# Patient Record
Sex: MALE | Race: BLACK OR AFRICAN AMERICAN | NOT HISPANIC OR LATINO | Employment: OTHER | URBAN - NONMETROPOLITAN AREA
[De-identification: names, ages, dates, MRNs, and addresses within clinical notes are randomized per-mention and may not be internally consistent; named-entity substitution may affect disease eponyms.]

---

## 2017-06-07 ENCOUNTER — APPOINTMENT (OUTPATIENT)
Dept: GENERAL RADIOLOGY | Facility: HOSPITAL | Age: 77
End: 2017-06-07

## 2017-06-07 ENCOUNTER — APPOINTMENT (OUTPATIENT)
Dept: CT IMAGING | Facility: HOSPITAL | Age: 77
End: 2017-06-07

## 2017-06-07 ENCOUNTER — HOSPITAL ENCOUNTER (OUTPATIENT)
Facility: HOSPITAL | Age: 77
Setting detail: OBSERVATION
Discharge: HOME | End: 2017-06-09
Attending: EMERGENCY MEDICINE | Admitting: HOSPITALIST

## 2017-06-07 DIAGNOSIS — D72.829 LEUKOCYTOSIS, UNSPECIFIED TYPE: ICD-10-CM

## 2017-06-07 DIAGNOSIS — R41.82 ALTERED MENTAL STATUS, UNSPECIFIED: Primary | ICD-10-CM

## 2017-06-07 DIAGNOSIS — R53.81 PHYSICAL DECONDITIONING: ICD-10-CM

## 2017-06-07 DIAGNOSIS — N28.9 RENAL INSUFFICIENCY: ICD-10-CM

## 2017-06-07 LAB
ALBUMIN SERPL-MCNC: 4.4 G/DL (ref 3.4–4.8)
ALBUMIN/GLOB SERPL: 1.2 G/DL (ref 1.1–1.8)
ALP SERPL-CCNC: 127 U/L (ref 38–126)
ALT SERPL W P-5'-P-CCNC: 27 U/L (ref 21–72)
ANION GAP SERPL CALCULATED.3IONS-SCNC: 14 MMOL/L (ref 5–15)
AST SERPL-CCNC: 32 U/L (ref 17–59)
BACTERIA UR QL AUTO: ABNORMAL /HPF
BASOPHILS # BLD AUTO: 0.01 10*3/MM3 (ref 0–0.2)
BASOPHILS NFR BLD AUTO: 0.1 % (ref 0–2)
BILIRUB SERPL-MCNC: 0.8 MG/DL (ref 0.2–1.3)
BILIRUB UR QL STRIP: NEGATIVE
BUN BLD-MCNC: 36 MG/DL (ref 7–21)
BUN/CREAT SERPL: 16.3 (ref 7–25)
CALCIUM SPEC-SCNC: 9 MG/DL (ref 8.4–10.2)
CHLORIDE SERPL-SCNC: 97 MMOL/L (ref 95–110)
CK MB SERPL-CCNC: 0.68 NG/ML (ref 0–5)
CK SERPL-CCNC: 77 U/L (ref 55–170)
CLARITY UR: CLEAR
CLUMPED PLATELETS: NORMAL
CO2 SERPL-SCNC: 23 MMOL/L (ref 22–31)
COLOR UR: YELLOW
CREAT BLD-MCNC: 2.21 MG/DL (ref 0.7–1.3)
DACRYOCYTES BLD QL SMEAR: NORMAL
DEPRECATED RDW RBC AUTO: 41.3 FL (ref 35.1–43.9)
EOSINOPHIL # BLD AUTO: 0.01 10*3/MM3 (ref 0–0.7)
EOSINOPHIL NFR BLD AUTO: 0.1 % (ref 0–7)
ERYTHROCYTE [DISTWIDTH] IN BLOOD BY AUTOMATED COUNT: 14.9 % (ref 11.5–14.5)
GFR SERPL CREATININE-BSD FRML MDRD: 35 ML/MIN/1.73 (ref 42–98)
GLOBULIN UR ELPH-MCNC: 3.6 GM/DL (ref 2.3–3.5)
GLUCOSE BLD-MCNC: 259 MG/DL (ref 60–100)
GLUCOSE BLDC GLUCOMTR-MCNC: 222 MG/DL (ref 70–130)
GLUCOSE UR STRIP-MCNC: ABNORMAL MG/DL
HCT VFR BLD AUTO: 47.5 % (ref 39–49)
HGB BLD-MCNC: 16.7 G/DL (ref 13.7–17.3)
HGB UR QL STRIP.AUTO: ABNORMAL
HOLD SPECIMEN: NORMAL
HOLD SPECIMEN: NORMAL
HYALINE CASTS UR QL AUTO: ABNORMAL /LPF
IMM GRANULOCYTES # BLD: 0.04 10*3/MM3 (ref 0–0.02)
IMM GRANULOCYTES NFR BLD: 0.3 % (ref 0–0.5)
INR PPP: 2.02 (ref 0.8–1.2)
KETONES UR QL STRIP: ABNORMAL
LEUKOCYTE ESTERASE UR QL STRIP.AUTO: NEGATIVE
LYMPHOCYTES # BLD AUTO: 0.66 10*3/MM3 (ref 0.6–4.2)
LYMPHOCYTES NFR BLD AUTO: 4.7 % (ref 10–50)
MCH RBC QN AUTO: 26.6 PG (ref 26.5–34)
MCHC RBC AUTO-ENTMCNC: 35.2 G/DL (ref 31.5–36.3)
MCV RBC AUTO: 75.8 FL (ref 80–98)
MICROCYTES BLD QL: NORMAL
MONOCYTES # BLD AUTO: 0.41 10*3/MM3 (ref 0–0.9)
MONOCYTES NFR BLD AUTO: 2.9 % (ref 0–12)
NEUTROPHILS # BLD AUTO: 13 10*3/MM3 (ref 2–8.6)
NEUTROPHILS NFR BLD AUTO: 91.9 % (ref 37–80)
NITRITE UR QL STRIP: NEGATIVE
NRBC BLD MANUAL-RTO: 0 /100 WBC (ref 0–0)
PH UR STRIP.AUTO: <=5 [PH] (ref 5–9)
PLATELET # BLD AUTO: 224 10*3/MM3 (ref 150–450)
PMV BLD AUTO: 11.4 FL (ref 8–12)
POTASSIUM BLD-SCNC: 4.9 MMOL/L (ref 3.5–5.1)
PROT SERPL-MCNC: 8 G/DL (ref 6.3–8.6)
PROT UR QL STRIP: ABNORMAL
PROTHROMBIN TIME: 23 SECONDS (ref 11.1–15.3)
RBC # BLD AUTO: 6.27 10*6/MM3 (ref 4.37–5.74)
RBC # UR: ABNORMAL /HPF
REF LAB TEST METHOD: ABNORMAL
SMALL PLATELETS BLD QL SMEAR: ADEQUATE
SODIUM BLD-SCNC: 134 MMOL/L (ref 137–145)
SP GR UR STRIP: 1.02 (ref 1–1.03)
SQUAMOUS #/AREA URNS HPF: ABNORMAL /HPF
TROPONIN I SERPL-MCNC: <0.012 NG/ML
UROBILINOGEN UR QL STRIP: ABNORMAL
WBC MORPH BLD: NORMAL
WBC NRBC COR # BLD: 14.13 10*3/MM3 (ref 3.2–9.8)
WBC UR QL AUTO: ABNORMAL /HPF
WHOLE BLOOD HOLD SPECIMEN: NORMAL
WHOLE BLOOD HOLD SPECIMEN: NORMAL

## 2017-06-07 PROCEDURE — 82962 GLUCOSE BLOOD TEST: CPT

## 2017-06-07 PROCEDURE — 87040 BLOOD CULTURE FOR BACTERIA: CPT | Performed by: EMERGENCY MEDICINE

## 2017-06-07 PROCEDURE — 84484 ASSAY OF TROPONIN QUANT: CPT | Performed by: EMERGENCY MEDICINE

## 2017-06-07 PROCEDURE — 82553 CREATINE MB FRACTION: CPT | Performed by: EMERGENCY MEDICINE

## 2017-06-07 PROCEDURE — 82550 ASSAY OF CK (CPK): CPT | Performed by: EMERGENCY MEDICINE

## 2017-06-07 PROCEDURE — 96375 TX/PRO/DX INJ NEW DRUG ADDON: CPT

## 2017-06-07 PROCEDURE — 99285 EMERGENCY DEPT VISIT HI MDM: CPT

## 2017-06-07 PROCEDURE — 85025 COMPLETE CBC W/AUTO DIFF WBC: CPT | Performed by: EMERGENCY MEDICINE

## 2017-06-07 PROCEDURE — 93010 ELECTROCARDIOGRAM REPORT: CPT | Performed by: INTERNAL MEDICINE

## 2017-06-07 PROCEDURE — 96374 THER/PROPH/DIAG INJ IV PUSH: CPT

## 2017-06-07 PROCEDURE — 85610 PROTHROMBIN TIME: CPT | Performed by: EMERGENCY MEDICINE

## 2017-06-07 PROCEDURE — G0378 HOSPITAL OBSERVATION PER HR: HCPCS

## 2017-06-07 PROCEDURE — 80053 COMPREHEN METABOLIC PANEL: CPT | Performed by: EMERGENCY MEDICINE

## 2017-06-07 PROCEDURE — 70450 CT HEAD/BRAIN W/O DYE: CPT

## 2017-06-07 PROCEDURE — 85007 BL SMEAR W/DIFF WBC COUNT: CPT | Performed by: EMERGENCY MEDICINE

## 2017-06-07 PROCEDURE — 81001 URINALYSIS AUTO W/SCOPE: CPT | Performed by: EMERGENCY MEDICINE

## 2017-06-07 PROCEDURE — 93005 ELECTROCARDIOGRAM TRACING: CPT | Performed by: EMERGENCY MEDICINE

## 2017-06-07 PROCEDURE — 71010 HC CHEST PA OR AP: CPT

## 2017-06-07 PROCEDURE — 25010000002 CEFTRIAXONE: Performed by: EMERGENCY MEDICINE

## 2017-06-07 RX ORDER — BUDESONIDE 0.5 MG/2ML
0.5 INHALANT ORAL
COMMUNITY
Start: 2016-01-26

## 2017-06-07 RX ORDER — PILOCARPINE HYDROCHLORIDE 10 MG/ML
1 SOLUTION/ DROPS OPHTHALMIC
COMMUNITY
Start: 2016-01-26 | End: 2020-01-01 | Stop reason: HOSPADM

## 2017-06-07 RX ORDER — LANSOPRAZOLE 30 MG/1
30 CAPSULE, DELAYED RELEASE ORAL
Status: ON HOLD | COMMUNITY
Start: 2017-04-10 | End: 2021-01-01

## 2017-06-07 RX ORDER — AMLODIPINE BESYLATE 5 MG/1
TABLET ORAL
COMMUNITY
Start: 2017-03-07 | End: 2020-01-01 | Stop reason: HOSPADM

## 2017-06-07 RX ORDER — BRINZOLAMIDE 10 MG/ML
1 SUSPENSION/ DROPS OPHTHALMIC 3 TIMES DAILY
COMMUNITY
Start: 2016-01-26

## 2017-06-07 RX ORDER — CLONIDINE HYDROCHLORIDE 0.2 MG/1
0.2 TABLET ORAL
COMMUNITY
Start: 2017-04-10 | End: 2020-01-01 | Stop reason: HOSPADM

## 2017-06-07 RX ORDER — OXYBUTYNIN CHLORIDE 10 MG/1
TABLET, EXTENDED RELEASE ORAL
COMMUNITY
Start: 2016-09-05 | End: 2020-01-01 | Stop reason: HOSPADM

## 2017-06-07 RX ORDER — WARFARIN SODIUM 5 MG/1
TABLET ORAL
COMMUNITY
Start: 2017-04-10 | End: 2020-01-01 | Stop reason: HOSPADM

## 2017-06-07 RX ORDER — IPRATROPIUM BROMIDE AND ALBUTEROL SULFATE 2.5; .5 MG/3ML; MG/3ML
3 SOLUTION RESPIRATORY (INHALATION) EVERY 6 HOURS
COMMUNITY
Start: 2016-01-26 | End: 2020-01-01 | Stop reason: HOSPADM

## 2017-06-07 RX ORDER — HYDROCODONE BITARTRATE AND ACETAMINOPHEN 7.5; 325 MG/1; MG/1
1 TABLET ORAL EVERY 6 HOURS PRN
COMMUNITY
End: 2020-01-01 | Stop reason: HOSPADM

## 2017-06-07 RX ORDER — LATANOPROST 50 UG/ML
1 SOLUTION/ DROPS OPHTHALMIC NIGHTLY
COMMUNITY
Start: 2016-01-26

## 2017-06-07 RX ORDER — SODIUM CHLORIDE 9 MG/ML
125 INJECTION, SOLUTION INTRAVENOUS CONTINUOUS
Status: DISCONTINUED | OUTPATIENT
Start: 2017-06-07 | End: 2017-06-09 | Stop reason: HOSPADM

## 2017-06-07 RX ORDER — SODIUM CHLORIDE 0.9 % (FLUSH) 0.9 %
10 SYRINGE (ML) INJECTION AS NEEDED
Status: DISCONTINUED | OUTPATIENT
Start: 2017-06-07 | End: 2017-06-09 | Stop reason: HOSPADM

## 2017-06-07 RX ORDER — ALBUTEROL SULFATE 90 UG/1
2 AEROSOL, METERED RESPIRATORY (INHALATION) EVERY 6 HOURS
COMMUNITY
End: 2020-01-01 | Stop reason: HOSPADM

## 2017-06-07 RX ORDER — HYDROCODONE BITARTRATE AND ACETAMINOPHEN 7.5; 325 MG/1; MG/1
1 TABLET ORAL ONCE
Status: COMPLETED | OUTPATIENT
Start: 2017-06-07 | End: 2017-06-07

## 2017-06-07 RX ORDER — DESLORATADINE 5 MG/1
5 TABLET ORAL
COMMUNITY
Start: 2017-04-10 | End: 2020-01-01 | Stop reason: HOSPADM

## 2017-06-07 RX ORDER — LABETALOL HYDROCHLORIDE 5 MG/ML
40 INJECTION, SOLUTION INTRAVENOUS ONCE
Status: COMPLETED | OUTPATIENT
Start: 2017-06-07 | End: 2017-06-07

## 2017-06-07 RX ADMIN — Medication 10 ML: at 22:27

## 2017-06-07 RX ADMIN — HYDROCODONE BITARTRATE AND ACETAMINOPHEN 1 TABLET: 7.5; 325 TABLET ORAL at 22:43

## 2017-06-07 RX ADMIN — CEFTRIAXONE 1 G: 1 INJECTION, POWDER, FOR SOLUTION INTRAMUSCULAR; INTRAVENOUS at 22:42

## 2017-06-07 RX ADMIN — Medication 10 ML: at 22:00

## 2017-06-07 RX ADMIN — SODIUM CHLORIDE 125 ML/HR: 900 INJECTION, SOLUTION INTRAVENOUS at 23:56

## 2017-06-07 RX ADMIN — LABETALOL HYDROCHLORIDE 40 MG: 5 INJECTION, SOLUTION INTRAVENOUS at 22:27

## 2017-06-08 LAB
ANION GAP SERPL CALCULATED.3IONS-SCNC: 13 MMOL/L (ref 5–15)
BUN BLD-MCNC: 44 MG/DL (ref 7–21)
BUN/CREAT SERPL: 16.3 (ref 7–25)
CALCIUM SPEC-SCNC: 8.7 MG/DL (ref 8.4–10.2)
CHLORIDE SERPL-SCNC: 100 MMOL/L (ref 95–110)
CO2 SERPL-SCNC: 25 MMOL/L (ref 22–31)
CREAT BLD-MCNC: 2.7 MG/DL (ref 0.7–1.3)
GFR SERPL CREATININE-BSD FRML MDRD: 28 ML/MIN/1.73 (ref 42–98)
GLUCOSE BLD-MCNC: 251 MG/DL (ref 60–100)
GLUCOSE BLDC GLUCOMTR-MCNC: 222 MG/DL (ref 70–130)
GLUCOSE BLDC GLUCOMTR-MCNC: 268 MG/DL (ref 70–130)
GLUCOSE BLDC GLUCOMTR-MCNC: 272 MG/DL (ref 70–130)
GLUCOSE BLDC GLUCOMTR-MCNC: 299 MG/DL (ref 70–130)
GLUCOSE BLDC GLUCOMTR-MCNC: 356 MG/DL (ref 70–130)
INR PPP: 2.05 (ref 0.8–1.2)
INR PPP: 2.19 (ref 0.8–1.2)
POTASSIUM BLD-SCNC: 4.1 MMOL/L (ref 3.5–5.1)
PROTHROMBIN TIME: 23.3 SECONDS (ref 11.1–15.3)
PROTHROMBIN TIME: 24.5 SECONDS (ref 11.1–15.3)
SODIUM BLD-SCNC: 138 MMOL/L (ref 137–145)
TROPONIN I SERPL-MCNC: <0.012 NG/ML
TROPONIN I SERPL-MCNC: <0.012 NG/ML
WHOLE BLOOD HOLD SPECIMEN: NORMAL

## 2017-06-08 PROCEDURE — 63710000001 INSULIN DETEMIR PER 5 UNITS: Performed by: HOSPITALIST

## 2017-06-08 PROCEDURE — 84484 ASSAY OF TROPONIN QUANT: CPT | Performed by: HOSPITALIST

## 2017-06-08 PROCEDURE — 87040 BLOOD CULTURE FOR BACTERIA: CPT | Performed by: EMERGENCY MEDICINE

## 2017-06-08 PROCEDURE — 82962 GLUCOSE BLOOD TEST: CPT

## 2017-06-08 PROCEDURE — 80048 BASIC METABOLIC PNL TOTAL CA: CPT | Performed by: NURSE PRACTITIONER

## 2017-06-08 PROCEDURE — G0378 HOSPITAL OBSERVATION PER HR: HCPCS

## 2017-06-08 PROCEDURE — 94640 AIRWAY INHALATION TREATMENT: CPT

## 2017-06-08 PROCEDURE — 85610 PROTHROMBIN TIME: CPT | Performed by: HOSPITALIST

## 2017-06-08 PROCEDURE — 63710000001 INSULIN ASPART PER 5 UNITS: Performed by: NURSE PRACTITIONER

## 2017-06-08 PROCEDURE — 94799 UNLISTED PULMONARY SVC/PX: CPT

## 2017-06-08 PROCEDURE — 94760 N-INVAS EAR/PLS OXIMETRY 1: CPT

## 2017-06-08 PROCEDURE — 25010000002 CEFTRIAXONE: Performed by: HOSPITALIST

## 2017-06-08 RX ORDER — IPRATROPIUM BROMIDE AND ALBUTEROL SULFATE 2.5; .5 MG/3ML; MG/3ML
3 SOLUTION RESPIRATORY (INHALATION) EVERY 6 HOURS PRN
Status: DISCONTINUED | OUTPATIENT
Start: 2017-06-08 | End: 2017-06-09 | Stop reason: HOSPADM

## 2017-06-08 RX ORDER — SODIUM CHLORIDE 0.9 % (FLUSH) 0.9 %
1-10 SYRINGE (ML) INJECTION AS NEEDED
Status: DISCONTINUED | OUTPATIENT
Start: 2017-06-08 | End: 2017-06-09 | Stop reason: HOSPADM

## 2017-06-08 RX ORDER — CLONIDINE HYDROCHLORIDE 0.2 MG/1
0.2 TABLET ORAL EVERY 8 HOURS SCHEDULED
Status: DISCONTINUED | OUTPATIENT
Start: 2017-06-08 | End: 2017-06-09 | Stop reason: HOSPADM

## 2017-06-08 RX ORDER — WARFARIN SODIUM 5 MG/1
5 TABLET ORAL
Status: DISCONTINUED | OUTPATIENT
Start: 2017-06-08 | End: 2017-06-08

## 2017-06-08 RX ORDER — AMLODIPINE BESYLATE 5 MG/1
5 TABLET ORAL
Status: DISCONTINUED | OUTPATIENT
Start: 2017-06-08 | End: 2017-06-09 | Stop reason: HOSPADM

## 2017-06-08 RX ORDER — IPRATROPIUM BROMIDE AND ALBUTEROL SULFATE 2.5; .5 MG/3ML; MG/3ML
3 SOLUTION RESPIRATORY (INHALATION)
Status: DISCONTINUED | OUTPATIENT
Start: 2017-06-08 | End: 2017-06-08

## 2017-06-08 RX ORDER — HYDROCODONE BITARTRATE AND ACETAMINOPHEN 7.5; 325 MG/1; MG/1
1 TABLET ORAL EVERY 6 HOURS PRN
Status: DISCONTINUED | OUTPATIENT
Start: 2017-06-08 | End: 2017-06-09 | Stop reason: HOSPADM

## 2017-06-08 RX ORDER — ALBUTEROL SULFATE 90 UG/1
2 AEROSOL, METERED RESPIRATORY (INHALATION) EVERY 6 HOURS
Status: DISCONTINUED | OUTPATIENT
Start: 2017-06-08 | End: 2017-06-08 | Stop reason: CLARIF

## 2017-06-08 RX ORDER — DEXTROSE MONOHYDRATE 25 G/50ML
25 INJECTION, SOLUTION INTRAVENOUS
Status: DISCONTINUED | OUTPATIENT
Start: 2017-06-08 | End: 2017-06-09 | Stop reason: HOSPADM

## 2017-06-08 RX ORDER — NALOXONE HCL 0.4 MG/ML
0.4 VIAL (ML) INJECTION
Status: DISCONTINUED | OUTPATIENT
Start: 2017-06-08 | End: 2017-06-09 | Stop reason: HOSPADM

## 2017-06-08 RX ORDER — PANTOPRAZOLE SODIUM 40 MG/1
40 TABLET, DELAYED RELEASE ORAL EVERY MORNING
Status: DISCONTINUED | OUTPATIENT
Start: 2017-06-08 | End: 2017-06-09 | Stop reason: HOSPADM

## 2017-06-08 RX ORDER — NICOTINE POLACRILEX 4 MG
15 LOZENGE BUCCAL
Status: DISCONTINUED | OUTPATIENT
Start: 2017-06-08 | End: 2017-06-09 | Stop reason: HOSPADM

## 2017-06-08 RX ORDER — MORPHINE SULFATE 2 MG/ML
1 INJECTION, SOLUTION INTRAMUSCULAR; INTRAVENOUS EVERY 4 HOURS PRN
Status: DISCONTINUED | OUTPATIENT
Start: 2017-06-08 | End: 2017-06-09 | Stop reason: HOSPADM

## 2017-06-08 RX ORDER — WARFARIN SODIUM 5 MG/1
5 TABLET ORAL
Status: DISCONTINUED | OUTPATIENT
Start: 2017-06-08 | End: 2017-06-09 | Stop reason: HOSPADM

## 2017-06-08 RX ORDER — WARFARIN SODIUM 7.5 MG/1
7.5 TABLET ORAL
Status: DISCONTINUED | OUTPATIENT
Start: 2017-06-09 | End: 2017-06-09 | Stop reason: HOSPADM

## 2017-06-08 RX ORDER — BUDESONIDE 0.5 MG/2ML
0.5 INHALANT ORAL
Status: DISCONTINUED | OUTPATIENT
Start: 2017-06-08 | End: 2017-06-09 | Stop reason: HOSPADM

## 2017-06-08 RX ADMIN — BUDESONIDE 0.5 MG: 0.5 INHALANT RESPIRATORY (INHALATION) at 19:50

## 2017-06-08 RX ADMIN — IPRATROPIUM BROMIDE AND ALBUTEROL SULFATE 3 ML: 2.5; .5 SOLUTION RESPIRATORY (INHALATION) at 06:45

## 2017-06-08 RX ADMIN — INSULIN ASPART 6 UNITS: 100 INJECTION, SOLUTION INTRAVENOUS; SUBCUTANEOUS at 11:09

## 2017-06-08 RX ADMIN — INSULIN ASPART 8 UNITS: 100 INJECTION, SOLUTION INTRAVENOUS; SUBCUTANEOUS at 21:20

## 2017-06-08 RX ADMIN — INSULIN DETEMIR 15 UNITS: 100 INJECTION, SOLUTION SUBCUTANEOUS at 08:22

## 2017-06-08 RX ADMIN — SODIUM CHLORIDE 125 ML/HR: 900 INJECTION, SOLUTION INTRAVENOUS at 11:13

## 2017-06-08 RX ADMIN — CEFTRIAXONE 1 G: 1 INJECTION, POWDER, FOR SOLUTION INTRAMUSCULAR; INTRAVENOUS at 21:33

## 2017-06-08 RX ADMIN — CLONIDINE HYDROCHLORIDE 0.2 MG: 0.2 TABLET ORAL at 21:20

## 2017-06-08 RX ADMIN — INSULIN DETEMIR 15 UNITS: 100 INJECTION, SOLUTION SUBCUTANEOUS at 18:09

## 2017-06-08 RX ADMIN — AMLODIPINE BESYLATE 5 MG: 5 TABLET ORAL at 08:22

## 2017-06-08 RX ADMIN — CLONIDINE HYDROCHLORIDE 0.2 MG: 0.2 TABLET ORAL at 15:24

## 2017-06-08 RX ADMIN — WARFARIN SODIUM 5 MG: 5 TABLET ORAL at 18:09

## 2017-06-08 RX ADMIN — BUDESONIDE 0.5 MG: 0.5 INHALANT RESPIRATORY (INHALATION) at 06:45

## 2017-06-08 RX ADMIN — INSULIN ASPART 6 UNITS: 100 INJECTION, SOLUTION INTRAVENOUS; SUBCUTANEOUS at 18:09

## 2017-06-08 RX ADMIN — CLONIDINE HYDROCHLORIDE 0.2 MG: 0.2 TABLET ORAL at 06:21

## 2017-06-08 RX ADMIN — PANTOPRAZOLE SODIUM 40 MG: 40 TABLET, DELAYED RELEASE ORAL at 06:21

## 2017-06-08 NOTE — CONSULTS
Discharge Planning Assessment  [facility]     Patient Name: Ondina Alanis Sr.  MRN: [id number]  Today's Date: 6/8/2017    Admit Date: 6/7/2017          Discharge Needs Assessment       06/08/17 1626    Living Environment    Lives With spouse    Living Arrangements house    Transportation Available family or friend will provide    Living Environment    Provides Primary Care For no one    Primary Care Provided By spouse/significant other    Quality Of Family Relationships supportive    Able to Return to Prior Living Arrangements yes    Living Arrangement Comments lives with spouse.     Discharge Needs Assessment    Concerns To Be Addressed discharge planning concerns   dme    Concerns Comments Pt's wife requested a wheelchair and transfer tub bench from [facility]. If HHC is ordered caretenders is preferred    Anticipated Changes Related to Illness inability to care for self    Equipment Currently Used at Home walker, rolling;commode   has rollator also    Equipment Needed After Discharge bath bench;wheelchair    Discharge Facility/Level Of Care Needs home with home health    Current Discharge Risk cognitively impaired    Discharge Planning Comments anticipate home vs. home with c. Pt's wife has assistance to get him up the 4 steps to the house. She said that the son is planning on building a wheelchair ramp. His ins. covers meds. through [employer] mail order. For short term meds, [facility].             Discharge Plan       06/08/17 1637    Case Management/Social Work Plan    Plan home vs. home with [facility]    Patient/Family In Agreement With Plan yes        Discharge Placement     No information found        Expected Discharge Date and Time     Expected Discharge Date Expected Discharge Time    Jun 9, 2017               Demographic Summary     None            Functional Status     None            Psychosocial     None            Abuse/Neglect     None            Legal     None            Substance Abuse      None            Patient Forms       06/08/17 1626    Patient Forms    Patient Observation Letter Delivered    Delivered to Support person   wife    Method of delivery In person   patient is disoriented          Geovanna Omalley RN

## 2017-06-08 NOTE — PROGRESS NOTES
"Anticoagulation by Pharmacy - Warfarin    Ondina Alanis Sr. is a 77 y.o.male  [Ht: 76\" (193 cm); Wt: 240 lb (109 kg)] on Warfarin 5 mg on Tuesday, Thursday and Saturday with 7.5mg on Sunday, Monday, Wednesday and Friday PO  for indication of PE.    Goal INR: 2-3  Today's INR:   Lab Results   Component Value Date    INR 2.19 (H) 06/08/2017         Lab Results   Component Value Date    INR 2.19 (H) 06/08/2017    INR 2.05 (H) 06/08/2017    INR 2.02 (H) 06/07/2017    PROTIME 24.5 (H) 06/08/2017    PROTIME 23.3 (H) 06/08/2017    PROTIME 23.0 (H) 06/07/2017     Lab Results   Component Value Date    HGB 16.7 06/07/2017    HGB 14.5 01/17/2017    HGB 14.9 01/16/2017     Lab Results   Component Value Date    HCT 47.5 06/07/2017    HCT 43.1 01/17/2017    HCT 44.2 01/16/2017     Assessment/Plan:  Reviewed above labs. INR is 2.19.  INR is at goal.Reviewed current medication list.  Will continue current home dose as listed above. Rx will continue to follow and adjust dose accordingly.      Fany Byers [facility]  06/08/17 10:10 AM     "

## 2017-06-08 NOTE — H&P
[facility] Admission      Date of Admission: 6/7/2017      Primary Care Physician: Ralph Hensley MD    Following information is obtained partially from patient, family and/or medical records.    Time of patient encounter: 6/7/17 11:45PM    Chief Complaint:   Chief Complaint   Patient presents with   • Altered Mental Status       HPI: Pt is a 77 y.o. male with known history of possible dementia presenting to the ER with altered mental status.  Patient lives with home with his wife who reports that normally he obeys commands and is helpful in activities of daily living.  However today.  Family reports that he has been uncooperative.  He suspects that he is doing requested activity however he is not able to follow commands.  Family reports the patient has been progressively weakening to the point that he is now unable to enter his house.  Family report denies any neurological findings including focal deficits.  Denies any fever or chills.  Denies any cough or congestion.  Denies any dysuria hematuria.  Denies any nausea vomiting or diarrhea.       Concurrent Medical History:   Patient Active Problem List   Diagnosis   • Altered mental status, unspecified       Past Surgical History:   Past Surgical History:   Procedure Laterality Date   • BACK SURGERY     • KNEE SURGERY Left    • PROSTATE SURGERY         Family History: family history is not on file.    Social History:  reports that he has never smoked. He does not have any smokeless tobacco history on file. He reports that he does not drink alcohol or use illicit drugs.    Allergies:   Allergies   Allergen Reactions   • Contrast Dye        Home Medications:   Prior to Admission medications    Medication Sig Start Date End Date Taking? Authorizing Provider   amLODIPine (NORVASC) 5 MG tablet TAKE 1 TABLET DAILY 3/7/17  Yes Historical Provider, MD   brimonidine (ALPHAGAN P) 0.1 % solution ophthalmic solution 1 drop.  1/26/16  Yes Historical Provider, MD   brinzolamide (AZOPT) 1 % ophthalmic suspension 1 drop. 1/26/16  Yes Historical Provider, MD   budesonide (PULMICORT) 0.5 MG/2ML nebulizer solution Inhale 0.5 mg. 1/26/16  Yes Historical Provider, MD   CloNIDine (CATAPRES) 0.2 MG tablet Take 0.2 mg by mouth. 4/10/17  Yes Historical Provider, MD   desloratadine (CLARINEX) 5 MG tablet Take 5 mg by mouth. 4/10/17  Yes Historical Provider, MD   HYDROcodone-acetaminophen (NORCO) 7.5-325 MG per tablet Take 1 tablet by mouth Every 6 (Six) Hours As Needed for Moderate Pain (4-6).   Yes Historical Provider, MD   Insulin Detemir (LEVEMIR SC) Inject 15 Units under the skin 2 (Two) Times a Day. 4/14/17  Yes Historical Provider, MD   Insulin Lispro (HUMALOG) 100 UNIT/ML solution pen-injector Inject 12 Units under the skin. 4/10/17  Yes Historical Provider, MD   ipratropium-albuterol (DUO-NEB) 0.5-2.5 mg/mL nebulizer Inhale 3 mL Every 6 (Six) Hours. 1/26/16  Yes Historical Provider, MD   lansoprazole (PREVACID) 30 MG capsule Take 30 mg by mouth. 4/10/17  Yes Historical Provider, MD   latanoprost (XALATAN) 0.005 % ophthalmic solution 1 drop. 1/26/16  Yes Historical Provider, MD   oxybutynin XL (DITROPAN-XL) 10 MG 24 hr tablet  9/5/16  Yes Historical Provider, MD   pilocarpine (PILOCAR) 1 % ophthalmic solution 1 drop. 1/26/16  Yes Historical Provider, MD   warfarin (COUMADIN) 5 MG tablet take 1 and 1/2 tablets SUN MON WED FRI and 1 tablet all other days for MYOCARDIAL REINFARCTION PREVENTION 4/10/17  Yes Historical Provider, MD   albuterol (PROVENTIL HFA;VENTOLIN HFA) 108 (90 BASE) MCG/ACT inhaler Inhale 2 puffs Every 6 (Six) Hours.    Historical Provider, MD       Review of Systems:  Review of Systems   Unable to perform ROS: Mental status change      Otherwise complete ROS is negative except as mentioned above and in HPI.    Physical Exam:   Temp:  [98.8 °F (37.1 °C)] 98.8 °F (37.1 °C)  Heart Rate:  [78-94] 78  Resp:  [18] 18  BP:  (165-195)/() 168/72  Physical Exam   Constitutional: He is oriented to person, place, and time. He appears well-developed and well-nourished.   HENT:   Head: Normocephalic and atraumatic.   Nose: Nose normal.   Mouth/Throat: Oropharynx is clear and moist.   Eyes: Conjunctivae are normal. Pupils are equal, round, and reactive to light. No scleral icterus.   Patient is bilaterally blind.   Neck: No tracheal deviation present.   Cardiovascular: Normal heart sounds.  Exam reveals no friction rub.    Pulmonary/Chest: Effort normal and breath sounds normal. No respiratory distress. He has no wheezes. He has no rales.   Abdominal: Soft. Bowel sounds are normal. He exhibits no distension. There is no tenderness.   Musculoskeletal: Normal range of motion.   Neurological: He is alert and oriented to person, place, and time.   Alert and oriented ×2.  Not oriented to time.   Skin: Skin is warm and dry.         Results Reviewed:  I have personally reviewed current lab, radiology, and data and agree with results.  Lab Results (last 24 hours)     Procedure Component Value Units Date/Time    CBC Auto Differential [[id number]]  (Abnormal) Collected:  06/07/17 2107    Specimen:  Blood Updated:  06/07/17 2120     WBC 14.13 (H) 10*3/mm3      RBC 6.27 (H) 10*6/mm3      Hemoglobin 16.7 g/dL      Hematocrit 47.5 %      MCV 75.8 (L) fL      MCH 26.6 pg      MCHC 35.2 g/dL      RDW 14.9 (H) %      RDW-SD 41.3 fl      MPV 11.4 fL      Platelets 224 10*3/mm3      Neutrophil % 91.9 (H) %      Lymphocyte % 4.7 (L) %      Monocyte % 2.9 %      Eosinophil % 0.1 %      Basophil % 0.1 %      Immature Grans % 0.3 %      Neutrophils, Absolute 13.00 (H) 10*3/mm3      Lymphocytes, Absolute 0.66 10*3/mm3      Monocytes, Absolute 0.41 10*3/mm3      Eosinophils, Absolute 0.01 10*3/mm3      Basophils, Absolute 0.01 10*3/mm3      Immature Grans, Absolute 0.04 (H) 10*3/mm3      nRBC 0.0 /100 WBC     CBC & Differential [[id number]] Collected:  06/07/17  2107    Specimen:  Blood Updated:  06/07/17 2139    Narrative:       The following orders were created for panel order CBC & Differential.  Procedure                               Abnormality         Status                     ---------                               -----------         ------                     Scan Slide[[id number]]                                       Final result               CBC Auto Differential[[id number]]        Abnormal            Final result                 Please view results for these tests on the individual orders.    Scan Slide [[id number]] Collected:  06/07/17 2107    Specimen:  Blood Updated:  06/07/17 2139     Dacrocytes Slight/1+     Microcytes Slight/1+     WBC Morphology Normal     Platelet Estimate Adequate     Clumped Platelets --      NONE SEEN       Comprehensive Metabolic Panel [[id number]]  (Abnormal) Collected:  06/07/17 2142    Specimen:  Blood Updated:  06/07/17 2200     Glucose 259 (H) mg/dL      BUN 36 (H) mg/dL      Creatinine 2.21 (H) mg/dL      Sodium 134 (L) mmol/L      Potassium 4.9 mmol/L      Chloride 97 mmol/L      CO2 23.0 mmol/L      Calcium 9.0 mg/dL      Total Protein 8.0 g/dL      Albumin 4.40 g/dL      ALT (SGPT) 27 U/L      AST (SGOT) 32 U/L      Alkaline Phosphatase 127 (H) U/L      Total Bilirubin 0.8 mg/dL      eGFR  African Amer 35 (L) mL/min/1.73      Globulin 3.6 (H) gm/dL      A/G Ratio 1.2 g/dL      BUN/Creatinine Ratio 16.3     Anion Gap 14.0 mmol/L     Narrative:       The MDRD GFR formula is only valid for adults with stable renal function between ages 18 and 70.    CK [[id number]]  (Normal) Collected:  06/07/17 2142    Specimen:  Blood Updated:  06/07/17 2200     Creatine Kinase 77 U/L     Urinalysis With / Culture If Indicated [[id number]]  (Abnormal) Collected:  06/07/17 2159    Specimen:  Urine from Urine, Catheter Updated:  06/07/17 2208     Color, UA Yellow     Appearance, UA Clear     pH, UA <=5.0     Specific Gravity, UA 1.020      Glucose, UA >=1000 mg/dL (3+) (A)     Ketones, UA Trace (A)     Bilirubin, UA Negative     Blood, UA Small (1+) (A)     Protein,  mg/dL (2+) (A)     Leuk Esterase, UA Negative     Nitrite, UA Negative     Urobilinogen, UA 0.2 E.U./dL    Urinalysis, Microscopic Only [[id number]]  (Abnormal) Collected:  06/07/17 2159    Specimen:  Urine from Urine, Catheter Updated:  06/07/17 2209     RBC, UA 0-2 (A) /HPF      WBC, UA None Seen /HPF      Bacteria, UA None Seen /HPF      Squamous Epithelial Cells, UA None Seen /HPF      Hyaline Casts, UA None Seen /LPF      Methodology Automated Microscopy    POC Glucose Fingerstick [[id number]]  (Abnormal) Collected:  06/07/17 2148    Specimen:  Blood Updated:  06/07/17 2211     Glucose 222 (H) mg/dL       RN NotifiedNotify DoctorMeter: [id number]: [id number] ML EVANGE       CK-MB [[id number]]  (Normal) Collected:  06/07/17 2142    Specimen:  Blood Updated:  06/07/17 2212     CKMB 0.68 ng/mL     Troponin [[id number]]  (Normal) Collected:  06/07/17 2142    Specimen:  Blood Updated:  06/07/17 2212     Troponin I <0.012 ng/mL     Protime-INR [[id number]]  (Abnormal) Collected:  06/07/17 2107    Specimen:  Blood Updated:  06/07/17 2234     Protime 23.0 (H) Seconds      INR 2.02 (H)    Narrative:       Therapeutic range for most indications is 2.0-3.0 INR,  or 2.5-3.5 for mechanical heart valves.    Blood Culture [[id number]] Collected:  06/07/17 2239    Specimen:  Blood from Arm, Left Updated:  06/07/17 2248    Light Blue Top [[id number]] Collected:  06/07/17 2107    Specimen:  Blood Updated:  06/07/17 2305     Extra Tube hold for add-on      Auto resulted       Lavender Top [[id number]] Collected:  06/07/17 2107    Specimen:  Blood Updated:  06/07/17 2305     Extra Tube hold for add-on      Auto resulted       Gold Top - SST [[id number]] Collected:  06/07/17 2107    Specimen:  Blood Updated:  06/07/17 2303     Extra Tube Hold for add-ons.      Auto resulted.       [place] Draw  [[id number]] Collected:  06/07/17 2107    Specimen:  Blood Updated:  06/07/17 2305    Narrative:       The following orders were created for panel order [place] Draw.  Procedure                               Abnormality         Status                     ---------                               -----------         ------                     Light Blue Top[[id number]]                                   Final result               Green Top (Gel)[[id number]]                                  Final result               Lavender Top[[id number]]                                     Final result               Gold Top - SST[[id number]]                                   Final result                 Please view results for these tests on the individual orders.    Green Top (Gel) [[id number]] Collected:  06/07/17 2142    Specimen:  Blood Updated:  06/07/17 2305     Extra Tube Hold for add-ons.      Auto resulted.           Imaging Results (last 24 hours)     Procedure Component Value Units Date/Time    XR Chest 1 View [[id number]] Collected:  06/07/17 2105     Updated:  06/07/17 2117    Narrative:         Chest single view on  6/7/2017     CLINICAL INDICATION: Altered mental status    COMPARISON: 1/16/2017    FINDINGS: There is moderate elevation of the right hemidiaphragm.  There is mild adjacent right basilar atelectasis. Lungs are  otherwise clear. Cardiac, hilar and mediastinal contours are  within normal limits. Pulmonary vascularity is within normal  limits.      Impression:       No acute disease.    Electronically signed by:  Tung Cruz  6/7/2017 9:17 PM CDT  Workstation: [id number]    CT Head Without Contrast [[id number]] Collected:  06/07/17 2129     Updated:  06/07/17 2145    Narrative:         CT head without contrast on 6/7/2017     CLINICAL INDICATION: Altered mental status    TECHNIQUE:  Multiple axial images are obtained throughout the  head without the administration of contrast.  This study was  performed  with techniques to keep radiation doses as low as  reasonably achievable, (ALARA).   Total DLP is 923.9 mGy*cm.    COMPARISON: 1/16/2017    FINDINGS:  There is generalized cerebral atrophy. There is low  density in the periventricular white matter consistent with  chronic small vessel ischemic changes. There is stable  ventriculomegaly that is not definitely out of proportion to the  degree of atrophy. There is no hemorrhage. There are no abnormal  extra-axial fluid collections. There is no mass, mass effect or  midline shift. There is no CT evidence of acute infarct. No bony  abnormality is noted.      Impression:       Atrophy and chronic small vessel ischemic changes  with no acute intracranial abnormality.    Electronically signed by:  Tung Cruz  6/7/2017 9:45 PM CDT  Workstation: [id number]          Assessment and Plan:  Questionable altered mental status: Workup in the ER has been essentially negative including urine and chest x-ray.  However, due to patient's progressive weakness will place patient in observation.  We will obtain MRI of the brain in the morning.      Tanner Bob MD  06/08/17  12:16 AM

## 2017-06-08 NOTE — ED PROVIDER NOTES
Subjective   HPI Comments: Patient presents with AMS via EMS.  Patient lives at home.  Minimal history as patient is poor historian.  Some hx of similar symptoms per family to EMS with prior UTI's.  Patient is baseline incontinent, unknown baseline mental status in terms of dementia.  No f/c.  Has been weak per his report.  No neurologic symptoms by history.  No cp/sob.  No syncope/presyncope.  Patient does appear to be oriented to place and time.       History provided by:  Patient      Review of Systems   Constitutional: Negative.  Negative for appetite change, chills and fever.   HENT: Negative.  Negative for congestion.    Eyes: Negative.  Negative for photophobia and visual disturbance.   Respiratory: Negative.  Negative for cough, chest tightness and shortness of breath.    Cardiovascular: Negative.  Negative for chest pain and palpitations.   Gastrointestinal: Negative.  Negative for abdominal pain, constipation, diarrhea, nausea and vomiting.   Endocrine: Negative.    Genitourinary: Negative.  Negative for decreased urine volume, dysuria, flank pain and hematuria.   Musculoskeletal: Positive for back pain. Negative for arthralgias, myalgias, neck pain and neck stiffness.   Skin: Negative.  Negative for pallor.   Neurological: Negative.  Negative for dizziness, syncope, weakness, light-headedness, numbness and headaches.   Psychiatric/Behavioral: Negative.  Negative for confusion and suicidal ideas. The patient is not nervous/anxious.        Past Medical History:   Diagnosis Date   • Asthma    • Diabetes mellitus    • GERD (gastroesophageal reflux disease)    • Hypertension    • Prostate disorder        Allergies   Allergen Reactions   • Contrast Dye        Past Surgical History:   Procedure Laterality Date   • BACK SURGERY     • KNEE SURGERY Left    • PROSTATE SURGERY         History reviewed. No pertinent family history.    Social History     Social History   • Marital status:      Spouse name: N/A    • Number of children: N/A   • Years of education: N/A     Social History Main Topics   • Smoking status: Never Smoker   • Smokeless tobacco: None   • Alcohol use No   • Drug use: No   • Sexual activity: Defer     Other Topics Concern   • None     Social History Narrative   • None           Objective   Physical Exam   Constitutional: He is oriented to person, place, and time. He appears well-developed and well-nourished. No distress.   HENT:   Head: Normocephalic and atraumatic.   Eyes: Conjunctivae and EOM are normal.   Neck: Normal range of motion. Neck supple. No JVD present.   Cardiovascular: Normal rate, regular rhythm, normal heart sounds and intact distal pulses.  Exam reveals no gallop and no friction rub.    No murmur heard.  Pulmonary/Chest: Effort normal. No respiratory distress. He has no wheezes. He has no rales. He exhibits no tenderness.   Abdominal: Soft. Bowel sounds are normal. He exhibits no distension and no mass. There is no tenderness. There is no rebound and no guarding.   Musculoskeletal: Normal range of motion.   Neurological: He is alert and oriented to person, place, and time.   Skin: Skin is warm and dry.   Psychiatric: Judgment and thought content normal. He is slowed. He is noncommunicative. He exhibits abnormal remote memory. He is inattentive.   Nursing note and vitals reviewed.      Procedures         ED Course  ED Course    Confusion with leukocytosis without source of infection identified at this time.  Patient with similar confusion prior with UTI etiology, though none at this time.  Empiric Rocephin given.  Mild renal insufficiency, some chronic insufficiency as well.  Will obs.               MDM    Final diagnoses:   Altered mental status, unspecified   Leukocytosis, unspecified type            Nima Lazcano MD  06/07/17 0695

## 2017-06-08 NOTE — PLAN OF CARE
Problem: Patient Care Overview (Adult)  Goal: Plan of Care Review  Outcome: Ongoing (interventions implemented as appropriate)    06/08/17 0339   Coping/Psychosocial Response Interventions   Plan Of Care Reviewed With patient;spouse   Patient Care Overview   Progress no change   Outcome Evaluation   Outcome Summary/Follow up Plan Pt has been resting since he came to the floor. He states he does not have pain. His blood pressure is in the 160's systolic, which is lower than it was in the ER.          Problem: Renal Failure/Kidney Injury, Acute (Adult)  Goal: Signs and Symptoms of Listed Potential Problems Will be Absent or Manageable (Renal Failure/Kidney Injury, Acute)  Outcome: Ongoing (interventions implemented as appropriate)

## 2017-06-08 NOTE — CONSULTS
Discharge Planning Assessment  [facility]     Patient Name: Ondina Alanis Sr.  MRN: [id number]  Today's Date: 6/8/2017    Admit Date: 6/7/2017          Discharge Needs Assessment       06/08/17 1626    Living Environment    Lives With spouse    Living Arrangements house    Transportation Available family or friend will provide    Living Environment    Provides Primary Care For no one    Primary Care Provided By spouse/significant other    Quality Of Family Relationships supportive    Able to Return to Prior Living Arrangements yes    Living Arrangement Comments lives with spouse.     Discharge Needs Assessment    Concerns To Be Addressed discharge planning concerns   dme    Concerns Comments Pt's wife requested a wheelchair and transfer tub bench from [facility]. If HHC is ordered caretenders is preferred    Anticipated Changes Related to Illness inability to care for self    Equipment Currently Used at Home walker, rolling;commode   has rollator also    Equipment Needed After Discharge bath bench;wheelchair    Discharge Facility/Level Of Care Needs home with home health    Current Discharge Risk cognitively impaired    Discharge Planning Comments anticipate home vs. home with hhc. Pt's wife has assistance to get him up the 4 steps to the house. She said that the son is planning on building a wheelchair ramp. His ins. covers meds. through [employer] mail order. For short term meds, [facility].             Discharge Plan     None        Discharge Placement     No information found                Demographic Summary     None            Functional Status     None            Psychosocial     None            Abuse/Neglect     None            Legal     None            Substance Abuse     None            Patient Forms       06/08/17 1626    Patient Forms    Patient Observation Letter Delivered    Delivered to Support person   wife    Method of delivery In person   patient is disoriented          Geovanna Omalley RN

## 2017-06-08 NOTE — PLAN OF CARE
Problem: Patient Care Overview (Adult)  Goal: Plan of Care Review    06/08/17 1007   Coping/Psychosocial Response Interventions   Plan Of Care Reviewed With patient;spouse   Patient Care Overview   Progress improving   Outcome Evaluation   Outcome Summary/Follow up Plan Pt has been resting well, ate breakfast well. Pt still disoriented to time however wife stated confusion is much better       Goal: Adult Individualization and Mutuality  Outcome: Ongoing (interventions implemented as appropriate)    Problem: Renal Failure/Kidney Injury, Acute (Adult)  Goal: Signs and Symptoms of Listed Potential Problems Will be Absent or Manageable (Renal Failure/Kidney Injury, Acute)  Outcome: Ongoing (interventions implemented as appropriate)

## 2017-06-08 NOTE — PROGRESS NOTES
[facility] Medicine Services  INPATIENT PROGRESS NOTE    Length of Stay: 0  Date of Admission: 6/7/2017  Primary Care Physician: Ralph Hensley MD    Subjective   Chief Complaint: confusion  HPI:  77 year old  male who presented with altered mental status.  He has a history of underlying dementia but wife reports that he was different from his baseline in the fact that he was unable to follow instructions for simple activities of daily living and was more forgetful (unable to recall the current year or president).  Wife denies focal deficits or changes in speech and no deficit noted on exam.  He is awaiting MRI of the brain this morning.     Review of Systems   Constitutional: Negative for chills and fever.   Respiratory: Negative for chest tightness, shortness of breath and wheezing.    Cardiovascular: Negative for chest pain, palpitations and leg swelling.   Gastrointestinal: Negative for abdominal pain, diarrhea, nausea and vomiting.   Musculoskeletal: Negative for back pain and neck pain.   Neurological: Negative for dizziness, weakness and headaches.   Psychiatric/Behavioral: Positive for confusion.        All pertinent negatives and positives are as above. All other systems have been reviewed and are negative unless otherwise stated.     Objective    Temp:  [98.2 °F (36.8 °C)-98.8 °F (37.1 °C)] 98.2 °F (36.8 °C)  Heart Rate:  [71-94] 80  Resp:  [16-20] 18  BP: (164-195)/() 164/84    Physical Exam   Constitutional: He appears well-developed and well-nourished.   HENT:   Head: Normocephalic.   Eyes: Conjunctivae and EOM are normal. Pupils are equal, round, and reactive to light.   Neck: Neck supple.   Cardiovascular: Normal rate, regular rhythm, normal heart sounds and intact distal pulses.    Pulmonary/Chest: Effort normal and breath sounds normal.   Abdominal: Soft. Bowel sounds are normal.   Neurological: He is alert.   Oriented to person and  place only    Skin: Skin is warm and dry.   Psychiatric: He has a normal mood and affect. His behavior is normal.   Vitals reviewed.          Results Review:  I have reviewed the labs, radiology results, and diagnostic studies.    Laboratory Data:     Results from last 7 days  Lab Units 06/08/17  0637 06/07/17  2142   SODIUM mmol/L 138 134*   POTASSIUM mmol/L 4.1 4.9   CHLORIDE mmol/L 100 97   TOTAL CO2 mmol/L 25.0 23.0   BUN mg/dL 44* 36*   CREATININE mg/dL 2.70* 2.21*   GLUCOSE mg/dL 251* 259*   CALCIUM mg/dL 8.7 9.0   BILIRUBIN mg/dL  --  0.8   ALK PHOS U/L  --  127*   ALT (SGPT) U/L  --  27   AST (SGOT) U/L  --  32   ANION GAP mmol/L 13.0 14.0     Estimated Creatinine Clearance: 31.5 mL/min (by C-G formula based on Cr of 2.7).            Results from last 7 days  Lab Units 06/07/17  2107   WBC 10*3/mm3 14.13*   HEMOGLOBIN g/dL 16.7   HEMATOCRIT % 47.5   PLATELETS 10*3/mm3 224       Results from last 7 days  Lab Units 06/08/17  0637 06/08/17  0139 06/07/17 2107   INR  2.19* 2.05* 2.02*       Culture Data:   No results found for: BLOODCX  No results found for: URINECX  No results found for: RESPCX  No results found for: WOUNDCX  No results found for: STOOLCX  No components found for: BODYFLD    Radiology Data:   Imaging Results (last 24 hours)     Procedure Component Value Units Date/Time    XR Chest 1 View [[id number]] Collected:  06/07/17 2105     Updated:  06/07/17 2117    Narrative:         Chest single view on  6/7/2017     CLINICAL INDICATION: Altered mental status    COMPARISON: 1/16/2017    FINDINGS: There is moderate elevation of the right hemidiaphragm.  There is mild adjacent right basilar atelectasis. Lungs are  otherwise clear. Cardiac, hilar and mediastinal contours are  within normal limits. Pulmonary vascularity is within normal  limits.      Impression:       No acute disease.    Electronically signed by:  Tung Cruz  6/7/2017 9:17 PM CDT  Workstation: [id number]    CT Head Without  Contrast [[id number]] Collected:  06/07/17 2129     Updated:  06/07/17 2145    Narrative:         CT head without contrast on 6/7/2017     CLINICAL INDICATION: Altered mental status    TECHNIQUE:  Multiple axial images are obtained throughout the  head without the administration of contrast.  This study was  performed with techniques to keep radiation doses as low as  reasonably achievable, (ALARA).   Total DLP is 923.9 mGy*cm.    COMPARISON: 1/16/2017    FINDINGS:  There is generalized cerebral atrophy. There is low  density in the periventricular white matter consistent with  chronic small vessel ischemic changes. There is stable  ventriculomegaly that is not definitely out of proportion to the  degree of atrophy. There is no hemorrhage. There are no abnormal  extra-axial fluid collections. There is no mass, mass effect or  midline shift. There is no CT evidence of acute infarct. No bony  abnormality is noted.      Impression:       Atrophy and chronic small vessel ischemic changes  with no acute intracranial abnormality.    Electronically signed by:  Tung Cruz  6/7/2017 9:45 PM CDT  Workstation: [id number]          I have reviewed the patient current medications.     Assessment/Plan     Hospital Problem List     Altered mental status, unspecified          Plan:    1. Altered mental status: patient more alert this morning per his wife, but he remains disoriented to time or situation.  Ct of head negative other than chronic small vessel ischemic changes.  UA and CXR unremarkable.  Blood cultures pending and patient awaiting MRI of brain.   2. HTN: Continue home doses of Norvasc and Clonidine  3. Type 2 DM: FSBS ac and hs, Levemir BID  4. Hx dementia  5. Hx of pulmonary embolism and chronic anticoagulation: Coumadin dosing per pharmacy.  6. Hx glaucoma      Discharge Planning: I expect patient to be discharged to home in 1-2 days.          This document has been electronically signed by Nicole Canseco  APRN on June 8, 2017 10:42 AM

## 2017-06-09 ENCOUNTER — APPOINTMENT (OUTPATIENT)
Dept: MRI IMAGING | Facility: HOSPITAL | Age: 77
End: 2017-06-09

## 2017-06-09 VITALS
RESPIRATION RATE: 16 BRPM | TEMPERATURE: 96.8 F | HEIGHT: 76 IN | SYSTOLIC BLOOD PRESSURE: 146 MMHG | WEIGHT: 240 LBS | DIASTOLIC BLOOD PRESSURE: 72 MMHG | HEART RATE: 58 BPM | BODY MASS INDEX: 29.22 KG/M2 | OXYGEN SATURATION: 98 %

## 2017-06-09 LAB
ANION GAP SERPL CALCULATED.3IONS-SCNC: 13 MMOL/L (ref 5–15)
BUN BLD-MCNC: 51 MG/DL (ref 7–21)
BUN/CREAT SERPL: 16.2 (ref 7–25)
CALCIUM SPEC-SCNC: 8 MG/DL (ref 8.4–10.2)
CHLORIDE SERPL-SCNC: 106 MMOL/L (ref 95–110)
CO2 SERPL-SCNC: 19 MMOL/L (ref 22–31)
CREAT BLD-MCNC: 3.14 MG/DL (ref 0.7–1.3)
DEPRECATED RDW RBC AUTO: 44.2 FL (ref 35.1–43.9)
ERYTHROCYTE [DISTWIDTH] IN BLOOD BY AUTOMATED COUNT: 15.2 % (ref 11.5–14.5)
GFR SERPL CREATININE-BSD FRML MDRD: 24 ML/MIN/1.73 (ref 42–98)
GLUCOSE BLD-MCNC: 158 MG/DL (ref 60–100)
GLUCOSE BLDC GLUCOMTR-MCNC: 161 MG/DL (ref 70–130)
GLUCOSE BLDC GLUCOMTR-MCNC: 163 MG/DL (ref 70–130)
HCT VFR BLD AUTO: 38.1 % (ref 39–49)
HGB BLD-MCNC: 12.9 G/DL (ref 13.7–17.3)
INR PPP: 2.29 (ref 0.8–1.2)
MCH RBC QN AUTO: 26.7 PG (ref 26.5–34)
MCHC RBC AUTO-ENTMCNC: 33.9 G/DL (ref 31.5–36.3)
MCV RBC AUTO: 78.7 FL (ref 80–98)
PLATELET # BLD AUTO: 164 10*3/MM3 (ref 150–450)
PMV BLD AUTO: 11.7 FL (ref 8–12)
POTASSIUM BLD-SCNC: 4.5 MMOL/L (ref 3.5–5.1)
PROTHROMBIN TIME: 25.4 SECONDS (ref 11.1–15.3)
RBC # BLD AUTO: 4.84 10*6/MM3 (ref 4.37–5.74)
SODIUM BLD-SCNC: 138 MMOL/L (ref 137–145)
WBC NRBC COR # BLD: 9.41 10*3/MM3 (ref 3.2–9.8)

## 2017-06-09 PROCEDURE — 70551 MRI BRAIN STEM W/O DYE: CPT

## 2017-06-09 PROCEDURE — 82962 GLUCOSE BLOOD TEST: CPT

## 2017-06-09 PROCEDURE — 63710000001 INSULIN ASPART PER 5 UNITS: Performed by: NURSE PRACTITIONER

## 2017-06-09 PROCEDURE — G0378 HOSPITAL OBSERVATION PER HR: HCPCS

## 2017-06-09 PROCEDURE — 63710000001 INSULIN DETEMIR PER 5 UNITS: Performed by: HOSPITALIST

## 2017-06-09 PROCEDURE — 85027 COMPLETE CBC AUTOMATED: CPT | Performed by: NURSE PRACTITIONER

## 2017-06-09 PROCEDURE — 80048 BASIC METABOLIC PNL TOTAL CA: CPT | Performed by: NURSE PRACTITIONER

## 2017-06-09 PROCEDURE — 85610 PROTHROMBIN TIME: CPT | Performed by: HOSPITALIST

## 2017-06-09 PROCEDURE — 94799 UNLISTED PULMONARY SVC/PX: CPT

## 2017-06-09 PROCEDURE — 94760 N-INVAS EAR/PLS OXIMETRY 1: CPT

## 2017-06-09 RX ADMIN — PANTOPRAZOLE SODIUM 40 MG: 40 TABLET, DELAYED RELEASE ORAL at 06:10

## 2017-06-09 RX ADMIN — INSULIN ASPART 2 UNITS: 100 INJECTION, SOLUTION INTRAVENOUS; SUBCUTANEOUS at 09:57

## 2017-06-09 RX ADMIN — INSULIN ASPART 2 UNITS: 100 INJECTION, SOLUTION INTRAVENOUS; SUBCUTANEOUS at 11:08

## 2017-06-09 RX ADMIN — INSULIN DETEMIR 15 UNITS: 100 INJECTION, SOLUTION SUBCUTANEOUS at 09:58

## 2017-06-09 RX ADMIN — SODIUM CHLORIDE 125 ML/HR: 900 INJECTION, SOLUTION INTRAVENOUS at 01:32

## 2017-06-09 RX ADMIN — BUDESONIDE 0.5 MG: 0.5 INHALANT RESPIRATORY (INHALATION) at 10:29

## 2017-06-09 RX ADMIN — AMLODIPINE BESYLATE 5 MG: 5 TABLET ORAL at 09:58

## 2017-06-09 RX ADMIN — CLONIDINE HYDROCHLORIDE 0.2 MG: 0.2 TABLET ORAL at 13:46

## 2017-06-09 NOTE — DISCHARGE INSTR - OTHER ORDERS
[facility] will order wheelchair and transfer bench and obtain submit for insurance approval, and will notify you when approved and available.  [phone]    [facility] line  [phone]  Registered Nurse available 24/7  Call from your home with your health care questions.

## 2017-06-09 NOTE — DISCHARGE PLACEMENT REQUEST
"Nadir Alanis Sr. (77 y.o. Male)     Date of Birth Social Security Number Address Home Phone MRN    1940  [address] [address] [phone] [id number]    Confucianism Marital Status          Alevism        Admission Date Admission Type Admitting Provider Attending Provider Department, Room/Bed    6/7/17 Emergency Tanner Bob MD Mycle, Noble Sam, MD [address], 402/1    Discharge Date Discharge Disposition Discharge Destination                      Attending Provider: Tanner Bob MD     Allergies:  Contrast Dye    Isolation:  None   Infection:  None   Code Status:  FULL    Ht:  76\" (193 cm)   Wt:  240 lb (109 kg)    Admission Cmt:  None   Principal Problem:  None                Active Insurance as of 6/7/2017     Primary Coverage     Payor Plan Insurance Group Employer/Plan Group    MEDICARE MEDICARE A & B      Payor Plan Address Payor Plan Phone Number Effective From Effective To    [address] [phone] 6/1/2017     [place] [address]       Subscriber Name Subscriber Birth Date Member ID       NADIR ALANIS SR. 1940 [id number]           Secondary Coverage     Payor Plan Insurance Group Employer/Plan Group    [facility] SUP [id number]     Payor Plan Address Payor Plan Phone Number Effective From Effective To    [address] [address]  1/1/2017     [place] [address]       Subscriber Name Subscriber Birth Date Member ID       NADIR ALANIS SR. 1940 [id number]                 Emergency Contacts      (Rel.) Home Phone Work Phone Mobile Phone    ZekeLamar (Spouse) [phone] -- [phone]    Cammy Alanis (Daughter) [phone] -- [phone]        Misc. Devices (TUB TRANSFER BOARD) misc [[id number]] (Order [id number])    Date: 6/9/2017   Department: [address]   Ordering/Authorizing: THIERNO Waldrop         Medication    Misc. Devices (TUB TRANSFER BOARD) misc " [[id number]]         Misc. Devices (TUB TRANSFER BOARD) [facility] [[id number]]  Order Details     Dose, Route, Frequency: As Directed    Dispense Quantity:  1 each Refills:  0 Fills Remaining:  --           Sig: Transfer tub bench r/t deconditioning, dementia, and blind r/t glaucoma          Written Date:  17 Expiration Date:  18     Start Date:  17 End Date:  --            Ordering Provider:  THIERNO Waldrop Phone:  [phone] Fax:  [phone]    Address:  [address] [place] [address] NPI:  [id number]            Authorizing Provider:  THIERNO Waldrop Phone:  [phone] Fax:  [phone]    Address:  [address] [place] [address] NPI:  [id number]                 Pharmacy:  [facility] [phone]  - [phone] Phone:  [phone] Fax:  [phone]    Address:  [address] [place] [id number]      Pharmacy Comments:  --          Quantity Remaining:  -- Quantity Filled:  --            Orders with any of the following pharmaceutical classes: Medical Devices      Name Dose Frequency Start Date End Date Medication Warnings Interventions? Order Mode        Misc. Devices (TRANSFER BENCH) [facility]   17    Outpatient        Warnings History      No Interaction Warnings Shown        Order Audit [place]      Number of times this order has been changed since signin     Order Audit [place]       Order Reconciliation Actions           Order Reconciliation Actions       E-Prescribing Status      Medication                    Misc. Devices (TUB TRANSFER BOARD) misc          Sig: Transfer tub bench r/t deconditioning, dementia, and blind r/t glaucoma          Class: Normal          Date/Time Signed: 2017  1:30 PM         E-Prescribing Status: Receipt confirmed by pharmacy (2017  1:30 PM CDT)            Event History           Event History       Order Transmittal Tracking      Misc. Devices (TUB TRANSFER  BOARD) misc (Order #[id number]) on 6/9/17             Insurance Information                MEDICARE/MEDICARE A & B Phone: [phone]    Subscriber: Ondina Alanis Sr. Subscriber#: [id number]    Group#:  Precert#:         UMWA/KOFI MC SUP Phone:     Subscriber: Ondina Alanis Sr. Subscriber#: [id number]    Group#: [id number] Precert#:

## 2017-06-09 NOTE — PLAN OF CARE
Problem: Patient Care Overview (Adult)  Goal: Plan of Care Review  Outcome: Ongoing (interventions implemented as appropriate)    06/09/17 1520   Coping/Psychosocial Response Interventions   Plan Of Care Reviewed With patient   Patient Care Overview   Progress no change       Goal: Adult Individualization and Mutuality  Outcome: Ongoing (interventions implemented as appropriate)    Problem: Renal Failure/Kidney Injury, Acute (Adult)  Goal: Signs and Symptoms of Listed Potential Problems Will be Absent or Manageable (Renal Failure/Kidney Injury, Acute)  Outcome: Ongoing (interventions implemented as appropriate)

## 2017-06-09 NOTE — PROGRESS NOTES
"Anticoagulation by Pharmacy - Warfarin    Ondina Alanis Sr. is a 77 y.o.male  [Ht: 76\" (193 cm); Wt: 240 lb (109 kg)] on Warfarin 7.5 mg PO on Su,M,W,F and 5 mg on Tu,Th, and Sat  for indication of HX of PE.    Goal INR: 2-3  Today's INR:   Lab Results   Component Value Date    INR 2.29 (H) 06/09/2017         Lab Results   Component Value Date    INR 2.29 (H) 06/09/2017    INR 2.19 (H) 06/08/2017    INR 2.05 (H) 06/08/2017    PROTIME 25.4 (H) 06/09/2017    PROTIME 24.5 (H) 06/08/2017    PROTIME 23.3 (H) 06/08/2017     Lab Results   Component Value Date    HGB 12.9 (L) 06/09/2017    HGB 16.7 06/07/2017    HGB 14.5 01/17/2017     Lab Results   Component Value Date    HCT 38.1 (L) 06/09/2017    HCT 47.5 06/07/2017    HCT 43.1 01/17/2017     Assessment/Plan:  INR in therapeutic range. Will continue home dose as noted above.  Warfarin 7.5 mg due today.  Pharmacy will follow and adjust as needed.    Kassie Arana RPH  06/09/17 10:20 AM     "

## 2017-06-09 NOTE — PLAN OF CARE
Problem: Patient Care Overview (Adult)  Goal: Plan of Care Review  Outcome: Ongoing (interventions implemented as appropriate)  Goal: Adult Individualization and Mutuality  Outcome: Ongoing (interventions implemented as appropriate)  Goal: Discharge Needs Assessment  Outcome: Ongoing (interventions implemented as appropriate)    Problem: Renal Failure/Kidney Injury, Acute (Adult)  Goal: Signs and Symptoms of Listed Potential Problems Will be Absent or Manageable (Renal Failure/Kidney Injury, Acute)  Outcome: Ongoing (interventions implemented as appropriate)

## 2017-06-09 NOTE — DISCHARGE PLACEMENT REQUEST
"Nadir Alanis Sr. (77 y.o. Male)     Date of Birth Social Security Number Address Home Phone MRN    1940  [address] [place] [address] [phone] [id number]    Church Marital Status          Amish        Admission Date Admission Type Admitting Provider Attending Provider Department, Room/Bed    6/7/17 Emergency Tanner Bob MD Mycle, Noble Sam, MD [address], 402/1    Discharge Date Discharge Disposition Discharge Destination                      Attending Provider: Tanner Bob MD     Allergies:  Contrast Dye    Isolation:  None   Infection:  None   Code Status:  FULL    Ht:  76\" (193 cm)   Wt:  240 lb (109 kg)    Admission Cmt:  None   Principal Problem:  None                Active Insurance as of 6/7/2017     Primary Coverage     Payor Plan Insurance Group Employer/Plan Group    MEDICARE MEDICARE A & B      Payor Plan Address Payor Plan Phone Number Effective From Effective To    [address] [phone] 6/1/2017     [place] [address]       Subscriber Name Subscriber Birth Date Member ID       NADIR ALANIS SR. 1940 [id number]           Secondary Coverage     Payor Plan Insurance Group Employer/Plan Group    [facility] SUP [id number]     Payor Plan Address Payor Plan Phone Number Effective From Effective To    [address] [address]  1/1/2017     [place] [address]       Subscriber Name Subscriber Birth Date Member ID       NADIR ALANIS SR. 1940 [id number]                 Emergency Contacts      (Rel.) Home Phone Work Phone Mobile Phone    Lamar Alanis (Spouse) [phone] -- [phone]    Cammy Alanis (Daughter) [phone] -- [phone]          [address] [address]  Phone: [phone]  Fax:  Date Ordered: Jun 8, 2017         Patient: Nadir Alanis Sr. MRN: [id number]   [address] [address] " ": 1940  SSN:    Phone: [phone] Sex: M     Weight: 240 lb (109 kg)         Ht Readings from Last 1 Encounters:   17 76\" (193 cm)         Standard Wheelchair (Order ID: [id number])   Diagnosis: Physical deconditioning (R53.81 [ICD-10-CM] 799.3 [ICD-9-CM])   Quantity: 1     Equipment:  Standard Wheelchair  Wheelchair accessories:  Manual W/C Seat Widths 20-23 inches  Length of Need (99 Months = Lifetime): 99 Months = Lifetime            Authorizing Provider:THIERNO Waldrop  Authorizing Provider's NPI: [id number]  Order Entered By: THIERNO Waldrop 2017 4:21 PM     Electronically signed by: THIERNO Waldrop 2017 4:21 PM             Insurance Information                MEDICARE/MEDICARE A & B Phone: [phone]    Subscriber: Ondina Alanis Sr. Subscriber#: [id number]    Group#:  Precert#:         ORAWA/KOFI  SUP Phone:     Subscriber: Ondina Alanis Sr. Subscriber#: [id number]    Group#: [id number] Precert#:           Problem List           Codes Noted - Resolved       Hospital    Altered mental status, unspecified ICD-10-CM: R41.82  ICD-9-CM: 780.97 2017 - Present          "

## 2017-06-09 NOTE — DISCHARGE SUMMARY
[facility] Medicine Services  DISCHARGE SUMMARY       Date of Admission: 6/7/2017  Date of Discharge:  6/9/2017  Primary Care Physician: Ralph Hensley MD    Presenting Problem/History of Present Illness:  Renal insufficiency [N28.9]  Altered mental status, unspecified [R41.82]  Leukocytosis, unspecified type [D72.829]       Final Discharge Diagnoses:  Altered mental status/possible dementia    Consults:   Consults     Date and Time Order Name Status Description    6/7/2017 7603 Hospitalist (on-call MD unless specified)            Procedures Performed:                 Pertinent Test Results: /lpglab  Imaging Results (all)     Procedure Component Value Units Date/Time    XR Chest 1 View [[id number]] Collected:  06/07/17 2105     Updated:  06/07/17 2117    Narrative:         Chest single view on  6/7/2017     CLINICAL INDICATION: Altered mental status    COMPARISON: 1/16/2017    FINDINGS: There is moderate elevation of the right hemidiaphragm.  There is mild adjacent right basilar atelectasis. Lungs are  otherwise clear. Cardiac, hilar and mediastinal contours are  within normal limits. Pulmonary vascularity is within normal  limits.      Impression:       No acute disease.    Electronically signed by:  Tung Cruz  6/7/2017 9:17 PM CDT  Workstation: [id number]    CT Head Without Contrast [[id number]] Collected:  06/07/17 2129     Updated:  06/07/17 2145    Narrative:         CT head without contrast on 6/7/2017     CLINICAL INDICATION: Altered mental status    TECHNIQUE:  Multiple axial images are obtained throughout the  head without the administration of contrast.  This study was  performed with techniques to keep radiation doses as low as  reasonably achievable, (ALARA).   Total DLP is 923.9 mGy*cm.    COMPARISON: 1/16/2017    FINDINGS:  There is generalized cerebral atrophy. There is low  density in the periventricular white matter consistent with  chronic small  vessel ischemic changes. There is stable  ventriculomegaly that is not definitely out of proportion to the  degree of atrophy. There is no hemorrhage. There are no abnormal  extra-axial fluid collections. There is no mass, mass effect or  midline shift. There is no CT evidence of acute infarct. No bony  abnormality is noted.      Impression:       Atrophy and chronic small vessel ischemic changes  with no acute intracranial abnormality.    Electronically signed by:  Tung Cruz  6/7/2017 9:45 PM CDT  Workstation: [id number]    MRI Brain Without Contrast [[id number]] Collected:  06/09/17 0810     Updated:  06/09/17 1543    Narrative:       DATE OF PROCEDURE:  6/9/2017 8:10 AM CDT    PROCEDURE: MR BRAIN WITHOUT IV CONTRAST    INDICATION FOR PROCEDURE:   77 years -old patient presents for  evaluation of altered mental status.    TECHNIQUE: Multiplanar multisequence MR images of the brain are  obtained without intravenous administration of contrast.     COMPARISON:  CT of the head dated June 7, 2017.    FINDINGS:  There is moderate prominence of cortical sulci.   Sagittal midline structures are within normal limits. What is  seen of the cranium and scalp have a normal MR appearance.  What  is seen of the upper cervical vertebral bodies have normal height  and alignment.  Atlanto-axial and atlanto-occipital relationships  are within normal limits. There are multilevel degenerative  changes of the imaged cervical spine. Visualized cervical spinal  cord has normal morphology and signal characteristics.    There is no restricted diffusion. There is multifocal abnormal T2  hyperintensity within the subcortical white matter and centrum  semiovale. There is also confluent abnormal T2 hyperintensity  within the periventricular white matter. Major flow voids of the  [place] are present.   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    The nasopharynx has a normal  appearance. What is seen of both  parotid glands are within normal limits. Internal auditory canals  and cerebellopontine angles are within normal limits. Both orbits  have a normal MR appearance.     There is no obvious mass or mass effect. There is normal  gray-white differentiation. There are no abnormal intra-axial or  extra-axial fluid collections. There are large basilar cisterns.  There is moderate ventriculomegaly. The ventricles have normal  position.  The basal ganglia, thalami, brainstem and cerebellum  have a normal MR appearance.    There are bilateral maxillary mucous retention cysts.      Impression:         1.  No MR evidence for acute infarct.  2.  Moderate involutional changes of the brain.    Electronically signed by:  Alyce Srinivasan MD  6/9/2017 3:43 PM CDT  Workstation: [employer]        Lab Results (last 24 hours)     Procedure Component Value Units Date/Time    POC Glucose Fingerstick [[id number]]  (Abnormal) Collected:  06/08/17 1613    Specimen:  Blood Updated:  06/08/17 1638     Glucose 268 (H) mg/dL       RN NotifiedMeter: [id number]: [id number] SHERINE ABEBE       POC Glucose Fingerstick [[id number]]  (Abnormal) Collected:  06/08/17 2001    Specimen:  Blood Updated:  06/08/17 2014     Glucose 356 (H) mg/dL       RN NotifiedMeter: [id number]: [id number] CONNER KALA       Blood Culture [[id number]]  (Normal) Collected:  06/07/17 2239    Specimen:  Blood from Arm, Left Updated:  06/08/17 2302     Blood Culture No growth at 24 hours    Blood Culture [[id number]]  (Normal) Collected:  06/08/17 0139    Specimen:  Blood from Arm, Right Updated:  06/09/17 0206     Blood Culture No growth at 24 hours    POC Glucose Fingerstick [[id number]]  (Abnormal) Collected:  06/09/17 0717    Specimen:  Blood Updated:  06/09/17 0817     Glucose 161 (H) mg/dL       RN NotifiedMeter: [id number]: [id number] SHERINE ABEBE       Basic Metabolic Panel [[id number]]  (Abnormal)  Collected:  06/09/17 0729    Specimen:  Blood Updated:  06/09/17 0822     Glucose 158 (H) mg/dL      BUN 51 (H) mg/dL      Creatinine 3.14 (H) mg/dL      Sodium 138 mmol/L      Potassium 4.5 mmol/L      Chloride 106 mmol/L      CO2 19.0 (L) mmol/L      Calcium 8.0 (L) mg/dL      eGFR  African Amer 24 (L) mL/min/1.73      BUN/Creatinine Ratio 16.2     Anion Gap 13.0 mmol/L     Narrative:       The MDRD GFR formula is only valid for adults with stable renal function between ages 18 and 70.    CBC (No Diff) [[id number]]  (Abnormal) Collected:  06/09/17 0729    Specimen:  Blood Updated:  06/09/17 0825     WBC 9.41 10*3/mm3      RBC 4.84 10*6/mm3      Hemoglobin 12.9 (L) g/dL      Hematocrit 38.1 (L) %      MCV 78.7 (L) fL      MCH 26.7 pg      MCHC 33.9 g/dL      RDW 15.2 (H) %      RDW-SD 44.2 (H) fl      MPV 11.7 fL      Platelets 164 10*3/mm3     Protime-INR [[id number]]  (Abnormal) Collected:  06/09/17 0729    Specimen:  Blood Updated:  06/09/17 0825     Protime 25.4 (H) Seconds      INR 2.29 (H)    Narrative:       Therapeutic range for most indications is 2.0-3.0 INR,  or 2.5-3.5 for mechanical heart valves.    POC Glucose Fingerstick [[id number]]  (Abnormal) Collected:  06/09/17 1055    Specimen:  Blood Updated:  06/09/17 1149     Glucose 163 (H) mg/dL       RN NotifiedMeter: [id number]: [id number] SHERINE ABEBE               Chief Complaint on Day of Discharge: None    Hospital Course:  77-year-old -American male who was admitted on June 7, 2017 related to altered mental status.  The wife reports that the patient has been progressively more confused over the course of the past few months, but that he had a significant change in his mental status in the 24 hours prior to his admission.  He was placed under observation status for further workup of his confusion.  He had an unremarkable urinalysis, urine culture, blood cultures, and chest x-ray.  No infectious sources were found.  CT of the head and  "MRI of the brain were negative for any signs of CVA or any other acute abnormalities.  The patient's neuro exam was within normal limits with no focal deficits are no weakness noted.  The patient's confusion is improved per the wife's report.  He still remains unable to tell staff the current year or president, but he is now alert and oriented to person, place, and situation.  It's likely that the patient's confusion has a progression of the dementia.  The patient will be discharged home today with instructions to follow-up with his primary care provider within one week of discharge.  He is in stable condition.  The patient and his wife were offered home health for further monitoring in the home setting, however they have declined    Condition on Discharge:  Stable    Physical Exam on Discharge:  /72 (BP Location: Left arm, Patient Position: Lying)  Pulse 58  Temp 96.8 °F (36 °C) (Temporal Artery )   Resp 16  Ht 76\" (193 cm)  Wt 240 lb (109 kg)  SpO2 98%  BMI 29.21 kg/m2  Physical Exam   Constitutional: He appears well-developed and well-nourished.   HENT:   Head: Normocephalic.   Eyes: Conjunctivae are normal.   Neck: Neck supple.   Cardiovascular: Normal rate and regular rhythm.    Pulmonary/Chest: Effort normal and breath sounds normal.   Abdominal: Soft. Bowel sounds are normal.   Musculoskeletal: Normal range of motion. He exhibits no edema.   Neurological: He is alert.   Oriented to person, place, and situation   Skin: Skin is warm and dry.   Psychiatric: He has a normal mood and affect. His behavior is normal.   Vitals reviewed.        Discharge Disposition: home.       Discharge Medications:   Ondina Alanis .   Home Medication Instructions KENNY:[id number]    Printed on:06/09/17 1248   Medication Information                      albuterol (PROVENTIL HFA;VENTOLIN HFA) 108 (90 BASE) MCG/ACT inhaler  Inhale 2 puffs Every 6 (Six) Hours.             amLODIPine (NORVASC) 5 MG tablet  TAKE 1 " TABLET DAILY             brimonidine (ALPHAGAN P) 0.1 % solution ophthalmic solution  1 drop.             brinzolamide (AZOPT) 1 % ophthalmic suspension  1 drop.             budesonide (PULMICORT) 0.5 MG/2ML nebulizer solution  Inhale 0.5 mg.             CloNIDine (CATAPRES) 0.2 MG tablet  Take 0.2 mg by mouth.             desloratadine (CLARINEX) 5 MG tablet  Take 5 mg by mouth.             HYDROcodone-acetaminophen (NORCO) 7.5-325 MG per tablet  Take 1 tablet by mouth Every 6 (Six) Hours As Needed for Moderate Pain (4-6).             Insulin Detemir (LEVEMIR SC)  Inject 15 Units under the skin 2 (Two) Times a Day.             Insulin Lispro (HUMALOG) 100 UNIT/ML solution pen-injector  Inject 12 Units under the skin.             ipratropium-albuterol (DUO-NEB) 0.5-2.5 mg/mL nebulizer  Inhale 3 mL Every 6 (Six) Hours.             lansoprazole (PREVACID) 30 MG capsule  Take 30 mg by mouth.             latanoprost (XALATAN) 0.005 % ophthalmic solution  1 drop.             Misc. Devices (TRANSFER BENCH) misc  Transfer bench r/t dementia, deconditioning.             Misc. Devices (TUB TRANSFER BOARD) misc  Transfer tub bench r/t deconditioning, dementia, and blind r/t glaucoma             oxybutynin XL (DITROPAN-XL) 10 MG 24 hr tablet               pilocarpine (PILOCAR) 1 % ophthalmic solution  1 drop.             warfarin (COUMADIN) 5 MG tablet  take 1 and 1/2 tablets SUN MON WED FRI and 1 tablet all other days for MYOCARDIAL REINFARCTION PREVENTION                 Discharge Diet:  cardiac    Activity at Discharge:  as tolerated    Discharge Care Plan/Instructions: Follow up with PCP within one week of discharge.    Follow-up Appointments:   No future appointments.    Test Results Pending at Discharge:    Order Current Status    Blood Culture Preliminary result    Blood Culture Preliminary result                This document has been electronically signed by THIERNO Waldrop on June 9, 2017 3:59 PM

## 2017-06-12 LAB — BACTERIA SPEC AEROBE CULT: NORMAL

## 2017-06-13 LAB — BACTERIA SPEC AEROBE CULT: NORMAL

## 2019-01-23 ENCOUNTER — APPOINTMENT (OUTPATIENT)
Dept: NUCLEAR MEDICINE | Facility: HOSPITAL | Age: 79
End: 2019-01-23

## 2019-01-23 ENCOUNTER — HOSPITAL ENCOUNTER (INPATIENT)
Facility: HOSPITAL | Age: 79
LOS: 1 days | Discharge: HOME HEALTH | End: 2019-01-25
Attending: FAMILY MEDICINE | Admitting: FAMILY MEDICINE

## 2019-01-23 ENCOUNTER — APPOINTMENT (OUTPATIENT)
Dept: GENERAL RADIOLOGY | Facility: HOSPITAL | Age: 79
End: 2019-01-23

## 2019-01-23 ENCOUNTER — APPOINTMENT (OUTPATIENT)
Dept: ULTRASOUND IMAGING | Facility: HOSPITAL | Age: 79
End: 2019-01-23

## 2019-01-23 DIAGNOSIS — R09.1 PLEURISY: ICD-10-CM

## 2019-01-23 DIAGNOSIS — M17.0 PRIMARY OSTEOARTHRITIS OF BOTH KNEES: ICD-10-CM

## 2019-01-23 DIAGNOSIS — I51.9 LEFT VENTRICULAR DIASTOLIC DYSFUNCTION: ICD-10-CM

## 2019-01-23 DIAGNOSIS — M79.605 LEFT LEG PAIN: ICD-10-CM

## 2019-01-23 DIAGNOSIS — E11.42 DIABETIC PERIPHERAL NEUROPATHY ASSOCIATED WITH TYPE 2 DIABETES MELLITUS (HCC): ICD-10-CM

## 2019-01-23 DIAGNOSIS — I26.99 OTHER ACUTE PULMONARY EMBOLISM WITHOUT ACUTE COR PULMONALE (HCC): ICD-10-CM

## 2019-01-23 DIAGNOSIS — J96.01 ACUTE RESPIRATORY FAILURE WITH HYPOXIA (HCC): ICD-10-CM

## 2019-01-23 DIAGNOSIS — N18.4 CKD (CHRONIC KIDNEY DISEASE) STAGE 4, GFR 15-29 ML/MIN (HCC): ICD-10-CM

## 2019-01-23 DIAGNOSIS — N19 RENAL FAILURE, UNSPECIFIED CHRONICITY: ICD-10-CM

## 2019-01-23 DIAGNOSIS — Z74.09 IMPAIRED PHYSICAL MOBILITY: ICD-10-CM

## 2019-01-23 DIAGNOSIS — R07.9 CHEST PAIN, UNSPECIFIED TYPE: Primary | ICD-10-CM

## 2019-01-23 PROBLEM — N17.9 STAGE 1 ACUTE KIDNEY INJURY (HCC): Status: ACTIVE | Noted: 2019-01-23

## 2019-01-23 PROBLEM — R41.82 ALTERED MENTAL STATUS, UNSPECIFIED: Status: RESOLVED | Noted: 2017-06-07 | Resolved: 2019-01-23

## 2019-01-23 PROBLEM — R07.1 CHEST PAIN ON BREATHING: Status: ACTIVE | Noted: 2019-01-23

## 2019-01-23 PROBLEM — J45.909 ASTHMA: Status: ACTIVE | Noted: 2017-01-23

## 2019-01-23 LAB
ALBUMIN SERPL-MCNC: 3.7 G/DL (ref 3.4–4.8)
ALBUMIN/GLOB SERPL: 1.2 G/DL (ref 1.1–1.8)
ALP SERPL-CCNC: 87 U/L (ref 38–126)
ALT SERPL W P-5'-P-CCNC: 15 U/L (ref 21–72)
ANION GAP SERPL CALCULATED.3IONS-SCNC: 6 MMOL/L (ref 5–15)
AST SERPL-CCNC: 25 U/L (ref 17–59)
BASOPHILS # BLD AUTO: 0.01 10*3/MM3 (ref 0–0.2)
BASOPHILS NFR BLD AUTO: 0.1 % (ref 0–2)
BILIRUB SERPL-MCNC: 1 MG/DL (ref 0.2–1.3)
BUN BLD-MCNC: 44 MG/DL (ref 7–21)
BUN/CREAT SERPL: 16.4 (ref 7–25)
CALCIUM SPEC-SCNC: 9 MG/DL (ref 8.4–10.2)
CHLORIDE SERPL-SCNC: 102 MMOL/L (ref 95–110)
CO2 SERPL-SCNC: 28 MMOL/L (ref 22–31)
CREAT BLD-MCNC: 2.69 MG/DL (ref 0.7–1.3)
D-DIMER, QUANTITATIVE (MAD,POW, STR): 2064 NG/ML (FEU) (ref 0–470)
DEPRECATED RDW RBC AUTO: 43.2 FL (ref 35.1–43.9)
EOSINOPHIL # BLD AUTO: 0.17 10*3/MM3 (ref 0–0.7)
EOSINOPHIL NFR BLD AUTO: 1.6 % (ref 0–7)
ERYTHROCYTE [DISTWIDTH] IN BLOOD BY AUTOMATED COUNT: 14.8 % (ref 11.5–14.5)
FLUAV AG NPH QL: NEGATIVE
FLUBV AG NPH QL IA: NEGATIVE
GFR SERPL CREATININE-BSD FRML MDRD: 28 ML/MIN/1.73 (ref 42–98)
GLOBULIN UR ELPH-MCNC: 3.2 GM/DL (ref 2.3–3.5)
GLUCOSE BLD-MCNC: 148 MG/DL (ref 60–100)
HCT VFR BLD AUTO: 41.2 % (ref 39–49)
HGB BLD-MCNC: 14 G/DL (ref 13.7–17.3)
HOLD SPECIMEN: NORMAL
HOLD SPECIMEN: NORMAL
IMM GRANULOCYTES # BLD AUTO: 0.03 10*3/MM3 (ref 0–0.02)
IMM GRANULOCYTES NFR BLD AUTO: 0.3 % (ref 0–0.5)
INR PPP: 2.09 (ref 0.8–1.2)
LYMPHOCYTES # BLD AUTO: 1.7 10*3/MM3 (ref 0.6–4.2)
LYMPHOCYTES NFR BLD AUTO: 15.6 % (ref 10–50)
MCH RBC QN AUTO: 27 PG (ref 26.5–34)
MCHC RBC AUTO-ENTMCNC: 34 G/DL (ref 31.5–36.3)
MCV RBC AUTO: 79.4 FL (ref 80–98)
MONOCYTES # BLD AUTO: 1.11 10*3/MM3 (ref 0–0.9)
MONOCYTES NFR BLD AUTO: 10.2 % (ref 0–12)
NEUTROPHILS # BLD AUTO: 7.87 10*3/MM3 (ref 2–8.6)
NEUTROPHILS NFR BLD AUTO: 72.2 % (ref 37–80)
NT-PROBNP SERPL-MCNC: 2170 PG/ML (ref 0–1800)
PLATELET # BLD AUTO: 202 10*3/MM3 (ref 150–450)
PMV BLD AUTO: 10.9 FL (ref 8–12)
POTASSIUM BLD-SCNC: 4.3 MMOL/L (ref 3.5–5.1)
PROT SERPL-MCNC: 6.9 G/DL (ref 6.3–8.6)
PROTHROMBIN TIME: 22.6 SECONDS (ref 11.1–15.3)
RBC # BLD AUTO: 5.19 10*6/MM3 (ref 4.37–5.74)
SODIUM BLD-SCNC: 136 MMOL/L (ref 137–145)
TROPONIN I SERPL-MCNC: <0.012 NG/ML
TROPONIN I SERPL-MCNC: <0.012 NG/ML
WBC NRBC COR # BLD: 10.89 10*3/MM3 (ref 3.2–9.8)
WHOLE BLOOD HOLD SPECIMEN: NORMAL
WHOLE BLOOD HOLD SPECIMEN: NORMAL

## 2019-01-23 PROCEDURE — 84484 ASSAY OF TROPONIN QUANT: CPT | Performed by: FAMILY MEDICINE

## 2019-01-23 PROCEDURE — 78580 LUNG PERFUSION IMAGING: CPT

## 2019-01-23 PROCEDURE — 82962 GLUCOSE BLOOD TEST: CPT

## 2019-01-23 PROCEDURE — 84484 ASSAY OF TROPONIN QUANT: CPT | Performed by: STUDENT IN AN ORGANIZED HEALTH CARE EDUCATION/TRAINING PROGRAM

## 2019-01-23 PROCEDURE — 85379 FIBRIN DEGRADATION QUANT: CPT | Performed by: PHYSICIAN ASSISTANT

## 2019-01-23 PROCEDURE — 87804 INFLUENZA ASSAY W/OPTIC: CPT | Performed by: STUDENT IN AN ORGANIZED HEALTH CARE EDUCATION/TRAINING PROGRAM

## 2019-01-23 PROCEDURE — 85025 COMPLETE CBC W/AUTO DIFF WBC: CPT | Performed by: FAMILY MEDICINE

## 2019-01-23 PROCEDURE — 99218 PR INITIAL OBSERVATION CARE/DAY 30 MINUTES: CPT | Performed by: STUDENT IN AN ORGANIZED HEALTH CARE EDUCATION/TRAINING PROGRAM

## 2019-01-23 PROCEDURE — G0378 HOSPITAL OBSERVATION PER HR: HCPCS

## 2019-01-23 PROCEDURE — 0 TECHNETIUM ALBUMIN AGGREGATED: Performed by: EMERGENCY MEDICINE

## 2019-01-23 PROCEDURE — 83880 ASSAY OF NATRIURETIC PEPTIDE: CPT | Performed by: FAMILY MEDICINE

## 2019-01-23 PROCEDURE — 71045 X-RAY EXAM CHEST 1 VIEW: CPT

## 2019-01-23 PROCEDURE — 99285 EMERGENCY DEPT VISIT HI MDM: CPT

## 2019-01-23 PROCEDURE — A9540 TC99M MAA: HCPCS | Performed by: EMERGENCY MEDICINE

## 2019-01-23 PROCEDURE — 25010000002 HYDRALAZINE PER 20 MG: Performed by: PHYSICIAN ASSISTANT

## 2019-01-23 PROCEDURE — 80053 COMPREHEN METABOLIC PANEL: CPT | Performed by: FAMILY MEDICINE

## 2019-01-23 PROCEDURE — 93010 ELECTROCARDIOGRAM REPORT: CPT | Performed by: INTERNAL MEDICINE

## 2019-01-23 PROCEDURE — 93971 EXTREMITY STUDY: CPT

## 2019-01-23 PROCEDURE — 25010000002 MORPHINE PER 10 MG: Performed by: FAMILY MEDICINE

## 2019-01-23 PROCEDURE — 93005 ELECTROCARDIOGRAM TRACING: CPT

## 2019-01-23 PROCEDURE — 85610 PROTHROMBIN TIME: CPT | Performed by: FAMILY MEDICINE

## 2019-01-23 PROCEDURE — 93005 ELECTROCARDIOGRAM TRACING: CPT | Performed by: FAMILY MEDICINE

## 2019-01-23 RX ORDER — SODIUM CHLORIDE 9 MG/ML
75 INJECTION, SOLUTION INTRAVENOUS CONTINUOUS
Status: DISCONTINUED | OUTPATIENT
Start: 2019-01-23 | End: 2019-01-25 | Stop reason: HOSPADM

## 2019-01-23 RX ORDER — SODIUM CHLORIDE 0.9 % (FLUSH) 0.9 %
3-10 SYRINGE (ML) INJECTION AS NEEDED
Status: DISCONTINUED | OUTPATIENT
Start: 2019-01-23 | End: 2019-01-23

## 2019-01-23 RX ORDER — ALBUTEROL SULFATE 2.5 MG/3ML
2.5 SOLUTION RESPIRATORY (INHALATION) EVERY 6 HOURS PRN
Status: DISCONTINUED | OUTPATIENT
Start: 2019-01-23 | End: 2019-01-25 | Stop reason: HOSPADM

## 2019-01-23 RX ORDER — HYDROCODONE BITARTRATE AND ACETAMINOPHEN 7.5; 325 MG/1; MG/1
1 TABLET ORAL EVERY 6 HOURS PRN
Status: DISCONTINUED | OUTPATIENT
Start: 2019-01-23 | End: 2019-01-25 | Stop reason: HOSPADM

## 2019-01-23 RX ORDER — IPRATROPIUM BROMIDE AND ALBUTEROL SULFATE 2.5; .5 MG/3ML; MG/3ML
3 SOLUTION RESPIRATORY (INHALATION)
Status: DISCONTINUED | OUTPATIENT
Start: 2019-01-23 | End: 2019-01-25 | Stop reason: HOSPADM

## 2019-01-23 RX ORDER — WARFARIN SODIUM 7.5 MG/1
7.5 TABLET ORAL
Status: COMPLETED | OUTPATIENT
Start: 2019-01-23 | End: 2019-01-23

## 2019-01-23 RX ORDER — SODIUM CHLORIDE 0.9 % (FLUSH) 0.9 %
3 SYRINGE (ML) INJECTION EVERY 12 HOURS SCHEDULED
Status: DISCONTINUED | OUTPATIENT
Start: 2019-01-23 | End: 2019-01-23

## 2019-01-23 RX ORDER — ONDANSETRON 4 MG/1
4 TABLET, ORALLY DISINTEGRATING ORAL EVERY 6 HOURS PRN
Status: DISCONTINUED | OUTPATIENT
Start: 2019-01-23 | End: 2019-01-25 | Stop reason: HOSPADM

## 2019-01-23 RX ORDER — AMLODIPINE BESYLATE 5 MG/1
5 TABLET ORAL DAILY
Status: DISCONTINUED | OUTPATIENT
Start: 2019-01-24 | End: 2019-01-25 | Stop reason: HOSPADM

## 2019-01-23 RX ORDER — CETIRIZINE HYDROCHLORIDE 5 MG/1
5 TABLET ORAL DAILY
Status: DISCONTINUED | OUTPATIENT
Start: 2019-01-24 | End: 2019-01-25 | Stop reason: HOSPADM

## 2019-01-23 RX ORDER — LANOLIN ALCOHOL/MO/W.PET/CERES
5 CREAM (GRAM) TOPICAL NIGHTLY PRN
Status: DISCONTINUED | OUTPATIENT
Start: 2019-01-23 | End: 2019-01-25 | Stop reason: HOSPADM

## 2019-01-23 RX ORDER — PILOCARPINE HYDROCHLORIDE 10 MG/ML
1 SOLUTION/ DROPS OPHTHALMIC DAILY
Status: DISCONTINUED | OUTPATIENT
Start: 2019-01-24 | End: 2019-01-25 | Stop reason: HOSPADM

## 2019-01-23 RX ORDER — ONDANSETRON 4 MG/1
4 TABLET, FILM COATED ORAL EVERY 6 HOURS PRN
Status: DISCONTINUED | OUTPATIENT
Start: 2019-01-23 | End: 2019-01-25 | Stop reason: HOSPADM

## 2019-01-23 RX ORDER — LATANOPROST 50 UG/ML
1 SOLUTION/ DROPS OPHTHALMIC NIGHTLY
Status: DISCONTINUED | OUTPATIENT
Start: 2019-01-23 | End: 2019-01-25 | Stop reason: HOSPADM

## 2019-01-23 RX ORDER — PANTOPRAZOLE SODIUM 40 MG/1
40 TABLET, DELAYED RELEASE ORAL
Status: DISCONTINUED | OUTPATIENT
Start: 2019-01-24 | End: 2019-01-25 | Stop reason: HOSPADM

## 2019-01-23 RX ORDER — BUDESONIDE 0.5 MG/2ML
0.5 INHALANT ORAL
Status: DISCONTINUED | OUTPATIENT
Start: 2019-01-24 | End: 2019-01-25 | Stop reason: HOSPADM

## 2019-01-23 RX ORDER — CALCIUM CARBONATE 200(500)MG
2 TABLET,CHEWABLE ORAL 2 TIMES DAILY PRN
Status: DISCONTINUED | OUTPATIENT
Start: 2019-01-23 | End: 2019-01-25 | Stop reason: HOSPADM

## 2019-01-23 RX ORDER — ONDANSETRON 2 MG/ML
4 INJECTION INTRAMUSCULAR; INTRAVENOUS EVERY 6 HOURS PRN
Status: DISCONTINUED | OUTPATIENT
Start: 2019-01-23 | End: 2019-01-25 | Stop reason: HOSPADM

## 2019-01-23 RX ORDER — HYDRALAZINE HYDROCHLORIDE 20 MG/ML
10 INJECTION INTRAMUSCULAR; INTRAVENOUS ONCE
Status: COMPLETED | OUTPATIENT
Start: 2019-01-23 | End: 2019-01-23

## 2019-01-23 RX ORDER — FAMOTIDINE 40 MG/1
40 TABLET, FILM COATED ORAL DAILY
Status: DISCONTINUED | OUTPATIENT
Start: 2019-01-24 | End: 2019-01-24

## 2019-01-23 RX ORDER — BRIMONIDINE TARTRATE 0.15 %
1 DROPS OPHTHALMIC (EYE) 3 TIMES DAILY
Status: DISCONTINUED | OUTPATIENT
Start: 2019-01-23 | End: 2019-01-23

## 2019-01-23 RX ORDER — DEXTROSE MONOHYDRATE 25 G/50ML
25 INJECTION, SOLUTION INTRAVENOUS
Status: DISCONTINUED | OUTPATIENT
Start: 2019-01-23 | End: 2019-01-25 | Stop reason: HOSPADM

## 2019-01-23 RX ORDER — SODIUM CHLORIDE 9 MG/ML
75 INJECTION, SOLUTION INTRAVENOUS CONTINUOUS
Status: DISCONTINUED | OUTPATIENT
Start: 2019-01-23 | End: 2019-01-23

## 2019-01-23 RX ORDER — WARFARIN SODIUM 5 MG/1
5 TABLET ORAL
Status: DISCONTINUED | OUTPATIENT
Start: 2019-01-23 | End: 2019-01-23

## 2019-01-23 RX ORDER — BRINZOLAMIDE 10 MG/ML
1 SUSPENSION/ DROPS OPHTHALMIC 3 TIMES DAILY
Status: DISCONTINUED | OUTPATIENT
Start: 2019-01-23 | End: 2019-01-25 | Stop reason: HOSPADM

## 2019-01-23 RX ORDER — CLONIDINE HYDROCHLORIDE 0.2 MG/1
0.2 TABLET ORAL EVERY 12 HOURS SCHEDULED
Status: DISCONTINUED | OUTPATIENT
Start: 2019-01-23 | End: 2019-01-25 | Stop reason: HOSPADM

## 2019-01-23 RX ORDER — ACETAMINOPHEN 325 MG/1
650 TABLET ORAL EVERY 4 HOURS PRN
Status: DISCONTINUED | OUTPATIENT
Start: 2019-01-23 | End: 2019-01-25 | Stop reason: HOSPADM

## 2019-01-23 RX ORDER — SENNA AND DOCUSATE SODIUM 50; 8.6 MG/1; MG/1
2 TABLET, FILM COATED ORAL NIGHTLY
Status: DISCONTINUED | OUTPATIENT
Start: 2019-01-23 | End: 2019-01-25 | Stop reason: HOSPADM

## 2019-01-23 RX ORDER — SODIUM CHLORIDE 0.9 % (FLUSH) 0.9 %
10 SYRINGE (ML) INJECTION AS NEEDED
Status: DISCONTINUED | OUTPATIENT
Start: 2019-01-23 | End: 2019-01-25 | Stop reason: HOSPADM

## 2019-01-23 RX ORDER — OXYBUTYNIN CHLORIDE 10 MG/1
10 TABLET, EXTENDED RELEASE ORAL NIGHTLY
Status: DISCONTINUED | OUTPATIENT
Start: 2019-01-23 | End: 2019-01-25 | Stop reason: HOSPADM

## 2019-01-23 RX ORDER — NICOTINE POLACRILEX 4 MG
15 LOZENGE BUCCAL
Status: DISCONTINUED | OUTPATIENT
Start: 2019-01-23 | End: 2019-01-25 | Stop reason: HOSPADM

## 2019-01-23 RX ADMIN — LATANOPROST 1 DROP: 50 SOLUTION OPHTHALMIC at 21:57

## 2019-01-23 RX ADMIN — Medication 1 DOSE: at 17:33

## 2019-01-23 RX ADMIN — SODIUM CHLORIDE 75 ML/HR: 9 INJECTION, SOLUTION INTRAVENOUS at 19:02

## 2019-01-23 RX ADMIN — MORPHINE SULFATE 4 MG: 4 INJECTION INTRAVENOUS at 15:56

## 2019-01-23 RX ADMIN — OXYBUTYNIN CHLORIDE 10 MG: 10 TABLET, EXTENDED RELEASE ORAL at 21:56

## 2019-01-23 RX ADMIN — SENNOSIDES AND DOCUSATE SODIUM 2 TABLET: 8.6; 5 TABLET ORAL at 21:56

## 2019-01-23 RX ADMIN — HYDROCODONE BITARTRATE AND ACETAMINOPHEN 1 TABLET: 7.5; 325 TABLET ORAL at 22:03

## 2019-01-23 RX ADMIN — HYDRALAZINE HYDROCHLORIDE 10 MG: 20 INJECTION INTRAMUSCULAR; INTRAVENOUS at 19:30

## 2019-01-23 RX ADMIN — CLONIDINE HYDROCHLORIDE 0.2 MG: 0.2 TABLET ORAL at 21:56

## 2019-01-23 RX ADMIN — BRINZOLAMIDE 1 DROP: 10 SUSPENSION/ DROPS OPHTHALMIC at 21:57

## 2019-01-23 RX ADMIN — WARFARIN SODIUM 7.5 MG: 7.5 TABLET ORAL at 21:56

## 2019-01-23 NOTE — ED PROVIDER NOTES
Subjective   Patient presents to emergency department for dyspnea/chest pain.  Wife states it started yesterday.  He lives at home and is non-ambulatory.  Hx of previous DVT/PE.  Endorses compliance with coumadin.  Hx of left knee surgery.  Patient is having left knee pain as well.          History provided by:  Patient   used: No    Chest Pain   Pain location:  Substernal area  Pain quality: sharp    Pain radiates to:  Does not radiate  Pain severity:  Moderate  Onset quality:  Sudden  Duration:  1 day  Timing:  Constant  Progression:  Unchanged  Chronicity:  New  Context: breathing    Worsened by:  Deep breathing  Associated symptoms: shortness of breath    Associated symptoms: no abdominal pain, no altered mental status, no anxiety, no back pain, no cough, no diaphoresis, no dizziness, no dysphagia, no fatigue, no fever, no headache, no heartburn, no lower extremity edema, no nausea, no near-syncope, no numbness, no orthopnea, no palpitations, no syncope, no vomiting and no weakness    Risk factors: diabetes mellitus, hypertension, male sex, prior DVT/PE and smoking        Review of Systems   Constitutional: Negative for chills, diaphoresis, fatigue and fever.   HENT: Negative for trouble swallowing.    Respiratory: Positive for shortness of breath. Negative for cough and wheezing.    Cardiovascular: Positive for chest pain. Negative for palpitations, orthopnea, syncope and near-syncope.   Gastrointestinal: Negative for abdominal pain, heartburn, nausea and vomiting.   Genitourinary: Negative for dysuria and flank pain.   Musculoskeletal: Positive for arthralgias (left knee). Negative for back pain.   Skin: Negative for color change.   Allergic/Immunologic: Negative for immunocompromised state.   Neurological: Negative for dizziness, weakness, numbness and headaches.   Hematological: Does not bruise/bleed easily.   Psychiatric/Behavioral: Negative for confusion.       Past Medical History:  "  Diagnosis Date   • Asthma    • Diabetes mellitus (CMS/[facility])    • GERD (gastroesophageal reflux disease)    • Hypertension    • Prostate disorder        Allergies   Allergen Reactions   • Contrast Dye        Past Surgical History:   Procedure Laterality Date   • BACK SURGERY     • KNEE SURGERY Left    • PROSTATE SURGERY         No family history on file.    Social History     Socioeconomic History   • Marital status:      Spouse name: Not on file   • Number of children: Not on file   • Years of education: Not on file   • Highest education level: Not on file   Tobacco Use   • Smoking status: Never Smoker   Substance and Sexual Activity   • Alcohol use: No   • Drug use: No   • Sexual activity: Defer           Objective      /84 (BP Location: Left arm, Patient Position: Sitting)   Pulse 96   Temp 97.6 °F (36.4 °C) (Oral)   Resp 16   Ht 188 cm (74\")   Wt 98 kg (216 lb)   SpO2 98%   BMI 27.73 kg/m²     Physical Exam   Constitutional: He appears well-developed and well-nourished. He appears distressed.   HENT:   Head: Normocephalic and atraumatic.   Eyes: Conjunctivae are normal.   Cardiovascular: Normal rate, regular rhythm and normal heart sounds.   Pulmonary/Chest: Effort normal and breath sounds normal. No respiratory distress. He has no wheezes.   Abdominal: Soft. He exhibits no distension. There is no tenderness.   Musculoskeletal: He exhibits edema (around left knee) and tenderness (left knee).   Neurological: He is alert.   Skin: Capillary refill takes less than 2 seconds.   Psychiatric: He has a normal mood and affect. His behavior is normal. Thought content normal.   Nursing note and vitals reviewed.      ECG 12 Lead    Date/Time: 1/23/2019 2:31 PM  Performed by: Nasir Drake PA-C  Authorized by: Almas Bera MD   Interpreted by physician  Comparison: not compared with previous ECG   Rhythm: sinus rhythm  Ectopy: atrial premature contractions  Rate: normal  BPM: 91  ST " Segments: ST segments normal  Clinical impression: abnormal ECG                 ED Course      Results for orders placed or performed during the hospital encounter of 01/23/19   Troponin   Result Value Ref Range    Troponin I <0.012 <=0.034 ng/mL   Comprehensive Metabolic Panel   Result Value Ref Range    Glucose 148 (H) 60 - 100 mg/dL    BUN 44 (H) 7 - 21 mg/dL    Creatinine 2.69 (H) 0.70 - 1.30 mg/dL    Sodium 136 (L) 137 - 145 mmol/L    Potassium 4.3 3.5 - 5.1 mmol/L    Chloride 102 95 - 110 mmol/L    CO2 28.0 22.0 - 31.0 mmol/L    Calcium 9.0 8.4 - 10.2 mg/dL    Total Protein 6.9 6.3 - 8.6 g/dL    Albumin 3.70 3.40 - 4.80 g/dL    ALT (SGPT) 15 (L) 21 - 72 U/L    AST (SGOT) 25 17 - 59 U/L    Alkaline Phosphatase 87 38 - 126 U/L    Total Bilirubin 1.0 0.2 - 1.3 mg/dL    eGFR  African Amer 28 (L) 42 - 98 mL/min/1.73    Globulin 3.2 2.3 - 3.5 gm/dL    A/G Ratio 1.2 1.1 - 1.8 g/dL    BUN/Creatinine Ratio 16.4 7.0 - 25.0    Anion Gap 6.0 5.0 - 15.0 mmol/L   BNP   Result Value Ref Range    proBNP 2,170.0 (H) 0.0-1,800.0 pg/mL   Protime-INR   Result Value Ref Range    Protime 22.6 (H) 11.1 - 15.3 Seconds    INR 2.09 (H) 0.80 - 1.20   CBC Auto Differential   Result Value Ref Range    WBC 10.89 (H) 3.20 - 9.80 10*3/mm3    RBC 5.19 4.37 - 5.74 10*6/mm3    Hemoglobin 14.0 13.7 - 17.3 g/dL    Hematocrit 41.2 39.0 - 49.0 %    MCV 79.4 (L) 80.0 - 98.0 fL    MCH 27.0 26.5 - 34.0 pg    MCHC 34.0 31.5 - 36.3 g/dL    RDW 14.8 (H) 11.5 - 14.5 %    RDW-SD 43.2 35.1 - 43.9 fl    MPV 10.9 8.0 - 12.0 fL    Platelets 202 150 - 450 10*3/mm3    Neutrophil % 72.2 37.0 - 80.0 %    Lymphocyte % 15.6 10.0 - 50.0 %    Monocyte % 10.2 0.0 - 12.0 %    Eosinophil % 1.6 0.0 - 7.0 %    Basophil % 0.1 0.0 - 2.0 %    Immature Grans % 0.3 0.0 - 0.5 %    Neutrophils, Absolute 7.87 2.00 - 8.60 10*3/mm3    Lymphocytes, Absolute 1.70 0.60 - 4.20 10*3/mm3    Monocytes, Absolute 1.11 (H) 0.00 - 0.90 10*3/mm3    Eosinophils, Absolute 0.17 0.00 - 0.70  10*3/mm3    Basophils, Absolute 0.01 0.00 - 0.20 10*3/mm3    Immature Grans, Absolute 0.03 (H) 0.00 - 0.02 10*3/mm3   D-dimer, Quantitative   Result Value Ref Range    D-Dimer, Quantitative 2,064 (H) 0 - 470 ng/mL (FEU)   Light Blue Top   Result Value Ref Range    Extra Tube hold for add-on    Green Top (Gel)   Result Value Ref Range    Extra Tube Hold for add-ons.    Lavender Top   Result Value Ref Range    Extra Tube hold for add-on    Gold Top - SST   Result Value Ref Range    Extra Tube Hold for add-ons.      Nm Lung Scan Perfusion Particulate    Result Date: 1/23/2019  Narrative: EXAMINATION:  NUCLEAR MEDICINE PULMONARY PERFUSION / VENTILATION SCAN (V/Q SCAN) CLINICAL HISTORY:   Dyspnea, elevated d-dimer COMPARISON EXAMINATIONS: Chest x-ray performed the same date. TECHNIQUE: Perfusion: 6.2 mCi of technetium labeled MAA (number of particles:  approx 600,000) Ventilation: Not performed, patient was unable to tolerate ventilation procedure. FINDINGS: Perfusion only scan was performed. There is an area of bandlike decreased perfusion in the mid right lung without a corresponding chest x-ray abnormality. Additional areas of decreased perfusion are seen in the left upper anterior lung on the RPO and [place] views without chest x-ray abnormalities.     Impression: CONCLUSION: Intermediate probability of pulmonary embolism. Exam is limited without ventilation data. COMMENTS:  Normal exam = statistically less than 2% have pulmonary emboli. Low probability = statistically 5-19% have pulmonary emboli. Intermediate probability = statistically 20-70% have pulmonary emboli. High probability = statistically greater than 80% have pulmonary emboli.  NOTE: The likelihood of pulmonary embolism, especially in the low and intermediate categories, should be interpreted in conjunction with the pre- and post-test probability and other test results such as venous Doppler, D dimer and if necessary pulmonary angiography as clinically  indicated. Findings were discussed with SALLY DRAKE on 1/23/2019 5:58 PM CST Electronically signed by:  Agustin Early MD  1/23/2019 5:59 PM CST Workstation: [id number]    Xr Chest 1 View    Result Date: 1/23/2019  Narrative: PORTABLE CHEST HISTORY: Chest pain Portable AP supine film of the chest was obtained at 2:04 PM. COMPARISON: June 7, 2017 EKG leads. The lungs are clear of an acute process. The heart is not enlarged. The pulmonary vasculature is not increased. No pleural effusion. No pneumothorax. No acute osseous abnormality.     Impression: CONCLUSION: No Acute Disease [id number] Electronically signed by:  Chris Srinivasan MD  1/23/2019 2:21 PM CST Workstation: [id number]    Us Venous Doppler Lower Extremity Left (duplex)    Result Date: 1/23/2019  Narrative: Ultrasound venous duplex left lower extremity HISTORY: Left lower extremity pain and swelling Duplex ultrasound of the deep venous system of the left lower extremity was performed Real-time images demonstrate normal compressibility without evidence of intraluminal thrombus. Doppler shows phasic flow and augmentation. Color Doppler also reveals venous patency.     Impression: CONCLUSION: No ultrasound evidence of deep venous thrombosis of the left lower extremity. [id number] Electronically signed by:  Chris Srinivasan MD  1/23/2019 4:10 PM CST Workstation: [id number]                [facility]      Final diagnoses:   Chest pain, unspecified type   Renal failure, unspecified chronicity   Left leg pain            Sally Drake PA-C  01/23/19 2595

## 2019-01-24 ENCOUNTER — APPOINTMENT (OUTPATIENT)
Dept: CARDIOLOGY | Facility: HOSPITAL | Age: 79
End: 2019-01-24
Attending: STUDENT IN AN ORGANIZED HEALTH CARE EDUCATION/TRAINING PROGRAM

## 2019-01-24 ENCOUNTER — APPOINTMENT (OUTPATIENT)
Dept: GENERAL RADIOLOGY | Facility: HOSPITAL | Age: 79
End: 2019-01-24

## 2019-01-24 PROBLEM — R07.9 CHEST PAIN: Status: ACTIVE | Noted: 2019-01-24

## 2019-01-24 PROBLEM — I51.9 LEFT VENTRICULAR DYSFUNCTION: Status: ACTIVE | Noted: 2019-01-24

## 2019-01-24 PROBLEM — M25.562 LEFT KNEE PAIN: Status: ACTIVE | Noted: 2019-01-24

## 2019-01-24 LAB
ANION GAP SERPL CALCULATED.3IONS-SCNC: 7 MMOL/L (ref 5–15)
BH CV ECHO MEAS - AO ISTHMUS: 2.3 CM
BH CV ECHO MEAS - AO MAX PG (FULL): 4 MMHG
BH CV ECHO MEAS - AO MAX PG: 8.9 MMHG
BH CV ECHO MEAS - AO MEAN PG (FULL): 3 MMHG
BH CV ECHO MEAS - AO MEAN PG: 5 MMHG
BH CV ECHO MEAS - AO ROOT AREA (BSA CORRECTED): 1.7
BH CV ECHO MEAS - AO ROOT AREA: 10.8 CM^2
BH CV ECHO MEAS - AO ROOT DIAM: 3.7 CM
BH CV ECHO MEAS - AO V2 MAX: 149 CM/SEC
BH CV ECHO MEAS - AO V2 MEAN: 101 CM/SEC
BH CV ECHO MEAS - AO V2 VTI: 21.4 CM
BH CV ECHO MEAS - ASC AORTA: 3.6 CM
BH CV ECHO MEAS - AVA(I,A): 3.4 CM^2
BH CV ECHO MEAS - AVA(I,D): 3.4 CM^2
BH CV ECHO MEAS - AVA(V,A): 3.3 CM^2
BH CV ECHO MEAS - AVA(V,D): 3.3 CM^2
BH CV ECHO MEAS - BSA(HAYCOCK): 2.3 M^2
BH CV ECHO MEAS - BSA: 2.2 M^2
BH CV ECHO MEAS - BZI_BMI: 27.5 KILOGRAMS/M^2
BH CV ECHO MEAS - BZI_METRIC_HEIGHT: 188 CM
BH CV ECHO MEAS - BZI_METRIC_WEIGHT: 97.1 KG
BH CV ECHO MEAS - EDV(CUBED): 148 ML
BH CV ECHO MEAS - EDV(TEICH): 134.8 ML
BH CV ECHO MEAS - EF(CUBED): 53.4 %
BH CV ECHO MEAS - EF(TEICH): 44.9 %
BH CV ECHO MEAS - ESV(CUBED): 68.9 ML
BH CV ECHO MEAS - ESV(TEICH): 74.2 ML
BH CV ECHO MEAS - FS: 22.5 %
BH CV ECHO MEAS - IVS/LVPW: 1.5
BH CV ECHO MEAS - IVSD: 1.7 CM
BH CV ECHO MEAS - LA DIMENSION: 3.8 CM
BH CV ECHO MEAS - LA/AO: 1
BH CV ECHO MEAS - LV MASS(C)D: 336.2 GRAMS
BH CV ECHO MEAS - LV MASS(C)DI: 150.3 GRAMS/M^2
BH CV ECHO MEAS - LV MAX PG: 4.8 MMHG
BH CV ECHO MEAS - LV MEAN PG: 2 MMHG
BH CV ECHO MEAS - LV V1 MAX: 110 CM/SEC
BH CV ECHO MEAS - LV V1 MEAN: 70.3 CM/SEC
BH CV ECHO MEAS - LV V1 VTI: 16 CM
BH CV ECHO MEAS - LVIDD: 5.3 CM
BH CV ECHO MEAS - LVIDS: 4.1 CM
BH CV ECHO MEAS - LVOT AREA (M): 4.5 CM^2
BH CV ECHO MEAS - LVOT AREA: 4.5 CM^2
BH CV ECHO MEAS - LVOT DIAM: 2.4 CM
BH CV ECHO MEAS - LVPWD: 1.2 CM
BH CV ECHO MEAS - MV A MAX VEL: 85.9 CM/SEC
BH CV ECHO MEAS - MV DEC SLOPE: 305 CM/SEC^2
BH CV ECHO MEAS - MV E MAX VEL: 31.7 CM/SEC
BH CV ECHO MEAS - MV E/A: 0.37
BH CV ECHO MEAS - MV MAX PG: 3.2 MMHG
BH CV ECHO MEAS - MV MEAN PG: 1 MMHG
BH CV ECHO MEAS - MV P1/2T MAX VEL: 43.4 CM/SEC
BH CV ECHO MEAS - MV P1/2T: 41.7 MSEC
BH CV ECHO MEAS - MV V2 MAX: 89.6 CM/SEC
BH CV ECHO MEAS - MV V2 MEAN: 50.2 CM/SEC
BH CV ECHO MEAS - MV V2 VTI: 13.3 CM
BH CV ECHO MEAS - MVA P1/2T LCG: 5.1 CM^2
BH CV ECHO MEAS - MVA(P1/2T): 5.3 CM^2
BH CV ECHO MEAS - MVA(VTI): 5.4 CM^2
BH CV ECHO MEAS - RVDD: 2.6 CM
BH CV ECHO MEAS - SI(AO): 102.9 ML/M^2
BH CV ECHO MEAS - SI(CUBED): 35.4 ML/M^2
BH CV ECHO MEAS - SI(LVOT): 32.4 ML/M^2
BH CV ECHO MEAS - SI(TEICH): 27.1 ML/M^2
BH CV ECHO MEAS - SV(AO): 230.1 ML
BH CV ECHO MEAS - SV(CUBED): 79.1 ML
BH CV ECHO MEAS - SV(LVOT): 72.4 ML
BH CV ECHO MEAS - SV(TEICH): 60.5 ML
BH CV ECHO MEAS - TR MAX VEL: 311 CM/SEC
BUN BLD-MCNC: 45 MG/DL (ref 7–21)
BUN/CREAT SERPL: 19 (ref 7–25)
CALCIUM SPEC-SCNC: 8.8 MG/DL (ref 8.4–10.2)
CHLORIDE SERPL-SCNC: 104 MMOL/L (ref 95–110)
CO2 SERPL-SCNC: 25 MMOL/L (ref 22–31)
CREAT BLD-MCNC: 2.37 MG/DL (ref 0.7–1.3)
GFR SERPL CREATININE-BSD FRML MDRD: 32 ML/MIN/1.73 (ref 42–98)
GLUCOSE BLD-MCNC: 188 MG/DL (ref 60–100)
GLUCOSE BLDC GLUCOMTR-MCNC: 151 MG/DL (ref 70–130)
GLUCOSE BLDC GLUCOMTR-MCNC: 164 MG/DL (ref 70–130)
GLUCOSE BLDC GLUCOMTR-MCNC: 233 MG/DL (ref 70–130)
GLUCOSE BLDC GLUCOMTR-MCNC: 243 MG/DL (ref 70–130)
GLUCOSE BLDC GLUCOMTR-MCNC: 245 MG/DL (ref 70–130)
INR PPP: 2.55 (ref 0.8–1.2)
LV EF 2D ECHO EST: 56 %
MAXIMAL PREDICTED HEART RATE: 143 BPM
POTASSIUM BLD-SCNC: 4.5 MMOL/L (ref 3.5–5.1)
PROTHROMBIN TIME: 26.3 SECONDS (ref 11.1–15.3)
SODIUM BLD-SCNC: 136 MMOL/L (ref 137–145)
STRESS TARGET HR: 122 BPM
TROPONIN I SERPL-MCNC: <0.012 NG/ML

## 2019-01-24 PROCEDURE — 82962 GLUCOSE BLOOD TEST: CPT

## 2019-01-24 PROCEDURE — 93306 TTE W/DOPPLER COMPLETE: CPT | Performed by: INTERNAL MEDICINE

## 2019-01-24 PROCEDURE — 94640 AIRWAY INHALATION TREATMENT: CPT

## 2019-01-24 PROCEDURE — 80048 BASIC METABOLIC PNL TOTAL CA: CPT | Performed by: STUDENT IN AN ORGANIZED HEALTH CARE EDUCATION/TRAINING PROGRAM

## 2019-01-24 PROCEDURE — 85610 PROTHROMBIN TIME: CPT | Performed by: STUDENT IN AN ORGANIZED HEALTH CARE EDUCATION/TRAINING PROGRAM

## 2019-01-24 PROCEDURE — 63710000001 INSULIN ASPART PER 5 UNITS: Performed by: STUDENT IN AN ORGANIZED HEALTH CARE EDUCATION/TRAINING PROGRAM

## 2019-01-24 PROCEDURE — 73560 X-RAY EXAM OF KNEE 1 OR 2: CPT

## 2019-01-24 PROCEDURE — 93306 TTE W/DOPPLER COMPLETE: CPT

## 2019-01-24 PROCEDURE — 94799 UNLISTED PULMONARY SVC/PX: CPT

## 2019-01-24 PROCEDURE — 94760 N-INVAS EAR/PLS OXIMETRY 1: CPT

## 2019-01-24 PROCEDURE — 99232 SBSQ HOSP IP/OBS MODERATE 35: CPT | Performed by: STUDENT IN AN ORGANIZED HEALTH CARE EDUCATION/TRAINING PROGRAM

## 2019-01-24 RX ORDER — WARFARIN SODIUM 3 MG/1
6 TABLET ORAL
Status: DISCONTINUED | OUTPATIENT
Start: 2019-01-24 | End: 2019-01-25

## 2019-01-24 RX ORDER — FAMOTIDINE 20 MG/1
20 TABLET, FILM COATED ORAL DAILY
Status: DISCONTINUED | OUTPATIENT
Start: 2019-01-24 | End: 2019-01-25 | Stop reason: HOSPADM

## 2019-01-24 RX ADMIN — BUDESONIDE 0.5 MG: 0.5 INHALANT RESPIRATORY (INHALATION) at 07:37

## 2019-01-24 RX ADMIN — SENNOSIDES AND DOCUSATE SODIUM 2 TABLET: 8.6; 5 TABLET ORAL at 21:44

## 2019-01-24 RX ADMIN — HYDROCODONE BITARTRATE AND ACETAMINOPHEN 1 TABLET: 7.5; 325 TABLET ORAL at 17:20

## 2019-01-24 RX ADMIN — INSULIN ASPART 3 UNITS: 100 INJECTION, SOLUTION INTRAVENOUS; SUBCUTANEOUS at 17:07

## 2019-01-24 RX ADMIN — INSULIN ASPART 3 UNITS: 100 INJECTION, SOLUTION INTRAVENOUS; SUBCUTANEOUS at 21:44

## 2019-01-24 RX ADMIN — WARFARIN SODIUM 6 MG: 3 TABLET ORAL at 17:07

## 2019-01-24 RX ADMIN — AMLODIPINE BESYLATE 5 MG: 5 TABLET ORAL at 10:09

## 2019-01-24 RX ADMIN — FAMOTIDINE 20 MG: 20 TABLET ORAL at 10:09

## 2019-01-24 RX ADMIN — BRINZOLAMIDE 1 DROP: 10 SUSPENSION/ DROPS OPHTHALMIC at 21:47

## 2019-01-24 RX ADMIN — INSULIN ASPART 3 UNITS: 100 INJECTION, SOLUTION INTRAVENOUS; SUBCUTANEOUS at 12:35

## 2019-01-24 RX ADMIN — IPRATROPIUM BROMIDE AND ALBUTEROL SULFATE 3 ML: 2.5; .5 SOLUTION RESPIRATORY (INHALATION) at 07:32

## 2019-01-24 RX ADMIN — IPRATROPIUM BROMIDE AND ALBUTEROL SULFATE 3 ML: 2.5; .5 SOLUTION RESPIRATORY (INHALATION) at 19:19

## 2019-01-24 RX ADMIN — SODIUM CHLORIDE 75 ML/HR: 9 INJECTION, SOLUTION INTRAVENOUS at 10:11

## 2019-01-24 RX ADMIN — CLONIDINE HYDROCHLORIDE 0.2 MG: 0.2 TABLET ORAL at 21:44

## 2019-01-24 RX ADMIN — OXYBUTYNIN CHLORIDE 10 MG: 10 TABLET, EXTENDED RELEASE ORAL at 21:44

## 2019-01-24 RX ADMIN — BRINZOLAMIDE 1 DROP: 10 SUSPENSION/ DROPS OPHTHALMIC at 15:58

## 2019-01-24 RX ADMIN — IPRATROPIUM BROMIDE AND ALBUTEROL SULFATE 3 ML: 2.5; .5 SOLUTION RESPIRATORY (INHALATION) at 12:54

## 2019-01-24 RX ADMIN — CETIRIZINE HYDROCHLORIDE 5 MG: 5 TABLET, FILM COATED ORAL at 10:09

## 2019-01-24 RX ADMIN — PILOCARPINE HYDROCHLORIDE 1 DROP: 10 SOLUTION/ DROPS OPHTHALMIC at 10:11

## 2019-01-24 RX ADMIN — INSULIN ASPART 2 UNITS: 100 INJECTION, SOLUTION INTRAVENOUS; SUBCUTANEOUS at 10:08

## 2019-01-24 RX ADMIN — HYDROCODONE BITARTRATE AND ACETAMINOPHEN 1 TABLET: 7.5; 325 TABLET ORAL at 04:20

## 2019-01-24 RX ADMIN — LATANOPROST 1 DROP: 50 SOLUTION OPHTHALMIC at 21:47

## 2019-01-24 RX ADMIN — CLONIDINE HYDROCHLORIDE 0.2 MG: 0.2 TABLET ORAL at 10:10

## 2019-01-24 RX ADMIN — BRINZOLAMIDE 1 DROP: 10 SUSPENSION/ DROPS OPHTHALMIC at 10:11

## 2019-01-24 NOTE — PROGRESS NOTES
FAMILY MEDICINE DAILY PROGRESS NOTE  NAME: Ondina Alanis Sr.  : 1940  MRN: [id number]     LOS: 0 days     PROVIDER OF SERVICE: Pedro Diaz MD    Chief Complaint: Chest pain on breathing    Subjective:     Interval History:  History taken from: patient family  Patient is a 79 y/o Afri [place] american male who was admitted for chest pain.   Patient was found resting in bed, in no acute distress. Patient did endorse substernal chest pain without radiation. Aleviated by rest and pain medication, aggravated by movement. Reported occasional moments of shortness of air that resolved on its own  Also, patients wife reported that patients L knee and L lower extremity pain.     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     Review of Systems:   Review of Systems   Constitutional: Negative for activity change, appetite change and fever.   HENT: Negative for congestion, sinus pressure, sinus pain and sneezing.    Respiratory: Positive for shortness of breath. Negative for apnea, choking, chest tightness and wheezing.    Cardiovascular: Positive for chest pain. Negative for palpitations and leg swelling.   Gastrointestinal: Negative for abdominal distention, abdominal pain, diarrhea, nausea and vomiting.   Genitourinary: Negative for difficulty urinating, dysuria and frequency.   Musculoskeletal: Positive for arthralgias and back pain.   Skin: Negative for color change.   Neurological: Negative for dizziness, facial asymmetry and headaches.   Psychiatric/Behavioral: Negative for agitation, behavioral problems and confusion.       Objective:     Vital Signs  Temp:  [97.6 °F (36.4 °C)-99.3 °F (37.4 °C)] 99.3 °F (37.4 °C)  Heart Rate:  [] 93  Resp:  [16-20] 18  BP: (142-221)/() 156/82    Physical Exam  Physical Exam   Constitutional: He appears well-developed and  well-nourished. No distress.   HENT:   Head: Atraumatic.   Right Ear: External ear normal.   Left Ear: External ear normal.   Mouth/Throat: Oropharynx is clear and moist.   Eyes:   Patient is blind    Neck: Trachea normal, full passive range of motion without pain and phonation normal.   Cardiovascular: Regular rhythm, S1 normal and S2 normal.   Pulses:       Dorsalis pedis pulses are 2+ on the right side, and 2+ on the left side.   Pulmonary/Chest: Effort normal and breath sounds normal. No tachypnea. No respiratory distress. He has no decreased breath sounds. He has no wheezes.   Abdominal: Soft. Bowel sounds are normal.   Musculoskeletal:        Left knee: He exhibits swelling.        Legs:  Neurological: He is alert.   Patient responded to person and place, not time.        Medication Review    Current Facility-Administered Medications:   •  acetaminophen (TYLENOL) tablet 650 mg, 650 mg, Oral, Q4H PRN, Norm Hernandez MD  •  albuterol (PROVENTIL) nebulizer solution 0.083% 2.5 mg/3mL, 2.5 mg, Nebulization, Q6H PRN, Norm Hernandez MD  •  amLODIPine (NORVASC) tablet 5 mg, 5 mg, Oral, Daily, Norm Hernandez MD  •  brinzolamide (AZOPT) 1 % ophthalmic suspension 1 drop, 1 drop, Both Eyes, TID, Norm Hernandez MD, 1 drop at 01/23/19 2157  •  budesonide (PULMICORT) nebulizer solution 0.5 mg, 0.5 mg, Nebulization, Daily - RT, Norm Hernandez MD, 0.5 mg at 01/24/19 0737  •  calcium carbonate (TUMS) chewable tablet 500 mg (200 mg elemental), 2 tablet, Oral, BID PRN, Norm Hernandez MD  •  cetirizine (zyrTEC) tablet 5 mg, 5 mg, Oral, Daily, Norm Hernandez MD  •  CloNIDine (CATAPRES) tablet 0.2 mg, 0.2 mg, Oral, Q12H, Norm Hernandez MD, 0.2 mg at 01/23/19 2156  •  dextrose (D50W) 25 g/ 50mL Intravenous Solution 25 g, 25 g, Intravenous, Q15 Min PRN, Norm Hernandez MD  •  dextrose (GLUTOSE) oral gel 15 g, 15 g, Oral, Q15 Min PRN, Norm Hernandez MD  •  famotidine (PEPCID) tablet 20 mg, 20 mg, Oral,  Daily, Pedro Diaz MD  •  glucagon (human recombinant) (GLUCAGEN DIAGNOSTIC) injection 1 mg, 1 mg, Subcutaneous, PRN, Norm Hernandez MD  •  HYDROcodone-acetaminophen (NORCO) 7.5-325 MG per tablet 1 tablet, 1 tablet, Oral, Q6H PRN, Norm Hernandez MD, 1 tablet at 01/24/19 0420  •  insulin aspart (novoLOG) injection 0-7 Units, 0-7 Units, Subcutaneous, 4x Daily AC & at Bedtime, Norm Hernandez MD  •  ipratropium-albuterol (DUO-NEB) nebulizer solution 3 mL, 3 mL, Nebulization, Q6H While Awake - RT, Norm Hernandez MD, 3 mL at 01/24/19 0732  •  latanoprost (XALATAN) 0.005 % ophthalmic solution 1 drop, 1 drop, Both Eyes, Nightly, Norm Hernandez MD, 1 drop at 01/23/19 2157  •  melatonin tablet 5.25 mg, 5.25 mg, Oral, Nightly PRN, Norm Hernandez MD  •  ondansetron (ZOFRAN) tablet 4 mg, 4 mg, Oral, Q6H PRN **OR** ondansetron ODT (ZOFRAN-ODT) disintegrating tablet 4 mg, 4 mg, Oral, Q6H PRN **OR** ondansetron (ZOFRAN) injection 4 mg, 4 mg, Intravenous, Q6H PRN, Norm Hernandez MD  •  oxybutynin XL (DITROPAN-XL) 24 hr tablet 10 mg, 10 mg, Oral, Nightly, Norm Hernandez MD, 10 mg at 01/23/19 2156  •  pantoprazole (PROTONIX) EC tablet 40 mg, 40 mg, Oral, Q AM, Norm Hernandez MD  •  Pharmacy to dose warfarin, , Does not apply, Continuous PRN, Norm Hernandez MD  •  pilocarpine (PILOCAR) 1 % ophthalmic solution 1 drop, 1 drop, Both Eyes, Daily, Norm Hernandez MD  •  sennosides-docusate sodium (SENOKOT-S) 8.6-50 MG tablet 2 tablet, 2 tablet, Oral, Nightly, Norm Hernandez MD, 2 tablet at 01/23/19 2156  •  sodium chloride 0.9 % flush 10 mL, 10 mL, Intravenous, PRN, Almas Bear MD  •  sodium chloride 0.9 % infusion, 75 mL/hr, Intravenous, Continuous, Norm Hernandez MD, Last Rate: 75 mL/hr at 01/24/19 0741, 75 mL/hr at 01/24/19 0741     Diagnostic Data    Lab Results (last 24 hours)     Procedure Component Value Units Date/Time    Basic Metabolic Panel [[id number]]  (Abnormal) Collected:   01/24/19 0649    Specimen:  Blood Updated:  01/24/19 0801     Glucose 188 mg/dL      BUN 45 mg/dL      Creatinine 2.37 mg/dL      Sodium 136 mmol/L      Potassium 4.5 mmol/L      Chloride 104 mmol/L      CO2 25.0 mmol/L      Calcium 8.8 mg/dL      eGFR  African Amer 32 mL/min/1.73      BUN/Creatinine Ratio 19.0     Anion Gap 7.0 mmol/L     Narrative:       The MDRD GFR formula is only valid for adults with stable renal function between ages 18 and 70.    POC Glucose Once [[id number]]  (Abnormal) Collected:  01/24/19 0721    Specimen:  Blood Updated:  01/24/19 0744     Glucose 164 mg/dL      Comment: RN NotifiedOperator: [id number] ISH SMILEYNAMpaulette ID: [id number]       Protime-INR [[id number]] Collected:  01/24/19 0649    Specimen:  Blood Updated:  01/24/19 0740    POC Glucose Once [[id number]]  (Abnormal) Collected:  01/23/19 2047    Specimen:  Blood Updated:  01/24/19 0652     Glucose 151 mg/dL      Comment: RN NotifiedOperator: [id number] MARY ODONNELLMeter ID: [id number]       Troponin [[id number]]  (Normal) Collected:  01/23/19 2342    Specimen:  Blood Updated:  01/24/19 0029     Troponin I <0.012 ng/mL     Influenza Antigen, Rapid - Swab, Nasopharynx [[id number]]  (Normal) Collected:  01/23/19 2056    Specimen:  Swab from Nasopharynx Updated:  01/23/19 2113     Influenza A Ag, EIA Negative     Influenza B Ag, EIA Negative    Troponin [[id number]]  (Normal) Collected:  01/23/19 1814    Specimen:  Blood Updated:  01/23/19 1855     Troponin I <0.012 ng/mL     D-dimer, Quantitative [[id number]]  (Abnormal) Collected:  01/23/19 1400    Specimen:  Blood Updated:  01/23/19 1545     D-Dimer, Quantitative 2,064 ng/mL (FEU)     Narrative:       Dimer values <500 ng/ml FEU are FDA approved as aid in diagnosis of deep venous thrombosis and pulmonary embolism.  This test should not be used in an exclusion strategy with pretest probability alone.    A recent guideline regarding diagnosis for pulmonary thromboembolism  recommends an adjusted exclusion criterion of age x 10 ng/ml FEU for patients >50 years of age ([employer] Intern Med 2015; 163: 701-711).    [place] Draw [[id number]] Collected:  01/23/19 1400    Specimen:  Blood Updated:  01/23/19 1501    Narrative:       The following orders were created for panel order [place] Draw.  Procedure                               Abnormality         Status                     ---------                               -----------         ------                     Light Blue Top[[id number]]                                   Final result               Green Top (Gel)[[id number]]                                  Final result               Lavender Top[[id number]]                                     Final result               Gold Top - SST[[id number]]                                   Final result                 Please view results for these tests on the individual orders.    Light Blue Top [[id number]] Collected:  01/23/19 1400    Specimen:  Blood Updated:  01/23/19 1501     Extra Tube hold for add-on     Comment: Auto resulted       Green Top (Gel) [[id number]] Collected:  01/23/19 1400    Specimen:  Blood Updated:  01/23/19 1501     Extra Tube Hold for add-ons.     Comment: Auto resulted.       Lavender Top [[id number]] Collected:  01/23/19 1400    Specimen:  Blood Updated:  01/23/19 1501     Extra Tube hold for add-on     Comment: Auto resulted       Gold Top - SST [[id number]] Collected:  01/23/19 1400    Specimen:  Blood Updated:  01/23/19 1501     Extra Tube Hold for add-ons.     Comment: Auto resulted.       Protime-INR [[id number]]  (Abnormal) Collected:  01/23/19 1400    Specimen:  Blood Updated:  01/23/19 1446     Protime 22.6 Seconds      INR 2.09    Narrative:       Therapeutic range for most indications is 2.0-3.0 INR,  or 2.5-3.5 for mechanical heart valves.    Troponin [[id number]]  (Normal) Collected:  01/23/19 1400    Specimen:  Blood Updated:  01/23/19 1434     Troponin I <0.012 ng/mL      BNP [[id number]]  (Abnormal) Collected:  01/23/19 1400    Specimen:  Blood Updated:  01/23/19 1434     proBNP 2,170.0 pg/mL     Comprehensive Metabolic Panel [[id number]]  (Abnormal) Collected:  01/23/19 1400    Specimen:  Blood Updated:  01/23/19 1422     Glucose 148 mg/dL      BUN 44 mg/dL      Creatinine 2.69 mg/dL      Sodium 136 mmol/L      Potassium 4.3 mmol/L      Chloride 102 mmol/L      CO2 28.0 mmol/L      Calcium 9.0 mg/dL      Total Protein 6.9 g/dL      Albumin 3.70 g/dL      ALT (SGPT) 15 U/L      AST (SGOT) 25 U/L      Alkaline Phosphatase 87 U/L      Total Bilirubin 1.0 mg/dL      eGFR  African Amer 28 mL/min/1.73      Globulin 3.2 gm/dL      A/G Ratio 1.2 g/dL      BUN/Creatinine Ratio 16.4     Anion Gap 6.0 mmol/L     Narrative:       The MDRD GFR formula is only valid for adults with stable renal function between ages 18 and 70.    CBC & Differential [[id number]] Collected:  01/23/19 1400    Specimen:  Blood Updated:  01/23/19 1406    Narrative:       The following orders were created for panel order CBC & Differential.  Procedure                               Abnormality         Status                     ---------                               -----------         ------                     CBC Auto Differential[[id number]]        Abnormal            Final result                 Please view results for these tests on the individual orders.    CBC Auto Differential [[id number]]  (Abnormal) Collected:  01/23/19 1400    Specimen:  Blood Updated:  01/23/19 1406     WBC 10.89 10*3/mm3      RBC 5.19 10*6/mm3      Hemoglobin 14.0 g/dL      Hematocrit 41.2 %      MCV 79.4 fL      MCH 27.0 pg      MCHC 34.0 g/dL      RDW 14.8 %      RDW-SD 43.2 fl      MPV 10.9 fL      Platelets 202 10*3/mm3      Neutrophil % 72.2 %      Lymphocyte % 15.6 %      Monocyte % 10.2 %      Eosinophil % 1.6 %      Basophil % 0.1 %      Immature Grans % 0.3 %      Neutrophils, Absolute 7.87 10*3/mm3      Lymphocytes, Absolute 1.70  10*3/mm3      Monocytes, Absolute 1.11 10*3/mm3      Eosinophils, Absolute 0.17 10*3/mm3      Basophils, Absolute 0.01 10*3/mm3      Immature Grans, Absolute 0.03 10*3/mm3            Imaging Results (last 24 hours)     Procedure Component Value Units Date/Time    NM Lung Scan Perfusion Particulate [[id number]] Collected:  01/23/19 1733     Updated:  01/23/19 1800    Narrative:       EXAMINATION:  NUCLEAR MEDICINE PULMONARY PERFUSION / VENTILATION  SCAN (V/Q SCAN)    CLINICAL HISTORY:   Dyspnea, elevated d-dimer     COMPARISON EXAMINATIONS: Chest x-ray performed the same date.    TECHNIQUE:  Perfusion: 6.2 mCi of technetium labeled MAA (number of  particles:  approx 600,000)  Ventilation: Not performed, patient was unable to tolerate  ventilation procedure.    FINDINGS:  Perfusion only scan was performed. There is an area of bandlike  decreased perfusion in the mid right lung without a corresponding  chest x-ray abnormality.  Additional areas of decreased perfusion are seen in the left  upper anterior lung on the RPO and Croatian views without chest x-ray  abnormalities.      Impression:       CONCLUSION:  Intermediate probability of pulmonary embolism. Exam is limited  without ventilation data.    COMMENTS:    Normal exam = statistically less than 2% have pulmonary emboli.  Low probability = statistically 5-19% have pulmonary emboli.   Intermediate probability = statistically 20-70% have pulmonary  emboli.   High probability = statistically greater than 80% have pulmonary  emboli.      NOTE:   The likelihood of pulmonary embolism, especially in the low and  intermediate categories, should be interpreted in conjunction  with the pre- and post-test probability and other test results  such as venous Doppler, D dimer and if necessary pulmonary  angiography as clinically indicated.    Findings were discussed with SALLY JENKINS on 1/23/2019  5:58 PM CST    Electronically signed by:  Agustin Early MD  1/23/2019 5:59 PM  CST  Workstation: [id number]    US Venous Doppler Lower Extremity Left (duplex) [[id number]] Collected:  01/23/19 1522     Updated:  01/23/19 1612    Narrative:         Ultrasound venous duplex left lower extremity    HISTORY: Left lower extremity pain and swelling    Duplex ultrasound of the deep venous system of the left lower  extremity was performed    Real-time images demonstrate normal compressibility without  evidence of intraluminal thrombus.  Doppler shows phasic flow and augmentation.  Color Doppler also reveals venous patency.      Impression:       CONCLUSION:  No ultrasound evidence of deep venous thrombosis of the left  lower extremity.    [id number]    Electronically signed by:  Chris Srinivasan MD  1/23/2019 4:10 PM CST  Workstation: [id number]    XR Chest 1 View [[id number]] Collected:  01/23/19 1405     Updated:  01/23/19 1423    Narrative:         PORTABLE CHEST    HISTORY: Chest pain    Portable AP supine film of the chest was obtained at 2:04 PM.  COMPARISON: June 7, 2017    EKG leads.  The lungs are clear of an acute process.  The heart is not enlarged.  The pulmonary vasculature is not increased.  No pleural effusion.  No pneumothorax.  No acute osseous abnormality.      Impression:       CONCLUSION:  No Acute Disease    [id number]    Electronically signed by:  Chris Srinivasan MD  1/23/2019 2:21 PM CST  Workstation: [id number]          I reviewed the patient's new clinical results.    Assessment/Plan:     Active Hospital Problems    Diagnosis   • **Chest pain on breathing     -EKG unremarkable.  -Troponin negative x 3    Echo this morning      • Left knee pain       Xray ordered  -pain medication         • Acute respiratory failure with hypoxia (CMS/HCC)     -Patient is not on oxygen nasal cannula at home.  -He was placed on 2 L nasal cannula in the ER and desatted to 86%.  He was bumped up to 3 L and was satting greater than 92%.  -Continue to monitor and wean off of oxygen as needed.  -Chest x-ray showed no acute  cardiopulmonary processes.     • Stage 1 acute kidney injury (CMS/HCC)     -Creatinine elevated to 2.69 (decreasing)   -Baseline appears to be around 2.  -Continue to monitor.  -Renal diet.  -IVF 75 cc per hour.      • Asthma     -Continue home inhalers.     • Primary osteoarthritis of both knees     -Norco 7.5 continue.     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  As well as a PE.  -D-dimer elevated today.  Intermediate risk on VQ scan.  Unable to obtain CTA due to elevated creatinine.  -Left swollen knee.  Ultrasound showed no DVT in the left lower extremity.  -Patient anticoagulated with warfarin.  INR 2.1.  Continue treatment, pharmacy to dose.  -Echo in AM.      • CKD (chronic kidney disease) stage 4, GFR 15-29 ml/min (CMS/[facility])     -Creatinine baseline appears to be 2.  Currently it is 2.69.  -Continue to monitor.  -IVF 75 cc per hour.  -Renal diet.     • GERD (gastroesophageal reflux disease)     -Tums and Pepcid when necessary.     • Diabetic peripheral neuropathy associated with type 2 diabetes mellitus (CMS/[facility])   • Benign essential hypertension     -Continue home antihypertensive medications.  -Continue to monitor.     • Diabetes mellitus type II, uncontrolled (CMS/[facility])     -Sliding Scale.         DVT prophylaxis: Coumadin  Code Status and Medical Interventions:   Ordered at: 01/23/19 2053     Code Status:    CPR     Medical Interventions (Level of Support Prior to Arrest):    Full       Plan for disposition:Home in 1-2 days       Time: Home in 1-2 days      Signed,   Pedro Diaz MD  Family Medicine Resident PGY1  [facility]        This document has been electronically signed by Pedro Diaz MD on January 24, 2019 8:02 AM

## 2019-01-24 NOTE — ED NOTES
Pt presents to the ED with complaints of mid sternal chest pains for the past 2 days. Wife reports that patient has also complained of bilateral lower leg pain with history of dvt. Pt had moderate amount of swelling and warmth to left knee. Pt denies any shortness of breath, cough, or fever.      Sammie Dallas RN  01/23/19 1914

## 2019-01-24 NOTE — PLAN OF CARE
Problem: Patient Care Overview  Goal: Plan of Care Review  Outcome: Ongoing (interventions implemented as appropriate)   01/24/19 1118   Coping/Psychosocial   Plan of Care Reviewed With patient;spouse   Plan of Care Review   Progress no change   OTHER   Outcome Summary New assessment. Per family pt was eating well up until several days pta. No hx of wt loss. Rd will monitor po and make recommendations prn

## 2019-01-24 NOTE — PLAN OF CARE
Problem: Fall Risk (Adult)  Goal: Identify Related Risk Factors and Signs and Symptoms  Outcome: Outcome(s) achieved Date Met: 01/24/19    Goal: Absence of Fall  Outcome: Ongoing (interventions implemented as appropriate)      Problem: Skin Injury Risk (Adult)  Goal: Identify Related Risk Factors and Signs and Symptoms  Outcome: Outcome(s) achieved Date Met: 01/24/19    Goal: Skin Health and Integrity  Outcome: Ongoing (interventions implemented as appropriate)      Problem: Patient Care Overview  Goal: Plan of Care Review  Outcome: Ongoing (interventions implemented as appropriate)   01/24/19 0323   Coping/Psychosocial   Plan of Care Reviewed With patient   Plan of Care Review   Progress no change   OTHER   Outcome Summary new admit. vss. pain controlled with interventions. continue to monitor.       Problem: Pain, Acute (Adult)  Goal: Identify Related Risk Factors and Signs and Symptoms  Outcome: Outcome(s) achieved Date Met: 01/24/19    Goal: Acceptable Pain Control/Comfort Level  Outcome: Ongoing (interventions implemented as appropriate)

## 2019-01-24 NOTE — CONSULTS
Adult Nutrition  Assessment    Patient Name:  Ondina Alanis Sr.  YOB: 1940  MRN: [id number]  Admit Date:  1/23/2019    Assessment Date:  1/24/2019    Comments:  RD consulted for decreased appetite.  Pt sleeping soundly.  His family reports a decreased appetite over the last several days pta.  Prior to that he was eating well.  NO hx of wt loss.  Rd will continue to monitor po and refrain from adding supplements at this time due to elevated bun and creat with hx of CKD.  RD will monitor.    Reason for Assessment     Row Name 01/24/19 1621          Reason for Assessment    Reason For Assessment  identified at risk by screening criteria     Diagnosis  pulmonary disease;cardiac disease     Identified At Risk by Screening Criteria  reduced oral intake over the last month         Nutrition/Diet History     Row Name 01/24/19 1622          Nutrition/Diet History    Typical Food/Fluid Intake  .           Labs/Tests/Procedures/Meds     Row Name 01/24/19 1622          Labs/Procedures/Meds    Lab Results Reviewed  reviewed, pertinent        Diagnostic Tests/Procedures    Diagnostic Test/Procedure Reviewed  reviewed, pertinent        Medications    Pertinent Medications Reviewed  reviewed, pertinent         Physical Findings     Row Name 01/24/19 1622          Physical Findings    Overall Physical Appearance  on oxygen therapy         Estimated/Assessed Needs     Row Name 01/24/19 1622          Calculation Measurements    Weight Used For Calculations  98 kg (216 lb)        Estimated/Assessed Needs    Additional Documentation  Additional Requirements (Group);Fluid Requirements (Group);Willows-St. Jeor Equation (Group);Protein Requirements (Group);Calorie Requirements (Group);KCAL/KG (Group)        KCAL/KG    14 Kcal/Kg (kcal)  1371.68     15 Kcal/Kg (kcal)  1469.66     18 Kcal/Kg (kcal)  1763.59     20 Kcal/Kg (kcal)  1959.54     25 Kcal/Kg (kcal)  2449.43     30 Kcal/Kg (kcal)  2939.31     35 Kcal/Kg  (kcal)  3429.2     40 Kcal/Kg (kcal)  3919.08     45 Kcal/Kg (kcal)  4408.96     50 Kcal/Kg (kcal)  4898.85        Tillman-St. Jeor Equation    RMR (Tillman-St. Jeor Equation)  1774.52        Fluid Requirements    Alberto-Kana Method (over 20 kg)  3459.54         Nutrition Prescription Ordered     Row Name 01/24/19 1622          Nutrition Prescription PO    Current PO Diet  Regular     Fluid Consistency  Thin     Common Modifiers  Consistent Carbohydrate;Cardiac;Renal         Evaluation of Received Nutrient/Fluid Intake     Row Name 01/24/19 1623 01/24/19 1622       Calculation Measurements    Weight Used For Calculations  --  98 kg (216 lb)       PO Evaluation    Number of Days PO Intake Evaluated  Insufficient Data  --        Evaluation of Prescribed Nutrient/Fluid Intake     Row Name 01/24/19 1622          Calculation Measurements    Weight Used For Calculations  98 kg (216 lb)             Electronically signed by:  Constance Thomas RD  01/24/19 4:26 PM

## 2019-01-24 NOTE — H&P
HISTORY AND PHYSICAL  NAME: Ondina Alanis Sr.  : 1940  MRN: [id number]    DATE OF ADMISSION: 19    DATE & TIME SEEN: 19 6:37 PM    PCP: Provider, No Known    CODE STATUS: Full code    CHIEF COMPLAINT Chest pain    HPI:  Ondina Alanis Sr. is a 78 y.o. 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  The patient has dementia and is limited in providing history.  Relative is in the room who she has most of the story.  Patient has been complaining of chest pain for the last 2 days has gotten worse.  The pain is markedly worse when he breathes.  He does not have coronary artery disease history.  He has a dry cough and shortness of breath as well.  Nothing seems to help his chest pain improve, nothing makes it worse.  He has no URI symptoms.  For the last day he has not had very much to eat or drink.  Patient is not mobile, staying confined to his bed.  He also has swelling of his left knee that started a day or 2 ago.     In the ER, patient troponin negative ×1.  EKG was unremarkable.  D-dimer elevated to greater than 2000.  Patient has elevated creatinine and CTA could not be obtained.  VQ scan showed intermediate risk for a PE.  Ultrasound of the left leg was obtained, but was negative for a DVT.  Patient's BNP minimally elevated to ~2100.  He does not have a history of heart failure.  INR is therapeutic at 2.1.    CONCURRENT MEDICAL HISTORY:  Past Medical History:   Diagnosis Date   • Asthma    • Diabetes mellitus (CMS/HCC)    • GERD (gastroesophageal reflux disease)    • Hypertension    • Prostate disorder        PAST SURGICAL HISTORY:  Past Surgical History:   Procedure Laterality Date   • BACK SURGERY     • KNEE SURGERY Left    • PROSTATE SURGERY         FAMILY HISTORY:  No family history on file.     SOCIAL HISTORY:  Social History     Socioeconomic History   • Marital status:      Spouse name: Not on file   • Number of children: Not on  file   • Years of education: Not on file   • Highest education level: Not on file   Social Needs   • Financial resource strain: Not on file   • Food insecurity - worry: Not on file   • Food insecurity - inability: Not on file   • Transportation needs - medical: Not on file   • Transportation needs - non-medical: Not on file   Occupational History   • Not on file   Tobacco Use   • Smoking status: Never Smoker   Substance and Sexual Activity   • Alcohol use: No   • Drug use: No   • Sexual activity: Defer   Other Topics Concern   • Not on file   Social History Narrative   • Not on file       HOME MEDICATIONS:  Prior to Admission medications    Medication Sig Start Date End Date Taking? Authorizing Provider   albuterol (PROVENTIL HFA;VENTOLIN HFA) 108 (90 BASE) MCG/ACT inhaler Inhale 2 puffs Every 6 (Six) Hours.    Selina Rosario MD   amLODIPine (NORVASC) 5 MG tablet TAKE 1 TABLET DAILY 3/7/17   Selina Rosario MD   brimonidine (ALPHAGAN P) 0.1 % solution ophthalmic solution 1 drop. 1/26/16   Selina Rosario MD   brinzolamide (AZOPT) 1 % ophthalmic suspension 1 drop. 1/26/16   Selina Rosario MD   budesonide (PULMICORT) 0.5 MG/2ML nebulizer solution Inhale 0.5 mg. 1/26/16   Selina Rosario MD   CloNIDine (CATAPRES) 0.2 MG tablet Take 0.2 mg by mouth. 4/10/17   Selina Rosario MD   desloratadine (CLARINEX) 5 MG tablet Take 5 mg by mouth. 4/10/17   Selina Rosario MD   HYDROcodone-acetaminophen (NORCO) 7.5-325 MG per tablet Take 1 tablet by mouth Every 6 (Six) Hours As Needed for Moderate Pain (4-6).    Selina Rosario MD   Insulin Detemir (LEVEMIR SC) Inject 15 Units under the skin 2 (Two) Times a Day. 4/14/17   Selina Rosario MD   Insulin Lispro (HUMALOG) 100 UNIT/ML solution pen-injector Inject 12 Units under the skin. 4/10/17   Selina Rosario MD   ipratropium-albuterol (DUO-NEB) 0.5-2.5 mg/mL nebulizer Inhale 3 mL Every 6 (Six) Hours. 1/26/16   Marlene  MD Selina   lansoprazole (PREVACID) 30 MG capsule Take 30 mg by mouth. 4/10/17   Selina Rosario MD   latanoprost (XALATAN) 0.005 % ophthalmic solution 1 drop. 1/26/16   Selina Rosario MD   Misc. Devices (TRANSFER BENCH) misc Transfer bench r/t dementia, deconditioning. 6/8/17   Nicole Canseco APRN   [facility] [place]UB [place]ER [place] [facility] [place] r/t deconditioning, dementia, and blind r/t glaucoma 6/9/17   Nicole Canseco APRN   oxybutynin XL (DITROPAN-XL) 10 MG 24 hr tablet  9/5/16   Selina Rosario MD   pilocarpine (PILOCAR) 1 % ophthalmic solution 1 drop. 1/26/16   Selina Rosario MD   warfarin (COUMADIN) 5 MG tablet take 1 and 1/2 tablets SUN MON WED FRI and 1 tablet all other days for MYOCARDIAL REINFARCTION PREVENTION 4/10/17   Selina Rosario MD       ALLERGIES:  Contrast dye    REVIEW OF SYSTEMS  Review of Systems   Unable to perform ROS: Dementia (limited by)   Constitutional: Positive for activity change, appetite change, chills and fatigue. Negative for diaphoresis and fever.   HENT: Negative for congestion, ear pain, rhinorrhea and sore throat.    Eyes: Negative for redness.   Respiratory: Positive for cough and shortness of breath. Negative for chest tightness and wheezing.    Cardiovascular: Positive for chest pain. Negative for palpitations and leg swelling.   Gastrointestinal: Positive for nausea. Negative for abdominal distention, abdominal pain, constipation, diarrhea, rectal pain and vomiting.   Genitourinary: Negative for flank pain.   Skin: Negative for color change and rash.   Neurological: Positive for weakness. Negative for dizziness, seizures, syncope, light-headedness, numbness and headaches.   Psychiatric/Behavioral: Negative for agitation, confusion, decreased concentration and sleep disturbance. The patient is not nervous/anxious.    All other systems reviewed and are negative.      PHYSICAL EXAM:  Temp:  [97.6 °F (36.4 °C)-98.5 °F (36.9  °C)] 98.5 °F (36.9 °C)  Heart Rate:  [] 105  Resp:  [16-20] 18  BP: (142-221)/() 142/80  Body mass index is 27.73 kg/m².     Physical Exam   Constitutional: He appears well-developed and well-nourished. He appears lethargic. He is active and cooperative. He is easily aroused.  Non-toxic appearance. He does not have a sickly appearance. He does not appear ill. No distress. Nasal cannula in place.   HENT:   Head: Normocephalic.   Right Ear: External ear normal.   Left Ear: External ear normal.   Nose: Nose normal. No mucosal edema or rhinorrhea.   Mouth/Throat: Uvula is midline, oropharynx is clear and moist and mucous membranes are normal. Abnormal dentition.   Cerumen present in both ears.   Eyes: Conjunctivae are normal. No scleral icterus.   Neck: Normal range of motion.   Cardiovascular: Intact distal pulses.   Pulmonary/Chest: Effort normal and breath sounds normal. No accessory muscle usage. No respiratory distress. He has no decreased breath sounds. He has no wheezes.   Abdominal: Soft. Bowel sounds are normal. There is no tenderness (deep palpation). There is no rigidity, no rebound and no guarding.   Musculoskeletal:        Left knee: He exhibits decreased range of motion and swelling. He exhibits no ecchymosis, no deformity, no laceration and no erythema. No tenderness found.   No peripheral edema.  Left knee is warm to touch and swollen.  No erythema, no pain.   Neurological: He is easily aroused. He appears lethargic. No cranial nerve deficit (grossly intact). GCS eye subscore is 3. GCS verbal subscore is 4. GCS motor subscore is 6.   Skin: Skin is warm. Capillary refill takes less than 2 seconds. No rash noted. He is not diaphoretic.   Nursing note and vitals reviewed.      DIAGNOSTIC DATA:   Lab Results (last 24 hours)     Procedure Component Value Units Date/Time    Influenza Antigen, Rapid - Swab, Nasopharynx [[id number]] Collected:  01/23/19 2056    Specimen:  Swab from Nasopharynx  Updated:  01/23/19 2059    Troponin [[id number]]  (Normal) Collected:  01/23/19 1814    Specimen:  Blood Updated:  01/23/19 1855     Troponin I <0.012 ng/mL     D-dimer, Quantitative [[id number]]  (Abnormal) Collected:  01/23/19 1400    Specimen:  Blood Updated:  01/23/19 1545     D-Dimer, Quantitative 2,064 ng/mL (FEU)     Narrative:       Dimer values <500 ng/ml FEU are FDA approved as aid in diagnosis of deep venous thrombosis and pulmonary embolism.  This test should not be used in an exclusion strategy with pretest probability alone.    A recent guideline regarding diagnosis for pulmonary thromboembolism recommends an adjusted exclusion criterion of age x 10 ng/ml FEU for patients >50 years of age ([employer] Intern Med 2015; 163: 701-711).    [place] Draw [[id number]] Collected:  01/23/19 1400    Specimen:  Blood Updated:  01/23/19 1501    Narrative:       The following orders were created for panel order [place] Draw.  Procedure                               Abnormality         Status                     ---------                               -----------         ------                     Light Blue Top[[id number]]                                   Final result               Green Top (Gel)[[id number]]                                  Final result               Lavender Top[[id number]]                                     Final result               Gold Top - SST[[id number]]                                   Final result                 Please view results for these tests on the individual orders.    Light Blue Top [[id number]] Collected:  01/23/19 1400    Specimen:  Blood Updated:  01/23/19 1501     Extra Tube hold for add-on     Comment: Auto resulted       Green Top (Gel) [[id number]] Collected:  01/23/19 1400    Specimen:  Blood Updated:  01/23/19 1501     Extra Tube Hold for add-ons.     Comment: Auto resulted.       Lavender Top [[id number]] Collected:  01/23/19 1400    Specimen:  Blood Updated:  01/23/19 1501     Extra  Tube hold for add-on     Comment: Auto resulted       Gold Top - SST [[id number]] Collected:  01/23/19 1400    Specimen:  Blood Updated:  01/23/19 1501     Extra Tube Hold for add-ons.     Comment: Auto resulted.       Protime-INR [[id number]]  (Abnormal) Collected:  01/23/19 1400    Specimen:  Blood Updated:  01/23/19 1446     Protime 22.6 Seconds      INR 2.09    Narrative:       Therapeutic range for most indications is 2.0-3.0 INR,  or 2.5-3.5 for mechanical heart valves.    Troponin [[id number]]  (Normal) Collected:  01/23/19 1400    Specimen:  Blood Updated:  01/23/19 1434     Troponin I <0.012 ng/mL     BNP [[id number]]  (Abnormal) Collected:  01/23/19 1400    Specimen:  Blood Updated:  01/23/19 1434     proBNP 2,170.0 pg/mL     Comprehensive Metabolic Panel [[id number]]  (Abnormal) Collected:  01/23/19 1400    Specimen:  Blood Updated:  01/23/19 1422     Glucose 148 mg/dL      BUN 44 mg/dL      Creatinine 2.69 mg/dL      Sodium 136 mmol/L      Potassium 4.3 mmol/L      Chloride 102 mmol/L      CO2 28.0 mmol/L      Calcium 9.0 mg/dL      Total Protein 6.9 g/dL      Albumin 3.70 g/dL      ALT (SGPT) 15 U/L      AST (SGOT) 25 U/L      Alkaline Phosphatase 87 U/L      Total Bilirubin 1.0 mg/dL      eGFR  African Amer 28 mL/min/1.73      Globulin 3.2 gm/dL      A/G Ratio 1.2 g/dL      BUN/Creatinine Ratio 16.4     Anion Gap 6.0 mmol/L     Narrative:       The MDRD GFR formula is only valid for adults with stable renal function between ages 18 and 70.    CBC & Differential [[id number]] Collected:  01/23/19 1400    Specimen:  Blood Updated:  01/23/19 1406    Narrative:       The following orders were created for panel order CBC & Differential.  Procedure                               Abnormality         Status                     ---------                               -----------         ------                     CBC Auto Differential[[id number]]        Abnormal            Final result                 Please view  results for these tests on the individual orders.    CBC Auto Differential [[id number]]  (Abnormal) Collected:  01/23/19 1400    Specimen:  Blood Updated:  01/23/19 1406     WBC 10.89 10*3/mm3      RBC 5.19 10*6/mm3      Hemoglobin 14.0 g/dL      Hematocrit 41.2 %      MCV 79.4 fL      MCH 27.0 pg      MCHC 34.0 g/dL      RDW 14.8 %      RDW-SD 43.2 fl      MPV 10.9 fL      Platelets 202 10*3/mm3      Neutrophil % 72.2 %      Lymphocyte % 15.6 %      Monocyte % 10.2 %      Eosinophil % 1.6 %      Basophil % 0.1 %      Immature Grans % 0.3 %      Neutrophils, Absolute 7.87 10*3/mm3      Lymphocytes, Absolute 1.70 10*3/mm3      Monocytes, Absolute 1.11 10*3/mm3      Eosinophils, Absolute 0.17 10*3/mm3      Basophils, Absolute 0.01 10*3/mm3      Immature Grans, Absolute 0.03 10*3/mm3            Imaging Results (last 24 hours)     Procedure Component Value Units Date/Time    NM Lung Scan Perfusion Particulate [[id number]] Collected:  01/23/19 1733     Updated:  01/23/19 1800    Narrative:       EXAMINATION:  NUCLEAR MEDICINE PULMONARY PERFUSION / VENTILATION  SCAN (V/Q SCAN)    CLINICAL HISTORY:   Dyspnea, elevated d-dimer     COMPARISON EXAMINATIONS: Chest x-ray performed the same date.    TECHNIQUE:  Perfusion: 6.2 mCi of technetium labeled MAA (number of  particles:  approx 600,000)  Ventilation: Not performed, patient was unable to tolerate  ventilation procedure.    FINDINGS:  Perfusion only scan was performed. There is an area of bandlike  decreased perfusion in the mid right lung without a corresponding  chest x-ray abnormality.  Additional areas of decreased perfusion are seen in the left  upper anterior lung on the RPO and [place] views without chest x-ray  abnormalities.      Impression:       CONCLUSION:  Intermediate probability of pulmonary embolism. Exam is limited  without ventilation data.    COMMENTS:    Normal exam = statistically less than 2% have pulmonary emboli.  Low probability = statistically 5-19%  have pulmonary emboli.   Intermediate probability = statistically 20-70% have pulmonary  emboli.   High probability = statistically greater than 80% have pulmonary  emboli.      NOTE:   The likelihood of pulmonary embolism, especially in the low and  intermediate categories, should be interpreted in conjunction  with the pre- and post-test probability and other test results  such as venous Doppler, D dimer and if necessary pulmonary  angiography as clinically indicated.    Findings were discussed with SALLY JENKINS on 1/23/2019  5:58 PM CST    Electronically signed by:  Agustin Early MD  1/23/2019 5:59 PM CST  Workstation: [id number]    US Venous Doppler Lower Extremity Left (duplex) [[id number]] Collected:  01/23/19 1522     Updated:  01/23/19 1612    Narrative:         Ultrasound venous duplex left lower extremity    HISTORY: Left lower extremity pain and swelling    Duplex ultrasound of the deep venous system of the left lower  extremity was performed    Real-time images demonstrate normal compressibility without  evidence of intraluminal thrombus.  Doppler shows phasic flow and augmentation.  Color Doppler also reveals venous patency.      Impression:       CONCLUSION:  No ultrasound evidence of deep venous thrombosis of the left  lower extremity.    [id number]    Electronically signed by:  Chris Srinivasan MD  1/23/2019 4:10 PM CST  Workstation: [id number]    XR Chest 1 View [[id number]] Collected:  01/23/19 1405     Updated:  01/23/19 1423    Narrative:         PORTABLE CHEST    HISTORY: Chest pain    Portable AP supine film of the chest was obtained at 2:04 PM.  COMPARISON: June 7, 2017    EKG leads.  The lungs are clear of an acute process.  The heart is not enlarged.  The pulmonary vasculature is not increased.  No pleural effusion.  No pneumothorax.  No acute osseous abnormality.      Impression:       CONCLUSION:  No Acute Disease    15119    Electronically signed by:  Chris Srinivasan MD  1/23/2019 2:21 PM  [place]  Workstation: [id number]            I reviewed the patient's new clinical results.    ASSESSMENT AND PLAN: This is a 78 y.o. male with:    Active Hospital Problems    Diagnosis Date Noted   • **Chest pain on breathing [R07.1] 01/23/2019     Priority: High     -EKG unremarkable.  -Troponin negative ×1.  -Trend his troponins ×2.     • Acute respiratory failure with hypoxia (CMS/[facility]) [J96.01] 01/23/2019     Priority: High     -Patient is not on oxygen nasal cannula at home.  -He was placed on 2 L nasal cannula in the ER and desatted to 86%.  He was bumped up to 3 L and was satting greater than 92%.  -Continue to monitor and wean off of oxygen as needed.  -Chest x-ray showed no acute cardiopulmonary processes.     • Pulmonary embolism (CMS/[facility]) [I26.99] 07/20/2015     Priority: High     -Patient has a history of DVTs in the past.  As well as a PE.  -D-dimer elevated today.  Intermediate risk on VQ scan.  Unable to obtain CTA due to elevated creatinine.  -Left swollen knee.  Ultrasound showed no DVT in the left lower extremity.  -Patient anticoagulated with warfarin.  INR 2.1.  Continue treatment, pharmacy to dose.  -Echo in AM.     • Stage 1 acute kidney injury (CMS/[facility]) [N17.9] 01/23/2019     Priority: Medium     -Creatinine elevated to 2.7.  -Baseline appears to be around 2.  -Continue to monitor.  -Renal diet.  -IVF 75 cc per hour.      • Asthma [J45.909] 01/23/2017     -Continue home inhalers.     • Primary osteoarthritis of both knees [M17.0] 08/31/2015     -Norco 7.5 continue.     • CKD (chronic kidney disease) stage 4, GFR 15-29 ml/min (CMS/[facility]) [N18.4] 03/02/2015     -Creatinine baseline appears to be 2.  Currently it is 2.7.  -Continue to monitor.  -IVF 75 cc per hour.  -Renal diet.     • GERD (gastroesophageal reflux disease) [K21.9] 04/09/2014     -Tums and Pepcid when necessary.     • Diabetic peripheral neuropathy associated with type 2 diabetes mellitus (CMS/[facility]) [E11.42] 07/15/2011   • Benign essential  hypertension [I10] 03/04/2010     -Continue home antihypertensive medications.  -Continue to monitor.     • Diabetes mellitus type II, uncontrolled (CMS/HCC) [E11.65] 03/04/2010     -Sliding Scale.         DVT prophylaxis: Coumadin     ANA # unable to obtain at 1902 due to ana being down for maintenance.    Expected Length of Stay: Where: home and When:  1-2 days    I discussed the patients findings and my recommendations with patient and family.     Dr. Prince  is the attending on record at time of admission, She is aware of the patient's status and agrees with the above history and physical.          This document has been electronically signed by Norm Hernandez MD on January 23, 2019 9:04 PM

## 2019-01-24 NOTE — PROGRESS NOTES
"Anticoagulation by Pharmacy - Warfarin    Ondina Alanis Sr. is a 78 y.o.male  [Ht: 188 cm (74\"); Wt: 98 kg (216 lb)] on Warfarin 6 mg PO  for indication of DVT/PE hx.    Goal INR: 2-3  Today's INR:   Lab Results   Component Value Date    INR 2.55 (H) 01/24/2019         Lab Results   Component Value Date    INR 2.55 (H) 01/24/2019    INR 2.09 (H) 01/23/2019    INR 2.29 (H) 06/09/2017    PROTIME 26.3 (H) 01/24/2019    PROTIME 22.6 (H) 01/23/2019    PROTIME 25.4 (H) 06/09/2017     Lab Results   Component Value Date    HGB 14.0 01/23/2019    HGB 12.9 (L) 06/09/2017    HGB 16.7 06/07/2017     Lab Results   Component Value Date    HCT 41.2 01/23/2019    HCT 38.1 (L) 06/09/2017    HCT 47.5 06/07/2017     Lab Results   Component Value Date     01/23/2019     06/09/2017     06/07/2017       Recent Warfarin Administrations       The 5 most recent administrations since 01/17/2019 are shown below each listed medication.    Warfarin Sodium         Order Dose Date Given     warfarin (COUMADIN) tablet 7.5 mg 7.5 mg 01/23/2019                      Assessment/Plan:  INR 2.55, significant increase overnight.  PTA: 7.5mg SUN MON WED FRI and 5mg all other days (Average daily dose = 6.5mg)    H&H/plt stable  7.5mg dose was given last night and INR had a rapid rise.     Warfarin 6mg daily initiated     Jimmie Caban [facility]  01/24/19 10:37 AM     "

## 2019-01-24 NOTE — PLAN OF CARE
Problem: Fall Risk (Adult)  Goal: Absence of Fall  Outcome: Ongoing (interventions implemented as appropriate)      Problem: Skin Injury Risk (Adult)  Goal: Skin Health and Integrity  Outcome: Ongoing (interventions implemented as appropriate)      Problem: Patient Care Overview  Goal: Plan of Care Review  Outcome: Ongoing (interventions implemented as appropriate)   01/24/19 1442   Coping/Psychosocial   Plan of Care Reviewed With patient   Plan of Care Review   Progress no change   OTHER   Outcome Summary pt. VS stable; no changes; echo and X-ray of L knee and awaiting results. will cont. to monitor     Goal: Individualization and Mutuality  Outcome: Ongoing (interventions implemented as appropriate)    Goal: Discharge Needs Assessment  Outcome: Ongoing (interventions implemented as appropriate)    Goal: Interprofessional Rounds/Family Conf  Outcome: Ongoing (interventions implemented as appropriate)      Problem: Pain, Acute (Adult)  Goal: Acceptable Pain Control/Comfort Level  Outcome: Ongoing (interventions implemented as appropriate)

## 2019-01-25 VITALS
TEMPERATURE: 99.2 F | OXYGEN SATURATION: 96 % | BODY MASS INDEX: 27.72 KG/M2 | HEART RATE: 76 BPM | WEIGHT: 216 LBS | DIASTOLIC BLOOD PRESSURE: 72 MMHG | SYSTOLIC BLOOD PRESSURE: 142 MMHG | HEIGHT: 74 IN | RESPIRATION RATE: 18 BRPM

## 2019-01-25 PROBLEM — I26.99 OTHER ACUTE PULMONARY EMBOLISM WITHOUT ACUTE COR PULMONALE (HCC): Status: ACTIVE | Noted: 2019-01-25

## 2019-01-25 PROBLEM — R09.1 PLEURISY: Status: ACTIVE | Noted: 2019-01-23

## 2019-01-25 LAB
ANION GAP SERPL CALCULATED.3IONS-SCNC: 7 MMOL/L (ref 5–15)
BUN BLD-MCNC: 51 MG/DL (ref 7–21)
BUN/CREAT SERPL: 19.3 (ref 7–25)
CALCIUM SPEC-SCNC: 8.7 MG/DL (ref 8.4–10.2)
CHLORIDE SERPL-SCNC: 102 MMOL/L (ref 95–110)
CO2 SERPL-SCNC: 24 MMOL/L (ref 22–31)
CREAT BLD-MCNC: 2.64 MG/DL (ref 0.7–1.3)
GFR SERPL CREATININE-BSD FRML MDRD: 29 ML/MIN/1.73 (ref 42–98)
GLUCOSE BLD-MCNC: 169 MG/DL (ref 60–100)
GLUCOSE BLDC GLUCOMTR-MCNC: 149 MG/DL (ref 70–130)
GLUCOSE BLDC GLUCOMTR-MCNC: 178 MG/DL (ref 70–130)
GLUCOSE BLDC GLUCOMTR-MCNC: 241 MG/DL (ref 70–130)
INR PPP: 3.12 (ref 0.8–1.2)
POTASSIUM BLD-SCNC: 4.1 MMOL/L (ref 3.5–5.1)
PROTHROMBIN TIME: 30.6 SECONDS (ref 11.1–15.3)
SODIUM BLD-SCNC: 133 MMOL/L (ref 137–145)

## 2019-01-25 PROCEDURE — 85610 PROTHROMBIN TIME: CPT | Performed by: STUDENT IN AN ORGANIZED HEALTH CARE EDUCATION/TRAINING PROGRAM

## 2019-01-25 PROCEDURE — 94799 UNLISTED PULMONARY SVC/PX: CPT

## 2019-01-25 PROCEDURE — 82962 GLUCOSE BLOOD TEST: CPT

## 2019-01-25 PROCEDURE — 94760 N-INVAS EAR/PLS OXIMETRY 1: CPT

## 2019-01-25 PROCEDURE — 97162 PT EVAL MOD COMPLEX 30 MIN: CPT | Performed by: PHYSICAL THERAPIST

## 2019-01-25 PROCEDURE — 63710000001 INSULIN DETEMIR PER 5 UNITS: Performed by: FAMILY MEDICINE

## 2019-01-25 PROCEDURE — 99232 SBSQ HOSP IP/OBS MODERATE 35: CPT | Performed by: STUDENT IN AN ORGANIZED HEALTH CARE EDUCATION/TRAINING PROGRAM

## 2019-01-25 PROCEDURE — 63710000001 INSULIN ASPART PER 5 UNITS: Performed by: STUDENT IN AN ORGANIZED HEALTH CARE EDUCATION/TRAINING PROGRAM

## 2019-01-25 PROCEDURE — 80048 BASIC METABOLIC PNL TOTAL CA: CPT | Performed by: STUDENT IN AN ORGANIZED HEALTH CARE EDUCATION/TRAINING PROGRAM

## 2019-01-25 RX ORDER — ACETAMINOPHEN 500 MG
1000 TABLET ORAL 3 TIMES DAILY
Qty: 60 TABLET | Refills: 0 | Status: SHIPPED | OUTPATIENT
Start: 2019-01-25 | End: 2020-01-01 | Stop reason: HOSPADM

## 2019-01-25 RX ADMIN — INSULIN DETEMIR 16 UNITS: 100 INJECTION, SOLUTION SUBCUTANEOUS at 10:51

## 2019-01-25 RX ADMIN — IPRATROPIUM BROMIDE AND ALBUTEROL SULFATE 3 ML: 2.5; .5 SOLUTION RESPIRATORY (INHALATION) at 07:49

## 2019-01-25 RX ADMIN — HYDROCODONE BITARTRATE AND ACETAMINOPHEN 1 TABLET: 7.5; 325 TABLET ORAL at 10:51

## 2019-01-25 RX ADMIN — CLONIDINE HYDROCHLORIDE 0.2 MG: 0.2 TABLET ORAL at 08:23

## 2019-01-25 RX ADMIN — BRINZOLAMIDE 1 DROP: 10 SUSPENSION/ DROPS OPHTHALMIC at 16:36

## 2019-01-25 RX ADMIN — INSULIN ASPART 3 UNITS: 100 INJECTION, SOLUTION INTRAVENOUS; SUBCUTANEOUS at 11:23

## 2019-01-25 RX ADMIN — HYDROCODONE BITARTRATE AND ACETAMINOPHEN 1 TABLET: 7.5; 325 TABLET ORAL at 18:54

## 2019-01-25 RX ADMIN — BUDESONIDE 0.5 MG: 0.5 INHALANT RESPIRATORY (INHALATION) at 07:49

## 2019-01-25 RX ADMIN — FAMOTIDINE 20 MG: 20 TABLET ORAL at 08:23

## 2019-01-25 RX ADMIN — PILOCARPINE HYDROCHLORIDE 1 DROP: 10 SOLUTION/ DROPS OPHTHALMIC at 08:24

## 2019-01-25 RX ADMIN — CETIRIZINE HYDROCHLORIDE 5 MG: 5 TABLET, FILM COATED ORAL at 08:23

## 2019-01-25 RX ADMIN — HYDROCODONE BITARTRATE AND ACETAMINOPHEN 1 TABLET: 7.5; 325 TABLET ORAL at 04:05

## 2019-01-25 RX ADMIN — PANTOPRAZOLE SODIUM 40 MG: 40 TABLET, DELAYED RELEASE ORAL at 06:12

## 2019-01-25 RX ADMIN — BRINZOLAMIDE 1 DROP: 10 SUSPENSION/ DROPS OPHTHALMIC at 08:23

## 2019-01-25 RX ADMIN — AMLODIPINE BESYLATE 5 MG: 5 TABLET ORAL at 08:23

## 2019-01-25 NOTE — THERAPY EVALUATION
Acute Care - Physical Therapy Initial Evaluation  [facility]     Patient Name: Ondina Alanis Sr.  : 1940  MRN: [id number]  Today's Date: 2019   Onset of Illness/Injury or Date of Surgery: 19  Date of Referral to PT: 19  Referring Physician: Pedro Diaz MD      Admit Date: 2019    Visit Dx:     ICD-10-CM ICD-9-CM   1. Chest pain, unspecified type R07.9 786.50   2. Renal failure, unspecified chronicity N19 586   3. Left leg pain M79.605 729.5   4. Other acute pulmonary embolism without acute cor pulmonale (CMS/HCC) I26.99 415.19   5. CKD (chronic kidney disease) stage 4, GFR 15-29 ml/min (CMS/HCC) N18.4 585.4   6. Primary osteoarthritis of both knees M17.0 715.16   7. Pleurisy R09.1 511.0   8. Left ventricular diastolic dysfunction I51.9 429.9   9. Diabetic peripheral neuropathy associated with type 2 diabetes mellitus (CMS/HCC) E11.42 250.60     357.2   10. Acute respiratory failure with hypoxia (CMS/HCC) J96.01 518.81   11. Impaired physical mobility Z74.09 781.99     Patient Active Problem List   Diagnosis   • CKD (chronic kidney disease) stage 4, GFR 15-29 ml/min (CMS/HCC)   • Diabetic peripheral neuropathy associated with type 2 diabetes mellitus (CMS/HCC)   • Diabetes mellitus type II, uncontrolled (CMS/HCC)   • GERD (gastroesophageal reflux disease)   • Primary osteoarthritis of both knees   • Pulmonary embolism (CMS/HCC)   • BPH (benign prostatic hyperplasia)   • Benign essential hypertension   • Asthma   • Pleurisy   • Acute respiratory failure with hypoxia (CMS/HCC)   • Stage 1 acute kidney injury (CMS/HCC)   • Left ventricular diastolic dysfunction   • Other acute pulmonary embolism without acute cor pulmonale (CMS/HCC)     Past Medical History:   Diagnosis Date   • Asthma    • Diabetes mellitus (CMS/HCC)    • GERD (gastroesophageal reflux disease)    • Hypertension    • Prostate disorder      Past Surgical History:   Procedure Laterality Date   • BACK SURGERY      • KNEE SURGERY Left    • PROSTATE SURGERY          PT ASSESSMENT (last 12 hours)      Physical Therapy Evaluation     Row Name 01/25/19 0930          PT Evaluation Time/Intention    Subjective Information  complains of;pain  -CB     Document Type  evaluation  -CB     Mode of Treatment  individual therapy;physical therapy  -CB     Total Evaluation Minutes, Physical Therapy  41  -CB     Patient Effort  good  -CB     Symptoms Noted During/After Treatment  fatigue  -CB     Row Name 01/25/19 0930          General Information    Patient Profile Reviewed?  yes  -CB     Onset of Illness/Injury or Date of Surgery  01/23/19  -CB     Referring Physician  Pedro Diaz MD  -CB     Patient Observations  alert;cooperative;agree to therapy  -CB     Patient/Family Observations  Wife present  -CB     General Observations of Patient  supine in bed, telemetry, IV, no apparent distress  -CB     Prior Level of Function  independent:;bed mobility independent to dependent with bed mobility in past month   -CB     Equipment Currently Used at Home  wheelchair;walker, rolling;cane, quad;cane, straight;power chair, (recliner lift);commode, bedside  -CB     Pertinent History of Current Functional Problem  To ER with chest pain, SOB, LE swelling  -CB     Existing Precautions/Restrictions  fall;oxygen therapy device and L/min  -CB     Limitations/Impairments  visual  -CB     Benefits Reviewed  patient:;improve function;increase independence  -CB     Barriers to Rehab  previous functional deficit;visual deficit  -CB     Row Name 01/25/19 0930          Relationship/Environment    Lives With  spouse  -CB     Row Name 01/25/19 0930          Resource/Environmental Concerns    Current Living Arrangements  home/apartment/condo  -CB     Row Name 01/25/19 0930          Home Main Entrance    Number of Stairs, Main Entrance  four  -CB     Stair Railings, Main Entrance  railings on both sides of stairs  -CB     Row Name 01/25/19 0930          Cognitive  Assessment/Intervention- PT/OT    Orientation Status (Cognition)  oriented x 3  -CB     Follows Commands (Cognition)  follows one step commands;50-74% accuracy;delayed response/completion;physical/tactile prompts required;repetition of directions required;verbal cues/prompting required  -CB     Row Name 01/25/19 0930          Bed Mobility Assessment/Treatment    Bed Mobility Assessment/Treatment  rolling left;rolling right;supine-sit-supine  -CB     Rolling Left [place] (Bed Mobility)  moderate assist (50% patient effort);2 person assist  -CB     Rolling Right [place] (Bed Mobility)  moderate assist (50% patient effort);2 person assist  -CB     Supine-Sit-Supine [place] (Bed Mobility)  maximum assist (25% patient effort);2 person assist  -CB     Assistive Device (Bed Mobility)  head of bed elevated;overhead trapeze  -CB     Row Name 01/25/19 0930          General ROM    GENERAL ROM COMMENTS  BLE PROM WFL  -CB     Row Name 01/25/19 0930          MMT (Manual Muscle Testing)    General MMT Comments  BLE grossly 2/5  -CB     Row Name 01/25/19 0930          Sensory Assessment/Intervention    Sensory General Assessment  --  -CB     Row Name 01/25/19 0930          Light Touch Sensation Assessment    Left Lower Extremity: Light Touch Sensation Assessment  intact  -CB     Right Lower Extremity: Light Touch Sensation Assessment  intact  -CB     Row Name 01/25/19 0930          Vision Assessment/Intervention    Visual Impairment/Limitations  legally blind  -CB     Row Name 01/25/19 0930          Pain Assessment    Additional Documentation  Pain Scale: Word Pre/Post-Treatment (Group)  -CB     Row Name 01/25/19 0930          Pain Scale: Word Pre/Post-Treatment    Pain: Word Scale, Pretreatment  2 - mild pain  -CB     Pain: Word Scale, Post-Treatment  0 - no pain  -CB     Row Name 01/25/19 5330          Physical Therapy Clinical Impression    Date of Referral to PT  01/24/19  -CB     PT Diagnosis (PT Clinical  Impression)  impaired physical mobility  -CB     Prognosis (PT Clinical Impression)  fair  -CB     Criteria for Skilled Interventions Met (PT Clinical Impression)  yes;treatment indicated  -CB     Pathology/Pathophysiology Noted (Describe Specifically for Each System)  musculoskeletal  -CB     Impairments Found (describe specific impairments)  aerobic capacity/endurance;gait, locomotion, and balance;muscle performance;ROM;ventilation and respiration/gas exchange  -CB     Rehab Potential (PT Clinical Summary)  fair, will monitor progress closely  -CB     Predicted Duration of Therapy (PT)  1-2 days  -CB     Row Name 01/25/19 0930          Vital Signs    Pre Systolic BP Rehab  119  -CB     Pre Treatment Diastolic BP  67  -CB     Intra Systolic BP Rehab  155  -CB     Intra Treatment Diastolic BP  74  -CB     Pretreatment Heart Rate (beats/min)  85  -CB     Posttreatment Heart Rate (beats/min)  78  -CB     Pre SpO2 (%)  98  -CB     O2 Delivery Pre Treatment  supplemental O2  -CB     Post SpO2 (%)  98  -CB     O2 Delivery Post Treatment  supplemental O2  -CB     Pre Patient Position  Supine  -CB     Intra Patient Position  Sitting  -CB     Post Patient Position  Supine  -CB     Row Name 01/25/19 3147          Physical Therapy Goals    Bed Mobility Goal Selection (PT)  bed mobility, PT goal 1;bed mobility, PT goal 2  -CB     Transfer Goal Selection (PT)  transfer, PT goal 1  -CB     Row Name 01/25/19 0930          Bed Mobility Goal 1 (PT)    Activity/Assistive Device (Bed Mobility Goal 1, PT)  rolling to left;rolling to right  -CB     [place] Level/Cues Needed (Bed Mobility Goal 1, PT)  tactile cues required;verbal cues required;minimum assist (75% or more patient effort)  -CB     Time Frame (Bed Mobility Goal 1, PT)  short term goal (STG);2 days  -CB     Barriers (Bed Mobility Goal 1, PT)  general weakness  -CB     Progress/Outcomes (Bed Mobility Goal 1, PT)  goal not met  -CB     Row Name 01/25/19 0930          Bed  Mobility Goal 2 (PT)    Activity/Assistive Device (Bed Mobility Goal 2, PT)  sit to supine/supine to sit  -CB     [place] Level/Cues Needed (Bed Mobility Goal 2, PT)  tactile cues required;verbal cues required;minimum assist (75% or more patient effort);other (see comments) with HOB elevated  -CB     Time Frame (Bed Mobility Goal 2, PT)  long term goal (LTG);by discharge  -CB     Barriers (Bed Mobility Goal 2, PT)  general weakness  -CB     Progress/Outcomes (Bed Mobility Goal 2, PT)  goal not met  -CB     Row Name 01/25/19 0930          Transfer Goal 1 (PT)    Activity/Assistive Device (Transfer Goal 1, PT)  sit-to-stand/stand-to-sit;bed-to-chair/chair-to-bed  -CB     [place] Level/Cues Needed (Transfer Goal 1, PT)  tactile cues required;verbal cues required;moderate assist (50-74% patient effort)  -CB     Time Frame (Transfer Goal 1, PT)  long term goal (LTG);by discharge  -CB     Barriers (Transfers Goal 1, PT)  general weakness  -CB     Progress/Outcome (Transfer Goal 1, PT)  goal not met  -CB     Row Name 01/25/19 0930          Positioning and Restraints    Pre-Treatment Position  in bed  -CB     Post Treatment Position  bed  -CB     In Bed  supine;call light within reach;encouraged to call for assist;exit alarm on;with family/caregiver;side rails up x2;legs elevated  -CB     Row Name 01/25/19 0930          Living Environment    Home Accessibility  stairs to enter home Patient only able to enter house by stretcher/ambulance  -CB       User Key  (r) = Recorded By, (t) = Taken By, (c) = Cosigned By    Initials Name Provider Type    Fay Metcalf, PT Physical Therapist        Physical Therapy Education     Title: PT OT SLP Therapies (In Progress)     Topic: Physical Therapy (In Progress)     Point: Mobility training (Done)     Learning Progress Summary           Patient Acceptance, E,D,TB, VU,NR by KASSIDY at 1/25/2019  4:04 PM    Comment:  Patient educated on proper technique for rolling in the bed and  performing supine-sit-supine mobility.   Significant Other Acceptance, E,D,TB, VU,NR by CB at 1/25/2019  4:04 PM    Comment:  Patient educated on proper technique for rolling in the bed and performing supine-sit-supine mobility.                               User Key     Initials Effective Dates Name Provider Type Discipline    CB 04/06/17 -  Fay Armstrong, PT Physical Therapist PT              PT Recommendation and Plan  Anticipated Discharge Disposition (PT): home with home health  Planned Therapy Interventions (PT Eval): balance training, bed mobility training, gait training, home exercise program, patient/family education, ROM (range of motion), stair training, strengthening, stretching, transfer training  Therapy Frequency (PT Clinical Impression): daily  Outcome Summary/Treatment Plan (PT)  Anticipated Equipment Needs at Discharge (PT): hospital bed, overhead frame and trapeze  Anticipated Discharge Disposition (PT): home with home health  Plan of Care Reviewed With: spouse, patient  Outcome Summary: Initial PT evaluation completed this date.  Patient completes rolling from right to left in bed with modAx2; comes to sit on EOB with maxAx2.  Patient able to sit EOB for 10 minutes with BUE support and maxA for trunk control.  Patient moves from sit to supine with maxAx2.  Recommending hospital bed with overhead trapeze after discharge home and home health PT; physician notified.  Wife educated on using ointment for patient's arthritis as needed.  Continue skilled PT.  Outcome Measures     Row Name 01/25/19 0930             How much help from another person do you currently need...    Turning from your back to your side while in flat bed without using bedrails?  2  -CB      Moving from lying on back to sitting on the side of a flat bed without bedrails?  2  -CB      Moving to and from a bed to a chair (including a wheelchair)?  1  -CB      Standing up from a chair using your arms (e.g., wheelchair, bedside  chair)?  1  -CB      Climbing 3-5 steps with a railing?  1  -CB      To walk in hospital room?  1  -CB      AM-PAC 6 Clicks Score  8  -CB         Functional Assessment    Outcome Measure Options  AM-PAC 6 Clicks Basic Mobility (PT)  -CB        User Key  (r) = Recorded By, (t) = Taken By, (c) = Cosigned By    Initials Name Provider Type    Fay Metcalf, PT Physical Therapist         Time Calculation:   PT Charges     Row Name 01/25/19 1613             Time Calculation    Start Time  0930  -CB      Stop Time  1011  -CB      Time Calculation (min)  41 min  -CB      PT Received On  01/25/19  -CB      PT Goal Re-Cert Due Date  02/07/19  -CB        User Key  (r) = Recorded By, (t) = Taken By, (c) = Cosigned By    Initials Name Provider Type    Fay Metcalf, PT Physical Therapist        Therapy Suggested Charges     Code   Minutes Charges    None           Therapy Charges for Today     Code Description Service Date Service Provider Modifiers Qty    18606055066 HC PT EVAL MOD COMPLEXITY 3 1/25/2019 Fay Armstrong, PT GP 1          PT G-Codes  Outcome Measure Options: AM-PAC 6 Clicks Basic Mobility (PT)  AM-PAC 6 Clicks Score: 8      Fay Armstrong, PT  1/25/2019

## 2019-01-25 NOTE — PROGRESS NOTES
"Anticoagulation by Pharmacy - Warfarin    Ondina Alanis Sr. is a 78 y.o.male  [Ht: 188 cm (74\"); Wt: 98 kg (216 lb)] on Warfarin for indication of DVT hx.    Goal INR: 2-3  Today's INR:   Lab Results   Component Value Date    INR 3.12 (H) 01/25/2019         Lab Results   Component Value Date    INR 3.12 (H) 01/25/2019    INR 2.55 (H) 01/24/2019    INR 2.09 (H) 01/23/2019    PROTIME 30.6 (H) 01/25/2019    PROTIME 26.3 (H) 01/24/2019    PROTIME 22.6 (H) 01/23/2019     Lab Results   Component Value Date    HGB 14.0 01/23/2019    HGB 12.9 (L) 06/09/2017    HGB 16.7 06/07/2017     Lab Results   Component Value Date    HCT 41.2 01/23/2019    HCT 38.1 (L) 06/09/2017    HCT 47.5 06/07/2017     Lab Results   Component Value Date     01/23/2019     06/09/2017     06/07/2017       Recent Warfarin Administrations       The 5 most recent administrations since 01/18/2019 are shown below each listed medication.    Warfarin Sodium         Order Dose Date Given     warfarin (COUMADIN) tablet 6 mg 6 mg 01/24/2019     warfarin (COUMADIN) tablet 7.5 mg 7.5 mg 01/23/2019                      Assessment/Plan:  INR 3.12, supratherapeutic.     INR had another significant jump overnight, 6mg was given last night (PTA average daily dose = 6.5mg)  H&H/plt stable    Will hold warfarin due to two consecutive significant increases in INR, which is now slightly above goal.          Jimmie Caban, [facility]  01/25/19 10:13 AM     "

## 2019-01-25 NOTE — PROGRESS NOTES
FAMILY MEDICINE DAILY PROGRESS NOTE  NAME: Ondina Alanis Sr.  : 1940  MRN: [id number]     LOS: 1 day     PROVIDER OF SERVICE: Pedro Diaz MD    Chief Complaint: Chest pain on breathing    Subjective:     Interval History:  History taken from: patient family  Patient is a 77 y/o Afri can american male who was admitted for chest pain.   Patient was found resting in bed, in no acute distress.   Patient did endorse ongoing substernal chest pain without radiation. Patient reported that it worse with deep breaths. Knee pain is unchanged.    Denied: chest pressure, palpitations, fever, chills, n/v, diarrhea     Overnight nursing reported: no incidents    Will speak with Dr Estrada this morning-  For possible signs of right heart strain.     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     Review of Systems:   Review of Systems   Constitutional: Negative for activity change, appetite change and fever.   HENT: Negative for congestion, sinus pressure, sinus pain and sneezing.    Respiratory: Positive for shortness of breath. Negative for apnea, choking, chest tightness and wheezing.    Cardiovascular: Positive for chest pain. Negative for palpitations and leg swelling.   Gastrointestinal: Negative for abdominal distention, abdominal pain, diarrhea, nausea and vomiting.   Genitourinary: Negative for difficulty urinating, dysuria and frequency.   Musculoskeletal: Positive for arthralgias and back pain.   Skin: Negative for color change.   Neurological: Negative for dizziness, facial asymmetry and headaches.   Psychiatric/Behavioral: Negative for agitation, behavioral problems and confusion.       Objective:     Vital Signs  Temp:  [97.9 °F (36.6 °C)-100.1 °F (37.8 °C)] 98.6 °F (37 °C)  Heart Rate:  [65-98] 67  Resp:  [18-20] 18  BP: (134-164)/() 134/61    Physical Exam  Physical Exam   Constitutional: He appears well-developed and well-nourished. No distress.   HENT:   Head: Atraumatic.   Right Ear: External ear normal.    Left Ear: External ear normal.   Mouth/Throat: Oropharynx is clear and moist.   Eyes:   Patient is blind    Neck: Trachea normal, full passive range of motion without pain and phonation normal.   Cardiovascular: Regular rhythm, S1 normal and S2 normal.   Pulses:       Dorsalis pedis pulses are 2+ on the right side, and 2+ on the left side.   Pulmonary/Chest: Effort normal and breath sounds normal. No tachypnea. No respiratory distress. He has no decreased breath sounds. He has no wheezes.   Abdominal: Soft. Bowel sounds are normal.   Musculoskeletal:        Left knee: He exhibits swelling.        Legs:  Neurological: He is alert.   Patient responded to person and place, not time.        Medication Review    Current Facility-Administered Medications:   •  acetaminophen (TYLENOL) tablet 650 mg, 650 mg, Oral, Q4H PRN, Norm Hernandez MD  •  albuterol (PROVENTIL) nebulizer solution 0.083% 2.5 mg/3mL, 2.5 mg, Nebulization, Q6H PRN, Norm Hernandez MD  •  amLODIPine (NORVASC) tablet 5 mg, 5 mg, Oral, Daily, Norm Hernandez MD, 5 mg at 01/24/19 1009  •  brinzolamide (AZOPT) 1 % ophthalmic suspension 1 drop, 1 drop, Both Eyes, TID, Norm Hernandez MD, 1 drop at 01/24/19 2147  •  budesonide (PULMICORT) nebulizer solution 0.5 mg, 0.5 mg, Nebulization, Daily - RT, Norm Hernandez MD, 0.5 mg at 01/24/19 0737  •  calcium carbonate (TUMS) chewable tablet 500 mg (200 mg elemental), 2 tablet, Oral, BID PRN, Norm Hernandez MD  •  cetirizine (zyrTEC) tablet 5 mg, 5 mg, Oral, Daily, Norm Hernandez MD, 5 mg at 01/24/19 1009  •  CloNIDine (CATAPRES) tablet 0.2 mg, 0.2 mg, Oral, Q12H, Norm Hernandez MD, 0.2 mg at 01/24/19 2144  •  dextrose (D50W) 25 g/ 50mL Intravenous Solution 25 g, 25 g, Intravenous, Q15 Min PRN, Norm Hernandez MD  •  dextrose (GLUTOSE) oral gel 15 g, 15 g, Oral, Q15 Min PRN, Norm Hernandez MD  •  famotidine (PEPCID) tablet 20 mg, 20 mg, Oral, Daily, Pedro Diaz MD, 20 mg at 01/24/19  1009  •  glucagon (human recombinant) (GLUCAGEN DIAGNOSTIC) injection 1 mg, 1 mg, Subcutaneous, PRN, Norm Hernandez MD  •  HYDROcodone-acetaminophen (NORCO) 7.5-325 MG per tablet 1 tablet, 1 tablet, Oral, Q6H PRN, Norm Hernandez MD, 1 tablet at 01/25/19 0405  •  insulin aspart (novoLOG) injection 0-7 Units, 0-7 Units, Subcutaneous, 4x Daily AC & at Bedtime, Norm Hernandez MD, 3 Units at 01/24/19 2144  •  ipratropium-albuterol (DUO-NEB) nebulizer solution 3 mL, 3 mL, Nebulization, Q6H While Awake - RT, Norm Hernandez MD, 3 mL at 01/24/19 1919  •  latanoprost (XALATAN) 0.005 % ophthalmic solution 1 drop, 1 drop, Both Eyes, Nightly, Norm Hernandez MD, 1 drop at 01/24/19 2147  •  melatonin tablet 5.25 mg, 5.25 mg, Oral, Nightly PRN, Norm Hernandez MD  •  ondansetron (ZOFRAN) tablet 4 mg, 4 mg, Oral, Q6H PRN **OR** ondansetron ODT (ZOFRAN-ODT) disintegrating tablet 4 mg, 4 mg, Oral, Q6H PRN **OR** ondansetron (ZOFRAN) injection 4 mg, 4 mg, Intravenous, Q6H PRN, Norm Hernandez MD  •  oxybutynin XL (DITROPAN-XL) 24 hr tablet 10 mg, 10 mg, Oral, Nightly, Norm Hernandez MD, 10 mg at 01/24/19 2144  •  pantoprazole (PROTONIX) EC tablet 40 mg, 40 mg, Oral, Q AM, Norm Hernandez MD, 40 mg at 01/25/19 0612  •  Pharmacy to dose warfarin, , Does not apply, Continuous PRN, Norm Hernandez MD  •  pilocarpine (PILOCAR) 1 % ophthalmic solution 1 drop, 1 drop, Both Eyes, Daily, Norm Hernandez MD, 1 drop at 01/24/19 1011  •  sennosides-docusate sodium (SENOKOT-S) 8.6-50 MG tablet 2 tablet, 2 tablet, Oral, Nightly, Norm Hernandez MD, 2 tablet at 01/24/19 9596  •  sodium chloride 0.9 % flush 10 mL, 10 mL, Intravenous, PRN, Almas Bear MD  •  sodium chloride 0.9 % infusion, 75 mL/hr, Intravenous, Continuous, Norm Hernandez MD, Last Rate: 75 mL/hr at 01/24/19 1011, 75 mL/hr at 01/24/19 1011  •  warfarin (COUMADIN) tablet 6 mg, 6 mg, Oral, Daily, Norm Hernandez MD, 6 mg at 01/24/19  1707     Diagnostic Data    Lab Results (last 24 hours)     Procedure Component Value Units Date/Time    Protime-INR [[id number]] Collected:  01/25/19 0645    Specimen:  Blood Updated:  01/25/19 0704    Basic Metabolic Panel [[id number]] Collected:  01/25/19 0645    Specimen:  Blood Updated:  01/25/19 0704    POC Glucose Once [[id number]]  (Abnormal) Collected:  01/24/19 2107    Specimen:  Blood Updated:  01/24/19 2137     Glucose 243 mg/dL      Comment: RN NotifiedOperator: [id number] JOSE GREENMeter ID: [id number]       POC Glucose Once [[id number]]  (Abnormal) Collected:  01/24/19 1659    Specimen:  Blood Updated:  01/24/19 1732     Glucose 245 mg/dL      Comment: RN NotifiedOperator: [id number] ISH BRIANNAMeter ID: [id number]       POC Glucose Once [[id number]]  (Abnormal) Collected:  01/24/19 1054    Specimen:  Blood Updated:  01/24/19 1407     Glucose 233 mg/dL      Comment: RN NotifiedOperator: [id number] DENG BRIANNAMeter ID: [id number]       Protime-INR [[id number]]  (Abnormal) Collected:  01/24/19 0649    Specimen:  Blood Updated:  01/24/19 0815     Protime 26.3 Seconds      INR 2.55    Narrative:       Therapeutic range for most indications is 2.0-3.0 INR,  or 2.5-3.5 for mechanical heart valves.    Basic Metabolic Panel [[id number]]  (Abnormal) Collected:  01/24/19 0649    Specimen:  Blood Updated:  01/24/19 0801     Glucose 188 mg/dL      BUN 45 mg/dL      Creatinine 2.37 mg/dL      Sodium 136 mmol/L      Potassium 4.5 mmol/L      Chloride 104 mmol/L      CO2 25.0 mmol/L      Calcium 8.8 mg/dL      eGFR  African Amer 32 mL/min/1.73      BUN/Creatinine Ratio 19.0     Anion Gap 7.0 mmol/L     Narrative:       The MDRD GFR formula is only valid for adults with stable renal function between ages 18 and 70.    POC Glucose Once [[id number]]  (Abnormal) Collected:  01/24/19 0721    Specimen:  Blood Updated:  01/24/19 0744     Glucose 164 mg/dL      Comment: RN NotifiedOperator: [id number] ISH  BRIANNAMeter ID: [id number]              Imaging Results (last 24 hours)     Procedure Component Value Units Date/Time    XR Knee 1 or 2 View Left [[id number]] Collected:  01/24/19 0901     Updated:  01/24/19 1523    Narrative:       PROCEDURE: Left knee, two views.    INDICATION: L knee pain, swelling and calor, R07.9 Chest pain,  unspecified N19 Unspecified kidney failure M79.605 Pain in left  leg    COMPARISON: None.    AP and lateral view of the left knee.    Small joint effusion.    High riding patella.    No fractures or acute osseous abnormalities. Tricompartmental  osteoarthritic degenerative changes, severe in the medial joint  compartment with bone-on-bone and osteophytes. Moderate in the  patellofemoral and lateral joint compartments with joint space  narrowing and osteophytes.      Impression:       Small joint effusion.    Tricompartmental osteoarthritic degenerative changes detailed  above greatest in the medial joint compartment.    [id number]    Electronically signed by:  Christopher Ramirez MD  1/24/2019 3:22  PM CST Workstation: [email or web address]          I reviewed the patient's new clinical results.    Assessment/Plan:     Active Hospital Problems    Diagnosis   • **Chest pain on breathing     -EKG unremarkable.  -Troponin negative x 3    Echo- Left ventricular systolic function is normal. Estimated EF appears to be in the range of 56 - 60%. Estimated EF = 56%. Normal left ventricular cavity size noted. All left ventricular wall segments contract normally. Left ventricular wall thickness is consistent with borderline concentric hypertrophy. Left ventricular diastolic dysfunction is noted.     • Left ventricular diastolic dysfunction     ECHO 1/24/19  Left ventricular systolic function is normal. Estimated EF appears to be in the range of 56 - 60%. Estimated EF = 56%. Normal left ventricular cavity size noted. All left ventricular wall segments contract normally. Left ventricular wall thickness is consistent  with borderline concentric hypertrophy. Left ventricular diastolic dysfunction is noted.    -will consult Dr Estrada for possible right heart strain      • Acute respiratory failure with hypoxia (CMS/HCC)     -Patient is not on oxygen nasal cannula at home.  -He was placed on 2 L nasal cannula in the ER and desatted to 86%.  He was bumped up to 3 L and was satting greater than 92%.  -Continue to monitor and wean off of oxygen as needed.  -Chest x-ray showed no acute cardiopulmonary processes.     • Stage 1 acute kidney injury (CMS/HCC)     -Creatinine elevated to 2.69 (decreasing)   Cr today 2.37  -Baseline appears to be around 2.  -Continue to monitor.  -Renal diet.  -IVF 75 cc per hour.      • Asthma     -Continue home inhalers.     • Primary osteoarthritis of both knees     -Norco 7.5 continue  Patient complains of L knee pain  XR- L knee- No fractures or acute osseous abnormalities. Tricompartmental  osteoarthritic degenerative changes, severe in the medial joint  compartment with bone-on-bone and osteophytes. Moderate in the  patellofemoral and lateral joint compartments with joint space  narrowing and osteophytes.    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  As well as a PE.  -D-dimer elevated 2064   Intermediate risk on VQ scan.  Unable to obtain CTA due to elevated creatinine.  -Left swollen knee.  Ultrasound showed no DVT in the left lower extremity.  -Patient anticoagulated with warfarin.  INR 2.1.  Continue treatment, pharmacy to dose.  -Echo  - Left ventricular systolic function is normal. Estimated EF appears to be in the range of 56 - 60%. Estimated EF = 56%. Normal left ventricular cavity size noted. All left ventricular wall segments contract normally. Left ventricular wall thickness is consistent with borderline concentric hypertrophy. Left ventricular diastolic dysfunction is noted.    -consult Dr Estrada this am     • CKD (chronic kidney  disease) stage 4, GFR 15-29 ml/min (CMS/[facility])     -Creatinine baseline appears to be 2.      -Continue to monitor.  -IVF 75 cc per hour.  -Renal diet.     • GERD (gastroesophageal reflux disease)     -Tums and Pepcid when necessary.     • Diabetic peripheral neuropathy associated with type 2 diabetes mellitus (CMS/[facility])   • Benign essential hypertension     -Continue home antihypertensive medications.  -Continue to monitor.     • Diabetes mellitus type II, uncontrolled (CMS/[facility])     -Sliding Scale.         DVT prophylaxis: Coumadin  Code Status and Medical Interventions:   Ordered at: 01/23/19 2053     Code Status:    CPR     Medical Interventions (Level of Support Prior to Arrest):    Full       Plan for disposition:Home in 1-2 days       Time: Home in 1-2 days      Signed,   Pedro Diaz MD  Family Medicine Resident PGY1  [facility]        This document has been electronically signed by Pedro Diaz MD on January 25, 2019 7:06 AM

## 2019-01-25 NOTE — PLAN OF CARE
Problem: Patient Care Overview  Goal: Plan of Care Review  Outcome: Ongoing (interventions implemented as appropriate)   01/25/19 0930   Coping/Psychosocial   Plan of Care Reviewed With spouse;patient   OTHER   Outcome Summary Initial PT evaluation completed this date. Patient completes rolling from right to left in bed with modAx2; comes to sit on EOB with maxAx2. Patient able to sit EOB for 10 minutes with BUE support and maxA for trunk control. Patient moves from sit to supine with maxAx2. Recommending hospital bed with overhead trapeze after discharge home and home health PT; physician notified. Wife educated on using ointment for patient's arthritis as needed. Continue skilled PT.     Goal: Discharge Needs Assessment  Outcome: Ongoing (interventions implemented as appropriate)   01/25/19 0930   Discharge Needs Assessment   Patient/Family Anticipates Transition to home with help/services   Patient/Family Anticipated Services at Transition home health care   Transportation Concerns other (see comments)  (Patient transferred into and out of house by ambulance.)   Anticipated Changes Related to Illness none   Equipment Needed After Discharge hospital bed;other (see comments)  (overhead trapeze)   Outpatient/Agency/Support Group Needs homecare agency   Discharge Facility/Level of Care Needs home with home health   Disability   Equipment Currently Used at Home cane, quad;cane, straight;commode;power chair,(recliner lift);walker, rolling;wheelchair

## 2019-01-25 NOTE — DISCHARGE SUMMARY
[facility]   HOSPITAL DISCHARGE SUMMARY    PATIENT NAME: Ondina Alanis Sr.   PHYSICIAN: Pedro Diaz MD   : 1940  MRN: [id number]    ADMITTED: 2019  DISCHARGED: 19     ADMISSION DIAGNOSES:  Active Hospital Problems    Diagnosis Date Noted   • **Pleurisy [R09.1] 2019   • Left ventricular diastolic dysfunction [I51.9] 2019   • Acute respiratory failure with hypoxia (CMS/HCC) [J96.01] 2019   • Stage 1 acute kidney injury (CMS/HCC) [N17.9] 2019   • Asthma [J45.909] 2017   • Primary osteoarthritis of both knees [M17.0] 2015   • Pulmonary embolism (CMS/HCC) [I26.99] 2015   • CKD (chronic kidney disease) stage 4, GFR 15-29 ml/min (CMS/HCC) [N18.4] 2015   • GERD (gastroesophageal reflux disease) [K21.9] 2014   • Diabetic peripheral neuropathy associated with type 2 diabetes mellitus (CMS/[facility]) [E11.42] 07/15/2011   • Benign essential hypertension [I10] 2010   • Diabetes mellitus type II, uncontrolled (CMS/[facility]) [E11.65] 2010      Resolved Hospital Problems   No resolved problems to display.     DISCHARGE DIAGNOSES:   Active Hospital Problems    Diagnosis Date Noted   • **Pleurisy [R09.1] 2019   • Left ventricular diastolic dysfunction [I51.9] 2019   • Acute respiratory failure with hypoxia (CMS/HCC) [J96.01] 2019   • Stage 1 acute kidney injury (CMS/HCC) [N17.9] 2019   • Asthma [J45.909] 2017   • Primary osteoarthritis of both knees [M17.0] 2015   • Pulmonary embolism (CMS/HCC) [I26.99] 2015   • CKD (chronic kidney disease) stage 4, GFR 15-29 ml/min (CMS/HCC) [N18.4] 2015   • GERD (gastroesophageal reflux disease) [K21.9] 2014   • Diabetic peripheral neuropathy associated with type 2 diabetes mellitus (CMS/[facility]) [E11.42] 07/15/2011   • Benign essential hypertension [I10] 2010   • Diabetes mellitus type II, uncontrolled (CMS/[facility]) [E11.65] 2010       Resolved Hospital Problems   No resolved problems to display.       SERVICE: Family Medicine. Attending Stacey Prince M.D., Resident Pedro Diaz MD    CONSULTS:   Consult Orders (all) (From admission, onward)    Start     Ordered    01/23/19 1840  Family Practice - Resident (on-call MD unless specified)  Once     Specialty:  Family Medicine  Provider:  Norm Hernandez MD    01/23/19 1839          PROCEDURES:   None     HISTORY OF PRESENT ILLNESS (from admission H&P):   Patient is a 78 y.o. 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  The patient has dementia and is limited in providing history.  Relative is in the room who she has most of the story.  Patient has been complaining of chest pain for the last 2 days has gotten worse.  The pain is markedly worse when he breathes.  He does not have coronary artery disease history.  He has a dry cough and shortness of breath as well.  Nothing seems to help his chest pain improve, nothing makes it worse.  He has no URI symptoms.  For the last day he has not had very much to eat or drink.  Patient is not mobile, staying confined to his bed.  He also has swelling of his left knee that started a day or 2 ago.      In the ER, patient troponin negative ×1.  EKG was unremarkable.  D-dimer elevated to greater than 2000.  Patient has elevated creatinine and CTA could not be obtained.  VQ scan showed intermediate risk for a PE.  Ultrasound of the left leg was obtained, but was negative for a DVT.  Patient's BNP minimally elevated to ~2100.  He does not have a history of heart failure.  INR is therapeutic at 2.1.    Copied From Dr Hernandez's Admission H&P      DIAGNOSTIC DATA:   Lab Results   Component Value Date    WBC 10.89 (H) 01/23/2019    HGB 14.0 01/23/2019    HCT 41.2 01/23/2019    MCV 79.4 (L) 01/23/2019     01/23/2019     Lab Results   Component Value Date    GLUCOSE 169 (H) 01/25/2019    BUN 51 (H) 01/25/2019     CREATININE 2.64 (H) 01/25/2019    EGFRIFAFRI 29 (L) 01/25/2019    BCR 19.3 01/25/2019    K 4.1 01/25/2019    CO2 24.0 01/25/2019    CALCIUM 8.7 01/25/2019    ALBUMIN 3.70 01/23/2019    AST 25 01/23/2019    ALT 15 (L) 01/23/2019     Imaging Results (last 24 hours)     Procedure Component Value Units Date/Time    XR Knee 1 or 2 View Left [[id number]] Collected:  01/24/19 0901     Updated:  01/24/19 1523    Narrative:       PROCEDURE: Left knee, two views.    INDICATION: L knee pain, swelling and calor, R07.9 Chest pain,  unspecified N19 Unspecified kidney failure M79.605 Pain in left  leg    COMPARISON: None.    AP and lateral view of the left knee.    Small joint effusion.    High riding patella.    No fractures or acute osseous abnormalities. Tricompartmental  osteoarthritic degenerative changes, severe in the medial joint  compartment with bone-on-bone and osteophytes. Moderate in the  patellofemoral and lateral joint compartments with joint space  narrowing and osteophytes.      Impression:       Small joint effusion.    Tricompartmental osteoarthritic degenerative changes detailed  above greatest in the medial joint compartment.    [id number]    Electronically signed by:  Christopher Ramirez MD  1/24/2019 3:22  PM CST Workstation: [facility]             HOSPITAL COURSE:  Patient was admitted for further evaluation and treatment of substernal chest pain. In the ER, patient troponin negative ×1.  EKG was unremarkable.  D-dimer elevated to greater than 2000.   VQ scan showed intermediate risk for a PE.  Ultrasound of the left leg was obtained, but was negative for a DVT.  Patient's BNP minimally elevated to ~2100.  He does not have a history of heart failure.  INR is therapeutic at 2.1.    Due to concerns for a PE (D-dimer 2064 and previous history), V/Q scan was found to be intermediate risk.  CTA was not unable to be obtained due to elevated creatinine.  Patient was continued on warfarin.  Echocardiogram was performed  showing left ventricular systolic function normal.  Estimated EF is 56-60%.  Normal left ventricular cavity size noted all left ventricular wall segments contracted normally.  Left ventricular wall thickness is consistent with borderline concentric hypertrophy.  Left ventricular diastolic dysfunction is noted. Due to concerns of possible right heart strain, cardiology was consulted.  Upon recommendation of cardiology, no right heart strain was noted regular function of the right ventricle.    Patient complained of chest pain, worsened on deep respiration.  EKG was unremarkable.  Troponins were negative ×3.  Patient was diagnosed with pleurisy.  Advised to take Tylenol 1000 mg 3 times a day.    Patient complained of left knee pain.  Patient has history of osteoarthritis of both knees.  Pain medications was provided.  X-ray was obtained of the left knee,  No fractures or acute osseous abnormalities. Tricompartmental  osteoarthritic degenerative changes, severe in the medial joint  compartment with bone-on-bone and osteophytes. Moderate in the  patellofemoral and lateral joint compartments with joint space  narrowing and osteophytes.  PT/OT was ordered and followed patient throughout hospital course.     Due to patient's deconditioning and osteoarthritis, patient was recommended by PT/OT to have a hospital bed and overhead trapeze upon discharge.     Patient clinically improved throughout hospital course.  Patient was stable upon discharge home.  Patient was advised to follow-up with PCP Mark Sheldon APRN   in 1 week.    DISCHARGE CONDITION:   Stable    DISPOSITION:  Home or Self Care [1]  home health    DISCHARGE MEDICATIONS     Discharge Medications      New Medications      Instructions Start Date   acetaminophen 500 MG tablet  Commonly known as:  TYLENOL   1,000 mg, Oral, 3 Times Daily         Continue These Medications      Instructions Start Date   albuterol sulfate  (90 Base) MCG/ACT  inhaler  Commonly known as:  PROVENTIL HFA;VENTOLIN HFA;PROAIR HFA   2 puffs, Inhalation, Every 6 Hours      amLODIPine 5 MG tablet  Commonly known as:  NORVASC   TAKE 1 TABLET DAILY      brimonidine 0.1 % solution ophthalmic solution  Commonly known as:  ALPHAGAN P   1 drop      brinzolamide 1 % ophthalmic suspension  Commonly known as:  AZOPT   1 drop      budesonide 0.5 MG/2ML nebulizer solution  Commonly known as:  PULMICORT   0.5 mg, Inhalation      CloNIDine 0.2 MG tablet  Commonly known as:  CATAPRES   0.2 mg, Oral      desloratadine 5 MG tablet  Commonly known as:  CLARINEX   5 mg, Oral      HYDROcodone-acetaminophen 7.5-325 MG per tablet  Commonly known as:  NORCO   1 tablet, Oral, Every 6 Hours PRN      Insulin Lispro 100 UNIT/ML solution pen-injector  Commonly known as:  HUMALOG   12 Units, Subcutaneous      ipratropium-albuterol 0.5-2.5 mg/3 ml nebulizer  Commonly known as:  DUO-NEB   3 mL, Inhalation, Every 6 Hours      lansoprazole 30 MG capsule  Commonly known as:  PREVACID   30 mg, Oral      latanoprost 0.005 % ophthalmic solution  Commonly known as:  XALATAN   1 drop      LEVEMIR SC   15 Units, Subcutaneous, 2 Times Daily      oxybutynin XL 10 MG 24 hr tablet  Commonly known as:  DITROPAN-XL   No dose, route, or frequency recorded.      pilocarpine 1 % ophthalmic solution  Commonly known as:  PILOCAR   1 drop      Transfer Bench misc   Transfer bench r/t dementia, deconditioning.      Tub Transfer Board misc   Transfer tub bench r/t deconditioning, dementia, and blind r/t glaucoma      warfarin 5 MG tablet  Commonly known as:  COUMADIN   take 1 and 1/2 tablets SUN MON WED FRI and 1 tablet all other days for MYOCARDIAL REINFARCTION PREVENTION             INSTRUCTIONS:  Activity:   Activity Instructions     Activity as Tolerated          Diet:   Diet Instructions     Diet: Regular, Consistent Carbohydrate      Discharge Diet:   Regular  Consistent Carbohydrate           Special instructions: Patient  instructed to call MD or return to ED with worsening shortness of breath, chest pain, fever greater than 100.4 degrees F or any other medical concerns..    FOLLOW UP:   Additional Instructions for the Follow-ups that You Need to Schedule     Ambulatory Referral to Anticoagulation Monitoring   As directed      Referral to Home Health   As directed      Face to Face Visit Date:  1/25/2019    Follow-up Provider for Plan of Care?:  I treated the patient in an acute care facility and will not continue treatment after discharge.    Follow-up Provider:  NICK SOTOMAYOR [[id number]]    Reason/Clinical Findings:  Deconditioning    Describe mobility limitations that make leaving home difficult:  Deconditioning    Nursing/Therapeutic Services Requested:  Physical Therapy Occupational Therapy Skilled Nursing    Skilled nursing orders:  Medication education    Frequency:  1 Week 1           Follow-up Information     Nick Sotomayor APRN .    Specialty:  Nurse Practitioner  Contact information:  [address]  [place] [id number]  [phone]                   PENDING TEST RESULTS AT DISCHARGE      Time: 20 min    Stacey Prince M.D. is the attending at time of discharge, is aware of the patient's status and agrees with the above discharge summary.    Signed    Pedro Diaz MD  Family Medicine Resident PGY1  [facility]      This document has been electronically signed by Pedro Diaz MD on 01/25/19 11:24 AM

## 2019-01-25 NOTE — DISCHARGE PLACEMENT REQUEST
"Nadir Alanis Sr. (78 y.o. Male)     Date of Birth Social Security Number Address Home Phone MRN    1940  [address] [place] [address] [phone] [id number]    Alevism Marital Status          [place]        Admission Date Admission Type Admitting Provider Attending Provider Department, Room/Bed    1/23/19 Emergency Nims, MD Emily Hernández Anthony, MD [address], 366/1    Discharge Date Discharge Disposition Discharge Destination         Home or Self Care              Attending Provider:  Pedro Diaz MD    Allergies:  Contrast Dye    Isolation:  None   Infection:  None   Code Status:  CPR    Ht:  188 cm (74\")   Wt:  98 kg (216 lb)    Admission Cmt:  None   Principal Problem:  Pleurisy [R09.1] More...                 Active Insurance as of 1/23/2019     Primary Coverage     Payor Plan Insurance Group Employer/Plan Group    MEDICARE MEDICARE A & B      Payor Plan Address Payor Plan Phone Number Payor Plan Fax Number Effective Dates    [address] [phone]  6/1/2017 - None Entered    Formerly [facility] [id number]       Subscriber Name Subscriber Birth Date Member ID       NADIR ALANIS SR. 1940 [id number]           Secondary Coverage     Payor Plan Insurance Group Employer/Plan Group    [facility] [id number]     Payor Plan Address Payor Plan Phone Number Payor Plan Fax Number Effective Dates    [address] [address]   1/23/2019 - None Entered    [facility] [phone]       Subscriber Name Subscriber Birth Date Member ID       NADIR ALANIS SR. 1940 [id number]                 Emergency Contacts      (Rel.) Home Phone Work Phone Mobile Phone    Lamar Alanis (Spouse) [phone] -- [phone]    Cammy Alanis (Daughter) [phone] -- [phone]        [address] [address]  Dept. Phone:  [phone]  Dept. Fax:   Date Ordered: Jan 25, 2019 " "        Patient:  Ondina Alanis Sr. MRN:  [id number]   [address] [facility] [address] :  1940  SSN:    Phone: [phone] Sex:  M     Weight: 98 kg (216 lb)         Ht Readings from Last 1 Encounters:   19 188 cm (74\")         Hospital Bed  (Order ID: [id number])    Diagnosis:  Primary osteoarthritis of both knees (M17.0 [ICD-10-CM] 715.16 [ICD-9-CM])  Pleurisy (R09.1 [ICD-10-CM] 511.0 [ICD-9-CM])  Left ventricular diastolic dysfunction (I51.9 [ICD-10-CM] 429.9 [ICD-9-CM])  Diabetic peripheral neuropathy associated with type 2 diabetes mellitus (CMS/HCC) (E11.42 [ICD-10-CM] 250.60,357.2 [ICD-9-CM])  Acute respiratory failure with hypoxia (CMS/HCC) (J96.01 [ICD-10-CM] 518.81 [ICD-9-CM])   Quantity:  1     Equipment:  Hospital Bed, Semi-Electirc w/ Mattress & w/ Rails  Accessories:  Low Air Loss Mattress (Group 2 support)  Accessories:  Trapeze Bar, Attached to Bed, w/ Grab Bar  Length of Need (99 Months = Lifetime): 99 Months = Lifetime        Authorizing Provider's Phone: [phone]   Authorizing Provider:Pedro Diaz MD  Authorizing Provider's NPI: [id number]  Order Entered By: Pedro Diaz MD 2019 11:39 AM     Electronically signed by: Pedro Diaz MD 2019 11:39 AM               History & Physical      Norm Hernandez MD at 2019  6:36 PM     Attestation signed by Stacey Prince MD at 2019  9:27 PM    I have performed a history and physical examination of the patient. I have discussed the management of the patient with the resident.  I have reviewed the notes, assessment and plan, and/or procedures performed by the resident. I concur with the resident’s documentation.    History provided by wife and daughter. Patient endorses hurting across his entire chest but cannot quantify it. He is also saying his whole left leg hurts.  Wife endorses shaking episodes that just started a couple of days ago. The wife also states he was " out at the doctor's office for the first time in a very long time as he is normally bed confined.      PE: Appears to be in pain, rigors, CVS: S1/S2 RRR with PACs, Lungs: CTA B/L Abdomen soft NT, left knee is swollen compared to right, skin is warmer to touch    Plan: Check influenza screen. Oxygen as needed to maintain sats above 92%. Telemetry and trend troponins.  May have element of heart failure with elevated BNP. Will need echocardiogram in the AM.       This document has been electronically signed by Stacey Prince MD on 2019 9:27 PM                           HISTORY AND PHYSICAL  NAME: Ondina Alanis Sr.  : 1940  MRN: [id number]    DATE OF ADMISSION: 19    DATE & TIME SEEN: 19 6:37 PM    PCP: Provider, No Known    CODE STATUS: Full code    CHIEF COMPLAINT Chest pain    HPI:  Ondina Alanis Sr. is a 78 y.o. 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  The patient has dementia and is limited in providing history.  Relative is in the room who she has most of the story.  Patient has been complaining of chest pain for the last 2 days has gotten worse.  The pain is markedly worse when he breathes.  He does not have coronary artery disease history.  He has a dry cough and shortness of breath as well.  Nothing seems to help his chest pain improve, nothing makes it worse.  He has no URI symptoms.  For the last day he has not had very much to eat or drink.  Patient is not mobile, staying confined to his bed.  He also has swelling of his left knee that started a day or 2 ago.     In the ER, patient troponin negative ×1.  EKG was unremarkable.  D-dimer elevated to greater than 2000.  Patient has elevated creatinine and CTA could not be obtained.  VQ scan showed intermediate risk for a PE.  Ultrasound of the left leg was obtained, but was negative for a DVT.  Patient's BNP minimally elevated to ~2100.  He does not have a history of heart  failure.  INR is therapeutic at 2.1.    CONCURRENT MEDICAL HISTORY:  Past Medical History:   Diagnosis Date   • Asthma    • Diabetes mellitus (CMS/[facility])    • GERD (gastroesophageal reflux disease)    • Hypertension    • Prostate disorder        PAST SURGICAL HISTORY:  Past Surgical History:   Procedure Laterality Date   • BACK SURGERY     • KNEE SURGERY Left    • PROSTATE SURGERY         FAMILY HISTORY:  No family history on file.     SOCIAL HISTORY:  Social History     Socioeconomic History   • Marital status:      Spouse name: Not on file   • Number of children: Not on file   • Years of education: Not on file   • Highest education level: Not on file   Social Needs   • Financial resource strain: Not on file   • Food insecurity - worry: Not on file   • Food insecurity - inability: Not on file   • Transportation needs - medical: Not on file   • Transportation needs - non-medical: Not on file   Occupational History   • Not on file   Tobacco Use   • Smoking status: Never Smoker   Substance and Sexual Activity   • Alcohol use: No   • Drug use: No   • Sexual activity: Defer   Other Topics Concern   • Not on file   Social History Narrative   • Not on file       HOME MEDICATIONS:  Prior to Admission medications    Medication Sig Start Date End Date Taking? Authorizing Provider   albuterol (PROVENTIL HFA;VENTOLIN HFA) 108 (90 BASE) MCG/ACT inhaler Inhale 2 puffs Every 6 (Six) Hours.    ProviderSelina MD   amLODIPine (NORVASC) 5 MG tablet TAKE 1 TABLET DAILY 3/7/17   Selina Rosario MD   brimonidine (ALPHAGAN P) 0.1 % solution ophthalmic solution 1 drop. 1/26/16   Selina Rosario MD   brinzolamide (AZOPT) 1 % ophthalmic suspension 1 drop. 1/26/16   Selina Rosario MD   budesonide (PULMICORT) 0.5 MG/2ML nebulizer solution Inhale 0.5 mg. 1/26/16   Selina Rosario MD   CloNIDine (CATAPRES) 0.2 MG tablet Take 0.2 mg by mouth. 4/10/17   Selina Rosario MD   desloratadine (CLARINEX) 5 MG  tablet Take 5 mg by mouth. 4/10/17   Selina Rosario MD   HYDROcodone-acetaminophen (NORCO) 7.5-325 MG per tablet Take 1 tablet by mouth Every 6 (Six) Hours As Needed for Moderate Pain (4-6).    Selina Rosario MD   Insulin Detemir (LEVEMIR SC) Inject 15 Units under the skin 2 (Two) Times a Day. 4/14/17   Selina Rosario MD   Insulin Lispro (HUMALOG) 100 UNIT/ML solution pen-injector Inject 12 Units under the skin. 4/10/17   Selina Rosario MD   ipratropium-albuterol (DUO-NEB) 0.5-2.5 mg/mL nebulizer Inhale 3 mL Every 6 (Six) Hours. 1/26/16   Selina Rosario MD   lansoprazole (PREVACID) 30 MG capsule Take 30 mg by mouth. 4/10/17   Selina Rosario MD   latanoprost (XALATAN) 0.005 % ophthalmic solution 1 drop. 1/26/16   Selina Rosario MD   Misc. Devices (TRANSFER BENCH) misc Transfer bench r/t dementia, deconditioning. 6/8/17   Nicole Canseco APRN   [facility] Devices (TUB TRANSFER BOARD) [facility] Transfer tub bench r/t deconditioning, dementia, and blind r/t glaucoma 6/9/17   Nicole Canseco APRN   oxybutynin XL (DITROPAN-XL) 10 MG 24 hr tablet  9/5/16   Selina Rosario MD   pilocarpine (PILOCAR) 1 % ophthalmic solution 1 drop. 1/26/16   Selina Rosario MD   warfarin (COUMADIN) 5 MG tablet take 1 and 1/2 tablets SUN MON WED FRI and 1 tablet all other days for MYOCARDIAL REINFARCTION PREVENTION 4/10/17   Selina Rosario MD       ALLERGIES:  Contrast dye    REVIEW OF SYSTEMS  Review of Systems   Unable to perform ROS: Dementia (limited by)   Constitutional: Positive for activity change, appetite change, chills and fatigue. Negative for diaphoresis and fever.   HENT: Negative for congestion, ear pain, rhinorrhea and sore throat.    Eyes: Negative for redness.   Respiratory: Positive for cough and shortness of breath. Negative for chest tightness and wheezing.    Cardiovascular: Positive for chest pain. Negative for palpitations and leg swelling.   Gastrointestinal:  Positive for nausea. Negative for abdominal distention, abdominal pain, constipation, diarrhea, rectal pain and vomiting.   Genitourinary: Negative for flank pain.   Skin: Negative for color change and rash.   Neurological: Positive for weakness. Negative for dizziness, seizures, syncope, light-headedness, numbness and headaches.   Psychiatric/Behavioral: Negative for agitation, confusion, decreased concentration and sleep disturbance. The patient is not nervous/anxious.    All other systems reviewed and are negative.      PHYSICAL EXAM:  Temp:  [97.6 °F (36.4 °C)-98.5 °F (36.9 °C)] 98.5 °F (36.9 °C)  Heart Rate:  [] 105  Resp:  [16-20] 18  BP: (142-221)/() 142/80  Body mass index is 27.73 kg/m².     Physical Exam   Constitutional: He appears well-developed and well-nourished. He appears lethargic. He is active and cooperative. He is easily aroused.  Non-toxic appearance. He does not have a sickly appearance. He does not appear ill. No distress. Nasal cannula in place.   HENT:   Head: Normocephalic.   Right Ear: External ear normal.   Left Ear: External ear normal.   Nose: Nose normal. No mucosal edema or rhinorrhea.   Mouth/Throat: Uvula is midline, oropharynx is clear and moist and mucous membranes are normal. Abnormal dentition.   Cerumen present in both ears.   Eyes: Conjunctivae are normal. No scleral icterus.   Neck: Normal range of motion.   Cardiovascular: Intact distal pulses.   Pulmonary/Chest: Effort normal and breath sounds normal. No accessory muscle usage. No respiratory distress. He has no decreased breath sounds. He has no wheezes.   Abdominal: Soft. Bowel sounds are normal. There is no tenderness (deep palpation). There is no rigidity, no rebound and no guarding.   Musculoskeletal:        Left knee: He exhibits decreased range of motion and swelling. He exhibits no ecchymosis, no deformity, no laceration and no erythema. No tenderness found.   No peripheral edema.  Left knee is warm to  touch and swollen.  No erythema, no pain.   Neurological: He is easily aroused. He appears lethargic. No cranial nerve deficit (grossly intact). GCS eye subscore is 3. GCS verbal subscore is 4. GCS motor subscore is 6.   Skin: Skin is warm. Capillary refill takes less than 2 seconds. No rash noted. He is not diaphoretic.   Nursing note and vitals reviewed.      DIAGNOSTIC DATA:   Lab Results (last 24 hours)     Procedure Component Value Units Date/Time    Influenza Antigen, Rapid - Swab, Nasopharynx [[id number]] Collected:  01/23/19 2056    Specimen:  Swab from Nasopharynx Updated:  01/23/19 2059    Troponin [[id number]]  (Normal) Collected:  01/23/19 1814    Specimen:  Blood Updated:  01/23/19 1855     Troponin I <0.012 ng/mL     D-dimer, Quantitative [[id number]]  (Abnormal) Collected:  01/23/19 1400    Specimen:  Blood Updated:  01/23/19 1545     D-Dimer, Quantitative 2,064 ng/mL (FEU)     Narrative:       Dimer values <500 ng/ml FEU are FDA approved as aid in diagnosis of deep venous thrombosis and pulmonary embolism.  This test should not be used in an exclusion strategy with pretest probability alone.    A recent guideline regarding diagnosis for pulmonary thromboembolism recommends an adjusted exclusion criterion of age x 10 ng/ml FEU for patients >50 years of age (Brenda Intern Med 2015; 163: 701-711).    [facility] Draw [[id number]] Collected:  01/23/19 1400    Specimen:  Blood Updated:  01/23/19 1501    Narrative:       The following orders were created for panel order [facility] Draw.  Procedure                               Abnormality         Status                     ---------                               -----------         ------                     Light Blue Top[[id number]]                                   Final result               Green Top (Gel)[[id number]]                                  Final result               Lavender Top[[id number]]                                     Final result               Gold  Top - SST[[id number]]                                   Final result                 Please view results for these tests on the individual orders.    Light Blue Top [[id number]] Collected:  01/23/19 1400    Specimen:  Blood Updated:  01/23/19 1501     Extra Tube hold for add-on     Comment: Auto resulted       Green Top (Gel) [[id number]] Collected:  01/23/19 1400    Specimen:  Blood Updated:  01/23/19 1501     Extra Tube Hold for add-ons.     Comment: Auto resulted.       Lavender Top [[id number]] Collected:  01/23/19 1400    Specimen:  Blood Updated:  01/23/19 1501     Extra Tube hold for add-on     Comment: Auto resulted       Gold Top - SST [[id number]] Collected:  01/23/19 1400    Specimen:  Blood Updated:  01/23/19 1501     Extra Tube Hold for add-ons.     Comment: Auto resulted.       Protime-INR [[id number]]  (Abnormal) Collected:  01/23/19 1400    Specimen:  Blood Updated:  01/23/19 1446     Protime 22.6 Seconds      INR 2.09    Narrative:       Therapeutic range for most indications is 2.0-3.0 INR,  or 2.5-3.5 for mechanical heart valves.    Troponin [[id number]]  (Normal) Collected:  01/23/19 1400    Specimen:  Blood Updated:  01/23/19 1434     Troponin I <0.012 ng/mL     BNP [[id number]]  (Abnormal) Collected:  01/23/19 1400    Specimen:  Blood Updated:  01/23/19 1434     proBNP 2,170.0 pg/mL     Comprehensive Metabolic Panel [[id number]]  (Abnormal) Collected:  01/23/19 1400    Specimen:  Blood Updated:  01/23/19 1422     Glucose 148 mg/dL      BUN 44 mg/dL      Creatinine 2.69 mg/dL      Sodium 136 mmol/L      Potassium 4.3 mmol/L      Chloride 102 mmol/L      CO2 28.0 mmol/L      Calcium 9.0 mg/dL      Total Protein 6.9 g/dL      Albumin 3.70 g/dL      ALT (SGPT) 15 U/L      AST (SGOT) 25 U/L      Alkaline Phosphatase 87 U/L      Total Bilirubin 1.0 mg/dL      eGFR  African Amer 28 mL/min/1.73      Globulin 3.2 gm/dL      A/G Ratio 1.2 g/dL      BUN/Creatinine Ratio 16.4     Anion Gap 6.0 mmol/L     Narrative:        The MDRD GFR formula is only valid for adults with stable renal function between ages 18 and 70.    CBC & Differential [[id number]] Collected:  01/23/19 1400    Specimen:  Blood Updated:  01/23/19 1406    Narrative:       The following orders were created for panel order CBC & Differential.  Procedure                               Abnormality         Status                     ---------                               -----------         ------                     CBC Auto Differential[[id number]]        Abnormal            Final result                 Please view results for these tests on the individual orders.    CBC Auto Differential [[id number]]  (Abnormal) Collected:  01/23/19 1400    Specimen:  Blood Updated:  01/23/19 1406     WBC 10.89 10*3/mm3      RBC 5.19 10*6/mm3      Hemoglobin 14.0 g/dL      Hematocrit 41.2 %      MCV 79.4 fL      MCH 27.0 pg      MCHC 34.0 g/dL      RDW 14.8 %      RDW-SD 43.2 fl      MPV 10.9 fL      Platelets 202 10*3/mm3      Neutrophil % 72.2 %      Lymphocyte % 15.6 %      Monocyte % 10.2 %      Eosinophil % 1.6 %      Basophil % 0.1 %      Immature Grans % 0.3 %      Neutrophils, Absolute 7.87 10*3/mm3      Lymphocytes, Absolute 1.70 10*3/mm3      Monocytes, Absolute 1.11 10*3/mm3      Eosinophils, Absolute 0.17 10*3/mm3      Basophils, Absolute 0.01 10*3/mm3      Immature Grans, Absolute 0.03 10*3/mm3            Imaging Results (last 24 hours)     Procedure Component Value Units Date/Time    NM Lung Scan Perfusion Particulate [[id number]] Collected:  01/23/19 1733     Updated:  01/23/19 1800    Narrative:       EXAMINATION:  NUCLEAR MEDICINE PULMONARY PERFUSION / VENTILATION  SCAN (V/Q SCAN)    CLINICAL HISTORY:   Dyspnea, elevated d-dimer     COMPARISON EXAMINATIONS: Chest x-ray performed the same date.    TECHNIQUE:  Perfusion: 6.2 mCi of technetium labeled MAA (number of  particles:  approx 600,000)  Ventilation: Not performed, patient was unable to tolerate  ventilation  procedure.    FINDINGS:  Perfusion only scan was performed. There is an area of bandlike  decreased perfusion in the mid right lung without a corresponding  chest x-ray abnormality.  Additional areas of decreased perfusion are seen in the left  upper anterior lung on the RPO and English views without chest x-ray  abnormalities.      Impression:       CONCLUSION:  Intermediate probability of pulmonary embolism. Exam is limited  without ventilation data.    COMMENTS:    Normal exam = statistically less than 2% have pulmonary emboli.  Low probability = statistically 5-19% have pulmonary emboli.   Intermediate probability = statistically 20-70% have pulmonary  emboli.   High probability = statistically greater than 80% have pulmonary  emboli.      NOTE:   The likelihood of pulmonary embolism, especially in the low and  intermediate categories, should be interpreted in conjunction  with the pre- and post-test probability and other test results  such as venous Doppler, D dimer and if necessary pulmonary  angiography as clinically indicated.    Findings were discussed with SALLY JENKINS on 1/23/2019  5:58 PM CST    Electronically signed by:  Agustin Early MD  1/23/2019 5:59 PM CST  Workstation: [id number]    US Venous Doppler Lower Extremity Left (duplex) [[id number]] Collected:  01/23/19 1522     Updated:  01/23/19 1612    Narrative:         Ultrasound venous duplex left lower extremity    HISTORY: Left lower extremity pain and swelling    Duplex ultrasound of the deep venous system of the left lower  extremity was performed    Real-time images demonstrate normal compressibility without  evidence of intraluminal thrombus.  Doppler shows phasic flow and augmentation.  Color Doppler also reveals venous patency.      Impression:       CONCLUSION:  No ultrasound evidence of deep venous thrombosis of the left  lower extremity.    [id number]    Electronically signed by:  Chris Srinivasan MD  1/23/2019 4:10 PM CST  Workstation: [id number]     XR Chest 1 View [[id number]] Collected:  01/23/19 1405     Updated:  01/23/19 1423    Narrative:         PORTABLE CHEST    HISTORY: Chest pain    Portable AP supine film of the chest was obtained at 2:04 PM.  COMPARISON: June 7, 2017    EKG leads.  The lungs are clear of an acute process.  The heart is not enlarged.  The pulmonary vasculature is not increased.  No pleural effusion.  No pneumothorax.  No acute osseous abnormality.      Impression:       CONCLUSION:  No Acute Disease    [id number]    Electronically signed by:  Chris Srinivasan MD  1/23/2019 2:21 PM CST  Workstation: [id number]            I reviewed the patient's new clinical results.    ASSESSMENT AND PLAN: This is a 78 y.o. male with:    Active Hospital Problems    Diagnosis Date Noted   • **Chest pain on breathing [R07.1] 01/23/2019     Priority: High     -EKG unremarkable.  -Troponin negative ×1.  -Trend his troponins ×2.     • Acute respiratory failure with hypoxia (CMS/HCC) [J96.01] 01/23/2019     Priority: High     -Patient is not on oxygen nasal cannula at home.  -He was placed on 2 L nasal cannula in the ER and desatted to 86%.  He was bumped up to 3 L and was satting greater than 92%.  -Continue to monitor and wean off of oxygen as needed.  -Chest x-ray showed no acute cardiopulmonary processes.     • Pulmonary embolism (CMS/HCC) [I26.99] 07/20/2015     Priority: High     -Patient has a history of DVTs in the past.  As well as a PE.  -D-dimer elevated today.  Intermediate risk on VQ scan.  Unable to obtain CTA due to elevated creatinine.  -Left swollen knee.  Ultrasound showed no DVT in the left lower extremity.  -Patient anticoagulated with warfarin.  INR 2.1.  Continue treatment, pharmacy to dose.  -Echo in AM.     • Stage 1 acute kidney injury (CMS/HCC) [N17.9] 01/23/2019     Priority: Medium     -Creatinine elevated to 2.7.  -Baseline appears to be around 2.  -Continue to monitor.  -Renal diet.  -IVF 75 cc per hour.      • Asthma [J45.909] 01/23/2017      -Continue home inhalers.     • Primary osteoarthritis of both knees [M17.0] 08/31/2015     -Norco 7.5 continue.     • CKD (chronic kidney disease) stage 4, GFR 15-29 ml/min (CMS/[facility]) [N18.4] 03/02/2015     -Creatinine baseline appears to be 2.  Currently it is 2.7.  -Continue to monitor.  -IVF 75 cc per hour.  -Renal diet.     • GERD (gastroesophageal reflux disease) [K21.9] 04/09/2014     -Tums and Pepcid when necessary.     • Diabetic peripheral neuropathy associated with type 2 diabetes mellitus (CMS/[facility]) [E11.42] 07/15/2011   • Benign essential hypertension [I10] 03/04/2010     -Continue home antihypertensive medications.  -Continue to monitor.     • Diabetes mellitus type II, uncontrolled (CMS/[facility]) [E11.65] 03/04/2010     -Sliding Scale.         DVT prophylaxis: Coumadin     ANA # unable to obtain at 1902 due to ana being down for maintenance.    Expected Length of Stay: Where: home and When:  1-2 days    I discussed the patients findings and my recommendations with patient and family.     Dr. Prince  is the attending on record at time of admission, She is aware of the patient's status and agrees with the above history and physical.          This document has been electronically signed by Norm Hernandez MD on January 23, 2019 9:04 PM    Electronically signed by Stacey Prince MD at 1/23/2019  9:27 PM          Emergency Department Notes      Sammie Dallas RN at 1/23/2019  2:15 PM        Pt presents to the ED with complaints of mid sternal chest pains for the past 2 days. Wife reports that patient has also complained of bilateral lower leg pain with history of dvt. Pt had moderate amount of swelling and warmth to left knee. Pt denies any shortness of breath, cough, or fever.      Sammie Dallas RN  01/23/19 1914      Electronically signed by Sammie Dallas RN at 1/23/2019  7:14 PM     Nasir Drake PA-C at 1/23/2019  2:30 PM      Procedure Orders    1. ECG 12 Lead [[id number]]  ordered by Almas Bear MD at 01/23/19 1345           Attestation signed by Almas Bear MD at 1/25/2019 10:07 AM          For this patient encounter, I reviewed the NP or PA documentation, treatment plan, and medical decision making. Almas Bear MD 1/25/2019 10:07 AM                  Subjective   Patient presents to emergency department for dyspnea/chest pain.  Wife states it started yesterday.  He lives at home and is non-ambulatory.  Hx of previous DVT/PE.  Endorses compliance with coumadin.  Hx of left knee surgery.  Patient is having left knee pain as well.          History provided by:  Patient   used: No    Chest Pain   Pain location:  Substernal area  Pain quality: sharp    Pain radiates to:  Does not radiate  Pain severity:  Moderate  Onset quality:  Sudden  Duration:  1 day  Timing:  Constant  Progression:  Unchanged  Chronicity:  New  Context: breathing    Worsened by:  Deep breathing  Associated symptoms: shortness of breath    Associated symptoms: no abdominal pain, no altered mental status, no anxiety, no back pain, no cough, no diaphoresis, no dizziness, no dysphagia, no fatigue, no fever, no headache, no heartburn, no lower extremity edema, no nausea, no near-syncope, no numbness, no orthopnea, no palpitations, no syncope, no vomiting and no weakness    Risk factors: diabetes mellitus, hypertension, male sex, prior DVT/PE and smoking        Review of Systems   Constitutional: Negative for chills, diaphoresis, fatigue and fever.   HENT: Negative for trouble swallowing.    Respiratory: Positive for shortness of breath. Negative for cough and wheezing.    Cardiovascular: Positive for chest pain. Negative for palpitations, orthopnea, syncope and near-syncope.   Gastrointestinal: Negative for abdominal pain, heartburn, nausea and vomiting.   Genitourinary: Negative for dysuria and flank pain.   Musculoskeletal: Positive for arthralgias (left knee). Negative  "for back pain.   Skin: Negative for color change.   Allergic/Immunologic: Negative for immunocompromised state.   Neurological: Negative for dizziness, weakness, numbness and headaches.   Hematological: Does not bruise/bleed easily.   Psychiatric/Behavioral: Negative for confusion.       Past Medical History:   Diagnosis Date   • Asthma    • Diabetes mellitus (CMS/HCC)    • GERD (gastroesophageal reflux disease)    • Hypertension    • Prostate disorder        Allergies   Allergen Reactions   • Contrast Dye        Past Surgical History:   Procedure Laterality Date   • BACK SURGERY     • KNEE SURGERY Left    • PROSTATE SURGERY         No family history on file.    Social History     Socioeconomic History   • Marital status:      Spouse name: Not on file   • Number of children: Not on file   • Years of education: Not on file   • Highest education level: Not on file   Tobacco Use   • Smoking status: Never Smoker   Substance and Sexual Activity   • Alcohol use: No   • Drug use: No   • Sexual activity: Defer           Objective      /84 (BP Location: Left arm, Patient Position: Sitting)   Pulse 96   Temp 97.6 °F (36.4 °C) (Oral)   Resp 16   Ht 188 cm (74\")   Wt 98 kg (216 lb)   SpO2 98%   BMI 27.73 kg/m²      Physical Exam   Constitutional: He appears well-developed and well-nourished. He appears distressed.   HENT:   Head: Normocephalic and atraumatic.   Eyes: Conjunctivae are normal.   Cardiovascular: Normal rate, regular rhythm and normal heart sounds.   Pulmonary/Chest: Effort normal and breath sounds normal. No respiratory distress. He has no wheezes.   Abdominal: Soft. He exhibits no distension. There is no tenderness.   Musculoskeletal: He exhibits edema (around left knee) and tenderness (left knee).   Neurological: He is alert.   Skin: Capillary refill takes less than 2 seconds.   Psychiatric: He has a normal mood and affect. His behavior is normal. Thought content normal.   Nursing note and " vitals reviewed.      ECG 12 Lead    Date/Time: 1/23/2019 2:31 PM  Performed by: Nasir Drake PA-C  Authorized by: Almas Bear MD   Interpreted by physician  Comparison: not compared with previous ECG   Rhythm: sinus rhythm  Ectopy: atrial premature contractions  Rate: normal  BPM: 91  ST Segments: ST segments normal  Clinical impression: abnormal ECG                ED Course      Results for orders placed or performed during the hospital encounter of 01/23/19   Troponin   Result Value Ref Range    Troponin I <0.012 <=0.034 ng/mL   Comprehensive Metabolic Panel   Result Value Ref Range    Glucose 148 (H) 60 - 100 mg/dL    BUN 44 (H) 7 - 21 mg/dL    Creatinine 2.69 (H) 0.70 - 1.30 mg/dL    Sodium 136 (L) 137 - 145 mmol/L    Potassium 4.3 3.5 - 5.1 mmol/L    Chloride 102 95 - 110 mmol/L    CO2 28.0 22.0 - 31.0 mmol/L    Calcium 9.0 8.4 - 10.2 mg/dL    Total Protein 6.9 6.3 - 8.6 g/dL    Albumin 3.70 3.40 - 4.80 g/dL    ALT (SGPT) 15 (L) 21 - 72 U/L    AST (SGOT) 25 17 - 59 U/L    Alkaline Phosphatase 87 38 - 126 U/L    Total Bilirubin 1.0 0.2 - 1.3 mg/dL    eGFR  African Amer 28 (L) 42 - 98 mL/min/1.73    Globulin 3.2 2.3 - 3.5 gm/dL    A/G Ratio 1.2 1.1 - 1.8 g/dL    BUN/Creatinine Ratio 16.4 7.0 - 25.0    Anion Gap 6.0 5.0 - 15.0 mmol/L   BNP   Result Value Ref Range    proBNP 2,170.0 (H) 0.0-1,800.0 pg/mL   Protime-INR   Result Value Ref Range    Protime 22.6 (H) 11.1 - 15.3 Seconds    INR 2.09 (H) 0.80 - 1.20   CBC Auto Differential   Result Value Ref Range    WBC 10.89 (H) 3.20 - 9.80 10*3/mm3    RBC 5.19 4.37 - 5.74 10*6/mm3    Hemoglobin 14.0 13.7 - 17.3 g/dL    Hematocrit 41.2 39.0 - 49.0 %    MCV 79.4 (L) 80.0 - 98.0 fL    MCH 27.0 26.5 - 34.0 pg    MCHC 34.0 31.5 - 36.3 g/dL    RDW 14.8 (H) 11.5 - 14.5 %    RDW-SD 43.2 35.1 - 43.9 fl    MPV 10.9 8.0 - 12.0 fL    Platelets 202 150 - 450 10*3/mm3    Neutrophil % 72.2 37.0 - 80.0 %    Lymphocyte % 15.6 10.0 - 50.0 %    Monocyte % 10.2 0.0 -  12.0 %    Eosinophil % 1.6 0.0 - 7.0 %    Basophil % 0.1 0.0 - 2.0 %    Immature Grans % 0.3 0.0 - 0.5 %    Neutrophils, Absolute 7.87 2.00 - 8.60 10*3/mm3    Lymphocytes, Absolute 1.70 0.60 - 4.20 10*3/mm3    Monocytes, Absolute 1.11 (H) 0.00 - 0.90 10*3/mm3    Eosinophils, Absolute 0.17 0.00 - 0.70 10*3/mm3    Basophils, Absolute 0.01 0.00 - 0.20 10*3/mm3    Immature Grans, Absolute 0.03 (H) 0.00 - 0.02 10*3/mm3   D-dimer, Quantitative   Result Value Ref Range    D-Dimer, Quantitative 2,064 (H) 0 - 470 ng/mL (FEU)   Light Blue Top   Result Value Ref Range    Extra Tube hold for add-on    Green Top (Gel)   Result Value Ref Range    Extra Tube Hold for add-ons.    Lavender Top   Result Value Ref Range    Extra Tube hold for add-on    Gold Top - SST   Result Value Ref Range    Extra Tube Hold for add-ons.      Nm Lung Scan Perfusion Particulate    Result Date: 1/23/2019  Narrative: EXAMINATION:  NUCLEAR MEDICINE PULMONARY PERFUSION / VENTILATION SCAN (V/Q SCAN) CLINICAL HISTORY:   Dyspnea, elevated d-dimer COMPARISON EXAMINATIONS: Chest x-ray performed the same date. TECHNIQUE: Perfusion: 6.2 mCi of technetium labeled MAA (number of particles:  approx 600,000) Ventilation: Not performed, patient was unable to tolerate ventilation procedure. FINDINGS: Perfusion only scan was performed. There is an area of bandlike decreased perfusion in the mid right lung without a corresponding chest x-ray abnormality. Additional areas of decreased perfusion are seen in the left upper anterior lung on the RPO and [place] views without chest x-ray abnormalities.     Impression: CONCLUSION: Intermediate probability of pulmonary embolism. Exam is limited without ventilation data. COMMENTS:  Normal exam = statistically less than 2% have pulmonary emboli. Low probability = statistically 5-19% have pulmonary emboli. Intermediate probability = statistically 20-70% have pulmonary emboli. High probability = statistically greater than 80% have  pulmonary emboli.  NOTE: The likelihood of pulmonary embolism, especially in the low and intermediate categories, should be interpreted in conjunction with the pre- and post-test probability and other test results such as venous Doppler, D dimer and if necessary pulmonary angiography as clinically indicated. Findings were discussed with NASIR JENKINS on 1/23/2019 5:58 PM CST Electronically signed by:  Agustin Early MD  1/23/2019 5:59 PM CST Workstation: [id number]    Xr Chest 1 View    Result Date: 1/23/2019  Narrative: PORTABLE CHEST HISTORY: Chest pain Portable AP supine film of the chest was obtained at 2:04 PM. COMPARISON: June 7, 2017 EKG leads. The lungs are clear of an acute process. The heart is not enlarged. The pulmonary vasculature is not increased. No pleural effusion. No pneumothorax. No acute osseous abnormality.     Impression: CONCLUSION: No Acute Disease [id number] Electronically signed by:  Chris Srinivasan MD  1/23/2019 2:21 PM CST Workstation: [id number]    Us Venous Doppler Lower Extremity Left (duplex)    Result Date: 1/23/2019  Narrative: Ultrasound venous duplex left lower extremity HISTORY: Left lower extremity pain and swelling Duplex ultrasound of the deep venous system of the left lower extremity was performed Real-time images demonstrate normal compressibility without evidence of intraluminal thrombus. Doppler shows phasic flow and augmentation. Color Doppler also reveals venous patency.     Impression: CONCLUSION: No ultrasound evidence of deep venous thrombosis of the left lower extremity. [id number] Electronically signed by:  Chris Srinivasan MD  1/23/2019 4:10 PM CST Workstation: [id number]                [facility]      Final diagnoses:   Chest pain, unspecified type   Renal failure, unspecified chronicity   Left leg pain            Nasir Jenkins PA-C  01/23/19 3151      Electronically signed by Almas Bear MD at 1/25/2019 10:07 AM     Sammie Dallas, RN at 1/23/2019  3:10 PM        Pt  in ultrasound at this time.      Sammie Dallas RN  01/23/19 1533      Electronically signed by Sammie Dallas RN at 1/23/2019  3:33 PM     Sammie Dallas RN at 1/23/2019  5:45 PM        Pt in NM at this time.      Sammie Dallas RN  01/23/19 4101      Electronically signed by Sammie Dallas RN at 1/23/2019  5:45 PM     Salena Silva RN at 1/23/2019  7:40 PM        Pt does not wear O2 at home      Salena Silva RN  01/23/19 1940      Electronically signed by Salena Silva RN at 1/23/2019  7:40 PM       Prior to Admission Medications     Prescriptions Last Dose Informant Patient Reported? Taking?    albuterol (PROVENTIL HFA;VENTOLIN HFA) 108 (90 BASE) MCG/ACT inhaler Past Month  Yes Yes    Inhale 2 puffs Every 6 (Six) Hours.    amLODIPine (NORVASC) 5 MG tablet 1/23/2019  Yes Yes    TAKE 1 TABLET DAILY    brimonidine (ALPHAGAN P) 0.1 % solution ophthalmic solution Past Week  Yes Yes    1 drop.    brinzolamide (AZOPT) 1 % ophthalmic suspension Past Week  Yes Yes    1 drop.    budesonide (PULMICORT) 0.5 MG/2ML nebulizer solution Past Week  Yes Yes    Inhale 0.5 mg.    CloNIDine (CATAPRES) 0.2 MG tablet 1/23/2019  Yes Yes    Take 0.2 mg by mouth.    desloratadine (CLARINEX) 5 MG tablet Past Week  Yes Yes    Take 5 mg by mouth.    HYDROcodone-acetaminophen (NORCO) 7.5-325 MG per tablet Past Month  Yes Yes    Take 1 tablet by mouth Every 6 (Six) Hours As Needed for Moderate Pain (4-6).    Insulin Detemir (LEVEMIR SC) 1/23/2019  Yes Yes    Inject 15 Units under the skin 2 (Two) Times a Day.    Insulin Lispro (HUMALOG) 100 UNIT/ML solution pen-injector 1/22/2019  Yes Yes    Inject 12 Units under the skin.    ipratropium-albuterol (DUO-NEB) 0.5-2.5 mg/mL nebulizer 1/22/2019  Yes Yes    Inhale 3 mL Every 6 (Six) Hours.    lansoprazole (PREVACID) 30 MG capsule Past Week  Yes Yes    Take 30 mg by mouth.    latanoprost (XALATAN) 0.005 % ophthalmic solution Past Week  Yes Yes    1 drop.    Misc. Devices  (TRANSFER BENCH) misc   No No    Transfer bench r/t dementia, deconditioning.    Misc. Devices (TUB TRANSFER BOARD) [facility] [place]   No No    Transfer tub bench r/t deconditioning, dementia, and blind r/t glaucoma    oxybutynin XL (DITROPAN-XL) 10 MG 24 hr tablet Past Month  Yes Yes    pilocarpine (PILOCAR) 1 % ophthalmic solution Past Week  Yes Yes    1 drop.    warfarin (COUMADIN) 5 MG tablet 1/22/2019  Yes Yes    take 1 and 1/2 tablets SUN MON WED FRI and 1 tablet all other days for MYOCARDIAL REINFARCTION PREVENTION          Hospital Medications (active)       Dose Frequency Start End    acetaminophen (TYLENOL) tablet 650 mg 650 mg Every 4 Hours PRN 1/23/2019     Sig - Route: Take 2 tablets by mouth Every 4 (Four) Hours As Needed for Mild Pain . - Oral    albuterol (PROVENTIL) nebulizer solution 0.083% 2.5 mg/3mL 2.5 mg Every 6 Hours PRN 1/23/2019     Sig - Route: Take 2.5 mg by nebulization Every 6 (Six) Hours As Needed for Wheezing or Shortness of Air. - Nebulization    amLODIPine (NORVASC) tablet 5 mg 5 mg Daily 1/24/2019     Sig - Route: Take 1 tablet by mouth Daily. - Oral    brinzolamide (AZOPT) 1 % ophthalmic suspension 1 drop 1 drop 3 Times Daily 1/23/2019     Sig - Route: Administer 1 drop to both eyes 3 (Three) Times a Day. - Both Eyes    budesonide (PULMICORT) nebulizer solution 0.5 mg 0.5 mg Daily - RT 1/24/2019     Sig - Route: Take 2 mL by nebulization Daily. - Nebulization    calcium carbonate (TUMS) chewable tablet 500 mg (200 mg elemental) 2 tablet 2 Times Daily PRN 1/23/2019     Sig - Route: Chew 1,000 mg 2 (Two) Times a Day As Needed for Heartburn. - Oral    cetirizine (zyrTEC) tablet 5 mg 5 mg Daily 1/24/2019     Sig - Route: Take 1 tablet by mouth Daily. - Oral    CloNIDine (CATAPRES) tablet 0.2 mg 0.2 mg Every 12 Hours Scheduled 1/23/2019     Sig - Route: Take 1 tablet by mouth Every 12 (Twelve) Hours. - Oral    dextrose (D50W) 25 g/ 50mL Intravenous Solution 25 g 25 g Every 15 Minutes PRN 1/23/2019      Sig - Route: Infuse 50 mL into a venous catheter Every 15 (Fifteen) Minutes As Needed for Low Blood Sugar (Blood Sugar Less Than 70). - Intravenous    dextrose (GLUTOSE) oral gel 15 g 15 g Every 15 Minutes PRN 1/23/2019     Sig - Route: Take 15 g by mouth Every 15 (Fifteen) Minutes As Needed for Low Blood Sugar (Blood sugar less than 70). - Oral    famotidine (PEPCID) tablet 20 mg 20 mg Daily 1/24/2019     Sig - Route: Take 1 tablet by mouth Daily. - Oral    Cosign for Ordering: Accepted by Geetha Kelly MD on 1/24/2019 12:35 PM    glucagon (human recombinant) (GLUCAGEN DIAGNOSTIC) injection 1 mg 1 mg As Needed 1/23/2019     Sig - Route: Inject 1 mg under the skin into the appropriate area as directed As Needed (Blood Glucose Less Than 70). - Subcutaneous    HYDROcodone-acetaminophen (NORCO) 7.5-325 MG per tablet 1 tablet 1 tablet Every 6 Hours PRN 1/23/2019     Sig - Route: Take 1 tablet by mouth Every 6 (Six) Hours As Needed for Moderate Pain . - Oral    insulin aspart (novoLOG) injection 0-7 Units 0-7 Units 4 Times Daily Before Meals & Nightly 1/23/2019     Sig - Route: Inject 0-7 Units under the skin into the appropriate area as directed 4 (Four) Times a Day Before Meals & at Bedtime. - Subcutaneous    insulin detemir (LEVEMIR) injection 16 Units 16 Units Every 12 Hours Scheduled 1/25/2019     Sig - Route: Inject 16 Units under the skin into the appropriate area as directed Every 12 (Twelve) Hours. - Subcutaneous    ipratropium-albuterol (DUO-NEB) nebulizer solution 3 mL 3 mL Every 6 Hours While Awake - RT 1/23/2019     Sig - Route: Take 3 mL by nebulization Every 6 (Six) Hours While Awake. - Nebulization    latanoprost (XALATAN) 0.005 % ophthalmic solution 1 drop 1 drop Nightly 1/23/2019     Sig - Route: Administer 1 drop to both eyes Every Night. - Both Eyes    melatonin tablet 5.25 mg 5.25 mg Nightly PRN 1/23/2019     Sig - Route: Take 1.75 tablets by mouth At Night As Needed for Sleep. - Oral     "ondansetron (ZOFRAN) injection 4 mg 4 mg Every 6 Hours PRN 1/23/2019     Sig - Route: Infuse 2 mL into a venous catheter Every 6 (Six) Hours As Needed for Nausea or Vomiting. - Intravenous    Linked Group 1:  \"Or\" Linked Group Details        ondansetron (ZOFRAN) tablet 4 mg 4 mg Every 6 Hours PRN 1/23/2019     Sig - Route: Take 1 tablet by mouth Every 6 (Six) Hours As Needed for Nausea or Vomiting. - Oral    Linked Group 1:  \"Or\" Linked Group Details        ondansetron ODT (ZOFRAN-ODT) disintegrating tablet 4 mg 4 mg Every 6 Hours PRN 1/23/2019     Sig - Route: Take 1 tablet by mouth Every 6 (Six) Hours As Needed for Nausea or Vomiting. - Oral    Linked Group 1:  \"Or\" Linked Group Details        oxybutynin XL (DITROPAN-XL) 24 hr tablet 10 mg 10 mg Nightly 1/23/2019     Sig - Route: Take 1 tablet by mouth Every Night. - Oral    pantoprazole (PROTONIX) EC tablet 40 mg 40 mg Every Early Morning 1/24/2019     Sig - Route: Take 1 tablet by mouth Every Morning. - Oral    Pharmacy to dose warfarin  Continuous PRN 1/23/2019     Sig - Route: Continuous As Needed for Consult (to keep INR ). - Does not apply    pilocarpine (PILOCAR) 1 % ophthalmic solution 1 drop 1 drop Daily 1/24/2019     Sig - Route: Administer 1 drop to both eyes Daily. - Both Eyes    sennosides-docusate sodium (SENOKOT-S) 8.6-50 MG tablet 2 tablet 2 tablet Nightly 1/23/2019     Sig - Route: Take 2 tablets by mouth Every Night. - Oral    sodium chloride 0.9 % flush 10 mL 10 mL As Needed 1/23/2019     Sig - Route: Infuse 10 mL into a venous catheter As Needed for Line Care. - Intravenous    Cosign for Ordering: Accepted by Almas Bear MD on 1/25/2019 10:52 AM    sodium chloride 0.9 % infusion 75 mL/hr Continuous 1/23/2019     Sig - Route: Infuse 75 mL/hr into a venous catheter Continuous. - Intravenous    warfarin (COUMADIN) tablet 6 mg (Discontinued) 6 mg Daily Warfarin 1/24/2019 1/25/2019    Sig - Route: Take 2 tablets by mouth Daily. - Oral    "          Physician Progress Notes (last 24 hours) (Notes from 2019 11:41 AM through 2019 11:41 AM)      Pedro Diaz MD at 2019  6:50 AM     Attestation signed by Stacey Prince MD at 2019 11:39 AM    I have seen and evaluated the patient.  I have discussed the case with the resident. I have reviewed the notes, assessment and plan, and/or procedures performed by the resident. I concur with the resident’s documentation.  Patient had weaned off oxygen but was hurting across his chest when breathing and was placed back on it.      PE: NAD, CVS: S1/S2 RRR, Lungs: CTA B/L Abdomen soft NT, left knee swelling, extremities are warm and dry.    Plan: Wean off oxygen. Discussed with wife pleuritic chest pain. His knee swelling is due to OA. Discussed OTC topical cream such as capsacin. PT recommended a hospital bed with trapeze for home use with continued PT.  Possible discharge today.      This document has been electronically signed by Stacey Prince MD on 2019 11:39 AM                              FAMILY MEDICINE DAILY PROGRESS NOTE  NAME: Ondina Alanis Sr.  : 1940  MRN: [id number]     LOS: 1 day     PROVIDER OF SERVICE: Pedro Diaz MD    Chief Complaint: Chest pain on breathing    Subjective:     Interval History:  History taken from: patient family  Patient is a 77 y/o [place] americ[place] male who was admitted for chest pain.   Patient was found resting in bed, in no acute distress.   Patient did endorse ongoing substernal chest pain without radiation. Patient reported that it worse with deep breaths. Knee pain is unchanged.    Denied: chest pressure, palpitations, fever, chills, n/v, diarrhea     Overnight nursing reported: no incidents    Will speak with Dr Estrada this morning-  For possible signs of right heart strain.     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     Review of Systems:   Review of Systems   Constitutional: Negative for activity change, appetite change and  fever.   HENT: Negative for congestion, sinus pressure, sinus pain and sneezing.    Respiratory: Positive for shortness of breath. Negative for apnea, choking, chest tightness and wheezing.    Cardiovascular: Positive for chest pain. Negative for palpitations and leg swelling.   Gastrointestinal: Negative for abdominal distention, abdominal pain, diarrhea, nausea and vomiting.   Genitourinary: Negative for difficulty urinating, dysuria and frequency.   Musculoskeletal: Positive for arthralgias and back pain.   Skin: Negative for color change.   Neurological: Negative for dizziness, facial asymmetry and headaches.   Psychiatric/Behavioral: Negative for agitation, behavioral problems and confusion.       Objective:     Vital Signs  Temp:  [97.9 °F (36.6 °C)-100.1 °F (37.8 °C)] 98.6 °F (37 °C)  Heart Rate:  [65-98] 67  Resp:  [18-20] 18  BP: (134-164)/() 134/61    Physical Exam  Physical Exam   Constitutional: He appears well-developed and well-nourished. No distress.   HENT:   Head: Atraumatic.   Right Ear: External ear normal.   Left Ear: External ear normal.   Mouth/Throat: Oropharynx is clear and moist.   Eyes:   Patient is blind    Neck: Trachea normal, full passive range of motion without pain and phonation normal.   Cardiovascular: Regular rhythm, S1 normal and S2 normal.   Pulses:       Dorsalis pedis pulses are 2+ on the right side, and 2+ on the left side.   Pulmonary/Chest: Effort normal and breath sounds normal. No tachypnea. No respiratory distress. He has no decreased breath sounds. He has no wheezes.   Abdominal: Soft. Bowel sounds are normal.   Musculoskeletal:        Left knee: He exhibits swelling.        Legs:  Neurological: He is alert.   Patient responded to person and place, not time.        Medication Review    Current Facility-Administered Medications:   •  acetaminophen (TYLENOL) tablet 650 mg, 650 mg, Oral, Q4H PRN, Norm Hernandez MD  •  albuterol (PROVENTIL) nebulizer solution 0.083%  2.5 mg/3mL, 2.5 mg, Nebulization, Q6H PRN, Norm Hernandez MD  •  amLODIPine (NORVASC) tablet 5 mg, 5 mg, Oral, Daily, Norm Hernandez MD, 5 mg at 01/24/19 1009  •  brinzolamide (AZOPT) 1 % ophthalmic suspension 1 drop, 1 drop, Both Eyes, TID, Norm Hernandez MD, 1 drop at 01/24/19 2147  •  budesonide (PULMICORT) nebulizer solution 0.5 mg, 0.5 mg, Nebulization, Daily - RT, Norm Hernandez MD, 0.5 mg at 01/24/19 0737  •  calcium carbonate (TUMS) chewable tablet 500 mg (200 mg elemental), 2 tablet, Oral, BID PRN, Norm Hernandez MD  •  cetirizine (zyrTEC) tablet 5 mg, 5 mg, Oral, Daily, Norm Hernandez MD, 5 mg at 01/24/19 1009  •  CloNIDine (CATAPRES) tablet 0.2 mg, 0.2 mg, Oral, Q12H, Norm Hernandez MD, 0.2 mg at 01/24/19 2144  •  dextrose (D50W) 25 g/ 50mL Intravenous Solution 25 g, 25 g, Intravenous, Q15 Min PRN, Norm Hernandez MD  •  dextrose (GLUTOSE) oral gel 15 g, 15 g, Oral, Q15 Min PRN, Norm Hernandez MD  •  famotidine (PEPCID) tablet 20 mg, 20 mg, Oral, Daily, Pedro Diaz MD, 20 mg at 01/24/19 1009  •  glucagon (human recombinant) (GLUCAGEN DIAGNOSTIC) injection 1 mg, 1 mg, Subcutaneous, PRN, Norm Hernandez MD  •  HYDROcodone-acetaminophen (NORCO) 7.5-325 MG per tablet 1 tablet, 1 tablet, Oral, Q6H PRN, Norm Hernandez MD, 1 tablet at 01/25/19 0405  •  insulin aspart (novoLOG) injection 0-7 Units, 0-7 Units, Subcutaneous, 4x Daily AC & at Bedtime, Norm Hernandez MD, 3 Units at 01/24/19 2144  •  ipratropium-albuterol (DUO-NEB) nebulizer solution 3 mL, 3 mL, Nebulization, Q6H While Awake - RT, Norm Hernandez MD, 3 mL at 01/24/19 1919  •  latanoprost (XALATAN) 0.005 % ophthalmic solution 1 drop, 1 drop, Both Eyes, Nightly, Norm Hernandez MD, 1 drop at 01/24/19 2147  •  melatonin tablet 5.25 mg, 5.25 mg, Oral, Nightly PRN, Norm Hernandez MD  •  ondansetron (ZOFRAN) tablet 4 mg, 4 mg, Oral, Q6H PRN **OR** ondansetron ODT (ZOFRAN-ODT) disintegrating tablet 4 mg, 4 mg,  Oral, Q6H PRN **OR** ondansetron (ZOFRAN) injection 4 mg, 4 mg, Intravenous, Q6H PRN, Norm Hernandez MD  •  oxybutynin XL (DITROPAN-XL) 24 hr tablet 10 mg, 10 mg, Oral, Nightly, Norm Hernandez MD, 10 mg at 01/24/19 2144  •  pantoprazole (PROTONIX) EC tablet 40 mg, 40 mg, Oral, Q AM, Norm Hernandez MD, 40 mg at 01/25/19 0612  •  Pharmacy to dose warfarin, , Does not apply, Continuous PRN, Norm Hernandez MD  •  pilocarpine (PILOCAR) 1 % ophthalmic solution 1 drop, 1 drop, Both Eyes, Daily, Norm Hernandez MD, 1 drop at 01/24/19 1011  •  sennosides-docusate sodium (SENOKOT-S) 8.6-50 MG tablet 2 tablet, 2 tablet, Oral, Nightly, Norm Hernandez MD, 2 tablet at 01/24/19 2144  •  sodium chloride 0.9 % flush 10 mL, 10 mL, Intravenous, PRN, Almas Bear MD  •  sodium chloride 0.9 % infusion, 75 mL/hr, Intravenous, Continuous, Norm Hernandez MD, Last Rate: 75 mL/hr at 01/24/19 1011, 75 mL/hr at 01/24/19 1011  •  warfarin (COUMADIN) tablet 6 mg, 6 mg, Oral, Daily, Norm Hernandez MD, 6 mg at 01/24/19 1707     Diagnostic Data    Lab Results (last 24 hours)     Procedure Component Value Units Date/Time    Protime-INR [[id number]] Collected:  01/25/19 0645    Specimen:  Blood Updated:  01/25/19 0704    Basic Metabolic Panel [[id number]] Collected:  01/25/19 0645    Specimen:  Blood Updated:  01/25/19 0704    POC Glucose Once [[id number]]  (Abnormal) Collected:  01/24/19 2107    Specimen:  Blood Updated:  01/24/19 2137     Glucose 243 mg/dL      Comment: RN NotifiedOperator: [id number] RITCHIEOK Francisco ID: [id number]       POC Glucose Once [[id number]]  (Abnormal) Collected:  01/24/19 1659    Specimen:  Blood Updated:  01/24/19 1732     Glucose 245 mg/dL      Comment: RN NotifiedOperator: [id number] [facility] ID: [id number]       POC Glucose Once [[id number]]  (Abnormal) Collected:  01/24/19 1054    Specimen:  Blood Updated:  01/24/19 1407     Glucose 233 mg/dL      Comment: RN  NotifiedOperator: [id number] [employer] BRIANNAMeter ID: [id number]       Protime-INR [[id number]]  (Abnormal) Collected:  01/24/19 0649    Specimen:  Blood Updated:  01/24/19 0815     Protime 26.3 Seconds      INR 2.55    Narrative:       Therapeutic range for most indications is 2.0-3.0 INR,  or 2.5-3.5 for mechanical heart valves.    Basic Metabolic Panel [[id number]]  (Abnormal) Collected:  01/24/19 0649    Specimen:  Blood Updated:  01/24/19 0801     Glucose 188 mg/dL      BUN 45 mg/dL      Creatinine 2.37 mg/dL      Sodium 136 mmol/L      Potassium 4.5 mmol/L      Chloride 104 mmol/L      CO2 25.0 mmol/L      Calcium 8.8 mg/dL      eGFR  African Amer 32 mL/min/1.73      BUN/Creatinine Ratio 19.0     Anion Gap 7.0 mmol/L     Narrative:       The MDRD GFR formula is only valid for adults with stable renal function between ages 18 and 70.    POC Glucose Once [[id number]]  (Abnormal) Collected:  01/24/19 0721    Specimen:  Blood Updated:  01/24/19 0744     Glucose 164 mg/dL      Comment: RN NotifiedOperator: [id number] [employer] MediaIANNAMeter ID: [id number]              Imaging Results (last 24 hours)     Procedure Component Value Units Date/Time    XR Knee 1 or 2 View Left [[id number]] Collected:  01/24/19 0901     Updated:  01/24/19 1523    Narrative:       PROCEDURE: Left knee, two views.    INDICATION: L knee pain, swelling and calor, R07.9 Chest pain,  unspecified N19 Unspecified kidney failure M79.605 Pain in left  leg    COMPARISON: None.    AP and lateral view of the left knee.    Small joint effusion.    High riding patella.    No fractures or acute osseous abnormalities. Tricompartmental  osteoarthritic degenerative changes, severe in the medial joint  compartment with bone-on-bone and osteophytes. Moderate in the  patellofemoral and lateral joint compartments with joint space  narrowing and osteophytes.      Impression:       Small joint effusion.    Tricompartmental osteoarthritic degenerative changes  detailed  above greatest in the medial joint compartment.    [id number]    Electronically signed by:  Christopher Ramirez MD  1/24/2019 3:22  PM CST Workstation: [id number]          I reviewed the patient's new clinical results.    Assessment/Plan:     Active Hospital Problems    Diagnosis   • **Chest pain on breathing     -EKG unremarkable.  -Troponin negative x 3    Echo- Left ventricular systolic function is normal. Estimated EF appears to be in the range of 56 - 60%. Estimated EF = 56%. Normal left ventricular cavity size noted. All left ventricular wall segments contract normally. Left ventricular wall thickness is consistent with borderline concentric hypertrophy. Left ventricular diastolic dysfunction is noted.     • Left ventricular diastolic dysfunction     ECHO 1/24/19  Left ventricular systolic function is normal. Estimated EF appears to be in the range of 56 - 60%. Estimated EF = 56%. Normal left ventricular cavity size noted. All left ventricular wall segments contract normally. Left ventricular wall thickness is consistent with borderline concentric hypertrophy. Left ventricular diastolic dysfunction is noted.    -will consult Dr Estrada for possible right heart strain      • Acute respiratory failure with hypoxia (CMS/HCC)     -Patient is not on oxygen nasal cannula at home.  -He was placed on 2 L nasal cannula in the ER and desatted to 86%.  He was bumped up to 3 L and was satting greater than 92%.  -Continue to monitor and wean off of oxygen as needed.  -Chest x-ray showed no acute cardiopulmonary processes.     • Stage 1 acute kidney injury (CMS/HCC)     -Creatinine elevated to 2.69 (decreasing)   Cr today 2.37  -Baseline appears to be around 2.  -Continue to monitor.  -Renal diet.  -IVF 75 cc per hour.      • Asthma     -Continue home inhalers.     • Primary osteoarthritis of both knees     -Norco 7.5 continue  Patient complains of L knee pain  XR- L knee- No fractures or acute osseous  abnormalities. Tricompartmental  osteoarthritic degenerative changes, severe in the medial joint  compartment with bone-on-bone and osteophytes. Moderate in the  patellofemoral and lateral joint compartments with joint space  narrowing and osteophytes.    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  As well as a PE.  -D-dimer elevated 2064   Intermediate risk on VQ scan.  Unable to obtain CTA due to elevated creatinine.  -Left swollen knee.  Ultrasound showed no DVT in the left lower extremity.  -Patient anticoagulated with warfarin.  INR 2.1.  Continue treatment, pharmacy to dose.  -Echo  - Left ventricular systolic function is normal. Estimated EF appears to be in the range of 56 - 60%. Estimated EF = 56%. Normal left ventricular cavity size noted. All left ventricular wall segments contract normally. Left ventricular wall thickness is consistent with borderline concentric hypertrophy. Left ventricular diastolic dysfunction is noted.    -consult Dr Estrada this am     • CKD (chronic kidney disease) stage 4, GFR 15-29 ml/min (CMS/[facility])     -Creatinine baseline appears to be 2.      -Continue to monitor.  -IVF 75 cc per hour.  -Renal diet.     • GERD (gastroesophageal reflux disease)     -Tums and Pepcid when necessary.     • Diabetic peripheral neuropathy associated with type 2 diabetes mellitus (CMS/[facility])   • Benign essential hypertension     -Continue home antihypertensive medications.  -Continue to monitor.     • Diabetes mellitus type II, uncontrolled (CMS/HCC)     -Sliding Scale.         DVT prophylaxis: Coumadin  Code Status and Medical Interventions:   Ordered at: 01/23/19 2053     Code Status:    CPR     Medical Interventions (Level of Support Prior to Arrest):    Full       Plan for disposition:Home in 1-2 days       Time: Home in 1-2 days      Signed,   ePdro Diaz MD  Family Medicine Resident PGY1  [facility]        This document has  been electronically signed by Pedro Diaz MD on January 25, 2019 7:06 AM          Electronically signed by Stacey Prince MD at 1/25/2019 11:39 AM

## 2019-01-25 NOTE — DISCHARGE PLACEMENT REQUEST
"Nadir Alanis Sr. (78 y.o. Male)     Date of Birth Social Security Number Address Home Phone MRN    1940  [address] [address] [phone] [id number]    Spiritism Marital Status          Restorationist        Admission Date Admission Type Admitting Provider Attending Provider Department, Room/Bed    1/23/19 Emergency Nims, MD Emily Hernández Anthony, MD [address], 366/1    Discharge Date Discharge Disposition Discharge Destination         Home or Self Care              Attending Provider:  Pedro Diaz MD    Allergies:  Contrast Dye    Isolation:  None   Infection:  None   Code Status:  CPR    Ht:  188 cm (74\")   Wt:  98 kg (216 lb)    Admission Cmt:  None   Principal Problem:  Pleurisy [R09.1] More...                 Active Insurance as of 1/23/2019     Primary Coverage     Payor Plan Insurance Group Employer/Plan Group    MEDICARE MEDICARE A & B      Payor Plan Address Payor Plan Phone Number Payor Plan Fax Number Effective Dates    [address] [phone]  6/1/2017 - None Entered    [facility] [id number]       Subscriber Name Subscriber Birth Date Member ID       NADIR ALANIS SR. 1940 [id number]           Secondary Coverage     Payor Plan Insurance Group Employer/Plan Group    [facility] [id number]     Payor Plan Address Payor Plan Phone Number Payor Plan Fax Number Effective Dates    [address] [address]   1/23/2019 - None Entered    [facility] [address]       Subscriber Name Subscriber Birth Date Member ID       NADIR ALANIS SR. 1940 [id number]                 Emergency Contacts      (Rel.) Home Phone Work Phone Mobile Phone    AlanisLamar (Spouse) [phone] -- [phone]    Cammy Alanis (Daughter) [phone] -- [phone]        [address] [address]  Phone:  [phone]  Fax:   Date: Jan 25, 2019      Referral to Home " Health     Patient:  Ondina Alanis Sr. MRN:  [id number]   [address] [address] :  1940  SSN:    Phone: [phone] Sex:  M      INSURANCE PAYOR PLAN GROUP # SUBSCRIBER ID   Primary:  Secondary:    MEDICARE  [facility] [id number]  [id number]    [id number]  [id number]      Referring Provider Information:  LAURENT MURRY Phone: [phone] Fax:       Referral Information:   # Visits:  1 Referral Type: Home Health [42]   Urgency:  Routine Referral Reason: Specialty Services Required   Start Date: 2019 End Date:  To be determined by Insurer   Diagnosis: Left leg pain (M79.605 [ICD-10-CM] 729.5 [ICD-9-CM])  CKD (chronic kidney disease) stage 4, GFR 15-29 ml/min (CMS/[facility]) (N18.4 [ICD-10-CM] 585.4 [ICD-9-CM])  Primary osteoarthritis of both knees (M17.0 [ICD-10-CM] 715.16 [ICD-9-CM])  Pleurisy (R09.1 [ICD-10-CM] 511.0 [ICD-9-CM])  Left ventricular diastolic dysfunction (I51.9 [ICD-10-CM] 429.9 [ICD-9-CM])      Refer to Dept:   Refer to Provider:   Refer to Facility:       Face to Face Visit Date: 2019  Follow-up Provider for Plan of Care? I treated the patient in an acute care facility and will not continue treatment after discharge.  Follow-up Provider: NICK SOTOMAYOR [[id number]]  Reason/Clinical Findings: Deconditioning  Describe mobility limitations that make leaving home difficult: Deconditioning  Nursing/Therapeutic Services Requested: Physical Therapy  Nursing/Therapeutic Services Requested: Occupational Therapy  Nursing/Therapeutic Services Requested: Skilled Nursing  Skilled nursing orders: Medication education  Frequency: 1 Week 1     This document serves as a request of services and does not constitute Insurance authorization or approval of services.  To determine eligibility, please contact the members Insurance carrier to verify and review coverage.     If you have medical questions regarding this request for services. Please contact [facility]  [address] at [phone] between the hours of 8:00am - 5:00pm (Mon-Fri).       Authorizing Provider:ePdro Diaz MD  Authorizing Provider's NPI: [id number]  Order Entered By: Pedro Diaz MD 2019 11:23 AM     Electronically signed by: Pedro Diaz MD 2019 11:23 AM               History & Physical      Norm Hernandez MD at 2019  6:36 PM     Attestation signed by Stacey Prince MD at 2019  9:27 PM    I have performed a history and physical examination of the patient. I have discussed the management of the patient with the resident.  I have reviewed the notes, assessment and plan, and/or procedures performed by the resident. I concur with the resident’s documentation.    History provided by wife and daughter. Patient endorses hurting across his entire chest but cannot quantify it. He is also saying his whole left leg hurts.  Wife endorses shaking episodes that just started a couple of days ago. The wife also states he was out at the doctor's office for the first time in a very long time as he is normally bed confined.      PE: Appears to be in pain, rigors, CVS: S1/S2 RRR with PACs, Lungs: CTA B/L Abdomen soft NT, left knee is swollen compared to right, skin is warmer to touch    Plan: Check influenza screen. Oxygen as needed to maintain sats above 92%. Telemetry and trend troponins.  May have element of heart failure with elevated BNP. Will need echocardiogram in the AM.       This document has been electronically signed by Stacey Prince MD on 2019 9:27 PM                           HISTORY AND PHYSICAL  NAME: Ondina Alanis Sr.  : 1940  MRN: [id number]    DATE OF ADMISSION: 19    DATE & TIME SEEN: 19 6:37 PM    PCP: Provider, No Known    CODE STATUS: Full code    CHIEF COMPLAINT Chest pain    HPI:  Ondina Alanis Sr. is a 78 y.o. 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  The patient has  dementia and is limited in providing history.  Relative is in the room who she has most of the story.  Patient has been complaining of chest pain for the last 2 days has gotten worse.  The pain is markedly worse when he breathes.  He does not have coronary artery disease history.  He has a dry cough and shortness of breath as well.  Nothing seems to help his chest pain improve, nothing makes it worse.  He has no URI symptoms.  For the last day he has not had very much to eat or drink.  Patient is not mobile, staying confined to his bed.  He also has swelling of his left knee that started a day or 2 ago.     In the ER, patient troponin negative ×1.  EKG was unremarkable.  D-dimer elevated to greater than 2000.  Patient has elevated creatinine and CTA could not be obtained.  VQ scan showed intermediate risk for a PE.  Ultrasound of the left leg was obtained, but was negative for a DVT.  Patient's BNP minimally elevated to ~2100.  He does not have a history of heart failure.  INR is therapeutic at 2.1.    CONCURRENT MEDICAL HISTORY:  Past Medical History:   Diagnosis Date   • Asthma    • Diabetes mellitus (CMS/[facility])    • GERD (gastroesophageal reflux disease)    • Hypertension    • Prostate disorder        PAST SURGICAL HISTORY:  Past Surgical History:   Procedure Laterality Date   • BACK SURGERY     • KNEE SURGERY Left    • PROSTATE SURGERY         FAMILY HISTORY:  No family history on file.     SOCIAL HISTORY:  Social History     Socioeconomic History   • Marital status:      Spouse name: Not on file   • Number of children: Not on file   • Years of education: Not on file   • Highest education level: Not on file   Social Needs   • Financial resource strain: Not on file   • Food insecurity - worry: Not on file   • Food insecurity - inability: Not on file   • Transportation needs - medical: Not on file   • Transportation needs - non-medical: Not on file   Occupational History   • Not on file   Tobacco Use   •  Smoking status: Never Smoker   Substance and Sexual Activity   • Alcohol use: No   • Drug use: No   • Sexual activity: Defer   Other Topics Concern   • Not on file   Social History Narrative   • Not on file       HOME MEDICATIONS:  Prior to Admission medications    Medication Sig Start Date End Date Taking? Authorizing Provider   albuterol (PROVENTIL HFA;VENTOLIN HFA) 108 (90 BASE) MCG/ACT inhaler Inhale 2 puffs Every 6 (Six) Hours.    Selina Rosario MD   amLODIPine (NORVASC) 5 MG tablet TAKE 1 TABLET DAILY 3/7/17   Selina Rosario MD   brimonidine (ALPHAGAN P) 0.1 % solution ophthalmic solution 1 drop. 1/26/16   Selina Rosario MD   brinzolamide (AZOPT) 1 % ophthalmic suspension 1 drop. 1/26/16   Selina Rosario MD   budesonide (PULMICORT) 0.5 MG/2ML nebulizer solution Inhale 0.5 mg. 1/26/16   Selina Rosario MD   CloNIDine (CATAPRES) 0.2 MG tablet Take 0.2 mg by mouth. 4/10/17   Selina Rosario MD   desloratadine (CLARINEX) 5 MG tablet Take 5 mg by mouth. 4/10/17   Selina Rosario MD   HYDROcodone-acetaminophen (NORCO) 7.5-325 MG per tablet Take 1 tablet by mouth Every 6 (Six) Hours As Needed for Moderate Pain (4-6).    Selina Rosario MD   Insulin Detemir (LEVEMIR SC) Inject 15 Units under the skin 2 (Two) Times a Day. 4/14/17   Selina Rosario MD   Insulin Lispro (HUMALOG) 100 UNIT/ML solution pen-injector Inject 12 Units under the skin. 4/10/17   Selina Rosario MD   ipratropium-albuterol (DUO-NEB) 0.5-2.5 mg/mL nebulizer Inhale 3 mL Every 6 (Six) Hours. 1/26/16   Selina Rosario MD   lansoprazole (PREVACID) 30 MG capsule Take 30 mg by mouth. 4/10/17   Selina Rosario MD   latanoprost (XALATAN) 0.005 % ophthalmic solution 1 drop. 1/26/16   Provider, Historical, MD   Misc. Devices (TRANSFER BENCH) misc Transfer bench r/t dementia, deconditioning. 6/8/17   Nicole Canseco APRN   [facility] Devices (TUB TRANSFER BOARD) misc Transfer tub bench r/t  deconditioning, dementia, and blind r/t glaucoma 6/9/17   Canseco NicoleTHIERNO mills   oxybutynin XL (DITROPAN-XL) 10 MG 24 hr tablet  9/5/16   Selina Rosario MD   pilocarpine (PILOCAR) 1 % ophthalmic solution 1 drop. 1/26/16   Selina Rosario MD   warfarin (COUMADIN) 5 MG tablet take 1 and 1/2 tablets SUN MON WED FRI and 1 tablet all other days for MYOCARDIAL REINFARCTION PREVENTION 4/10/17   Selina Rosario MD       ALLERGIES:  Contrast dye    REVIEW OF SYSTEMS  Review of Systems   Unable to perform ROS: Dementia (limited by)   Constitutional: Positive for activity change, appetite change, chills and fatigue. Negative for diaphoresis and fever.   HENT: Negative for congestion, ear pain, rhinorrhea and sore throat.    Eyes: Negative for redness.   Respiratory: Positive for cough and shortness of breath. Negative for chest tightness and wheezing.    Cardiovascular: Positive for chest pain. Negative for palpitations and leg swelling.   Gastrointestinal: Positive for nausea. Negative for abdominal distention, abdominal pain, constipation, diarrhea, rectal pain and vomiting.   Genitourinary: Negative for flank pain.   Skin: Negative for color change and rash.   Neurological: Positive for weakness. Negative for dizziness, seizures, syncope, light-headedness, numbness and headaches.   Psychiatric/Behavioral: Negative for agitation, confusion, decreased concentration and sleep disturbance. The patient is not nervous/anxious.    All other systems reviewed and are negative.      PHYSICAL EXAM:  Temp:  [97.6 °F (36.4 °C)-98.5 °F (36.9 °C)] 98.5 °F (36.9 °C)  Heart Rate:  [] 105  Resp:  [16-20] 18  BP: (142-221)/() 142/80  Body mass index is 27.73 kg/m².     Physical Exam   Constitutional: He appears well-developed and well-nourished. He appears lethargic. He is active and cooperative. He is easily aroused.  Non-toxic appearance. He does not have a sickly appearance. He does not appear ill. No  distress. Nasal cannula in place.   HENT:   Head: Normocephalic.   Right Ear: External ear normal.   Left Ear: External ear normal.   Nose: Nose normal. No mucosal edema or rhinorrhea.   Mouth/Throat: Uvula is midline, oropharynx is clear and moist and mucous membranes are normal. Abnormal dentition.   Cerumen present in both ears.   Eyes: Conjunctivae are normal. No scleral icterus.   Neck: Normal range of motion.   Cardiovascular: Intact distal pulses.   Pulmonary/Chest: Effort normal and breath sounds normal. No accessory muscle usage. No respiratory distress. He has no decreased breath sounds. He has no wheezes.   Abdominal: Soft. Bowel sounds are normal. There is no tenderness (deep palpation). There is no rigidity, no rebound and no guarding.   Musculoskeletal:        Left knee: He exhibits decreased range of motion and swelling. He exhibits no ecchymosis, no deformity, no laceration and no erythema. No tenderness found.   No peripheral edema.  Left knee is warm to touch and swollen.  No erythema, no pain.   Neurological: He is easily aroused. He appears lethargic. No cranial nerve deficit (grossly intact). GCS eye subscore is 3. GCS verbal subscore is 4. GCS motor subscore is 6.   Skin: Skin is warm. Capillary refill takes less than 2 seconds. No rash noted. He is not diaphoretic.   Nursing note and vitals reviewed.      DIAGNOSTIC DATA:   Lab Results (last 24 hours)     Procedure Component Value Units Date/Time    Influenza Antigen, Rapid - Swab, Nasopharynx [[id number]] Collected:  01/23/19 2056    Specimen:  Swab from Nasopharynx Updated:  01/23/19 2059    Troponin [[id number]]  (Normal) Collected:  01/23/19 1814    Specimen:  Blood Updated:  01/23/19 1855     Troponin I <0.012 ng/mL     D-dimer, Quantitative [[id number]]  (Abnormal) Collected:  01/23/19 1400    Specimen:  Blood Updated:  01/23/19 1545     D-Dimer, Quantitative 2,064 ng/mL (FEU)     Narrative:       Dimer values <500 ng/ml FEU are FDA  approved as aid in diagnosis of deep venous thrombosis and pulmonary embolism.  This test should not be used in an exclusion strategy with pretest probability alone.    A recent guideline regarding diagnosis for pulmonary thromboembolism recommends an adjusted exclusion criterion of age x 10 ng/ml FEU for patients >50 years of age (Brenda Intern Med 2015; 163: 701-711).    [place] Draw [[id number]] Collected:  01/23/19 1400    Specimen:  Blood Updated:  01/23/19 1501    Narrative:       The following orders were created for panel order [place] Draw.  Procedure                               Abnormality         Status                     ---------                               -----------         ------                     Light Blue Top[[id number]]                                   Final result               Green Top (Gel)[[id number]]                                  Final result               Lavender Top[[id number]]                                     Final result               Gold Top - SST[[id number]]                                   Final result                 Please view results for these tests on the individual orders.    Light Blue Top [[id number]] Collected:  01/23/19 1400    Specimen:  Blood Updated:  01/23/19 1501     Extra Tube hold for add-on     Comment: Auto resulted       Green Top (Gel) [[id number]] Collected:  01/23/19 1400    Specimen:  Blood Updated:  01/23/19 1501     Extra Tube Hold for add-ons.     Comment: Auto resulted.       Lavender Top [[id number]] Collected:  01/23/19 1400    Specimen:  Blood Updated:  01/23/19 1501     Extra Tube hold for add-on     Comment: Auto resulted       Gold Top - SST [[id number]] Collected:  01/23/19 1400    Specimen:  Blood Updated:  01/23/19 1501     Extra Tube Hold for add-ons.     Comment: Auto resulted.       Protime-INR [[id number]]  (Abnormal) Collected:  01/23/19 1400    Specimen:  Blood Updated:  01/23/19 1446     Protime 22.6 Seconds      INR 2.09    Narrative:        Therapeutic range for most indications is 2.0-3.0 INR,  or 2.5-3.5 for mechanical heart valves.    Troponin [[id number]]  (Normal) Collected:  01/23/19 1400    Specimen:  Blood Updated:  01/23/19 1434     Troponin I <0.012 ng/mL     BNP [[id number]]  (Abnormal) Collected:  01/23/19 1400    Specimen:  Blood Updated:  01/23/19 1434     proBNP 2,170.0 pg/mL     Comprehensive Metabolic Panel [[id number]]  (Abnormal) Collected:  01/23/19 1400    Specimen:  Blood Updated:  01/23/19 1422     Glucose 148 mg/dL      BUN 44 mg/dL      Creatinine 2.69 mg/dL      Sodium 136 mmol/L      Potassium 4.3 mmol/L      Chloride 102 mmol/L      CO2 28.0 mmol/L      Calcium 9.0 mg/dL      Total Protein 6.9 g/dL      Albumin 3.70 g/dL      ALT (SGPT) 15 U/L      AST (SGOT) 25 U/L      Alkaline Phosphatase 87 U/L      Total Bilirubin 1.0 mg/dL      eGFR  African Amer 28 mL/min/1.73      Globulin 3.2 gm/dL      A/G Ratio 1.2 g/dL      BUN/Creatinine Ratio 16.4     Anion Gap 6.0 mmol/L     Narrative:       The MDRD GFR formula is only valid for adults with stable renal function between ages 18 and 70.    CBC & Differential [[id number]] Collected:  01/23/19 1400    Specimen:  Blood Updated:  01/23/19 1406    Narrative:       The following orders were created for panel order CBC & Differential.  Procedure                               Abnormality         Status                     ---------                               -----------         ------                     CBC Auto Differential[[id number]]        Abnormal            Final result                 Please view results for these tests on the individual orders.    CBC Auto Differential [[id number]]  (Abnormal) Collected:  01/23/19 1400    Specimen:  Blood Updated:  01/23/19 1406     WBC 10.89 10*3/mm3      RBC 5.19 10*6/mm3      Hemoglobin 14.0 g/dL      Hematocrit 41.2 %      MCV 79.4 fL      MCH 27.0 pg      MCHC 34.0 g/dL      RDW 14.8 %      RDW-SD 43.2 fl      MPV 10.9 fL       Platelets 202 10*3/mm3      Neutrophil % 72.2 %      Lymphocyte % 15.6 %      Monocyte % 10.2 %      Eosinophil % 1.6 %      Basophil % 0.1 %      Immature Grans % 0.3 %      Neutrophils, Absolute 7.87 10*3/mm3      Lymphocytes, Absolute 1.70 10*3/mm3      Monocytes, Absolute 1.11 10*3/mm3      Eosinophils, Absolute 0.17 10*3/mm3      Basophils, Absolute 0.01 10*3/mm3      Immature Grans, Absolute 0.03 10*3/mm3            Imaging Results (last 24 hours)     Procedure Component Value Units Date/Time    NM Lung Scan Perfusion Particulate [[id number]] Collected:  01/23/19 1733     Updated:  01/23/19 1800    Narrative:       EXAMINATION:  NUCLEAR MEDICINE PULMONARY PERFUSION / VENTILATION  SCAN (V/Q SCAN)    CLINICAL HISTORY:   Dyspnea, elevated d-dimer     COMPARISON EXAMINATIONS: Chest x-ray performed the same date.    TECHNIQUE:  Perfusion: 6.2 mCi of technetium labeled MAA (number of  particles:  approx 600,000)  Ventilation: Not performed, patient was unable to tolerate  ventilation procedure.    FINDINGS:  Perfusion only scan was performed. There is an area of bandlike  decreased perfusion in the mid right lung without a corresponding  chest x-ray abnormality.  Additional areas of decreased perfusion are seen in the left  upper anterior lung on the RPO and CARLOS views without chest x-ray  abnormalities.      Impression:       CONCLUSION:  Intermediate probability of pulmonary embolism. Exam is limited  without ventilation data.    COMMENTS:    Normal exam = statistically less than 2% have pulmonary emboli.  Low probability = statistically 5-19% have pulmonary emboli.   Intermediate probability = statistically 20-70% have pulmonary  emboli.   High probability = statistically greater than 80% have pulmonary  emboli.      NOTE:   The likelihood of pulmonary embolism, especially in the low and  intermediate categories, should be interpreted in conjunction  with the pre- and post-test probability and other test  results  such as venous Doppler, D dimer and if necessary pulmonary  angiography as clinically indicated.    Findings were discussed with SALLY JENKINS on 1/23/2019  5:58 PM CST    Electronically signed by:  Agustin Early MD  1/23/2019 5:59 PM CST  Workstation: [id number]    US Venous Doppler Lower Extremity Left (duplex) [[id number]] Collected:  01/23/19 1522     Updated:  01/23/19 1612    Narrative:         Ultrasound venous duplex left lower extremity    HISTORY: Left lower extremity pain and swelling    Duplex ultrasound of the deep venous system of the left lower  extremity was performed    Real-time images demonstrate normal compressibility without  evidence of intraluminal thrombus.  Doppler shows phasic flow and augmentation.  Color Doppler also reveals venous patency.      Impression:       CONCLUSION:  No ultrasound evidence of deep venous thrombosis of the left  lower extremity.    [id number]    Electronically signed by:  Chris Srinivasan MD  1/23/2019 4:10 PM CST  Workstation: [id number]    XR Chest 1 View [[id number]] Collected:  01/23/19 1405     Updated:  01/23/19 1423    Narrative:         PORTABLE CHEST    HISTORY: Chest pain    Portable AP supine film of the chest was obtained at 2:04 PM.  COMPARISON: June 7, 2017    EKG leads.  The lungs are clear of an acute process.  The heart is not enlarged.  The pulmonary vasculature is not increased.  No pleural effusion.  No pneumothorax.  No acute osseous abnormality.      Impression:       CONCLUSION:  No Acute Disease    [id number]    Electronically signed by:  Chris Srinivasan MD  1/23/2019 2:21 PM CST  Workstation: [id number]            I reviewed the patient's new clinical results.    ASSESSMENT AND PLAN: This is a 78 y.o. male with:    Active Hospital Problems    Diagnosis Date Noted   • **Chest pain on breathing [R07.1] 01/23/2019     Priority: High     -EKG unremarkable.  -Troponin negative ×1.  -Trend his troponins ×2.     • Acute respiratory failure with hypoxia  (CMS/[facility]) [J96.01] 01/23/2019     Priority: High     -Patient is not on oxygen nasal cannula at home.  -He was placed on 2 L nasal cannula in the ER and desatted to 86%.  He was bumped up to 3 L and was satting greater than 92%.  -Continue to monitor and wean off of oxygen as needed.  -Chest x-ray showed no acute cardiopulmonary processes.     • Pulmonary embolism (CMS/HCC) [I26.99] 07/20/2015     Priority: High     -Patient has a history of DVTs in the past.  As well as a PE.  -D-dimer elevated today.  Intermediate risk on VQ scan.  Unable to obtain CTA due to elevated creatinine.  -Left swollen knee.  Ultrasound showed no DVT in the left lower extremity.  -Patient anticoagulated with warfarin.  INR 2.1.  Continue treatment, pharmacy to dose.  -Echo in AM.     • Stage 1 acute kidney injury (CMS/HCC) [N17.9] 01/23/2019     Priority: Medium     -Creatinine elevated to 2.7.  -Baseline appears to be around 2.  -Continue to monitor.  -Renal diet.  -IVF 75 cc per hour.      • Asthma [J45.909] 01/23/2017     -Continue home inhalers.     • Primary osteoarthritis of both knees [M17.0] 08/31/2015     -Norco 7.5 continue.     • CKD (chronic kidney disease) stage 4, GFR 15-29 ml/min (CMS/HCC) [N18.4] 03/02/2015     -Creatinine baseline appears to be 2.  Currently it is 2.7.  -Continue to monitor.  -IVF 75 cc per hour.  -Renal diet.     • GERD (gastroesophageal reflux disease) [K21.9] 04/09/2014     -Tums and Pepcid when necessary.     • Diabetic peripheral neuropathy associated with type 2 diabetes mellitus (CMS/[facility]) [E11.42] 07/15/2011   • Benign essential hypertension [I10] 03/04/2010     -Continue home antihypertensive medications.  -Continue to monitor.     • Diabetes mellitus type II, uncontrolled (CMS/[facility]) [E11.65] 03/04/2010     -Sliding Scale.         DVT prophylaxis: Coumadin     ANA # unable to obtain at 1902 due to ana being down for maintenance.    Expected Length of Stay: Where: home and When:  1-2 days    I  discussed the patients findings and my recommendations with patient and family.     Dr. Prince  is the attending on record at time of admission, She is aware of the patient's status and agrees with the above history and physical.          This document has been electronically signed by Norm Hernandez MD on 2019 9:04 PM    Electronically signed by Stacey Prince MD at 2019  9:27 PM          Physician Progress Notes (last 24 hours) (Notes from 2019 11:25 AM through 2019 11:25 AM)      Pedro Diaz MD at 2019  6:50 AM          FAMILY MEDICINE DAILY PROGRESS NOTE  NAME: Ondina Alanis Sr.  : 1940  MRN: [id number]     LOS: 1 day     PROVIDER OF SERVICE: Pedro Diaz MD    Chief Complaint: Chest pain on breathing    Subjective:     Interval History:  History taken from: patient family  Patient is a 79 y/o Afri [place] american male who was admitted for chest pain.   Patient was found resting in bed, in no acute distress.   Patient did endorse ongoing substernal chest pain without radiation. Patient reported that it worse with deep breaths. Knee pain is unchanged.    Denied: chest pressure, palpitations, fever, chills, n/v, diarrhea     Overnight nursing reported: no incidents    Will speak with Dr Estrada this morning-  For possible signs of right heart strain.     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     Review of Systems:   Review of Systems   Constitutional: Negative for activity change, appetite change and fever.   HENT: Negative for congestion, sinus pressure, sinus pain and sneezing.    Respiratory: Positive for shortness of breath. Negative for apnea, choking, chest tightness and wheezing.    Cardiovascular: Positive for chest pain. Negative for palpitations and leg swelling.   Gastrointestinal: Negative for abdominal distention, abdominal pain, diarrhea, nausea and vomiting.   Genitourinary: Negative for difficulty urinating, dysuria and frequency.   Musculoskeletal:  Positive for arthralgias and back pain.   Skin: Negative for color change.   Neurological: Negative for dizziness, facial asymmetry and headaches.   Psychiatric/Behavioral: Negative for agitation, behavioral problems and confusion.       Objective:     Vital Signs  Temp:  [97.9 °F (36.6 °C)-100.1 °F (37.8 °C)] 98.6 °F (37 °C)  Heart Rate:  [65-98] 67  Resp:  [18-20] 18  BP: (134-164)/() 134/61    Physical Exam  Physical Exam   Constitutional: He appears well-developed and well-nourished. No distress.   HENT:   Head: Atraumatic.   Right Ear: External ear normal.   Left Ear: External ear normal.   Mouth/Throat: Oropharynx is clear and moist.   Eyes:   Patient is blind    Neck: Trachea normal, full passive range of motion without pain and phonation normal.   Cardiovascular: Regular rhythm, S1 normal and S2 normal.   Pulses:       Dorsalis pedis pulses are 2+ on the right side, and 2+ on the left side.   Pulmonary/Chest: Effort normal and breath sounds normal. No tachypnea. No respiratory distress. He has no decreased breath sounds. He has no wheezes.   Abdominal: Soft. Bowel sounds are normal.   Musculoskeletal:        Left knee: He exhibits swelling.        Legs:  Neurological: He is alert.   Patient responded to person and place, not time.        Medication Review    Current Facility-Administered Medications:   •  acetaminophen (TYLENOL) tablet 650 mg, 650 mg, Oral, Q4H PRN, Norm Hernandez MD  •  albuterol (PROVENTIL) nebulizer solution 0.083% 2.5 mg/3mL, 2.5 mg, Nebulization, Q6H PRN, Norm Hernandez MD  •  amLODIPine (NORVASC) tablet 5 mg, 5 mg, Oral, Daily, Norm Hernandez MD, 5 mg at 01/24/19 1009  •  brinzolamide (AZOPT) 1 % ophthalmic suspension 1 drop, 1 drop, Both Eyes, TID, Norm Hernandez MD, 1 drop at 01/24/19 2824  •  budesonide (PULMICORT) nebulizer solution 0.5 mg, 0.5 mg, Nebulization, Daily - RT, Norm Hernandez MD, 0.5 mg at 01/24/19 5222  •  calcium carbonate (TUMS) chewable tablet  500 mg (200 mg elemental), 2 tablet, Oral, BID PRN, Norm Hernandez MD  •  cetirizine (zyrTEC) tablet 5 mg, 5 mg, Oral, Daily, Norm Hernandez MD, 5 mg at 01/24/19 1009  •  CloNIDine (CATAPRES) tablet 0.2 mg, 0.2 mg, Oral, Q12H, Norm Hernandez MD, 0.2 mg at 01/24/19 2144  •  dextrose (D50W) 25 g/ 50mL Intravenous Solution 25 g, 25 g, Intravenous, Q15 Min PRN, Norm Hernandez MD  •  dextrose (GLUTOSE) oral gel 15 g, 15 g, Oral, Q15 Min PRN, Norm Hernandez MD  •  famotidine (PEPCID) tablet 20 mg, 20 mg, Oral, Daily, DiazPedro MD, 20 mg at 01/24/19 1009  •  glucagon (human recombinant) (GLUCAGEN DIAGNOSTIC) injection 1 mg, 1 mg, Subcutaneous, PRN, Norm Hernandez MD  •  HYDROcodone-acetaminophen (NORCO) 7.5-325 MG per tablet 1 tablet, 1 tablet, Oral, Q6H PRN, Norm Hernandez MD, 1 tablet at 01/25/19 0405  •  insulin aspart (novoLOG) injection 0-7 Units, 0-7 Units, Subcutaneous, 4x Daily AC & at Bedtime, Norm Hernandez MD, 3 Units at 01/24/19 2144  •  ipratropium-albuterol (DUO-NEB) nebulizer solution 3 mL, 3 mL, Nebulization, Q6H While Awake - RT, Norm Hernandez MD, 3 mL at 01/24/19 1919  •  latanoprost (XALATAN) 0.005 % ophthalmic solution 1 drop, 1 drop, Both Eyes, Nightly, Norm Hernadnez MD, 1 drop at 01/24/19 2147  •  melatonin tablet 5.25 mg, 5.25 mg, Oral, Nightly PRN, Norm Hernandez MD  •  ondansetron (ZOFRAN) tablet 4 mg, 4 mg, Oral, Q6H PRN **OR** ondansetron ODT (ZOFRAN-ODT) disintegrating tablet 4 mg, 4 mg, Oral, Q6H PRN **OR** ondansetron (ZOFRAN) injection 4 mg, 4 mg, Intravenous, Q6H PRN, Norm Hernandez MD  •  oxybutynin XL (DITROPAN-XL) 24 hr tablet 10 mg, 10 mg, Oral, Nightly, Norm Hernandez MD, 10 mg at 01/24/19 6290  •  pantoprazole (PROTONIX) EC tablet 40 mg, 40 mg, Oral, Q AM, Norm Hernandez MD, 40 mg at 01/25/19 0612  •  Pharmacy to dose warfarin, , Does not apply, Continuous PRN, Norm Hernandez MD  •  pilocarpine (PILOCAR) 1 % ophthalmic solution 1  drop, 1 drop, Both Eyes, Daily, Norm Hernandez MD, 1 drop at 01/24/19 1011  •  sennosides-docusate sodium (SENOKOT-S) 8.6-50 MG tablet 2 tablet, 2 tablet, Oral, Nightly, Norm Hernandez MD, 2 tablet at 01/24/19 2144  •  sodium chloride 0.9 % flush 10 mL, 10 mL, Intravenous, PRN, Almas Bear MD  •  sodium chloride 0.9 % infusion, 75 mL/hr, Intravenous, Continuous, Norm Hernandez MD, Last Rate: 75 mL/hr at 01/24/19 1011, 75 mL/hr at 01/24/19 1011  •  warfarin (COUMADIN) tablet 6 mg, 6 mg, Oral, Daily, Norm Hernandez MD, 6 mg at 01/24/19 1707     Diagnostic Data    Lab Results (last 24 hours)     Procedure Component Value Units Date/Time    Protime-INR [[id number]] Collected:  01/25/19 0645    Specimen:  Blood Updated:  01/25/19 0704    Basic Metabolic Panel [[id number]] Collected:  01/25/19 0645    Specimen:  Blood Updated:  01/25/19 0704    POC Glucose Once [[id number]]  (Abnormal) Collected:  01/24/19 2107    Specimen:  Blood Updated:  01/24/19 2137     Glucose 243 mg/dL      Comment: RN NotifiedOperator: [id number] JOSE GREENMeter ID: [id number]       POC Glucose Once [[id number]]  (Abnormal) Collected:  01/24/19 1659    Specimen:  Blood Updated:  01/24/19 1732     Glucose 245 mg/dL      Comment: RN NotifiedOperator: [id number] [employer] ID: [id number]       POC Glucose Once [[id number]]  (Abnormal) Collected:  01/24/19 1054    Specimen:  Blood Updated:  01/24/19 1407     Glucose 233 mg/dL      Comment: RN NotifiedOperator: [id number] [employer] ID: [id number]       Protime-INR [[id number]]  (Abnormal) Collected:  01/24/19 0649    Specimen:  Blood Updated:  01/24/19 0815     Protime 26.3 Seconds      INR 2.55    Narrative:       Therapeutic range for most indications is 2.0-3.0 INR,  or 2.5-3.5 for mechanical heart valves.    Basic Metabolic Panel [[id number]]  (Abnormal) Collected:  01/24/19 0649    Specimen:  Blood Updated:  01/24/19 0801     Glucose 188 mg/dL      BUN 45  mg/dL      Creatinine 2.37 mg/dL      Sodium 136 mmol/L      Potassium 4.5 mmol/L      Chloride 104 mmol/L      CO2 25.0 mmol/L      Calcium 8.8 mg/dL      eGFR  African Amer 32 mL/min/1.73      BUN/Creatinine Ratio 19.0     Anion Gap 7.0 mmol/L     Narrative:       The MDRD GFR formula is only valid for adults with stable renal function between ages 18 and 70.    POC Glucose Once [[id number]]  (Abnormal) Collected:  01/24/19 0721    Specimen:  Blood Updated:  01/24/19 0744     Glucose 164 mg/dL      Comment: RN NotifiedOperator: [id number] ISH GUTIÉRREZIANNAMeter ID: [id number]              Imaging Results (last 24 hours)     Procedure Component Value Units Date/Time    XR Knee 1 or 2 View Left [[id number]] Collected:  01/24/19 0901     Updated:  01/24/19 1523    Narrative:       PROCEDURE: Left knee, two views.    INDICATION: L knee pain, swelling and calor, R07.9 Chest pain,  unspecified N19 Unspecified kidney failure M79.605 Pain in left  leg    COMPARISON: None.    AP and lateral view of the left knee.    Small joint effusion.    High riding patella.    No fractures or acute osseous abnormalities. Tricompartmental  osteoarthritic degenerative changes, severe in the medial joint  compartment with bone-on-bone and osteophytes. Moderate in the  patellofemoral and lateral joint compartments with joint space  narrowing and osteophytes.      Impression:       Small joint effusion.    Tricompartmental osteoarthritic degenerative changes detailed  above greatest in the medial joint compartment.    12324    Electronically signed by:  Christopher Ramirez MD  1/24/2019 3:22  PM CST Workstation: [employer]          I reviewed the patient's new clinical results.    Assessment/Plan:     Active Hospital Problems    Diagnosis   • **Chest pain on breathing     -EKG unremarkable.  -Troponin negative x 3    Echo- Left ventricular systolic function is normal. Estimated EF appears to be in the range of 56 - 60%. Estimated EF = 56%.  Normal left ventricular cavity size noted. All left ventricular wall segments contract normally. Left ventricular wall thickness is consistent with borderline concentric hypertrophy. Left ventricular diastolic dysfunction is noted.     • Left ventricular diastolic dysfunction     ECHO 1/24/19  Left ventricular systolic function is normal. Estimated EF appears to be in the range of 56 - 60%. Estimated EF = 56%. Normal left ventricular cavity size noted. All left ventricular wall segments contract normally. Left ventricular wall thickness is consistent with borderline concentric hypertrophy. Left ventricular diastolic dysfunction is noted.    -will consult Dr Estrada for possible right heart strain      • Acute respiratory failure with hypoxia (CMS/HCC)     -Patient is not on oxygen nasal cannula at home.  -He was placed on 2 L nasal cannula in the ER and desatted to 86%.  He was bumped up to 3 L and was satting greater than 92%.  -Continue to monitor and wean off of oxygen as needed.  -Chest x-ray showed no acute cardiopulmonary processes.     • Stage 1 acute kidney injury (CMS/HCC)     -Creatinine elevated to 2.69 (decreasing)   Cr today 2.37  -Baseline appears to be around 2.  -Continue to monitor.  -Renal diet.  -IVF 75 cc per hour.      • Asthma     -Continue home inhalers.     • Primary osteoarthritis of both knees     -Norco 7.5 continue  Patient complains of L knee pain  XR- L knee- No fractures or acute osseous abnormalities. Tricompartmental  osteoarthritic degenerative changes, severe in the medial joint  compartment with bone-on-bone and osteophytes. Moderate in the  patellofemoral and lateral joint compartments with joint space  narrowing and osteophytes.    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  As well as a PE.  -D-dimer elevated 2064   Intermediate risk on VQ scan.  Unable to obtain CTA due to elevated creatinine.  -Left swollen knee.   Ultrasound showed no DVT in the left lower extremity.  -Patient anticoagulated with warfarin.  INR 2.1.  Continue treatment, pharmacy to dose.  -Echo  - Left ventricular systolic function is normal. Estimated EF appears to be in the range of 56 - 60%. Estimated EF = 56%. Normal left ventricular cavity size noted. All left ventricular wall segments contract normally. Left ventricular wall thickness is consistent with borderline concentric hypertrophy. Left ventricular diastolic dysfunction is noted.    -consult Dr Estrada this am     • CKD (chronic kidney disease) stage 4, GFR 15-29 ml/min (CMS/[facility])     -Creatinine baseline appears to be 2.      -Continue to monitor.  -IVF 75 cc per hour.  -Renal diet.     • GERD (gastroesophageal reflux disease)     -Tums and Pepcid when necessary.     • Diabetic peripheral neuropathy associated with type 2 diabetes mellitus (CMS/[facility])   • Benign essential hypertension     -Continue home antihypertensive medications.  -Continue to monitor.     • Diabetes mellitus type II, uncontrolled (CMS/[facility])     -Sliding Scale.         DVT prophylaxis: Coumadin  Code Status and Medical Interventions:   Ordered at: 01/23/19 2053     Code Status:    CPR     Medical Interventions (Level of Support Prior to Arrest):    Full       Plan for disposition:Home in 1-2 days       Time: Home in 1-2 days      Signed,   Pedro Diaz MD  Family Medicine Resident PGY1  [facility]        This document has been electronically signed by Pedro Diaz MD on January 25, 2019 7:06 AM          Electronically signed by Pedro Diaz MD at 1/25/2019  7:06 AM

## 2019-01-25 NOTE — SIGNIFICANT NOTE
The patient requires operative report elevated more than 30° most of the time due to problems with aspiration because of acute respiratory failure with hypoxia.  In addition the patient requires frequent changes in body position due to patient's immobility.  This is to prevent bedsores.  Patient is bedbound.      Signed,   Pedro Diaz MD  Family Medicine Resident PGY1  [facility]        This document has been electronically signed by Pedro Diaz MD on January 25, 2019 2:44 PM

## 2019-01-25 NOTE — SIGNIFICANT NOTE
Spoke with Dr Estrada regarding possible right heart strain. At 0820.   Dr Estrada reported no right heart strain, normal right ventricular function.     Signed,   Pedro Diaz MD  Family Medicine Resident PGY1  [facility]        This document has been electronically signed by Pedro Diaz MD on January 25, 2019 8:22 AM

## 2019-01-26 ENCOUNTER — READMISSION MANAGEMENT (OUTPATIENT)
Dept: CALL CENTER | Facility: HOSPITAL | Age: 79
End: 2019-01-26

## 2019-01-26 NOTE — OUTREACH NOTE
Prep Survey      Responses   Facility patient discharged from?  [place]   Is patient eligible?  Yes   Discharge diagnosis  pluerisy   Does the patient have one of the following disease processes/diagnoses(primary or secondary)?  Other   Does the patient have Home health ordered?  Yes   What is the Home health agency?   [facility]   Is there a DME ordered?  Yes   What DME was ordered?  hospital bed   Comments regarding appointments  call for apmt - see AVS   Prep survey completed?  Yes          Suzanna Pierre RN

## 2019-01-26 NOTE — THERAPY DISCHARGE NOTE
Acute Care - Physical Therapy Discharge Summary  [facility]       Patient Name: Ondina Alanis Sr.  : 1940  MRN: [id number]    Today's Date: 2019  Onset of Illness/Injury or Date of Surgery: 19    Date of Referral to PT: 19  Referring Physician: Pedro Diaz MD      Admit Date: 2019      PT Recommendation and Plan    Visit Dx:    ICD-10-CM ICD-9-CM   1. Chest pain, unspecified type R07.9 786.50   2. Renal failure, unspecified chronicity N19 586   3. Left leg pain M79.605 729.5   4. Other acute pulmonary embolism without acute cor pulmonale (CMS/[facility]) I26.99 415.19   5. CKD (chronic kidney disease) stage 4, GFR 15-29 ml/min (CMS/[facility]) N18.4 585.4   6. Primary osteoarthritis of both knees M17.0 715.16   7. Pleurisy R09.1 511.0   8. Left ventricular diastolic dysfunction I51.9 429.9   9. Diabetic peripheral neuropathy associated with type 2 diabetes mellitus (CMS/[facility]) E11.42 250.60     357.2   10. Acute respiratory failure with hypoxia (CMS/[facility]) J96.01 518.81   11. Impaired physical mobility Z74.09 781.99       Outcome Measures     Row Name 19 0930             How much help from another person do you currently need...    Turning from your back to your side while in flat bed without using bedrails?  2  -CB      Moving from lying on back to sitting on the side of a flat bed without bedrails?  2  -CB      Moving to and from a bed to a chair (including a wheelchair)?  1  -CB      Standing up from a chair using your arms (e.g., wheelchair, bedside chair)?  1  -CB      Climbing 3-5 steps with a railing?  1  -CB      To walk in hospital room?  1  -CB      AM-PAC 6 Clicks Score  8  -CB         Functional Assessment    Outcome Measure Options  AM-PAC 6 Clicks Basic Mobility (PT)  -CB        User Key  (r) = Recorded By, (t) = Taken By, (c) = Cosigned By    Initials Name Provider Type    Fay Metcalf, PT Physical Therapist            Therapy Suggested Charges     Code   Minutes  Charges    None             Rehab Goal Summary     Row Name 01/26/19 1009             Physical Therapy Goals    Bed Mobility Goal Selection (PT)  bed mobility, PT goal 1;bed mobility, PT goal 2  -LF      Transfer Goal Selection (PT)  transfer, PT goal 1  -LF         Bed Mobility Goal 1 (PT)    Activity/Assistive Device (Bed Mobility Goal 1, PT)  rolling to left;rolling to right  -LF      [place] Level/Cues Needed (Bed Mobility Goal 1, PT)  tactile cues required;verbal cues required;minimum assist (75% or more patient effort)  -LF      Time Frame (Bed Mobility Goal 1, PT)  short term goal (STG);2 days  -LF      Barriers (Bed Mobility Goal 1, PT)  general weakness  -LF      Progress/Outcomes (Bed Mobility Goal 1, PT)  goal not met  -LF         Bed Mobility Goal 2 (PT)    Activity/Assistive Device (Bed Mobility Goal 2, PT)  sit to supine/supine to sit  -LF      [place] Level/Cues Needed (Bed Mobility Goal 2, PT)  tactile cues required;verbal cues required;minimum assist (75% or more patient effort);other (see comments) with HOB elevated  -LF      Time Frame (Bed Mobility Goal 2, PT)  long term goal (LTG);by discharge  -LF      Barriers (Bed Mobility Goal 2, PT)  general weakness  -LF      Progress/Outcomes (Bed Mobility Goal 2, PT)  goal not met  -LF         Transfer Goal 1 (PT)    Activity/Assistive Device (Transfer Goal 1, PT)  sit-to-stand/stand-to-sit;bed-to-chair/chair-to-bed  -LF      [place] Level/Cues Needed (Transfer Goal 1, PT)  tactile cues required;verbal cues required;moderate assist (50-74% patient effort)  -LF      Time Frame (Transfer Goal 1, PT)  long term goal (LTG);by discharge  -LF      Barriers (Transfers Goal 1, PT)  general weakness  -LF      Progress/Outcome (Transfer Goal 1, PT)  goal not met  -LF        User Key  (r) = Recorded By, (t) = Taken By, (c) = Cosigned By    Initials Name Provider Type Discipline    LF Onel, Terese, PT Physical Therapist PT              PT Discharge  Summary  Anticipated Discharge Disposition (PT): home with home health  Reason for Discharge: Discharge from facility, Per MD order  Outcomes Achieved: Discharge from facility occurred on same date as evluation  Discharge Destination: Home      Terese Balderramacristin, PT   1/26/2019

## 2019-01-28 ENCOUNTER — READMISSION MANAGEMENT (OUTPATIENT)
Dept: CALL CENTER | Facility: HOSPITAL | Age: 79
End: 2019-01-28

## 2019-01-28 NOTE — OUTREACH NOTE
Medical Week 1 Survey      Responses   Facility patient discharged from?  [facility]   Does the patient have one of the following disease processes/diagnoses(primary or secondary)?  Other   Is there a successful TCM telephone encounter documented?  No   Week 1 attempt successful?  Yes   Call start time  1437   Call end time  1440   Discharge diagnosis  pltroyisy   Is patient permission given to speak with other caregiver?  Yes   List who call center can speak with  Wife   Person spoke with today (if not patient) and relationship  Wife   Meds reviewed with patient/caregiver?  Yes   Is the patient having any side effects they believe may be caused by any medication additions or changes?  No   Does the patient have all medications ordered at discharge?  Yes   Is the patient taking all medications as directed (includes completed medication regime)?  Yes   Medication comments  Does not need Tylenol every day   Comments regarding appointments  [facility]   Does the patient have a primary care provider?   Yes   Does the patient have an appointment with their PCP within 7 days of discharge?  Yes   Has the patient kept scheduled appointments due by today?  N/A   What is the Home health agency?   [facility]   What DME was ordered?  hospital bed   Has all DME been delivered?  No   Psychosocial issues?  No   Comments  Waiting on insurance for hospital bed   Did the patient receive a copy of their discharge instructions?  Yes   Nursing interventions  Reviewed instructions with patient   What is the patient's perception of their health status since discharge?  Improving   Is the patient/caregiver able to teach back signs and symptoms related to disease process for when to call PCP?  Yes   Is the patient/caregiver able to teach back signs and symptoms related to disease process for when to call 911?  Yes   Is the patient/caregiver able to teach back the hierarchy of who to call/visit for symptoms/problems? PCP, Specialist, Home health  nurse, Urgent Care, ED, 911  Yes   Week 1 call completed?  Yes          Meena Salcido RN

## 2019-01-28 NOTE — PAYOR COMM NOTE
"Nadir Alanis Sr. (78 y.o. Male)     Date of Birth Social Security Number Address Home Phone MRN    1940  [address] [place] [address] [phone] [id number]    Restorationist Marital Status          Taoism        Admission Date Admission Type Admitting Provider Attending Provider Department, Room/Bed    1/23/19 Emergency Stacey Prince MD  [address], 366/1    Discharge Date Discharge Disposition Discharge Destination        1/25/2019 Home or Self Care              Attending Provider:  (none)   Allergies:  Contrast Dye    Isolation:  None   Infection:  None   Code Status:  Prior    Ht:  188 cm (74\")   Wt:  98 kg (216 lb)    Admission Cmt:  None   Principal Problem:  Pleurisy [R09.1] More...                 Active Insurance as of 1/23/2019     Primary Coverage     Payor Plan Insurance Group Employer/Plan Group    MEDICARE MEDICARE A & B      Payor Plan Address Payor Plan Phone Number Payor Plan Fax Number Effective Dates    [address] [phone]  6/1/2017 - None Entered    [facility] [id number]       Subscriber Name Subscriber Birth Date Member ID       NADIR ALANIS SR. 1940 [id number]           Secondary Coverage     Payor Plan Insurance Group Employer/Plan Group    [facility] [id number]     Payor Plan Address Payor Plan Phone Number Payor Plan Fax Number Effective Dates    [address] [address]   1/23/2019 - None Entered    [facility] [phone]       Subscriber Name Subscriber Birth Date Member ID       NADIR ALANIS SR. 1940 [id number]                 Emergency Contacts      (Rel.) Home Phone Work Phone Mobile Phone    Lamar Alanis (Spouse) [phone] -- [phone]    ZekeCammy (Daughter) [phone] -- [phone]               Discharge Summary      Pedro Diaz MD at 1/25/2019 11:23 AM     Attestation signed by Stacey Prince MD at 1/27/2019 12:10 PM    I have seen the patient on day of " discharge, 2019.  I have reviewed the notes, assessments, and/or procedures performed by Dr. Diaz, I concur with her/his documentation and assessment and plan for Ondina Alanis SrPawel.            This document has been electronically signed by Stacey Prince MD on 2019 12:10 PM                        [facility]   HOSPITAL DISCHARGE SUMMARY    PATIENT NAME: Ondina Alanis Sr.   PHYSICIAN: Pedro Diaz MD   : 1940  MRN: [id number]    ADMITTED: 2019  DISCHARGED: 19     ADMISSION DIAGNOSES:  Active Hospital Problems    Diagnosis Date Noted   • **Pleurisy [R09.1] 2019   • Left ventricular diastolic dysfunction [I51.9] 2019   • Acute respiratory failure with hypoxia (CMS/HCC) [J96.01] 2019   • Stage 1 acute kidney injury (CMS/HCC) [N17.9] 2019   • Asthma [J45.909] 2017   • Primary osteoarthritis of both knees [M17.0] 2015   • Pulmonary embolism (CMS/HCC) [I26.99] 2015   • CKD (chronic kidney disease) stage 4, GFR 15-29 ml/min (CMS/HCC) [N18.4] 2015   • GERD (gastroesophageal reflux disease) [K21.9] 2014   • Diabetic peripheral neuropathy associated with type 2 diabetes mellitus (CMS/HCC) [E11.42] 07/15/2011   • Benign essential hypertension [I10] 2010   • Diabetes mellitus type II, uncontrolled (CMS/HCC) [E11.65] 2010      Resolved Hospital Problems   No resolved problems to display.     DISCHARGE DIAGNOSES:   Active Hospital Problems    Diagnosis Date Noted   • **Pleurisy [R09.1] 2019   • Left ventricular diastolic dysfunction [I51.9] 2019   • Acute respiratory failure with hypoxia (CMS/HCC) [J96.01] 2019   • Stage 1 acute kidney injury (CMS/HCC) [N17.9] 2019   • Asthma [J45.909] 2017   • Primary osteoarthritis of both knees [M17.0] 2015   • Pulmonary embolism (CMS/HCC) [I26.99] 2015   • CKD (chronic kidney disease) stage 4, GFR 15-29 ml/min (CMS/HCC)  [N18.4] 03/02/2015   • GERD (gastroesophageal reflux disease) [K21.9] 04/09/2014   • Diabetic peripheral neuropathy associated with type 2 diabetes mellitus (CMS/[facility]) [E11.42] 07/15/2011   • Benign essential hypertension [I10] 03/04/2010   • Diabetes mellitus type II, uncontrolled (CMS/[facility]) [E11.65] 03/04/2010      Resolved Hospital Problems   No resolved problems to display.       SERVICE: Family Medicine. Attending Stacey Prince M.D., Resident Pedro Diaz MD    CONSULTS:   Consult Orders (all) (From admission, onward)    Start     Ordered    01/23/19 1840  Family Practice - Resident (on-call MD unless specified)  Once     Specialty:  Family Medicine  Provider:  Norm Hernandez MD    01/23/19 9849          PROCEDURES:   None     HISTORY OF PRESENT ILLNESS (from admission H&P):   Patient is a 78 y.o. 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  The patient has dementia and is limited in providing history.  Relative is in the room who she has most of the story.  Patient has been complaining of chest pain for the last 2 days has gotten worse.  The pain is markedly worse when he breathes.  He does not have coronary artery disease history.  He has a dry cough and shortness of breath as well.  Nothing seems to help his chest pain improve, nothing makes it worse.  He has no URI symptoms.  For the last day he has not had very much to eat or drink.  Patient is not mobile, staying confined to his bed.  He also has swelling of his left knee that started a day or 2 ago.      In the ER, patient troponin negative ×1.  EKG was unremarkable.  D-dimer elevated to greater than 2000.  Patient has elevated creatinine and CTA could not be obtained.  VQ scan showed intermediate risk for a PE.  Ultrasound of the left leg was obtained, but was negative for a DVT.  Patient's BNP minimally elevated to ~2100.  He does not have a history of heart failure.  INR is therapeutic at 2.1.    Copied  From Dr Hernandez's Admission H&P      DIAGNOSTIC DATA:   Lab Results   Component Value Date    WBC 10.89 (H) 01/23/2019    HGB 14.0 01/23/2019    HCT 41.2 01/23/2019    MCV 79.4 (L) 01/23/2019     01/23/2019     Lab Results   Component Value Date    GLUCOSE 169 (H) 01/25/2019    BUN 51 (H) 01/25/2019    CREATININE 2.64 (H) 01/25/2019    EGFRIFAFRI 29 (L) 01/25/2019    BCR 19.3 01/25/2019    K 4.1 01/25/2019    CO2 24.0 01/25/2019    CALCIUM 8.7 01/25/2019    ALBUMIN 3.70 01/23/2019    AST 25 01/23/2019    ALT 15 (L) 01/23/2019     Imaging Results (last 24 hours)     Procedure Component Value Units Date/Time    XR Knee 1 or 2 View Left [[id number]] Collected:  01/24/19 0901     Updated:  01/24/19 1523    Narrative:       PROCEDURE: Left knee, two views.    INDICATION: L knee pain, swelling and calor, R07.9 Chest pain,  unspecified N19 Unspecified kidney failure M79.605 Pain in left  leg    COMPARISON: None.    AP and lateral view of the left knee.    Small joint effusion.    High riding patella.    No fractures or acute osseous abnormalities. Tricompartmental  osteoarthritic degenerative changes, severe in the medial joint  compartment with bone-on-bone and osteophytes. Moderate in the  patellofemoral and lateral joint compartments with joint space  narrowing and osteophytes.      Impression:       Small joint effusion.    Tricompartmental osteoarthritic degenerative changes detailed  above greatest in the medial joint compartment.    [id number]    Electronically signed by:  Christopher Ramirez MD  1/24/2019 3:22  PM CST Workstation: [facility]             HOSPITAL COURSE:  Patient was admitted for further evaluation and treatment of substernal chest pain. In the ER, patient troponin negative ×1.  EKG was unremarkable.  D-dimer elevated to greater than 2000.   VQ scan showed intermediate risk for a PE.  Ultrasound of the left leg was obtained, but was negative for a DVT.  Patient's BNP minimally elevated to ~2100.   He does not have a history of heart failure.  INR is therapeutic at 2.1.    Due to concerns for a PE (D-dimer 2064 and previous history), V/Q scan was found to be intermediate risk.  CTA was not unable to be obtained due to elevated creatinine.  Patient was continued on warfarin.  Echocardiogram was performed showing left ventricular systolic function normal.  Estimated EF is 56-60%.  Normal left ventricular cavity size noted all left ventricular wall segments contracted normally.  Left ventricular wall thickness is consistent with borderline concentric hypertrophy.  Left ventricular diastolic dysfunction is noted. Due to concerns of possible right heart strain, cardiology was consulted.  Upon recommendation of cardiology, no right heart strain was noted regular function of the right ventricle.    Patient complained of chest pain, worsened on deep respiration.  EKG was unremarkable.  Troponins were negative ×3.  Patient was diagnosed with pleurisy.  Advised to take Tylenol 1000 mg 3 times a day.    Patient complained of left knee pain.  Patient has history of osteoarthritis of both knees.  Pain medications was provided.  X-ray was obtained of the left knee,  No fractures or acute osseous abnormalities. Tricompartmental  osteoarthritic degenerative changes, severe in the medial joint  compartment with bone-on-bone and osteophytes. Moderate in the  patellofemoral and lateral joint compartments with joint space  narrowing and osteophytes.  PT/OT was ordered and followed patient throughout hospital course.     Due to patient's deconditioning and osteoarthritis, patient was recommended by PT/OT to have a hospital bed and overhead trapeze upon discharge.     Patient clinically improved throughout hospital course.  Patient was stable upon discharge home.  Patient was advised to follow-up with PCP Mark Sheldon APRN   in 1 week.    DISCHARGE CONDITION:   Stable    DISPOSITION:  Home or Self Care [1]  home  health    DISCHARGE MEDICATIONS     Discharge Medications      New Medications      Instructions Start Date   acetaminophen 500 MG tablet  Commonly known as:  TYLENOL   1,000 mg, Oral, 3 Times Daily         Continue These Medications      Instructions Start Date   albuterol sulfate  (90 Base) MCG/ACT inhaler  Commonly known as:  PROVENTIL HFA;VENTOLIN HFA;PROAIR HFA   2 puffs, Inhalation, Every 6 Hours      amLODIPine 5 MG tablet  Commonly known as:  NORVASC   TAKE 1 TABLET DAILY      brimonidine 0.1 % solution ophthalmic solution  Commonly known as:  ALPHAGAN P   1 drop      brinzolamide 1 % ophthalmic suspension  Commonly known as:  AZOPT   1 drop      budesonide 0.5 MG/2ML nebulizer solution  Commonly known as:  PULMICORT   0.5 mg, Inhalation      CloNIDine 0.2 MG tablet  Commonly known as:  CATAPRES   0.2 mg, Oral      desloratadine 5 MG tablet  Commonly known as:  CLARINEX   5 mg, Oral      HYDROcodone-acetaminophen 7.5-325 MG per tablet  Commonly known as:  NORCO   1 tablet, Oral, Every 6 Hours PRN      Insulin Lispro 100 UNIT/ML solution pen-injector  Commonly known as:  HUMALOG   12 Units, Subcutaneous      ipratropium-albuterol 0.5-2.5 mg/3 ml nebulizer  Commonly known as:  DUO-NEB   3 mL, Inhalation, Every 6 Hours      lansoprazole 30 MG capsule  Commonly known as:  PREVACID   30 mg, Oral      latanoprost 0.005 % ophthalmic solution  Commonly known as:  XALATAN   1 drop      LEVEMIR SC   15 Units, Subcutaneous, 2 Times Daily      oxybutynin XL 10 MG 24 hr tablet  Commonly known as:  DITROPAN-XL   No dose, route, or frequency recorded.      pilocarpine 1 % ophthalmic solution  Commonly known as:  PILOCAR   1 drop      Transfer Bench misc   Transfer bench r/t dementia, deconditioning.      Tub Transfer Board misc   Transfer tub bench r/t deconditioning, dementia, and blind r/t glaucoma      warfarin 5 MG tablet  Commonly known as:  COUMADIN   take 1 and 1/2 tablets SUN MON WED FRI and 1 tablet all  other days for MYOCARDIAL REINFARCTION PREVENTION             INSTRUCTIONS:  Activity:   Activity Instructions     Activity as Tolerated          Diet:   Diet Instructions     Diet: Regular, Consistent Carbohydrate      Discharge Diet:   Regular  Consistent Carbohydrate           Special instructions: Patient instructed to call MD or return to ED with worsening shortness of breath, chest pain, fever greater than 100.4 degrees F or any other medical concerns..    FOLLOW UP:   Additional Instructions for the Follow-ups that You Need to Schedule     Ambulatory Referral to Anticoagulation Monitoring   As directed      Referral to Home Health   As directed      Face to Face Visit Date:  1/25/2019    Follow-up Provider for Plan of Care?:  I treated the patient in an acute care facility and will not continue treatment after discharge.    Follow-up Provider:  NICK SOTOMAYOR [[id number]]    Reason/Clinical Findings:  Deconditioning    Describe mobility limitations that make leaving home difficult:  Deconditioning    Nursing/Therapeutic Services Requested:  Physical Therapy Occupational Therapy Skilled Nursing    Skilled nursing orders:  Medication education    Frequency:  1 Week 1           Follow-up Information     Nick Sotomayor APRN .    Specialty:  Nurse Practitioner  Contact information:  [address] [place], [place] [address]Y  [place] [address]  [phone]                   PENDING TEST RESULTS AT DISCHARGE      Time: 20 min    Stacey Prince M.D. is the attending at time of discharge, is aware of the patient's status and agrees with the above discharge summary.    Signed    Pedro Diaz MD  Family Medicine Resident PGY1  [facility]      This document has been electronically signed by Pedro Diaz MD on 01/25/19 11:24 AM                Electronically signed by Stacey Prince MD at 1/27/2019 12:10 PM

## 2019-01-29 ENCOUNTER — ANTICOAGULATION VISIT (OUTPATIENT)
Dept: CARDIAC SURGERY | Facility: CLINIC | Age: 79
End: 2019-01-29

## 2019-01-29 DIAGNOSIS — I26.99 OTHER ACUTE PULMONARY EMBOLISM WITHOUT ACUTE COR PULMONALE (HCC): ICD-10-CM

## 2019-01-29 NOTE — PROGRESS NOTES
Pt referred to [facility] and was already being managed by Dr Shellie De La Fuente, RUSS with [facility]. Will resolve episode.

## 2019-02-05 ENCOUNTER — READMISSION MANAGEMENT (OUTPATIENT)
Dept: CALL CENTER | Facility: HOSPITAL | Age: 79
End: 2019-02-05

## 2019-02-05 NOTE — OUTREACH NOTE
"Medical Week 2 Survey      Responses   Facility patient discharged from?  [facility]   Does the patient have one of the following disease processes/diagnoses(primary or secondary)?  Other   Week 2 attempt successful?  Yes   Call start time  1133   Discharge diagnosis  pluerisy   Call end time  1139   Person spoke with today (if not patient) and relationship  Wife   Meds reviewed with patient/caregiver?  Yes   Is the patient taking all medications as directed (includes completed medication regime)?  Yes   Medication comments  started on new med that helps breathing   Has the patient kept scheduled appointments due by today?  Yes   What is the Home health agency?   [facility]   Has home health visited the patient within 72 hours of discharge?  Yes   Home health comments  HH still coming   What is the patient's perception of their health status since discharge?  Improving   Additional teach back comments  Wife say pt is \"better\". Breathing \"pretty good\". no chest pain. Still having pain in legs.    Week 2 Call Completed?  Yes          Ananya Bell RN  "

## 2019-02-13 ENCOUNTER — READMISSION MANAGEMENT (OUTPATIENT)
Dept: CALL CENTER | Facility: HOSPITAL | Age: 79
End: 2019-02-13

## 2019-02-13 NOTE — OUTREACH NOTE
Medical Week 3 Survey      Responses   Facility patient discharged from?  [facility]   Does the patient have one of the following disease processes/diagnoses(primary or secondary)?  Other   Week 3 attempt successful?  Yes   Call start time  0951   Call end time  0954   Discharge diagnosis  pltroyisy   Is patient permission given to speak with other caregiver?  Yes   List who call center can speak with  Wife   Person spoke with today (if not patient) and relationship  Wife   Meds reviewed with patient/caregiver?  Yes   Is the patient having any side effects they believe may be caused by any medication additions or changes?  No   Does the patient have all medications ordered at discharge?  Yes   Is the patient taking all medications as directed (includes completed medication regime)?  Yes   Comments regarding appointments  [facility]   Does the patient have a primary care provider?   Yes   Does the patient have an appointment with their PCP within 7 days of discharge?  Yes   Has the patient kept scheduled appointments due by today?  Yes   What is the Home health agency?   [facility]   Has home health visited the patient within 72 hours of discharge?  Yes   Home health comments  HH still coming   Psychosocial issues?  No   Did the patient receive a copy of their discharge instructions?  Yes   Nursing interventions  Reviewed instructions with patient   What is the patient's perception of their health status since discharge?  Improving   Is the patient/caregiver able to teach back signs and symptoms related to disease process for when to call PCP?  Yes   Is the patient/caregiver able to teach back signs and symptoms related to disease process for when to call 911?  Yes   Is the patient/caregiver able to teach back the hierarchy of who to call/visit for symptoms/problems? PCP, Specialist, Home health nurse, Urgent Care, ED, 911  Yes   Additional teach back comments  Wife reports is doing better. Still with mobility issues and  pain with knees and legs. [facility] and HH still coming to house.    Week 3 Call Completed?  Yes          Ezequiel Armendariz RN

## 2019-02-20 ENCOUNTER — READMISSION MANAGEMENT (OUTPATIENT)
Dept: CALL CENTER | Facility: HOSPITAL | Age: 79
End: 2019-02-20

## 2019-02-20 NOTE — OUTREACH NOTE
Medical Week 4 Survey      Responses   Facility patient discharged from?  [facility]   Does the patient have one of the following disease processes/diagnoses(primary or secondary)?  Other   Week 4 attempt successful?  Yes   Call start time  0804   Call end time  0808   Discharge diagnosis  pluerisy   Person spoke with today (if not patient) and relationship  Wife, Lamar Foley reviewed with patient/caregiver?  Yes   Is the patient taking all medications as directed (includes completed medication regime)?  Yes   Has the patient kept scheduled appointments due by today?  Yes   Comments  [facility]   Is the patient still receiving Home Health Services?  Yes   Home health comments  HH still coming   Comments  Has the hospital bed   What is the patient's perception of their health status since discharge?  Improving   Additional teach back comments  Wife cares for patient. She feeds patient, turns and care for him.   Week 4 Call Completed?  Yes   Would the patient like one additional call?  No   Graduated  Yes   Did the patient feel the follow up calls were helpful during their recovery period?  Yes   Was the number of calls appropriate?  Yes   Wrap up additional comments  Aware of the Nurse Call Line          Sylvia Sanders RN

## 2020-01-01 ENCOUNTER — APPOINTMENT (OUTPATIENT)
Dept: GENERAL RADIOLOGY | Facility: HOSPITAL | Age: 80
End: 2020-01-01

## 2020-01-01 ENCOUNTER — APPOINTMENT (OUTPATIENT)
Dept: ULTRASOUND IMAGING | Facility: HOSPITAL | Age: 80
End: 2020-01-01

## 2020-01-01 ENCOUNTER — HOSPITAL ENCOUNTER (INPATIENT)
Facility: HOSPITAL | Age: 80
LOS: 19 days | Discharge: NURSING, REHAB OR LONG-TERM CARE | End: 2020-10-13
Attending: EMERGENCY MEDICINE | Admitting: FAMILY MEDICINE

## 2020-01-01 ENCOUNTER — HOSPITAL ENCOUNTER (INPATIENT)
Facility: HOSPITAL | Age: 80
LOS: 7 days | Discharge: NURSING, REHAB OR LONG-TERM CARE | End: 2020-08-28
Attending: STUDENT IN AN ORGANIZED HEALTH CARE EDUCATION/TRAINING PROGRAM | Admitting: HOSPITALIST

## 2020-01-01 ENCOUNTER — APPOINTMENT (OUTPATIENT)
Dept: CARDIOLOGY | Facility: HOSPITAL | Age: 80
End: 2020-01-01

## 2020-01-01 ENCOUNTER — APPOINTMENT (OUTPATIENT)
Dept: INTERVENTIONAL RADIOLOGY/VASCULAR | Facility: HOSPITAL | Age: 80
End: 2020-01-01

## 2020-01-01 ENCOUNTER — HOSPITAL ENCOUNTER (OUTPATIENT)
Facility: HOSPITAL | Age: 80
Discharge: HOME | End: 2020-09-11
Attending: INTERNAL MEDICINE | Admitting: INTERNAL MEDICINE

## 2020-01-01 ENCOUNTER — HOSPITAL ENCOUNTER (OUTPATIENT)
Facility: HOSPITAL | Age: 80
Discharge: HOME | End: 2020-11-03
Attending: INTERNAL MEDICINE | Admitting: INTERNAL MEDICINE

## 2020-01-01 VITALS
OXYGEN SATURATION: 98 % | SYSTOLIC BLOOD PRESSURE: 166 MMHG | TEMPERATURE: 99.5 F | WEIGHT: 209.7 LBS | HEART RATE: 83 BPM | RESPIRATION RATE: 18 BRPM | BODY MASS INDEX: 28.4 KG/M2 | DIASTOLIC BLOOD PRESSURE: 77 MMHG | HEIGHT: 72 IN

## 2020-01-01 VITALS — HEART RATE: 67 BPM

## 2020-01-01 VITALS
HEART RATE: 67 BPM | SYSTOLIC BLOOD PRESSURE: 142 MMHG | HEIGHT: 76 IN | WEIGHT: 212.8 LBS | BODY MASS INDEX: 25.91 KG/M2 | OXYGEN SATURATION: 99 % | TEMPERATURE: 97.9 F | DIASTOLIC BLOOD PRESSURE: 68 MMHG | RESPIRATION RATE: 18 BRPM

## 2020-01-01 DIAGNOSIS — N32.89 BLADDER SPASM: ICD-10-CM

## 2020-01-01 DIAGNOSIS — Z74.09 IMPAIRED MOBILITY AND ACTIVITIES OF DAILY LIVING: ICD-10-CM

## 2020-01-01 DIAGNOSIS — R26.89 DECREASED FUNCTIONAL MOBILITY: ICD-10-CM

## 2020-01-01 DIAGNOSIS — R13.10 DYSPHAGIA, UNSPECIFIED TYPE: ICD-10-CM

## 2020-01-01 DIAGNOSIS — R09.02 HYPOXIA: ICD-10-CM

## 2020-01-01 DIAGNOSIS — R77.8 ELEVATED TROPONIN: ICD-10-CM

## 2020-01-01 DIAGNOSIS — J18.9 PNEUMONIA OF BOTH LOWER LOBES DUE TO INFECTIOUS ORGANISM: Primary | ICD-10-CM

## 2020-01-01 DIAGNOSIS — Z78.9 IMPAIRED MOBILITY AND ACTIVITIES OF DAILY LIVING: ICD-10-CM

## 2020-01-01 DIAGNOSIS — Z74.09 IMPAIRED PHYSICAL MOBILITY: ICD-10-CM

## 2020-01-01 DIAGNOSIS — R06.02 SOB (SHORTNESS OF BREATH): ICD-10-CM

## 2020-01-01 DIAGNOSIS — J90 PLEURAL EFFUSION: ICD-10-CM

## 2020-01-01 DIAGNOSIS — Z74.09 MOBILITY IMPAIRED: ICD-10-CM

## 2020-01-01 DIAGNOSIS — I50.9 CONGESTIVE HEART FAILURE, UNSPECIFIED HF CHRONICITY, UNSPECIFIED HEART FAILURE TYPE (HCC): ICD-10-CM

## 2020-01-01 DIAGNOSIS — J45.901 EXACERBATION OF ASTHMA, UNSPECIFIED ASTHMA SEVERITY, UNSPECIFIED WHETHER PERSISTENT: Primary | ICD-10-CM

## 2020-01-01 LAB
25(OH)D3 SERPL-MCNC: 43.8 NG/ML (ref 30–100)
ACANTHOCYTES BLD QL SMEAR: NORMAL
ALBUMIN SERPL-MCNC: 2.8 G/DL (ref 3.5–5.2)
ALBUMIN SERPL-MCNC: 2.9 G/DL (ref 3.5–5.2)
ALBUMIN SERPL-MCNC: 2.9 G/DL (ref 3.5–5.2)
ALBUMIN SERPL-MCNC: 3.1 G/DL (ref 3.5–5.2)
ALBUMIN SERPL-MCNC: 3.2 G/DL (ref 3.5–5.2)
ALBUMIN SERPL-MCNC: 3.3 G/DL (ref 3.5–5.2)
ALBUMIN SERPL-MCNC: 3.4 G/DL (ref 3.5–5.2)
ALBUMIN SERPL-MCNC: 3.4 G/DL (ref 3.5–5.2)
ALBUMIN SERPL-MCNC: 3.6 G/DL (ref 3.5–5.2)
ALBUMIN SERPL-MCNC: 3.6 G/DL (ref 3.5–5.2)
ALBUMIN SERPL-MCNC: 3.7 G/DL (ref 3.5–5.2)
ALBUMIN SERPL-MCNC: 3.8 G/DL (ref 3.5–5.2)
ALBUMIN SERPL-MCNC: 3.9 G/DL (ref 3.5–5.2)
ALBUMIN SERPL-MCNC: 4 G/DL (ref 3.5–5.2)
ALBUMIN/GLOB SERPL: 1 G/DL
ALBUMIN/GLOB SERPL: 1.1 G/DL
ALBUMIN/GLOB SERPL: 1.2 G/DL
ALBUMIN/GLOB SERPL: 1.3 G/DL
ALBUMIN/GLOB SERPL: 1.4 G/DL
ALBUMIN/GLOB SERPL: 1.5 G/DL
ALP SERPL-CCNC: 60 U/L (ref 39–117)
ALP SERPL-CCNC: 62 U/L (ref 39–117)
ALP SERPL-CCNC: 64 U/L (ref 39–117)
ALP SERPL-CCNC: 65 U/L (ref 39–117)
ALP SERPL-CCNC: 68 U/L (ref 39–117)
ALP SERPL-CCNC: 69 U/L (ref 39–117)
ALP SERPL-CCNC: 70 U/L (ref 39–117)
ALP SERPL-CCNC: 71 U/L (ref 39–117)
ALP SERPL-CCNC: 72 U/L (ref 39–117)
ALP SERPL-CCNC: 72 U/L (ref 39–117)
ALP SERPL-CCNC: 73 U/L (ref 39–117)
ALP SERPL-CCNC: 74 U/L (ref 39–117)
ALP SERPL-CCNC: 74 U/L (ref 39–117)
ALP SERPL-CCNC: 75 U/L (ref 39–117)
ALP SERPL-CCNC: 75 U/L (ref 39–117)
ALP SERPL-CCNC: 76 U/L (ref 39–117)
ALP SERPL-CCNC: 77 U/L (ref 39–117)
ALP SERPL-CCNC: 77 U/L (ref 39–117)
ALP SERPL-CCNC: 78 U/L (ref 39–117)
ALP SERPL-CCNC: 84 U/L (ref 39–117)
ALP SERPL-CCNC: 84 U/L (ref 39–117)
ALP SERPL-CCNC: 88 U/L (ref 39–117)
ALP SERPL-CCNC: 90 U/L (ref 39–117)
ALP SERPL-CCNC: 90 U/L (ref 39–117)
ALT SERPL W P-5'-P-CCNC: 10 U/L (ref 1–41)
ALT SERPL W P-5'-P-CCNC: 11 U/L (ref 1–41)
ALT SERPL W P-5'-P-CCNC: 12 U/L (ref 1–41)
ALT SERPL W P-5'-P-CCNC: 13 U/L (ref 1–41)
ALT SERPL W P-5'-P-CCNC: 14 U/L (ref 1–41)
ALT SERPL W P-5'-P-CCNC: 14 U/L (ref 1–41)
ALT SERPL W P-5'-P-CCNC: 15 U/L (ref 1–41)
ALT SERPL W P-5'-P-CCNC: 15 U/L (ref 1–41)
ALT SERPL W P-5'-P-CCNC: 5 U/L (ref 1–41)
ALT SERPL W P-5'-P-CCNC: 5 U/L (ref 1–41)
ALT SERPL W P-5'-P-CCNC: 6 U/L (ref 1–41)
ALT SERPL W P-5'-P-CCNC: 7 U/L (ref 1–41)
ALT SERPL W P-5'-P-CCNC: 8 U/L (ref 1–41)
ALT SERPL W P-5'-P-CCNC: 9 U/L (ref 1–41)
ALT SERPL W P-5'-P-CCNC: <5 U/L (ref 1–41)
AMORPH URATE CRY URNS QL MICRO: ABNORMAL /HPF
ANION GAP SERPL CALCULATED.3IONS-SCNC: 10 MMOL/L (ref 5–15)
ANION GAP SERPL CALCULATED.3IONS-SCNC: 11 MMOL/L (ref 5–15)
ANION GAP SERPL CALCULATED.3IONS-SCNC: 12 MMOL/L (ref 5–15)
ANION GAP SERPL CALCULATED.3IONS-SCNC: 13 MMOL/L (ref 5–15)
ANION GAP SERPL CALCULATED.3IONS-SCNC: 14 MMOL/L (ref 5–15)
ANION GAP SERPL CALCULATED.3IONS-SCNC: 15 MMOL/L (ref 5–15)
ANION GAP SERPL CALCULATED.3IONS-SCNC: 16 MMOL/L (ref 5–15)
ANION GAP SERPL CALCULATED.3IONS-SCNC: 16 MMOL/L (ref 5–15)
ANION GAP SERPL CALCULATED.3IONS-SCNC: 7 MMOL/L (ref 5–15)
ANION GAP SERPL CALCULATED.3IONS-SCNC: 8 MMOL/L (ref 5–15)
ANION GAP SERPL CALCULATED.3IONS-SCNC: 9 MMOL/L (ref 5–15)
ANISOCYTOSIS BLD QL: NORMAL
AST SERPL-CCNC: 10 U/L (ref 1–40)
AST SERPL-CCNC: 11 U/L (ref 1–40)
AST SERPL-CCNC: 12 U/L (ref 1–40)
AST SERPL-CCNC: 14 U/L (ref 1–40)
AST SERPL-CCNC: 14 U/L (ref 1–40)
AST SERPL-CCNC: 15 U/L (ref 1–40)
AST SERPL-CCNC: 16 U/L (ref 1–40)
AST SERPL-CCNC: 17 U/L (ref 1–40)
AST SERPL-CCNC: 17 U/L (ref 1–40)
AST SERPL-CCNC: 18 U/L (ref 1–40)
AST SERPL-CCNC: 19 U/L (ref 1–40)
AST SERPL-CCNC: 19 U/L (ref 1–40)
AST SERPL-CCNC: 20 U/L (ref 1–40)
AST SERPL-CCNC: 23 U/L (ref 1–40)
AST SERPL-CCNC: 23 U/L (ref 1–40)
AST SERPL-CCNC: 7 U/L (ref 1–40)
AST SERPL-CCNC: 8 U/L (ref 1–40)
AST SERPL-CCNC: 9 U/L (ref 1–40)
AST SERPL-CCNC: 9 U/L (ref 1–40)
B PARAPERT DNA SPEC QL NAA+PROBE: NOT DETECTED
B PERT DNA SPEC QL NAA+PROBE: NOT DETECTED
BACTERIA BLD CULT: ABNORMAL
BACTERIA SPEC AEROBE CULT: ABNORMAL
BACTERIA SPEC AEROBE CULT: NO GROWTH
BACTERIA SPEC AEROBE CULT: NORMAL
BACTERIA UR QL AUTO: ABNORMAL /HPF
BASOPHILS # BLD AUTO: 0.01 10*3/MM3 (ref 0–0.2)
BASOPHILS # BLD AUTO: 0.02 10*3/MM3 (ref 0–0.2)
BASOPHILS # BLD AUTO: 0.03 10*3/MM3 (ref 0–0.2)
BASOPHILS # BLD AUTO: 0.04 10*3/MM3 (ref 0–0.2)
BASOPHILS NFR BLD AUTO: 0.1 % (ref 0–1.5)
BASOPHILS NFR BLD AUTO: 0.2 % (ref 0–1.5)
BASOPHILS NFR BLD AUTO: 0.3 % (ref 0–1.5)
BASOPHILS NFR BLD AUTO: 0.4 % (ref 0–1.5)
BASOPHILS NFR BLD AUTO: 0.5 % (ref 0–1.5)
BASOPHILS NFR BLD AUTO: 0.6 % (ref 0–1.5)
BASOPHILS NFR BLD AUTO: 0.6 % (ref 0–1.5)
BASOPHILS NFR BLD AUTO: 0.7 % (ref 0–1.5)
BH CV ECHO MEAS - ACS: 1.7 CM
BH CV ECHO MEAS - AI DEC SLOPE: 530 CM/SEC^2
BH CV ECHO MEAS - AI MAX PG: 66.6 MMHG
BH CV ECHO MEAS - AI MAX VEL: 408 CM/SEC
BH CV ECHO MEAS - AI P1/2T: 225.5 MSEC
BH CV ECHO MEAS - AO ISTHMUS: 3.3 CM
BH CV ECHO MEAS - AO MAX PG (FULL): 5.3 MMHG
BH CV ECHO MEAS - AO MAX PG: 7.5 MMHG
BH CV ECHO MEAS - AO MEAN PG (FULL): 4 MMHG
BH CV ECHO MEAS - AO MEAN PG: 5 MMHG
BH CV ECHO MEAS - AO ROOT AREA (BSA CORRECTED): 1.8
BH CV ECHO MEAS - AO ROOT AREA: 9.6 CM^2
BH CV ECHO MEAS - AO ROOT DIAM: 3.5 CM
BH CV ECHO MEAS - AO V2 MAX: 137 CM/SEC
BH CV ECHO MEAS - AO V2 MEAN: 99.7 CM/SEC
BH CV ECHO MEAS - AO V2 VTI: 17.1 CM
BH CV ECHO MEAS - ASC AORTA: 3.8 CM
BH CV ECHO MEAS - AVA(I,A): 2.1 CM^2
BH CV ECHO MEAS - AVA(I,D): 2.1 CM^2
BH CV ECHO MEAS - AVA(V,A): 2.7 CM^2
BH CV ECHO MEAS - AVA(V,D): 2.7 CM^2
BH CV ECHO MEAS - BSA(HAYCOCK): 1.9 M^2
BH CV ECHO MEAS - BSA: 1.9 M^2
BH CV ECHO MEAS - BZI_BMI: 21.7 KILOGRAMS/M^2
BH CV ECHO MEAS - BZI_METRIC_HEIGHT: 182.9 CM
BH CV ECHO MEAS - BZI_METRIC_WEIGHT: 72.6 KG
BH CV ECHO MEAS - EDV(CUBED): 308.9 ML
BH CV ECHO MEAS - EDV(MOD-SP2): 212 ML
BH CV ECHO MEAS - EDV(MOD-SP4): 193 ML
BH CV ECHO MEAS - EDV(TEICH): 236.1 ML
BH CV ECHO MEAS - EF(CUBED): 26.5 %
BH CV ECHO MEAS - EF(MOD-SP2): 12.7 %
BH CV ECHO MEAS - EF(MOD-SP4): 13.5 %
BH CV ECHO MEAS - EF(TEICH): 20.8 %
BH CV ECHO MEAS - ESV(CUBED): 227 ML
BH CV ECHO MEAS - ESV(MOD-SP2): 185 ML
BH CV ECHO MEAS - ESV(MOD-SP4): 167 ML
BH CV ECHO MEAS - ESV(TEICH): 186.9 ML
BH CV ECHO MEAS - FS: 9.8 %
BH CV ECHO MEAS - IVS/LVPW: 0.89
BH CV ECHO MEAS - IVSD: 1 CM
BH CV ECHO MEAS - LA DIMENSION: 5.3 CM
BH CV ECHO MEAS - LA/AO: 1.5
BH CV ECHO MEAS - LV DIASTOLIC VOL/BSA (35-75): 99.6 ML/M^2
BH CV ECHO MEAS - LV MASS(C)D: 344.1 GRAMS
BH CV ECHO MEAS - LV MASS(C)DI: 177.6 GRAMS/M^2
BH CV ECHO MEAS - LV MAX PG: 2.3 MMHG
BH CV ECHO MEAS - LV MEAN PG: 1 MMHG
BH CV ECHO MEAS - LV SYSTOLIC VOL/BSA (12-30): 86.2 ML/M^2
BH CV ECHO MEAS - LV V1 MAX: 75.1 CM/SEC
BH CV ECHO MEAS - LV V1 MEAN: 46 CM/SEC
BH CV ECHO MEAS - LV V1 VTI: 7.2 CM
BH CV ECHO MEAS - LVIDD: 6.8 CM
BH CV ECHO MEAS - LVIDS: 6.1 CM
BH CV ECHO MEAS - LVLD AP2: 10.1 CM
BH CV ECHO MEAS - LVLD AP4: 10.4 CM
BH CV ECHO MEAS - LVLS AP2: 10.3 CM
BH CV ECHO MEAS - LVLS AP4: 10.3 CM
BH CV ECHO MEAS - LVOT AREA (M): 4.9 CM^2
BH CV ECHO MEAS - LVOT AREA: 4.9 CM^2
BH CV ECHO MEAS - LVOT DIAM: 2.5 CM
BH CV ECHO MEAS - LVPWD: 1.2 CM
BH CV ECHO MEAS - MR MAX PG: 101.6 MMHG
BH CV ECHO MEAS - MR MAX VEL: 504 CM/SEC
BH CV ECHO MEAS - MV A MAX VEL: 81 CM/SEC
BH CV ECHO MEAS - MV DEC SLOPE: 903 CM/SEC^2
BH CV ECHO MEAS - MV E MAX VEL: 98.5 CM/SEC
BH CV ECHO MEAS - MV E/A: 1.2
BH CV ECHO MEAS - MV MAX PG: 11.8 MMHG
BH CV ECHO MEAS - MV MEAN PG: 5 MMHG
BH CV ECHO MEAS - MV P1/2T MAX VEL: 125 CM/SEC
BH CV ECHO MEAS - MV P1/2T: 40.5 MSEC
BH CV ECHO MEAS - MV V2 MAX: 172 CM/SEC
BH CV ECHO MEAS - MV V2 MEAN: 99.6 CM/SEC
BH CV ECHO MEAS - MV V2 VTI: 16.6 CM
BH CV ECHO MEAS - MVA P1/2T LCG: 1.8 CM^2
BH CV ECHO MEAS - MVA(P1/2T): 5.4 CM^2
BH CV ECHO MEAS - MVA(VTI): 2.1 CM^2
BH CV ECHO MEAS - PA MAX PG: 3.9 MMHG
BH CV ECHO MEAS - PA V2 MAX: 98.8 CM/SEC
BH CV ECHO MEAS - RAP SYSTOLE: 5 MMHG
BH CV ECHO MEAS - RVDD: 2.8 CM
BH CV ECHO MEAS - RVSP: 61.4 MMHG
BH CV ECHO MEAS - SI(AO): 84.9 ML/M^2
BH CV ECHO MEAS - SI(CUBED): 42.3 ML/M^2
BH CV ECHO MEAS - SI(LVOT): 18.2 ML/M^2
BH CV ECHO MEAS - SI(MOD-SP2): 13.9 ML/M^2
BH CV ECHO MEAS - SI(MOD-SP4): 13.4 ML/M^2
BH CV ECHO MEAS - SI(TEICH): 25.4 ML/M^2
BH CV ECHO MEAS - SV(AO): 164.5 ML
BH CV ECHO MEAS - SV(CUBED): 81.9 ML
BH CV ECHO MEAS - SV(LVOT): 35.2 ML
BH CV ECHO MEAS - SV(MOD-SP2): 27 ML
BH CV ECHO MEAS - SV(MOD-SP4): 26 ML
BH CV ECHO MEAS - SV(TEICH): 49.1 ML
BH CV ECHO MEAS - TR MAX VEL: 375 CM/SEC
BILIRUB SERPL-MCNC: 0.4 MG/DL (ref 0–1.2)
BILIRUB SERPL-MCNC: 0.5 MG/DL (ref 0–1.2)
BILIRUB SERPL-MCNC: 0.6 MG/DL (ref 0–1.2)
BILIRUB SERPL-MCNC: 0.6 MG/DL (ref 0–1.2)
BILIRUB SERPL-MCNC: 0.7 MG/DL (ref 0–1.2)
BILIRUB UR QL STRIP: NEGATIVE
BUN SERPL-MCNC: 48 MG/DL (ref 8–23)
BUN SERPL-MCNC: 50 MG/DL (ref 8–23)
BUN SERPL-MCNC: 51 MG/DL (ref 8–23)
BUN SERPL-MCNC: 52 MG/DL (ref 8–23)
BUN SERPL-MCNC: 55 MG/DL (ref 8–23)
BUN SERPL-MCNC: 56 MG/DL (ref 8–23)
BUN SERPL-MCNC: 59 MG/DL (ref 8–23)
BUN SERPL-MCNC: 60 MG/DL (ref 8–23)
BUN SERPL-MCNC: 61 MG/DL (ref 8–23)
BUN SERPL-MCNC: 63 MG/DL (ref 8–23)
BUN SERPL-MCNC: 64 MG/DL (ref 8–23)
BUN SERPL-MCNC: 66 MG/DL (ref 8–23)
BUN SERPL-MCNC: 67 MG/DL (ref 8–23)
BUN SERPL-MCNC: 69 MG/DL (ref 8–23)
BUN SERPL-MCNC: 69 MG/DL (ref 8–23)
BUN SERPL-MCNC: 70 MG/DL (ref 8–23)
BUN SERPL-MCNC: 70 MG/DL (ref 8–23)
BUN SERPL-MCNC: 71 MG/DL (ref 8–23)
BUN SERPL-MCNC: 72 MG/DL (ref 8–23)
BUN SERPL-MCNC: 73 MG/DL (ref 8–23)
BUN SERPL-MCNC: 74 MG/DL (ref 8–23)
BUN SERPL-MCNC: 75 MG/DL (ref 8–23)
BUN SERPL-MCNC: 76 MG/DL (ref 8–23)
BUN SERPL-MCNC: 77 MG/DL (ref 8–23)
BUN SERPL-MCNC: 78 MG/DL (ref 8–23)
BUN SERPL-MCNC: 79 MG/DL (ref 8–23)
BUN SERPL-MCNC: 79 MG/DL (ref 8–23)
BUN SERPL-MCNC: 80 MG/DL (ref 8–23)
BUN SERPL-MCNC: 81 MG/DL (ref 8–23)
BUN SERPL-MCNC: 81 MG/DL (ref 8–23)
BUN SERPL-MCNC: 82 MG/DL (ref 8–23)
BUN SERPL-MCNC: 82 MG/DL (ref 8–23)
BUN SERPL-MCNC: 83 MG/DL (ref 8–23)
BUN SERPL-MCNC: 84 MG/DL (ref 8–23)
BUN/CREAT SERPL: 17.8 (ref 7–25)
BUN/CREAT SERPL: 17.9 (ref 7–25)
BUN/CREAT SERPL: 18.1 (ref 7–25)
BUN/CREAT SERPL: 18.3 (ref 7–25)
BUN/CREAT SERPL: 19.6 (ref 7–25)
BUN/CREAT SERPL: 19.8 (ref 7–25)
BUN/CREAT SERPL: 20 (ref 7–25)
BUN/CREAT SERPL: 20.1 (ref 7–25)
BUN/CREAT SERPL: 20.2 (ref 7–25)
BUN/CREAT SERPL: 20.3 (ref 7–25)
BUN/CREAT SERPL: 20.5 (ref 7–25)
BUN/CREAT SERPL: 20.6 (ref 7–25)
BUN/CREAT SERPL: 20.7 (ref 7–25)
BUN/CREAT SERPL: 20.8 (ref 7–25)
BUN/CREAT SERPL: 20.8 (ref 7–25)
BUN/CREAT SERPL: 20.9 (ref 7–25)
BUN/CREAT SERPL: 21 (ref 7–25)
BUN/CREAT SERPL: 21.1 (ref 7–25)
BUN/CREAT SERPL: 21.1 (ref 7–25)
BUN/CREAT SERPL: 21.6 (ref 7–25)
BUN/CREAT SERPL: 21.6 (ref 7–25)
BUN/CREAT SERPL: 22 (ref 7–25)
BUN/CREAT SERPL: 22.3 (ref 7–25)
BUN/CREAT SERPL: 22.4 (ref 7–25)
BUN/CREAT SERPL: 22.5 (ref 7–25)
BUN/CREAT SERPL: 22.6 (ref 7–25)
BUN/CREAT SERPL: 22.8 (ref 7–25)
BUN/CREAT SERPL: 22.8 (ref 7–25)
BUN/CREAT SERPL: 22.9 (ref 7–25)
BUN/CREAT SERPL: 23 (ref 7–25)
BUN/CREAT SERPL: 23.3 (ref 7–25)
BUN/CREAT SERPL: 23.3 (ref 7–25)
BUN/CREAT SERPL: 23.8 (ref 7–25)
BUN/CREAT SERPL: 23.9 (ref 7–25)
BUN/CREAT SERPL: 24.2 (ref 7–25)
BUN/CREAT SERPL: 24.4 (ref 7–25)
BUN/CREAT SERPL: 24.6 (ref 7–25)
BUN/CREAT SERPL: 25.3 (ref 7–25)
BUN/CREAT SERPL: 25.5 (ref 7–25)
BUN/CREAT SERPL: 26.1 (ref 7–25)
BUN/CREAT SERPL: 26.3 (ref 7–25)
BUN/CREAT SERPL: 26.6 (ref 7–25)
BUN/CREAT SERPL: 27 (ref 7–25)
BUN/CREAT SERPL: 28.6 (ref 7–25)
BUN/CREAT SERPL: 28.7 (ref 7–25)
BUN/CREAT SERPL: 29.2 (ref 7–25)
BUN/CREAT SERPL: 29.6 (ref 7–25)
BUN/CREAT SERPL: 30.7 (ref 7–25)
C PNEUM DNA NPH QL NAA+NON-PROBE: NOT DETECTED
CALCIUM SPEC-SCNC: 8 MG/DL (ref 8.6–10.5)
CALCIUM SPEC-SCNC: 8.1 MG/DL (ref 8.6–10.5)
CALCIUM SPEC-SCNC: 8.2 MG/DL (ref 8.6–10.5)
CALCIUM SPEC-SCNC: 8.2 MG/DL (ref 8.6–10.5)
CALCIUM SPEC-SCNC: 8.3 MG/DL (ref 8.6–10.5)
CALCIUM SPEC-SCNC: 8.4 MG/DL (ref 8.6–10.5)
CALCIUM SPEC-SCNC: 8.4 MG/DL (ref 8.6–10.5)
CALCIUM SPEC-SCNC: 8.5 MG/DL (ref 8.6–10.5)
CALCIUM SPEC-SCNC: 8.5 MG/DL (ref 8.6–10.5)
CALCIUM SPEC-SCNC: 8.6 MG/DL (ref 8.6–10.5)
CALCIUM SPEC-SCNC: 8.7 MG/DL (ref 8.6–10.5)
CALCIUM SPEC-SCNC: 8.8 MG/DL (ref 8.6–10.5)
CALCIUM SPEC-SCNC: 8.9 MG/DL (ref 8.6–10.5)
CALCIUM SPEC-SCNC: 9 MG/DL (ref 8.6–10.5)
CALCIUM SPEC-SCNC: 9.1 MG/DL (ref 8.6–10.5)
CALCIUM SPEC-SCNC: 9.2 MG/DL (ref 8.6–10.5)
CALCIUM SPEC-SCNC: 9.3 MG/DL (ref 8.6–10.5)
CALCIUM SPEC-SCNC: 9.4 MG/DL (ref 8.6–10.5)
CALCIUM SPEC-SCNC: 9.4 MG/DL (ref 8.6–10.5)
CALCIUM SPEC-SCNC: 9.5 MG/DL (ref 8.6–10.5)
CALCIUM SPEC-SCNC: 9.5 MG/DL (ref 8.6–10.5)
CHLORIDE SERPL-SCNC: 100 MMOL/L (ref 98–107)
CHLORIDE SERPL-SCNC: 101 MMOL/L (ref 98–107)
CHLORIDE SERPL-SCNC: 101 MMOL/L (ref 98–107)
CHLORIDE SERPL-SCNC: 102 MMOL/L (ref 98–107)
CHLORIDE SERPL-SCNC: 103 MMOL/L (ref 98–107)
CHLORIDE SERPL-SCNC: 104 MMOL/L (ref 98–107)
CHLORIDE SERPL-SCNC: 105 MMOL/L (ref 98–107)
CHLORIDE SERPL-SCNC: 106 MMOL/L (ref 98–107)
CHLORIDE SERPL-SCNC: 106 MMOL/L (ref 98–107)
CHLORIDE SERPL-SCNC: 107 MMOL/L (ref 98–107)
CHLORIDE SERPL-SCNC: 108 MMOL/L (ref 98–107)
CHLORIDE SERPL-SCNC: 109 MMOL/L (ref 98–107)
CHLORIDE SERPL-SCNC: 110 MMOL/L (ref 98–107)
CHLORIDE SERPL-SCNC: 111 MMOL/L (ref 98–107)
CHLORIDE SERPL-SCNC: 92 MMOL/L (ref 98–107)
CHLORIDE SERPL-SCNC: 92 MMOL/L (ref 98–107)
CHLORIDE SERPL-SCNC: 93 MMOL/L (ref 98–107)
CHLORIDE SERPL-SCNC: 94 MMOL/L (ref 98–107)
CHLORIDE SERPL-SCNC: 96 MMOL/L (ref 98–107)
CHLORIDE SERPL-SCNC: 96 MMOL/L (ref 98–107)
CHLORIDE SERPL-SCNC: 97 MMOL/L (ref 98–107)
CHLORIDE SERPL-SCNC: 98 MMOL/L (ref 98–107)
CHLORIDE SERPL-SCNC: 98 MMOL/L (ref 98–107)
CHLORIDE SERPL-SCNC: 99 MMOL/L (ref 98–107)
CLARITY UR: ABNORMAL
CO2 SERPL-SCNC: 16 MMOL/L (ref 22–29)
CO2 SERPL-SCNC: 18 MMOL/L (ref 22–29)
CO2 SERPL-SCNC: 18 MMOL/L (ref 22–29)
CO2 SERPL-SCNC: 19 MMOL/L (ref 22–29)
CO2 SERPL-SCNC: 20 MMOL/L (ref 22–29)
CO2 SERPL-SCNC: 21 MMOL/L (ref 22–29)
CO2 SERPL-SCNC: 22 MMOL/L (ref 22–29)
CO2 SERPL-SCNC: 22 MMOL/L (ref 22–29)
CO2 SERPL-SCNC: 23 MMOL/L (ref 22–29)
CO2 SERPL-SCNC: 24 MMOL/L (ref 22–29)
CO2 SERPL-SCNC: 25 MMOL/L (ref 22–29)
CO2 SERPL-SCNC: 25 MMOL/L (ref 22–29)
CO2 SERPL-SCNC: 26 MMOL/L (ref 22–29)
CO2 SERPL-SCNC: 27 MMOL/L (ref 22–29)
CO2 SERPL-SCNC: 28 MMOL/L (ref 22–29)
CO2 SERPL-SCNC: 29 MMOL/L (ref 22–29)
CO2 SERPL-SCNC: 30 MMOL/L (ref 22–29)
CO2 SERPL-SCNC: 30 MMOL/L (ref 22–29)
CO2 SERPL-SCNC: 31 MMOL/L (ref 22–29)
CO2 SERPL-SCNC: 31 MMOL/L (ref 22–29)
CO2 SERPL-SCNC: 33 MMOL/L (ref 22–29)
CO2 SERPL-SCNC: 35 MMOL/L (ref 22–29)
COLOR UR: YELLOW
CREAT SERPL-MCNC: 2.03 MG/DL (ref 0.76–1.27)
CREAT SERPL-MCNC: 2.11 MG/DL (ref 0.76–1.27)
CREAT SERPL-MCNC: 2.15 MG/DL (ref 0.76–1.27)
CREAT SERPL-MCNC: 2.26 MG/DL (ref 0.76–1.27)
CREAT SERPL-MCNC: 2.32 MG/DL (ref 0.76–1.27)
CREAT SERPL-MCNC: 2.47 MG/DL (ref 0.76–1.27)
CREAT SERPL-MCNC: 2.51 MG/DL (ref 0.76–1.27)
CREAT SERPL-MCNC: 2.61 MG/DL (ref 0.76–1.27)
CREAT SERPL-MCNC: 2.63 MG/DL (ref 0.76–1.27)
CREAT SERPL-MCNC: 2.66 MG/DL (ref 0.76–1.27)
CREAT SERPL-MCNC: 2.7 MG/DL (ref 0.76–1.27)
CREAT SERPL-MCNC: 2.74 MG/DL (ref 0.76–1.27)
CREAT SERPL-MCNC: 2.78 MG/DL (ref 0.76–1.27)
CREAT SERPL-MCNC: 2.8 MG/DL (ref 0.76–1.27)
CREAT SERPL-MCNC: 2.81 MG/DL (ref 0.76–1.27)
CREAT SERPL-MCNC: 2.83 MG/DL (ref 0.76–1.27)
CREAT SERPL-MCNC: 2.84 MG/DL (ref 0.76–1.27)
CREAT SERPL-MCNC: 2.85 MG/DL (ref 0.76–1.27)
CREAT SERPL-MCNC: 2.89 MG/DL (ref 0.76–1.27)
CREAT SERPL-MCNC: 2.9 MG/DL (ref 0.76–1.27)
CREAT SERPL-MCNC: 2.9 MG/DL (ref 0.76–1.27)
CREAT SERPL-MCNC: 2.95 MG/DL (ref 0.76–1.27)
CREAT SERPL-MCNC: 2.97 MG/DL (ref 0.76–1.27)
CREAT SERPL-MCNC: 2.98 MG/DL (ref 0.76–1.27)
CREAT SERPL-MCNC: 2.98 MG/DL (ref 0.76–1.27)
CREAT SERPL-MCNC: 3.03 MG/DL (ref 0.76–1.27)
CREAT SERPL-MCNC: 3.04 MG/DL (ref 0.76–1.27)
CREAT SERPL-MCNC: 3.05 MG/DL (ref 0.76–1.27)
CREAT SERPL-MCNC: 3.11 MG/DL (ref 0.76–1.27)
CREAT SERPL-MCNC: 3.12 MG/DL (ref 0.76–1.27)
CREAT SERPL-MCNC: 3.13 MG/DL (ref 0.76–1.27)
CREAT SERPL-MCNC: 3.18 MG/DL (ref 0.76–1.27)
CREAT SERPL-MCNC: 3.31 MG/DL (ref 0.76–1.27)
CREAT SERPL-MCNC: 3.39 MG/DL (ref 0.76–1.27)
CREAT SERPL-MCNC: 3.39 MG/DL (ref 0.76–1.27)
CREAT SERPL-MCNC: 3.47 MG/DL (ref 0.76–1.27)
CREAT SERPL-MCNC: 3.51 MG/DL (ref 0.76–1.27)
CREAT SERPL-MCNC: 3.52 MG/DL (ref 0.76–1.27)
CREAT SERPL-MCNC: 3.54 MG/DL (ref 0.76–1.27)
CREAT SERPL-MCNC: 3.63 MG/DL (ref 0.76–1.27)
CREAT SERPL-MCNC: 3.63 MG/DL (ref 0.76–1.27)
CREAT SERPL-MCNC: 3.67 MG/DL (ref 0.76–1.27)
CREAT SERPL-MCNC: 3.68 MG/DL (ref 0.76–1.27)
CREAT SERPL-MCNC: 3.69 MG/DL (ref 0.76–1.27)
CREAT SERPL-MCNC: 3.71 MG/DL (ref 0.76–1.27)
CREAT SERPL-MCNC: 3.72 MG/DL (ref 0.76–1.27)
CREAT SERPL-MCNC: 3.81 MG/DL (ref 0.76–1.27)
CREAT SERPL-MCNC: 3.82 MG/DL (ref 0.76–1.27)
CREAT SERPL-MCNC: 3.83 MG/DL (ref 0.76–1.27)
CREAT SERPL-MCNC: 3.95 MG/DL (ref 0.76–1.27)
CREAT SERPL-MCNC: 4 MG/DL (ref 0.76–1.27)
CREAT UR-MCNC: 37.1 MG/DL
CRP SERPL-MCNC: 3.63 MG/DL (ref 0–0.5)
D-LACTATE SERPL-SCNC: 1.4 MMOL/L (ref 0.5–2)
D-LACTATE SERPL-SCNC: 1.4 MMOL/L (ref 0.5–2)
D-LACTATE SERPL-SCNC: 2.1 MMOL/L (ref 0.5–2)
DEPRECATED RDW RBC AUTO: 42.6 FL (ref 37–54)
DEPRECATED RDW RBC AUTO: 43.2 FL (ref 37–54)
DEPRECATED RDW RBC AUTO: 44.1 FL (ref 37–54)
DEPRECATED RDW RBC AUTO: 44.2 FL (ref 37–54)
DEPRECATED RDW RBC AUTO: 44.3 FL (ref 37–54)
DEPRECATED RDW RBC AUTO: 44.3 FL (ref 37–54)
DEPRECATED RDW RBC AUTO: 45 FL (ref 37–54)
DEPRECATED RDW RBC AUTO: 45 FL (ref 37–54)
DEPRECATED RDW RBC AUTO: 45.1 FL (ref 37–54)
DEPRECATED RDW RBC AUTO: 45.5 FL (ref 37–54)
DEPRECATED RDW RBC AUTO: 46.2 FL (ref 37–54)
DEPRECATED RDW RBC AUTO: 46.4 FL (ref 37–54)
DEPRECATED RDW RBC AUTO: 46.5 FL (ref 37–54)
DEPRECATED RDW RBC AUTO: 46.8 FL (ref 37–54)
DEPRECATED RDW RBC AUTO: 46.8 FL (ref 37–54)
DEPRECATED RDW RBC AUTO: 47.1 FL (ref 37–54)
DEPRECATED RDW RBC AUTO: 47.3 FL (ref 37–54)
DEPRECATED RDW RBC AUTO: 47.4 FL (ref 37–54)
DEPRECATED RDW RBC AUTO: 47.5 FL (ref 37–54)
DEPRECATED RDW RBC AUTO: 47.5 FL (ref 37–54)
DEPRECATED RDW RBC AUTO: 47.9 FL (ref 37–54)
DEPRECATED RDW RBC AUTO: 47.9 FL (ref 37–54)
DEPRECATED RDW RBC AUTO: 48.1 FL (ref 37–54)
DEPRECATED RDW RBC AUTO: 48.5 FL (ref 37–54)
DEPRECATED RDW RBC AUTO: 48.6 FL (ref 37–54)
DEPRECATED RDW RBC AUTO: 48.6 FL (ref 37–54)
DEPRECATED RDW RBC AUTO: 48.7 FL (ref 37–54)
DEPRECATED RDW RBC AUTO: 49 FL (ref 37–54)
DEPRECATED RDW RBC AUTO: 49.1 FL (ref 37–54)
DEPRECATED RDW RBC AUTO: 49.2 FL (ref 37–54)
DEPRECATED RDW RBC AUTO: 49.3 FL (ref 37–54)
DEPRECATED RDW RBC AUTO: 49.4 FL (ref 37–54)
DEPRECATED RDW RBC AUTO: 49.5 FL (ref 37–54)
EOSINOPHIL # BLD AUTO: 0.01 10*3/MM3 (ref 0–0.4)
EOSINOPHIL # BLD AUTO: 0.03 10*3/MM3 (ref 0–0.4)
EOSINOPHIL # BLD AUTO: 0.04 10*3/MM3 (ref 0–0.4)
EOSINOPHIL # BLD AUTO: 0.07 10*3/MM3 (ref 0–0.4)
EOSINOPHIL # BLD AUTO: 0.21 10*3/MM3 (ref 0–0.4)
EOSINOPHIL # BLD AUTO: 0.21 10*3/MM3 (ref 0–0.4)
EOSINOPHIL # BLD AUTO: 0.24 10*3/MM3 (ref 0–0.4)
EOSINOPHIL # BLD AUTO: 0.29 10*3/MM3 (ref 0–0.4)
EOSINOPHIL # BLD AUTO: 0.31 10*3/MM3 (ref 0–0.4)
EOSINOPHIL # BLD AUTO: 0.31 10*3/MM3 (ref 0–0.4)
EOSINOPHIL # BLD AUTO: 0.32 10*3/MM3 (ref 0–0.4)
EOSINOPHIL # BLD AUTO: 0.33 10*3/MM3 (ref 0–0.4)
EOSINOPHIL # BLD AUTO: 0.33 10*3/MM3 (ref 0–0.4)
EOSINOPHIL # BLD AUTO: 0.35 10*3/MM3 (ref 0–0.4)
EOSINOPHIL # BLD AUTO: 0.36 10*3/MM3 (ref 0–0.4)
EOSINOPHIL # BLD AUTO: 0.36 10*3/MM3 (ref 0–0.4)
EOSINOPHIL # BLD AUTO: 0.37 10*3/MM3 (ref 0–0.4)
EOSINOPHIL # BLD AUTO: 0.39 10*3/MM3 (ref 0–0.4)
EOSINOPHIL # BLD AUTO: 0.4 10*3/MM3 (ref 0–0.4)
EOSINOPHIL # BLD AUTO: 0.42 10*3/MM3 (ref 0–0.4)
EOSINOPHIL # BLD AUTO: 0.46 10*3/MM3 (ref 0–0.4)
EOSINOPHIL NFR BLD AUTO: 0.2 % (ref 0.3–6.2)
EOSINOPHIL NFR BLD AUTO: 0.4 % (ref 0.3–6.2)
EOSINOPHIL NFR BLD AUTO: 0.4 % (ref 0.3–6.2)
EOSINOPHIL NFR BLD AUTO: 0.9 % (ref 0.3–6.2)
EOSINOPHIL NFR BLD AUTO: 3 % (ref 0.3–6.2)
EOSINOPHIL NFR BLD AUTO: 3.1 % (ref 0.3–6.2)
EOSINOPHIL NFR BLD AUTO: 3.1 % (ref 0.3–6.2)
EOSINOPHIL NFR BLD AUTO: 3.4 % (ref 0.3–6.2)
EOSINOPHIL NFR BLD AUTO: 3.4 % (ref 0.3–6.2)
EOSINOPHIL NFR BLD AUTO: 3.9 % (ref 0.3–6.2)
EOSINOPHIL NFR BLD AUTO: 4 % (ref 0.3–6.2)
EOSINOPHIL NFR BLD AUTO: 4 % (ref 0.3–6.2)
EOSINOPHIL NFR BLD AUTO: 4.1 % (ref 0.3–6.2)
EOSINOPHIL NFR BLD AUTO: 4.3 % (ref 0.3–6.2)
EOSINOPHIL NFR BLD AUTO: 4.6 % (ref 0.3–6.2)
EOSINOPHIL NFR BLD AUTO: 5 % (ref 0.3–6.2)
EOSINOPHIL NFR BLD AUTO: 5 % (ref 0.3–6.2)
EOSINOPHIL NFR BLD AUTO: 5.1 % (ref 0.3–6.2)
EOSINOPHIL NFR BLD AUTO: 5.5 % (ref 0.3–6.2)
EOSINOPHIL NFR BLD AUTO: 5.8 % (ref 0.3–6.2)
EOSINOPHIL NFR BLD AUTO: 6 % (ref 0.3–6.2)
EOSINOPHIL NFR BLD AUTO: 6.5 % (ref 0.3–6.2)
EOSINOPHIL NFR BLD AUTO: 7 % (ref 0.3–6.2)
ERYTHROCYTE [DISTWIDTH] IN BLOOD BY AUTOMATED COUNT: 14.6 % (ref 12.3–15.4)
ERYTHROCYTE [DISTWIDTH] IN BLOOD BY AUTOMATED COUNT: 14.8 % (ref 12.3–15.4)
ERYTHROCYTE [DISTWIDTH] IN BLOOD BY AUTOMATED COUNT: 14.9 % (ref 12.3–15.4)
ERYTHROCYTE [DISTWIDTH] IN BLOOD BY AUTOMATED COUNT: 14.9 % (ref 12.3–15.4)
ERYTHROCYTE [DISTWIDTH] IN BLOOD BY AUTOMATED COUNT: 15.1 % (ref 12.3–15.4)
ERYTHROCYTE [DISTWIDTH] IN BLOOD BY AUTOMATED COUNT: 15.2 % (ref 12.3–15.4)
ERYTHROCYTE [DISTWIDTH] IN BLOOD BY AUTOMATED COUNT: 15.6 % (ref 12.3–15.4)
ERYTHROCYTE [DISTWIDTH] IN BLOOD BY AUTOMATED COUNT: 15.8 % (ref 12.3–15.4)
ERYTHROCYTE [DISTWIDTH] IN BLOOD BY AUTOMATED COUNT: 15.9 % (ref 12.3–15.4)
ERYTHROCYTE [DISTWIDTH] IN BLOOD BY AUTOMATED COUNT: 16 % (ref 12.3–15.4)
ERYTHROCYTE [DISTWIDTH] IN BLOOD BY AUTOMATED COUNT: 16.1 % (ref 12.3–15.4)
ERYTHROCYTE [DISTWIDTH] IN BLOOD BY AUTOMATED COUNT: 16.2 % (ref 12.3–15.4)
ERYTHROCYTE [DISTWIDTH] IN BLOOD BY AUTOMATED COUNT: 16.3 % (ref 12.3–15.4)
ERYTHROCYTE [DISTWIDTH] IN BLOOD BY AUTOMATED COUNT: 16.4 % (ref 12.3–15.4)
ERYTHROCYTE [DISTWIDTH] IN BLOOD BY AUTOMATED COUNT: 16.4 % (ref 12.3–15.4)
ERYTHROCYTE [DISTWIDTH] IN BLOOD BY AUTOMATED COUNT: 16.6 % (ref 12.3–15.4)
ERYTHROCYTE [DISTWIDTH] IN BLOOD BY AUTOMATED COUNT: 16.7 % (ref 12.3–15.4)
ERYTHROCYTE [DISTWIDTH] IN BLOOD BY AUTOMATED COUNT: 16.8 % (ref 12.3–15.4)
ERYTHROCYTE [DISTWIDTH] IN BLOOD BY AUTOMATED COUNT: 16.9 % (ref 12.3–15.4)
FERRITIN SERPL-MCNC: 599.8 NG/ML (ref 30–400)
FLUAV H1 2009 PAND RNA NPH QL NAA+PROBE: NOT DETECTED
FLUAV H1 HA GENE NPH QL NAA+PROBE: NOT DETECTED
FLUAV H3 RNA NPH QL NAA+PROBE: NOT DETECTED
FLUAV SUBTYP SPEC NAA+PROBE: NOT DETECTED
FLUBV RNA ISLT QL NAA+PROBE: NOT DETECTED
GFR SERPL CREATININE-BSD FRML MDRD: 18 ML/MIN/1.73
GFR SERPL CREATININE-BSD FRML MDRD: 18 ML/MIN/1.73
GFR SERPL CREATININE-BSD FRML MDRD: 19 ML/MIN/1.73
GFR SERPL CREATININE-BSD FRML MDRD: 20 ML/MIN/1.73
GFR SERPL CREATININE-BSD FRML MDRD: 21 ML/MIN/1.73
GFR SERPL CREATININE-BSD FRML MDRD: 22 ML/MIN/1.73
GFR SERPL CREATININE-BSD FRML MDRD: 23 ML/MIN/1.73
GFR SERPL CREATININE-BSD FRML MDRD: 23 ML/MIN/1.73
GFR SERPL CREATININE-BSD FRML MDRD: 24 ML/MIN/1.73
GFR SERPL CREATININE-BSD FRML MDRD: 25 ML/MIN/1.73
GFR SERPL CREATININE-BSD FRML MDRD: 26 ML/MIN/1.73
GFR SERPL CREATININE-BSD FRML MDRD: 27 ML/MIN/1.73
GFR SERPL CREATININE-BSD FRML MDRD: 28 ML/MIN/1.73
GFR SERPL CREATININE-BSD FRML MDRD: 28 ML/MIN/1.73
GFR SERPL CREATININE-BSD FRML MDRD: 29 ML/MIN/1.73
GFR SERPL CREATININE-BSD FRML MDRD: 29 ML/MIN/1.73
GFR SERPL CREATININE-BSD FRML MDRD: 30 ML/MIN/1.73
GFR SERPL CREATININE-BSD FRML MDRD: 31 ML/MIN/1.73
GFR SERPL CREATININE-BSD FRML MDRD: 33 ML/MIN/1.73
GFR SERPL CREATININE-BSD FRML MDRD: 34 ML/MIN/1.73
GFR SERPL CREATININE-BSD FRML MDRD: 36 ML/MIN/1.73
GFR SERPL CREATININE-BSD FRML MDRD: 37 ML/MIN/1.73
GFR SERPL CREATININE-BSD FRML MDRD: 39 ML/MIN/1.73
GLOBULIN UR ELPH-MCNC: 2.4 GM/DL
GLOBULIN UR ELPH-MCNC: 2.5 GM/DL
GLOBULIN UR ELPH-MCNC: 2.6 GM/DL
GLOBULIN UR ELPH-MCNC: 2.7 GM/DL
GLOBULIN UR ELPH-MCNC: 2.8 GM/DL
GLOBULIN UR ELPH-MCNC: 2.8 GM/DL
GLOBULIN UR ELPH-MCNC: 2.9 GM/DL
GLOBULIN UR ELPH-MCNC: 3 GM/DL
GLOBULIN UR ELPH-MCNC: 3 GM/DL
GLOBULIN UR ELPH-MCNC: 3.1 GM/DL
GLOBULIN UR ELPH-MCNC: 3.2 GM/DL
GLOBULIN UR ELPH-MCNC: 3.3 GM/DL
GLUCOSE BLDC GLUCOMTR-MCNC: 102 MG/DL (ref 70–130)
GLUCOSE BLDC GLUCOMTR-MCNC: 103 MG/DL (ref 70–130)
GLUCOSE BLDC GLUCOMTR-MCNC: 105 MG/DL (ref 70–130)
GLUCOSE BLDC GLUCOMTR-MCNC: 106 MG/DL (ref 70–130)
GLUCOSE BLDC GLUCOMTR-MCNC: 107 MG/DL (ref 70–130)
GLUCOSE BLDC GLUCOMTR-MCNC: 110 MG/DL (ref 70–130)
GLUCOSE BLDC GLUCOMTR-MCNC: 110 MG/DL (ref 70–130)
GLUCOSE BLDC GLUCOMTR-MCNC: 111 MG/DL (ref 70–130)
GLUCOSE BLDC GLUCOMTR-MCNC: 112 MG/DL (ref 70–130)
GLUCOSE BLDC GLUCOMTR-MCNC: 113 MG/DL (ref 70–130)
GLUCOSE BLDC GLUCOMTR-MCNC: 115 MG/DL (ref 70–130)
GLUCOSE BLDC GLUCOMTR-MCNC: 116 MG/DL (ref 70–130)
GLUCOSE BLDC GLUCOMTR-MCNC: 116 MG/DL (ref 70–130)
GLUCOSE BLDC GLUCOMTR-MCNC: 117 MG/DL (ref 70–130)
GLUCOSE BLDC GLUCOMTR-MCNC: 117 MG/DL (ref 70–130)
GLUCOSE BLDC GLUCOMTR-MCNC: 119 MG/DL (ref 70–130)
GLUCOSE BLDC GLUCOMTR-MCNC: 120 MG/DL (ref 70–130)
GLUCOSE BLDC GLUCOMTR-MCNC: 122 MG/DL (ref 70–130)
GLUCOSE BLDC GLUCOMTR-MCNC: 122 MG/DL (ref 70–130)
GLUCOSE BLDC GLUCOMTR-MCNC: 123 MG/DL (ref 70–130)
GLUCOSE BLDC GLUCOMTR-MCNC: 123 MG/DL (ref 70–130)
GLUCOSE BLDC GLUCOMTR-MCNC: 125 MG/DL (ref 70–130)
GLUCOSE BLDC GLUCOMTR-MCNC: 127 MG/DL (ref 70–130)
GLUCOSE BLDC GLUCOMTR-MCNC: 128 MG/DL (ref 70–130)
GLUCOSE BLDC GLUCOMTR-MCNC: 132 MG/DL (ref 70–130)
GLUCOSE BLDC GLUCOMTR-MCNC: 134 MG/DL (ref 70–130)
GLUCOSE BLDC GLUCOMTR-MCNC: 134 MG/DL (ref 70–130)
GLUCOSE BLDC GLUCOMTR-MCNC: 135 MG/DL (ref 70–130)
GLUCOSE BLDC GLUCOMTR-MCNC: 137 MG/DL (ref 70–130)
GLUCOSE BLDC GLUCOMTR-MCNC: 139 MG/DL (ref 70–130)
GLUCOSE BLDC GLUCOMTR-MCNC: 141 MG/DL (ref 70–130)
GLUCOSE BLDC GLUCOMTR-MCNC: 142 MG/DL (ref 70–130)
GLUCOSE BLDC GLUCOMTR-MCNC: 143 MG/DL (ref 70–130)
GLUCOSE BLDC GLUCOMTR-MCNC: 146 MG/DL (ref 70–130)
GLUCOSE BLDC GLUCOMTR-MCNC: 146 MG/DL (ref 70–130)
GLUCOSE BLDC GLUCOMTR-MCNC: 148 MG/DL (ref 70–130)
GLUCOSE BLDC GLUCOMTR-MCNC: 151 MG/DL (ref 70–130)
GLUCOSE BLDC GLUCOMTR-MCNC: 151 MG/DL (ref 70–130)
GLUCOSE BLDC GLUCOMTR-MCNC: 152 MG/DL (ref 70–130)
GLUCOSE BLDC GLUCOMTR-MCNC: 152 MG/DL (ref 70–130)
GLUCOSE BLDC GLUCOMTR-MCNC: 153 MG/DL (ref 70–130)
GLUCOSE BLDC GLUCOMTR-MCNC: 153 MG/DL (ref 70–130)
GLUCOSE BLDC GLUCOMTR-MCNC: 154 MG/DL (ref 70–130)
GLUCOSE BLDC GLUCOMTR-MCNC: 155 MG/DL (ref 70–130)
GLUCOSE BLDC GLUCOMTR-MCNC: 156 MG/DL (ref 70–130)
GLUCOSE BLDC GLUCOMTR-MCNC: 158 MG/DL (ref 70–130)
GLUCOSE BLDC GLUCOMTR-MCNC: 159 MG/DL (ref 70–130)
GLUCOSE BLDC GLUCOMTR-MCNC: 163 MG/DL (ref 70–130)
GLUCOSE BLDC GLUCOMTR-MCNC: 164 MG/DL (ref 70–130)
GLUCOSE BLDC GLUCOMTR-MCNC: 164 MG/DL (ref 70–130)
GLUCOSE BLDC GLUCOMTR-MCNC: 166 MG/DL (ref 70–130)
GLUCOSE BLDC GLUCOMTR-MCNC: 167 MG/DL (ref 70–130)
GLUCOSE BLDC GLUCOMTR-MCNC: 171 MG/DL (ref 70–130)
GLUCOSE BLDC GLUCOMTR-MCNC: 172 MG/DL (ref 70–130)
GLUCOSE BLDC GLUCOMTR-MCNC: 173 MG/DL (ref 70–130)
GLUCOSE BLDC GLUCOMTR-MCNC: 173 MG/DL (ref 70–130)
GLUCOSE BLDC GLUCOMTR-MCNC: 175 MG/DL (ref 70–130)
GLUCOSE BLDC GLUCOMTR-MCNC: 187 MG/DL (ref 70–130)
GLUCOSE BLDC GLUCOMTR-MCNC: 190 MG/DL (ref 70–130)
GLUCOSE BLDC GLUCOMTR-MCNC: 191 MG/DL (ref 70–130)
GLUCOSE BLDC GLUCOMTR-MCNC: 194 MG/DL (ref 70–130)
GLUCOSE BLDC GLUCOMTR-MCNC: 198 MG/DL (ref 70–130)
GLUCOSE BLDC GLUCOMTR-MCNC: 205 MG/DL (ref 70–130)
GLUCOSE BLDC GLUCOMTR-MCNC: 209 MG/DL (ref 70–130)
GLUCOSE BLDC GLUCOMTR-MCNC: 213 MG/DL (ref 70–130)
GLUCOSE BLDC GLUCOMTR-MCNC: 242 MG/DL (ref 70–130)
GLUCOSE BLDC GLUCOMTR-MCNC: 53 MG/DL (ref 70–130)
GLUCOSE BLDC GLUCOMTR-MCNC: 54 MG/DL (ref 70–130)
GLUCOSE BLDC GLUCOMTR-MCNC: 57 MG/DL (ref 70–130)
GLUCOSE BLDC GLUCOMTR-MCNC: 61 MG/DL (ref 70–130)
GLUCOSE BLDC GLUCOMTR-MCNC: 63 MG/DL (ref 70–130)
GLUCOSE BLDC GLUCOMTR-MCNC: 69 MG/DL (ref 70–130)
GLUCOSE BLDC GLUCOMTR-MCNC: 69 MG/DL (ref 70–130)
GLUCOSE BLDC GLUCOMTR-MCNC: 73 MG/DL (ref 70–130)
GLUCOSE BLDC GLUCOMTR-MCNC: 73 MG/DL (ref 70–130)
GLUCOSE BLDC GLUCOMTR-MCNC: 74 MG/DL (ref 70–130)
GLUCOSE BLDC GLUCOMTR-MCNC: 75 MG/DL (ref 70–130)
GLUCOSE BLDC GLUCOMTR-MCNC: 80 MG/DL (ref 70–130)
GLUCOSE BLDC GLUCOMTR-MCNC: 82 MG/DL (ref 70–130)
GLUCOSE BLDC GLUCOMTR-MCNC: 85 MG/DL (ref 70–130)
GLUCOSE BLDC GLUCOMTR-MCNC: 89 MG/DL (ref 70–130)
GLUCOSE BLDC GLUCOMTR-MCNC: 89 MG/DL (ref 70–130)
GLUCOSE BLDC GLUCOMTR-MCNC: 92 MG/DL (ref 70–130)
GLUCOSE BLDC GLUCOMTR-MCNC: 92 MG/DL (ref 70–130)
GLUCOSE BLDC GLUCOMTR-MCNC: 96 MG/DL (ref 70–130)
GLUCOSE SERPL-MCNC: 103 MG/DL (ref 65–99)
GLUCOSE SERPL-MCNC: 103 MG/DL (ref 65–99)
GLUCOSE SERPL-MCNC: 106 MG/DL (ref 65–99)
GLUCOSE SERPL-MCNC: 107 MG/DL (ref 65–99)
GLUCOSE SERPL-MCNC: 107 MG/DL (ref 65–99)
GLUCOSE SERPL-MCNC: 108 MG/DL (ref 65–99)
GLUCOSE SERPL-MCNC: 109 MG/DL (ref 65–99)
GLUCOSE SERPL-MCNC: 109 MG/DL (ref 65–99)
GLUCOSE SERPL-MCNC: 110 MG/DL (ref 65–99)
GLUCOSE SERPL-MCNC: 112 MG/DL (ref 65–99)
GLUCOSE SERPL-MCNC: 112 MG/DL (ref 65–99)
GLUCOSE SERPL-MCNC: 113 MG/DL (ref 65–99)
GLUCOSE SERPL-MCNC: 114 MG/DL (ref 65–99)
GLUCOSE SERPL-MCNC: 115 MG/DL (ref 65–99)
GLUCOSE SERPL-MCNC: 115 MG/DL (ref 65–99)
GLUCOSE SERPL-MCNC: 116 MG/DL (ref 65–99)
GLUCOSE SERPL-MCNC: 119 MG/DL (ref 65–99)
GLUCOSE SERPL-MCNC: 121 MG/DL (ref 65–99)
GLUCOSE SERPL-MCNC: 121 MG/DL (ref 65–99)
GLUCOSE SERPL-MCNC: 122 MG/DL (ref 65–99)
GLUCOSE SERPL-MCNC: 129 MG/DL (ref 65–99)
GLUCOSE SERPL-MCNC: 130 MG/DL (ref 65–99)
GLUCOSE SERPL-MCNC: 130 MG/DL (ref 65–99)
GLUCOSE SERPL-MCNC: 132 MG/DL (ref 65–99)
GLUCOSE SERPL-MCNC: 137 MG/DL (ref 65–99)
GLUCOSE SERPL-MCNC: 139 MG/DL (ref 65–99)
GLUCOSE SERPL-MCNC: 139 MG/DL (ref 65–99)
GLUCOSE SERPL-MCNC: 142 MG/DL (ref 65–99)
GLUCOSE SERPL-MCNC: 145 MG/DL (ref 65–99)
GLUCOSE SERPL-MCNC: 151 MG/DL (ref 65–99)
GLUCOSE SERPL-MCNC: 153 MG/DL (ref 65–99)
GLUCOSE SERPL-MCNC: 153 MG/DL (ref 65–99)
GLUCOSE SERPL-MCNC: 158 MG/DL (ref 65–99)
GLUCOSE SERPL-MCNC: 159 MG/DL (ref 65–99)
GLUCOSE SERPL-MCNC: 168 MG/DL (ref 65–99)
GLUCOSE SERPL-MCNC: 170 MG/DL (ref 65–99)
GLUCOSE SERPL-MCNC: 180 MG/DL (ref 65–99)
GLUCOSE SERPL-MCNC: 193 MG/DL (ref 65–99)
GLUCOSE SERPL-MCNC: 201 MG/DL (ref 65–99)
GLUCOSE SERPL-MCNC: 204 MG/DL (ref 65–99)
GLUCOSE SERPL-MCNC: 44 MG/DL (ref 65–99)
GLUCOSE SERPL-MCNC: 56 MG/DL (ref 65–99)
GLUCOSE SERPL-MCNC: 63 MG/DL (ref 65–99)
GLUCOSE SERPL-MCNC: 67 MG/DL (ref 65–99)
GLUCOSE SERPL-MCNC: 79 MG/DL (ref 65–99)
GLUCOSE SERPL-MCNC: 94 MG/DL (ref 65–99)
GLUCOSE SERPL-MCNC: 96 MG/DL (ref 65–99)
GLUCOSE SERPL-MCNC: 96 MG/DL (ref 65–99)
GLUCOSE SERPL-MCNC: 99 MG/DL (ref 65–99)
GLUCOSE UR STRIP-MCNC: NEGATIVE MG/DL
GRAM STN SPEC: ABNORMAL
HADV DNA SPEC NAA+PROBE: NOT DETECTED
HCOV 229E RNA SPEC QL NAA+PROBE: NOT DETECTED
HCOV HKU1 RNA SPEC QL NAA+PROBE: NOT DETECTED
HCOV NL63 RNA SPEC QL NAA+PROBE: NOT DETECTED
HCOV OC43 RNA SPEC QL NAA+PROBE: NOT DETECTED
HCT VFR BLD AUTO: 25.3 % (ref 37.5–51)
HCT VFR BLD AUTO: 26.4 % (ref 37.5–51)
HCT VFR BLD AUTO: 26.5 % (ref 37.5–51)
HCT VFR BLD AUTO: 26.9 % (ref 37.5–51)
HCT VFR BLD AUTO: 27.6 % (ref 37.5–51)
HCT VFR BLD AUTO: 27.7 % (ref 37.5–51)
HCT VFR BLD AUTO: 27.7 % (ref 37.5–51)
HCT VFR BLD AUTO: 27.8 % (ref 37.5–51)
HCT VFR BLD AUTO: 27.9 % (ref 37.5–51)
HCT VFR BLD AUTO: 27.9 % (ref 37.5–51)
HCT VFR BLD AUTO: 28.1 % (ref 37.5–51)
HCT VFR BLD AUTO: 28.3 % (ref 37.5–51)
HCT VFR BLD AUTO: 28.6 % (ref 37.5–51)
HCT VFR BLD AUTO: 28.8 % (ref 37.5–51)
HCT VFR BLD AUTO: 28.9 % (ref 37.5–51)
HCT VFR BLD AUTO: 28.9 % (ref 37.5–51)
HCT VFR BLD AUTO: 29.3 % (ref 37.5–51)
HCT VFR BLD AUTO: 29.5 % (ref 37.5–51)
HCT VFR BLD AUTO: 29.6 % (ref 37.5–51)
HCT VFR BLD AUTO: 29.8 % (ref 37.5–51)
HCT VFR BLD AUTO: 30.8 % (ref 37.5–51)
HCT VFR BLD AUTO: 30.8 % (ref 37.5–51)
HCT VFR BLD AUTO: 31.3 % (ref 37.5–51)
HCT VFR BLD AUTO: 31.3 % (ref 37.5–51)
HCT VFR BLD AUTO: 31.5 % (ref 37.5–51)
HCT VFR BLD AUTO: 31.8 % (ref 37.5–51)
HCT VFR BLD AUTO: 31.9 % (ref 37.5–51)
HCT VFR BLD AUTO: 31.9 % (ref 37.5–51)
HCT VFR BLD AUTO: 32.5 % (ref 37.5–51)
HCT VFR BLD AUTO: 33 % (ref 37.5–51)
HCT VFR BLD AUTO: 33.4 % (ref 37.5–51)
HCT VFR BLD AUTO: 33.5 % (ref 37.5–51)
HCT VFR BLD AUTO: 33.5 % (ref 37.5–51)
HCT VFR BLD AUTO: 34.6 % (ref 37.5–51)
HCT VFR BLD AUTO: 34.8 % (ref 37.5–51)
HCT VFR BLD AUTO: 34.8 % (ref 37.5–51)
HCT VFR BLD AUTO: 35.4 % (ref 37.5–51)
HCT VFR BLD AUTO: 35.7 % (ref 37.5–51)
HGB BLD-MCNC: 10.1 G/DL (ref 13–17.7)
HGB BLD-MCNC: 10.1 G/DL (ref 13–17.7)
HGB BLD-MCNC: 10.3 G/DL (ref 13–17.7)
HGB BLD-MCNC: 10.4 G/DL (ref 13–17.7)
HGB BLD-MCNC: 10.5 G/DL (ref 13–17.7)
HGB BLD-MCNC: 10.5 G/DL (ref 13–17.7)
HGB BLD-MCNC: 10.6 G/DL (ref 13–17.7)
HGB BLD-MCNC: 10.7 G/DL (ref 13–17.7)
HGB BLD-MCNC: 10.8 G/DL (ref 13–17.7)
HGB BLD-MCNC: 11 G/DL (ref 13–17.7)
HGB BLD-MCNC: 11.3 G/DL (ref 13–17.7)
HGB BLD-MCNC: 11.3 G/DL (ref 13–17.7)
HGB BLD-MCNC: 11.6 G/DL (ref 13–17.7)
HGB BLD-MCNC: 11.8 G/DL (ref 13–17.7)
HGB BLD-MCNC: 11.9 G/DL (ref 13–17.7)
HGB BLD-MCNC: 8.4 G/DL (ref 13–17.7)
HGB BLD-MCNC: 8.8 G/DL (ref 13–17.7)
HGB BLD-MCNC: 8.9 G/DL (ref 13–17.7)
HGB BLD-MCNC: 8.9 G/DL (ref 13–17.7)
HGB BLD-MCNC: 9 G/DL (ref 13–17.7)
HGB BLD-MCNC: 9.1 G/DL (ref 13–17.7)
HGB BLD-MCNC: 9.2 G/DL (ref 13–17.7)
HGB BLD-MCNC: 9.2 G/DL (ref 13–17.7)
HGB BLD-MCNC: 9.3 G/DL (ref 13–17.7)
HGB BLD-MCNC: 9.4 G/DL (ref 13–17.7)
HGB BLD-MCNC: 9.5 G/DL (ref 13–17.7)
HGB BLD-MCNC: 9.6 G/DL (ref 13–17.7)
HGB BLD-MCNC: 9.8 G/DL (ref 13–17.7)
HGB UR QL STRIP.AUTO: ABNORMAL
HGB UR QL STRIP.AUTO: ABNORMAL
HGB UR QL STRIP.AUTO: NEGATIVE
HMPV RNA NPH QL NAA+NON-PROBE: NOT DETECTED
HOLD SPECIMEN: NORMAL
HPIV1 RNA SPEC QL NAA+PROBE: NOT DETECTED
HPIV2 RNA SPEC QL NAA+PROBE: NOT DETECTED
HPIV3 RNA NPH QL NAA+PROBE: NOT DETECTED
HPIV4 P GENE NPH QL NAA+PROBE: NOT DETECTED
HYALINE CASTS UR QL AUTO: ABNORMAL /LPF
IMM GRANULOCYTES # BLD AUTO: 0.01 10*3/MM3 (ref 0–0.05)
IMM GRANULOCYTES # BLD AUTO: 0.02 10*3/MM3 (ref 0–0.05)
IMM GRANULOCYTES # BLD AUTO: 0.03 10*3/MM3 (ref 0–0.05)
IMM GRANULOCYTES # BLD AUTO: 0.04 10*3/MM3 (ref 0–0.05)
IMM GRANULOCYTES # BLD AUTO: 0.04 10*3/MM3 (ref 0–0.05)
IMM GRANULOCYTES # BLD AUTO: 0.05 10*3/MM3 (ref 0–0.05)
IMM GRANULOCYTES # BLD AUTO: 0.05 10*3/MM3 (ref 0–0.05)
IMM GRANULOCYTES # BLD AUTO: 0.07 10*3/MM3 (ref 0–0.05)
IMM GRANULOCYTES # BLD AUTO: 0.08 10*3/MM3 (ref 0–0.05)
IMM GRANULOCYTES NFR BLD AUTO: 0.2 % (ref 0–0.5)
IMM GRANULOCYTES NFR BLD AUTO: 0.2 % (ref 0–0.5)
IMM GRANULOCYTES NFR BLD AUTO: 0.3 % (ref 0–0.5)
IMM GRANULOCYTES NFR BLD AUTO: 0.4 % (ref 0–0.5)
IMM GRANULOCYTES NFR BLD AUTO: 0.5 % (ref 0–0.5)
IMM GRANULOCYTES NFR BLD AUTO: 0.5 % (ref 0–0.5)
IMM GRANULOCYTES NFR BLD AUTO: 0.6 % (ref 0–0.5)
IMM GRANULOCYTES NFR BLD AUTO: 0.6 % (ref 0–0.5)
IMM GRANULOCYTES NFR BLD AUTO: 0.9 % (ref 0–0.5)
IMM GRANULOCYTES NFR BLD AUTO: 1 % (ref 0–0.5)
INR PPP: 1.22 (ref 0.8–1.2)
INR PPP: 1.26 (ref 0.8–1.2)
INR PPP: 1.27 (ref 0.8–1.2)
INR PPP: 1.28 (ref 0.8–1.2)
INR PPP: 1.33 (ref 0.8–1.2)
INR PPP: 1.35 (ref 0.8–1.2)
INR PPP: 1.4 (ref 0.8–1.2)
INR PPP: 1.4 (ref 0.8–1.2)
INR PPP: 1.41 (ref 0.8–1.2)
INR PPP: 1.45 (ref 0.8–1.2)
INR PPP: 1.46 (ref 0.8–1.2)
INR PPP: 1.5 (ref 0.8–1.2)
INR PPP: 1.51 (ref 0.8–1.2)
INR PPP: 1.55 (ref 0.8–1.2)
INR PPP: 1.6 (ref 0.8–1.2)
INR PPP: 1.7 (ref 0.8–1.2)
INR PPP: 1.78 (ref 0.8–1.2)
INR PPP: 1.82 (ref 0.8–1.2)
INR PPP: 1.88 (ref 0.8–1.2)
INR PPP: 1.9 (ref 0.8–1.2)
INR PPP: 1.9 (ref 0.8–1.2)
INR PPP: 1.97 (ref 0.8–1.2)
INR PPP: 2.16 (ref 0.8–1.2)
INR PPP: 2.2 (ref 0.8–1.2)
INR PPP: 2.2 (ref 0.8–1.2)
INR PPP: 2.24 (ref 0.8–1.2)
INR PPP: 2.26 (ref 0.8–1.2)
INR PPP: 2.28 (ref 0.8–1.2)
INR PPP: 2.3 (ref 0.8–1.2)
INR PPP: 2.4 (ref 0.8–1.2)
INR PPP: 2.5 (ref 0.8–1.2)
INR PPP: 2.66 (ref 0.8–1.2)
INR PPP: 2.7 (ref 0.8–1.2)
INR PPP: 2.73 (ref 0.8–1.2)
INR PPP: 3.3 (ref 0.8–1.2)
INR PPP: 3.3 (ref 0.8–1.2)
IRON 24H UR-MRATE: 29 MCG/DL (ref 59–158)
IRON SATN MFR SERPL: 13 % (ref 20–50)
ISOLATED FROM: ABNORMAL
KETONES UR QL STRIP: NEGATIVE
LACTATE HOLD SPECIMEN: NORMAL
LDH SERPL-CCNC: 174 U/L (ref 135–225)
LEUKOCYTE ESTERASE UR QL STRIP.AUTO: ABNORMAL
LV EF 2D ECHO EST: 20 %
LYMPHOCYTES # BLD AUTO: 0.41 10*3/MM3 (ref 0.7–3.1)
LYMPHOCYTES # BLD AUTO: 0.59 10*3/MM3 (ref 0.7–3.1)
LYMPHOCYTES # BLD AUTO: 0.6 10*3/MM3 (ref 0.7–3.1)
LYMPHOCYTES # BLD AUTO: 0.72 10*3/MM3 (ref 0.7–3.1)
LYMPHOCYTES # BLD AUTO: 0.82 10*3/MM3 (ref 0.7–3.1)
LYMPHOCYTES # BLD AUTO: 0.83 10*3/MM3 (ref 0.7–3.1)
LYMPHOCYTES # BLD AUTO: 0.85 10*3/MM3 (ref 0.7–3.1)
LYMPHOCYTES # BLD AUTO: 0.88 10*3/MM3 (ref 0.7–3.1)
LYMPHOCYTES # BLD AUTO: 0.89 10*3/MM3 (ref 0.7–3.1)
LYMPHOCYTES # BLD AUTO: 0.91 10*3/MM3 (ref 0.7–3.1)
LYMPHOCYTES # BLD AUTO: 0.92 10*3/MM3 (ref 0.7–3.1)
LYMPHOCYTES # BLD AUTO: 0.94 10*3/MM3 (ref 0.7–3.1)
LYMPHOCYTES # BLD AUTO: 0.95 10*3/MM3 (ref 0.7–3.1)
LYMPHOCYTES # BLD AUTO: 0.98 10*3/MM3 (ref 0.7–3.1)
LYMPHOCYTES # BLD AUTO: 0.99 10*3/MM3 (ref 0.7–3.1)
LYMPHOCYTES # BLD AUTO: 1.03 10*3/MM3 (ref 0.7–3.1)
LYMPHOCYTES # BLD AUTO: 1.07 10*3/MM3 (ref 0.7–3.1)
LYMPHOCYTES # BLD AUTO: 1.21 10*3/MM3 (ref 0.7–3.1)
LYMPHOCYTES # BLD AUTO: 1.49 10*3/MM3 (ref 0.7–3.1)
LYMPHOCYTES # BLD AUTO: 1.62 10*3/MM3 (ref 0.7–3.1)
LYMPHOCYTES # BLD AUTO: 1.68 10*3/MM3 (ref 0.7–3.1)
LYMPHOCYTES NFR BLD AUTO: 11.2 % (ref 19.6–45.3)
LYMPHOCYTES NFR BLD AUTO: 11.6 % (ref 19.6–45.3)
LYMPHOCYTES NFR BLD AUTO: 11.7 % (ref 19.6–45.3)
LYMPHOCYTES NFR BLD AUTO: 12.1 % (ref 19.6–45.3)
LYMPHOCYTES NFR BLD AUTO: 12.2 % (ref 19.6–45.3)
LYMPHOCYTES NFR BLD AUTO: 13.4 % (ref 19.6–45.3)
LYMPHOCYTES NFR BLD AUTO: 13.6 % (ref 19.6–45.3)
LYMPHOCYTES NFR BLD AUTO: 13.9 % (ref 19.6–45.3)
LYMPHOCYTES NFR BLD AUTO: 14.1 % (ref 19.6–45.3)
LYMPHOCYTES NFR BLD AUTO: 14.5 % (ref 19.6–45.3)
LYMPHOCYTES NFR BLD AUTO: 14.8 % (ref 19.6–45.3)
LYMPHOCYTES NFR BLD AUTO: 15.7 % (ref 19.6–45.3)
LYMPHOCYTES NFR BLD AUTO: 16.9 % (ref 19.6–45.3)
LYMPHOCYTES NFR BLD AUTO: 17.5 % (ref 19.6–45.3)
LYMPHOCYTES NFR BLD AUTO: 18 % (ref 19.6–45.3)
LYMPHOCYTES NFR BLD AUTO: 21.2 % (ref 19.6–45.3)
LYMPHOCYTES NFR BLD AUTO: 21.2 % (ref 19.6–45.3)
LYMPHOCYTES NFR BLD AUTO: 5.5 % (ref 19.6–45.3)
LYMPHOCYTES NFR BLD AUTO: 7 % (ref 19.6–45.3)
LYMPHOCYTES NFR BLD AUTO: 7.8 % (ref 19.6–45.3)
LYMPHOCYTES NFR BLD AUTO: 8.5 % (ref 19.6–45.3)
LYMPHOCYTES NFR BLD AUTO: 9.9 % (ref 19.6–45.3)
LYMPHOCYTES NFR BLD AUTO: 9.9 % (ref 19.6–45.3)
M PNEUMO IGG SER IA-ACNC: NOT DETECTED
MAGNESIUM SERPL-MCNC: 1.7 MG/DL (ref 1.6–2.4)
MAGNESIUM SERPL-MCNC: 1.9 MG/DL (ref 1.6–2.4)
MAGNESIUM SERPL-MCNC: 1.9 MG/DL (ref 1.6–2.4)
MAGNESIUM SERPL-MCNC: 2 MG/DL (ref 1.6–2.4)
MAGNESIUM SERPL-MCNC: 2.1 MG/DL (ref 1.6–2.4)
MAGNESIUM SERPL-MCNC: 2.2 MG/DL (ref 1.6–2.4)
MAGNESIUM SERPL-MCNC: 2.3 MG/DL (ref 1.6–2.4)
MAGNESIUM SERPL-MCNC: 2.5 MG/DL (ref 1.6–2.4)
MAXIMAL PREDICTED HEART RATE: 141 BPM
MCH RBC QN AUTO: 26.3 PG (ref 26.6–33)
MCH RBC QN AUTO: 26.4 PG (ref 26.6–33)
MCH RBC QN AUTO: 26.4 PG (ref 26.6–33)
MCH RBC QN AUTO: 26.5 PG (ref 26.6–33)
MCH RBC QN AUTO: 26.6 PG (ref 26.6–33)
MCH RBC QN AUTO: 26.8 PG (ref 26.6–33)
MCH RBC QN AUTO: 26.9 PG (ref 26.6–33)
MCH RBC QN AUTO: 27 PG (ref 26.6–33)
MCH RBC QN AUTO: 27.1 PG (ref 26.6–33)
MCH RBC QN AUTO: 27.2 PG (ref 26.6–33)
MCH RBC QN AUTO: 27.3 PG (ref 26.6–33)
MCH RBC QN AUTO: 27.3 PG (ref 26.6–33)
MCHC RBC AUTO-ENTMCNC: 31.6 G/DL (ref 31.5–35.7)
MCHC RBC AUTO-ENTMCNC: 31.9 G/DL (ref 31.5–35.7)
MCHC RBC AUTO-ENTMCNC: 31.9 G/DL (ref 31.5–35.7)
MCHC RBC AUTO-ENTMCNC: 32.2 G/DL (ref 31.5–35.7)
MCHC RBC AUTO-ENTMCNC: 32.3 G/DL (ref 31.5–35.7)
MCHC RBC AUTO-ENTMCNC: 32.3 G/DL (ref 31.5–35.7)
MCHC RBC AUTO-ENTMCNC: 32.5 G/DL (ref 31.5–35.7)
MCHC RBC AUTO-ENTMCNC: 32.7 G/DL (ref 31.5–35.7)
MCHC RBC AUTO-ENTMCNC: 32.8 G/DL (ref 31.5–35.7)
MCHC RBC AUTO-ENTMCNC: 32.9 G/DL (ref 31.5–35.7)
MCHC RBC AUTO-ENTMCNC: 33 G/DL (ref 31.5–35.7)
MCHC RBC AUTO-ENTMCNC: 33 G/DL (ref 31.5–35.7)
MCHC RBC AUTO-ENTMCNC: 33.1 G/DL (ref 31.5–35.7)
MCHC RBC AUTO-ENTMCNC: 33.2 G/DL (ref 31.5–35.7)
MCHC RBC AUTO-ENTMCNC: 33.4 G/DL (ref 31.5–35.7)
MCHC RBC AUTO-ENTMCNC: 33.5 G/DL (ref 31.5–35.7)
MCHC RBC AUTO-ENTMCNC: 33.6 G/DL (ref 31.5–35.7)
MCHC RBC AUTO-ENTMCNC: 33.7 G/DL (ref 31.5–35.7)
MCHC RBC AUTO-ENTMCNC: 33.9 G/DL (ref 31.5–35.7)
MCHC RBC AUTO-ENTMCNC: 33.9 G/DL (ref 31.5–35.7)
MCHC RBC AUTO-ENTMCNC: 34 G/DL (ref 31.5–35.7)
MCV RBC AUTO: 78.6 FL (ref 79–97)
MCV RBC AUTO: 79.6 FL (ref 79–97)
MCV RBC AUTO: 79.7 FL (ref 79–97)
MCV RBC AUTO: 80 FL (ref 79–97)
MCV RBC AUTO: 80.3 FL (ref 79–97)
MCV RBC AUTO: 80.3 FL (ref 79–97)
MCV RBC AUTO: 80.4 FL (ref 79–97)
MCV RBC AUTO: 80.5 FL (ref 79–97)
MCV RBC AUTO: 80.5 FL (ref 79–97)
MCV RBC AUTO: 80.6 FL (ref 79–97)
MCV RBC AUTO: 80.6 FL (ref 79–97)
MCV RBC AUTO: 80.7 FL (ref 79–97)
MCV RBC AUTO: 80.8 FL (ref 79–97)
MCV RBC AUTO: 80.8 FL (ref 79–97)
MCV RBC AUTO: 80.9 FL (ref 79–97)
MCV RBC AUTO: 81 FL (ref 79–97)
MCV RBC AUTO: 81.1 FL (ref 79–97)
MCV RBC AUTO: 81.4 FL (ref 79–97)
MCV RBC AUTO: 81.5 FL (ref 79–97)
MCV RBC AUTO: 81.6 FL (ref 79–97)
MCV RBC AUTO: 81.9 FL (ref 79–97)
MCV RBC AUTO: 81.9 FL (ref 79–97)
MCV RBC AUTO: 82 FL (ref 79–97)
MCV RBC AUTO: 82 FL (ref 79–97)
MCV RBC AUTO: 82.1 FL (ref 79–97)
MCV RBC AUTO: 82.6 FL (ref 79–97)
MCV RBC AUTO: 82.9 FL (ref 79–97)
MCV RBC AUTO: 83 FL (ref 79–97)
MCV RBC AUTO: 83 FL (ref 79–97)
MCV RBC AUTO: 83.1 FL (ref 79–97)
MCV RBC AUTO: 83.2 FL (ref 79–97)
MCV RBC AUTO: 83.3 FL (ref 79–97)
MCV RBC AUTO: 83.4 FL (ref 79–97)
MCV RBC AUTO: 83.5 FL (ref 79–97)
MCV RBC AUTO: 84 FL (ref 79–97)
MCV RBC AUTO: 84.3 FL (ref 79–97)
MONOCYTES # BLD AUTO: 0.3 10*3/MM3 (ref 0.1–0.9)
MONOCYTES # BLD AUTO: 0.42 10*3/MM3 (ref 0.1–0.9)
MONOCYTES # BLD AUTO: 0.56 10*3/MM3 (ref 0.1–0.9)
MONOCYTES # BLD AUTO: 0.58 10*3/MM3 (ref 0.1–0.9)
MONOCYTES # BLD AUTO: 0.6 10*3/MM3 (ref 0.1–0.9)
MONOCYTES # BLD AUTO: 0.6 10*3/MM3 (ref 0.1–0.9)
MONOCYTES # BLD AUTO: 0.61 10*3/MM3 (ref 0.1–0.9)
MONOCYTES # BLD AUTO: 0.62 10*3/MM3 (ref 0.1–0.9)
MONOCYTES # BLD AUTO: 0.63 10*3/MM3 (ref 0.1–0.9)
MONOCYTES # BLD AUTO: 0.68 10*3/MM3 (ref 0.1–0.9)
MONOCYTES # BLD AUTO: 0.68 10*3/MM3 (ref 0.1–0.9)
MONOCYTES # BLD AUTO: 0.69 10*3/MM3 (ref 0.1–0.9)
MONOCYTES # BLD AUTO: 0.69 10*3/MM3 (ref 0.1–0.9)
MONOCYTES # BLD AUTO: 0.71 10*3/MM3 (ref 0.1–0.9)
MONOCYTES # BLD AUTO: 0.72 10*3/MM3 (ref 0.1–0.9)
MONOCYTES # BLD AUTO: 0.73 10*3/MM3 (ref 0.1–0.9)
MONOCYTES # BLD AUTO: 0.73 10*3/MM3 (ref 0.1–0.9)
MONOCYTES # BLD AUTO: 0.82 10*3/MM3 (ref 0.1–0.9)
MONOCYTES # BLD AUTO: 0.85 10*3/MM3 (ref 0.1–0.9)
MONOCYTES # BLD AUTO: 0.86 10*3/MM3 (ref 0.1–0.9)
MONOCYTES # BLD AUTO: 0.89 10*3/MM3 (ref 0.1–0.9)
MONOCYTES # BLD AUTO: 0.98 10*3/MM3 (ref 0.1–0.9)
MONOCYTES # BLD AUTO: 1 10*3/MM3 (ref 0.1–0.9)
MONOCYTES NFR BLD AUTO: 10.2 % (ref 5–12)
MONOCYTES NFR BLD AUTO: 10.4 % (ref 5–12)
MONOCYTES NFR BLD AUTO: 10.6 % (ref 5–12)
MONOCYTES NFR BLD AUTO: 10.6 % (ref 5–12)
MONOCYTES NFR BLD AUTO: 10.8 % (ref 5–12)
MONOCYTES NFR BLD AUTO: 10.8 % (ref 5–12)
MONOCYTES NFR BLD AUTO: 11 % (ref 5–12)
MONOCYTES NFR BLD AUTO: 11 % (ref 5–12)
MONOCYTES NFR BLD AUTO: 12.1 % (ref 5–12)
MONOCYTES NFR BLD AUTO: 13.1 % (ref 5–12)
MONOCYTES NFR BLD AUTO: 4 % (ref 5–12)
MONOCYTES NFR BLD AUTO: 6.6 % (ref 5–12)
MONOCYTES NFR BLD AUTO: 6.9 % (ref 5–12)
MONOCYTES NFR BLD AUTO: 7.1 % (ref 5–12)
MONOCYTES NFR BLD AUTO: 7.8 % (ref 5–12)
MONOCYTES NFR BLD AUTO: 8.4 % (ref 5–12)
MONOCYTES NFR BLD AUTO: 8.4 % (ref 5–12)
MONOCYTES NFR BLD AUTO: 8.6 % (ref 5–12)
MONOCYTES NFR BLD AUTO: 8.9 % (ref 5–12)
MONOCYTES NFR BLD AUTO: 9 % (ref 5–12)
MONOCYTES NFR BLD AUTO: 9.1 % (ref 5–12)
MONOCYTES NFR BLD AUTO: 9.4 % (ref 5–12)
MONOCYTES NFR BLD AUTO: 9.4 % (ref 5–12)
MRSA DNA SPEC QL NAA+PROBE: NEGATIVE
NEUTROPHILS NFR BLD AUTO: 3.61 10*3/MM3 (ref 1.7–7)
NEUTROPHILS NFR BLD AUTO: 3.75 10*3/MM3 (ref 1.7–7)
NEUTROPHILS NFR BLD AUTO: 3.82 10*3/MM3 (ref 1.7–7)
NEUTROPHILS NFR BLD AUTO: 3.84 10*3/MM3 (ref 1.7–7)
NEUTROPHILS NFR BLD AUTO: 4.26 10*3/MM3 (ref 1.7–7)
NEUTROPHILS NFR BLD AUTO: 4.33 10*3/MM3 (ref 1.7–7)
NEUTROPHILS NFR BLD AUTO: 4.44 10*3/MM3 (ref 1.7–7)
NEUTROPHILS NFR BLD AUTO: 4.58 10*3/MM3 (ref 1.7–7)
NEUTROPHILS NFR BLD AUTO: 4.98 10*3/MM3 (ref 1.7–7)
NEUTROPHILS NFR BLD AUTO: 5.13 10*3/MM3 (ref 1.7–7)
NEUTROPHILS NFR BLD AUTO: 5.16 10*3/MM3 (ref 1.7–7)
NEUTROPHILS NFR BLD AUTO: 5.24 10*3/MM3 (ref 1.7–7)
NEUTROPHILS NFR BLD AUTO: 5.34 10*3/MM3 (ref 1.7–7)
NEUTROPHILS NFR BLD AUTO: 5.41 10*3/MM3 (ref 1.7–7)
NEUTROPHILS NFR BLD AUTO: 5.71 10*3/MM3 (ref 1.7–7)
NEUTROPHILS NFR BLD AUTO: 5.83 10*3/MM3 (ref 1.7–7)
NEUTROPHILS NFR BLD AUTO: 6.67 10*3/MM3 (ref 1.7–7)
NEUTROPHILS NFR BLD AUTO: 6.67 10*3/MM3 (ref 1.7–7)
NEUTROPHILS NFR BLD AUTO: 60 % (ref 42.7–76)
NEUTROPHILS NFR BLD AUTO: 60.7 % (ref 42.7–76)
NEUTROPHILS NFR BLD AUTO: 64.7 % (ref 42.7–76)
NEUTROPHILS NFR BLD AUTO: 66 % (ref 42.7–76)
NEUTROPHILS NFR BLD AUTO: 66.6 % (ref 42.7–76)
NEUTROPHILS NFR BLD AUTO: 66.9 % (ref 42.7–76)
NEUTROPHILS NFR BLD AUTO: 67.5 % (ref 42.7–76)
NEUTROPHILS NFR BLD AUTO: 69.2 % (ref 42.7–76)
NEUTROPHILS NFR BLD AUTO: 7.14 10*3/MM3 (ref 1.7–7)
NEUTROPHILS NFR BLD AUTO: 7.19 10*3/MM3 (ref 1.7–7)
NEUTROPHILS NFR BLD AUTO: 7.57 10*3/MM3 (ref 1.7–7)
NEUTROPHILS NFR BLD AUTO: 71.2 % (ref 42.7–76)
NEUTROPHILS NFR BLD AUTO: 71.3 % (ref 42.7–76)
NEUTROPHILS NFR BLD AUTO: 71.4 % (ref 42.7–76)
NEUTROPHILS NFR BLD AUTO: 73.1 % (ref 42.7–76)
NEUTROPHILS NFR BLD AUTO: 73.6 % (ref 42.7–76)
NEUTROPHILS NFR BLD AUTO: 74 % (ref 42.7–76)
NEUTROPHILS NFR BLD AUTO: 74 % (ref 42.7–76)
NEUTROPHILS NFR BLD AUTO: 74.2 % (ref 42.7–76)
NEUTROPHILS NFR BLD AUTO: 74.9 % (ref 42.7–76)
NEUTROPHILS NFR BLD AUTO: 79.3 % (ref 42.7–76)
NEUTROPHILS NFR BLD AUTO: 8.22 10*3/MM3 (ref 1.7–7)
NEUTROPHILS NFR BLD AUTO: 80 % (ref 42.7–76)
NEUTROPHILS NFR BLD AUTO: 81.3 % (ref 42.7–76)
NEUTROPHILS NFR BLD AUTO: 82.4 % (ref 42.7–76)
NEUTROPHILS NFR BLD AUTO: 84.7 % (ref 42.7–76)
NEUTROPHILS NFR BLD AUTO: 88.9 % (ref 42.7–76)
NEUTROPHILS NFR BLD AUTO: 9.32 10*3/MM3 (ref 1.7–7)
NITRITE UR QL STRIP: NEGATIVE
NRBC BLD AUTO-RTO: 0 /100 WBC (ref 0–0.2)
NT-PROBNP SERPL-MCNC: ABNORMAL PG/ML (ref 0–1800)
OVALOCYTES BLD QL SMEAR: NORMAL
PH UR STRIP.AUTO: <=5 [PH] (ref 5–9)
PHOSPHATE SERPL-MCNC: 2.6 MG/DL (ref 2.5–4.5)
PHOSPHATE SERPL-MCNC: 2.8 MG/DL (ref 2.5–4.5)
PHOSPHATE SERPL-MCNC: 3.4 MG/DL (ref 2.5–4.5)
PHOSPHATE SERPL-MCNC: 4.3 MG/DL (ref 2.5–4.5)
PLATELET # BLD AUTO: 160 10*3/MM3 (ref 140–450)
PLATELET # BLD AUTO: 160 10*3/MM3 (ref 140–450)
PLATELET # BLD AUTO: 162 10*3/MM3 (ref 140–450)
PLATELET # BLD AUTO: 165 10*3/MM3 (ref 140–450)
PLATELET # BLD AUTO: 166 10*3/MM3 (ref 140–450)
PLATELET # BLD AUTO: 173 10*3/MM3 (ref 140–450)
PLATELET # BLD AUTO: 189 10*3/MM3 (ref 140–450)
PLATELET # BLD AUTO: 191 10*3/MM3 (ref 140–450)
PLATELET # BLD AUTO: 192 10*3/MM3 (ref 140–450)
PLATELET # BLD AUTO: 197 10*3/MM3 (ref 140–450)
PLATELET # BLD AUTO: 200 10*3/MM3 (ref 140–450)
PLATELET # BLD AUTO: 202 10*3/MM3 (ref 140–450)
PLATELET # BLD AUTO: 203 10*3/MM3 (ref 140–450)
PLATELET # BLD AUTO: 205 10*3/MM3 (ref 140–450)
PLATELET # BLD AUTO: 213 10*3/MM3 (ref 140–450)
PLATELET # BLD AUTO: 214 10*3/MM3 (ref 140–450)
PLATELET # BLD AUTO: 216 10*3/MM3 (ref 140–450)
PLATELET # BLD AUTO: 217 10*3/MM3 (ref 140–450)
PLATELET # BLD AUTO: 221 10*3/MM3 (ref 140–450)
PLATELET # BLD AUTO: 222 10*3/MM3 (ref 140–450)
PLATELET # BLD AUTO: 223 10*3/MM3 (ref 140–450)
PLATELET # BLD AUTO: 226 10*3/MM3 (ref 140–450)
PLATELET # BLD AUTO: 227 10*3/MM3 (ref 140–450)
PLATELET # BLD AUTO: 228 10*3/MM3 (ref 140–450)
PLATELET # BLD AUTO: 229 10*3/MM3 (ref 140–450)
PLATELET # BLD AUTO: 234 10*3/MM3 (ref 140–450)
PLATELET # BLD AUTO: 241 10*3/MM3 (ref 140–450)
PLATELET # BLD AUTO: 250 10*3/MM3 (ref 140–450)
PLATELET # BLD AUTO: 252 10*3/MM3 (ref 140–450)
PLATELET # BLD AUTO: 277 10*3/MM3 (ref 140–450)
PLATELET # BLD AUTO: 278 10*3/MM3 (ref 140–450)
PLATELET # BLD AUTO: 284 10*3/MM3 (ref 140–450)
PMV BLD AUTO: 10.5 FL (ref 6–12)
PMV BLD AUTO: 11 FL (ref 6–12)
PMV BLD AUTO: 11.2 FL (ref 6–12)
PMV BLD AUTO: 11.3 FL (ref 6–12)
PMV BLD AUTO: 11.4 FL (ref 6–12)
PMV BLD AUTO: 11.5 FL (ref 6–12)
PMV BLD AUTO: 11.6 FL (ref 6–12)
PMV BLD AUTO: 11.6 FL (ref 6–12)
PMV BLD AUTO: 11.7 FL (ref 6–12)
PMV BLD AUTO: 11.9 FL (ref 6–12)
PMV BLD AUTO: 12 FL (ref 6–12)
PMV BLD AUTO: 12.1 FL (ref 6–12)
PMV BLD AUTO: 12.2 FL (ref 6–12)
PMV BLD AUTO: 12.3 FL (ref 6–12)
PMV BLD AUTO: 12.4 FL (ref 6–12)
PMV BLD AUTO: 12.5 FL (ref 6–12)
PMV BLD AUTO: 12.6 FL (ref 6–12)
PMV BLD AUTO: 12.7 FL (ref 6–12)
PMV BLD AUTO: 12.7 FL (ref 6–12)
PMV BLD AUTO: 12.9 FL (ref 6–12)
PMV BLD AUTO: 12.9 FL (ref 6–12)
PMV BLD AUTO: 13 FL (ref 6–12)
PMV BLD AUTO: 13.5 FL (ref 6–12)
PMV BLD AUTO: 13.7 FL (ref 6–12)
POTASSIUM SERPL-SCNC: 3.2 MMOL/L (ref 3.5–5.2)
POTASSIUM SERPL-SCNC: 3.4 MMOL/L (ref 3.5–5.2)
POTASSIUM SERPL-SCNC: 3.5 MMOL/L (ref 3.5–5.2)
POTASSIUM SERPL-SCNC: 3.5 MMOL/L (ref 3.5–5.2)
POTASSIUM SERPL-SCNC: 3.6 MMOL/L (ref 3.5–5.2)
POTASSIUM SERPL-SCNC: 3.7 MMOL/L (ref 3.5–5.2)
POTASSIUM SERPL-SCNC: 3.7 MMOL/L (ref 3.5–5.2)
POTASSIUM SERPL-SCNC: 3.8 MMOL/L (ref 3.5–5.2)
POTASSIUM SERPL-SCNC: 3.9 MMOL/L (ref 3.5–5.2)
POTASSIUM SERPL-SCNC: 4 MMOL/L (ref 3.5–5.2)
POTASSIUM SERPL-SCNC: 4.1 MMOL/L (ref 3.5–5.2)
POTASSIUM SERPL-SCNC: 4.2 MMOL/L (ref 3.5–5.2)
POTASSIUM SERPL-SCNC: 4.3 MMOL/L (ref 3.5–5.2)
POTASSIUM SERPL-SCNC: 4.4 MMOL/L (ref 3.5–5.2)
POTASSIUM SERPL-SCNC: 4.5 MMOL/L (ref 3.5–5.2)
POTASSIUM SERPL-SCNC: 4.5 MMOL/L (ref 3.5–5.2)
POTASSIUM SERPL-SCNC: 4.6 MMOL/L (ref 3.5–5.2)
POTASSIUM SERPL-SCNC: 4.8 MMOL/L (ref 3.5–5.2)
POTASSIUM SERPL-SCNC: 4.8 MMOL/L (ref 3.5–5.2)
POTASSIUM SERPL-SCNC: 4.9 MMOL/L (ref 3.5–5.2)
POTASSIUM SERPL-SCNC: 5 MMOL/L (ref 3.5–5.2)
POTASSIUM SERPL-SCNC: 5.1 MMOL/L (ref 3.5–5.2)
POTASSIUM SERPL-SCNC: 5.1 MMOL/L (ref 3.5–5.2)
POTASSIUM SERPL-SCNC: 5.2 MMOL/L (ref 3.5–5.2)
POTASSIUM SERPL-SCNC: 5.3 MMOL/L (ref 3.5–5.2)
POTASSIUM SERPL-SCNC: 5.3 MMOL/L (ref 3.5–5.2)
POTASSIUM SERPL-SCNC: 5.4 MMOL/L (ref 3.5–5.2)
POTASSIUM SERPL-SCNC: 5.7 MMOL/L (ref 3.5–5.2)
PREALB SERPL-MCNC: 15.3 MG/DL (ref 20–40)
PROCALCITONIN SERPL-MCNC: 0.34 NG/ML (ref 0–0.25)
PROT SERPL-MCNC: 5.3 G/DL (ref 6–8.5)
PROT SERPL-MCNC: 5.3 G/DL (ref 6–8.5)
PROT SERPL-MCNC: 5.8 G/DL (ref 6–8.5)
PROT SERPL-MCNC: 5.9 G/DL (ref 6–8.5)
PROT SERPL-MCNC: 5.9 G/DL (ref 6–8.5)
PROT SERPL-MCNC: 6 G/DL (ref 6–8.5)
PROT SERPL-MCNC: 6.1 G/DL (ref 6–8.5)
PROT SERPL-MCNC: 6.3 G/DL (ref 6–8.5)
PROT SERPL-MCNC: 6.4 G/DL (ref 6–8.5)
PROT SERPL-MCNC: 6.5 G/DL (ref 6–8.5)
PROT SERPL-MCNC: 6.6 G/DL (ref 6–8.5)
PROT SERPL-MCNC: 6.6 G/DL (ref 6–8.5)
PROT SERPL-MCNC: 6.7 G/DL (ref 6–8.5)
PROT SERPL-MCNC: 6.7 G/DL (ref 6–8.5)
PROT SERPL-MCNC: 6.8 G/DL (ref 6–8.5)
PROT SERPL-MCNC: 6.8 G/DL (ref 6–8.5)
PROT SERPL-MCNC: 6.9 G/DL (ref 6–8.5)
PROT SERPL-MCNC: 7.2 G/DL (ref 6–8.5)
PROT UR QL STRIP: ABNORMAL
PROTHROMBIN TIME: 16 SECONDS (ref 11.1–15.3)
PROTHROMBIN TIME: 16.4 SECONDS (ref 11.1–15.3)
PROTHROMBIN TIME: 16.5 SECONDS (ref 11.1–15.3)
PROTHROMBIN TIME: 16.6 SECONDS (ref 11.1–15.3)
PROTHROMBIN TIME: 17.1 SECONDS (ref 11.1–15.3)
PROTHROMBIN TIME: 17.4 SECONDS (ref 11.1–15.3)
PROTHROMBIN TIME: 18 SECONDS (ref 11.1–15.3)
PROTHROMBIN TIME: 18.4 SECONDS (ref 11.1–15.3)
PROTHROMBIN TIME: 18.5 SECONDS (ref 11.1–15.3)
PROTHROMBIN TIME: 19 SECONDS (ref 11.1–15.3)
PROTHROMBIN TIME: 19.4 SECONDS (ref 11.1–15.3)
PROTHROMBIN TIME: 21.7 SECONDS (ref 11.1–15.3)
PROTHROMBIN TIME: 22.1 SECONDS (ref 11.1–15.3)
PROTHROMBIN TIME: 22.7 SECONDS (ref 11.1–15.3)
PROTHROMBIN TIME: 23.6 SECONDS (ref 11.1–15.3)
PROTHROMBIN TIME: 25.4 SECONDS (ref 11.1–15.3)
PROTHROMBIN TIME: 25.8 SECONDS (ref 11.1–15.3)
PROTHROMBIN TIME: 26.2 SECONDS (ref 11.1–15.3)
PROTHROMBIN TIME: 26.4 SECONDS (ref 11.1–15.3)
PROTHROMBIN TIME: 26.6 SECONDS (ref 11.1–15.3)
PROTHROMBIN TIME: 30.1 SECONDS (ref 11.1–15.3)
PROTHROMBIN TIME: 30.5 SECONDS (ref 11.1–15.3)
PROTHROMBIN TIME: 30.8 SECONDS (ref 11.1–15.3)
PROTHROMBIN TIME: ABNORMAL S
PTH-INTACT SERPL-MCNC: 78.4 PG/ML (ref 15–65)
RBC # BLD AUTO: 3.12 10*6/MM3 (ref 4.14–5.8)
RBC # BLD AUTO: 3.28 10*6/MM3 (ref 4.14–5.8)
RBC # BLD AUTO: 3.28 10*6/MM3 (ref 4.14–5.8)
RBC # BLD AUTO: 3.31 10*6/MM3 (ref 4.14–5.8)
RBC # BLD AUTO: 3.32 10*6/MM3 (ref 4.14–5.8)
RBC # BLD AUTO: 3.34 10*6/MM3 (ref 4.14–5.8)
RBC # BLD AUTO: 3.36 10*6/MM3 (ref 4.14–5.8)
RBC # BLD AUTO: 3.39 10*6/MM3 (ref 4.14–5.8)
RBC # BLD AUTO: 3.4 10*6/MM3 (ref 4.14–5.8)
RBC # BLD AUTO: 3.4 10*6/MM3 (ref 4.14–5.8)
RBC # BLD AUTO: 3.46 10*6/MM3 (ref 4.14–5.8)
RBC # BLD AUTO: 3.48 10*6/MM3 (ref 4.14–5.8)
RBC # BLD AUTO: 3.51 10*6/MM3 (ref 4.14–5.8)
RBC # BLD AUTO: 3.55 10*6/MM3 (ref 4.14–5.8)
RBC # BLD AUTO: 3.55 10*6/MM3 (ref 4.14–5.8)
RBC # BLD AUTO: 3.61 10*6/MM3 (ref 4.14–5.8)
RBC # BLD AUTO: 3.64 10*6/MM3 (ref 4.14–5.8)
RBC # BLD AUTO: 3.71 10*6/MM3 (ref 4.14–5.8)
RBC # BLD AUTO: 3.76 10*6/MM3 (ref 4.14–5.8)
RBC # BLD AUTO: 3.82 10*6/MM3 (ref 4.14–5.8)
RBC # BLD AUTO: 3.82 10*6/MM3 (ref 4.14–5.8)
RBC # BLD AUTO: 3.91 10*6/MM3 (ref 4.14–5.8)
RBC # BLD AUTO: 3.93 10*6/MM3 (ref 4.14–5.8)
RBC # BLD AUTO: 3.94 10*6/MM3 (ref 4.14–5.8)
RBC # BLD AUTO: 3.94 10*6/MM3 (ref 4.14–5.8)
RBC # BLD AUTO: 3.95 10*6/MM3 (ref 4.14–5.8)
RBC # BLD AUTO: 4.02 10*6/MM3 (ref 4.14–5.8)
RBC # BLD AUTO: 4.02 10*6/MM3 (ref 4.14–5.8)
RBC # BLD AUTO: 4.06 10*6/MM3 (ref 4.14–5.8)
RBC # BLD AUTO: 4.1 10*6/MM3 (ref 4.14–5.8)
RBC # BLD AUTO: 4.14 10*6/MM3 (ref 4.14–5.8)
RBC # BLD AUTO: 4.16 10*6/MM3 (ref 4.14–5.8)
RBC # BLD AUTO: 4.2 10*6/MM3 (ref 4.14–5.8)
RBC # BLD AUTO: 4.3 10*6/MM3 (ref 4.14–5.8)
RBC # BLD AUTO: 4.33 10*6/MM3 (ref 4.14–5.8)
RBC # BLD AUTO: 4.38 10*6/MM3 (ref 4.14–5.8)
RBC # UR: ABNORMAL /HPF
REF LAB TEST METHOD: ABNORMAL
RHINOVIRUS RNA SPEC NAA+PROBE: NOT DETECTED
RSV RNA NPH QL NAA+NON-PROBE: NOT DETECTED
SARS-COV-2 RNA NPH QL NAA+NON-PROBE: NOT DETECTED
SMALL PLATELETS BLD QL SMEAR: ADEQUATE
SODIUM SERPL-SCNC: 133 MMOL/L (ref 136–145)
SODIUM SERPL-SCNC: 133 MMOL/L (ref 136–145)
SODIUM SERPL-SCNC: 134 MMOL/L (ref 136–145)
SODIUM SERPL-SCNC: 135 MMOL/L (ref 136–145)
SODIUM SERPL-SCNC: 136 MMOL/L (ref 136–145)
SODIUM SERPL-SCNC: 137 MMOL/L (ref 136–145)
SODIUM SERPL-SCNC: 138 MMOL/L (ref 136–145)
SODIUM SERPL-SCNC: 139 MMOL/L (ref 136–145)
SODIUM SERPL-SCNC: 140 MMOL/L (ref 136–145)
SODIUM SERPL-SCNC: 141 MMOL/L (ref 136–145)
SODIUM SERPL-SCNC: 142 MMOL/L (ref 136–145)
SODIUM SERPL-SCNC: 143 MMOL/L (ref 136–145)
SODIUM UR-SCNC: 98 MMOL/L
SP GR UR STRIP: 1.01 (ref 1–1.03)
SQUAMOUS #/AREA URNS HPF: ABNORMAL /HPF
STRESS TARGET HR: 120 BPM
TARGETS BLD QL SMEAR: NORMAL
TIBC SERPL-MCNC: 221 MCG/DL (ref 298–536)
TRANSFERRIN SERPL-MCNC: 148 MG/DL (ref 200–360)
TROPONIN T SERPL-MCNC: 0.2 NG/ML (ref 0–0.03)
TROPONIN T SERPL-MCNC: 0.21 NG/ML (ref 0–0.03)
TROPONIN T SERPL-MCNC: 0.21 NG/ML (ref 0–0.03)
TROPONIN T SERPL-MCNC: 0.24 NG/ML (ref 0–0.03)
UNIDENT CRYS URNS QL MICRO: ABNORMAL /HPF
UROBILINOGEN UR QL STRIP: ABNORMAL
UUN 24H UR-MCNC: 247 MG/DL
VANCOMYCIN SERPL-MCNC: 11.5 MCG/ML (ref 5–40)
WBC # BLD AUTO: 10.36 10*3/MM3 (ref 3.4–10.8)
WBC # BLD AUTO: 11.66 10*3/MM3 (ref 3.4–10.8)
WBC # BLD AUTO: 5.38 10*3/MM3 (ref 3.4–10.8)
WBC # BLD AUTO: 5.48 10*3/MM3 (ref 3.4–10.8)
WBC # BLD AUTO: 5.57 10*3/MM3 (ref 3.4–10.8)
WBC # BLD AUTO: 5.67 10*3/MM3 (ref 3.4–10.8)
WBC # BLD AUTO: 5.73 10*3/MM3 (ref 3.4–10.8)
WBC # BLD AUTO: 5.74 10*3/MM3 (ref 3.4–10.8)
WBC # BLD AUTO: 5.88 10*3/MM3 (ref 3.4–10.8)
WBC # BLD AUTO: 6.05 10*3/MM3 (ref 3.4–10.8)
WBC # BLD AUTO: 6.06 10*3/MM3 (ref 3.4–10.8)
WBC # BLD AUTO: 6.13 10*3/MM3 (ref 3.4–10.8)
WBC # BLD AUTO: 6.25 10*3/MM3 (ref 3.4–10.8)
WBC # BLD AUTO: 6.31 10*3/MM3 (ref 3.4–10.8)
WBC # BLD AUTO: 6.34 10*3/MM3 (ref 3.4–10.8)
WBC # BLD AUTO: 6.41 10*3/MM3 (ref 3.4–10.8)
WBC # BLD AUTO: 6.41 10*3/MM3 (ref 3.4–10.8)
WBC # BLD AUTO: 6.42 10*3/MM3 (ref 3.4–10.8)
WBC # BLD AUTO: 6.59 10*3/MM3 (ref 3.4–10.8)
WBC # BLD AUTO: 6.59 10*3/MM3 (ref 3.4–10.8)
WBC # BLD AUTO: 6.94 10*3/MM3 (ref 3.4–10.8)
WBC # BLD AUTO: 7.02 10*3/MM3 (ref 3.4–10.8)
WBC # BLD AUTO: 7.08 10*3/MM3 (ref 3.4–10.8)
WBC # BLD AUTO: 7.25 10*3/MM3 (ref 3.4–10.8)
WBC # BLD AUTO: 7.5 10*3/MM3 (ref 3.4–10.8)
WBC # BLD AUTO: 7.6 10*3/MM3 (ref 3.4–10.8)
WBC # BLD AUTO: 7.64 10*3/MM3 (ref 3.4–10.8)
WBC # BLD AUTO: 7.71 10*3/MM3 (ref 3.4–10.8)
WBC # BLD AUTO: 7.8 10*3/MM3 (ref 3.4–10.8)
WBC # BLD AUTO: 7.84 10*3/MM3 (ref 3.4–10.8)
WBC # BLD AUTO: 7.87 10*3/MM3 (ref 3.4–10.8)
WBC # BLD AUTO: 8.47 10*3/MM3 (ref 3.4–10.8)
WBC # BLD AUTO: 9.09 10*3/MM3 (ref 3.4–10.8)
WBC # BLD AUTO: 9.32 10*3/MM3 (ref 3.4–10.8)
WBC # BLD AUTO: 9.35 10*3/MM3 (ref 3.4–10.8)
WBC # BLD AUTO: 9.54 10*3/MM3 (ref 3.4–10.8)
WBC MORPH BLD: NORMAL
WBC UR QL AUTO: ABNORMAL /HPF
WHOLE BLOOD HOLD SPECIMEN: NORMAL

## 2020-01-01 PROCEDURE — 74019 RADEX ABDOMEN 2 VIEWS: CPT

## 2020-01-01 PROCEDURE — P9047 ALBUMIN (HUMAN), 25%, 50ML: HCPCS | Performed by: NURSE PRACTITIONER

## 2020-01-01 PROCEDURE — 80053 COMPREHEN METABOLIC PANEL: CPT | Performed by: INTERNAL MEDICINE

## 2020-01-01 PROCEDURE — 25010000002 FUROSEMIDE PER 20 MG: Performed by: HOSPITALIST

## 2020-01-01 PROCEDURE — 63710000001 DRONABINOL PER 2.5 MG: Performed by: INTERNAL MEDICINE

## 2020-01-01 PROCEDURE — 82962 GLUCOSE BLOOD TEST: CPT

## 2020-01-01 PROCEDURE — 97530 THERAPEUTIC ACTIVITIES: CPT

## 2020-01-01 PROCEDURE — 85610 PROTHROMBIN TIME: CPT | Performed by: INTERNAL MEDICINE

## 2020-01-01 PROCEDURE — 83735 ASSAY OF MAGNESIUM: CPT | Performed by: INTERNAL MEDICINE

## 2020-01-01 PROCEDURE — 83735 ASSAY OF MAGNESIUM: CPT | Performed by: NURSE PRACTITIONER

## 2020-01-01 PROCEDURE — 63710000001 INSULIN DETEMIR PER 5 UNITS: Performed by: STUDENT IN AN ORGANIZED HEALTH CARE EDUCATION/TRAINING PROGRAM

## 2020-01-01 PROCEDURE — 97110 THERAPEUTIC EXERCISES: CPT

## 2020-01-01 PROCEDURE — 93005 ELECTROCARDIOGRAM TRACING: CPT | Performed by: EMERGENCY MEDICINE

## 2020-01-01 PROCEDURE — 36410 VNPNXR 3YR/> PHY/QHP DX/THER: CPT

## 2020-01-01 PROCEDURE — 25010000002 HALOPERIDOL LACTATE PER 5 MG: Performed by: INTERNAL MEDICINE

## 2020-01-01 PROCEDURE — 81001 URINALYSIS AUTO W/SCOPE: CPT | Performed by: INTERNAL MEDICINE

## 2020-01-01 PROCEDURE — 76937 US GUIDE VASCULAR ACCESS: CPT

## 2020-01-01 PROCEDURE — 63710000001 INSULIN DETEMIR PER 5 UNITS: Performed by: INTERNAL MEDICINE

## 2020-01-01 PROCEDURE — 85027 COMPLETE CBC AUTOMATED: CPT | Performed by: INTERNAL MEDICINE

## 2020-01-01 PROCEDURE — 84466 ASSAY OF TRANSFERRIN: CPT | Performed by: NURSE PRACTITIONER

## 2020-01-01 PROCEDURE — 25010000002 FUROSEMIDE PER 20 MG: Performed by: NURSE PRACTITIONER

## 2020-01-01 PROCEDURE — 25010000002 PROMETHAZINE PER 50 MG: Performed by: INTERNAL MEDICINE

## 2020-01-01 PROCEDURE — 97535 SELF CARE MNGMENT TRAINING: CPT

## 2020-01-01 PROCEDURE — 25010000002 AMIODARONE IN DEXTROSE 5% 150-4.21 MG/100ML-% SOLUTION

## 2020-01-01 PROCEDURE — 25010000002 CEFEPIME PER 500 MG: Performed by: HOSPITALIST

## 2020-01-01 PROCEDURE — 94799 UNLISTED PULMONARY SVC/PX: CPT

## 2020-01-01 PROCEDURE — 63710000001 INSULIN ASPART PER 5 UNITS: Performed by: INTERNAL MEDICINE

## 2020-01-01 PROCEDURE — 82306 VITAMIN D 25 HYDROXY: CPT | Performed by: NURSE PRACTITIONER

## 2020-01-01 PROCEDURE — 85025 COMPLETE CBC W/AUTO DIFF WBC: CPT | Performed by: INTERNAL MEDICINE

## 2020-01-01 PROCEDURE — 25010000002 ALBUMIN HUMAN 25% PER 50 ML: Performed by: NURSE PRACTITIONER

## 2020-01-01 PROCEDURE — 85610 PROTHROMBIN TIME: CPT | Performed by: HOSPITALIST

## 2020-01-01 PROCEDURE — 83605 ASSAY OF LACTIC ACID: CPT | Performed by: EMERGENCY MEDICINE

## 2020-01-01 PROCEDURE — P9047 ALBUMIN (HUMAN), 25%, 50ML: HCPCS | Performed by: INTERNAL MEDICINE

## 2020-01-01 PROCEDURE — 92526 ORAL FUNCTION THERAPY: CPT | Performed by: SPEECH-LANGUAGE PATHOLOGIST

## 2020-01-01 PROCEDURE — 93010 ELECTROCARDIOGRAM REPORT: CPT | Performed by: INTERNAL MEDICINE

## 2020-01-01 PROCEDURE — 76775 US EXAM ABDO BACK WALL LIM: CPT

## 2020-01-01 PROCEDURE — 80048 BASIC METABOLIC PNL TOTAL CA: CPT | Performed by: STUDENT IN AN ORGANIZED HEALTH CARE EDUCATION/TRAINING PROGRAM

## 2020-01-01 PROCEDURE — 71045 X-RAY EXAM CHEST 1 VIEW: CPT

## 2020-01-01 PROCEDURE — 99233 SBSQ HOSP IP/OBS HIGH 50: CPT | Performed by: INTERNAL MEDICINE

## 2020-01-01 PROCEDURE — B54NZZA ULTRASONOGRAPHY OF LEFT UPPER EXTREMITY VEINS, GUIDANCE: ICD-10-PCS | Performed by: FAMILY MEDICINE

## 2020-01-01 PROCEDURE — 25010000002 VANCOMYCIN 5 G RECONSTITUTED SOLUTION: Performed by: HOSPITALIST

## 2020-01-01 PROCEDURE — 25010000002 ONDANSETRON PER 1 MG: Performed by: HOSPITALIST

## 2020-01-01 PROCEDURE — 85007 BL SMEAR W/DIFF WBC COUNT: CPT | Performed by: INTERNAL MEDICINE

## 2020-01-01 PROCEDURE — 84484 ASSAY OF TROPONIN QUANT: CPT | Performed by: NURSE PRACTITIONER

## 2020-01-01 PROCEDURE — 94760 N-INVAS EAR/PLS OXIMETRY 1: CPT

## 2020-01-01 PROCEDURE — 25010000002 ALBUMIN HUMAN 25% PER 50 ML: Performed by: INTERNAL MEDICINE

## 2020-01-01 PROCEDURE — 83970 ASSAY OF PARATHORMONE: CPT | Performed by: NURSE PRACTITIONER

## 2020-01-01 PROCEDURE — 25010000002 LORAZEPAM PER 2 MG: Performed by: HOSPITALIST

## 2020-01-01 PROCEDURE — 85025 COMPLETE CBC W/AUTO DIFF WBC: CPT | Performed by: STUDENT IN AN ORGANIZED HEALTH CARE EDUCATION/TRAINING PROGRAM

## 2020-01-01 PROCEDURE — 25010000002 EPOETIN ALFA-EPBX 10000 UNIT/ML SOLUTION: Performed by: INTERNAL MEDICINE

## 2020-01-01 PROCEDURE — 85014 HEMATOCRIT: CPT | Performed by: NURSE PRACTITIONER

## 2020-01-01 PROCEDURE — 93005 ELECTROCARDIOGRAM TRACING: CPT | Performed by: INTERNAL MEDICINE

## 2020-01-01 PROCEDURE — 80048 BASIC METABOLIC PNL TOTAL CA: CPT | Performed by: HOSPITALIST

## 2020-01-01 PROCEDURE — 97166 OT EVAL MOD COMPLEX 45 MIN: CPT

## 2020-01-01 PROCEDURE — 87147 CULTURE TYPE IMMUNOLOGIC: CPT | Performed by: INTERNAL MEDICINE

## 2020-01-01 PROCEDURE — 25010000002 VANCOMYCIN 5 G RECONSTITUTED SOLUTION: Performed by: INTERNAL MEDICINE

## 2020-01-01 PROCEDURE — 93306 TTE W/DOPPLER COMPLETE: CPT | Performed by: INTERNAL MEDICINE

## 2020-01-01 PROCEDURE — 25010000002 FUROSEMIDE PER 20 MG: Performed by: FAMILY MEDICINE

## 2020-01-01 PROCEDURE — 25010000002 AMIODARONE IN DEXTROSE 5% 360-4.14 MG/200ML-% SOLUTION: Performed by: INTERNAL MEDICINE

## 2020-01-01 PROCEDURE — 99222 1ST HOSP IP/OBS MODERATE 55: CPT | Performed by: NURSE PRACTITIONER

## 2020-01-01 PROCEDURE — 25010000002 ENOXAPARIN PER 10 MG: Performed by: NURSE PRACTITIONER

## 2020-01-01 PROCEDURE — 85610 PROTHROMBIN TIME: CPT | Performed by: EMERGENCY MEDICINE

## 2020-01-01 PROCEDURE — 80048 BASIC METABOLIC PNL TOTAL CA: CPT | Performed by: INTERNAL MEDICINE

## 2020-01-01 PROCEDURE — 85610 PROTHROMBIN TIME: CPT | Performed by: STUDENT IN AN ORGANIZED HEALTH CARE EDUCATION/TRAINING PROGRAM

## 2020-01-01 PROCEDURE — 63710000001 INSULIN DETEMIR PER 5 UNITS: Performed by: HOSPITALIST

## 2020-01-01 PROCEDURE — 99233 SBSQ HOSP IP/OBS HIGH 50: CPT | Performed by: NURSE PRACTITIONER

## 2020-01-01 PROCEDURE — 36415 COLL VENOUS BLD VENIPUNCTURE: CPT | Performed by: HOSPITALIST

## 2020-01-01 PROCEDURE — 63710000001 INSULIN ASPART PER 5 UNITS: Performed by: NURSE PRACTITIONER

## 2020-01-01 PROCEDURE — 5A2204Z RESTORATION OF CARDIAC RHYTHM, SINGLE: ICD-10-PCS | Performed by: INTERNAL MEDICINE

## 2020-01-01 PROCEDURE — 83880 ASSAY OF NATRIURETIC PEPTIDE: CPT | Performed by: INTERNAL MEDICINE

## 2020-01-01 PROCEDURE — P9612 CATHETERIZE FOR URINE SPEC: HCPCS

## 2020-01-01 PROCEDURE — 87040 BLOOD CULTURE FOR BACTERIA: CPT | Performed by: INTERNAL MEDICINE

## 2020-01-01 PROCEDURE — 93005 ELECTROCARDIOGRAM TRACING: CPT | Performed by: STUDENT IN AN ORGANIZED HEALTH CARE EDUCATION/TRAINING PROGRAM

## 2020-01-01 PROCEDURE — 84484 ASSAY OF TROPONIN QUANT: CPT | Performed by: EMERGENCY MEDICINE

## 2020-01-01 PROCEDURE — 94640 AIRWAY INHALATION TREATMENT: CPT

## 2020-01-01 PROCEDURE — 93005 ELECTROCARDIOGRAM TRACING: CPT | Performed by: FAMILY MEDICINE

## 2020-01-01 PROCEDURE — 05HF33Z INSERTION OF INFUSION DEVICE INTO LEFT CEPHALIC VEIN, PERCUTANEOUS APPROACH: ICD-10-PCS | Performed by: FAMILY MEDICINE

## 2020-01-01 PROCEDURE — 80048 BASIC METABOLIC PNL TOTAL CA: CPT | Performed by: NURSE PRACTITIONER

## 2020-01-01 PROCEDURE — 25010000002 FUROSEMIDE PER 20 MG: Performed by: EMERGENCY MEDICINE

## 2020-01-01 PROCEDURE — 25010000002 FUROSEMIDE PER 20 MG: Performed by: INTERNAL MEDICINE

## 2020-01-01 PROCEDURE — 85025 COMPLETE CBC W/AUTO DIFF WBC: CPT | Performed by: HOSPITALIST

## 2020-01-01 PROCEDURE — 63710000001 DIPHENHYDRAMINE PER 50 MG: Performed by: INTERNAL MEDICINE

## 2020-01-01 PROCEDURE — 25010000002 FUROSEMIDE PER 20 MG

## 2020-01-01 PROCEDURE — 85018 HEMOGLOBIN: CPT | Performed by: NURSE PRACTITIONER

## 2020-01-01 PROCEDURE — 80048 BASIC METABOLIC PNL TOTAL CA: CPT | Performed by: FAMILY MEDICINE

## 2020-01-01 PROCEDURE — 87641 MR-STAPH DNA AMP PROBE: CPT | Performed by: FAMILY MEDICINE

## 2020-01-01 PROCEDURE — 63710000001 ONDANSETRON PER 8 MG: Performed by: INTERNAL MEDICINE

## 2020-01-01 PROCEDURE — 85025 COMPLETE CBC W/AUTO DIFF WBC: CPT | Performed by: EMERGENCY MEDICINE

## 2020-01-01 PROCEDURE — 86140 C-REACTIVE PROTEIN: CPT | Performed by: EMERGENCY MEDICINE

## 2020-01-01 PROCEDURE — 84484 ASSAY OF TROPONIN QUANT: CPT | Performed by: STUDENT IN AN ORGANIZED HEALTH CARE EDUCATION/TRAINING PROGRAM

## 2020-01-01 PROCEDURE — 82570 ASSAY OF URINE CREATININE: CPT | Performed by: INTERNAL MEDICINE

## 2020-01-01 PROCEDURE — 63710000001 INSULIN DETEMIR PER 5 UNITS: Performed by: NURSE PRACTITIONER

## 2020-01-01 PROCEDURE — B54MZZA ULTRASONOGRAPHY OF RIGHT UPPER EXTREMITY VEINS, GUIDANCE: ICD-10-PCS | Performed by: FAMILY MEDICINE

## 2020-01-01 PROCEDURE — 84100 ASSAY OF PHOSPHORUS: CPT | Performed by: NURSE PRACTITIONER

## 2020-01-01 PROCEDURE — 71046 X-RAY EXAM CHEST 2 VIEWS: CPT

## 2020-01-01 PROCEDURE — 84100 ASSAY OF PHOSPHORUS: CPT | Performed by: INTERNAL MEDICINE

## 2020-01-01 PROCEDURE — 84484 ASSAY OF TROPONIN QUANT: CPT | Performed by: HOSPITALIST

## 2020-01-01 PROCEDURE — 63710000001 INSULIN REGULAR HUMAN PER 5 UNITS: Performed by: NURSE PRACTITIONER

## 2020-01-01 PROCEDURE — 87040 BLOOD CULTURE FOR BACTERIA: CPT | Performed by: STUDENT IN AN ORGANIZED HEALTH CARE EDUCATION/TRAINING PROGRAM

## 2020-01-01 PROCEDURE — 83605 ASSAY OF LACTIC ACID: CPT | Performed by: STUDENT IN AN ORGANIZED HEALTH CARE EDUCATION/TRAINING PROGRAM

## 2020-01-01 PROCEDURE — 05HB33Z INSERTION OF INFUSION DEVICE INTO RIGHT BASILIC VEIN, PERCUTANEOUS APPROACH: ICD-10-PCS | Performed by: FAMILY MEDICINE

## 2020-01-01 PROCEDURE — 83735 ASSAY OF MAGNESIUM: CPT | Performed by: HOSPITALIST

## 2020-01-01 PROCEDURE — 87150 DNA/RNA AMPLIFIED PROBE: CPT | Performed by: INTERNAL MEDICINE

## 2020-01-01 PROCEDURE — 83735 ASSAY OF MAGNESIUM: CPT | Performed by: STUDENT IN AN ORGANIZED HEALTH CARE EDUCATION/TRAINING PROGRAM

## 2020-01-01 PROCEDURE — 80053 COMPREHEN METABOLIC PANEL: CPT | Performed by: NURSE PRACTITIONER

## 2020-01-01 PROCEDURE — 92610 EVALUATE SWALLOWING FUNCTION: CPT | Performed by: SPEECH-LANGUAGE PATHOLOGIST

## 2020-01-01 PROCEDURE — 97162 PT EVAL MOD COMPLEX 30 MIN: CPT

## 2020-01-01 PROCEDURE — 36415 COLL VENOUS BLD VENIPUNCTURE: CPT | Performed by: EMERGENCY MEDICINE

## 2020-01-01 PROCEDURE — 25010000002 CEFEPIME PER 500 MG: Performed by: INTERNAL MEDICINE

## 2020-01-01 PROCEDURE — 87086 URINE CULTURE/COLONY COUNT: CPT | Performed by: INTERNAL MEDICINE

## 2020-01-01 PROCEDURE — 93005 ELECTROCARDIOGRAM TRACING: CPT | Performed by: NURSE PRACTITIONER

## 2020-01-01 PROCEDURE — 63710000001 INSULIN ASPART PER 5 UNITS: Performed by: STUDENT IN AN ORGANIZED HEALTH CARE EDUCATION/TRAINING PROGRAM

## 2020-01-01 PROCEDURE — 84300 ASSAY OF URINE SODIUM: CPT | Performed by: INTERNAL MEDICINE

## 2020-01-01 PROCEDURE — 25010000002 MIDAZOLAM PER 1 MG: Performed by: INTERNAL MEDICINE

## 2020-01-01 PROCEDURE — 85610 PROTHROMBIN TIME: CPT | Performed by: NURSE PRACTITIONER

## 2020-01-01 PROCEDURE — 84132 ASSAY OF SERUM POTASSIUM: CPT | Performed by: INTERNAL MEDICINE

## 2020-01-01 PROCEDURE — 83615 LACTATE (LD) (LDH) ENZYME: CPT | Performed by: EMERGENCY MEDICINE

## 2020-01-01 PROCEDURE — 99285 EMERGENCY DEPT VISIT HI MDM: CPT

## 2020-01-01 PROCEDURE — 84100 ASSAY OF PHOSPHORUS: CPT | Performed by: HOSPITALIST

## 2020-01-01 PROCEDURE — 83880 ASSAY OF NATRIURETIC PEPTIDE: CPT | Performed by: STUDENT IN AN ORGANIZED HEALTH CARE EDUCATION/TRAINING PROGRAM

## 2020-01-01 PROCEDURE — 84145 PROCALCITONIN (PCT): CPT | Performed by: EMERGENCY MEDICINE

## 2020-01-01 PROCEDURE — C1751 CATH, INF, PER/CENT/MIDLINE: HCPCS

## 2020-01-01 PROCEDURE — 84540 ASSAY OF URINE/UREA-N: CPT | Performed by: INTERNAL MEDICINE

## 2020-01-01 PROCEDURE — 83540 ASSAY OF IRON: CPT | Performed by: NURSE PRACTITIONER

## 2020-01-01 PROCEDURE — 87040 BLOOD CULTURE FOR BACTERIA: CPT | Performed by: EMERGENCY MEDICINE

## 2020-01-01 PROCEDURE — 0202U NFCT DS 22 TRGT SARS-COV-2: CPT | Performed by: EMERGENCY MEDICINE

## 2020-01-01 PROCEDURE — 85027 COMPLETE CBC AUTOMATED: CPT | Performed by: NURSE PRACTITIONER

## 2020-01-01 PROCEDURE — 25010000002 FENTANYL CITRATE (PF) 100 MCG/2ML SOLUTION: Performed by: INTERNAL MEDICINE

## 2020-01-01 PROCEDURE — 84134 ASSAY OF PREALBUMIN: CPT | Performed by: INTERNAL MEDICINE

## 2020-01-01 PROCEDURE — 80053 COMPREHEN METABOLIC PANEL: CPT | Performed by: STUDENT IN AN ORGANIZED HEALTH CARE EDUCATION/TRAINING PROGRAM

## 2020-01-01 PROCEDURE — 80053 COMPREHEN METABOLIC PANEL: CPT | Performed by: EMERGENCY MEDICINE

## 2020-01-01 PROCEDURE — 25010000002 CEFTRIAXONE PER 250 MG: Performed by: EMERGENCY MEDICINE

## 2020-01-01 PROCEDURE — 80202 ASSAY OF VANCOMYCIN: CPT | Performed by: HOSPITALIST

## 2020-01-01 PROCEDURE — 83880 ASSAY OF NATRIURETIC PEPTIDE: CPT | Performed by: EMERGENCY MEDICINE

## 2020-01-01 PROCEDURE — 81001 URINALYSIS AUTO W/SCOPE: CPT | Performed by: EMERGENCY MEDICINE

## 2020-01-01 PROCEDURE — 87040 BLOOD CULTURE FOR BACTERIA: CPT | Performed by: HOSPITALIST

## 2020-01-01 PROCEDURE — 84132 ASSAY OF SERUM POTASSIUM: CPT | Performed by: NURSE PRACTITIONER

## 2020-01-01 PROCEDURE — 93306 TTE W/DOPPLER COMPLETE: CPT

## 2020-01-01 PROCEDURE — 85025 COMPLETE CBC W/AUTO DIFF WBC: CPT | Performed by: FAMILY MEDICINE

## 2020-01-01 PROCEDURE — 63710000001 ONDANSETRON ODT 4 MG TABLET DISPERSIBLE: Performed by: NURSE PRACTITIONER

## 2020-01-01 PROCEDURE — 82728 ASSAY OF FERRITIN: CPT | Performed by: EMERGENCY MEDICINE

## 2020-01-01 RX ORDER — ONDANSETRON 4 MG/1
4 TABLET, FILM COATED ORAL EVERY 6 HOURS PRN
Status: DISCONTINUED | OUTPATIENT
Start: 2020-01-01 | End: 2020-01-01 | Stop reason: SDUPTHER

## 2020-01-01 RX ORDER — NITROGLYCERIN 0.4 MG/1
0.4 TABLET SUBLINGUAL
Status: DISCONTINUED | OUTPATIENT
Start: 2020-01-01 | End: 2020-01-01 | Stop reason: HOSPADM

## 2020-01-01 RX ORDER — ISOSORBIDE MONONITRATE 30 MG/1
30 TABLET, EXTENDED RELEASE ORAL
Status: DISCONTINUED | OUTPATIENT
Start: 2020-01-01 | End: 2020-01-01 | Stop reason: HOSPADM

## 2020-01-01 RX ORDER — AMIODARONE HYDROCHLORIDE 200 MG/1
200 TABLET ORAL
Start: 2020-01-01 | End: 2021-01-01 | Stop reason: HOSPADM

## 2020-01-01 RX ORDER — FUROSEMIDE 10 MG/ML
10 INJECTION INTRAMUSCULAR; INTRAVENOUS ONCE
Status: COMPLETED | OUTPATIENT
Start: 2020-01-01 | End: 2020-01-01

## 2020-01-01 RX ORDER — BENZONATATE 100 MG/1
200 CAPSULE ORAL 3 TIMES DAILY PRN
Status: DISCONTINUED | OUTPATIENT
Start: 2020-01-01 | End: 2020-01-01 | Stop reason: HOSPADM

## 2020-01-01 RX ORDER — NICOTINE POLACRILEX 4 MG
15 LOZENGE BUCCAL
Status: DISCONTINUED | OUTPATIENT
Start: 2020-01-01 | End: 2020-01-01 | Stop reason: HOSPADM

## 2020-01-01 RX ORDER — ACETAMINOPHEN 160 MG/5ML
650 SOLUTION ORAL EVERY 4 HOURS PRN
Status: DISCONTINUED | OUTPATIENT
Start: 2020-01-01 | End: 2020-01-01 | Stop reason: HOSPADM

## 2020-01-01 RX ORDER — HALOPERIDOL 5 MG/ML
1 INJECTION INTRAMUSCULAR EVERY 6 HOURS PRN
Status: DISCONTINUED | OUTPATIENT
Start: 2020-01-01 | End: 2020-01-01 | Stop reason: HOSPADM

## 2020-01-01 RX ORDER — FAMOTIDINE 40 MG/1
40 TABLET, FILM COATED ORAL DAILY
Status: DISCONTINUED | OUTPATIENT
Start: 2020-01-01 | End: 2020-01-01

## 2020-01-01 RX ORDER — DIAPER,BRIEF,INFANT-TODD,DISP
EACH MISCELLANEOUS 2 TIMES DAILY
Status: DISCONTINUED | OUTPATIENT
Start: 2020-01-01 | End: 2020-01-01 | Stop reason: HOSPADM

## 2020-01-01 RX ORDER — FERROUS SULFATE TAB EC 324 MG (65 MG FE EQUIVALENT) 324 (65 FE) MG
324 TABLET DELAYED RESPONSE ORAL 2 TIMES DAILY WITH MEALS
Status: DISCONTINUED | OUTPATIENT
Start: 2020-01-01 | End: 2020-01-01 | Stop reason: HOSPADM

## 2020-01-01 RX ORDER — TRAZODONE HYDROCHLORIDE 100 MG/1
100 TABLET ORAL NIGHTLY
COMMUNITY
End: 2020-01-01 | Stop reason: HOSPADM

## 2020-01-01 RX ORDER — BISACODYL 10 MG
5 SUPPOSITORY, RECTAL RECTAL DAILY PRN
Status: DISCONTINUED | OUTPATIENT
Start: 2020-01-01 | End: 2020-01-01 | Stop reason: HOSPADM

## 2020-01-01 RX ORDER — ACETAMINOPHEN 650 MG/1
650 SUPPOSITORY RECTAL EVERY 4 HOURS PRN
Status: DISCONTINUED | OUTPATIENT
Start: 2020-01-01 | End: 2020-01-01 | Stop reason: HOSPADM

## 2020-01-01 RX ORDER — FUROSEMIDE 40 MG/1
40 TABLET ORAL DAILY
Status: DISCONTINUED | OUTPATIENT
Start: 2020-01-01 | End: 2020-01-01

## 2020-01-01 RX ORDER — ASPIRIN 81 MG/1
81 TABLET ORAL DAILY
Qty: 30 TABLET | Refills: 0
Start: 2020-01-01 | End: 2020-01-01 | Stop reason: HOSPADM

## 2020-01-01 RX ORDER — WARFARIN SODIUM 7.5 MG/1
7.5 TABLET ORAL
Status: DISCONTINUED | OUTPATIENT
Start: 2020-01-01 | End: 2020-01-01

## 2020-01-01 RX ORDER — WARFARIN SODIUM 7.5 MG/1
TABLET ORAL
Qty: 1 TABLET | Refills: 0
Start: 2020-01-01 | End: 2020-01-01 | Stop reason: HOSPADM

## 2020-01-01 RX ORDER — DRONABINOL 2.5 MG/1
2.5 CAPSULE ORAL 2 TIMES DAILY
Status: DISPENSED | OUTPATIENT
Start: 2020-01-01 | End: 2020-01-01

## 2020-01-01 RX ORDER — ALBUMIN (HUMAN) 12.5 G/50ML
25 SOLUTION INTRAVENOUS EVERY 12 HOURS
Status: DISCONTINUED | OUTPATIENT
Start: 2020-01-01 | End: 2020-01-01

## 2020-01-01 RX ORDER — WARFARIN SODIUM 2.5 MG/1
2.5 TABLET ORAL
Status: DISCONTINUED | OUTPATIENT
Start: 2020-01-01 | End: 2020-01-01

## 2020-01-01 RX ORDER — ONDANSETRON 4 MG/1
4 TABLET, FILM COATED ORAL EVERY 4 HOURS PRN
Status: DISCONTINUED | OUTPATIENT
Start: 2020-01-01 | End: 2020-01-01 | Stop reason: HOSPADM

## 2020-01-01 RX ORDER — FUROSEMIDE 40 MG/1
40 TABLET ORAL ONCE
Status: DISCONTINUED | OUTPATIENT
Start: 2020-01-01 | End: 2020-01-01

## 2020-01-01 RX ORDER — ATROPA BELLADONNA AND OPIUM 16.2; 3 MG/1; MG/1
30 SUPPOSITORY RECTAL EVERY 8 HOURS PRN
Status: DISCONTINUED | OUTPATIENT
Start: 2020-01-01 | End: 2020-01-01 | Stop reason: HOSPADM

## 2020-01-01 RX ORDER — HYDRALAZINE HYDROCHLORIDE 50 MG/1
50 TABLET, FILM COATED ORAL EVERY 8 HOURS SCHEDULED
Status: DISCONTINUED | OUTPATIENT
Start: 2020-01-01 | End: 2020-01-01

## 2020-01-01 RX ORDER — HYDROCODONE BITARTRATE AND ACETAMINOPHEN 5; 325 MG/1; MG/1
1 TABLET ORAL EVERY 8 HOURS PRN
Status: DISCONTINUED | OUTPATIENT
Start: 2020-01-01 | End: 2020-01-01 | Stop reason: HOSPADM

## 2020-01-01 RX ORDER — SIMETHICONE 80 MG
80 TABLET,CHEWABLE ORAL 4 TIMES DAILY PRN
Status: DISCONTINUED | OUTPATIENT
Start: 2020-01-01 | End: 2020-01-01 | Stop reason: HOSPADM

## 2020-01-01 RX ORDER — LORAZEPAM 2 MG/ML
0.5 INJECTION INTRAMUSCULAR ONCE
Status: COMPLETED | OUTPATIENT
Start: 2020-01-01 | End: 2020-01-01

## 2020-01-01 RX ORDER — WARFARIN SODIUM 5 MG/1
5 TABLET ORAL
Status: DISCONTINUED | OUTPATIENT
Start: 2020-01-01 | End: 2020-01-01

## 2020-01-01 RX ORDER — ACETAMINOPHEN 325 MG/1
650 TABLET ORAL EVERY 4 HOURS PRN
Status: DISCONTINUED | OUTPATIENT
Start: 2020-01-01 | End: 2020-01-01 | Stop reason: HOSPADM

## 2020-01-01 RX ORDER — CARVEDILOL 6.25 MG/1
6.25 TABLET ORAL 2 TIMES DAILY WITH MEALS
Status: DISCONTINUED | OUTPATIENT
Start: 2020-01-01 | End: 2020-01-01 | Stop reason: HOSPADM

## 2020-01-01 RX ORDER — METOLAZONE 2.5 MG/1
2.5 TABLET ORAL EVERY OTHER DAY
Status: DISCONTINUED | OUTPATIENT
Start: 2020-01-01 | End: 2020-01-01

## 2020-01-01 RX ORDER — SODIUM CHLORIDE 9 MG/ML
75 INJECTION, SOLUTION INTRAVENOUS CONTINUOUS
Status: DISPENSED | OUTPATIENT
Start: 2020-01-01 | End: 2020-01-01

## 2020-01-01 RX ORDER — BUDESONIDE AND FORMOTEROL FUMARATE DIHYDRATE 80; 4.5 UG/1; UG/1
2 AEROSOL RESPIRATORY (INHALATION)
Status: DISCONTINUED | OUTPATIENT
Start: 2020-01-01 | End: 2020-01-01 | Stop reason: HOSPADM

## 2020-01-01 RX ORDER — HYDRALAZINE HYDROCHLORIDE 25 MG/1
75 TABLET, FILM COATED ORAL EVERY 8 HOURS SCHEDULED
Start: 2020-01-01 | End: 2021-01-01 | Stop reason: HOSPADM

## 2020-01-01 RX ORDER — PANTOPRAZOLE SODIUM 40 MG/1
40 TABLET, DELAYED RELEASE ORAL EVERY MORNING
Status: DISCONTINUED | OUTPATIENT
Start: 2020-01-01 | End: 2020-01-01 | Stop reason: HOSPADM

## 2020-01-01 RX ORDER — POTASSIUM CHLORIDE 1.5 G/1.77G
20 POWDER, FOR SOLUTION ORAL ONCE
Status: COMPLETED | OUTPATIENT
Start: 2020-01-01 | End: 2020-01-01

## 2020-01-01 RX ORDER — WARFARIN SODIUM 3 MG/1
3 TABLET ORAL
Status: DISCONTINUED | OUTPATIENT
Start: 2020-01-01 | End: 2020-01-01 | Stop reason: HOSPADM

## 2020-01-01 RX ORDER — WARFARIN SODIUM 7.5 MG/1
7.5 TABLET ORAL
Status: DISCONTINUED | OUTPATIENT
Start: 2020-01-01 | End: 2020-01-01 | Stop reason: HOSPADM

## 2020-01-01 RX ORDER — ATROPA BELLADONNA AND OPIUM 16.2; 3 MG/1; MG/1
30 SUPPOSITORY RECTAL EVERY 8 HOURS PRN
Start: 2020-01-01 | End: 2020-01-01

## 2020-01-01 RX ORDER — ONDANSETRON 4 MG/1
4 TABLET, ORALLY DISINTEGRATING ORAL EVERY 6 HOURS PRN
Status: DISCONTINUED | OUTPATIENT
Start: 2020-01-01 | End: 2020-01-01 | Stop reason: HOSPADM

## 2020-01-01 RX ORDER — FENTANYL CITRATE 50 UG/ML
50 INJECTION, SOLUTION INTRAMUSCULAR; INTRAVENOUS ONCE
Status: COMPLETED | OUTPATIENT
Start: 2020-01-01 | End: 2020-01-01

## 2020-01-01 RX ORDER — BISACODYL 5 MG/1
5 TABLET, DELAYED RELEASE ORAL DAILY PRN
Status: DISCONTINUED | OUTPATIENT
Start: 2020-01-01 | End: 2020-01-01 | Stop reason: HOSPADM

## 2020-01-01 RX ORDER — WARFARIN SODIUM 7.5 MG/1
7.5 TABLET ORAL
Status: DISCONTINUED | OUTPATIENT
Start: 2020-01-01 | End: 2020-01-01 | Stop reason: DRUGHIGH

## 2020-01-01 RX ORDER — HYDRALAZINE HYDROCHLORIDE 25 MG/1
25 TABLET, FILM COATED ORAL EVERY 8 HOURS SCHEDULED
Qty: 60 TABLET | Refills: 0
Start: 2020-01-01 | End: 2020-01-01 | Stop reason: HOSPADM

## 2020-01-01 RX ORDER — SODIUM CHLORIDE 0.9 % (FLUSH) 0.9 %
10 SYRINGE (ML) INJECTION EVERY 12 HOURS SCHEDULED
Status: CANCELLED | OUTPATIENT
Start: 2020-01-01

## 2020-01-01 RX ORDER — IPRATROPIUM BROMIDE AND ALBUTEROL SULFATE 2.5; .5 MG/3ML; MG/3ML
3 SOLUTION RESPIRATORY (INHALATION) EVERY 4 HOURS PRN
Status: DISCONTINUED | OUTPATIENT
Start: 2020-01-01 | End: 2020-01-01 | Stop reason: HOSPADM

## 2020-01-01 RX ORDER — CARVEDILOL 3.12 MG/1
3.12 TABLET ORAL 2 TIMES DAILY WITH MEALS
Status: DISCONTINUED | OUTPATIENT
Start: 2020-01-01 | End: 2020-01-01

## 2020-01-01 RX ORDER — HYDRALAZINE HYDROCHLORIDE 25 MG/1
25 TABLET, FILM COATED ORAL EVERY 8 HOURS SCHEDULED
Status: DISCONTINUED | OUTPATIENT
Start: 2020-01-01 | End: 2020-01-01

## 2020-01-01 RX ORDER — WARFARIN SODIUM 3 MG/1
3 TABLET ORAL
Status: DISCONTINUED | OUTPATIENT
Start: 2020-01-01 | End: 2020-01-01

## 2020-01-01 RX ORDER — MIDAZOLAM HYDROCHLORIDE 1 MG/ML
1 INJECTION INTRAMUSCULAR; INTRAVENOUS ONCE
Status: COMPLETED | OUTPATIENT
Start: 2020-01-01 | End: 2020-01-01

## 2020-01-01 RX ORDER — SODIUM CHLORIDE 0.9 % (FLUSH) 0.9 %
10 SYRINGE (ML) INJECTION EVERY 12 HOURS SCHEDULED
Status: DISCONTINUED | OUTPATIENT
Start: 2020-01-01 | End: 2020-01-01 | Stop reason: HOSPADM

## 2020-01-01 RX ORDER — ATORVASTATIN CALCIUM 40 MG/1
40 TABLET, FILM COATED ORAL NIGHTLY
Qty: 30 TABLET | Refills: 0
Start: 2020-01-01

## 2020-01-01 RX ORDER — WARFARIN SODIUM 10 MG/1
TABLET ORAL
Qty: 1 TABLET | Refills: 0
Start: 2020-01-01 | End: 2020-01-01 | Stop reason: HOSPADM

## 2020-01-01 RX ORDER — FUROSEMIDE 10 MG/ML
40 INJECTION INTRAMUSCULAR; INTRAVENOUS EVERY 12 HOURS
Status: DISCONTINUED | OUTPATIENT
Start: 2020-01-01 | End: 2020-01-01

## 2020-01-01 RX ORDER — IPRATROPIUM BROMIDE AND ALBUTEROL SULFATE 2.5; .5 MG/3ML; MG/3ML
3 SOLUTION RESPIRATORY (INHALATION) EVERY 4 HOURS PRN
Qty: 360 ML
Start: 2020-01-01

## 2020-01-01 RX ORDER — ALBUTEROL SULFATE 2.5 MG/3ML
2.5 SOLUTION RESPIRATORY (INHALATION)
Status: DISCONTINUED | OUTPATIENT
Start: 2020-01-01 | End: 2020-01-01 | Stop reason: HOSPADM

## 2020-01-01 RX ORDER — WARFARIN SODIUM 4 MG/1
4 TABLET ORAL
Status: DISCONTINUED | OUTPATIENT
Start: 2020-01-01 | End: 2020-01-01

## 2020-01-01 RX ORDER — HYDROCODONE BITARTRATE AND ACETAMINOPHEN 7.5; 325 MG/1; MG/1
1 TABLET ORAL ONCE AS NEEDED
Status: COMPLETED | OUTPATIENT
Start: 2020-01-01 | End: 2020-01-01

## 2020-01-01 RX ORDER — ALBUMIN (HUMAN) 12.5 G/50ML
25 SOLUTION INTRAVENOUS 2 TIMES DAILY
Status: COMPLETED | OUTPATIENT
Start: 2020-01-01 | End: 2020-01-01

## 2020-01-01 RX ORDER — WARFARIN SODIUM 1 MG/1
1 TABLET ORAL
Status: DISCONTINUED | OUTPATIENT
Start: 2020-01-01 | End: 2020-01-01

## 2020-01-01 RX ORDER — WARFARIN SODIUM 7.5 MG/1
7.5 TABLET ORAL
Status: COMPLETED | OUTPATIENT
Start: 2020-01-01 | End: 2020-01-01

## 2020-01-01 RX ORDER — DOCUSATE SODIUM 100 MG/1
200 CAPSULE, LIQUID FILLED ORAL 2 TIMES DAILY
Status: DISCONTINUED | OUTPATIENT
Start: 2020-01-01 | End: 2020-01-01 | Stop reason: HOSPADM

## 2020-01-01 RX ORDER — HYDRALAZINE HYDROCHLORIDE 25 MG/1
25 TABLET, FILM COATED ORAL EVERY 8 HOURS SCHEDULED
Status: DISCONTINUED | OUTPATIENT
Start: 2020-01-01 | End: 2020-01-01 | Stop reason: HOSPADM

## 2020-01-01 RX ORDER — IPRATROPIUM BROMIDE AND ALBUTEROL SULFATE 2.5; .5 MG/3ML; MG/3ML
3 SOLUTION RESPIRATORY (INHALATION)
Status: DISCONTINUED | OUTPATIENT
Start: 2020-01-01 | End: 2020-01-01 | Stop reason: DRUGHIGH

## 2020-01-01 RX ORDER — HALOPERIDOL 5 MG/ML
2 INJECTION INTRAMUSCULAR EVERY 6 HOURS PRN
Status: DISCONTINUED | OUTPATIENT
Start: 2020-01-01 | End: 2020-01-01 | Stop reason: HOSPADM

## 2020-01-01 RX ORDER — DILTIAZEM HCL IN NACL,ISO-OSM 125 MG/125
5-15 PLASTIC BAG, INJECTION (ML) INTRAVENOUS
Status: DISCONTINUED | OUTPATIENT
Start: 2020-01-01 | End: 2020-01-01

## 2020-01-01 RX ORDER — IPRATROPIUM BROMIDE AND ALBUTEROL SULFATE 2.5; .5 MG/3ML; MG/3ML
3 SOLUTION RESPIRATORY (INHALATION)
Status: DISCONTINUED | OUTPATIENT
Start: 2020-01-01 | End: 2020-01-01

## 2020-01-01 RX ORDER — AMIODARONE HYDROCHLORIDE 200 MG/1
200 TABLET ORAL
Status: DISCONTINUED | OUTPATIENT
Start: 2020-01-01 | End: 2020-01-01 | Stop reason: HOSPADM

## 2020-01-01 RX ORDER — FUROSEMIDE 10 MG/ML
20 INJECTION INTRAMUSCULAR; INTRAVENOUS 3 TIMES DAILY
Status: DISCONTINUED | OUTPATIENT
Start: 2020-01-01 | End: 2020-01-01

## 2020-01-01 RX ORDER — BENZONATATE 200 MG/1
200 CAPSULE ORAL 3 TIMES DAILY PRN
Qty: 30 CAPSULE | Refills: 0
Start: 2020-01-01 | End: 2020-01-01 | Stop reason: HOSPADM

## 2020-01-01 RX ORDER — ISOSORBIDE MONONITRATE 30 MG/1
30 TABLET, EXTENDED RELEASE ORAL
Qty: 60 TABLET | Refills: 0
Start: 2020-01-01

## 2020-01-01 RX ORDER — CLONIDINE HYDROCHLORIDE 0.1 MG/1
0.1 TABLET ORAL EVERY 8 HOURS SCHEDULED
Status: DISCONTINUED | OUTPATIENT
Start: 2020-01-01 | End: 2020-01-01

## 2020-01-01 RX ORDER — DOCUSATE SODIUM 100 MG/1
200 CAPSULE, LIQUID FILLED ORAL 2 TIMES DAILY
Status: DISCONTINUED | OUTPATIENT
Start: 2020-01-01 | End: 2020-01-01 | Stop reason: SDUPTHER

## 2020-01-01 RX ORDER — AMLODIPINE BESYLATE 5 MG/1
5 TABLET ORAL
Status: DISCONTINUED | OUTPATIENT
Start: 2020-01-01 | End: 2020-01-01 | Stop reason: HOSPADM

## 2020-01-01 RX ORDER — DEXTROSE MONOHYDRATE 25 G/50ML
25 INJECTION, SOLUTION INTRAVENOUS
Status: DISCONTINUED | OUTPATIENT
Start: 2020-01-01 | End: 2020-01-01 | Stop reason: HOSPADM

## 2020-01-01 RX ORDER — SODIUM CHLORIDE 9 MG/ML
75 INJECTION, SOLUTION INTRAVENOUS CONTINUOUS
Status: DISCONTINUED | OUTPATIENT
Start: 2020-01-01 | End: 2020-01-01

## 2020-01-01 RX ORDER — LORAZEPAM 1 MG/1
1 TABLET ORAL EVERY 6 HOURS PRN
Status: DISCONTINUED | OUTPATIENT
Start: 2020-01-01 | End: 2020-01-01 | Stop reason: HOSPADM

## 2020-01-01 RX ORDER — METOLAZONE 2.5 MG/1
2.5 TABLET ORAL DAILY
Status: DISCONTINUED | OUTPATIENT
Start: 2020-01-01 | End: 2020-01-01

## 2020-01-01 RX ORDER — BISACODYL 5 MG/1
5 TABLET, DELAYED RELEASE ORAL DAILY PRN
Status: ON HOLD
Start: 2020-01-01 | End: 2021-01-01

## 2020-01-01 RX ORDER — SODIUM CHLORIDE 0.9 % (FLUSH) 0.9 %
10 SYRINGE (ML) INJECTION AS NEEDED
Status: CANCELLED | OUTPATIENT
Start: 2020-01-01

## 2020-01-01 RX ORDER — ASPIRIN 81 MG/1
81 TABLET ORAL DAILY
Status: DISCONTINUED | OUTPATIENT
Start: 2020-01-01 | End: 2020-01-01 | Stop reason: HOSPADM

## 2020-01-01 RX ORDER — CLONIDINE HYDROCHLORIDE 0.2 MG/1
0.2 TABLET ORAL EVERY 12 HOURS SCHEDULED
Status: DISCONTINUED | OUTPATIENT
Start: 2020-01-01 | End: 2020-01-01

## 2020-01-01 RX ORDER — LORAZEPAM 0.5 MG/1
0.5 TABLET ORAL 2 TIMES DAILY
Status: DISCONTINUED | OUTPATIENT
Start: 2020-01-01 | End: 2020-01-01 | Stop reason: HOSPADM

## 2020-01-01 RX ORDER — ATORVASTATIN CALCIUM 40 MG/1
40 TABLET, FILM COATED ORAL NIGHTLY
Status: DISCONTINUED | OUTPATIENT
Start: 2020-01-01 | End: 2020-01-01 | Stop reason: HOSPADM

## 2020-01-01 RX ORDER — SODIUM CHLORIDE 0.9 % (FLUSH) 0.9 %
3 SYRINGE (ML) INJECTION EVERY 12 HOURS SCHEDULED
Status: DISCONTINUED | OUTPATIENT
Start: 2020-01-01 | End: 2020-01-01 | Stop reason: HOSPADM

## 2020-01-01 RX ORDER — AMIODARONE HYDROCHLORIDE 200 MG/1
200 TABLET ORAL EVERY 12 HOURS SCHEDULED
Qty: 30 TABLET | Refills: 0
Start: 2020-01-01 | End: 2020-01-01 | Stop reason: HOSPADM

## 2020-01-01 RX ORDER — HEPARIN SODIUM 5000 [USP'U]/ML
5000 INJECTION, SOLUTION INTRAVENOUS; SUBCUTANEOUS EVERY 12 HOURS SCHEDULED
Status: CANCELLED | OUTPATIENT
Start: 2020-01-01

## 2020-01-01 RX ORDER — HYDROCODONE BITARTRATE AND ACETAMINOPHEN 5; 325 MG/1; MG/1
1 TABLET ORAL EVERY 8 HOURS PRN
Status: DISPENSED | OUTPATIENT
Start: 2020-01-01 | End: 2020-01-01

## 2020-01-01 RX ORDER — ACETAMINOPHEN 325 MG/1
650 TABLET ORAL EVERY 6 HOURS PRN
Status: DISCONTINUED | OUTPATIENT
Start: 2020-01-01 | End: 2020-01-01 | Stop reason: HOSPADM

## 2020-01-01 RX ORDER — METOPROLOL TARTRATE 5 MG/5ML
5 INJECTION INTRAVENOUS ONCE
Status: COMPLETED | OUTPATIENT
Start: 2020-01-01 | End: 2020-01-01

## 2020-01-01 RX ORDER — ONDANSETRON 2 MG/ML
4 INJECTION INTRAMUSCULAR; INTRAVENOUS EVERY 6 HOURS PRN
Status: DISCONTINUED | OUTPATIENT
Start: 2020-01-01 | End: 2020-01-01 | Stop reason: HOSPADM

## 2020-01-01 RX ORDER — SODIUM POLYSTYRENE SULFONATE 15 G/60ML
30 SUSPENSION ORAL; RECTAL ONCE
Status: DISCONTINUED | OUTPATIENT
Start: 2020-01-01 | End: 2020-01-01 | Stop reason: HOSPADM

## 2020-01-01 RX ORDER — DILTIAZEM HYDROCHLORIDE 5 MG/ML
20 INJECTION INTRAVENOUS ONCE
Status: COMPLETED | OUTPATIENT
Start: 2020-01-01 | End: 2020-01-01

## 2020-01-01 RX ORDER — SODIUM CHLORIDE 0.9 % (FLUSH) 0.9 %
3 SYRINGE (ML) INJECTION AS NEEDED
Status: DISCONTINUED | OUTPATIENT
Start: 2020-01-01 | End: 2020-01-01 | Stop reason: HOSPADM

## 2020-01-01 RX ORDER — AMIODARONE HYDROCHLORIDE 200 MG/1
200 TABLET ORAL EVERY 12 HOURS SCHEDULED
Status: DISCONTINUED | OUTPATIENT
Start: 2020-01-01 | End: 2020-01-01 | Stop reason: HOSPADM

## 2020-01-01 RX ORDER — LACTULOSE 10 G/15ML
10 SOLUTION ORAL ONCE
Status: DISCONTINUED | OUTPATIENT
Start: 2020-01-01 | End: 2020-01-01 | Stop reason: HOSPADM

## 2020-01-01 RX ORDER — ONDANSETRON 4 MG/1
4 TABLET, FILM COATED ORAL EVERY 6 HOURS PRN
Status: DISCONTINUED | OUTPATIENT
Start: 2020-01-01 | End: 2020-01-01 | Stop reason: HOSPADM

## 2020-01-01 RX ORDER — CARVEDILOL 6.25 MG/1
6.25 TABLET ORAL 2 TIMES DAILY WITH MEALS
Status: DISCONTINUED | OUTPATIENT
Start: 2020-01-01 | End: 2020-01-01

## 2020-01-01 RX ORDER — CLONIDINE HYDROCHLORIDE 0.1 MG/1
0.1 TABLET ORAL EVERY 12 HOURS SCHEDULED
Status: COMPLETED | OUTPATIENT
Start: 2020-01-01 | End: 2020-01-01

## 2020-01-01 RX ORDER — SODIUM CHLORIDE 0.9 % (FLUSH) 0.9 %
10 SYRINGE (ML) INJECTION AS NEEDED
Status: DISCONTINUED | OUTPATIENT
Start: 2020-01-01 | End: 2020-01-01 | Stop reason: HOSPADM

## 2020-01-01 RX ORDER — CARVEDILOL 12.5 MG/1
12.5 TABLET ORAL 2 TIMES DAILY WITH MEALS
Status: DISCONTINUED | OUTPATIENT
Start: 2020-01-01 | End: 2020-01-01

## 2020-01-01 RX ORDER — AMLODIPINE BESYLATE 5 MG/1
5 TABLET ORAL DAILY
Status: DISCONTINUED | OUTPATIENT
Start: 2020-01-01 | End: 2020-01-01

## 2020-01-01 RX ORDER — LORAZEPAM 2 MG/ML
1 INJECTION INTRAMUSCULAR ONCE
Status: COMPLETED | OUTPATIENT
Start: 2020-01-01 | End: 2020-01-01

## 2020-01-01 RX ORDER — SODIUM CHLORIDE 9 MG/ML
60 INJECTION, SOLUTION INTRAVENOUS CONTINUOUS
Status: DISCONTINUED | OUTPATIENT
Start: 2020-01-01 | End: 2020-01-01

## 2020-01-01 RX ORDER — FUROSEMIDE 10 MG/ML
40 INJECTION INTRAMUSCULAR; INTRAVENOUS ONCE
Status: DISCONTINUED | OUTPATIENT
Start: 2020-01-01 | End: 2020-01-01

## 2020-01-01 RX ORDER — BUDESONIDE AND FORMOTEROL FUMARATE DIHYDRATE 80; 4.5 UG/1; UG/1
2 AEROSOL RESPIRATORY (INHALATION)
Status: DISCONTINUED | OUTPATIENT
Start: 2020-01-01 | End: 2020-01-01

## 2020-01-01 RX ORDER — QUETIAPINE FUMARATE 25 MG/1
25 TABLET, FILM COATED ORAL NIGHTLY
COMMUNITY
End: 2020-01-01 | Stop reason: HOSPADM

## 2020-01-01 RX ORDER — HYDRALAZINE HYDROCHLORIDE 20 MG/ML
10 INJECTION INTRAMUSCULAR; INTRAVENOUS EVERY 4 HOURS PRN
Status: DISCONTINUED | OUTPATIENT
Start: 2020-01-01 | End: 2020-01-01 | Stop reason: HOSPADM

## 2020-01-01 RX ORDER — WARFARIN SODIUM 3 MG/1
6 TABLET ORAL
Status: DISCONTINUED | OUTPATIENT
Start: 2020-01-01 | End: 2020-01-01

## 2020-01-01 RX ORDER — HYDRALAZINE HYDROCHLORIDE 50 MG/1
50 TABLET, FILM COATED ORAL EVERY 8 HOURS SCHEDULED
Status: DISCONTINUED | OUTPATIENT
Start: 2020-01-01 | End: 2020-01-01 | Stop reason: HOSPADM

## 2020-01-01 RX ORDER — FUROSEMIDE 10 MG/ML
INJECTION INTRAMUSCULAR; INTRAVENOUS
Status: COMPLETED
Start: 2020-01-01 | End: 2020-01-01

## 2020-01-01 RX ORDER — CARVEDILOL 6.25 MG/1
6.25 TABLET ORAL 2 TIMES DAILY WITH MEALS
Qty: 30 TABLET | Refills: 0
Start: 2020-01-01 | End: 2021-01-01 | Stop reason: HOSPADM

## 2020-01-01 RX ORDER — DIPHENHYDRAMINE HCL 25 MG
25 CAPSULE ORAL EVERY 6 HOURS PRN
Status: DISCONTINUED | OUTPATIENT
Start: 2020-01-01 | End: 2020-01-01 | Stop reason: HOSPADM

## 2020-01-01 RX ORDER — FUROSEMIDE 10 MG/ML
20 INJECTION INTRAMUSCULAR; INTRAVENOUS EVERY 12 HOURS
Status: COMPLETED | OUTPATIENT
Start: 2020-01-01 | End: 2020-01-01

## 2020-01-01 RX ORDER — FUROSEMIDE 10 MG/ML
40 INJECTION INTRAMUSCULAR; INTRAVENOUS DAILY
Status: DISCONTINUED | OUTPATIENT
Start: 2020-01-01 | End: 2020-01-01

## 2020-01-01 RX ORDER — MORPHINE SULFATE 2 MG/ML
2 INJECTION, SOLUTION INTRAMUSCULAR; INTRAVENOUS EVERY 4 HOURS PRN
Status: DISCONTINUED | OUTPATIENT
Start: 2020-01-01 | End: 2020-01-01 | Stop reason: HOSPADM

## 2020-01-01 RX ORDER — FUROSEMIDE 40 MG/1
80 TABLET ORAL DAILY
Status: DISCONTINUED | OUTPATIENT
Start: 2020-01-01 | End: 2020-01-01

## 2020-01-01 RX ORDER — AMIODARONE HYDROCHLORIDE 200 MG/1
200 TABLET ORAL EVERY 12 HOURS SCHEDULED
Status: DISCONTINUED | OUTPATIENT
Start: 2020-01-01 | End: 2020-01-01

## 2020-01-01 RX ORDER — WARFARIN SODIUM 10 MG/1
10 TABLET ORAL
Status: COMPLETED | OUTPATIENT
Start: 2020-01-01 | End: 2020-01-01

## 2020-01-01 RX ORDER — DEXTROSE MONOHYDRATE 25 G/50ML
50 INJECTION, SOLUTION INTRAVENOUS ONCE
Status: DISCONTINUED | OUTPATIENT
Start: 2020-01-01 | End: 2020-01-01 | Stop reason: HOSPADM

## 2020-01-01 RX ORDER — ACETAMINOPHEN 325 MG/1
650 TABLET ORAL EVERY 6 HOURS PRN
Start: 2020-01-01

## 2020-01-01 RX ORDER — ONDANSETRON 2 MG/ML
4 INJECTION INTRAMUSCULAR; INTRAVENOUS EVERY 6 HOURS PRN
Status: ON HOLD
Start: 2020-01-01 | End: 2021-01-01

## 2020-01-01 RX ORDER — METHYLPREDNISOLONE SODIUM SUCCINATE 125 MG/2ML
80 INJECTION, POWDER, LYOPHILIZED, FOR SOLUTION INTRAMUSCULAR; INTRAVENOUS EVERY 8 HOURS
Status: DISCONTINUED | OUTPATIENT
Start: 2020-01-01 | End: 2020-01-01

## 2020-01-01 RX ORDER — SODIUM CHLORIDE 0.9 % (FLUSH) 0.9 %
20 SYRINGE (ML) INJECTION AS NEEDED
Status: CANCELLED | OUTPATIENT
Start: 2020-01-01

## 2020-01-01 RX ORDER — LORAZEPAM 0.5 MG/1
0.5 TABLET ORAL 2 TIMES DAILY
Status: DISPENSED | OUTPATIENT
Start: 2020-01-01 | End: 2020-01-01

## 2020-01-01 RX ORDER — CLONIDINE HYDROCHLORIDE 0.1 MG/1
0.1 TABLET ORAL EVERY 12 HOURS SCHEDULED
Status: DISCONTINUED | OUTPATIENT
Start: 2020-01-01 | End: 2020-01-01

## 2020-01-01 RX ORDER — FUROSEMIDE 10 MG/ML
40 INJECTION INTRAMUSCULAR; INTRAVENOUS ONCE
Status: COMPLETED | OUTPATIENT
Start: 2020-01-01 | End: 2020-01-01

## 2020-01-01 RX ORDER — ASPIRIN 81 MG/1
324 TABLET, CHEWABLE ORAL ONCE
Status: COMPLETED | OUTPATIENT
Start: 2020-01-01 | End: 2020-01-01

## 2020-01-01 RX ORDER — ASPIRIN 81 MG/1
81 TABLET ORAL DAILY
Status: DISCONTINUED | OUTPATIENT
Start: 2020-01-01 | End: 2020-01-01

## 2020-01-01 RX ORDER — FUROSEMIDE 40 MG/1
40 TABLET ORAL
Status: DISCONTINUED | OUTPATIENT
Start: 2020-01-01 | End: 2020-01-01

## 2020-01-01 RX ADMIN — HYDRALAZINE HYDROCHLORIDE 75 MG: 50 TABLET ORAL at 14:37

## 2020-01-01 RX ADMIN — INSULIN DETEMIR 10 UNITS: 100 INJECTION, SOLUTION SUBCUTANEOUS at 20:30

## 2020-01-01 RX ADMIN — WARFARIN SODIUM 6 MG: 3 TABLET ORAL at 17:05

## 2020-01-01 RX ADMIN — HYDRALAZINE HYDROCHLORIDE 75 MG: 50 TABLET ORAL at 06:26

## 2020-01-01 RX ADMIN — NYSTATIN: 100000 CREAM TOPICAL at 08:45

## 2020-01-01 RX ADMIN — HYDRALAZINE HYDROCHLORIDE 75 MG: 50 TABLET ORAL at 05:16

## 2020-01-01 RX ADMIN — ISOSORBIDE MONONITRATE 30 MG: 30 TABLET, EXTENDED RELEASE ORAL at 09:41

## 2020-01-01 RX ADMIN — HYDRALAZINE HYDROCHLORIDE 50 MG: 50 TABLET, FILM COATED ORAL at 20:38

## 2020-01-01 RX ADMIN — CARVEDILOL 6.25 MG: 6.25 TABLET, FILM COATED ORAL at 08:42

## 2020-01-01 RX ADMIN — ALBUMIN HUMAN 25 G: 0.25 SOLUTION INTRAVENOUS at 04:08

## 2020-01-01 RX ADMIN — ATORVASTATIN CALCIUM 40 MG: 40 TABLET, FILM COATED ORAL at 20:21

## 2020-01-01 RX ADMIN — CEFEPIME HYDROCHLORIDE 2 G: 2 INJECTION, POWDER, FOR SOLUTION INTRAVENOUS at 14:37

## 2020-01-01 RX ADMIN — SODIUM CHLORIDE, PRESERVATIVE FREE 10 ML: 5 INJECTION INTRAVENOUS at 21:09

## 2020-01-01 RX ADMIN — SODIUM CHLORIDE, PRESERVATIVE FREE 10 ML: 5 INJECTION INTRAVENOUS at 08:57

## 2020-01-01 RX ADMIN — CARVEDILOL 12.5 MG: 12.5 TABLET, FILM COATED ORAL at 10:12

## 2020-01-01 RX ADMIN — ATORVASTATIN CALCIUM 40 MG: 40 TABLET, FILM COATED ORAL at 20:38

## 2020-01-01 RX ADMIN — SODIUM CHLORIDE 75 ML/HR: 9 INJECTION, SOLUTION INTRAVENOUS at 08:58

## 2020-01-01 RX ADMIN — HYDRALAZINE HYDROCHLORIDE 50 MG: 50 TABLET, FILM COATED ORAL at 21:01

## 2020-01-01 RX ADMIN — BUDESONIDE AND FORMOTEROL FUMARATE DIHYDRATE 2 PUFF: 80; 4.5 AEROSOL RESPIRATORY (INHALATION) at 19:15

## 2020-01-01 RX ADMIN — CEFEPIME HYDROCHLORIDE 2 G: 2 INJECTION, POWDER, FOR SOLUTION INTRAVENOUS at 02:40

## 2020-01-01 RX ADMIN — NYSTATIN: 100000 CREAM TOPICAL at 08:10

## 2020-01-01 RX ADMIN — NYSTATIN: 100000 CREAM TOPICAL at 21:50

## 2020-01-01 RX ADMIN — HYDRALAZINE HYDROCHLORIDE 25 MG: 25 TABLET, FILM COATED ORAL at 09:12

## 2020-01-01 RX ADMIN — PROMETHAZINE HYDROCHLORIDE 12.5 MG: 25 INJECTION INTRAMUSCULAR; INTRAVENOUS at 00:20

## 2020-01-01 RX ADMIN — NYSTATIN: 100000 CREAM TOPICAL at 21:29

## 2020-01-01 RX ADMIN — CARVEDILOL 6.25 MG: 6.25 TABLET, FILM COATED ORAL at 08:25

## 2020-01-01 RX ADMIN — FUROSEMIDE 40 MG: 10 INJECTION, SOLUTION INTRAMUSCULAR; INTRAVENOUS at 00:32

## 2020-01-01 RX ADMIN — SODIUM CHLORIDE, PRESERVATIVE FREE 10 ML: 5 INJECTION INTRAVENOUS at 06:45

## 2020-01-01 RX ADMIN — ATORVASTATIN CALCIUM 40 MG: 40 TABLET, FILM COATED ORAL at 21:26

## 2020-01-01 RX ADMIN — SODIUM CHLORIDE, PRESERVATIVE FREE 10 ML: 5 INJECTION INTRAVENOUS at 21:50

## 2020-01-01 RX ADMIN — PANTOPRAZOLE SODIUM 40 MG: 40 TABLET, DELAYED RELEASE ORAL at 07:04

## 2020-01-01 RX ADMIN — ACETAMINOPHEN 650 MG: 325 TABLET, FILM COATED ORAL at 08:08

## 2020-01-01 RX ADMIN — AMIODARONE HYDROCHLORIDE 150 MG: 1.5 INJECTION, SOLUTION INTRAVENOUS at 08:22

## 2020-01-01 RX ADMIN — PANTOPRAZOLE SODIUM 40 MG: 40 TABLET, DELAYED RELEASE ORAL at 06:26

## 2020-01-01 RX ADMIN — HYDRALAZINE HYDROCHLORIDE 50 MG: 50 TABLET, FILM COATED ORAL at 14:37

## 2020-01-01 RX ADMIN — ALBUTEROL SULFATE 2.5 MG: 2.5 SOLUTION RESPIRATORY (INHALATION) at 19:05

## 2020-01-01 RX ADMIN — ISOSORBIDE MONONITRATE 30 MG: 30 TABLET, EXTENDED RELEASE ORAL at 08:19

## 2020-01-01 RX ADMIN — SODIUM CHLORIDE 60 ML/HR: 900 INJECTION, SOLUTION INTRAVENOUS at 05:25

## 2020-01-01 RX ADMIN — CARVEDILOL 6.25 MG: 6.25 TABLET, FILM COATED ORAL at 08:33

## 2020-01-01 RX ADMIN — AMIODARONE HYDROCHLORIDE 200 MG: 200 TABLET ORAL at 09:14

## 2020-01-01 RX ADMIN — WARFARIN SODIUM 2.5 MG: 2.5 TABLET ORAL at 17:11

## 2020-01-01 RX ADMIN — ATORVASTATIN CALCIUM 40 MG: 40 TABLET, FILM COATED ORAL at 20:25

## 2020-01-01 RX ADMIN — CLONIDINE HYDROCHLORIDE 0.1 MG: 0.1 TABLET ORAL at 14:05

## 2020-01-01 RX ADMIN — ALBUMIN HUMAN 25 G: 0.25 SOLUTION INTRAVENOUS at 05:17

## 2020-01-01 RX ADMIN — FUROSEMIDE 40 MG: 10 INJECTION, SOLUTION INTRAMUSCULAR; INTRAVENOUS at 08:34

## 2020-01-01 RX ADMIN — ATORVASTATIN CALCIUM 40 MG: 40 TABLET, FILM COATED ORAL at 20:19

## 2020-01-01 RX ADMIN — SODIUM CHLORIDE 75 ML/HR: 9 INJECTION, SOLUTION INTRAVENOUS at 04:25

## 2020-01-01 RX ADMIN — AMIODARONE HYDROCHLORIDE 200 MG: 200 TABLET ORAL at 08:27

## 2020-01-01 RX ADMIN — ALBUTEROL SULFATE 2.5 MG: 2.5 SOLUTION RESPIRATORY (INHALATION) at 07:15

## 2020-01-01 RX ADMIN — ALBUTEROL SULFATE 2.5 MG: 2.5 SOLUTION RESPIRATORY (INHALATION) at 07:42

## 2020-01-01 RX ADMIN — AMIODARONE HYDROCHLORIDE 200 MG: 200 TABLET ORAL at 20:46

## 2020-01-01 RX ADMIN — CARVEDILOL 3.12 MG: 3.12 TABLET, FILM COATED ORAL at 17:04

## 2020-01-01 RX ADMIN — FUROSEMIDE 40 MG: 10 INJECTION, SOLUTION INTRAMUSCULAR; INTRAVENOUS at 08:30

## 2020-01-01 RX ADMIN — INSULIN DETEMIR 15 UNITS: 100 INJECTION, SOLUTION SUBCUTANEOUS at 20:47

## 2020-01-01 RX ADMIN — AMIODARONE HYDROCHLORIDE 200 MG: 200 TABLET ORAL at 08:22

## 2020-01-01 RX ADMIN — NYSTATIN: 100000 CREAM TOPICAL at 21:00

## 2020-01-01 RX ADMIN — ALBUTEROL SULFATE 2.5 MG: 2.5 SOLUTION RESPIRATORY (INHALATION) at 19:23

## 2020-01-01 RX ADMIN — ASPIRIN 324 MG: 81 TABLET, CHEWABLE ORAL at 23:12

## 2020-01-01 RX ADMIN — NYSTATIN: 100000 CREAM TOPICAL at 10:15

## 2020-01-01 RX ADMIN — ALBUTEROL SULFATE 2.5 MG: 2.5 SOLUTION RESPIRATORY (INHALATION) at 15:39

## 2020-01-01 RX ADMIN — CARVEDILOL 6.25 MG: 6.25 TABLET, FILM COATED ORAL at 12:08

## 2020-01-01 RX ADMIN — BUDESONIDE AND FORMOTEROL FUMARATE DIHYDRATE 2 PUFF: 80; 4.5 AEROSOL RESPIRATORY (INHALATION) at 20:25

## 2020-01-01 RX ADMIN — FUROSEMIDE 40 MG: 10 INJECTION, SOLUTION INTRAMUSCULAR; INTRAVENOUS at 09:13

## 2020-01-01 RX ADMIN — HYDRALAZINE HYDROCHLORIDE 50 MG: 50 TABLET, FILM COATED ORAL at 05:34

## 2020-01-01 RX ADMIN — SODIUM CHLORIDE, PRESERVATIVE FREE 10 ML: 5 INJECTION INTRAVENOUS at 08:45

## 2020-01-01 RX ADMIN — PANTOPRAZOLE SODIUM 40 MG: 40 TABLET, DELAYED RELEASE ORAL at 08:23

## 2020-01-01 RX ADMIN — PANTOPRAZOLE SODIUM 40 MG: 40 TABLET, DELAYED RELEASE ORAL at 06:06

## 2020-01-01 RX ADMIN — HYDRALAZINE HYDROCHLORIDE 50 MG: 50 TABLET, FILM COATED ORAL at 05:52

## 2020-01-01 RX ADMIN — CARVEDILOL 6.25 MG: 6.25 TABLET, FILM COATED ORAL at 18:55

## 2020-01-01 RX ADMIN — BISACODYL 5 MG: 5 TABLET, COATED ORAL at 18:19

## 2020-01-01 RX ADMIN — INSULIN DETEMIR 10 UNITS: 100 INJECTION, SOLUTION SUBCUTANEOUS at 08:22

## 2020-01-01 RX ADMIN — SODIUM CHLORIDE, PRESERVATIVE FREE 10 ML: 5 INJECTION INTRAVENOUS at 20:21

## 2020-01-01 RX ADMIN — HYDRALAZINE HYDROCHLORIDE 50 MG: 50 TABLET, FILM COATED ORAL at 18:03

## 2020-01-01 RX ADMIN — CEFEPIME HYDROCHLORIDE 2 G: 2 INJECTION, POWDER, FOR SOLUTION INTRAVENOUS at 09:09

## 2020-01-01 RX ADMIN — HYDRALAZINE HYDROCHLORIDE 75 MG: 50 TABLET ORAL at 21:11

## 2020-01-01 RX ADMIN — FUROSEMIDE 10 MG: 10 INJECTION INTRAMUSCULAR; INTRAVENOUS at 02:50

## 2020-01-01 RX ADMIN — PANTOPRAZOLE SODIUM 40 MG: 40 TABLET, DELAYED RELEASE ORAL at 06:18

## 2020-01-01 RX ADMIN — HYDRALAZINE HYDROCHLORIDE 75 MG: 50 TABLET ORAL at 05:32

## 2020-01-01 RX ADMIN — HYDRALAZINE HYDROCHLORIDE 75 MG: 50 TABLET ORAL at 21:34

## 2020-01-01 RX ADMIN — NYSTATIN: 100000 CREAM TOPICAL at 20:21

## 2020-01-01 RX ADMIN — CARVEDILOL 6.25 MG: 6.25 TABLET, FILM COATED ORAL at 09:14

## 2020-01-01 RX ADMIN — ISOSORBIDE MONONITRATE 30 MG: 30 TABLET, EXTENDED RELEASE ORAL at 08:27

## 2020-01-01 RX ADMIN — DOXYCYCLINE 100 MG: 100 INJECTION, POWDER, LYOPHILIZED, FOR SOLUTION INTRAVENOUS at 05:25

## 2020-01-01 RX ADMIN — ATORVASTATIN CALCIUM 40 MG: 40 TABLET, FILM COATED ORAL at 21:06

## 2020-01-01 RX ADMIN — NYSTATIN: 100000 CREAM TOPICAL at 09:17

## 2020-01-01 RX ADMIN — DRONABINOL 2.5 MG: 2.5 CAPSULE ORAL at 22:08

## 2020-01-01 RX ADMIN — DRONABINOL 2.5 MG: 2.5 CAPSULE ORAL at 10:03

## 2020-01-01 RX ADMIN — CARVEDILOL 6.25 MG: 6.25 TABLET, FILM COATED ORAL at 10:48

## 2020-01-01 RX ADMIN — ATORVASTATIN CALCIUM 40 MG: 40 TABLET, FILM COATED ORAL at 20:42

## 2020-01-01 RX ADMIN — ISOSORBIDE MONONITRATE 30 MG: 30 TABLET, EXTENDED RELEASE ORAL at 08:46

## 2020-01-01 RX ADMIN — FUROSEMIDE 80 MG: 40 TABLET ORAL at 08:43

## 2020-01-01 RX ADMIN — ALBUTEROL SULFATE 2.5 MG: 2.5 SOLUTION RESPIRATORY (INHALATION) at 19:13

## 2020-01-01 RX ADMIN — AMIODARONE HYDROCHLORIDE 200 MG: 200 TABLET ORAL at 08:51

## 2020-01-01 RX ADMIN — SODIUM CHLORIDE, PRESERVATIVE FREE 10 ML: 5 INJECTION INTRAVENOUS at 08:24

## 2020-01-01 RX ADMIN — AMIODARONE HYDROCHLORIDE 200 MG: 200 TABLET ORAL at 08:42

## 2020-01-01 RX ADMIN — SODIUM CHLORIDE, PRESERVATIVE FREE 10 ML: 5 INJECTION INTRAVENOUS at 08:19

## 2020-01-01 RX ADMIN — INSULIN ASPART 3 UNITS: 100 INJECTION, SOLUTION INTRAVENOUS; SUBCUTANEOUS at 17:04

## 2020-01-01 RX ADMIN — PANTOPRAZOLE SODIUM 40 MG: 40 TABLET, DELAYED RELEASE ORAL at 05:31

## 2020-01-01 RX ADMIN — HYDRALAZINE HYDROCHLORIDE 50 MG: 50 TABLET, FILM COATED ORAL at 00:07

## 2020-01-01 RX ADMIN — INSULIN DETEMIR 15 UNITS: 100 INJECTION, SOLUTION SUBCUTANEOUS at 09:49

## 2020-01-01 RX ADMIN — HYDRALAZINE HYDROCHLORIDE 50 MG: 50 TABLET, FILM COATED ORAL at 21:28

## 2020-01-01 RX ADMIN — ALBUTEROL SULFATE 2.5 MG: 2.5 SOLUTION RESPIRATORY (INHALATION) at 10:47

## 2020-01-01 RX ADMIN — ASPIRIN 81 MG: 81 TABLET, COATED ORAL at 08:50

## 2020-01-01 RX ADMIN — DOXYCYCLINE 100 MG: 100 INJECTION, POWDER, LYOPHILIZED, FOR SOLUTION INTRAVENOUS at 05:24

## 2020-01-01 RX ADMIN — INSULIN DETEMIR 10 UNITS: 100 INJECTION, SOLUTION SUBCUTANEOUS at 21:49

## 2020-01-01 RX ADMIN — NYSTATIN: 100000 CREAM TOPICAL at 08:11

## 2020-01-01 RX ADMIN — HYDRALAZINE HYDROCHLORIDE 75 MG: 50 TABLET ORAL at 20:55

## 2020-01-01 RX ADMIN — ACETAMINOPHEN 650 MG: 325 TABLET, FILM COATED ORAL at 00:07

## 2020-01-01 RX ADMIN — SODIUM CHLORIDE, PRESERVATIVE FREE 10 ML: 5 INJECTION INTRAVENOUS at 09:27

## 2020-01-01 RX ADMIN — ALBUMIN HUMAN 25 G: 0.25 SOLUTION INTRAVENOUS at 17:07

## 2020-01-01 RX ADMIN — ATORVASTATIN CALCIUM 40 MG: 40 TABLET, FILM COATED ORAL at 20:22

## 2020-01-01 RX ADMIN — HYDRALAZINE HYDROCHLORIDE 25 MG: 25 TABLET, FILM COATED ORAL at 21:26

## 2020-01-01 RX ADMIN — IPRATROPIUM BROMIDE 0.5 MG: 0.5 SOLUTION RESPIRATORY (INHALATION) at 13:45

## 2020-01-01 RX ADMIN — FUROSEMIDE 20 MG: 10 INJECTION, SOLUTION INTRAMUSCULAR; INTRAVENOUS at 05:24

## 2020-01-01 RX ADMIN — HYDRALAZINE HYDROCHLORIDE 75 MG: 50 TABLET ORAL at 16:04

## 2020-01-01 RX ADMIN — INSULIN ASPART 2 UNITS: 100 INJECTION, SOLUTION INTRAVENOUS; SUBCUTANEOUS at 16:31

## 2020-01-01 RX ADMIN — HYDRALAZINE HYDROCHLORIDE 50 MG: 50 TABLET, FILM COATED ORAL at 05:24

## 2020-01-01 RX ADMIN — FUROSEMIDE 20 MG: 10 INJECTION, SOLUTION INTRAVENOUS at 09:37

## 2020-01-01 RX ADMIN — CARVEDILOL 6.25 MG: 6.25 TABLET, FILM COATED ORAL at 09:09

## 2020-01-01 RX ADMIN — HYDROCODONE BITARTRATE AND ACETAMINOPHEN 1 TABLET: 7.5; 325 TABLET ORAL at 14:33

## 2020-01-01 RX ADMIN — CARVEDILOL 6.25 MG: 6.25 TABLET, FILM COATED ORAL at 17:25

## 2020-01-01 RX ADMIN — DRONABINOL 2.5 MG: 2.5 CAPSULE ORAL at 10:13

## 2020-01-01 RX ADMIN — INSULIN DETEMIR 15 UNITS: 100 INJECTION, SOLUTION SUBCUTANEOUS at 08:01

## 2020-01-01 RX ADMIN — HYDRALAZINE HYDROCHLORIDE 75 MG: 50 TABLET ORAL at 15:51

## 2020-01-01 RX ADMIN — ONDANSETRON 4 MG: 2 INJECTION INTRAMUSCULAR; INTRAVENOUS at 01:26

## 2020-01-01 RX ADMIN — DOXYCYCLINE 100 MG: 100 INJECTION, POWDER, LYOPHILIZED, FOR SOLUTION INTRAVENOUS at 05:49

## 2020-01-01 RX ADMIN — ALBUTEROL SULFATE 2.5 MG: 2.5 SOLUTION RESPIRATORY (INHALATION) at 15:00

## 2020-01-01 RX ADMIN — PANTOPRAZOLE SODIUM 40 MG: 40 TABLET, DELAYED RELEASE ORAL at 05:34

## 2020-01-01 RX ADMIN — SODIUM CHLORIDE, PRESERVATIVE FREE 10 ML: 5 INJECTION INTRAVENOUS at 08:51

## 2020-01-01 RX ADMIN — HYDRALAZINE HYDROCHLORIDE 75 MG: 50 TABLET ORAL at 12:07

## 2020-01-01 RX ADMIN — ISOSORBIDE MONONITRATE 30 MG: 30 TABLET, EXTENDED RELEASE ORAL at 12:09

## 2020-01-01 RX ADMIN — HYDRALAZINE HYDROCHLORIDE 75 MG: 50 TABLET ORAL at 05:51

## 2020-01-01 RX ADMIN — HYDRALAZINE HYDROCHLORIDE 25 MG: 25 TABLET, FILM COATED ORAL at 14:10

## 2020-01-01 RX ADMIN — NYSTATIN: 100000 CREAM TOPICAL at 21:40

## 2020-01-01 RX ADMIN — WARFARIN SODIUM 2.5 MG: 2.5 TABLET ORAL at 18:13

## 2020-01-01 RX ADMIN — HYDRALAZINE HYDROCHLORIDE 75 MG: 50 TABLET ORAL at 15:11

## 2020-01-01 RX ADMIN — WARFARIN SODIUM 5 MG: 5 TABLET ORAL at 19:25

## 2020-01-01 RX ADMIN — NYSTATIN: 100000 CREAM TOPICAL at 21:08

## 2020-01-01 RX ADMIN — INSULIN ASPART 3 UNITS: 100 INJECTION, SOLUTION INTRAVENOUS; SUBCUTANEOUS at 17:25

## 2020-01-01 RX ADMIN — DRONABINOL 2.5 MG: 2.5 CAPSULE ORAL at 20:54

## 2020-01-01 RX ADMIN — ATORVASTATIN CALCIUM 40 MG: 40 TABLET, FILM COATED ORAL at 20:34

## 2020-01-01 RX ADMIN — NYSTATIN: 100000 CREAM TOPICAL at 20:20

## 2020-01-01 RX ADMIN — ALBUMIN HUMAN 25 G: 0.25 SOLUTION INTRAVENOUS at 10:16

## 2020-01-01 RX ADMIN — FUROSEMIDE 40 MG: 10 INJECTION, SOLUTION INTRAMUSCULAR; INTRAVENOUS at 16:45

## 2020-01-01 RX ADMIN — CARVEDILOL 12.5 MG: 12.5 TABLET, FILM COATED ORAL at 17:10

## 2020-01-01 RX ADMIN — CEFEPIME HYDROCHLORIDE 2 G: 2 INJECTION, POWDER, FOR SOLUTION INTRAVENOUS at 16:06

## 2020-01-01 RX ADMIN — ATORVASTATIN CALCIUM 40 MG: 40 TABLET, FILM COATED ORAL at 21:12

## 2020-01-01 RX ADMIN — FUROSEMIDE 20 MG: 10 INJECTION, SOLUTION INTRAMUSCULAR; INTRAVENOUS at 06:27

## 2020-01-01 RX ADMIN — DOXYCYCLINE 100 MG: 100 INJECTION, POWDER, LYOPHILIZED, FOR SOLUTION INTRAVENOUS at 17:24

## 2020-01-01 RX ADMIN — AMIODARONE HYDROCHLORIDE 1 MG/MIN: 1.8 INJECTION, SOLUTION INTRAVENOUS at 08:32

## 2020-01-01 RX ADMIN — SODIUM CHLORIDE 75 ML/HR: 9 INJECTION, SOLUTION INTRAVENOUS at 13:09

## 2020-01-01 RX ADMIN — INSULIN ASPART 3 UNITS: 100 INJECTION, SOLUTION INTRAVENOUS; SUBCUTANEOUS at 12:42

## 2020-01-01 RX ADMIN — AMIODARONE HYDROCHLORIDE 200 MG: 200 TABLET ORAL at 10:12

## 2020-01-01 RX ADMIN — VANCOMYCIN HYDROCHLORIDE 2000 MG: 5 INJECTION, POWDER, LYOPHILIZED, FOR SOLUTION INTRAVENOUS at 04:22

## 2020-01-01 RX ADMIN — HYDRALAZINE HYDROCHLORIDE 50 MG: 50 TABLET, FILM COATED ORAL at 20:19

## 2020-01-01 RX ADMIN — NYSTATIN: 100000 CREAM TOPICAL at 09:00

## 2020-01-01 RX ADMIN — HYDRALAZINE HYDROCHLORIDE 75 MG: 50 TABLET ORAL at 13:19

## 2020-01-01 RX ADMIN — ISOSORBIDE MONONITRATE 30 MG: 30 TABLET, EXTENDED RELEASE ORAL at 08:42

## 2020-01-01 RX ADMIN — PANTOPRAZOLE SODIUM 40 MG: 40 TABLET, DELAYED RELEASE ORAL at 05:49

## 2020-01-01 RX ADMIN — ALBUTEROL SULFATE 2.5 MG: 2.5 SOLUTION RESPIRATORY (INHALATION) at 10:19

## 2020-01-01 RX ADMIN — FENTANYL CITRATE 50 MCG: 50 INJECTION, SOLUTION INTRAMUSCULAR; INTRAVENOUS at 08:06

## 2020-01-01 RX ADMIN — ISOSORBIDE MONONITRATE 30 MG: 30 TABLET, EXTENDED RELEASE ORAL at 09:28

## 2020-01-01 RX ADMIN — SODIUM CHLORIDE, PRESERVATIVE FREE 10 ML: 5 INJECTION INTRAVENOUS at 21:12

## 2020-01-01 RX ADMIN — ATORVASTATIN CALCIUM 40 MG: 40 TABLET, FILM COATED ORAL at 20:46

## 2020-01-01 RX ADMIN — ISOSORBIDE MONONITRATE 30 MG: 30 TABLET, EXTENDED RELEASE ORAL at 10:47

## 2020-01-01 RX ADMIN — INSULIN DETEMIR 15 UNITS: 100 INJECTION, SOLUTION SUBCUTANEOUS at 08:24

## 2020-01-01 RX ADMIN — FUROSEMIDE 40 MG: 40 TABLET ORAL at 09:17

## 2020-01-01 RX ADMIN — SODIUM CHLORIDE, PRESERVATIVE FREE 10 ML: 5 INJECTION INTRAVENOUS at 21:01

## 2020-01-01 RX ADMIN — AMIODARONE HYDROCHLORIDE 200 MG: 200 TABLET ORAL at 08:19

## 2020-01-01 RX ADMIN — CARVEDILOL 6.25 MG: 6.25 TABLET, FILM COATED ORAL at 17:34

## 2020-01-01 RX ADMIN — CLONIDINE HYDROCHLORIDE 0.1 MG: 0.1 TABLET ORAL at 23:23

## 2020-01-01 RX ADMIN — CEFEPIME HYDROCHLORIDE 2 G: 2 INJECTION, POWDER, FOR SOLUTION INTRAVENOUS at 05:24

## 2020-01-01 RX ADMIN — ALBUTEROL SULFATE 2.5 MG: 2.5 SOLUTION RESPIRATORY (INHALATION) at 20:25

## 2020-01-01 RX ADMIN — CARVEDILOL 6.25 MG: 6.25 TABLET, FILM COATED ORAL at 18:26

## 2020-01-01 RX ADMIN — NYSTATIN: 100000 CREAM TOPICAL at 09:49

## 2020-01-01 RX ADMIN — SODIUM CHLORIDE 75 ML/HR: 9 INJECTION, SOLUTION INTRAVENOUS at 08:22

## 2020-01-01 RX ADMIN — WARFARIN SODIUM 7.5 MG: 7.5 TABLET ORAL at 17:34

## 2020-01-01 RX ADMIN — NYSTATIN: 100000 CREAM TOPICAL at 10:59

## 2020-01-01 RX ADMIN — NYSTATIN: 100000 CREAM TOPICAL at 08:59

## 2020-01-01 RX ADMIN — ISOSORBIDE MONONITRATE 30 MG: 30 TABLET, EXTENDED RELEASE ORAL at 08:10

## 2020-01-01 RX ADMIN — SODIUM CHLORIDE, PRESERVATIVE FREE 10 ML: 5 INJECTION INTRAVENOUS at 21:41

## 2020-01-01 RX ADMIN — ASPIRIN 81 MG: 81 TABLET, COATED ORAL at 08:22

## 2020-01-01 RX ADMIN — FUROSEMIDE 40 MG: 10 INJECTION, SOLUTION INTRAMUSCULAR; INTRAVENOUS at 05:17

## 2020-01-01 RX ADMIN — AMIODARONE HYDROCHLORIDE 200 MG: 200 TABLET ORAL at 08:25

## 2020-01-01 RX ADMIN — ATORVASTATIN CALCIUM 40 MG: 40 TABLET, FILM COATED ORAL at 20:55

## 2020-01-01 RX ADMIN — ISOSORBIDE MONONITRATE 30 MG: 30 TABLET, EXTENDED RELEASE ORAL at 09:21

## 2020-01-01 RX ADMIN — SODIUM CHLORIDE, PRESERVATIVE FREE 10 ML: 5 INJECTION INTRAVENOUS at 20:35

## 2020-01-01 RX ADMIN — VANCOMYCIN HYDROCHLORIDE 750 MG: 5 INJECTION, POWDER, LYOPHILIZED, FOR SOLUTION INTRAVENOUS at 18:04

## 2020-01-01 RX ADMIN — SODIUM CHLORIDE, PRESERVATIVE FREE 10 ML: 5 INJECTION INTRAVENOUS at 09:38

## 2020-01-01 RX ADMIN — ALBUMIN HUMAN 25 G: 0.25 SOLUTION INTRAVENOUS at 17:40

## 2020-01-01 RX ADMIN — PANTOPRAZOLE SODIUM 40 MG: 40 TABLET, DELAYED RELEASE ORAL at 06:05

## 2020-01-01 RX ADMIN — INSULIN ASPART 3 UNITS: 100 INJECTION, SOLUTION INTRAVENOUS; SUBCUTANEOUS at 17:42

## 2020-01-01 RX ADMIN — CARVEDILOL 6.25 MG: 6.25 TABLET, FILM COATED ORAL at 18:00

## 2020-01-01 RX ADMIN — PANTOPRAZOLE SODIUM 40 MG: 40 TABLET, DELAYED RELEASE ORAL at 05:51

## 2020-01-01 RX ADMIN — HYDRALAZINE HYDROCHLORIDE 75 MG: 50 TABLET ORAL at 05:15

## 2020-01-01 RX ADMIN — CARVEDILOL 6.25 MG: 6.25 TABLET, FILM COATED ORAL at 17:38

## 2020-01-01 RX ADMIN — AMIODARONE HYDROCHLORIDE 200 MG: 200 TABLET ORAL at 10:13

## 2020-01-01 RX ADMIN — SODIUM CHLORIDE, PRESERVATIVE FREE 10 ML: 5 INJECTION INTRAVENOUS at 20:47

## 2020-01-01 RX ADMIN — INSULIN ASPART 5 UNITS: 100 INJECTION, SOLUTION INTRAVENOUS; SUBCUTANEOUS at 11:02

## 2020-01-01 RX ADMIN — AMIODARONE HYDROCHLORIDE 0.5 MG/MIN: 1.8 INJECTION, SOLUTION INTRAVENOUS at 04:13

## 2020-01-01 RX ADMIN — SODIUM CHLORIDE, PRESERVATIVE FREE 10 ML: 5 INJECTION INTRAVENOUS at 21:28

## 2020-01-01 RX ADMIN — CARVEDILOL 6.25 MG: 6.25 TABLET, FILM COATED ORAL at 17:45

## 2020-01-01 RX ADMIN — METOLAZONE 2.5 MG: 2.5 TABLET ORAL at 12:07

## 2020-01-01 RX ADMIN — AMIODARONE HYDROCHLORIDE 200 MG: 200 TABLET ORAL at 12:08

## 2020-01-01 RX ADMIN — NYSTATIN: 100000 CREAM TOPICAL at 10:03

## 2020-01-01 RX ADMIN — HYDRALAZINE HYDROCHLORIDE 75 MG: 50 TABLET ORAL at 00:21

## 2020-01-01 RX ADMIN — ASPIRIN 81 MG: 81 TABLET, COATED ORAL at 10:12

## 2020-01-01 RX ADMIN — NYSTATIN: 100000 CREAM TOPICAL at 22:06

## 2020-01-01 RX ADMIN — ALBUTEROL SULFATE 2.5 MG: 2.5 SOLUTION RESPIRATORY (INHALATION) at 15:45

## 2020-01-01 RX ADMIN — CARVEDILOL 6.25 MG: 6.25 TABLET, FILM COATED ORAL at 10:12

## 2020-01-01 RX ADMIN — PANTOPRAZOLE SODIUM 40 MG: 40 TABLET, DELAYED RELEASE ORAL at 05:35

## 2020-01-01 RX ADMIN — AMIODARONE HYDROCHLORIDE 200 MG: 200 TABLET ORAL at 20:30

## 2020-01-01 RX ADMIN — FUROSEMIDE 40 MG: 10 INJECTION, SOLUTION INTRAMUSCULAR; INTRAVENOUS at 18:38

## 2020-01-01 RX ADMIN — NYSTATIN: 100000 CREAM TOPICAL at 21:09

## 2020-01-01 RX ADMIN — NYSTATIN: 100000 CREAM TOPICAL at 20:39

## 2020-01-01 RX ADMIN — HYDRALAZINE HYDROCHLORIDE 75 MG: 50 TABLET ORAL at 13:45

## 2020-01-01 RX ADMIN — DRONABINOL 2.5 MG: 2.5 CAPSULE ORAL at 13:45

## 2020-01-01 RX ADMIN — CARVEDILOL 6.25 MG: 6.25 TABLET, FILM COATED ORAL at 09:28

## 2020-01-01 RX ADMIN — ASPIRIN 81 MG: 81 TABLET, COATED ORAL at 08:25

## 2020-01-01 RX ADMIN — ISOSORBIDE MONONITRATE 30 MG: 30 TABLET, EXTENDED RELEASE ORAL at 08:33

## 2020-01-01 RX ADMIN — WARFARIN SODIUM 10 MG: 10 TABLET ORAL at 17:37

## 2020-01-01 RX ADMIN — ALBUMIN HUMAN 25 G: 0.25 SOLUTION INTRAVENOUS at 21:08

## 2020-01-01 RX ADMIN — CARVEDILOL 6.25 MG: 6.25 TABLET, FILM COATED ORAL at 17:05

## 2020-01-01 RX ADMIN — ALBUTEROL SULFATE 2.5 MG: 2.5 SOLUTION RESPIRATORY (INHALATION) at 07:49

## 2020-01-01 RX ADMIN — CARVEDILOL 6.25 MG: 6.25 TABLET, FILM COATED ORAL at 16:04

## 2020-01-01 RX ADMIN — FUROSEMIDE 40 MG: 10 INJECTION, SOLUTION INTRAMUSCULAR; INTRAVENOUS at 05:28

## 2020-01-01 RX ADMIN — HYDRALAZINE HYDROCHLORIDE 75 MG: 50 TABLET ORAL at 21:04

## 2020-01-01 RX ADMIN — ASPIRIN 81 MG: 81 TABLET, COATED ORAL at 08:27

## 2020-01-01 RX ADMIN — DOXYCYCLINE 100 MG: 100 INJECTION, POWDER, LYOPHILIZED, FOR SOLUTION INTRAVENOUS at 18:04

## 2020-01-01 RX ADMIN — SODIUM CHLORIDE, PRESERVATIVE FREE 10 ML: 5 INJECTION INTRAVENOUS at 09:41

## 2020-01-01 RX ADMIN — SODIUM CHLORIDE, PRESERVATIVE FREE 10 ML: 5 INJECTION INTRAVENOUS at 21:49

## 2020-01-01 RX ADMIN — SODIUM CHLORIDE, PRESERVATIVE FREE 10 ML: 5 INJECTION INTRAVENOUS at 08:01

## 2020-01-01 RX ADMIN — SODIUM CHLORIDE, PRESERVATIVE FREE 10 ML: 5 INJECTION INTRAVENOUS at 21:11

## 2020-01-01 RX ADMIN — CARVEDILOL 6.25 MG: 6.25 TABLET, FILM COATED ORAL at 17:41

## 2020-01-01 RX ADMIN — DRONABINOL 2.5 MG: 2.5 CAPSULE ORAL at 10:29

## 2020-01-01 RX ADMIN — WARFARIN SODIUM 3 MG: 3 TABLET ORAL at 18:33

## 2020-01-01 RX ADMIN — ATORVASTATIN CALCIUM 40 MG: 40 TABLET, FILM COATED ORAL at 00:20

## 2020-01-01 RX ADMIN — AMIODARONE HYDROCHLORIDE 200 MG: 200 TABLET ORAL at 09:41

## 2020-01-01 RX ADMIN — INSULIN ASPART 2 UNITS: 100 INJECTION, SOLUTION INTRAVENOUS; SUBCUTANEOUS at 11:15

## 2020-01-01 RX ADMIN — CEFEPIME HYDROCHLORIDE 2 G: 2 INJECTION, POWDER, FOR SOLUTION INTRAVENOUS at 05:25

## 2020-01-01 RX ADMIN — SODIUM BICARBONATE 50 MEQ: 84 INJECTION, SOLUTION INTRAVENOUS at 17:49

## 2020-01-01 RX ADMIN — CARVEDILOL 6.25 MG: 6.25 TABLET, FILM COATED ORAL at 17:42

## 2020-01-01 RX ADMIN — CARVEDILOL 6.25 MG: 6.25 TABLET, FILM COATED ORAL at 08:22

## 2020-01-01 RX ADMIN — BUDESONIDE AND FORMOTEROL FUMARATE DIHYDRATE 2 PUFF: 80; 4.5 AEROSOL RESPIRATORY (INHALATION) at 07:42

## 2020-01-01 RX ADMIN — SODIUM CHLORIDE, PRESERVATIVE FREE 10 ML: 5 INJECTION INTRAVENOUS at 08:26

## 2020-01-01 RX ADMIN — CLONIDINE HYDROCHLORIDE 0.1 MG: 0.1 TABLET ORAL at 08:19

## 2020-01-01 RX ADMIN — HYDRALAZINE HYDROCHLORIDE 75 MG: 50 TABLET ORAL at 21:12

## 2020-01-01 RX ADMIN — BUDESONIDE AND FORMOTEROL FUMARATE DIHYDRATE 2 PUFF: 80; 4.5 AEROSOL RESPIRATORY (INHALATION) at 07:09

## 2020-01-01 RX ADMIN — HYDRALAZINE HYDROCHLORIDE 75 MG: 50 TABLET ORAL at 22:08

## 2020-01-01 RX ADMIN — ENOXAPARIN SODIUM 90 MG: 100 INJECTION SUBCUTANEOUS at 12:27

## 2020-01-01 RX ADMIN — ISOSORBIDE MONONITRATE 30 MG: 30 TABLET, EXTENDED RELEASE ORAL at 09:09

## 2020-01-01 RX ADMIN — AMIODARONE HYDROCHLORIDE 200 MG: 200 TABLET ORAL at 08:43

## 2020-01-01 RX ADMIN — HYDRALAZINE HYDROCHLORIDE 75 MG: 50 TABLET ORAL at 20:25

## 2020-01-01 RX ADMIN — CARVEDILOL 6.25 MG: 6.25 TABLET, FILM COATED ORAL at 18:19

## 2020-01-01 RX ADMIN — CEFEPIME HYDROCHLORIDE 2 G: 2 INJECTION, POWDER, FOR SOLUTION INTRAVENOUS at 06:28

## 2020-01-01 RX ADMIN — HYDRALAZINE HYDROCHLORIDE 75 MG: 50 TABLET ORAL at 05:21

## 2020-01-01 RX ADMIN — HYDRALAZINE HYDROCHLORIDE 25 MG: 25 TABLET ORAL at 21:11

## 2020-01-01 RX ADMIN — FUROSEMIDE 40 MG: 10 INJECTION, SOLUTION INTRAMUSCULAR; INTRAVENOUS at 04:12

## 2020-01-01 RX ADMIN — AMIODARONE HYDROCHLORIDE 200 MG: 200 TABLET ORAL at 09:39

## 2020-01-01 RX ADMIN — ASPIRIN 81 MG: 81 TABLET, COATED ORAL at 09:09

## 2020-01-01 RX ADMIN — WARFARIN SODIUM 3 MG: 3 TABLET ORAL at 17:57

## 2020-01-01 RX ADMIN — WARFARIN SODIUM 4 MG: 4 TABLET ORAL at 16:04

## 2020-01-01 RX ADMIN — ISOSORBIDE MONONITRATE 30 MG: 30 TABLET, EXTENDED RELEASE ORAL at 09:15

## 2020-01-01 RX ADMIN — SODIUM CHLORIDE, PRESERVATIVE FREE 10 ML: 5 INJECTION INTRAVENOUS at 21:00

## 2020-01-01 RX ADMIN — SODIUM CHLORIDE, PRESERVATIVE FREE 10 ML: 5 INJECTION INTRAVENOUS at 20:20

## 2020-01-01 RX ADMIN — NYSTATIN: 100000 CREAM TOPICAL at 10:48

## 2020-01-01 RX ADMIN — CARVEDILOL 6.25 MG: 6.25 TABLET, FILM COATED ORAL at 18:05

## 2020-01-01 RX ADMIN — CEFEPIME HYDROCHLORIDE 2 G: 2 INJECTION, POWDER, FOR SOLUTION INTRAVENOUS at 04:05

## 2020-01-01 RX ADMIN — AMIODARONE HYDROCHLORIDE 200 MG: 200 TABLET ORAL at 21:49

## 2020-01-01 RX ADMIN — INSULIN DETEMIR 10 UNITS: 100 INJECTION, SOLUTION SUBCUTANEOUS at 08:57

## 2020-01-01 RX ADMIN — BUDESONIDE AND FORMOTEROL FUMARATE DIHYDRATE 2 PUFF: 80; 4.5 AEROSOL RESPIRATORY (INHALATION) at 06:52

## 2020-01-01 RX ADMIN — SODIUM CHLORIDE, PRESERVATIVE FREE 10 ML: 5 INJECTION INTRAVENOUS at 08:46

## 2020-01-01 RX ADMIN — ATORVASTATIN CALCIUM 40 MG: 40 TABLET, FILM COATED ORAL at 21:11

## 2020-01-01 RX ADMIN — DRONABINOL 2.5 MG: 2.5 CAPSULE ORAL at 08:27

## 2020-01-01 RX ADMIN — ASPIRIN 81 MG: 81 TABLET, COATED ORAL at 08:01

## 2020-01-01 RX ADMIN — CARVEDILOL 6.25 MG: 6.25 TABLET, FILM COATED ORAL at 09:41

## 2020-01-01 RX ADMIN — HYDRALAZINE HYDROCHLORIDE 75 MG: 50 TABLET ORAL at 06:18

## 2020-01-01 RX ADMIN — CLONIDINE HYDROCHLORIDE 0.1 MG: 0.1 TABLET ORAL at 21:28

## 2020-01-01 RX ADMIN — INSULIN DETEMIR 10 UNITS: 100 INJECTION, SOLUTION SUBCUTANEOUS at 21:11

## 2020-01-01 RX ADMIN — HYDRALAZINE HYDROCHLORIDE 75 MG: 50 TABLET ORAL at 14:19

## 2020-01-01 RX ADMIN — FUROSEMIDE 40 MG: 10 INJECTION, SOLUTION INTRAMUSCULAR; INTRAVENOUS at 17:41

## 2020-01-01 RX ADMIN — SODIUM CHLORIDE 75 ML/HR: 9 INJECTION, SOLUTION INTRAVENOUS at 21:11

## 2020-01-01 RX ADMIN — BUDESONIDE AND FORMOTEROL FUMARATE DIHYDRATE 2 PUFF: 80; 4.5 AEROSOL RESPIRATORY (INHALATION) at 21:48

## 2020-01-01 RX ADMIN — SODIUM CHLORIDE, PRESERVATIVE FREE 10 ML: 5 INJECTION INTRAVENOUS at 10:14

## 2020-01-01 RX ADMIN — ASPIRIN 81 MG: 81 TABLET, COATED ORAL at 08:46

## 2020-01-01 RX ADMIN — DILTIAZEM HYDROCHLORIDE 20 MG: 5 INJECTION INTRAVENOUS at 11:27

## 2020-01-01 RX ADMIN — BUDESONIDE AND FORMOTEROL FUMARATE DIHYDRATE 2 PUFF: 80; 4.5 AEROSOL RESPIRATORY (INHALATION) at 07:49

## 2020-01-01 RX ADMIN — LORAZEPAM 0.5 MG: 2 INJECTION INTRAMUSCULAR; INTRAVENOUS at 05:24

## 2020-01-01 RX ADMIN — DOXYCYCLINE 100 MG: 100 INJECTION, POWDER, LYOPHILIZED, FOR SOLUTION INTRAVENOUS at 18:33

## 2020-01-01 RX ADMIN — AMIODARONE HYDROCHLORIDE 200 MG: 200 TABLET ORAL at 09:45

## 2020-01-01 RX ADMIN — ALBUMIN HUMAN 25 G: 0.25 SOLUTION INTRAVENOUS at 17:20

## 2020-01-01 RX ADMIN — AMIODARONE HYDROCHLORIDE 200 MG: 200 TABLET ORAL at 08:57

## 2020-01-01 RX ADMIN — ISOSORBIDE MONONITRATE 30 MG: 30 TABLET, EXTENDED RELEASE ORAL at 08:22

## 2020-01-01 RX ADMIN — PANTOPRAZOLE SODIUM 40 MG: 40 TABLET, DELAYED RELEASE ORAL at 06:21

## 2020-01-01 RX ADMIN — FUROSEMIDE 40 MG: 10 INJECTION, SOLUTION INTRAMUSCULAR; INTRAVENOUS at 17:45

## 2020-01-01 RX ADMIN — ASPIRIN 81 MG: 81 TABLET, COATED ORAL at 10:48

## 2020-01-01 RX ADMIN — PANTOPRAZOLE SODIUM 40 MG: 40 TABLET, DELAYED RELEASE ORAL at 05:58

## 2020-01-01 RX ADMIN — AMIODARONE HYDROCHLORIDE 200 MG: 200 TABLET ORAL at 09:16

## 2020-01-01 RX ADMIN — SODIUM CHLORIDE, PRESERVATIVE FREE 10 ML: 5 INJECTION INTRAVENOUS at 20:30

## 2020-01-01 RX ADMIN — ONDANSETRON 4 MG: 2 INJECTION INTRAMUSCULAR; INTRAVENOUS at 18:24

## 2020-01-01 RX ADMIN — FUROSEMIDE 10 MG: 10 INJECTION, SOLUTION INTRAMUSCULAR; INTRAVENOUS at 02:50

## 2020-01-01 RX ADMIN — ISOSORBIDE MONONITRATE 30 MG: 30 TABLET, EXTENDED RELEASE ORAL at 09:39

## 2020-01-01 RX ADMIN — WARFARIN SODIUM 4 MG: 4 TABLET ORAL at 18:26

## 2020-01-01 RX ADMIN — WARFARIN SODIUM 1 MG: 1 TABLET ORAL at 18:03

## 2020-01-01 RX ADMIN — CARVEDILOL 6.25 MG: 6.25 TABLET, FILM COATED ORAL at 21:49

## 2020-01-01 RX ADMIN — PANTOPRAZOLE SODIUM 40 MG: 40 TABLET, DELAYED RELEASE ORAL at 06:40

## 2020-01-01 RX ADMIN — HYDRALAZINE HYDROCHLORIDE 75 MG: 50 TABLET ORAL at 21:08

## 2020-01-01 RX ADMIN — PANTOPRAZOLE SODIUM 40 MG: 40 TABLET, DELAYED RELEASE ORAL at 08:33

## 2020-01-01 RX ADMIN — FUROSEMIDE 20 MG: 10 INJECTION, SOLUTION INTRAMUSCULAR; INTRAVENOUS at 17:20

## 2020-01-01 RX ADMIN — ASPIRIN 81 MG: 81 TABLET, COATED ORAL at 09:21

## 2020-01-01 RX ADMIN — SODIUM CHLORIDE, PRESERVATIVE FREE 10 ML: 5 INJECTION INTRAVENOUS at 10:16

## 2020-01-01 RX ADMIN — HYDRALAZINE HYDROCHLORIDE 75 MG: 50 TABLET ORAL at 20:41

## 2020-01-01 RX ADMIN — ATORVASTATIN CALCIUM 40 MG: 40 TABLET, FILM COATED ORAL at 21:08

## 2020-01-01 RX ADMIN — DRONABINOL 2.5 MG: 2.5 CAPSULE ORAL at 13:35

## 2020-01-01 RX ADMIN — ALBUTEROL SULFATE 2.5 MG: 2.5 SOLUTION RESPIRATORY (INHALATION) at 11:20

## 2020-01-01 RX ADMIN — ISOSORBIDE MONONITRATE 30 MG: 30 TABLET, EXTENDED RELEASE ORAL at 08:01

## 2020-01-01 RX ADMIN — ALBUTEROL SULFATE 2.5 MG: 2.5 SOLUTION RESPIRATORY (INHALATION) at 15:54

## 2020-01-01 RX ADMIN — FUROSEMIDE 40 MG: 10 INJECTION, SOLUTION INTRAMUSCULAR; INTRAVENOUS at 04:20

## 2020-01-01 RX ADMIN — Medication 15 MG/HR: at 21:52

## 2020-01-01 RX ADMIN — SODIUM CHLORIDE, PRESERVATIVE FREE 10 ML: 5 INJECTION INTRAVENOUS at 08:22

## 2020-01-01 RX ADMIN — ATORVASTATIN CALCIUM 40 MG: 40 TABLET, FILM COATED ORAL at 21:28

## 2020-01-01 RX ADMIN — WARFARIN SODIUM 5 MG: 5 TABLET ORAL at 17:20

## 2020-01-01 RX ADMIN — HYDRALAZINE HYDROCHLORIDE 25 MG: 25 TABLET ORAL at 22:35

## 2020-01-01 RX ADMIN — AMIODARONE HYDROCHLORIDE 200 MG: 200 TABLET ORAL at 09:28

## 2020-01-01 RX ADMIN — WARFARIN SODIUM 7.5 MG: 7.5 TABLET ORAL at 17:06

## 2020-01-01 RX ADMIN — ATORVASTATIN CALCIUM 40 MG: 40 TABLET, FILM COATED ORAL at 21:49

## 2020-01-01 RX ADMIN — HYDRALAZINE HYDROCHLORIDE 75 MG: 50 TABLET ORAL at 22:06

## 2020-01-01 RX ADMIN — AMIODARONE HYDROCHLORIDE 200 MG: 200 TABLET ORAL at 08:10

## 2020-01-01 RX ADMIN — ISOSORBIDE MONONITRATE 30 MG: 30 TABLET, EXTENDED RELEASE ORAL at 08:25

## 2020-01-01 RX ADMIN — HYDRALAZINE HYDROCHLORIDE 75 MG: 50 TABLET ORAL at 16:05

## 2020-01-01 RX ADMIN — ALBUTEROL SULFATE 2.5 MG: 2.5 SOLUTION RESPIRATORY (INHALATION) at 14:08

## 2020-01-01 RX ADMIN — CARVEDILOL 6.25 MG: 6.25 TABLET, FILM COATED ORAL at 17:20

## 2020-01-01 RX ADMIN — HYDRALAZINE HYDROCHLORIDE 75 MG: 50 TABLET ORAL at 13:34

## 2020-01-01 RX ADMIN — ATORVASTATIN CALCIUM 40 MG: 40 TABLET, FILM COATED ORAL at 20:30

## 2020-01-01 RX ADMIN — ASPIRIN 81 MG: 81 TABLET, COATED ORAL at 08:33

## 2020-01-01 RX ADMIN — PANTOPRAZOLE SODIUM 40 MG: 40 TABLET, DELAYED RELEASE ORAL at 07:58

## 2020-01-01 RX ADMIN — METOLAZONE 2.5 MG: 2.5 TABLET ORAL at 09:45

## 2020-01-01 RX ADMIN — ALBUMIN HUMAN 25 G: 0.25 SOLUTION INTRAVENOUS at 04:11

## 2020-01-01 RX ADMIN — ASPIRIN 81 MG: 81 TABLET, COATED ORAL at 08:43

## 2020-01-01 RX ADMIN — DRONABINOL 2.5 MG: 2.5 CAPSULE ORAL at 21:12

## 2020-01-01 RX ADMIN — HYDRALAZINE HYDROCHLORIDE 75 MG: 50 TABLET ORAL at 13:35

## 2020-01-01 RX ADMIN — ASPIRIN 81 MG: 81 TABLET, COATED ORAL at 08:42

## 2020-01-01 RX ADMIN — HYDRALAZINE HYDROCHLORIDE 25 MG: 25 TABLET ORAL at 23:02

## 2020-01-01 RX ADMIN — FUROSEMIDE 40 MG: 40 TABLET ORAL at 09:13

## 2020-01-01 RX ADMIN — SODIUM BICARBONATE 50 MEQ: 84 INJECTION, SOLUTION INTRAVENOUS at 09:34

## 2020-01-01 RX ADMIN — CEFEPIME HYDROCHLORIDE 2 G: 2 INJECTION, POWDER, FOR SOLUTION INTRAVENOUS at 17:11

## 2020-01-01 RX ADMIN — SODIUM CHLORIDE, PRESERVATIVE FREE 10 ML: 5 INJECTION INTRAVENOUS at 01:38

## 2020-01-01 RX ADMIN — ALBUMIN HUMAN 25 G: 0.25 SOLUTION INTRAVENOUS at 13:15

## 2020-01-01 RX ADMIN — CARVEDILOL 6.25 MG: 6.25 TABLET, FILM COATED ORAL at 19:25

## 2020-01-01 RX ADMIN — NYSTATIN: 100000 CREAM TOPICAL at 20:26

## 2020-01-01 RX ADMIN — ATORVASTATIN CALCIUM 40 MG: 40 TABLET, FILM COATED ORAL at 22:09

## 2020-01-01 RX ADMIN — AMIODARONE HYDROCHLORIDE 200 MG: 200 TABLET ORAL at 21:11

## 2020-01-01 RX ADMIN — CARVEDILOL 6.25 MG: 6.25 TABLET, FILM COATED ORAL at 08:57

## 2020-01-01 RX ADMIN — ASPIRIN 81 MG: 81 TABLET, COATED ORAL at 08:23

## 2020-01-01 RX ADMIN — CLONIDINE HYDROCHLORIDE 0.2 MG: 0.2 TABLET ORAL at 09:36

## 2020-01-01 RX ADMIN — POTASSIUM CHLORIDE 20 MEQ: 1.5 POWDER, FOR SOLUTION ORAL at 11:49

## 2020-01-01 RX ADMIN — CARVEDILOL 6.25 MG: 6.25 TABLET, FILM COATED ORAL at 09:39

## 2020-01-01 RX ADMIN — NYSTATIN: 100000 CREAM TOPICAL at 09:14

## 2020-01-01 RX ADMIN — SODIUM CHLORIDE, POTASSIUM CHLORIDE, SODIUM LACTATE AND CALCIUM CHLORIDE 1000 ML: 600; 310; 30; 20 INJECTION, SOLUTION INTRAVENOUS at 23:12

## 2020-01-01 RX ADMIN — CLONIDINE HYDROCHLORIDE 0.1 MG: 0.1 TABLET ORAL at 06:21

## 2020-01-01 RX ADMIN — ISOSORBIDE MONONITRATE 30 MG: 30 TABLET, EXTENDED RELEASE ORAL at 08:57

## 2020-01-01 RX ADMIN — ALBUMIN HUMAN 25 G: 0.25 SOLUTION INTRAVENOUS at 16:44

## 2020-01-01 RX ADMIN — CLONIDINE HYDROCHLORIDE 0.1 MG: 0.1 TABLET ORAL at 08:33

## 2020-01-01 RX ADMIN — HYDRALAZINE HYDROCHLORIDE 75 MG: 50 TABLET ORAL at 05:30

## 2020-01-01 RX ADMIN — ASPIRIN 81 MG: 81 TABLET, COATED ORAL at 09:41

## 2020-01-01 RX ADMIN — ASPIRIN 81 MG: 81 TABLET, COATED ORAL at 08:10

## 2020-01-01 RX ADMIN — CARVEDILOL 6.25 MG: 6.25 TABLET, FILM COATED ORAL at 08:08

## 2020-01-01 RX ADMIN — HYDRALAZINE HYDROCHLORIDE 25 MG: 25 TABLET ORAL at 15:02

## 2020-01-01 RX ADMIN — NYSTATIN: 100000 CREAM TOPICAL at 09:38

## 2020-01-01 RX ADMIN — NYSTATIN: 100000 CREAM TOPICAL at 09:24

## 2020-01-01 RX ADMIN — SODIUM CHLORIDE, PRESERVATIVE FREE 10 ML: 5 INJECTION INTRAVENOUS at 20:22

## 2020-01-01 RX ADMIN — DOXYCYCLINE 100 MG: 100 INJECTION, POWDER, LYOPHILIZED, FOR SOLUTION INTRAVENOUS at 18:13

## 2020-01-01 RX ADMIN — CARVEDILOL 6.25 MG: 6.25 TABLET, FILM COATED ORAL at 18:13

## 2020-01-01 RX ADMIN — CARVEDILOL 6.25 MG: 6.25 TABLET, FILM COATED ORAL at 09:12

## 2020-01-01 RX ADMIN — SODIUM CHLORIDE, PRESERVATIVE FREE 10 ML: 5 INJECTION INTRAVENOUS at 09:14

## 2020-01-01 RX ADMIN — ASPIRIN 81 MG: 81 TABLET, COATED ORAL at 08:19

## 2020-01-01 RX ADMIN — WARFARIN SODIUM 3 MG: 3 TABLET ORAL at 17:45

## 2020-01-01 RX ADMIN — PANTOPRAZOLE SODIUM 40 MG: 40 TABLET, DELAYED RELEASE ORAL at 05:15

## 2020-01-01 RX ADMIN — NYSTATIN 1 EACH: 100000 CREAM TOPICAL at 21:00

## 2020-01-01 RX ADMIN — SODIUM BICARBONATE 50 MEQ: 84 INJECTION, SOLUTION INTRAVENOUS at 14:05

## 2020-01-01 RX ADMIN — NYSTATIN: 100000 CREAM TOPICAL at 22:11

## 2020-01-01 RX ADMIN — ATORVASTATIN CALCIUM 40 MG: 40 TABLET, FILM COATED ORAL at 22:06

## 2020-01-01 RX ADMIN — AMIODARONE HYDROCHLORIDE 200 MG: 200 TABLET ORAL at 20:19

## 2020-01-01 RX ADMIN — SODIUM CHLORIDE, PRESERVATIVE FREE 10 ML: 5 INJECTION INTRAVENOUS at 11:01

## 2020-01-01 RX ADMIN — AMIODARONE HYDROCHLORIDE 200 MG: 200 TABLET ORAL at 08:08

## 2020-01-01 RX ADMIN — CARVEDILOL 6.25 MG: 6.25 TABLET, FILM COATED ORAL at 17:06

## 2020-01-01 RX ADMIN — HYDRALAZINE HYDROCHLORIDE 50 MG: 50 TABLET, FILM COATED ORAL at 06:21

## 2020-01-01 RX ADMIN — HYDRALAZINE HYDROCHLORIDE 75 MG: 50 TABLET ORAL at 06:06

## 2020-01-01 RX ADMIN — ALBUMIN HUMAN 25 G: 0.25 SOLUTION INTRAVENOUS at 05:28

## 2020-01-01 RX ADMIN — AMIODARONE HYDROCHLORIDE 200 MG: 200 TABLET ORAL at 08:46

## 2020-01-01 RX ADMIN — SODIUM CHLORIDE, PRESERVATIVE FREE 10 ML: 5 INJECTION INTRAVENOUS at 12:24

## 2020-01-01 RX ADMIN — ISOSORBIDE MONONITRATE 30 MG: 30 TABLET, EXTENDED RELEASE ORAL at 08:51

## 2020-01-01 RX ADMIN — AMIODARONE HYDROCHLORIDE 200 MG: 200 TABLET ORAL at 09:09

## 2020-01-01 RX ADMIN — CARVEDILOL 6.25 MG: 6.25 TABLET, FILM COATED ORAL at 08:10

## 2020-01-01 RX ADMIN — ISOSORBIDE MONONITRATE 30 MG: 30 TABLET, EXTENDED RELEASE ORAL at 09:46

## 2020-01-01 RX ADMIN — CEFTRIAXONE SODIUM 1 G: 1 INJECTION, POWDER, FOR SOLUTION INTRAMUSCULAR; INTRAVENOUS at 00:32

## 2020-01-01 RX ADMIN — PANTOPRAZOLE SODIUM 40 MG: 40 TABLET, DELAYED RELEASE ORAL at 05:22

## 2020-01-01 RX ADMIN — FUROSEMIDE 80 MG: 40 TABLET ORAL at 10:13

## 2020-01-01 RX ADMIN — ALBUTEROL SULFATE 2.5 MG: 2.5 SOLUTION RESPIRATORY (INHALATION) at 11:43

## 2020-01-01 RX ADMIN — ASPIRIN 81 MG: 81 TABLET, COATED ORAL at 08:57

## 2020-01-01 RX ADMIN — BUDESONIDE AND FORMOTEROL FUMARATE DIHYDRATE 2 PUFF: 80; 4.5 AEROSOL RESPIRATORY (INHALATION) at 07:36

## 2020-01-01 RX ADMIN — INSULIN ASPART 2 UNITS: 100 INJECTION, SOLUTION INTRAVENOUS; SUBCUTANEOUS at 17:34

## 2020-01-01 RX ADMIN — FUROSEMIDE 20 MG: 10 INJECTION, SOLUTION INTRAMUSCULAR; INTRAVENOUS at 05:34

## 2020-01-01 RX ADMIN — ALBUTEROL SULFATE 2.5 MG: 2.5 SOLUTION RESPIRATORY (INHALATION) at 10:40

## 2020-01-01 RX ADMIN — CLONIDINE HYDROCHLORIDE 0.1 MG: 0.1 TABLET ORAL at 23:01

## 2020-01-01 RX ADMIN — BUDESONIDE AND FORMOTEROL FUMARATE DIHYDRATE 2 PUFF: 80; 4.5 AEROSOL RESPIRATORY (INHALATION) at 07:17

## 2020-01-01 RX ADMIN — MIDAZOLAM HYDROCHLORIDE 1 MG: 2 INJECTION, SOLUTION INTRAMUSCULAR; INTRAVENOUS at 08:05

## 2020-01-01 RX ADMIN — INSULIN DETEMIR 10 UNITS: 100 INJECTION, SOLUTION SUBCUTANEOUS at 21:35

## 2020-01-01 RX ADMIN — NYSTATIN: 100000 CREAM TOPICAL at 20:34

## 2020-01-01 RX ADMIN — ASPIRIN 81 MG: 81 TABLET, COATED ORAL at 09:16

## 2020-01-01 RX ADMIN — ALBUTEROL SULFATE 2.5 MG: 2.5 SOLUTION RESPIRATORY (INHALATION) at 06:50

## 2020-01-01 RX ADMIN — ATROPA BELLADONNA AND OPIUM 30 MG: 16.2; 3 SUPPOSITORY RECTAL at 10:16

## 2020-01-01 RX ADMIN — NITROGLYCERIN 0.4 MG: 0.4 TABLET, ORALLY DISINTEGRATING SUBLINGUAL at 23:12

## 2020-01-01 RX ADMIN — CARVEDILOL 3.12 MG: 3.12 TABLET, FILM COATED ORAL at 08:23

## 2020-01-01 RX ADMIN — FUROSEMIDE 80 MG: 40 TABLET ORAL at 17:10

## 2020-01-01 RX ADMIN — AMIODARONE HYDROCHLORIDE 200 MG: 200 TABLET ORAL at 09:31

## 2020-01-01 RX ADMIN — WARFARIN SODIUM 5 MG: 5 TABLET ORAL at 18:00

## 2020-01-01 RX ADMIN — SODIUM CHLORIDE, PRESERVATIVE FREE 10 ML: 5 INJECTION INTRAVENOUS at 20:42

## 2020-01-01 RX ADMIN — IPRATROPIUM BROMIDE 0.5 MG: 0.5 SOLUTION RESPIRATORY (INHALATION) at 02:30

## 2020-01-01 RX ADMIN — ONDANSETRON 4 MG: 4 TABLET, ORALLY DISINTEGRATING ORAL at 16:02

## 2020-01-01 RX ADMIN — SODIUM CHLORIDE 60 ML/HR: 900 INJECTION, SOLUTION INTRAVENOUS at 13:45

## 2020-01-01 RX ADMIN — HYDRALAZINE HYDROCHLORIDE 75 MG: 50 TABLET ORAL at 13:12

## 2020-01-01 RX ADMIN — AMIODARONE HYDROCHLORIDE 0.5 MG/MIN: 1.8 INJECTION, SOLUTION INTRAVENOUS at 14:07

## 2020-01-01 RX ADMIN — ASPIRIN 81 MG: 81 TABLET, COATED ORAL at 12:09

## 2020-01-01 RX ADMIN — DOXYCYCLINE 100 MG: 100 INJECTION, POWDER, LYOPHILIZED, FOR SOLUTION INTRAVENOUS at 04:05

## 2020-01-01 RX ADMIN — ALBUMIN HUMAN 25 G: 0.25 SOLUTION INTRAVENOUS at 21:11

## 2020-01-01 RX ADMIN — WARFARIN SODIUM 5 MG: 5 TABLET ORAL at 18:05

## 2020-01-01 RX ADMIN — DRONABINOL 2.5 MG: 2.5 CAPSULE ORAL at 21:06

## 2020-01-01 RX ADMIN — ALBUTEROL SULFATE 2.5 MG: 2.5 SOLUTION RESPIRATORY (INHALATION) at 07:02

## 2020-01-01 RX ADMIN — AMIODARONE HYDROCHLORIDE 200 MG: 200 TABLET ORAL at 20:22

## 2020-01-01 RX ADMIN — NYSTATIN: 100000 CREAM TOPICAL at 20:56

## 2020-01-01 RX ADMIN — NYSTATIN: 100000 CREAM TOPICAL at 09:41

## 2020-01-01 RX ADMIN — DRONABINOL 2.5 MG: 2.5 CAPSULE ORAL at 20:25

## 2020-01-01 RX ADMIN — WARFARIN SODIUM 3.5 MG: 3 TABLET ORAL at 17:41

## 2020-01-01 RX ADMIN — PANTOPRAZOLE SODIUM 40 MG: 40 TABLET, DELAYED RELEASE ORAL at 06:31

## 2020-01-01 RX ADMIN — SODIUM CHLORIDE, PRESERVATIVE FREE 10 ML: 5 INJECTION INTRAVENOUS at 08:35

## 2020-01-01 RX ADMIN — METOLAZONE 2.5 MG: 2.5 TABLET ORAL at 08:10

## 2020-01-01 RX ADMIN — AMLODIPINE BESYLATE 5 MG: 5 TABLET ORAL at 09:37

## 2020-01-01 RX ADMIN — CARVEDILOL 6.25 MG: 6.25 TABLET, FILM COATED ORAL at 08:27

## 2020-01-01 RX ADMIN — NYSTATIN: 100000 CREAM TOPICAL at 21:59

## 2020-01-01 RX ADMIN — CARVEDILOL 6.25 MG: 6.25 TABLET, FILM COATED ORAL at 08:01

## 2020-01-01 RX ADMIN — METOPROLOL TARTRATE 5 MG: 5 INJECTION INTRAVENOUS at 12:24

## 2020-01-01 RX ADMIN — SODIUM CHLORIDE 75 ML/HR: 9 INJECTION, SOLUTION INTRAVENOUS at 11:01

## 2020-01-01 RX ADMIN — FUROSEMIDE 20 MG: 10 INJECTION, SOLUTION INTRAMUSCULAR; INTRAVENOUS at 18:03

## 2020-01-01 RX ADMIN — DOXYCYCLINE 100 MG: 100 INJECTION, POWDER, LYOPHILIZED, FOR SOLUTION INTRAVENOUS at 02:54

## 2020-01-01 RX ADMIN — FUROSEMIDE 40 MG: 10 INJECTION, SOLUTION INTRAMUSCULAR; INTRAVENOUS at 17:20

## 2020-01-01 RX ADMIN — HYDRALAZINE HYDROCHLORIDE 25 MG: 25 TABLET ORAL at 05:59

## 2020-01-01 RX ADMIN — CARVEDILOL 6.25 MG: 6.25 TABLET, FILM COATED ORAL at 18:03

## 2020-01-01 RX ADMIN — ISOSORBIDE MONONITRATE 30 MG: 30 TABLET, EXTENDED RELEASE ORAL at 10:12

## 2020-01-01 RX ADMIN — HYDRALAZINE HYDROCHLORIDE 75 MG: 50 TABLET ORAL at 14:00

## 2020-01-01 RX ADMIN — HYDRALAZINE HYDROCHLORIDE 75 MG: 50 TABLET ORAL at 21:06

## 2020-01-01 RX ADMIN — FUROSEMIDE 40 MG: 10 INJECTION, SOLUTION INTRAMUSCULAR; INTRAVENOUS at 08:26

## 2020-01-01 RX ADMIN — SODIUM CHLORIDE, PRESERVATIVE FREE 10 ML: 5 INJECTION INTRAVENOUS at 09:09

## 2020-01-01 RX ADMIN — HYDRALAZINE HYDROCHLORIDE 75 MG: 50 TABLET ORAL at 05:22

## 2020-01-01 RX ADMIN — CARVEDILOL 6.25 MG: 6.25 TABLET, FILM COATED ORAL at 08:19

## 2020-01-01 RX ADMIN — DOXYCYCLINE 100 MG: 100 INJECTION, POWDER, LYOPHILIZED, FOR SOLUTION INTRAVENOUS at 21:08

## 2020-01-01 RX ADMIN — CARVEDILOL 6.25 MG: 6.25 TABLET, FILM COATED ORAL at 09:45

## 2020-01-01 RX ADMIN — CARVEDILOL 6.25 MG: 6.25 TABLET, FILM COATED ORAL at 08:50

## 2020-01-01 RX ADMIN — CEFEPIME HYDROCHLORIDE 2 G: 2 INJECTION, POWDER, FOR SOLUTION INTRAVENOUS at 03:43

## 2020-01-01 RX ADMIN — HYDRALAZINE HYDROCHLORIDE 50 MG: 50 TABLET, FILM COATED ORAL at 08:34

## 2020-01-01 RX ADMIN — HYDRALAZINE HYDROCHLORIDE 50 MG: 50 TABLET, FILM COATED ORAL at 14:05

## 2020-01-01 RX ADMIN — METOLAZONE 2.5 MG: 2.5 TABLET ORAL at 09:18

## 2020-01-01 RX ADMIN — INSULIN DETEMIR 10 UNITS: 100 INJECTION, SOLUTION SUBCUTANEOUS at 08:21

## 2020-01-01 RX ADMIN — INSULIN DETEMIR 10 UNITS: 100 INJECTION, SOLUTION SUBCUTANEOUS at 08:25

## 2020-01-01 RX ADMIN — NYSTATIN: 100000 CREAM TOPICAL at 20:42

## 2020-01-01 RX ADMIN — HYDRALAZINE HYDROCHLORIDE 25 MG: 25 TABLET ORAL at 05:35

## 2020-01-01 RX ADMIN — HYDRALAZINE HYDROCHLORIDE 75 MG: 50 TABLET ORAL at 05:17

## 2020-01-01 RX ADMIN — SODIUM CHLORIDE, PRESERVATIVE FREE 10 ML: 5 INJECTION INTRAVENOUS at 09:49

## 2020-01-01 RX ADMIN — CARVEDILOL 6.25 MG: 6.25 TABLET, FILM COATED ORAL at 08:46

## 2020-01-01 RX ADMIN — ISOSORBIDE MONONITRATE 30 MG: 30 TABLET, EXTENDED RELEASE ORAL at 08:43

## 2020-01-01 RX ADMIN — HYDRALAZINE HYDROCHLORIDE 25 MG: 25 TABLET ORAL at 13:00

## 2020-01-01 RX ADMIN — DOXYCYCLINE 100 MG: 100 INJECTION, POWDER, LYOPHILIZED, FOR SOLUTION INTRAVENOUS at 05:28

## 2020-01-01 RX ADMIN — HYDRALAZINE HYDROCHLORIDE 50 MG: 50 TABLET, FILM COATED ORAL at 14:06

## 2020-01-01 RX ADMIN — NYSTATIN: 100000 CREAM TOPICAL at 10:13

## 2020-01-01 RX ADMIN — AMIODARONE HYDROCHLORIDE 200 MG: 200 TABLET ORAL at 20:38

## 2020-01-01 RX ADMIN — AMIODARONE HYDROCHLORIDE 200 MG: 200 TABLET ORAL at 10:48

## 2020-01-01 RX ADMIN — CARVEDILOL 6.25 MG: 6.25 TABLET, FILM COATED ORAL at 17:57

## 2020-01-01 RX ADMIN — ISOSORBIDE MONONITRATE 30 MG: 30 TABLET, EXTENDED RELEASE ORAL at 08:08

## 2020-01-01 RX ADMIN — SODIUM CHLORIDE, PRESERVATIVE FREE 10 ML: 5 INJECTION INTRAVENOUS at 10:48

## 2020-01-01 RX ADMIN — AMIODARONE HYDROCHLORIDE 200 MG: 200 TABLET ORAL at 21:28

## 2020-01-01 RX ADMIN — CARVEDILOL 6.25 MG: 6.25 TABLET, FILM COATED ORAL at 08:43

## 2020-01-01 RX ADMIN — HALOPERIDOL LACTATE 2 MG: 5 INJECTION, SOLUTION INTRAMUSCULAR at 01:38

## 2020-01-01 RX ADMIN — HYDRALAZINE HYDROCHLORIDE 50 MG: 50 TABLET, FILM COATED ORAL at 23:23

## 2020-01-01 RX ADMIN — LORAZEPAM 1 MG: 2 INJECTION INTRAMUSCULAR; INTRAVENOUS at 05:19

## 2020-01-01 RX ADMIN — Medication 5 MG/HR: at 12:28

## 2020-01-01 RX ADMIN — HYDRALAZINE HYDROCHLORIDE 25 MG: 25 TABLET ORAL at 06:31

## 2020-01-01 RX ADMIN — CEFEPIME HYDROCHLORIDE 2 G: 2 INJECTION, POWDER, FOR SOLUTION INTRAVENOUS at 05:49

## 2020-01-01 RX ADMIN — FUROSEMIDE 40 MG: 40 TABLET ORAL at 06:05

## 2020-01-01 RX ADMIN — PANTOPRAZOLE SODIUM 40 MG: 40 TABLET, DELAYED RELEASE ORAL at 05:24

## 2020-01-01 RX ADMIN — HYDRALAZINE HYDROCHLORIDE 25 MG: 25 TABLET ORAL at 16:11

## 2020-01-01 RX ADMIN — AMIODARONE HYDROCHLORIDE 200 MG: 200 TABLET ORAL at 08:34

## 2020-01-01 RX ADMIN — SODIUM CHLORIDE, PRESERVATIVE FREE 10 ML: 5 INJECTION INTRAVENOUS at 21:59

## 2020-01-01 RX ADMIN — WARFARIN SODIUM 4 MG: 4 TABLET ORAL at 18:54

## 2020-01-01 RX ADMIN — ASPIRIN 81 MG: 81 TABLET, COATED ORAL at 09:45

## 2020-01-01 RX ADMIN — SODIUM CHLORIDE, PRESERVATIVE FREE 10 ML: 5 INJECTION INTRAVENOUS at 08:11

## 2020-01-01 RX ADMIN — ISOSORBIDE MONONITRATE 30 MG: 30 TABLET, EXTENDED RELEASE ORAL at 10:13

## 2020-01-01 RX ADMIN — SODIUM CHLORIDE, PRESERVATIVE FREE 10 ML: 5 INJECTION INTRAVENOUS at 04:27

## 2020-01-01 RX ADMIN — ASPIRIN 81 MG: 81 TABLET, COATED ORAL at 09:28

## 2020-01-01 RX ADMIN — ALBUTEROL SULFATE 2.5 MG: 2.5 SOLUTION RESPIRATORY (INHALATION) at 07:36

## 2020-01-01 RX ADMIN — CARVEDILOL 6.25 MG: 6.25 TABLET, FILM COATED ORAL at 18:33

## 2020-08-21 PROBLEM — I48.92 ATRIAL FLUTTER (HCC): Status: ACTIVE | Noted: 2020-01-01

## 2020-08-21 PROBLEM — J44.1 COPD EXACERBATION (HCC): Status: ACTIVE | Noted: 2020-01-01

## 2020-08-21 PROBLEM — J45.901 EXACERBATION OF ASTHMA: Status: ACTIVE | Noted: 2020-01-01

## 2020-08-24 PROBLEM — I50.21 ACUTE SYSTOLIC CHF (CONGESTIVE HEART FAILURE) (HCC): Status: ACTIVE | Noted: 2020-01-01

## 2020-09-01 NOTE — PROGRESS NOTES
Anticoagulation by Pharmacy - Warfarin    Ondina Alanis Sr. is a 80 y.o.male who has been consulted for warfarin for atrial fibrillation.    Home regimen: Warfarin 5 mg TueThuSat and 7.5 mg all other days   INR Goal: 2-3    Lab Results   Component Value Date    INR 2.50 (H) 09/01/2020       Lab Results   Component Value Date    INR 2.50 (H) 09/01/2020    INR 2.24 (H) 08/31/2020    INR 2.40 (H) 08/30/2020    PROTIME  09/01/2020      Comment:      fingerstick    PROTIME 26.2 (H) 08/31/2020    PROTIME 16.0 (H) 08/28/2020     Lab Results   Component Value Date    HGB 10.3 (L) 08/31/2020    HGB 10.5 (L) 08/29/2020    HGB 10.5 (L) 08/28/2020    HCT 31.9 (L) 08/31/2020    HCT 31.9 (L) 08/29/2020    HCT 31.8 (L) 08/28/2020     08/31/2020     08/29/2020     08/28/2020       Recent Warfarin Administrations     The 5 most recent administrations since 08/24/2020 are shown below each listed medication.    Warfarin Sodium       Order Dose Date Given     warfarin (COUMADIN) tablet 10 mg 10 mg 08/28/2020     warfarin (COUMADIN) tablet 7.5 mg 7.5 mg 08/27/2020     warfarin (COUMADIN) tablet 7.5 mg 7.5 mg 08/26/2020                Assessment  • H/H were slightly low but stable on 8/31. Plts were WNL. No repeat labs today   • Interacting medications: amiodarone, aspirin    • INR is within goal. Warfarin was reduced from 7.5 mg to 5 mg on 8/30. Will continue current regimen at this time. May go back to home regimen soon if INR remains stable     Plan:  1. Give warfarin 5 mg tablet PO @ 1800 tonight  2. PT/INR ordered daily  3. Pharmacy will continue to follow    John Galicia RPH  09/01/20 11:02

## 2020-09-02 NOTE — PROGRESS NOTES
Anticoagulation by Pharmacy - Warfarin    Ondina Alanis Sr. is a 80 y.o.male who has been consulted for warfarin for atrial fibrillation.     Home regimen: Warfarin 5 mg TueThuSat and 7.5 mg all other days   INR Goal: 2-3  Last INR:   Lab Results   Component Value Date    INR 2.40 (H) 09/02/2020       Objective:  [Ht:  ; Wt:  ]  Lab Results   Component Value Date    INR 2.40 (H) 09/02/2020    INR 2.50 (H) 09/01/2020    INR 2.24 (H) 08/31/2020    PROTIME  09/02/2020      Comment:      FINGERSTICK    PROTIME  09/01/2020      Comment:      fingerstick    PROTIME 26.2 (H) 08/31/2020     Lab Results   Component Value Date    HGB 9.8 (L) 09/02/2020    HGB 10.3 (L) 08/31/2020    HGB 10.5 (L) 08/29/2020    HCT 29.6 (L) 09/02/2020    HCT 31.9 (L) 08/31/2020    HCT 31.9 (L) 08/29/2020     09/02/2020     08/31/2020     08/29/2020       Recent Warfarin Administrations       The 5 most recent administrations since 08/26/2020 are shown below each listed medication.    Warfarin Sodium         Order Dose Date Given     warfarin (COUMADIN) tablet 10 mg 10 mg 08/28/2020     warfarin (COUMADIN) tablet 7.5 mg 7.5 mg 08/27/2020     warfarin (COUMADIN) tablet 7.5 mg 7.5 mg 08/26/2020                      Assessment  Interacting medications: amiodarone can increase the affect of warfarin   INR is therapeutic, and appears stable  H&H down slightly will monitor    Plan:  1.  Give warfarin 5 mg tablet PO @ 1800 tonight  2.  Draw a PT/INR in AM  3.  Pharmacy will continue to follow    Jaxson Dye, PharmD  09/02/20 14:55

## 2020-09-03 NOTE — PROGRESS NOTES
Anticoagulation by Pharmacy - Warfarin    Ondina Alanis Sr. is a 80 y.o.male who has been consulted for warfarin for atrial fibrillation.     Home regimen: Warfarin 5 mg TueThuSat and 7.5 mg all other days   INR Goal: 2-3    Last INR:   Lab Results   Component Value Date    INR 3.30 (H) 09/03/2020       Objective:  [Ht:  ; Wt:  ]  Lab Results   Component Value Date    INR 3.30 (H) 09/03/2020    INR 2.40 (H) 09/02/2020    INR 2.50 (H) 09/01/2020    PROTIME  09/02/2020      Comment:      FINGERSTICK    PROTIME  09/01/2020      Comment:      fingerstick    PROTIME 26.2 (H) 08/31/2020     Lab Results   Component Value Date    HGB 9.8 (L) 09/02/2020    HGB 10.3 (L) 08/31/2020    HGB 10.5 (L) 08/29/2020    HCT 29.6 (L) 09/02/2020    HCT 31.9 (L) 08/31/2020    HCT 31.9 (L) 08/29/2020     09/02/2020     08/31/2020     08/29/2020       Recent Warfarin Administrations       The 5 most recent administrations since 08/26/2020 are shown below each listed medication.    Warfarin Sodium         Order Dose Date Given     warfarin (COUMADIN) tablet 10 mg 10 mg 08/28/2020     warfarin (COUMADIN) tablet 7.5 mg 7.5 mg 08/27/2020     warfarin (COUMADIN) tablet 7.5 mg 7.5 mg 08/26/2020                      Assessment  Interacting medications: amiodarone, aspirin  INR is above goal (fingerstick)  H&H down slightly will monitor  We will hold dose tonight given the very large jump in INR    Plan:  1.  Hold warfarin tonight  2.  Draw a PT/INR in AM  3.  Pharmacy will continue to follow    Norm Bustamante, PharmD  09/03/20 12:58

## 2020-09-04 NOTE — PROGRESS NOTES
Anticoagulation by Pharmacy - Warfarin    Ondina Alanis Sr. is a 80 y.o.male  [Ht:  ; Wt:  ] on Warfarin  for indication of Atrial fibrillation.    Goal INR: 2-3  Home dose is Warfarin 5 mg TueThuSat and 7.5 mg all other days     Today's INR:   Lab Results   Component Value Date    INR 2.66 (H) 09/04/2020          Lab Results   Component Value Date    INR 2.66 (H) 09/04/2020    INR 3.30 (H) 09/03/2020    INR 2.40 (H) 09/02/2020    PROTIME 30.1 (H) 09/04/2020    PROTIME  09/02/2020      Comment:      FINGERSTICK    PROTIME  09/01/2020      Comment:      fingerstick     Lab Results   Component Value Date    HGB 10.3 (L) 09/04/2020    HGB 9.8 (L) 09/02/2020    HGB 10.3 (L) 08/31/2020     Lab Results   Component Value Date    HCT 30.8 (L) 09/04/2020    HCT 29.6 (L) 09/02/2020    HCT 31.9 (L) 08/31/2020     Lab Results   Component Value Date     09/04/2020     09/02/2020     08/31/2020       Recent Warfarin Administrations       The 5 most recent administrations since 08/28/2020 are shown below each listed medication.    Warfarin Sodium         Order Dose Date Given     warfarin (COUMADIN) tablet 10 mg 10 mg 08/28/2020                      Assessment/Plan:  Reviewed above labs. INR is 2.66.  INR is  therapeutic. Pt did not receive dose of warfarin last night. No changes in medication list. Concurrent medications include aspirin and amiodarone. Will change current dose of warfarin to  3 mg. Rx will continue to follow and adjust dose accordingly.      Fany Byers, PharmD  09/04/20 12:50

## 2020-09-05 NOTE — PROGRESS NOTES
Anticoagulation by Pharmacy - Warfarin    Ondina Alanis Sr. is a 80 y.o.male  [Ht:  ; Wt:  ] on Warfarin for indication of Atrial fibrillation    Goal INR: 2-3  Home dose is Warfarin 5 mg TueThuSat and 7.5 mg all other days     Today's INR:   Lab Results   Component Value Date    INR 3.30 (H) 09/05/2020          Lab Results   Component Value Date    INR 3.30 (H) 09/05/2020    INR 2.66 (H) 09/04/2020    INR 3.30 (H) 09/03/2020    PROTIME  09/05/2020      Comment:      fingerstick    PROTIME 30.1 (H) 09/04/2020    PROTIME  09/02/2020      Comment:      FINGERSTICK     Lab Results   Component Value Date    HGB 10.3 (L) 09/04/2020    HGB 9.8 (L) 09/02/2020    HGB 10.3 (L) 08/31/2020     Lab Results   Component Value Date    HCT 30.8 (L) 09/04/2020    HCT 29.6 (L) 09/02/2020    HCT 31.9 (L) 08/31/2020     Lab Results   Component Value Date     09/04/2020     09/02/2020     08/31/2020           Assessment/Plan:  Reviewed above labs. INR is 3.3.  INR is supra therapeutic. Pt did receive dose of warfarin 3 mg  last night. No changes in medication list. Will HOLD warfarin.  Rx will continue to follow and adjust dose accordingly.     Fany Byers, PharmD  09/05/20 11:06

## 2020-09-06 NOTE — PROGRESS NOTES
Anticoagulation by Pharmacy - Warfarin    Ondina Alanis Sr. is a 80 y.o.male  [Ht:  ; Wt:  ] on Warfarin for indication of Atrial fibrillation.    Goal INR: 2-3  Home dose is Warfarin 5 mg TueThuSat and 7.5 mg all other days     Today's INR:   Lab Results   Component Value Date    INR 2.26 (H) 09/06/2020          Lab Results   Component Value Date    INR 2.26 (H) 09/06/2020    INR 3.30 (H) 09/05/2020    INR 2.66 (H) 09/04/2020    PROTIME 26.4 (H) 09/06/2020    PROTIME  09/05/2020      Comment:      fingerstick    PROTIME 30.1 (H) 09/04/2020     Lab Results   Component Value Date    HGB 10.1 (L) 09/06/2020    HGB 10.3 (L) 09/04/2020    HGB 9.8 (L) 09/02/2020     Lab Results   Component Value Date    HCT 30.8 (L) 09/06/2020    HCT 30.8 (L) 09/04/2020    HCT 29.6 (L) 09/02/2020     Lab Results   Component Value Date     09/06/2020     09/04/2020     09/02/2020           Assessment/Plan:  Reviewed above labs. INR is 2.33.  INR is  therapeutic. Pt did NOT receive dose of warfarin last night as dose was being held for supra therapeutic levels. No changes in medication list. Will restart warfarin back at 2.5 mg daily. Rx will continue to follow and adjust dose accordingly.     Fany Byers, PharmD  09/06/20 11:20

## 2020-09-07 NOTE — PROGRESS NOTES
Anticoagulation by Pharmacy - Warfarin    Ondina Alanis Sr. is a 80 y.o.male on Warfarin for indication of Atrial fibrillation.     Goal INR: 2-3  Home dose is Warfarin 5 mg TueThuSat and 7.5 mg all other days     Last INR:   Lab Results   Component Value Date    INR 2.30 (H) 09/07/2020       Objective:  [Ht: 183 cm ; Wt: 95.1 kg  ]  Lab Results   Component Value Date    INR 2.30 (H) 09/07/2020    INR 2.26 (H) 09/06/2020    INR 3.30 (H) 09/05/2020    PROTIME  09/07/2020      Comment:      FINGERSTICK INR    PROTIME 26.4 (H) 09/06/2020    PROTIME  09/05/2020      Comment:      fingerstick     Lab Results   Component Value Date    HGB 10.1 (L) 09/06/2020    HGB 10.3 (L) 09/04/2020    HGB 9.8 (L) 09/02/2020    HCT 30.8 (L) 09/06/2020    HCT 30.8 (L) 09/04/2020    HCT 29.6 (L) 09/02/2020     09/06/2020     09/04/2020     09/02/2020           Assessment  Interacting medications: aspirin, amiodarone  INR is 2.3, therapeutic, fingerstick today, could be falsely elevated.  Warfarin 2.5 mg was written in on MAR but not circled or initialed, unknown if given at time of note.  Dose was held 9/3 and 9/5 due to reaching supratherapeutic levels (these days are also fingersticks).  Reinitiated last night at warfarin 2.5 mg nightly.  Will continue to trend warfarin 2.5 mg nightly.  H/H n/a today.      Plan:  1.  Give warfarin 2.5 mg tablet PO @ 1800 tonight  2.  Draw a PT/INR in AM  3.  Pharmacy will continue to follow    Tung Colon, PharmD  09/07/20 14:13

## 2020-09-08 NOTE — PROGRESS NOTES
Anticoagulation by Pharmacy - Warfarin    Ondina Alanis Sr. is a 80 y.o.male  [Ht:  ; Wt:  ] on Warfarin  for indication of Atrial fibrillation.    Goal INR: 2-3  Home dose is Warfarin 5 mg TueThuSat and 7.5 mg all other days     Today's INR:   Lab Results   Component Value Date    INR 2.30 (H) 09/08/2020          Lab Results   Component Value Date    INR 2.30 (H) 09/08/2020    INR 2.30 (H) 09/07/2020    INR 2.26 (H) 09/06/2020    PROTIME  09/07/2020      Comment:      FINGERSTICK INR    PROTIME 26.4 (H) 09/06/2020    PROTIME  09/05/2020      Comment:      fingerstick     Lab Results   Component Value Date    HGB 9.6 (L) 09/08/2020    HGB 10.1 (L) 09/06/2020    HGB 10.3 (L) 09/04/2020     Lab Results   Component Value Date    HCT 29.3 (L) 09/08/2020    HCT 30.8 (L) 09/06/2020    HCT 30.8 (L) 09/04/2020     Lab Results   Component Value Date     09/08/2020     09/06/2020     09/04/2020           Assessment/Plan:  Reviewed above labs. INR is 2.3.  INR is at goal. No changes in medication list. Concurrent medications include amiodarone and aspirin. Pt did receive dose of warfarin last night.  Will continue current dose of  2.5 mg. Rx will continue to follow and adjust dose accordingly.     Fany Byers, PharmD  09/08/20 09:30

## 2020-09-09 NOTE — PROGRESS NOTES
Anticoagulation by Pharmacy - Warfarin    Ondina Alanis Sr. is a 80 y.o.male who has been consulted for warfarin for atrial fibrillation.    Home regimen: Warfarin 5 mg TueThuSat and 7.5 mg all other days   INR Goal: 2-3    Lab Results   Component Value Date    INR 1.82 (H) 09/09/2020       Lab Results   Component Value Date    INR 1.82 (H) 09/09/2020    INR 2.30 (H) 09/08/2020    INR 2.30 (H) 09/07/2020    PROTIME 22.1 (H) 09/09/2020    PROTIME  09/07/2020      Comment:      FINGERSTICK INR    PROTIME 26.4 (H) 09/06/2020     Lab Results   Component Value Date    HGB 9.6 (L) 09/08/2020    HGB 10.1 (L) 09/06/2020    HGB 10.3 (L) 09/04/2020    HCT 29.3 (L) 09/08/2020    HCT 30.8 (L) 09/06/2020    HCT 30.8 (L) 09/04/2020     09/08/2020     09/06/2020     09/04/2020       Recent Warfarin Administrations     The 5 most recent administrations since 08/24/2020 are shown below each listed medication.    Warfarin Sodium       Order Dose Date Given     warfarin (COUMADIN) tablet 10 mg 10 mg 08/28/2020     warfarin (COUMADIN) tablet 7.5 mg 7.5 mg 08/27/2020     warfarin (COUMADIN) tablet 7.5 mg 7.5 mg 08/26/2020                Assessment  • H/H were slightly low but stable on 9/8. Plts were WNL. No repeat labs today   • Interacting medications: amiodarone, aspirin    • INR is below goal. Will slightly increase Warfarin tonight.     Plan:  1. Give warfarin 3 mg tablet PO @ 1800 tonight  2. PT/INR ordered daily  3. Pharmacy will continue to follow    John Galicia RPH  09/09/20 11:38

## 2020-09-10 NOTE — PROGRESS NOTES
Anticoagulation by Pharmacy - Warfarin    Ondina Alanis Sr. is a 80 y.o.male who has been consulted for warfarin for atrial fibrillation.     Home regimen: Warfarin 5 mg TueThuSat and 7.5 mg all other days   INR Goal: 2-3  Last INR:   Lab Results   Component Value Date    INR 2.20 (H) 09/10/2020       Objective:  [Ht:  ; Wt:  ]  Lab Results   Component Value Date    INR 2.20 (H) 09/10/2020    INR 1.82 (H) 09/09/2020    INR 2.30 (H) 09/08/2020    PROTIME 25.8 (H) 09/10/2020    PROTIME 22.1 (H) 09/09/2020    PROTIME  09/07/2020      Comment:      FINGERSTICK INR     Lab Results   Component Value Date    HGB 9.5 (L) 09/10/2020    HGB 9.6 (L) 09/08/2020    HGB 10.1 (L) 09/06/2020    HCT 28.6 (L) 09/10/2020    HCT 29.3 (L) 09/08/2020    HCT 30.8 (L) 09/06/2020     09/10/2020     09/08/2020     09/06/2020           Assessment  Interacting medications: Amiodarone can increase the affect of warfarin on the INR  INR is Therapeutic, will continue 3 mg   H&H low, but stable      Plan:  1.  Give warfarin 3 mg tablet PO @ 1800 tonight  2.  Draw a PT/INR in AM  3.  Pharmacy will continue to follow    Jaxson Dye, PharmD  09/10/20 16:06

## 2020-09-11 NOTE — PROGRESS NOTES
Anticoagulation by Pharmacy - Warfarin    Ondina Alanis Sr. is a 80 y.o.male who has been consulted for warfarin for atrial fibrillation.     Home regimen: Warfarin 5 mg TueThuSat and 7.5 mg all other days   INR Goal: 2-3    Last INR:   Lab Results   Component Value Date    INR 2.16 (H) 09/11/2020       Objective:  [Ht: 183 cm  ; Wt: 95.1 kg  ]  Lab Results   Component Value Date    INR 2.16 (H) 09/11/2020    INR 2.20 (H) 09/10/2020    INR 1.82 (H) 09/09/2020    PROTIME 25.4 (H) 09/11/2020    PROTIME 25.8 (H) 09/10/2020    PROTIME 22.1 (H) 09/09/2020     Lab Results   Component Value Date    HGB 9.5 (L) 09/10/2020    HGB 9.6 (L) 09/08/2020    HGB 10.1 (L) 09/06/2020    HCT 28.6 (L) 09/10/2020    HCT 29.3 (L) 09/08/2020    HCT 30.8 (L) 09/06/2020     09/10/2020     09/08/2020     09/06/2020           Assessment  Interacting medications: aspirin, Amiodarone can increase the affect of warfarin on the INR  INR is 2.16, therapeutic x 2 days, will continue 3 mg   No H/H today        Plan:  1.  Give warfarin 3 mg tablet PO @ 1800 tonight  2.  Draw a PT/INR in AM  3.  Pharmacy will continue to follow    Tung Colon, PharmD  09/11/20 11:11

## 2020-09-24 PROBLEM — J18.9 PNEUMONIA OF BOTH LOWER LOBES DUE TO INFECTIOUS ORGANISM: Status: ACTIVE | Noted: 2020-01-01

## 2020-10-01 NOTE — SIGNIFICANT NOTE
10/01/20 1255   OTHER   Discipline physical therapist;occupational therapist   Rehab Time/Intention   Session Not Performed patient/family declined evaluation   Recommendation   PT - Next Appointment 10/02/20   Recommendation   OT - Next Appointment 10/02/20

## 2020-10-01 NOTE — PROGRESS NOTES
"Anticoagulation by Pharmacy - Warfarin    Ondina Alanis Sr. is a 80 y.o.male who has been consulted for warfarin for DVT/PE (Active thrombosis)    Home regimen: Recently on LTACH and tolerating Warfarin 2.5-3 mg daily. Discharged home and receiving 5 mg nightly per Wife to finish out old bottle before new prescription of 3 mg nightly. Patient only received a few doses of 3 mg before being admitted. Patient's wife checks INR at home every Monday and INR has been in range.   INR Goal: 2-3    Objective:  [Ht: 193 cm (76\"); Wt: 90.4 kg (199 lb 4.8 oz)]    Lab Results   Component Value Date    INR 1.90 (H) 10/01/2020    INR 1.90 (H) 09/30/2020    INR 1.88 (H) 09/29/2020    PROTIME  10/01/2020      Comment:      FINGERSTICK PT    PROTIME  09/30/2020      Comment:      FINGERSTICK PT    PROTIME 22.7 (H) 09/29/2020     Lab Results   Component Value Date    HGB 10.6 (L) 09/29/2020    HGB 9.5 (L) 09/25/2020    HGB 10.8 (L) 09/24/2020    HCT 33.5 (L) 09/29/2020    HCT 28.9 (L) 09/25/2020    HCT 33.4 (L) 09/24/2020     09/29/2020     09/25/2020     09/24/2020           Assessment  • H/H slightly low on 9/29. Plts WNL  • Interacting medications: aspirin, amiodarone  • INR is slightly below. Will increase dose tonight.     Plan:  1. Give warfarin 3.5 mg tablet PO @ 1800 tonight  2. PT/INR ordered daily  3. Pharmacy will continue to follow    John Galicia [facility]  10/01/20 13:56 CDT     "

## 2020-10-01 NOTE — PROGRESS NOTES
[facility] Medicine Services  INPATIENT PROGRESS NOTE    Length of Stay: 7  Date of Admission: 9/23/2020  Primary Care Physician: Wendie Quiñonez APRN    Subjective   Chief Complaint: No new complaints.    HPI: Patient is seen for follow-up.  He is doing well, slightly deconditioned but voices no new complaints.  Pedal edema persists but improving.      Review of Systems   Constitutional: Positive for activity change and fatigue. Negative for appetite change, chills, diaphoresis and fever.   HENT: Negative for trouble swallowing and voice change.    Eyes: Positive for visual disturbance. Negative for photophobia.   Respiratory: Negative for cough, choking, chest tightness, shortness of breath, wheezing and stridor.    Cardiovascular: Negative for chest pain, palpitations and leg swelling.   Gastrointestinal: Negative for abdominal distention, abdominal pain, blood in stool, constipation, diarrhea, nausea and vomiting.   Endocrine: Negative for cold intolerance, heat intolerance, polydipsia, polyphagia and polyuria.   Genitourinary: Negative for decreased urine volume, difficulty urinating, dysuria, enuresis, flank pain, frequency, hematuria and urgency.   Musculoskeletal: Negative for arthralgias, gait problem, myalgias, neck pain and neck stiffness.   Skin: Negative for pallor, rash and wound.   Neurological: Negative for dizziness, tremors, seizures, syncope, facial asymmetry, speech difficulty, weakness, light-headedness, numbness and headaches.   Hematological: Does not bruise/bleed easily.   Psychiatric/Behavioral: Negative for agitation, behavioral problems and confusion.       Objective    Temp:  [97.7 °F (36.5 °C)-98.6 °F (37 °C)] 98.1 °F (36.7 °C)  Heart Rate:  [64-81] 74  Resp:  [18] 18  BP: (128-166)/(58-80) 141/61    Physical Exam  Vitals signs and nursing note reviewed.   Constitutional:       General: He is not in acute distress.     Appearance: He is  well-developed. He is not diaphoretic.   HENT:      Head: Normocephalic and atraumatic.   Eyes:      General: No scleral icterus.     Pupils: Pupils are equal, round, and reactive to light.      Comments: Patient is legally blind.   Neck:      Musculoskeletal: Normal range of motion and neck supple.      Thyroid: No thyromegaly.      Vascular: No JVD.   Cardiovascular:      Rate and Rhythm: Normal rate and regular rhythm.      Heart sounds: Normal heart sounds. No murmur. No friction rub. No gallop.    Pulmonary:      Effort: Pulmonary effort is normal.      Breath sounds: Normal breath sounds. No wheezing or rales.   Chest:      Chest wall: No tenderness.   Abdominal:      General: Bowel sounds are normal. There is no distension.      Palpations: Abdomen is soft. There is no mass.      Tenderness: There is no abdominal tenderness. There is no guarding or rebound.   Musculoskeletal:         General: No tenderness or deformity.      Right lower leg: Edema present.      Left lower leg: Edema present.   Skin:     General: Skin is warm and dry.      Coloration: Skin is not pale.      Findings: No erythema or rash.   Neurological:      General: No focal deficit present.      Mental Status: He is alert and oriented to person, place, and time. Mental status is at baseline.      Cranial Nerves: No cranial nerve deficit.      Sensory: No sensory deficit.      Motor: No abnormal muscle tone.      Coordination: Coordination normal.      Deep Tendon Reflexes: Reflexes normal.   Psychiatric:         Behavior: Behavior normal.         Thought Content: Thought content normal.         Judgment: Judgment normal.           Medication Review:    Current Facility-Administered Medications:   •  acetaminophen (TYLENOL) tablet 650 mg, 650 mg, Oral, Q6H PRN, Samira Rodrigues MD, 650 mg at 09/25/20 0007  •  albumin human 25 % IV SOLN 25 g, 25 g, Intravenous, Q12H, Colt Neville APRN, 25 g at 10/01/20 8061  •   amiodarone (PACERONE) tablet 200 mg, 200 mg, Oral, Q24H, Molly Gr, APRN, 200 mg at 10/01/20 0846  •  aspirin EC tablet 81 mg, 81 mg, Oral, Daily, Samira Rodrigues MD, 81 mg at 10/01/20 0846  •  atorvastatin (LIPITOR) tablet 40 mg, 40 mg, Oral, Nightly, Samira Rodrigues MD, 40 mg at 09/29/20 2042  •  bisacodyl (DULCOLAX) EC tablet 5 mg, 5 mg, Oral, Daily PRN, Samira Rodrigues MD  •  carvedilol (COREG) tablet 6.25 mg, 6.25 mg, Oral, BID With Meals, Pedro Garcia MD, 6.25 mg at 10/01/20 0846  •  furosemide (LASIX) injection 40 mg, 40 mg, Intravenous, Q12H, Colt Neville, APRN, 40 mg at 10/01/20 0517  •  hydrALAZINE (APRESOLINE) tablet 75 mg, 75 mg, Oral, Q8H, Pedro Garcia MD, 75 mg at 10/01/20 0521  •  ipratropium-albuterol (DUO-NEB) nebulizer solution 3 mL, 3 mL, Nebulization, Q4H PRN, Samira Rodrigues MD  •  isosorbide mononitrate (IMDUR) 24 hr tablet 30 mg, 30 mg, Oral, Q24H, Samira Rodrigues MD, 30 mg at 10/01/20 0846  •  magic butt ointment, , Topical, BID, Samira Rodrigues MD  •  ondansetron (ZOFRAN) tablet 4 mg, 4 mg, Oral, Q6H PRN **OR** ondansetron (ZOFRAN) injection 4 mg, 4 mg, Intravenous, Q6H PRN, Samira Rodrigues MD, 4 mg at 10/01/20 0126  •  pantoprazole (PROTONIX) EC tablet 40 mg, 40 mg, Oral, QAM, Samira Rodrigues MD, 40 mg at 10/01/20 0522  •  Pharmacy to dose warfarin, , Does not apply, Continuous PRN, Samira Rodrigues MD  •  sodium chloride 0.9 % flush 10 mL, 10 mL, Intravenous, PRN, Tree Waggoner, DO  •  sodium chloride 0.9 % flush 10 mL, 10 mL, Intravenous, Q12H, Samira Rodrigues MD, 10 mL at 10/01/20 0846  •  sodium chloride 0.9 % flush 10 mL, 10 mL, Intravenous, PRN, Samira Rodrigues MD  •  warfarin (COUMADIN) tablet 3 mg, 3 mg,  Oral, Daily, Samira Rodrigues MD, 3 mg at 09/30/20 4251    Results Review:  I have reviewed the labs, radiology results, and diagnostic studies.    Laboratory Data:   Results from last 7 days   Lab Units 10/01/20  0718 09/30/20  0710 09/29/20  0549   SODIUM mmol/L 141 138 141   POTASSIUM mmol/L 4.6 3.7 4.0   CHLORIDE mmol/L 102 103 105   CO2 mmol/L 24.0 24.0 26.0   BUN mg/dL 50* 51* 50*   CREATININE mg/dL 2.47* 2.51* 2.32*   GLUCOSE mg/dL 115* 108* 122*   CALCIUM mg/dL 8.9 8.3* 8.5*   ANION GAP mmol/L 15.0 11.0 10.0     Estimated Creatinine Clearance: 31 mL/min (A) (by C-G formula based on SCr of 2.47 mg/dL (H)).  Results from last 7 days   Lab Units 09/30/20  0710 09/29/20  0549   MAGNESIUM mg/dL 1.7 1.9   PHOSPHORUS mg/dL 2.8 2.6         Results from last 7 days   Lab Units 09/29/20  0549 09/25/20  1053   WBC 10*3/mm3 5.67 5.38   HEMOGLOBIN g/dL 10.6* 9.5*   HEMATOCRIT % 33.5* 28.9*   PLATELETS 10*3/mm3 252 203     Results from last 7 days   Lab Units 10/01/20  0718 09/30/20  0710 09/29/20  0549 09/28/20  0700 09/27/20  0746   INR  1.90* 1.90* 1.88* 2.20* 2.40*       Culture Data:   No results found for: BLOODCX  Urine Culture   Date Value Ref Range Status   09/28/2020 No growth  Final     No results found for: RESPCX  No results found for: WOUNDCX  No results found for: STOOLCX  No components found for: BODYFLD    Radiology Data:   Imaging Results (Last 24 Hours)     ** No results found for the last 24 hours. **          I have reviewed the patient's current medications.     Assessment/Plan     Hospital Problem List:  Active Problems:    Pneumonia of both lower lobes due to infectious organism    Acute respiratory failure with hypoxia secondary to  Pneumonia/ CHF decompensation: Much improved.  Patient is maintaining O2 saturation in the upper 90s on room air and has completed a course of antimicrobial therapy. Blood culture showed no growth.    Heart failure with decreased ejection fraction  decompensation (secondary to ischemic cardiomyopathy): Improving as he has lost about 2 pounds in the last 24 hours. Echocardiogram done 8/21/2020 showed severe global hypokinesis with an ejection fraction of 20%.  Continue diuretics with albumin, daily weights, salt and fluid restriction.  Findings of chest x-ray done 9/29/2020 have been reviewed.  Input by cardiologist is appreciated.     Chronic kidney disease stage III/4: Patient's baseline creatinine reportedly between 1.9-2.0. Creatinine is down to 2.47 today  (was 2.70 on admission).  Continue to monitor renal function and avoid nephrotoxins.  Input by nephrologist is appreciated.     History of atrial flutter: Patient is currently in normal sinus rhythm.  Continue amiodarone, Coreg and anticoagulation with warfarin.  INR is 1.90.    Hypertension: Stable.  Continue Coreg and hydralazine.  Continue to make adjustments as needed for optimal blood pressure control.    Diabetes mellitus: Stable.  Continue anti-glycemic with Accu-Cheks and sliding scale insulin.    Acute cystitis: He has completed a course of antimicrobial therapy.  Repeat urine culture showed no growth.            History of GERD: Continue PPI.    Continue DVT prophylaxis with warfarin.    Deconditioning: Continue PT and OT.    Discharge Planning: In progress.    Pedro Garcia MD   10/01/20   11:20 CDT

## 2020-10-02 NOTE — PROGRESS NOTES
"Anticoagulation by Pharmacy - Warfarin    Ondina Alanis Sr. is a 80 y.o.male being continued on warfarin for DVT  Home regimen: Recently on LTACH and tolerating Warfarin 2.5-3 mg daily. Discharged home and receiving 5 mg nightly per Wife to finish out old bottle before new prescription of 3 mg nightly. Patient only received a few doses of 3 mg before being admitted. Patient's wife checks INR at home every Monday and INR has been in range  INR Goal: 2-3  Last INR:   Lab Results   Component Value Date    INR 1.78 (H) 10/02/2020       Objective:  [Ht: 193 cm (76\"); Wt: 91.3 kg (201 lb 4.8 oz)]  Lab Results   Component Value Date    INR 1.78 (H) 10/02/2020    INR 1.90 (H) 10/01/2020    INR 1.90 (H) 09/30/2020    PROTIME 21.7 (H) 10/02/2020    PROTIME  10/01/2020      Comment:      FINGERSTICK PT    PROTIME  09/30/2020      Comment:      FINGERSTICK PT     Lab Results   Component Value Date    HGB 10.6 (L) 09/29/2020    HGB 9.5 (L) 09/25/2020    HGB 10.8 (L) 09/24/2020    HCT 33.5 (L) 09/29/2020    HCT 28.9 (L) 09/25/2020    HCT 33.4 (L) 09/24/2020     09/29/2020     09/25/2020     09/24/2020       Recent Warfarin Administrations       The 5 most recent administrations since 09/25/2020 are shown below each listed medication.    Warfarin Sodium         Order Dose Date Given     warfarin (COUMADIN) half tablet 3.5 mg 3.5 mg 10/01/2020     warfarin (COUMADIN) tablet 3 mg 3 mg 09/30/2020      3 mg 09/29/2020     warfarin (COUMADIN) tablet 2.5 mg 2.5 mg 09/28/2020      2.5 mg 09/27/2020                    Assessment  Interacting medications: Amiodarone can increase the affect of warfarin on the INR  No new H&H  Will increase dose as INR remains subtherapeutic      Plan:  1.  Give warfarin 4 mg tablet PO @ 1800 tonight  2.  Draw a PT/INR in AM  3.  Pharmacy will continue to follow    Jaxson Dye PharmD  10/02/20 15:44 CDT     "

## 2020-10-02 NOTE — PLAN OF CARE
Goal Outcome Evaluation:  Plan of Care Reviewed With: patient  Progress: improving  Outcome Summary: VSS, nausea medicated, bp under control, currently sleeping, will cont to monitor

## 2020-10-02 NOTE — PROGRESS NOTES
[facility]  INPATIENT PROGRESS NOTE    Length of Stay: 8  Date of Admission: 9/23/2020  Primary Care Physician: Wendie Quiñonez APRN    Subjective   Chief Complaint: No new complaints.    HPI: Patient is seen for follow-up.  He is doing well, slightly deconditioned and voices no new complaints.  Pedal edema persists but improving.      Review of Systems   Constitutional: Positive for activity change and fatigue. Negative for appetite change, chills, diaphoresis and fever.   HENT: Negative for trouble swallowing and voice change.    Eyes: Positive for visual disturbance. Negative for photophobia.   Respiratory: Negative for cough, choking, chest tightness, shortness of breath, wheezing and stridor.    Cardiovascular: Negative for chest pain, palpitations and leg swelling.   Gastrointestinal: Negative for abdominal distention, abdominal pain, blood in stool, constipation, diarrhea, nausea and vomiting.   Endocrine: Negative for cold intolerance, heat intolerance, polydipsia, polyphagia and polyuria.   Genitourinary: Negative for decreased urine volume, difficulty urinating, dysuria, enuresis, flank pain, frequency, hematuria and urgency.   Musculoskeletal: Negative for arthralgias, gait problem, myalgias, neck pain and neck stiffness.   Skin: Negative for pallor, rash and wound.   Neurological: Negative for dizziness, tremors, seizures, syncope, facial asymmetry, speech difficulty, weakness, light-headedness, numbness and headaches.   Hematological: Does not bruise/bleed easily.   Psychiatric/Behavioral: Negative for agitation, behavioral problems and confusion.       Objective    Temp:  [97.3 °F (36.3 °C)-98.7 °F (37.1 °C)] 97.8 °F (36.6 °C)  Heart Rate:  [56-80] 75  Resp:  [18] 18  BP: (119-170)/(61-92) 141/92    Physical Exam  Vitals signs and nursing note reviewed.   Constitutional:       General: He is not in acute distress.     Appearance: He is  well-developed. He is not diaphoretic.   HENT:      Head: Normocephalic and atraumatic.   Eyes:      General: No scleral icterus.     Pupils: Pupils are equal, round, and reactive to light.      Comments: Patient is legally blind.   Neck:      Musculoskeletal: Normal range of motion and neck supple.      Thyroid: No thyromegaly.      Vascular: No JVD.   Cardiovascular:      Rate and Rhythm: Normal rate and regular rhythm.      Heart sounds: Normal heart sounds. No murmur. No friction rub. No gallop.    Pulmonary:      Effort: Pulmonary effort is normal.      Breath sounds: Normal breath sounds. No wheezing or rales.   Chest:      Chest wall: No tenderness.   Abdominal:      General: Bowel sounds are normal. There is no distension.      Palpations: Abdomen is soft. There is no mass.      Tenderness: There is no abdominal tenderness. There is no guarding or rebound.   Musculoskeletal:         General: No tenderness or deformity.      Right lower leg: Edema present.      Left lower leg: Edema present.   Skin:     General: Skin is warm and dry.      Coloration: Skin is not pale.      Findings: No erythema or rash.   Neurological:      General: No focal deficit present.      Mental Status: He is alert and oriented to person, place, and time. Mental status is at baseline.      Cranial Nerves: No cranial nerve deficit.      Sensory: No sensory deficit.      Motor: No abnormal muscle tone.      Coordination: Coordination normal.      Deep Tendon Reflexes: Reflexes normal.   Psychiatric:         Behavior: Behavior normal.         Thought Content: Thought content normal.         Judgment: Judgment normal.           Medication Review:    Current Facility-Administered Medications:   •  acetaminophen (TYLENOL) tablet 650 mg, 650 mg, Oral, Q6H PRN, Samira Rodrigues MD, 650 mg at 09/25/20 0007  •  albumin human 25 % IV SOLN 25 g, 25 g, Intravenous, Q12H, Colt Neville APRN, 25 g at 10/02/20 7940  •   amiodarone (PACERONE) tablet 200 mg, 200 mg, Oral, Q24H, Molly Gr, APRN, 200 mg at 10/02/20 0810  •  aspirin EC tablet 81 mg, 81 mg, Oral, Daily, Samira Rodrigues MD, 81 mg at 10/02/20 0810  •  atorvastatin (LIPITOR) tablet 40 mg, 40 mg, Oral, Nightly, Samira Rodrigues MD, 40 mg at 10/02/20 0020  •  bisacodyl (DULCOLAX) EC tablet 5 mg, 5 mg, Oral, Daily PRN, Samira Rodrigues MD  •  carvedilol (COREG) tablet 6.25 mg, 6.25 mg, Oral, BID With Meals, Pedro Garcia MD, 6.25 mg at 10/02/20 0810  •  furosemide (LASIX) injection 40 mg, 40 mg, Intravenous, Q12H, Colt Neville APRN, 40 mg at 10/02/20 0528  •  hydrALAZINE (APRESOLINE) tablet 75 mg, 75 mg, Oral, Q8H, Pedro Garcia MD, 75 mg at 10/02/20 0021  •  ipratropium-albuterol (DUO-NEB) nebulizer solution 3 mL, 3 mL, Nebulization, Q4H PRN, Samira Rodrigues MD  •  isosorbide mononitrate (IMDUR) 24 hr tablet 30 mg, 30 mg, Oral, Q24H, Samira Rodrigues MD, 30 mg at 10/02/20 0810  •  magic butt ointment, , Topical, BID, Samira Rodrigues MD  •  metOLazone (ZAROXOLYN) tablet 2.5 mg, 2.5 mg, Oral, Daily, Marley Akhtar MD, 2.5 mg at 10/02/20 0810  •  ondansetron (ZOFRAN) tablet 4 mg, 4 mg, Oral, Q6H PRN **OR** ondansetron (ZOFRAN) injection 4 mg, 4 mg, Intravenous, Q6H PRN, Samira Rodrigues MD, 4 mg at 10/01/20 1824  •  pantoprazole (PROTONIX) EC tablet 40 mg, 40 mg, Oral, QAM, Samira Rodrigues MD, 40 mg at 10/01/20 0522  •  Pharmacy to dose warfarin, , Does not apply, Continuous PRN, Samira Rodrigues MD  •  sodium chloride 0.9 % flush 10 mL, 10 mL, Intravenous, PRN, Tree Waggoner, DO  •  sodium chloride 0.9 % flush 10 mL, 10 mL, Intravenous, Q12H, Samira Rodrigues MD, 10 mL at 10/02/20 0811  •  sodium chloride 0.9 % flush 10 mL, 10 mL,  Intravenous, PRN, Samira Rodrigues MD  •  warfarin (COUMADIN) half tablet 3.5 mg, 3.5 mg, Oral, Daily, Samira Rodrigues MD, 3.5 mg at 10/01/20 1741    Results Review:  I have reviewed the labs, radiology results, and diagnostic studies.    Laboratory Data:   Results from last 7 days   Lab Units 10/02/20  0640 10/01/20  0718 09/30/20  0710   SODIUM mmol/L 140 141 138   POTASSIUM mmol/L 3.4* 4.6 3.7   CHLORIDE mmol/L 99 102 103   CO2 mmol/L 29.0 24.0 24.0   BUN mg/dL 48* 50* 51*   CREATININE mg/dL 2.63* 2.47* 2.51*   GLUCOSE mg/dL 109* 115* 108*   CALCIUM mg/dL 9.2 8.9 8.3*   ANION GAP mmol/L 12.0 15.0 11.0     Estimated Creatinine Clearance: 29.4 mL/min (A) (by C-G formula based on SCr of 2.63 mg/dL (H)).  Results from last 7 days   Lab Units 09/30/20  0710 09/29/20  0549   MAGNESIUM mg/dL 1.7 1.9   PHOSPHORUS mg/dL 2.8 2.6         Results from last 7 days   Lab Units 09/29/20  0549 09/25/20  1053   WBC 10*3/mm3 5.67 5.38   HEMOGLOBIN g/dL 10.6* 9.5*   HEMATOCRIT % 33.5* 28.9*   PLATELETS 10*3/mm3 252 203     Results from last 7 days   Lab Units 10/02/20  0640 10/01/20  0718 09/30/20  0710 09/29/20  0549 09/28/20  0700   INR  1.78* 1.90* 1.90* 1.88* 2.20*       Culture Data:   No results found for: BLOODCX  No results found for: URINECX  No results found for: RESPCX  No results found for: WOUNDCX  No results found for: STOOLCX  No components found for: BODYFLD    Radiology Data:   Imaging Results (Last 24 Hours)     ** No results found for the last 24 hours. **          I have reviewed the patient's current medications.     Assessment/Plan     Hospital Problem List:  Active Problems:    Pneumonia of both lower lobes due to infectious organism    Acute respiratory failure with hypoxia secondary to Pneumonia/ CHF decompensation: Much improved.  Patient is maintaining O2 saturation in the upper 90s on room air and has completed a course of antimicrobial therapy. Blood culture  showed no growth.    Heart failure with decreased ejection fraction decompensation (secondary to ischemic cardiomyopathy): Patient has gained 2 pounds in the last 24 hours. Echocardiogram done 8/21/2020 showed severe global hypokinesis with an ejection fraction of 20%.  Continue diuretics with albumin, daily weights, salt and fluid restriction.  Findings of chest x-ray done 9/29/2020 have been reviewed.  Repeat chest x-ray in a.m.  Input by cardiologist is appreciated.     Chronic kidney disease stage III/4: Patient's baseline creatinine reportedly between 1.9-2.0. Creatinine is 2.63 today  (was 2.70 on admission).  Continue to monitor renal function and avoid nephrotoxins.  Input by nephrologist is appreciated.     History of atrial flutter: Patient is currently in normal sinus rhythm.  Continue amiodarone, Coreg and anticoagulation with warfarin.  INR is 1.78.    Hypertension: Stable.  Continue Coreg and hydralazine.  Continue to make adjustments as needed for optimal blood pressure control.    Diabetes mellitus: Stable.  Continue anti-glycemic with Accu-Cheks and sliding scale insulin.    Acute cystitis: He has completed a course of antimicrobial therapy.  Repeat urine culture showed no growth.            History of GERD: Continue PPI.    Continue DVT prophylaxis with warfarin.    Deconditioning: Continue PT and OT.    Discharge Planning: In progress.    Pedro Garcia MD   10/02/20   10:21 CDT

## 2020-10-02 NOTE — SIGNIFICANT NOTE
"   10/02/20 5457   OTHER   Discipline occupational therapist;physical therapist   Rehab Time/Intention   Session Not Performed patient/family declined, not feeling well   Recommendation   PT - Next Appointment 10/03/20   Recommendation   OT - Next Appointment 10/03/20     PT/OT anam attempted again this date. Pt asleep upon arrival, unable to arouse to wakeful state. Declined PT/OT by shaking head \"no.\" Will re-attempt tomorrow, 10/3/20.   "

## 2020-10-02 NOTE — PLAN OF CARE
Goal Outcome Evaluation:  Plan of Care Reviewed With: patient  Progress: declining  Outcome Summary: VSS, bp under control with medication, pt has not spoken today, refuses to eat, pt sleeping constantly, refuses therapy, will continue to monitor

## 2020-10-02 NOTE — PROGRESS NOTES
"[facility]  [address] [place] [address]. [address]  T - [phone]  F - [phone]     Progress Note          PATIENT  DEMOGRAPHICS   PATIENT NAME: Ondina Alanis Sr.                      PHYSICIAN: Marley Akhtar MD  : 1940  MRN: [id number]   LOS: 8 days    Patient Care Team:  Wendie Quiñonez APRN as PCP - General (Nurse Practitioner)  Subjective   SUBJECTIVE   resting  Comfortably no distress       Objective   OBJECTIVE   Vital Signs  Temp:  [97.3 °F (36.3 °C)-98.7 °F (37.1 °C)] 97.7 °F (36.5 °C)  Heart Rate:  [56-80] 61  Resp:  [16-18] 16  BP: (119-170)/(67-92) 147/67    Flowsheet Rows      First Filed Value   Admission Height  193 cm (76\") Documented at 2020 2200   Admission Weight  94.8 kg (209 lb) Documented at 20200           I/O last 3 completed shifts:  In: 600 [P.O.:600]  Out: 4450 [Urine:4450]    PHYSICAL EXAM    Physical Exam  Vitals signs reviewed.   Constitutional:       General: He is not in acute distress.     Appearance: He is well-developed.   Eyes:      General:         Right eye: No discharge.   Neck:      Vascular: JVD present.   Cardiovascular:      Rate and Rhythm: Normal rate and regular rhythm.      Heart sounds: Normal heart sounds, S1 normal and S2 normal. No murmur.   Pulmonary:      Effort: Pulmonary effort is normal. No respiratory distress.      Breath sounds: No rales.   Abdominal:      General: Bowel sounds are normal. There is no distension.      Palpations: Abdomen is soft.      Tenderness: There is no abdominal tenderness.   Skin:     Coloration: Skin is not pale.      Findings: No erythema.   Neurological:      General: No focal deficit present.      Mental Status: He is alert. Mental status is at baseline.      Sensory: No sensory deficit.      Motor: No abnormal muscle tone.         RESULTS   Results Review:    Results from last 7 days   Lab Units 10/02/20  0640 10/01/20  0718 20  0710   SODIUM mmol/L 140 " 141 138   POTASSIUM mmol/L 3.4* 4.6 3.7   CHLORIDE mmol/L 99 102 103   CO2 mmol/L 29.0 24.0 24.0   BUN mg/dL 48* 50* 51*   CREATININE mg/dL 2.63* 2.47* 2.51*   CALCIUM mg/dL 9.2 8.9 8.3*   GLUCOSE mg/dL 109* 115* 108*       Estimated Creatinine Clearance: 29.4 mL/min (A) (by C-G formula based on SCr of 2.63 mg/dL (H)).    Results from last 7 days   Lab Units 09/30/20  0710 09/29/20  0549   MAGNESIUM mg/dL 1.7 1.9   PHOSPHORUS mg/dL 2.8 2.6             Results from last 7 days   Lab Units 09/29/20  0549   WBC 10*3/mm3 5.67   HEMOGLOBIN g/dL 10.6*   PLATELETS 10*3/mm3 252       Results from last 7 days   Lab Units 10/02/20  0640 10/01/20  0718 09/30/20  0710 09/29/20  0549 09/28/20  0700   INR  1.78* 1.90* 1.90* 1.88* 2.20*         Imaging Results (Last 24 Hours)     ** No results found for the last 24 hours. **           MEDICATIONS    albumin human, 25 g, Intravenous, Q12H  amiodarone, 200 mg, Oral, Q24H  aspirin, 81 mg, Oral, Daily  atorvastatin, 40 mg, Oral, Nightly  carvedilol, 6.25 mg, Oral, BID With Meals  furosemide, 40 mg, Intravenous, Q12H  hydrALAZINE, 75 mg, Oral, Q8H  isosorbide mononitrate, 30 mg, Oral, Q24H  magic butt ointment, , Topical, BID  metOLazone, 2.5 mg, Oral, Daily  pantoprazole, 40 mg, Oral, QAM  sodium chloride, 10 mL, Intravenous, Q12H  warfarin, 3.5 mg, Oral, Daily      Pharmacy to dose warfarin,         Assessment/Plan   ASSESSMENT / PLAN      Pneumonia of both lower lobes due to infectious organism    1.  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 good UO with iv lasix and albumin. Keep metolazone.      2.  Hypertension- poorly controlled, hydralazine increased to 75 mg 3 times daily and carvedilol to 12.5 mg twice daily  HR is low and observe. bp overall better.      3.  CHF- systolic heart failure EF 20%. severe LV systolic dysfunction with RV systolic pressure of 60 mmHg, cardiology following. cxr bilateral  infiltrates vs edema     4.  A. Fib on amiodarone and coumain     5.  Pneumonia- on antibiotics per primary team     6.  UTI- on antibiotics per primary team                   This document has been electronically signed by Marley Akhtar MD on October 2, 2020 12:52 CDT

## 2020-10-03 NOTE — THERAPY EVALUATION
Acute Care - Speech Language Pathology   Swallow Initial Evaluation [facility]     Patient Name: Ondina Alanis Sr.  : 1940  MRN: [id number]  Today's Date: 10/3/2020               Admit Date: 2020     Pt seen for skilled ST services this date to evaluate swallow function. Pt would verally respond to SLP, but kept eyes closed during entire session. Pt would use verbal obscenities when SLP would attempt to reposition pt; however, allowed for upright positioning w/breaks between positioning. Pt agreeable to consume PO and would intermittently deny additional trials, but would then open mouth to accept additional bolus. SLP provided trials until pt would not longer accept. Pt would not accept solids beyond puree. Pt safely tolerated puree solids w/efficient bolus prep and clearance of oral cavity. SLP consumed thin liquids via straw w/no overt s/s of aspiration. SLP to initiate puree/thin liquids diet and attempt advanced solids during tx next date. SLP attempted to educate pt; however, no evidence of learning was noted. Diet advanced in chart and updated on informational board.    Goal:  Patient will safely tolerate least restricted diet w/no overt s/s of aspiration for adequate nutrition and hydration:      Visit Dx:     ICD-10-CM ICD-9-CM   1. Pneumonia of both lower lobes due to infectious organism  J18.9 486   2. Hypoxia  R09.02 799.02   3. Elevated troponin  R79.89 790.6   4. Congestive heart failure, unspecified HF chronicity, unspecified heart failure type (CMS/[facility])  I50.9 428.0   5. Dysphagia, unspecified type  R13.10 787.20     Patient Active Problem List   Diagnosis   • CKD (chronic kidney disease) stage 4, GFR 15-29 ml/min (CMS/[facility])   • Diabetic peripheral neuropathy associated with type 2 diabetes mellitus (CMS/[facility])   • Diabetes mellitus type II, uncontrolled (CMS/[facility])   • GERD (gastroesophageal reflux disease)   • Primary osteoarthritis of both knees   • Pulmonary embolism (CMS/[facility])    • BPH (benign prostatic hyperplasia)   • Benign essential hypertension   • Asthma   • Pleurisy   • Acute respiratory failure with hypoxia (CMS/HCC)   • Stage 1 acute kidney injury (CMS/HCC)   • Left ventricular diastolic dysfunction   • Exacerbation of asthma   • COPD exacerbation (CMS/HCC)   • Atrial flutter (CMS/HCC)   • Acute systolic CHF (congestive heart failure) (CMS/HCC)   • Pneumonia of both lower lobes due to infectious organism     Past Medical History:   Diagnosis Date   • Asthma    • Diabetes mellitus (CMS/HCC)    • GERD (gastroesophageal reflux disease)    • Hypertension    • Prostate disorder      Past Surgical History:   Procedure Laterality Date   • BACK SURGERY     • KNEE SURGERY Left    • PROSTATE SURGERY          SWALLOW EVALUATION (last 72 hours)      SLP Adult Swallow Evaluation     Row Name 10/03/20 0946                   Rehab Evaluation    Document Type  evaluation  -CK        Total Evaluation Minutes, SLP  25  -CK        Subjective Information  no complaints  -CK        Patient Observations  poorly cooperative  -CK        Patient Effort  fair  -CK           General Information    Patient Profile Reviewed  yes  -CK        Pertinent History Of Current Problem  Nsg note reported pt was refusing PO  -CK        Current Method of Nutrition  NPO  -CK        Precautions/Limitations, Vision  vision impairment, bilaterally legally blind  -CK        Precautions/Limitations, Hearing  WFL;for purposes of eval  -CK        Prior Level of Function-Communication  cognitive-linguistic impairment dementia  -CK        Prior Level of Function-Swallowing  unknown  -CK        Plans/Goals Discussed with  patient No evidence of learning  -CK        Barriers to Rehab  cognitive status;uncooperative  -CK        Patient's Goals for Discharge  patient did not state  -CK           Pain    Additional Documentation  Pain Scale: Numbers Pre/Post-Treatment (Group)  -CK           Pain Scale: Numbers Pre/Post-Treatment     Pretreatment Pain Rating  0/10 - no pain  -CK        Posttreatment Pain Rating  0/10 - no pain  -CK           Oral Motor Structure and Function    Dentition Assessment  natural, present and adequate  -CK        Secretion Management  WNL/WFL  -CK        Mucosal Quality  moist, healthy  -CK        Volitional Swallow  unable to elicit  -CK        Volitional Cough  unable to elicit  -CK           General Eating/Swallowing Observations    Respiratory Support Currently in Use  room air  -CK        Eating/Swallowing Skills  fed by SLP  -CK        Positioning During Eating  upright 90 degree;upright in bed  -CK        Utensils Used  straw  -CK        Consistencies Trialed  pureed;thin liquids  -CK           Clinical Swallow Eval    Oral Prep Phase  WFL for consistencies trialed  -CK        Oral Transit  WFL for consistencies trialed  -CK        Oral Residue  WFL for consistencies trialed  -CK        Pharyngeal Phase  no overt signs/symptoms of pharyngeal impairment  -CK        Esophageal Phase  unremarkable  -CK           Clinical Impression    Barriers to Overall Progress (SLP)  cognition; uncooperative  -CK        SLP Swallowing Diagnosis  functional oral phase;functional pharyngeal phase for consistencies trialed; unable to assess advanced solids  -CK        Functional Impact  no impact on function;other unknown functional impact for advanced solids  -CK        Rehab Potential/Prognosis, Swallowing  good, to achieve stated therapy goals  -CK        Swallow Criteria for Skilled Therapeutic Interventions Met  demonstrates skilled criteria  -CK        Plan for Continued Treatment (SLP)  Initiate diet and POC; trial advanced solids as pt allows  -CK           Recommendations    Therapy Frequency (Swallow)  3 days per week;5 days per week  -CK        Predicted Duration Therapy Intervention (Days)  until discharge  -CK        SLP Diet Recommendation  puree;thin liquids  -CK        Recommended Precautions and Strategies   upright posture during/after eating  -CK        SLP Rec. for Method of Medication Administration  meds crushed;with pudding or applesauce  -CK        Monitor for Signs of Aspiration  yes;notify SLP if any concerns  -CK        Anticipated Discharge Disposition (SLP)  unknown  -CK           Swallow Goals (SLP)    Oral Nutrition/Hydration Goal Selection (SLP)  oral nutrition/hydration, SLP goal 1  -CK           Oral Nutrition/Hydration Goal 1 (SLP)    Oral Nutrition/Hydration Goal 1, SLP  Pt will safely tolerate least restricted diet w/no overt s/s of aspiration for adequate nutrition and hydration.  -CK        Time Frame (Oral Nutrition/Hydration Goal 1, SLP)  by discharge  -CK        Barriers (Oral Nutrition/Hydration Goal 1, SLP)  cognition; uncooperative  -CK          User Key  (r) = Recorded By, (t) = Taken By, (c) = Cosigned By    Initials Name Effective Dates    CK Zakia Wagner, MS CCC-SLP 02/11/20 -           EDUCATION  The patient has been educated in the following areas:   Dysphagia (Swallowing Impairment) Modified Diet Instruction.    SLP Recommendation and Plan  SLP Swallowing Diagnosis: functional oral phase, functional pharyngeal phase(for consistencies trialed; unable to assess advanced solids)  SLP Diet Recommendation: puree, thin liquids  Recommended Precautions and Strategies: upright posture during/after eating  SLP Rec. for Method of Medication Administration: meds crushed, with pudding or applesauce     Monitor for Signs of Aspiration: yes, notify SLP if any concerns     Swallow Criteria for Skilled Therapeutic Interventions Met: demonstrates skilled criteria  Anticipated Discharge Disposition (SLP): unknown  Rehab Potential/Prognosis, Swallowing: good, to achieve stated therapy goals  Therapy Frequency (Swallow): 3 days per week, 5 days per week  Predicted Duration Therapy Intervention (Days): until discharge          Plan for Continued Treatment (SLP): Initiate diet and POC; trial advanced  solids as pt allows              Plan of Care Reviewed With: patient  Outcome Summary: Pt seen for skilled ST services this date to evaluate swallow function.  Pt would verally respond to SLP, but kept eyes closed during entire session.  Pt would use verbal obscenities when SLP would attempt to reposition pt; however, allowed for upright positioning w/breaks between positioning.  Pt agreeable to consume PO and would intermittently deny additional trials, but would then open mouth to accept additional bolus.  SLP provided trials until pt would not longer accept.  Pt would not accept solids beyond puree.  Pt safely tolerated puree solids w/efficient bolus prep and clearance of oral cavity.  SLP consumed thin liquids via straw w/no overt s/s of aspiration.  SLP to initiate puree/thin liquids diet and attempt advanced solids during tx next date.  SLP attempted to educate pt; however, no evidence of learning was noted.  Diet advanced in chart and updated on informational board.    SLP GOALS     Row Name 10/03/20 0946             Oral Nutrition/Hydration Goal 1 (SLP)    Oral Nutrition/Hydration Goal 1, SLP  Pt will safely tolerate least restricted diet w/no overt s/s of aspiration for adequate nutrition and hydration.  -CK      Time Frame (Oral Nutrition/Hydration Goal 1, SLP)  by discharge  -CK      Barriers (Oral Nutrition/Hydration Goal 1, SLP)  cognition; uncooperative  -CK        User Key  (r) = Recorded By, (t) = Taken By, (c) = Cosigned By    Initials Name Provider Type    Zakia Garduno, MS CCC-SLP Speech and Language Pathologist             Time Calculation:   Time Calculation- SLP     Row Name 10/03/20 1011             Time Calculation- SLP    SLP Start Time  0946  -CK      SLP Stop Time  1011  -CK      SLP Time Calculation (min)  25 min  -CK      Total Timed Code Minutes- SLP  25 minute(s)  -CK      SLP Received On  10/03/20  -CK      SLP Goal Re-Cert Due Date  10/17/20  -CK        User Key  (r) =  Recorded By, (t) = Taken By, (c) = Cosigned By    Initials Name Provider Type    CK Zakia Wagner, MS CCC-SLP Speech and Language Pathologist          Therapy Charges for Today     Code Description Service Date Service Provider Modifiers Qty    [id number]  ST EVAL ORAL PHARYNG SWALLOW 2 10/3/2020 Zakia Wagner, MS RICH-SLP GN 1               Zakia Wagner MS CCC-GEORGE  10/3/2020

## 2020-10-03 NOTE — PROGRESS NOTES
[facility] Medicine Services  INPATIENT PROGRESS NOTE    Length of Stay: 9  Date of Admission: 9/23/2020  Primary Care Physician: Wednie Quiñonez APRN    Subjective   Chief Complaint: No new complaints.    HPI: Patient is seen for follow-up.  He is doing well, slightly deconditioned and has been n.p.o. since last p.m. because patient has refused his meals and medications.  He is more alert today and voices no new complaints.  Bilateral pedal edema is much improved.     Review of Systems   Constitutional: Positive for activity change, appetite change and fatigue. Negative for chills, diaphoresis and fever.   HENT: Negative for trouble swallowing and voice change.    Eyes: Positive for visual disturbance. Negative for photophobia.   Respiratory: Negative for cough, choking, chest tightness, shortness of breath, wheezing and stridor.    Cardiovascular: Negative for chest pain, palpitations and leg swelling.   Gastrointestinal: Negative for abdominal distention, abdominal pain, blood in stool, constipation, diarrhea, nausea and vomiting.   Endocrine: Negative for cold intolerance, heat intolerance, polydipsia, polyphagia and polyuria.   Genitourinary: Negative for decreased urine volume, difficulty urinating, dysuria, enuresis, flank pain, frequency, hematuria and urgency.   Musculoskeletal: Negative for arthralgias, gait problem, myalgias, neck pain and neck stiffness.   Skin: Negative for pallor, rash and wound.   Neurological: Negative for dizziness, tremors, seizures, syncope, facial asymmetry, speech difficulty, weakness, light-headedness, numbness and headaches.   Hematological: Does not bruise/bleed easily.   Psychiatric/Behavioral: Negative for agitation, behavioral problems and confusion.       Objective    Temp:  [97.7 °F (36.5 °C)-98.8 °F (37.1 °C)] 98.5 °F (36.9 °C)  Heart Rate:  [61-77] 70  Resp:  [16-18] 18  BP: (140-156)/(66-78) 156/78    Physical  Exam  Vitals signs and nursing note reviewed.   Constitutional:       General: He is not in acute distress.     Appearance: He is well-developed. He is not diaphoretic.   HENT:      Head: Normocephalic and atraumatic.   Eyes:      General: No scleral icterus.     Pupils: Pupils are equal, round, and reactive to light.      Comments: Patient is legally blind.   Neck:      Musculoskeletal: Normal range of motion and neck supple.      Thyroid: No thyromegaly.      Vascular: No JVD.   Cardiovascular:      Rate and Rhythm: Normal rate and regular rhythm.      Heart sounds: Normal heart sounds. No murmur. No friction rub. No gallop.    Pulmonary:      Effort: Pulmonary effort is normal.      Breath sounds: Normal breath sounds. No wheezing or rales.   Chest:      Chest wall: No tenderness.   Abdominal:      General: Bowel sounds are normal. There is no distension.      Palpations: Abdomen is soft. There is no mass.      Tenderness: There is no abdominal tenderness. There is no guarding or rebound.   Musculoskeletal:         General: No tenderness or deformity.      Right lower leg: Edema present.      Left lower leg: Edema present.      Comments: Bilateral pedal edema is much improved.   Skin:     General: Skin is warm and dry.      Coloration: Skin is not pale.      Findings: No erythema or rash.   Neurological:      General: No focal deficit present.      Mental Status: He is alert and oriented to person, place, and time. Mental status is at baseline.      Cranial Nerves: No cranial nerve deficit.      Sensory: No sensory deficit.      Motor: No abnormal muscle tone.      Coordination: Coordination normal.      Deep Tendon Reflexes: Reflexes normal.   Psychiatric:         Behavior: Behavior normal.         Thought Content: Thought content normal.         Judgment: Judgment normal.           Medication Review:    Current Facility-Administered Medications:   •  acetaminophen (TYLENOL) tablet 650 mg, 650 mg, Oral, Q6H PRN,  Samira Rodrigues MD, 650 mg at 09/25/20 0007  •  albumin human 25 % IV SOLN 25 g, 25 g, Intravenous, Q12H, Colt Neville, APRN, 25 g at 10/03/20 0411  •  amiodarone (PACERONE) tablet 200 mg, 200 mg, Oral, Q24H, Molly Gr, APRN, 200 mg at 10/02/20 0810  •  aspirin EC tablet 81 mg, 81 mg, Oral, Daily, Samira Rordigues MD, 81 mg at 10/02/20 0810  •  atorvastatin (LIPITOR) tablet 40 mg, 40 mg, Oral, Nightly, Samira Rodrigues MD, 40 mg at 10/02/20 0020  •  bisacodyl (DULCOLAX) EC tablet 5 mg, 5 mg, Oral, Daily PRN, Samira Rodrigues MD  •  carvedilol (COREG) tablet 6.25 mg, 6.25 mg, Oral, BID With Meals, Pedro Garcia MD, 6.25 mg at 10/02/20 0810  •  furosemide (LASIX) injection 40 mg, 40 mg, Intravenous, Q12H, Colt Neville, APRN, 40 mg at 10/03/20 0420  •  hydrALAZINE (APRESOLINE) tablet 75 mg, 75 mg, Oral, Q8H, Pedro Garcia MD, 75 mg at 10/02/20 1334  •  ipratropium-albuterol (DUO-NEB) nebulizer solution 3 mL, 3 mL, Nebulization, Q4H PRN, Samira Rodrigues MD  •  isosorbide mononitrate (IMDUR) 24 hr tablet 30 mg, 30 mg, Oral, Q24H, Samira Rodrigues MD, 30 mg at 10/02/20 0810  •  magic butt ointment, , Topical, BID, Samira Rodrigues MD  •  metOLazone (ZAROXOLYN) tablet 2.5 mg, 2.5 mg, Oral, Daily, Marley Akhtar MD, 2.5 mg at 10/02/20 0810  •  ondansetron (ZOFRAN) tablet 4 mg, 4 mg, Oral, Q6H PRN **OR** ondansetron (ZOFRAN) injection 4 mg, 4 mg, Intravenous, Q6H PRN, Samira Rodrigues MD, 4 mg at 10/01/20 1824  •  pantoprazole (PROTONIX) EC tablet 40 mg, 40 mg, Oral, QAM, Samira Rodrigues MD, 40 mg at 10/01/20 0522  •  Pharmacy to dose warfarin, , Does not apply, Continuous PRN, Samira Rodrigues MD  •  sodium chloride 0.9 % flush 10 mL, 10 mL, Intravenous, PRN, Tree Waggoner,  DO  •  sodium chloride 0.9 % flush 10 mL, 10 mL, Intravenous, Q12H, Samira Rodrigues MD, 10 mL at 10/02/20 2141  •  sodium chloride 0.9 % flush 10 mL, 10 mL, Intravenous, PRN, Samira Rodrigues MD  •  warfarin (COUMADIN) tablet 4 mg, 4 mg, Oral, Daily, Samira Rodrigues MD    Results Review:  I have reviewed the labs, radiology results, and diagnostic studies.    Laboratory Data:   Results from last 7 days   Lab Units 10/03/20  0744 10/02/20  0640 10/01/20  0718   SODIUM mmol/L 140 140 141   POTASSIUM mmol/L 3.9 3.4* 4.6   CHLORIDE mmol/L 97* 99 102   CO2 mmol/L 28.0 29.0 24.0   BUN mg/dL 50* 48* 50*   CREATININE mg/dL 2.81* 2.63* 2.47*   GLUCOSE mg/dL 79 109* 115*   CALCIUM mg/dL 9.4 9.2 8.9   ANION GAP mmol/L 15.0 12.0 15.0     Estimated Creatinine Clearance: 24.8 mL/min (A) (by C-G formula based on SCr of 2.81 mg/dL (H)).  Results from last 7 days   Lab Units 09/30/20  0710 09/29/20  0549   MAGNESIUM mg/dL 1.7 1.9   PHOSPHORUS mg/dL 2.8 2.6         Results from last 7 days   Lab Units 10/03/20  0603 09/29/20  0549   WBC 10*3/mm3 7.64 5.67   HEMOGLOBIN g/dL 11.9* 10.6*   HEMATOCRIT % 35.4* 33.5*   PLATELETS 10*3/mm3 162 252     Results from last 7 days   Lab Units 10/03/20  0744 10/02/20  0640 10/01/20  0718 09/30/20  0710 09/29/20  0549   INR  1.70* 1.78* 1.90* 1.90* 1.88*       Culture Data:   No results found for: BLOODCX  No results found for: URINECX  No results found for: RESPCX  No results found for: WOUNDCX  No results found for: STOOLCX  No components found for: BODYFLD    Radiology Data:   Imaging Results (Last 24 Hours)     ** No results found for the last 24 hours. **          I have reviewed the patient's current medications.     Assessment/Plan     Hospital Problem List:  Active Problems:    Pneumonia of both lower lobes due to infectious organism    Acute respiratory failure with hypoxia secondary to Pneumonia/ CHF decompensation: Much  improved.  Patient is maintaining O2 saturation in the upper 90s on room air and has completed a course of antimicrobial therapy. Blood culture showed no growth.    Heart failure with decreased ejection fraction decompensation (secondary to ischemic cardiomyopathy): Much improved as patient has lost about 21 pounds in the last 24 hours.  Echocardiogram done 8/21/2020 showed severe global hypokinesis with an ejection fraction of 20%.  Continue diuretics with albumin, daily weights, salt and fluid restriction.  Findings of chest x-ray done 9/29/2020 have been reviewed.  Repeat chest for this a.m. is pending.  Input by cardiologist is appreciated.     Chronic kidney disease stage III/4: Patient's baseline creatinine reportedly between 1.9-2.0. Creatinine is 2.81 today  (was 2.70 on admission).  Continue to monitor renal function and avoid nephrotoxins.  Input by nephrologist is appreciated.     History of atrial flutter: Patient is currently in normal sinus rhythm.  Continue amiodarone, Coreg and anticoagulation with warfarin.  INR is 1.70.    Hypertension: Stable.  Continue Coreg and hydralazine.  Continue to make adjustments as needed for optimal blood pressure control.    Diabetes mellitus: Stable.  Continue anti-glycemic with Accu-Cheks and sliding scale insulin.    Acute cystitis: He has completed a course of antimicrobial therapy.  Repeat urine culture showed no growth.            History of GERD: Continue PPI.    Continue DVT prophylaxis with warfarin.    Nutrition: Continue diet as recommended by speech-language pathologist.    Deconditioning: Continue PT and OT.    Discharge Planning: In progress.    Pedro Garcia MD   10/03/20   10:41 CDT

## 2020-10-03 NOTE — PLAN OF CARE
Problem: Adult Inpatient Plan of Care  Goal: Plan of Care Review  Outcome: Ongoing, Progressing  Flowsheets (Taken 10/3/2020 1005)  Plan of Care Reviewed With: patient  Outcome Summary:   Pt seen for skilled ST services this date to evaluate swallow function.  Pt would verally respond to SLP, but kept eyes closed during entire session.  Pt would use verbal obscenities when SLP would attempt to reposition pt   however, allowed for upright positioning w/breaks between positioning.  Pt agreeable to consume PO and would intermittently deny additional trials, but would then open mouth to accept additional bolus.  SLP provided trials until pt would not longer accept.  Pt would not accept solids beyond puree.  Pt safely tolerated puree solids w/efficient bolus prep and clearance of oral cavity.  SLP consumed thin liquids via straw w/no overt s/s of aspiration.  SLP to initiate puree/thin liquids diet and attempt advanced solids during tx next date.  SLP attempted to educate pt   however, no evidence of learning was noted.  Diet advanced in chart and updated on informational board.

## 2020-10-03 NOTE — PLAN OF CARE
Goal Outcome Evaluation:  Plan of Care Reviewed With: patient  Progress: no change  Outcome Summary: patient NPO until speech evaluates him. v/s has been stable. pt has been agitated this shift. will continue to monitor pt

## 2020-10-03 NOTE — PLAN OF CARE
Goal Outcome Evaluation:  Plan of Care Reviewed With: patient  Progress: no change  Outcome Summary: vss, pt still refusing medications, seen for swallow eval-now on puree diet, did refuse some turns, slept entire shift unless work up for care, will continue to monitor

## 2020-10-03 NOTE — SIGNIFICANT NOTE
10/03/20 0908   OTHER   Discipline occupational therapist;physical therapist   Rehab Time/Intention   Session Not Performed patient/family declined evaluation  (Pt declining OT and PT eval on this date - pt not opening eyes and shaking head no)   Recommendation   PT - Next Appointment 10/04/20   Recommendation   OT - Next Appointment 10/04/20

## 2020-10-03 NOTE — PROGRESS NOTES
"[facility]  [address] [place] [address]. [address]  T - [phone]  F - [phone]     Progress Note          PATIENT  DEMOGRAPHICS   PATIENT NAME: Ondina Alanis Sr.                      PHYSICIAN: Marley Akhtar MD  : 1940  MRN: [id number]   LOS: 9 days    Patient Care Team:  Wendie Quiñonez APRN as PCP - General (Nurse Practitioner)  Subjective   SUBJECTIVE   resting  Comfortably no distress       Objective   OBJECTIVE   Vital Signs  Temp:  [97.7 °F (36.5 °C)-98.8 °F (37.1 °C)] 98.5 °F (36.9 °C)  Heart Rate:  [61-77] 70  Resp:  [16-18] 18  BP: (140-156)/(66-78) 156/78    Flowsheet Rows      First Filed Value   Admission Height  193 cm (76\") Documented at 2020   Admission Weight  94.8 kg (209 lb) Documented at 2020           I/O last 3 completed shifts:  In: 240 [P.O.:240]  Out: 5650 [Urine:5650]    PHYSICAL EXAM    Physical Exam  Vitals signs reviewed.   Constitutional:       General: He is not in acute distress.     Appearance: He is well-developed.   Eyes:      General:         Right eye: No discharge.   Neck:      Vascular: JVD present.   Cardiovascular:      Rate and Rhythm: Normal rate and regular rhythm.      Heart sounds: Normal heart sounds, S1 normal and S2 normal. No murmur.   Pulmonary:      Effort: Pulmonary effort is normal. No respiratory distress.      Breath sounds: No rales.   Abdominal:      General: Bowel sounds are normal. There is no distension.      Palpations: Abdomen is soft.      Tenderness: There is no abdominal tenderness.   Skin:     Coloration: Skin is not pale.      Findings: No erythema.   Neurological:      General: No focal deficit present.      Mental Status: He is alert. Mental status is at baseline.      Sensory: No sensory deficit.      Motor: No abnormal muscle tone.         RESULTS   Results Review:    Results from last 7 days   Lab Units 10/02/20  0640 10/01/20  0718 20  0710   SODIUM mmol/L 140 " 141 138   POTASSIUM mmol/L 3.4* 4.6 3.7   CHLORIDE mmol/L 99 102 103   CO2 mmol/L 29.0 24.0 24.0   BUN mg/dL 48* 50* 51*   CREATININE mg/dL 2.63* 2.47* 2.51*   CALCIUM mg/dL 9.2 8.9 8.3*   GLUCOSE mg/dL 109* 115* 108*       Estimated Creatinine Clearance: 29.6 mL/min (A) (by C-G formula based on SCr of 2.63 mg/dL (H)).    Results from last 7 days   Lab Units 09/30/20  0710 09/29/20  0549   MAGNESIUM mg/dL 1.7 1.9   PHOSPHORUS mg/dL 2.8 2.6             Results from last 7 days   Lab Units 10/03/20  0603 09/29/20  0549   WBC 10*3/mm3 7.64 5.67   HEMOGLOBIN g/dL 11.9* 10.6*   PLATELETS 10*3/mm3 162 252       Results from last 7 days   Lab Units 10/03/20  0744 10/02/20  0640 10/01/20  0718 09/30/20  0710 09/29/20  0549   INR  1.70* 1.78* 1.90* 1.90* 1.88*         Imaging Results (Last 24 Hours)     ** No results found for the last 24 hours. **           MEDICATIONS    albumin human, 25 g, Intravenous, Q12H  amiodarone, 200 mg, Oral, Q24H  aspirin, 81 mg, Oral, Daily  atorvastatin, 40 mg, Oral, Nightly  carvedilol, 6.25 mg, Oral, BID With Meals  furosemide, 40 mg, Intravenous, Q12H  hydrALAZINE, 75 mg, Oral, Q8H  isosorbide mononitrate, 30 mg, Oral, Q24H  magic butt ointment, , Topical, BID  metOLazone, 2.5 mg, Oral, Daily  pantoprazole, 40 mg, Oral, QAM  sodium chloride, 10 mL, Intravenous, Q12H  warfarin, 4 mg, Oral, Daily      Pharmacy to dose warfarin,         Assessment/Plan   ASSESSMENT / PLAN      Pneumonia of both lower lobes due to infectious organism    1.  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 good UO with iv lasix and albumin. Keep metolazone. Wt unclear has nearly 5 L output with no change in weight      2.  Hypertension- poorly controlled, hydralazine increased to 75 mg 3 times daily and carvedilol to 12.5 mg twice daily  HR is low and observe. bp overall better.      3.  CHF- systolic heart failure EF 20%. severe LV systolic  dysfunction with RV systolic pressure of 60 mmHg, cardiology following. cxr bilateral infiltrates vs edema     4.  A. Fib on amiodarone and coumain     5.  Pneumonia- on antibiotics per primary team     6.  UTI- on antibiotics per primary team    7. Altered mental status npo at present                   This document has been electronically signed by Marley Akhtar MD on October 3, 2020 09:04 CDT

## 2020-10-03 NOTE — PROGRESS NOTES
"Anticoagulation by Pharmacy - Warfarin    Ondina Alanis Sr. is a 80 y.o.male  [Ht: 193 cm (76\"); Wt: 82.1 kg (181 lb)] on Warfarin for indication of DVT.    Goal INR: 2-3  Home dose: Recently on LTACH and tolerating Warfarin 2.5-3 mg daily. Discharged home and receiving 5 mg nightly per Wife to finish out old bottle before new prescription of 3 mg nightly. Patient only received a few doses of 3 mg before being admitted. Patient's wife checks INR at home every Monday and INR has been in range    Today's INR:   Lab Results   Component Value Date    INR 1.70 (H) 10/03/2020          Lab Results   Component Value Date    INR 1.70 (H) 10/03/2020    INR 1.78 (H) 10/02/2020    INR 1.90 (H) 10/01/2020    PROTIME 21.7 (H) 10/02/2020    PROTIME  10/01/2020      Comment:      FINGERSTICK PT    PROTIME  09/30/2020      Comment:      FINGERSTICK PT     Lab Results   Component Value Date    HGB 11.9 (L) 10/03/2020    HGB 10.6 (L) 09/29/2020    HGB 9.5 (L) 09/25/2020     Lab Results   Component Value Date    HCT 35.4 (L) 10/03/2020    HCT 33.5 (L) 09/29/2020    HCT 28.9 (L) 09/25/2020     Lab Results   Component Value Date     10/03/2020     09/29/2020     09/25/2020       Recent Warfarin Administrations       The 5 most recent administrations since 09/26/2020 are shown below each listed medication.    Warfarin Sodium         Order Dose Date Given     warfarin (COUMADIN) half tablet 3.5 mg 3.5 mg 10/01/2020     warfarin (COUMADIN) tablet 3 mg 3 mg 09/30/2020      3 mg 09/29/2020     warfarin (COUMADIN) tablet 2.5 mg 2.5 mg 09/28/2020      2.5 mg 09/27/2020                    Assessment/Plan:  Reviewed above labs. INR is 1.7.  INR is below goal. No changes in medication list. Concurrent medications include amiodarone and aspirin. Pt did NOT receive dose of warfarin last nigh, as pt refused.  Will continue current dose of  4 mg. Rx will continue to follow and adjust dose accordingly.     Fany Byers, " PharmD  10/03/20 12:33 CDT

## 2020-10-04 NOTE — PROGRESS NOTES
"Anticoagulation by Pharmacy - Warfarin    Ondina Alanis Sr. is a 80 y.o.male  [Ht: 193 cm (76\"); Wt: 82.1 kg (181 lb)] on Warfarin for indication of DVT.    Goal INR: 2-3  Home dose is Recently on LTACH and tolerating Warfarin 2.5-3 mg daily. Discharged home and receiving 5 mg nightly per Wife to finish out old bottle before new prescription of 3 mg nightly. Patient only received a few doses of 3 mg before being admitted. Patient's wife checks INR at home every Monday and INR has been in range  Today's INR:   Lab Results   Component Value Date    INR 1.70 (H) 10/03/2020          Lab Results   Component Value Date    INR 1.70 (H) 10/03/2020    INR 1.78 (H) 10/02/2020    INR 1.90 (H) 10/01/2020    PROTIME 21.7 (H) 10/02/2020    PROTIME  10/01/2020      Comment:      FINGERSTICK PT    PROTIME  09/30/2020      Comment:      FINGERSTICK PT     Lab Results   Component Value Date    HGB 11.9 (L) 10/03/2020    HGB 10.6 (L) 09/29/2020    HGB 9.5 (L) 09/25/2020     Lab Results   Component Value Date    HCT 35.4 (L) 10/03/2020    HCT 33.5 (L) 09/29/2020    HCT 28.9 (L) 09/25/2020     Lab Results   Component Value Date     10/03/2020     09/29/2020     09/25/2020       Recent Warfarin Administrations       The 5 most recent administrations since 09/27/2020 are shown below each listed medication.    Warfarin Sodium         Order Dose Date Given     warfarin (COUMADIN) half tablet 3.5 mg 3.5 mg 10/01/2020     warfarin (COUMADIN) tablet 3 mg 3 mg 09/30/2020      3 mg 09/29/2020     warfarin (COUMADIN) tablet 2.5 mg 2.5 mg 09/28/2020      2.5 mg 09/27/2020                    Assessment/Plan:  Reviewed above labs. INR- no lab this morning. Pt is refusing medication and has not taken dose the past 2 days. There is a consult for a swallow study.   Will speak to provider about possibly starting enoxaparin. Will leave dose the same for now.    Fany Byers, PharmD  10/04/20 09:25 CDT     "

## 2020-10-04 NOTE — PLAN OF CARE
Problem: Adult Inpatient Plan of Care  Goal: Plan of Care Review  Outcome: Ongoing, Progressing  Flowsheets  Taken 10/4/2020 1244  Plan of Care Reviewed With: patient  Outcome Summary:  Initial PT evaluation complete; co-evaluation with OT.  Patient is fairly alert, cooperative.  He requires max Ax2 with bed mobility, has difficulty initiating movement and fatigues quite easily.  Sitting EOB very limited by poor hip flexion, poor trunk control and severe posterior lean; unable to attempt standing. Patient would benefit from SNF placement for rehab prior to dishcarge home with spouse.  Goals established, continue skilled PT.  Taken 10/4/2020 0928  Plan of Care Reviewed With: patient

## 2020-10-04 NOTE — PROGRESS NOTES
"[facility]  [address] [place] [address]. [address]  T - [phone]  F - [phone]     Progress Note          PATIENT  DEMOGRAPHICS   PATIENT NAME: Ondina Alanis Sr.                      PHYSICIAN: Marley Akhtar MD  : 1940  MRN: [id number]   LOS: 10 days    Patient Care Team:  Wendie Quiñonez APRN as PCP - General (Nurse Practitioner)  Subjective   SUBJECTIVE   Working with speech and able to swallow soft diet       Objective   OBJECTIVE   Vital Signs  Temp:  [97.7 °F (36.5 °C)-98.4 °F (36.9 °C)] 98.4 °F (36.9 °C)  Heart Rate:  [65-81] 70  Resp:  [18] 18  BP: (148-160)/(64-77) 159/69    Flowsheet Rows      First Filed Value   Admission Height  193 cm (76\") Documented at 2020   Admission Weight  94.8 kg (209 lb) Documented at 2020           I/O last 3 completed shifts:  In: 240 [P.O.:240]  Out: 4475 [Urine:4475]    PHYSICAL EXAM    Physical Exam  Vitals signs reviewed.   Constitutional:       General: He is not in acute distress.     Appearance: He is well-developed.   Eyes:      General:         Right eye: No discharge.   Neck:      Vascular: JVD present.   Cardiovascular:      Rate and Rhythm: Normal rate and regular rhythm.      Heart sounds: Normal heart sounds, S1 normal and S2 normal. No murmur.   Pulmonary:      Effort: Pulmonary effort is normal. No respiratory distress.      Breath sounds: No rales.   Abdominal:      General: Bowel sounds are normal. There is no distension.      Palpations: Abdomen is soft.      Tenderness: There is no abdominal tenderness.   Skin:     Coloration: Skin is not pale.      Findings: No erythema.   Neurological:      General: No focal deficit present.      Mental Status: He is alert. Mental status is at baseline.      Sensory: No sensory deficit.      Motor: No abnormal muscle tone.         RESULTS   Results Review:    Results from last 7 days   Lab Units 10/03/20  0744 10/02/20  0640 10/01/20  0718   "   SODIUM mmol/L 140 140 141   POTASSIUM mmol/L 3.9 3.4* 4.6   CHLORIDE mmol/L 97* 99 102   CO2 mmol/L 28.0 29.0 24.0   BUN mg/dL 50* 48* 50*   CREATININE mg/dL 2.81* 2.63* 2.47*   CALCIUM mg/dL 9.4 9.2 8.9   GLUCOSE mg/dL 79 109* 115*       Estimated Creatinine Clearance: 24.8 mL/min (A) (by C-G formula based on SCr of 2.81 mg/dL (H)).    Results from last 7 days   Lab Units 09/30/20  0710 09/29/20  0549   MAGNESIUM mg/dL 1.7 1.9   PHOSPHORUS mg/dL 2.8 2.6             Results from last 7 days   Lab Units 10/03/20  0603 09/29/20  0549   WBC 10*3/mm3 7.64 5.67   HEMOGLOBIN g/dL 11.9* 10.6*   PLATELETS 10*3/mm3 162 252       Results from last 7 days   Lab Units 10/03/20  0744 10/02/20  0640 10/01/20  0718 09/30/20  0710 09/29/20  0549   INR  1.70* 1.78* 1.90* 1.90* 1.88*         Imaging Results (Last 24 Hours)     Procedure Component Value Units Date/Time    XR Chest 1 View [[id number]] Collected: 10/03/20 1610     Updated: 10/03/20 1706    Narrative:        PORTABLE CHEST    HISTORY: Follow-up congestive heart failure. Pneumonia.  Hypoxemia.    Portable AP upright film of the chest was obtained at 4:07 PM.  COMPARISON: September 29, 2020    FINDINGS:   EKG leads.  Decreasing small left pleural effusion.  There may be atelectasis or infiltrate at the left lung base.  The heart is not enlarged.  The pulmonary vasculature is not increased.  No pleural effusion.  No pneumothorax.  No acute osseous abnormality.      Impression:      CONCLUSION:  Decreasing small left pleural effusion.  There may be atelectasis or infiltrate at the left lung base.  The heart is not enlarged.  The pulmonary vasculature is not increased.    [id number]    Electronically signed by:  Chris Srinivasan MD  10/3/2020 5:05 PM CDT  Workstation: [id number]           MEDICATIONS    albumin human, 25 g, Intravenous, Q12H  amiodarone, 200 mg, Oral, Q24H  aspirin, 81 mg, Oral, Daily  atorvastatin, 40 mg, Oral, Nightly  carvedilol, 6.25 mg, Oral, BID With  Meals  furosemide, 40 mg, Intravenous, Q12H  hydrALAZINE, 75 mg, Oral, Q8H  isosorbide mononitrate, 30 mg, Oral, Q24H  magic butt ointment, , Topical, BID  metOLazone, 2.5 mg, Oral, Daily  pantoprazole, 40 mg, Oral, QAM  sodium chloride, 10 mL, Intravenous, Q12H  warfarin, 4 mg, Oral, Daily      Pharmacy to dose warfarin,         Assessment/Plan   ASSESSMENT / PLAN      Pneumonia of both lower lobes due to infectious organism    1.  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 good UO with iv lasix and albumin. Lost significant weight 181 lbs. Cr slightly high and appears euvolemic now.     Change lasix to po and metolazone every other day     2.  Hypertension- poorly controlled, hydralazine increased to 75 mg 3 times daily and carvedilol to 12.5 mg twice daily  HR is low and observe. bp overall better.      3.  CHF- systolic heart failure EF 20%. severe LV systolic dysfunction with RV systolic pressure of 60 mmHg, cardiology following. cxr bilateral infiltrates vs edema     4.  A. Fib on amiodarone and coumain     5.  Pneumonia- on antibiotics per primary team     6.  UTI- on antibiotics per primary team    7. Altered mental status now alert, speech is evaluating and tolerating po well                  This document has been electronically signed by Marley Akhtar MD on October 4, 2020 09:53 CDT

## 2020-10-04 NOTE — PLAN OF CARE
Goal Outcome Evaluation:  Plan of Care Reviewed With: spouse  Progress: improving  Outcome Summary: vss, pt has taken his meds today and has eaten at least 50% of each meal, worked with therapy and sat edge of bed, anticipate discharge tomorrow, will continue to monitor

## 2020-10-04 NOTE — PROGRESS NOTES
[facility] Medicine Services  INPATIENT PROGRESS NOTE    Length of Stay: 10  Date of Admission: 9/23/2020  Primary Care Physician: Wendie Quiñonez APRN    Subjective   Chief Complaint: No new complaints.    HPI: Patient is seen for follow-up.  He is doing well, slightly deconditioned and has been tolerating recommended diet.  Bilateral pedal edema has resolved.      Review of Systems   Constitutional: Positive for activity change and fatigue. Negative for appetite change, chills, diaphoresis and fever.   HENT: Negative for trouble swallowing and voice change.    Eyes: Positive for visual disturbance. Negative for photophobia.   Respiratory: Negative for cough, choking, chest tightness, shortness of breath, wheezing and stridor.    Cardiovascular: Negative for chest pain, palpitations and leg swelling.   Gastrointestinal: Negative for abdominal distention, abdominal pain, blood in stool, constipation, diarrhea, nausea and vomiting.   Endocrine: Negative for cold intolerance, heat intolerance, polydipsia, polyphagia and polyuria.   Genitourinary: Negative for decreased urine volume, difficulty urinating, dysuria, enuresis, flank pain, frequency, hematuria and urgency.   Musculoskeletal: Negative for arthralgias, gait problem, myalgias, neck pain and neck stiffness.   Skin: Negative for pallor, rash and wound.   Neurological: Negative for dizziness, tremors, seizures, syncope, facial asymmetry, speech difficulty, weakness, light-headedness, numbness and headaches.   Hematological: Does not bruise/bleed easily.   Psychiatric/Behavioral: Negative for agitation, behavioral problems and confusion.       Objective    Temp:  [97.7 °F (36.5 °C)-98.4 °F (36.9 °C)] 98.4 °F (36.9 °C)  Heart Rate:  [65-81] 70  Resp:  [18] 18  BP: (148-160)/(64-77) 159/69    Physical Exam  Vitals signs and nursing note reviewed.   Constitutional:       General: He is not in acute distress.      Appearance: He is well-developed. He is not diaphoretic.   HENT:      Head: Normocephalic and atraumatic.   Eyes:      General: No scleral icterus.     Pupils: Pupils are equal, round, and reactive to light.      Comments: Patient is legally blind.   Neck:      Musculoskeletal: Normal range of motion and neck supple.      Thyroid: No thyromegaly.      Vascular: No JVD.   Cardiovascular:      Rate and Rhythm: Normal rate and regular rhythm.      Heart sounds: Normal heart sounds. No murmur. No friction rub. No gallop.    Pulmonary:      Effort: Pulmonary effort is normal.      Breath sounds: Normal breath sounds. No wheezing or rales.   Chest:      Chest wall: No tenderness.   Abdominal:      General: Bowel sounds are normal. There is no distension.      Palpations: Abdomen is soft. There is no mass.      Tenderness: There is no abdominal tenderness. There is no guarding or rebound.   Musculoskeletal:         General: No tenderness or deformity.      Right lower leg: No edema.      Left lower leg: No edema.      Comments: Bilateral pedal edema is much improved.   Skin:     General: Skin is warm and dry.      Coloration: Skin is not pale.      Findings: No erythema or rash.   Neurological:      General: No focal deficit present.      Mental Status: He is alert and oriented to person, place, and time. Mental status is at baseline.      Cranial Nerves: No cranial nerve deficit.      Sensory: No sensory deficit.      Motor: No abnormal muscle tone.      Coordination: Coordination normal.      Deep Tendon Reflexes: Reflexes normal.   Psychiatric:         Behavior: Behavior normal.         Thought Content: Thought content normal.         Judgment: Judgment normal.           Medication Review:    Current Facility-Administered Medications:   •  acetaminophen (TYLENOL) tablet 650 mg, 650 mg, Oral, Q6H PRN, Samira Rodrigues MD, 650 mg at 09/25/20 0007  •  albumin human 25 % IV SOLN 25 g, 25 g,  Intravenous, Q12H, Colt Neville, APRN, 25 g at 10/04/20 0408  •  amiodarone (PACERONE) tablet 200 mg, 200 mg, Oral, Q24H, Molly Gr, APRN, 200 mg at 10/04/20 0945  •  aspirin EC tablet 81 mg, 81 mg, Oral, Daily, Samira Rodrigues MD, 81 mg at 10/04/20 0945  •  atorvastatin (LIPITOR) tablet 40 mg, 40 mg, Oral, Nightly, Samira Rodrigues MD, 40 mg at 10/03/20 2021  •  bisacodyl (DULCOLAX) EC tablet 5 mg, 5 mg, Oral, Daily PRN, Samira Rodrigues MD  •  carvedilol (COREG) tablet 6.25 mg, 6.25 mg, Oral, BID With Meals, Pedro Garcia MD, 6.25 mg at 10/04/20 0945  •  furosemide (LASIX) injection 40 mg, 40 mg, Intravenous, Q12H, Colt Neville, APRN, 40 mg at 10/04/20 0412  •  hydrALAZINE (APRESOLINE) tablet 75 mg, 75 mg, Oral, Q8H, Pedro Garcia MD, 75 mg at 10/04/20 0517  •  ipratropium-albuterol (DUO-NEB) nebulizer solution 3 mL, 3 mL, Nebulization, Q4H PRN, Samira Rodrigues MD  •  isosorbide mononitrate (IMDUR) 24 hr tablet 30 mg, 30 mg, Oral, Q24H, Samira Rodrigues MD, 30 mg at 10/04/20 0946  •  magic butt ointment, , Topical, BID, Samira Rodrigues MD  •  metOLazone (ZAROXOLYN) tablet 2.5 mg, 2.5 mg, Oral, Daily, Marley Akhtar MD, 2.5 mg at 10/04/20 0945  •  ondansetron (ZOFRAN) tablet 4 mg, 4 mg, Oral, Q6H PRN **OR** ondansetron (ZOFRAN) injection 4 mg, 4 mg, Intravenous, Q6H PRN, Samira Rodrigues MD, 4 mg at 10/01/20 1824  •  pantoprazole (PROTONIX) EC tablet 40 mg, 40 mg, Oral, QAM, Samira Rodrigues MD, 40 mg at 10/04/20 0606  •  Pharmacy to dose warfarin, , Does not apply, Continuous PRN, Samira Rodrigues MD  •  sodium chloride 0.9 % flush 10 mL, 10 mL, Intravenous, PRN, Tree Waggoner, DO  •  sodium chloride 0.9 % flush 10 mL, 10 mL, Intravenous, Q12H, Samira Rodrigues MD, 10  mL at 10/04/20 0949  •  sodium chloride 0.9 % flush 10 mL, 10 mL, Intravenous, PRN, Samira Rodrigues MD  •  warfarin (COUMADIN) tablet 4 mg, 4 mg, Oral, Daily, Samira Rodrigues MD    Results Review:  I have reviewed the labs, radiology results, and diagnostic studies.    Laboratory Data:   Results from last 7 days   Lab Units 10/03/20  0744 10/02/20  0640 10/01/20  0718   SODIUM mmol/L 140 140 141   POTASSIUM mmol/L 3.9 3.4* 4.6   CHLORIDE mmol/L 97* 99 102   CO2 mmol/L 28.0 29.0 24.0   BUN mg/dL 50* 48* 50*   CREATININE mg/dL 2.81* 2.63* 2.47*   GLUCOSE mg/dL 79 109* 115*   CALCIUM mg/dL 9.4 9.2 8.9   ANION GAP mmol/L 15.0 12.0 15.0     Estimated Creatinine Clearance: 24.8 mL/min (A) (by C-G formula based on SCr of 2.81 mg/dL (H)).  Results from last 7 days   Lab Units 09/30/20  0710 09/29/20  0549   MAGNESIUM mg/dL 1.7 1.9   PHOSPHORUS mg/dL 2.8 2.6         Results from last 7 days   Lab Units 10/03/20  0603 09/29/20  0549   WBC 10*3/mm3 7.64 5.67   HEMOGLOBIN g/dL 11.9* 10.6*   HEMATOCRIT % 35.4* 33.5*   PLATELETS 10*3/mm3 162 252     Results from last 7 days   Lab Units 10/04/20  0851 10/03/20  0744 10/02/20  0640 10/01/20  0718 09/30/20  0710   INR  1.60* 1.70* 1.78* 1.90* 1.90*       Culture Data:   No results found for: BLOODCX  No results found for: URINECX  No results found for: RESPCX  No results found for: WOUNDCX  No results found for: STOOLCX  No components found for: BODYFLD    Radiology Data:   Imaging Results (Last 24 Hours)     Procedure Component Value Units Date/Time    XR Chest 1 View [[id number]] Collected: 10/03/20 1610     Updated: 10/03/20 1706    Narrative:        PORTABLE CHEST    HISTORY: Follow-up congestive heart failure. Pneumonia.  Hypoxemia.    Portable AP upright film of the chest was obtained at 4:07 PM.  COMPARISON: September 29, 2020    FINDINGS:   EKG leads.  Decreasing small left pleural effusion.  There may be atelectasis or infiltrate  at the left lung base.  The heart is not enlarged.  The pulmonary vasculature is not increased.  No pleural effusion.  No pneumothorax.  No acute osseous abnormality.      Impression:      CONCLUSION:  Decreasing small left pleural effusion.  There may be atelectasis or infiltrate at the left lung base.  The heart is not enlarged.  The pulmonary vasculature is not increased.    [id number]    Electronically signed by:  Chris Srinivasan MD  10/3/2020 5:05 PM CDT  Workstation: [id number]          I have reviewed the patient's current medications.     Assessment/Plan     Hospital Problem List:  Active Problems:    Pneumonia of both lower lobes due to infectious organism    Acute respiratory failure with hypoxia secondary to Pneumonia/ CHF decompensation: Resolved.  Patient is maintaining O2 saturation in the upper 90s on room air and has completed a course of antimicrobial therapy. Blood culture showed no growth.    Heart failure with decreased ejection fraction decompensation (secondary to ischemic cardiomyopathy): Resolved this patient is down to his dry weight and follow-up chest x-ray done 10/3/2020 was essentially unremarkable. Echocardiogram done 8/21/2020 showed severe global hypokinesis with an ejection fraction of 20%.  Continue maintenance diuretics, daily weights, salt and fluid restriction. Inputs by nephrologist/cardiologist is appreciated.     Chronic kidney disease stage III/4: Patient's baseline creatinine reportedly between 1.9-2.0. Creatinine is 2.81 (was 2.70 on admission) and BMP for this a.m. is pending.  Continue to monitor renal function and avoid nephrotoxins.  Input by nephrologist is appreciated.     History of atrial flutter: Patient is currently in normal sinus rhythm.  Continue amiodarone, Coreg and anticoagulation with warfarin.  INR is 1.60.  Patient reportedly has not been compliant with taking prescribed warfarin.    Hypertension: Stable.  Continue Coreg and hydralazine.  Continue to make  adjustments as needed for optimal blood pressure control.    Diabetes mellitus: Stable.  Continue anti-glycemic with Accu-Cheks and sliding scale insulin.    Acute cystitis: He has completed a course of antimicrobial therapy.  Repeat urine culture showed no growth.            History of GERD: Continue PPI.    Continue DVT prophylaxis with warfarin.    Nutrition: Continue diet as recommended by speech-language pathologist.    Deconditioning: Continue PT and OT.    Discharge Planning: Likely discharge to SNF/ home in am.    Pedro Garcia MD   10/04/20   09:56 CDT

## 2020-10-04 NOTE — PLAN OF CARE
Problem: Adult Inpatient Plan of Care  Goal: Plan of Care Review  Outcome: Ongoing, Progressing  Flowsheets  Taken 10/4/2020 1139  Plan of Care Reviewed With: patient  Outcome Summary: OT Eval completed on this date as co-eval with PT. Pt pleasant and agreeable to therapy. Bed Mobility: Max A x 2 to get EOB - unable to maintain static sitting balance requiring Max A to prevent posterior lean when sitting EOB. Toileting: Dependent Feeding: Max A. Pt limited by vision, decreased activity tolerance, decreased strength, decreased balance, decreased ROM, and decreased safety awareness. Pt RUE Shoulder flexion impaired ~70-80* shoulder flexion - but WFL PROM. Pt would benefit from continued skilled OT to address functional deficits. Recommend d/c to SNF, unless spouse is able to care for him at this amount of assistance at home. If spouse is able to do so pt would need home with HHOT and 24/7 supervision/assist.  Taken 10/4/2020 5411  Plan of Care Reviewed With: patient

## 2020-10-04 NOTE — PLAN OF CARE
Problem: Adult Inpatient Plan of Care  Goal: Plan of Care Review  Outcome: Ongoing, Progressing  Flowsheets (Taken 10/4/2020 0940)  Progress: improving  Plan of Care Reviewed With: patient  Outcome Summary:   Pt seen for skilled ST services this date to address oral dysphagia.  Pt continues to present w/eyes closed during entire session   however, he does responds and did not use any verbal obscenities this date when being repositioned.  Pt consumed puree solids and thin liquids via straw w/no difficulty and no overt s/s of aspiration.  Pt required full feeding assist.  Pt consumed mech soft solids/mixed consistencies w/increased oral prep time d/t mastication and AP transit   however, he was noted to use lingual sweep to clear oral cavity.  SLP recommends continued diet and repeat of mech soft trials next date w/possible diet advancement.  SLP attempted to educate pt   however, no evidence of learning noted d/t cognitive deficits.

## 2020-10-04 NOTE — THERAPY EVALUATION
Patient Name: Ondina Alanis Sr.  : 1940    MRN: [id number]                              Today's Date: 10/4/2020       Admit Date: 2020    Visit Dx:     ICD-10-CM ICD-9-CM   1. Pneumonia of both lower lobes due to infectious organism  J18.9 486   2. Hypoxia  R09.02 799.02   3. Elevated troponin  R79.89 790.6   4. Congestive heart failure, unspecified HF chronicity, unspecified heart failure type (CMS/[facility])  I50.9 428.0   5. Dysphagia, unspecified type  R13.10 787.20   6. Impaired mobility and activities of daily living  Z74.09 V49.89    Z78.9    7. Impaired physical mobility  Z74.09 781.99     Patient Active Problem List   Diagnosis   • CKD (chronic kidney disease) stage 4, GFR 15-29 ml/min (CMS/[facility])   • Diabetic peripheral neuropathy associated with type 2 diabetes mellitus (CMS/[facility])   • Diabetes mellitus type II, uncontrolled (CMS/[facility])   • GERD (gastroesophageal reflux disease)   • Primary osteoarthritis of both knees   • Pulmonary embolism (CMS/[facility])   • BPH (benign prostatic hyperplasia)   • Benign essential hypertension   • Asthma   • Pleurisy   • Acute respiratory failure with hypoxia (CMS/[facility])   • Stage 1 acute kidney injury (CMS/[facility])   • Left ventricular diastolic dysfunction   • Exacerbation of asthma   • COPD exacerbation (CMS/[facility])   • Atrial flutter (CMS/[facility])   • Acute systolic CHF (congestive heart failure) (CMS/[facility])   • Pneumonia of both lower lobes due to infectious organism     Past Medical History:   Diagnosis Date   • Asthma    • Diabetes mellitus (CMS/[facility])    • GERD (gastroesophageal reflux disease)    • Hypertension    • Prostate disorder      Past Surgical History:   Procedure Laterality Date   • BACK SURGERY     • KNEE SURGERY Left    • PROSTATE SURGERY       General Information     Row Name 10/04/20 0944          Physical Therapy Time and Intention    Document Type  evaluation  -CZ     Mode of Treatment  co-treatment;physical therapy  -CZ     Row Name 10/04/20 0944           General Information    Patient Profile Reviewed  yes  -CZ     Prior Level of Function  min assist:;transfer;w/c or scooter;bed mobility;gait  -CZ     Barriers to Rehab  previous functional deficit;cognitive status;medically complex;visual deficit  -CZ     Row Name 10/04/20 0944          Living Environment    Lives With  spouse  -     Row Name 10/04/20 0944          Stairs Within Home, Primary    Stairs, Within Home, Primary  Only able to enter home via stretcher.  -CZ     Number of Stairs, Within Home, Primary  none  -CZ     Row Name 10/04/20 0944          Cognition    Orientation Status (Cognition)  oriented to;person;place  -CZ     Row Name 10/04/20 0944          Safety Issues, Functional Mobility    Impairments Affecting Function (Mobility)  cognition;endurance/activity tolerance;strength  -CZ     Cognitive Impairments, Mobility Safety/Performance  insight into deficits/self-awareness;judgment;problem-solving/reasoning  -CZ       User Key  (r) = Recorded By, (t) = Taken By, (c) = Cosigned By    Initials Name Provider Type    CZ Surya Rodriguez, PT Physical Therapist        Mobility     Row Name 10/04/20 0945          Bed Mobility    Bed Mobility  sit-supine;supine-sit  -CZ     Supine-Sit Cole (Bed Mobility)  maximum assist (25% patient effort);2 person assist  -CZ     Sit-Supine Cole (Bed Mobility)  maximum assist (25% patient effort);2 person assist  -CZ     Comment (Bed Mobility)  Very stiff, poor ability to flex hips sufficiently to sit up at EOB.  -     Row Name 10/04/20 0945          Transfers    Comment (Transfers)  Unable to sit sufficiently to attempt standing.  -CZ       User Key  (r) = Recorded By, (t) = Taken By, (c) = Cosigned By    Initials Name Provider Type    Surya Perales, PT Physical Therapist        Obj/Interventions     Row Name 10/04/20 0944          Range of Motion Comprehensive    General Range of Motion  other (see comments)  -CZ     Comment, General Range of  Motion  Very stiff hips and knees, difficult to assess; unable to flex hips and knees sufficiently to sit EOB.  -CZ     Row Name 10/04/20 0944          Strength Comprehensive (MMT)    General Manual Muscle Testing (MMT) Assessment  other (see comments)  -CZ     Comment, General Manual Muscle Testing (MMT) Assessment  BLEs: 2+/5 grossly.  -CZ       User Key  (r) = Recorded By, (t) = Taken By, (c) = Cosigned By    Initials Name Provider Type    CZ Surya Rodriguez, PT Physical Therapist        Goals/Plan     Row Name 10/04/20 0944          Bed Mobility Goal 1 (PT)    Activity/Assistive Device (Bed Mobility Goal 1, PT)  sit to supine/supine to sit;rolling to right;rolling to left  -CZ     [place] Level/Cues Needed (Bed Mobility Goal 1, PT)  minimum assist (75% or more patient effort)  -CZ     Time Frame (Bed Mobility Goal 1, PT)  long term goal (LTG);by discharge  -CZ     Strategies/Barriers (Bed Mobility Goal 1, PT)  Poor ability to maneuver in bed.  -CZ     Progress/Outcomes (Bed Mobility Goal 1, PT)  goal not met  -CZ     Row Name 10/04/20 0944          ROM Goal 1 (PT)    ROM Goal 1 (PT)  Patient will tolerate AAROM and stretching to BLEs, to improve functional mobility.  -CZ     Time Frame (ROM Goal 1, PT)  long-term goal (LTG);by discharge  -CZ     Strategies/Barriers (ROM Goal 1, PT)  Fatigues easily, very debilitated.  -CZ     Progress/Outcome (ROM Goal 1, PT)  goal not met  -CZ     Row Name 10/04/20 0944          Patient Education Goal (PT)    Activity (Patient Education Goal, PT)  Patient will sit EOB with min Ax1, x 5 min with BUE support.  -CZ     Time Frame (Patient Education Goal, PT)  long term goal (LTG);by discharge  -CZ     Barriers (Patient Education Goal, PT)  Decreased hip and knee flexion, poor seated balance.  -CZ     Progress/Outcome (Patient Education Goal, PT)  goal not met  -CZ       User Key  (r) = Recorded By, (t) = Taken By, (c) = Cosigned By    Initials Name Provider Type    CZ  Surya Rodriguez, PT Physical Therapist        Clinical Impression     Row Name 10/04/20 1016 10/04/20 0944       Pain    Additional Documentation  Pain Scale: Numbers Pre/Post-Treatment (Group)  -CZ  Pain Scale: Numbers Pre/Post-Treatment (Group)  -CZ    Row Name 10/04/20 1016 10/04/20 0944       Pain Scale: Numbers Pre/Post-Treatment    Pretreatment Pain Rating  0/10 - no pain  -CZ  0/10 - no pain  -CZ    Posttreatment Pain Rating  0/10 - no pain  -CZ  0/10 - no pain  -CZ    Row Name 10/04/20 0944          Plan of Care Review    Plan of Care Reviewed With  patient  -CZ     Row Name 10/04/20 0944          Therapy Assessment/Plan (PT)    Rehab Potential (PT)  fair, will monitor progress closely  -CZ     Criteria for Skilled Interventions Met (PT)  yes;skilled treatment is necessary  -CZ     Predicted Duration of Therapy Intervention (PT)  2-4 days plus SNF for rehab.  -CZ     Row Name 10/04/20 0944          Vital Signs    Post Systolic BP Rehab  227  -CZ     Post Treatment Diastolic BP  85 DinaMap, difficulty reading; nurse notified.  -CZ     Pretreatment Heart Rate (beats/min)  72  -CZ     Posttreatment Heart Rate (beats/min)  74  -CZ     Pre SpO2 (%)  98  -CZ     O2 Delivery Pre Treatment  room air  -CZ     Post SpO2 (%)  98  -CZ     O2 Delivery Post Treatment  room air  -CZ     Pre Patient Position  Supine  -CZ     Post Patient Position  Supine  -CZ     Row Name 10/04/20 0944          Positioning and Restraints    Pre-Treatment Position  in bed  -CZ     Post Treatment Position  bed  -CZ     In Bed  supine;call light within reach;encouraged to call for assist;exit alarm on;legs elevated  -CZ       User Key  (r) = Recorded By, (t) = Taken By, (c) = Cosigned By    Initials Name Provider Type    CZ Surya Rodriguez, PT Physical Therapist        Outcome Measures     Row Name 10/04/20 0944          How much help from another person do you currently need...    Turning from your back to your side while in flat bed  without using bedrails?  2  -CZ     Moving from lying on back to sitting on the side of a flat bed without bedrails?  2  -CZ     Moving to and from a bed to a chair (including a wheelchair)?  1  -CZ     Standing up from a chair using your arms (e.g., wheelchair, bedside chair)?  1  -CZ     Climbing 3-5 steps with a railing?  1  -CZ     To walk in hospital room?  1  -CZ     AM-PAC 6 Clicks Score (PT)  8  -CZ     Row Name 10/04/20 0944          Functional Assessment    Outcome Measure Options  AM-PAC 6 Clicks Basic Mobility (PT)  -       User Key  (r) = Recorded By, (t) = Taken By, (c) = Cosigned By    Initials Name Provider Type    CZ Surya Rodriguez, PT Physical Therapist        Physical Therapy Education                 Title: PT OT SLP Therapies (In Progress)     Topic: Physical Therapy (In Progress)     Point: Mobility training (Done)     Learning Progress Summary           Patient Acceptance, E, VU,NR by  at 10/4/2020 1244    Comment: Educated on rolling technique, sit<-->supine technique; discussed PT POC and goals.                   Point: Home exercise program (Not Started)     Learner Progress:  Not documented in this visit.          Point: Body mechanics (Not Started)     Learner Progress:  Not documented in this visit.          Point: Precautions (Not Started)     Learner Progress:  Not documented in this visit.                      User Key     Initials Effective Dates Name Provider Type [facility] 04/03/18 -  Surya Rodriguez, PT Physical Therapist PT              PT Recommendation and Plan  Planned Therapy Interventions (PT): balance training, bed mobility training, home exercise program, patient/family education, ROM (range of motion), strengthening, stretching, transfer training  Plan of Care Reviewed With: patient  Outcome Summary: Initial PT evaluation complete; co-evaluation with OT.  Patient is fairly alert, cooperative.  He requires max Ax2 with bed mobility, has difficulty initiating  movement and fatigues quite easily.  Sitting EOB very limited by poor hip flexion, poor trunk control and severe posterior lean; unable to attempt standing. Patient would benefit from SNF placement for rehab prior to dishcarge home with spouse.  Goals established, continue skilled PT.     Time Calculation:   PT Charges     Row Name 10/04/20 1249             Time Calculation    Start Time  0945  -CZ      Stop Time  1023  -CZ      Time Calculation (min)  38 min  -CZ      PT Received On  10/04/20  -CZ      PT Goal Re-Cert Due Date  10/17/20  -CZ        User Key  (r) = Recorded By, (t) = Taken By, (c) = Cosigned By    Initials Name Provider Type    CZ Surya Rodriguez, PT Physical Therapist        Therapy Charges for Today     Code Description Service Date Service Provider Modifiers Qty    53032022337 HC PT EVAL MOD COMPLEXITY 3 10/4/2020 Surya Rodriguez, PT GP 1          PT G-Codes  Outcome Measure Options: AM-PAC 6 Clicks Basic Mobility (PT)  AM-PAC 6 Clicks Score (PT): 8  AM-PAC 6 Clicks Score (OT): 15    Surya Rodriguez PT  10/4/2020

## 2020-10-04 NOTE — PLAN OF CARE
Goal Outcome Evaluation:  Plan of Care Reviewed With: patient  Progress: no change  Outcome Summary: pt resting between care. took night time meds. no acute events overnight. will continue to monitor pt

## 2020-10-04 NOTE — THERAPY TREATMENT NOTE
Acute Care - Speech Language Pathology   Swallow Treatment Note [facility]     Patient Name: Ondina Alanis Sr.  : 1940  MRN: [id number]  Today's Date: 10/4/2020               Admit Date: 2020     Goal:  Patient will safely tolerate least restricted diet w/no overt s/s of aspiration for adequate nutrition and hydration:   Pt seen for skilled ST services this date to address oral dysphagia.  Pt continues to present w/eyes closed during entire session; however, he does responds and did not use any verbal obscenities this date when being repositioned.  Pt consumed puree solids and thin liquids via straw w/no difficulty and no overt s/s of aspiration.  Pt required full feeding assist.  Pt consumed mech soft solids/mixed consistencies w/increased oral prep time d/t mastication and AP transit; however, he was noted to use lingual sweep to clear oral cavity.  SLP recommends continued diet and repeat of mech soft trials next date w/possible diet advancement.  SLP attempted to educate pt; however, no evidence of learning noted d/t cognitive deficits.         Visit Dx:     ICD-10-CM ICD-9-CM   1. Pneumonia of both lower lobes due to infectious organism  J18.9 486   2. Hypoxia  R09.02 799.02   3. Elevated troponin  R79.89 790.6   4. Congestive heart failure, unspecified HF chronicity, unspecified heart failure type (CMS/HCC)  I50.9 428.0   5. Dysphagia, unspecified type  R13.10 787.20     Patient Active Problem List   Diagnosis   • CKD (chronic kidney disease) stage 4, GFR 15-29 ml/min (CMS/[facility])   • Diabetic peripheral neuropathy associated with type 2 diabetes mellitus (CMS/[facility])   • Diabetes mellitus type II, uncontrolled (CMS/HCC)   • GERD (gastroesophageal reflux disease)   • Primary osteoarthritis of both knees   • Pulmonary embolism (CMS/HCC)   • BPH (benign prostatic hyperplasia)   • Benign essential hypertension   • Asthma   • Pleurisy   • Acute respiratory failure with hypoxia (CMS/[facility])   •  Stage 1 acute kidney injury (CMS/HCC)   • Left ventricular diastolic dysfunction   • Exacerbation of asthma   • COPD exacerbation (CMS/[facility])   • Atrial flutter (CMS/HCC)   • Acute systolic CHF (congestive heart failure) (CMS/[facility])   • Pneumonia of both lower lobes due to infectious organism     Past Medical History:   Diagnosis Date   • Asthma    • Diabetes mellitus (CMS/HCC)    • GERD (gastroesophageal reflux disease)    • Hypertension    • Prostate disorder      Past Surgical History:   Procedure Laterality Date   • BACK SURGERY     • KNEE SURGERY Left    • PROSTATE SURGERY          SWALLOW EVALUATION (last 72 hours)      SLP Adult Swallow Evaluation     Row Name 10/04/20 0916 10/03/20 0946                Rehab Evaluation    Document Type  --  evaluation  -CK       Total Evaluation Minutes, SLP  28  -CK  25  -CK       Subjective Information  --  no complaints  -CK       Patient Observations  --  poorly cooperative  -CK       Patient/Family/Caregiver Comments/Observations  No family present at bedside  -CK  --       Patient Effort  --  fair  -CK          General Information    Patient Profile Reviewed  --  yes  -CK       Pertinent History Of Current Problem  Pt continues to leave eyes closed, but was less agitated today when repositioned and had no verbal obscenities.  -CK  Nsg note reported pt was refusing PO  -CK       Current Method of Nutrition  pureed;thin liquids  -CK  NPO  -CK       Precautions/Limitations, Vision  --  vision impairment, bilaterally legally blind  -CK       Precautions/Limitations, Hearing  --  WFL;for purposes of eval  -CK       Prior Level of Function-Communication  cognitive-linguistic impairment dementia  -CK  cognitive-linguistic impairment dementia  -CK       Prior Level of Function-Swallowing  --  unknown  -CK       Plans/Goals Discussed with  --  patient No evidence of learning  -CK       Barriers to Rehab  --  cognitive status;uncooperative  -CK       Patient's Goals for Discharge  --   patient did not state  -CK          Pain    Additional Documentation  --  Pain Scale: Numbers Pre/Post-Treatment (Group)  -CK          Pain Scale: Numbers Pre/Post-Treatment    Pretreatment Pain Rating  --  0/10 - no pain  -CK       Posttreatment Pain Rating  --  0/10 - no pain  -CK          Oral Motor Structure and Function    Dentition Assessment  --  natural, present and adequate  -CK       Secretion Management  --  WNL/WFL  -CK       Mucosal Quality  --  moist, healthy  -CK       Volitional Swallow  WFL  -CK  unable to elicit  -CK       Volitional Cough  weak  -CK  unable to elicit  -CK          General Eating/Swallowing Observations    Respiratory Support Currently in Use  room air  -CK  room air  -CK       Eating/Swallowing Skills  fed by SLP  -CK  fed by SLP  -CK       Positioning During Eating  upright 90 degree;upright in bed  -CK  upright 90 degree;upright in bed  -CK       Utensils Used  straw  -CK  straw  -CK       Consistencies Trialed  soft textures;mixed consistency;pureed;thin liquids  -CK  pureed;thin liquids  -CK          Clinical Swallow Eval    Oral Prep Phase  impaired  -CK  WFL for consistencies trialed  -CK       Oral Transit  impaired  -CK  WFL for consistencies trialed  -CK       Oral Residue  WFL  -CK  WFL for consistencies trialed  -CK       Pharyngeal Phase  no overt signs/symptoms of pharyngeal impairment  -CK  no overt signs/symptoms of pharyngeal impairment  -CK       Esophageal Phase  unremarkable  -CK  unremarkable  -CK          Oral Prep Concerns    Oral Prep Concerns  prolonged mastication  -CK  --       Prolonged Mastication  mechanical soft  -CK  --          Oral Transit Concerns    Oral Transit Concerns  increased oral transit time  -CK  --       Increased Oral Transit Time  mechanical soft  -CK  --          Clinical Impression    Daily Summary of Progress (SLP)  progress toward functional goals is good  -CK  --       Barriers to Overall Progress (SLP)  cognition  -CK   cognition; uncooperative  -CK       SLP Swallowing Diagnosis  mild;oral dysphagia;functional pharyngeal phase  -CK  functional oral phase;functional pharyngeal phase for consistencies trialed; unable to assess advanced solids  -CK       Functional Impact  risk of aspiration/pneumonia  -CK  no impact on function;other unknown functional impact for advanced solids  -CK       Rehab Potential/Prognosis, Swallowing  good, to achieve stated therapy goals  -CK  good, to achieve stated therapy goals  -CK       Swallow Criteria for Skilled Therapeutic Interventions Met  demonstrates skilled criteria  -CK  demonstrates skilled criteria  -CK       Plan for Continued Treatment (SLP)  Continue diet and POC  -CK  Initiate diet and POC; trial advanced solids as pt allows  -CK          Recommendations    Therapy Frequency (Swallow)  3 days per week;5 days per week  -CK  3 days per week;5 days per week  -CK       Predicted Duration Therapy Intervention (Days)  --  until discharge  -CK       SLP Diet Recommendation  puree;thin liquids  -CK  puree;thin liquids  -CK       Recommended Precautions and Strategies  upright posture during/after eating  -CK  upright posture during/after eating  -CK       SLP Rec. for Method of Medication Administration  meds crushed;with pudding or applesauce  -CK  meds crushed;with pudding or applesauce  -CK       Monitor for Signs of Aspiration  yes;notify SLP if any concerns  -CK  yes;notify SLP if any concerns  -CK       Anticipated Discharge Disposition (SLP)  --  unknown  -CK          Swallow Goals (SLP)    Oral Nutrition/Hydration Goal Selection (SLP)  oral nutrition/hydration, SLP goal 1  -CK  oral nutrition/hydration, SLP goal 1  -CK          Oral Nutrition/Hydration Goal 1 (SLP)    Oral Nutrition/Hydration Goal 1, SLP  Pt will safely tolerate least restricted diet w/no overt s/s of aspiration for adequate nutrition and hydration.  -CK  Pt will safely tolerate least restricted diet w/no overt s/s of  aspiration for adequate nutrition and hydration.  -CK       Time Frame (Oral Nutrition/Hydration Goal 1, SLP)  by discharge  -CK  by discharge  -CK       Barriers (Oral Nutrition/Hydration Goal 1, SLP)  cognition  -CK  cognition; uncooperative  -CK       Progress/Outcomes (Oral Nutrition/Hydration Goal 1, SLP)  goal ongoing  -CK  --         User Key  (r) = Recorded By, (t) = Taken By, (c) = Cosigned By    Initials Name Effective Dates    CK Zakia Wagner, MS CCC-SLP 02/11/20 -           EDUCATION  The patient has been educated in the following areas:   Dysphagia (Swallowing Impairment) Modified Diet Instruction.    SLP Recommendation and Plan  SLP Swallowing Diagnosis: mild, oral dysphagia, functional pharyngeal phase  SLP Diet Recommendation: puree, thin liquids  Recommended Precautions and Strategies: upright posture during/after eating  SLP Rec. for Method of Medication Administration: meds crushed, with pudding or applesauce     Monitor for Signs of Aspiration: yes, notify SLP if any concerns     Swallow Criteria for Skilled Therapeutic Interventions Met: demonstrates skilled criteria  Anticipated Discharge Disposition (SLP): unknown  Rehab Potential/Prognosis, Swallowing: good, to achieve stated therapy goals  Therapy Frequency (Swallow): 3 days per week, 5 days per week  Predicted Duration Therapy Intervention (Days): until discharge     Daily Summary of Progress (SLP): progress toward functional goals is good    Plan for Continued Treatment (SLP): Continue diet and POC              Plan of Care Reviewed With: patient  Progress: improving  Outcome Summary: Pt seen for skilled ST services this date to address oral dysphagia.  Pt continues to present w/eyes closed during entire session; however, he does responds and did not use any verbal obscenities this date when being repositioned.  Pt consumed puree solids and thin liquids via straw w/no difficulty and no overt s/s of aspiration.  Pt required full  feeding assist.  Pt consumed [facility] soft solids/mixed consistencies w/increased oral prep time d/t mastication and AP transit; however, he was noted to use lingual sweep to clear oral cavity.  SLP recommends continued diet and repeat of [facility] soft trials next date w/possible diet advancement.  SLP attempted to educate pt; however, no evidence of learning noted d/t cognitive deficits.    SLP GOALS     Row Name 10/04/20 0916 10/03/20 0946          Oral Nutrition/Hydration Goal 1 (SLP)    Oral Nutrition/Hydration Goal 1, SLP  Pt will safely tolerate least restricted diet w/no overt s/s of aspiration for adequate nutrition and hydration.  -CK  Pt will safely tolerate least restricted diet w/no overt s/s of aspiration for adequate nutrition and hydration.  -CK     Time Frame (Oral Nutrition/Hydration Goal 1, SLP)  by discharge  -CK  by discharge  -CK     Barriers (Oral Nutrition/Hydration Goal 1, SLP)  cognition  -CK  cognition; uncooperative  -CK     Progress/Outcomes (Oral Nutrition/Hydration Goal 1, SLP)  goal ongoing  -CK  --       User Key  (r) = Recorded By, (t) = Taken By, (c) = Cosigned By    Initials Name Provider Type    CK Zakia Wagner MS CCC-SLP Speech and Language Pathologist             Time Calculation:   Time Calculation- SLP     Row Name 10/04/20 0946             Time Calculation- SLP    SLP Start Time  0916  -CK      SLP Stop Time  0944  -CK      SLP Time Calculation (min)  28 min  -CK      Total Timed Code Minutes- SLP  28 minute(s)  -CK      SLP Received On  10/04/20  -CK      SLP Goal Re-Cert Due Date  10/17/20  -CK        User Key  (r) = Recorded By, (t) = Taken By, (c) = Cosigned By    Initials Name Provider Type    CK Zakia Wagner MS CCC-SLP Speech and Language Pathologist          Therapy Charges for Today     Code Description Service Date Service Provider Modifiers Qty    35959669535 HC ST EVAL ORAL PHARYNG SWALLOW 2 10/3/2020 Zakia Wagner MS CCC-SLP GN 1    [id number] HC ST  TREATMENT SWALLOW 2 10/4/2020 Zakia Wagner, MS RICH-SLP GN 1               Zakia Wagner MS CCC-SLP  10/4/2020

## 2020-10-04 NOTE — THERAPY EVALUATION
Acute Care - Occupational Therapy Initial Evaluation  [facility]     Patient Name: Ondina Alanis Sr.  : 1940  MRN: [id number]  Today's Date: 10/4/2020  Onset of Illness/Injury or Date of Surgery: 20  Date of Referral to OT: 10/01/20       Admit Date: 2020       ICD-10-CM ICD-9-CM   1. Pneumonia of both lower lobes due to infectious organism  J18.9 486   2. Hypoxia  R09.02 799.02   3. Elevated troponin  R79.89 790.6   4. Congestive heart failure, unspecified HF chronicity, unspecified heart failure type (CMS/HCC)  I50.9 428.0   5. Dysphagia, unspecified type  R13.10 787.20   6. Impaired mobility and activities of daily living  Z74.09 V49.89    Z78.9      Patient Active Problem List   Diagnosis   • CKD (chronic kidney disease) stage 4, GFR 15-29 ml/min (CMS/[facility])   • Diabetic peripheral neuropathy associated with type 2 diabetes mellitus (CMS/[facility])   • Diabetes mellitus type II, uncontrolled (CMS/[facility])   • GERD (gastroesophageal reflux disease)   • Primary osteoarthritis of both knees   • Pulmonary embolism (CMS/[facility])   • BPH (benign prostatic hyperplasia)   • Benign essential hypertension   • Asthma   • Pleurisy   • Acute respiratory failure with hypoxia (CMS/[facility])   • Stage 1 acute kidney injury (CMS/[facility])   • Left ventricular diastolic dysfunction   • Exacerbation of asthma   • COPD exacerbation (CMS/[facility])   • Atrial flutter (CMS/[facility])   • Acute systolic CHF (congestive heart failure) (CMS/[facility])   • Pneumonia of both lower lobes due to infectious organism     Past Medical History:   Diagnosis Date   • Asthma    • Diabetes mellitus (CMS/[facility])    • GERD (gastroesophageal reflux disease)    • Hypertension    • Prostate disorder      Past Surgical History:   Procedure Laterality Date   • BACK SURGERY     • KNEE SURGERY Left    • PROSTATE SURGERY            OT ASSESSMENT FLOWSHEET (last 12 hours)      OT Evaluation and Treatment     Row Name 10/04/20 0925                   OT Time and Intention     Subjective Information  no complaints  -        Document Type  evaluation  -        Mode of Treatment  co-treatment;occupational therapy;physical therapy  -        Patient Effort  fair  -           General Information    Patient Profile Reviewed  yes  -        Onset of Illness/Injury or Date of Surgery  09/23/20  [facility]        Patient/Family/Caregiver Comments/Observations  None present  -        General Observations of Patient  Supine in bed  -        Equipment Currently Used at Home  wheelchair;walker, rolling;cane, quad;cane, straight;power chair, (recliner lift);commode, bedside  -        Limitations/Impairments  visual  -        Risks Reviewed  patient:;LOB;nausea/vomiting;dizziness;increased discomfort;change in vital signs;increased drainage;lines disloged  -        Benefits Reviewed  patient:;improve function;increase independence;increase strength;increase balance;decrease pain;decrease risk of DVT;improve skin integrity;increase knowledge  -           Living Environment    Current Living Arrangements  home/apartment/condo  -        Home Accessibility  stairs to enter home  -        Lives With  spouse  -           Sensory    Additional Documentation  Hearing Assessment (Group);Vision Assessment/Intervention (Group);Sensory Assessment (Somatosensory) (Group)  -           Hearing Assessment    Hearing Status  hearing impairment, bilaterally  -           Vision Assessment/Intervention    Visual Impairment/Limitations  legally blind  -           Sensory Assessment (Somatosensory)    Sensory Assessment (Somatosensory)  sensation intact  -           Cognition    [facility]        Orientation Status (Cognition)  oriented to;person;place  -        Follows Commands (Cognition)  follows one-step commands;over 90% accuracy  [facility]        Cognitive Function (Cognitive)  memory deficit;executive function deficit  -        Executive Function Deficit  (Cognition)  minimal deficit;moderate deficit  -        Memory Deficit (Cognitive)  minimal deficit;moderate deficit  -           Pain Assessment    Additional Documentation  Pain Scale: Numbers Pre/Post-Treatment (Group)  -           Pain Scale: Numbers Pre/Post-Treatment    Pretreatment Pain Rating  0/10 - no pain  -        Posttreatment Pain Rating  0/10 - no pain  -           Range of Motion (ROM)    Range of Motion  right upper extremity;other (see comments) R Shoulder flexion is limited to ~79-80*  -           Range of Motion Comprehensive    General Range of Motion  other (see comments) R Shoulder ROM Impaired  -           Strength (Manual Muscle Testing)    Strength (Manual Muscle Testing)  other (see comments) RUE Shoulder Flex 3-/5 Grossly, LUE 4-/5 Grossly  -           Strength Comprehensive (MMT)    General Manual Muscle Testing (MMT) Assessment  upper extremity strength deficits identified  -           Upper Extremity (Manual Muscle Testing)    Upper Extremity: Manual Muscle Testing (MMT)  left UE strength is WFL;right shoulder strength deficit  -           Right Shoulder (Manual Muscle Testing)    Right Shoulder Manual Muscle Testing (MMT)  flexion  -        MMT: Flexion, Right Shoulder  flexion  -        MMT, Gross Movement: Right Shoulder Flexion  (3-/5) fair minus  -           Bed Mobility    Bed Mobility  supine-sit;sit-supine  -        Supine-Sit [place] (Bed Mobility)  maximum assist (25% patient effort)  -        Sit-Supine [place] (Bed Mobility)  maximum assist (25% patient effort)  -           Transfer Assessment/Treatment    Comment (Transfers)  Unable to maintain static sitting balance with heavy posterior lean  -           Safety Issues, Functional Mobility    Impairments Affecting Function (Mobility)  balance;cognition;endurance/activity tolerance;strength;postural/trunk control;pain  -        Cognitive Impairments, Mobility Safety/Performance   insight into deficits/self-awareness;judgment;problem-solving/reasoning;safety precaution awareness;safety precaution follow-through;awareness, need for assistance  -           Activities of Daily Living    BADL Assessment/Intervention  toileting;feeding  -           Self-Feeding Assessment/Training    [place] Level (Feeding)  liquids to mouth;scoop food and bring to mouth;maximum assist (25% patient effort)  -        Position (Self-Feeding)  sitting up in bed  -           Toileting Assessment/Training    [place] Level (Toileting)  toileting skills;dependent (less than 25% patient effort)  -        Position (Toileting)  supine  -           BADL Safety/Performance    Impairments, BADL Safety/Performance  balance;cognition;endurance/activity tolerance;trunk/postural control;strength;pain  -           Plan of Care Review    Plan of Care Reviewed With  patient  -           Vital Signs    Post Systolic BP Rehab  227  -JH        Post Treatment Diastolic BP  85 dinamap - difficulty reading  -        Pretreatment Heart Rate (beats/min)  72  -        Posttreatment Heart Rate (beats/min)  74  -        Pre SpO2 (%)  98  -        O2 Delivery Pre Treatment  room air  -        Post SpO2 (%)  98  -        O2 Delivery Post Treatment  room air  -        Pre Patient Position  Supine  -           OT Goals    Bed Mobility Goal Selection (OT)  bed mobility, OT goal 1  -        Grooming Goal Selection (OT)  grooming, OT goal 1  -        Self-Feeding Goal Selection (OT)  self-feeding, OT goal 1  -        ROM Goal Selection (OT)  ROM, OT goal 1  -        Activity Tolerance Goal Selection (OT)  activity tolerance, OT goal 1  -           Bed Mobility Goal 1 (OT)    Activity/Assistive Device (Bed Mobility Goal 1, OT)  sit to supine/supine to sit  -        [place] Level/Cues Needed (Bed Mobility Goal 1, OT)  minimum assist (75% or more patient effort);tactile cues required;verbal cues  required  -        Time Frame (Bed Mobility Goal 1, OT)  long term goal (LTG);by discharge  -        Progress/Outcomes (Bed Mobility Goal 1, OT)  goal not met  -           Grooming Goal 1 (OT)    Activity/Device (Grooming Goal 1, OT)  grooming skills, all  -JH        [place] (Grooming Goal 1, OT)  minimum assist (75% or more patient effort);verbal cues required;tactile cues required  -JH        Time Frame (Grooming Goal 1, OT)  long term goal (LTG);by discharge  -        Progress/Outcome (Grooming Goal 1, OT)  goal not met  -           Self-Feeding Goal 1 (OT)    Activity/Device (Self-Feeding Goal 1, OT)  self-feeding skills, all  -        [place] Level/Cues Needed (Self-Feeding Goal 1, OT)  minimum assist (75% or more patient effort);verbal cues required;tactile cues required  -        Time Frame (Self-Feeding Goal 1, OT)  long term goal (LTG);by discharge  -        Progress/Outcomes (Self-Feeding Goal 1, OT)  goal not met  -           ROM Goal 1 (OT)    ROM Goal 1 (OT)  Pt to tolerate PROM to RUE 2 x 15   -JH        Time Frame (ROM Goal 1, OT)  long term goal (LTG);by discharge  -        Progress/Outcome (ROM Goal 1, OT)  goal not met  -            Activity Tolerance Goal 1 (OT)    Activity Level (Endurance Goal 1, OT)  -- 15 min functional activity with proper EC  -JH        Time Frame (Activity Tolerance Goal 1, OT)  long term goal (LTG);by discharge  -        Progress/Outcome (Activity Tolerance Goal 1, OT)  goal not met  -           Positioning and Restraints    Pre-Treatment Position  in bed  -        Post Treatment Position  bed  -        In Bed  supine;call light within reach;encouraged to call for assist;exit alarm on;side rails up x2;notified [facility]           Therapy Assessment/Plan (OT)    Date of Referral to OT  10/01/20  -        OT Diagnosis  Impaired Mobility and ADL's  -        Rehab Potential (OT)  good, to achieve stated therapy goals  [facility]         Criteria for Skilled Therapeutic Interventions Met (OT)  yes;meets criteria;skilled treatment is necessary  -        Therapy Frequency (OT)  other (see comments) 3-5 days per week  -        Predicted Duration of Therapy Intervention (OT)  Until goals met or d/c  -        Problem List (OT)  problems related to;balance;cognition;vision;strength;pain;mobility  -        Planned Therapy Interventions (OT)  activity tolerance training;adaptive equipment training;BADL retraining;transfer/mobility retraining;strengthening exercise;ROM/therapeutic exercise;patient/caregiver education/training;occupation/activity based interventions;neuromuscular control/coordination retraining;functional balance retraining  -           Evaluation Complexity (OT)    Review Occupational Profile/Medical/Therapy History Complexity  moderate complexity  -        Assessment, Occupational Performance/Identification of Deficit Complexity  moderate complexity  -        Clinical Decision Making Complexity (OT)  moderate complexity  -        Overall Complexity of Evaluation (OT)  moderate complexity  -           Therapy Plan Review/Discharge Plan (OT)    Therapy Plan Review (OT)  evaluation/treatment results reviewed;care plan/treatment goals reviewed;risks/benefits reviewed;current/potential barriers reviewed;participants voiced agreement with care plan;patient  -        Anticipated Discharge Disposition (OT)  home with home health  -          User Key  (r) = Recorded By, (t) = Taken By, (c) = Cosigned By    Initials Name Effective Dates     Adarsh Hurst, VINCENT 09/10/19 -                OT Recommendation and Plan  Planned Therapy Interventions (OT): activity tolerance training, adaptive equipment training, BADL retraining, transfer/mobility retraining, strengthening exercise, ROM/therapeutic exercise, patient/caregiver education/training, occupation/activity based interventions, neuromuscular control/coordination retraining,  functional balance retraining  Therapy Frequency (OT): other (see comments)(3-5 days per week)  Plan of Care Review  Plan of Care Reviewed With: patient  Outcome Summary: OT Eval completed on this date as co-eval with PT. Pt pleasant and agreeable to therapy. Bed Mobility: Max A x 2 to get EOB - unable to maintain static sitting balance requiring Max A to prevent posterior lean when sitting EOB. Toileting: Dependent Feeding: Max A. Pt limited by vision, decreased activity tolerance, decreased strength, decreased balance, decreased ROM, and decreased safety awareness. Pt RUE Shoulder flexion impaired ~70-80* shoulder flexion - but WFL PROM. Pt would benefit from continued skilled OT to address functional deficits. Recommend d/c to SNF, unless spouse is able to care for him at this amount of assistance at home. If spouse is able to do so pt would need home with HHOT and 24/7 supervision/assist.  Plan of Care Reviewed With: patient  Outcome Summary: OT Eval completed on this date as co-eval with PT. Pt pleasant and agreeable to therapy. Bed Mobility: Max A x 2 to get EOB - unable to maintain static sitting balance requiring Max A to prevent posterior lean when sitting EOB. Toileting: Dependent Feeding: Max A. Pt limited by vision, decreased activity tolerance, decreased strength, decreased balance, decreased ROM, and decreased safety awareness. Pt RUE Shoulder flexion impaired ~70-80* shoulder flexion - but WFL PROM. Pt would benefit from continued skilled OT to address functional deficits. Recommend d/c to SNF, unless spouse is able to care for him at this amount of assistance at home. If spouse is able to do so pt would need home with HHOT and 24/7 supervision/assist.    Outcome Measures     Row Name 10/04/20 0925             How much help from another is currently needed...    Putting on and taking off regular lower body clothing?  2  -JH      Bathing (including washing, rinsing, and drying)  2  -JH      Toileting  (which includes using toilet bed pan or urinal)  2  -      Putting on and taking off regular upper body clothing  3  -      Taking care of personal grooming (such as brushing teeth)  3  -      Eating meals  3  -      AM-PAC 6 Clicks Score (OT)  15  -         Functional Assessment    Outcome Measure Options  AM-PAC 6 Clicks Daily Activity (OT)  -        User Key  (r) = Recorded By, (t) = Taken By, (c) = Cosigned By    Initials Name Provider Type    Adarsh Robin OT Occupational Therapist          Time Calculation:   Time Calculation- OT     Row Name 10/04/20 1139             Time Calculation-     OT Start Time  0945  -      OT Stop Time  1023  -      OT Time Calculation (min)  38 min  -      OT Received On  10/04/20  -      OT Goal Re-Cert Due Date  10/17/20  -        User Key  (r) = Recorded By, (t) = Taken By, (c) = Cosigned By    Initials Name Provider Type    Adarsh Robin OT Occupational Therapist        Therapy Charges for Today     Code Description Service Date Service Provider Modifiers Qty    [id number]  OT EVAL MOD COMPLEXITY 3 10/4/2020 Adarsh Hurst OT GO 1               Adarsh Hurst OT  10/4/2020

## 2020-10-05 NOTE — SIGNIFICANT NOTE
10/05/20 1244   OTHER   Discipline speech language pathologist   Rehab Time/Intention   Session Not Performed other (see comments)   Unable to awaken pt for safe PO trials at 8:25 am or 1245.  Nsg states he would not awaken for am meal or safe med pass this morning.  Recommend continue puree only if alert.  SLP to trial [facility] soft for upgrade if appropriate next session.  Yenifer Vogt, CCC-SLP 10/5/2020 12:45 CDT

## 2020-10-05 NOTE — THERAPY TREATMENT NOTE
Acute Care - Physical Therapy Treatment Note  [facility]     Patient Name: Ondina Alanis Sr.  : 1940  MRN: [id number]  Today's Date: 10/5/2020   Onset of Illness/Injury or Date of Surgery: 20       PT Assessment (last 12 hours)      PT Evaluation and Treatment     Row Name 10/05/20 1415          Physical Therapy Time and Intention    Subjective Information  no complaints  -     Document Type  therapy note (daily note)  -     Mode of Treatment  physical therapy;individual therapy  -     Patient Effort  adequate  -     Row Name 10/05/20 141          General Information    Patient Profile Reviewed  yes  -     Limitations/Impairments  visual  -     Row Name 10/05/20 141          Cognition    Affect/Mental Status (Cognitive)  WFL  -     Orientation Status (Cognition)  oriented x 3  -     Row Name 10/05/20 141          Pain Scale: Numbers Pre/Post-Treatment    Pretreatment Pain Rating  0/10 - no pain  -     Posttreatment Pain Rating  0/10 - no pain  -     Row Name 10/05/20 1415          Bed Mobility    Scooting/Bridging [place] (Bed Mobility)  dependent (less than 25% patient effort);2 person assist  -     Supine-Sit [place] (Bed Mobility)  maximum assist (25% patient effort);1 person assist  -JA     Sit-Supine [place] (Bed Mobility)  maximum assist (25% patient effort);2 person assist  -JA     Comment (Bed Mobility)  Pt. sitting EOB ~15mins Max-Loc with posterior/ lateral lean, rocking R & L/ Anterior to decrease stiffness at trunk & hips this p.m.    -     Row Name 10/05/20 1415          Safety Issues, Functional Mobility    Impairments Affecting Function (Mobility)  cognition;endurance/activity tolerance;strength  -     Row Name 10/05/20 1415          Vital Signs    Pre Systolic BP Rehab  157  -     Pre Treatment Diastolic BP  70  -JA     Pretreatment Heart Rate (beats/min)  70  -     Pre Patient Position  Supine  -     Intra Patient Position   Sitting  -     Post Patient Position  Supine  -     Row Name 10/05/20 1415          Bed Mobility Goal 1 (PT)    Activity/Assistive Device (Bed Mobility Goal 1, PT)  sit to supine/supine to sit;rolling to right;rolling to left  -     [place] Level/Cues Needed (Bed Mobility Goal 1, PT)  minimum assist (75% or more patient effort)  -     Time Frame (Bed Mobility Goal 1, PT)  long term goal (LTG);by discharge  -JA     Strategies/Barriers (Bed Mobility Goal 1, PT)  Poor ability to maneuver in bed.  -JA     Progress/Outcomes (Bed Mobility Goal 1, PT)  goal not met  -     Row Name 10/05/20 1415          ROM Goal 1 (PT)    ROM Goal 1 (PT)  Patient will tolerate AAROM and stretching to BLEs, to improve functional mobility.  -JA     Time Frame (ROM Goal 1, PT)  long-term goal (LTG);by discharge  -JA     Strategies/Barriers (ROM Goal 1, PT)  Fatigues easily, very debilitated.  -     Progress/Outcome (ROM Goal 1, PT)  goal not met  -     Row Name 10/05/20 1415          Patient Education Goal (PT)    Activity (Patient Education Goal, PT)  Patient will sit EOB with min Ax1, x 5 min with BUE support.  -JA     Time Frame (Patient Education Goal, PT)  long term goal (LTG);by discharge  -     Barriers (Patient Education Goal, PT)  Decreased hip and knee flexion, poor seated balance.  -     Progress/Outcome (Patient Education Goal, PT)  goal not met  -     Row Name 10/05/20 1415          Therapy Assessment/Plan (PT)    Rehab Potential (PT)  fair, will monitor progress closely  -     Criteria for Skilled Interventions Met (PT)  yes;skilled treatment is necessary  -     Row Name 10/05/20 1415          Progress Summary (PT)    Progress Toward Functional Goals (PT)  progress toward functional goals as expected  -       User Key  (r) = Recorded By, (t) = Taken By, (c) = Cosigned By    Initials Name Provider Type    Teo Deras, PTA Physical Therapy Assistant        Physical Therapy Education                  Title: PT OT SLP Therapies (In Progress)     Topic: Physical Therapy (In Progress)     Point: Mobility training (Done)     Learning Progress Summary           Patient Acceptance, E, JULIAN,NR by  at 10/4/2020 1714    Comment: Educated on rolling technique, sit<-->supine technique; discussed PT POC and goals.                   Point: Home exercise program (Not Started)     Learner Progress:  Not documented in this visit.          Point: Body mechanics (Not Started)     Learner Progress:  Not documented in this visit.          Point: Precautions (Not Started)     Learner Progress:  Not documented in this visit.                      User Key     Initials Effective Dates Name Provider Type Discipline     04/03/18 -  Surya Rodriguez, PT Physical Therapist PT              PT Recommendation and Plan  Anticipated Discharge Disposition (PT): skilled nursing facility  Therapy Frequency (PT): daily  Progress Summary (PT)  Progress Toward Functional Goals (PT): progress toward functional goals as expected  Plan of Care Reviewed With: patient  Progress: improving  Outcome Summary: Pt. transferred sup-sit MaxAx1, pt. sat EOB ~15mins Max-Loc for performance pt. with fluctuating assist due to periods of increase posterior/L lateral lean, but able to correct and Loc after correcting, pt. transferred sit-sup MaxAx2, Depx2 to scoot up to HOB. Cont. to mobilize  Outcome Measures     Row Name 10/04/20 0925             How much help from another is currently needed...    Putting on and taking off regular lower body clothing?  2  -JH      Bathing (including washing, rinsing, and drying)  2  -JH      Toileting (which includes using toilet bed pan or urinal)  2  -JH      Putting on and taking off regular upper body clothing  3  -JH      Taking care of personal grooming (such as brushing teeth)  3  -JH      Eating meals  3  -JH      AM-PAC 6 Clicks Score (OT)  15  -JH         Functional Assessment    Outcome Measure Options   AM-PAC 6 Clicks Daily Activity (OT)  -        User Key  (r) = Recorded By, (t) = Taken By, (c) = Cosigned By    Initials Name Provider Type     Adarsh Hurst, VINCENT Occupational Therapist           Time Calculation:   PT Charges     Row Name 10/05/20 1554             Time Calculation    Start Time  1415  -EUSEBIO      Stop Time  1440  -EUSEBIO      Time Calculation (min)  25 min  -         Time Calculation- PT    Total Timed Code Minutes- PT  25 minute(s)  -        User Key  (r) = Recorded By, (t) = Taken By, (c) = Cosigned By    Initials Name Provider Type    Teo Deras PTA Physical Therapy Assistant        Therapy Charges for Today     Code Description Service Date Service Provider Modifiers Qty    [id number]  PT THERAPEUTIC ACT EA 15 MIN 10/5/2020 Teo Kebede PTA GP 2          PT G-Codes  Outcome Measure Options: AM-PAC 6 Clicks Basic Mobility (PT)  AM-PAC 6 Clicks Score (PT): 8  AM-PAC 6 Clicks Score (OT): 15    Teo Kebede PTA  10/5/2020

## 2020-10-05 NOTE — PLAN OF CARE
Problem: Adult Inpatient Plan of Care  Goal: Plan of Care Review  Outcome: Ongoing, Progressing  Flowsheets (Taken 10/5/2020 1415)  Progress: improving  Plan of Care Reviewed With: patient  Outcome Summary: Pt. transferred sup-sit MaxAx1, pt. sat EOB ~15mins Max-Loc for performance pt. with fluctuating assist due to periods of increase posterior/L lateral lean, but able to correct and Loc after correcting, pt. transferred sit-sup MaxAx2, Depx2 to scoot up to HOB. Cont. to mobilize

## 2020-10-05 NOTE — PROGRESS NOTES
[facility] Medicine Services  INPATIENT PROGRESS NOTE    Length of Stay: 11  Date of Admission: 9/23/2020  Primary Care Physician: Wendie Quiñonez APRN    Subjective   Chief Complaint: No new complaints.    HPI: Patient is seen for follow-up.  He is doing well, slightly deconditioned and reportedly has decreased oral intake.    Bilateral pedal edema has resolved.      Review of Systems   Constitutional: Positive for activity change, appetite change and fatigue. Negative for chills, diaphoresis and fever.   HENT: Negative for trouble swallowing and voice change.    Eyes: Positive for visual disturbance. Negative for photophobia.   Respiratory: Negative for cough, choking, chest tightness, shortness of breath, wheezing and stridor.    Cardiovascular: Negative for chest pain, palpitations and leg swelling.   Gastrointestinal: Negative for abdominal distention, abdominal pain, blood in stool, constipation, diarrhea, nausea and vomiting.   Endocrine: Negative for cold intolerance, heat intolerance, polydipsia, polyphagia and polyuria.   Genitourinary: Negative for decreased urine volume, difficulty urinating, dysuria, enuresis, flank pain, frequency, hematuria and urgency.   Musculoskeletal: Negative for arthralgias, gait problem, myalgias, neck pain and neck stiffness.   Skin: Negative for pallor, rash and wound.   Neurological: Negative for dizziness, tremors, seizures, syncope, facial asymmetry, speech difficulty, weakness, light-headedness, numbness and headaches.   Hematological: Does not bruise/bleed easily.   Psychiatric/Behavioral: Negative for agitation, behavioral problems and confusion.       Objective    Temp:  [96.4 °F (35.8 °C)-98.8 °F (37.1 °C)] 97.3 °F (36.3 °C)  Heart Rate:  [64-76] 67  Resp:  [14-16] 14  BP: (135-171)/(64-84) 142/64    Physical Exam  Vitals signs and nursing note reviewed.   Constitutional:       General: He is not in acute distress.      Appearance: He is well-developed. He is not diaphoretic.   HENT:      Head: Normocephalic and atraumatic.   Eyes:      General: No scleral icterus.     Pupils: Pupils are equal, round, and reactive to light.      Comments: Patient is legally blind.   Neck:      Musculoskeletal: Normal range of motion and neck supple.      Thyroid: No thyromegaly.      Vascular: No JVD.   Cardiovascular:      Rate and Rhythm: Normal rate and regular rhythm.      Heart sounds: Normal heart sounds. No murmur. No friction rub. No gallop.    Pulmonary:      Effort: Pulmonary effort is normal.      Breath sounds: Normal breath sounds. No wheezing or rales.   Chest:      Chest wall: No tenderness.   Abdominal:      General: Bowel sounds are normal. There is no distension.      Palpations: Abdomen is soft. There is no mass.      Tenderness: There is no abdominal tenderness. There is no guarding or rebound.   Musculoskeletal:         General: No tenderness or deformity.      Right lower leg: No edema.      Left lower leg: No edema.   Skin:     General: Skin is warm and dry.      Coloration: Skin is not pale.      Findings: No erythema or rash.   Neurological:      General: No focal deficit present.      Mental Status: He is alert and oriented to person, place, and time. Mental status is at baseline.      Cranial Nerves: No cranial nerve deficit.      Sensory: No sensory deficit.      Motor: No abnormal muscle tone.      Coordination: Coordination normal.      Deep Tendon Reflexes: Reflexes normal.   Psychiatric:         Behavior: Behavior normal.         Thought Content: Thought content normal.         Judgment: Judgment normal.           Medication Review:    Current Facility-Administered Medications:   •  acetaminophen (TYLENOL) tablet 650 mg, 650 mg, Oral, Q6H PRN, Samira Rodrigues MD, 650 mg at 09/25/20 0007  •  amiodarone (PACERONE) tablet 200 mg, 200 mg, Oral, Q24H, Molly Gr APRN, 200 mg at 10/04/20  0945  •  aspirin EC tablet 81 mg, 81 mg, Oral, Daily, Samira Rodrigues MD, 81 mg at 10/04/20 0945  •  atorvastatin (LIPITOR) tablet 40 mg, 40 mg, Oral, Nightly, Samira Rodrigues MD, 40 mg at 10/04/20 2034  •  bisacodyl (DULCOLAX) EC tablet 5 mg, 5 mg, Oral, Daily PRN, Samira Rodrigues MD  •  carvedilol (COREG) tablet 6.25 mg, 6.25 mg, Oral, BID With Meals, Pedro Garcia MD, 6.25 mg at 10/04/20 1604  •  furosemide (LASIX) tablet 40 mg, 40 mg, Oral, BID, Marley Akhtar MD, 40 mg at 10/05/20 0605  •  hydrALAZINE (APRESOLINE) tablet 75 mg, 75 mg, Oral, Q8H, Pedro Garcia MD, 75 mg at 10/05/20 0516  •  ipratropium-albuterol (DUO-NEB) nebulizer solution 3 mL, 3 mL, Nebulization, Q4H PRN, Samira Rodrigues MD  •  isosorbide mononitrate (IMDUR) 24 hr tablet 30 mg, 30 mg, Oral, Q24H, Samira Rodrigues MD, 30 mg at 10/04/20 0946  •  magic butt ointment, , Topical, BID, Samira Rodrigues MD  •  [START ON 10/6/2020] metOLazone (ZAROXOLYN) tablet 2.5 mg, 2.5 mg, Oral, Every Other Day, Marley Akhtar MD  •  ondansetron (ZOFRAN) tablet 4 mg, 4 mg, Oral, Q6H PRN **OR** ondansetron (ZOFRAN) injection 4 mg, 4 mg, Intravenous, Q6H PRN, Samira Rodrigues MD, 4 mg at 10/01/20 1824  •  pantoprazole (PROTONIX) EC tablet 40 mg, 40 mg, Oral, QAM, Samira Rodrigues MD, 40 mg at 10/05/20 0605  •  Pharmacy to dose warfarin, , Does not apply, Continuous PRN, Samira Rodrigues MD  •  sodium chloride 0.9 % flush 10 mL, 10 mL, Intravenous, PRN, Tree Waggoner, DO  •  sodium chloride 0.9 % flush 10 mL, 10 mL, Intravenous, Q12H, Samira Rodrigues MD, 10 mL at 10/05/20 0927  •  sodium chloride 0.9 % flush 10 mL, 10 mL, Intravenous, PRN, Samira Rodrigues MD  •  warfarin (COUMADIN) tablet 4 mg, 4 mg, Oral,  Daily, Samira Rodrigues MD, 4 mg at 10/04/20 1604    Results Review:  I have reviewed the labs, radiology results, and diagnostic studies.    Laboratory Data:   Results from last 7 days   Lab Units 10/05/20  0511 10/04/20  0851 10/03/20  0744   SODIUM mmol/L 138 141 140   POTASSIUM mmol/L 3.8 3.5 3.9   CHLORIDE mmol/L 94* 96* 97*   CO2 mmol/L 35.0* 30.0* 28.0   BUN mg/dL 60* 55* 50*   CREATININE mg/dL 3.31* 2.80* 2.81*   GLUCOSE mg/dL 110* 180* 79   CALCIUM mg/dL 9.5 9.5 9.4   ANION GAP mmol/L 9.0 15.0 15.0     Estimated Creatinine Clearance: 20.9 mL/min (A) (by C-G formula based on SCr of 3.31 mg/dL (H)).  Results from last 7 days   Lab Units 09/30/20  0710 09/29/20  0549   MAGNESIUM mg/dL 1.7 1.9   PHOSPHORUS mg/dL 2.8 2.6         Results from last 7 days   Lab Units 10/03/20  0603 09/29/20  0549   WBC 10*3/mm3 7.64 5.67   HEMOGLOBIN g/dL 11.9* 10.6*   HEMATOCRIT % 35.4* 33.5*   PLATELETS 10*3/mm3 162 252     Results from last 7 days   Lab Units 10/05/20  0511 10/04/20  0851 10/03/20  0744 10/02/20  0640 10/01/20  0718   INR  1.27* 1.60* 1.70* 1.78* 1.90*       Culture Data:   No results found for: BLOODCX  No results found for: URINECX  No results found for: RESPCX  No results found for: WOUNDCX  No results found for: STOOLCX  No components found for: BODYFLD    Radiology Data:   Imaging Results (Last 24 Hours)     ** No results found for the last 24 hours. **          I have reviewed the patient's current medications.     Assessment/Plan     Hospital Problem List:  Active Problems:    Pneumonia of both lower lobes due to infectious organism    Acute respiratory failure with hypoxia secondary to Pneumonia/ CHF decompensation: Resolved.  Patient is maintaining O2 saturation in the upper 90s on room air and has completed a course of antimicrobial therapy. Blood culture showed no growth.    Heart failure with decreased ejection fraction decompensation (secondary to ischemic cardiomyopathy):  Resolved as patient is down to his dry weight and follow-up chest x-ray done 10/3/2020 was essentially unremarkable. Echocardiogram done 8/21/2020 showed severe global hypokinesis with an ejection fraction of 20%.  Diuretic therapy will be adjusted due to worsening creatinine.  Continue daily weights, salt and fluid restriction. Inputs by nephrologist/cardiologist is appreciated.     Chronic kidney disease stage III/4: Patient's baseline creatinine reportedly between 1.9-2.0. Creatinine is 3.31 today.  Will defer adjustment of ongoing diuretic therapy to the nephrologist.  Continue to monitor renal function and avoid nephrotoxins.  Input by nephrologist is appreciated.     History of atrial flutter: Patient is currently in normal sinus rhythm.  Continue amiodarone, Coreg and anticoagulation with warfarin.  INR is 1.27.  Patient reportedly has not been compliant with taking prescribed warfarin hence the low INR.  May consider heparin infusion if the need arises    Hypertension: Stable.  Continue Coreg and hydralazine.  Continue to make adjustments as needed for optimal blood pressure control.    Diabetes mellitus: Stable.  Continue anti-glycemic with Accu-Cheks and sliding scale insulin.    Acute cystitis: He has completed a course of antimicrobial therapy.  Repeat urine culture showed no growth.            History of GERD: Continue PPI.    Continue DVT prophylaxis with warfarin.    Nutrition: Continue diet as recommended by speech-language pathologist.  Will add some appetite stimulants.    Deconditioning: Continue PT and OT.    Discharge Planning: In progress.      Pedro Garcia MD   10/05/20   09:50 CDT

## 2020-10-05 NOTE — PROGRESS NOTES
"Anticoagulation by Pharmacy - Warfarin    Ondina Alanis Sr. is a 80 y.o.male being continued on warfarin for DVT    Home regimen: Recently on LTACH and tolerating Warfarin 2.5-3 mg daily. Discharged home and receiving 5 mg nightly per Wife to finish out old bottle before new prescription of 3 mg nightly. Patient only received a few doses of 3 mg before being admitted. Patient's wife checks INR at home every Monday and INR has been in range    INR Goal: 2-3    Last INR:   Lab Results   Component Value Date    INR 1.27 (H) 10/05/2020       Objective:  [Ht: 193 cm (76\"); Wt: 81.8 kg (180 lb 4.8 oz)]  Lab Results   Component Value Date    INR 1.27 (H) 10/05/2020    INR 1.60 (H) 10/04/2020    INR 1.70 (H) 10/03/2020    PROTIME 16.5 (H) 10/05/2020    PROTIME  10/04/2020      Comment:      Fingerstick pt    PROTIME 21.7 (H) 10/02/2020     Lab Results   Component Value Date    HGB 11.9 (L) 10/03/2020    HGB 10.6 (L) 09/29/2020    HGB 9.5 (L) 09/25/2020    HCT 35.4 (L) 10/03/2020    HCT 33.5 (L) 09/29/2020    HCT 28.9 (L) 09/25/2020     10/03/2020     09/29/2020     09/25/2020       Recent Warfarin Administrations       The 5 most recent administrations since 09/25/2020 are shown below each listed medication.    Warfarin Sodium         Order Dose Date Given     warfarin (COUMADIN) half tablet 3.5 mg 3.5 mg 10/01/2020     warfarin (COUMADIN) tablet 3 mg 3 mg 09/30/2020      3 mg 09/29/2020     warfarin (COUMADIN) tablet 2.5 mg 2.5 mg 09/28/2020      2.5 mg 09/27/2020                    Assessment  Interacting medications: Amiodarone, aspirin  Patient has recently been refusing warfarin doses so INR is subtherapeutic   Patient did get a dose last night  No new h/h   We will continue with 4 mg PO nightly    Plan:  1.  Give warfarin 4 mg tablet PO @ 1800 tonight  2.  Draw a PT/INR in AM  3.  Pharmacy will continue to follow    Norm Bustamante, PharmD  10/05/20 13:42 CDT     "

## 2020-10-05 NOTE — PROGRESS NOTES
"[facility]  [address] [place] [address]. [address]  T - [phone]  F - [phone]     Progress Note          PATIENT  DEMOGRAPHICS   PATIENT NAME: Ondina Alanis Sr.                      PHYSICIAN: Marley Akhtar MD  : 1940  MRN: [id number]   LOS: 11 days    Patient Care Team:  Wendie Quiñonez APRN as PCP - General (Nurse Practitioner)  Subjective   SUBJECTIVE   Resting no distress, responding to some questions        Objective   OBJECTIVE   Vital Signs  Temp:  [97.3 °F (36.3 °C)-98.8 °F (37.1 °C)] 97.3 °F (36.3 °C)  Heart Rate:  [64-76] 67  Resp:  [14-16] 14  BP: (135-162)/(64-84) 142/64    Flowsheet Rows      First Filed Value   Admission Height  193 cm (76\") Documented at 2020 2200   Admission Weight  94.8 kg (209 lb) Documented at 2020 2200           I/O last 3 completed shifts:  In: 1120 [P.O.:1120]  Out: 2275 [Urine:2275]    PHYSICAL EXAM    Physical Exam  Vitals signs reviewed.   Constitutional:       General: He is not in acute distress.     Appearance: He is well-developed.   Eyes:      General:         Right eye: No discharge.   Neck:      Vascular: JVD present.   Cardiovascular:      Rate and Rhythm: Normal rate and regular rhythm.      Heart sounds: Normal heart sounds, S1 normal and S2 normal. No murmur.   Pulmonary:      Effort: Pulmonary effort is normal. No respiratory distress.      Breath sounds: No rales.   Abdominal:      General: Bowel sounds are normal. There is no distension.      Palpations: Abdomen is soft.      Tenderness: There is no abdominal tenderness.   Skin:     Coloration: Skin is not pale.      Findings: No erythema.   Neurological:      General: No focal deficit present.      Mental Status: He is alert. Mental status is at baseline.      Sensory: No sensory deficit.      Motor: No abnormal muscle tone.         RESULTS   Results Review:    Results from last 7 days   Lab Units 10/05/20  0511 10/04/20  0851 10/03/20  0744  "   SODIUM mmol/L 138 141 140   POTASSIUM mmol/L 3.8 3.5 3.9   CHLORIDE mmol/L 94* 96* 97*   CO2 mmol/L 35.0* 30.0* 28.0   BUN mg/dL 60* 55* 50*   CREATININE mg/dL 3.31* 2.80* 2.81*   CALCIUM mg/dL 9.5 9.5 9.4   GLUCOSE mg/dL 110* 180* 79       Estimated Creatinine Clearance: 20.9 mL/min (A) (by C-G formula based on SCr of 3.31 mg/dL (H)).    Results from last 7 days   Lab Units 09/30/20  0710 09/29/20  0549   MAGNESIUM mg/dL 1.7 1.9   PHOSPHORUS mg/dL 2.8 2.6             Results from last 7 days   Lab Units 10/03/20  0603 09/29/20  0549   WBC 10*3/mm3 7.64 5.67   HEMOGLOBIN g/dL 11.9* 10.6*   PLATELETS 10*3/mm3 162 252       Results from last 7 days   Lab Units 10/05/20  0511 10/04/20  0851 10/03/20  0744 10/02/20  0640 10/01/20  0718   INR  1.27* 1.60* 1.70* 1.78* 1.90*         Imaging Results (Last 24 Hours)     ** No results found for the last 24 hours. **           MEDICATIONS    amiodarone, 200 mg, Oral, Q24H  aspirin, 81 mg, Oral, Daily  atorvastatin, 40 mg, Oral, Nightly  carvedilol, 6.25 mg, Oral, BID With Meals  dronabinol, 2.5 mg, Oral, BID  furosemide, 40 mg, Oral, BID  hydrALAZINE, 75 mg, Oral, Q8H  isosorbide mononitrate, 30 mg, Oral, Q24H  magic butt ointment, , Topical, BID  [START ON 10/6/2020] metOLazone, 2.5 mg, Oral, Every Other Day  pantoprazole, 40 mg, Oral, QAM  sodium chloride, 10 mL, Intravenous, Q12H  warfarin, 4 mg, Oral, Daily      Pharmacy to dose warfarin,         Assessment/Plan   ASSESSMENT / PLAN      Pneumonia of both lower lobes due to infectious organism    1.  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 good UO with iv lasix and albumin. Lost significant weight 181 lbs. Peak 209 lbs. Cr worsening with high bicarb and low chloride (contraction alkalosis). Lower lasix to daily. Add 500cc NS x 1. Keep metolazone every other day     2.  Hypertension- poorly controlled, hydralazine increased to 75 mg 3 times daily  and carvedilol to 12.5 mg twice daily  HR is low and observe. bp overall better.      3.  CHF- systolic heart failure EF 20%. severe LV systolic dysfunction with RV systolic pressure of 60 mmHg, cardiology following. cxr bilateral infiltrates vs edema     4.  A. Fib on amiodarone and coumain     5.  Pneumonia- on antibiotics per primary team     6.  UTI- on antibiotics per primary team    7. Altered mental status now alert, speech is evaluating and tolerating po well                  This document has been electronically signed by Marley Akhtar MD on October 5, 2020 12:16 CDT

## 2020-10-05 NOTE — PLAN OF CARE
Goal Outcome Evaluation:  Plan of Care Reviewed With: patient  Progress: improving  Outcome Summary: pt resting. took night time meds. no acute events overnight. anticipate discharge this am. will continue to monitor pt

## 2020-10-06 NOTE — THERAPY TREATMENT NOTE
Inpatient Rehabilitation - Speech Language Pathology   Swallow Treatment Note [facility]     Patient Name: Ondina Alanis Sr.  : 1940  MRN: [id number]  Today's Date: 10/6/2020  Onset of Illness/Injury or Date of Surgery: 20            Admit Date: 2020     Pt seen for skilled ST therapu this morning. Pt was alert and agreed to PO trials of mecahnical soft. He is tolerating puree and thin with no noted s/s of aspiration. Pt kept eyes closed throughout session but answered all questions. He was repositioned upright in bed. Edson and thin liquids via straw presented. No s/s on thin liquids via straw. Pt did present with increased oral prep time on mechanical soft solids but no s/s of aspiration. He used lingual sweep and cleared oral cavity. SLP educated on slow rate, upright position, small bites/sips, cyclic eating, and oral sweep. Pt was in agreement but required verbal cues from SLP to utilize small bites/sips. Pt is a full feeding assist. Recommend upgrade to mechanical soft and thin liquids. RN notified prior to seeing patient and following recommended upgrade.     Yenifer Kate MS CCC-SLP      Visit Dx:     ICD-10-CM ICD-9-CM   1. Pneumonia of both lower lobes due to infectious organism  J18.9 486   2. Hypoxia  R09.02 799.02   3. Elevated troponin  R79.89 790.6   4. Congestive heart failure, unspecified HF chronicity, unspecified heart failure type (CMS/[facility])  I50.9 428.0   5. Dysphagia, unspecified type  R13.10 787.20   6. Impaired mobility and activities of daily living  Z74.09 V49.89    Z78.9    7. Impaired physical mobility  Z74.09 781.99     Patient Active Problem List   Diagnosis   • CKD (chronic kidney disease) stage 4, GFR 15-29 ml/min (CMS/[facility])   • Diabetic peripheral neuropathy associated with type 2 diabetes mellitus (CMS/[facility])   • Diabetes mellitus type II, uncontrolled (CMS/[facility])   • GERD (gastroesophageal reflux disease)   • Primary osteoarthritis of both knees    • Pulmonary embolism (CMS/HCC)   • BPH (benign prostatic hyperplasia)   • Benign essential hypertension   • Asthma   • Pleurisy   • Acute respiratory failure with hypoxia (CMS/HCC)   • Stage 1 acute kidney injury (CMS/HCC)   • Left ventricular diastolic dysfunction   • Exacerbation of asthma   • COPD exacerbation (CMS/HCC)   • Atrial flutter (CMS/HCC)   • Acute systolic CHF (congestive heart failure) (CMS/HCC)   • Pneumonia of both lower lobes due to infectious organism     Past Medical History:   Diagnosis Date   • Asthma    • Diabetes mellitus (CMS/HCC)    • GERD (gastroesophageal reflux disease)    • Hypertension    • Prostate disorder      Past Surgical History:   Procedure Laterality Date   • BACK SURGERY     • KNEE SURGERY Left    • PROSTATE SURGERY          SWALLOW EVALUATION (last 72 hours)      SLP Adult Swallow Evaluation     Row Name 10/06/20 0815 10/04/20 0916 10/03/20 0946             Rehab Evaluation    Document Type  therapy note (daily note)  -EA  --  evaluation  -CK      Total Evaluation Minutes, SLP  --  28  -CK  25  -CK      Subjective Information  no complaints  -EA  --  no complaints  -CK      Patient Observations  alert;cooperative;agree to therapy  -EA  --  poorly cooperative  -CK      Patient/Family/Caregiver Comments/Observations  no present   -EA  No family present at bedside  -CK  --      Patient Effort  adequate  -EA  --  fair  -CK         General Information    Patient Profile Reviewed  yes  -EA  --  yes  -CK      Pertinent History Of Current Problem  --  Pt continues to leave eyes closed, but was less agitated today when repositioned and had no verbal obscenities.  -CK  Nsg note reported pt was refusing PO  -CK      Current Method of Nutrition  pureed;thin liquids  -EA  pureed;thin liquids  -CK  NPO  -CK      Precautions/Limitations, Vision  vision impairment, bilaterally legally blind  -EA  --  vision impairment, bilaterally legally blind  -CK      Precautions/Limitations, Hearing   WFL;for purposes of eval  -EA  --  WFL;for purposes of eval  -CK      Prior Level of Function-Communication  cognitive-linguistic impairment dementia  -EA  cognitive-linguistic impairment dementia  -CK  cognitive-linguistic impairment dementia  -CK      Prior Level of Function-Swallowing  --  --  unknown  -CK      Plans/Goals Discussed with  patient No evidence of learning  -EA  --  patient No evidence of learning  -CK      Barriers to Rehab  cognitive status  -EA  --  cognitive status;uncooperative  -CK      Patient's Goals for Discharge  --  --  patient did not state  -CK         Pain    Additional Documentation  --  --  Pain Scale: Numbers Pre/Post-Treatment (Group)  -CK         Pain Scale: Numbers Pre/Post-Treatment    Pretreatment Pain Rating  0/10 - no pain  -EA  --  0/10 - no pain  -CK      Posttreatment Pain Rating  0/10 - no pain  -EA  --  0/10 - no pain  -CK         Oral Motor Structure and Function    Dentition Assessment  natural, present and adequate  -EA  --  natural, present and adequate  -CK      Secretion Management  --  --  WNL/WFL  -CK      Mucosal Quality  moist, healthy  -EA  --  moist, healthy  -CK      Volitional Swallow  WFL  -EA  WFL  -CK  unable to elicit  -CK      Volitional Cough  weak  -EA  weak  -CK  unable to elicit  -CK         General Eating/Swallowing Observations    Respiratory Support Currently in Use  room air  -EA  room air  -CK  room air  -CK      Eating/Swallowing Skills  fed by SLP  -EA  fed by SLP  -CK  fed by SLP  -CK      Positioning During Eating  upright 90 degree;upright in bed  -EA  upright 90 degree;upright in bed  -CK  upright 90 degree;upright in bed  -CK      Utensils Used  straw  -EA  straw  -CK  straw  -CK      Consistencies Trialed  soft textures;mixed consistency;pureed;thin liquids  -EA  soft textures;mixed consistency;pureed;thin liquids  -CK  pureed;thin liquids  -CK         Clinical Swallow Eval    Oral Prep Phase  impaired  -EA  impaired  -CK  WFL for  consistencies trialed  -CK      Oral Transit  impaired  -EA  impaired  -CK  WFL for consistencies trialed  -CK      Oral Residue  WFL  -EA  WFL  -CK  WFL for consistencies trialed  -CK      Pharyngeal Phase  no overt signs/symptoms of pharyngeal impairment  -EA  no overt signs/symptoms of pharyngeal impairment  -CK  no overt signs/symptoms of pharyngeal impairment  -CK      Esophageal Phase  unremarkable  -EA  unremarkable  -CK  unremarkable  -CK         Oral Prep Concerns    Oral Prep Concerns  prolonged mastication  -EA  prolonged mastication  -CK  --      Prolonged Mastication  mechanical soft  -EA  mechanical soft  -CK  --         Oral Transit Concerns    Oral Transit Concerns  increased oral transit time  -EA  increased oral transit time  -CK  --      Increased Oral Transit Time  mechanical soft  -EA  mechanical soft  -CK  --         Clinical Impression    Daily Summary of Progress (SLP)  --  progress toward functional goals is good  -CK  --      Barriers to Overall Progress (SLP)  --  cognition  -CK  cognition; uncooperative  -CK      SLP Swallowing Diagnosis  mild;oral dysphagia;functional pharyngeal phase  -EA  mild;oral dysphagia;functional pharyngeal phase  -CK  functional oral phase;functional pharyngeal phase for consistencies trialed; unable to assess advanced solids  -CK      Functional Impact  risk of aspiration/pneumonia  -EA  risk of aspiration/pneumonia  -CK  no impact on function;other unknown functional impact for advanced solids  -CK      Rehab Potential/Prognosis, Swallowing  good, to achieve stated therapy goals  -EA  good, to achieve stated therapy goals  -CK  good, to achieve stated therapy goals  -CK      Swallow Criteria for Skilled Therapeutic Interventions Met  demonstrates skilled criteria  -EA  demonstrates skilled criteria  -CK  demonstrates skilled criteria  -CK      Plan for Continued Treatment (SLP)  --  Continue diet and POC  -CK  Initiate diet and POC; trial advanced solids as pt  allows  -CK         Recommendations    Therapy Frequency (Swallow)  3 days per week;5 days per week  -EA  3 days per week;5 days per week  -CK  3 days per week;5 days per week  -CK      Predicted Duration Therapy Intervention (Days)  until discharge  -EA  --  until discharge  -CK      SLP Diet Recommendation  puree;thin liquids  -EA  puree;thin liquids  -CK  puree;thin liquids  -CK      Recommended Precautions and Strategies  upright posture during/after eating  -EA  upright posture during/after eating  -CK  upright posture during/after eating  -CK      SLP Rec. for Method of Medication Administration  meds crushed;with pudding or applesauce  -EA  meds crushed;with pudding or applesauce  -CK  meds crushed;with pudding or applesauce  -CK      Monitor for Signs of Aspiration  yes;notify SLP if any concerns  -EA  yes;notify SLP if any concerns  -CK  yes;notify SLP if any concerns  -CK      Anticipated Discharge Disposition (SLP)  unknown  -EA  --  unknown  -CK         Swallow Goals (SLP)    Oral Nutrition/Hydration Goal Selection (SLP)  oral nutrition/hydration, SLP goal 1  -EA  oral nutrition/hydration, SLP goal 1  -CK  oral nutrition/hydration, SLP goal 1  -CK         Oral Nutrition/Hydration Goal 1 (SLP)    Oral Nutrition/Hydration Goal 1, SLP  Pt will safely tolerate least restricted diet w/no overt s/s of aspiration for adequate nutrition and hydration.  -EA  Pt will safely tolerate least restricted diet w/no overt s/s of aspiration for adequate nutrition and hydration.  -CK  Pt will safely tolerate least restricted diet w/no overt s/s of aspiration for adequate nutrition and hydration.  -CK      Time Frame (Oral Nutrition/Hydration Goal 1, SLP)  by discharge  -EA  by discharge  -CK  by discharge  -CK      Barriers (Oral Nutrition/Hydration Goal 1, SLP)  cognition  -EA  cognition  -CK  cognition; uncooperative  -CK      Progress/Outcomes (Oral Nutrition/Hydration Goal 1, SLP)  goal ongoing  -EA  goal ongoing  -CK   --        User Key  (r) = Recorded By, (t) = Taken By, (c) = Cosigned By    Initials Name Effective Dates    CK Ifrah Wagnerjulita TRUONG, MS CCC-SLP 02/11/20 -     Yenifer Hogan, MS CCC-SLP 08/09/20 -           EDUCATION  The patient has been educated in the following areas:   Dysphagia (Swallowing Impairment).    SLP Recommendation and Plan  SLP Swallowing Diagnosis: mild, oral dysphagia, functional pharyngeal phase  SLP Diet Recommendation: puree, thin liquids  Recommended Precautions and Strategies: upright posture during/after eating  SLP Rec. for Method of Medication Administration: meds crushed, with pudding or applesauce     Monitor for Signs of Aspiration: yes, notify SLP if any concerns     Swallow Criteria for Skilled Therapeutic Interventions Met: demonstrates skilled criteria  Anticipated Discharge Disposition (SLP): unknown  Rehab Potential/Prognosis, Swallowing: good, to achieve stated therapy goals  Therapy Frequency (Swallow): 3 days per week, 5 days per week  Predicted Duration Therapy Intervention (Days): until discharge                         Plan of Care Reviewed With: patient  Progress: improving  Outcome Summary: Pt seen for skilled ST therapu this morning. Pt was alert and agreed to PO trials of mecahnical soft. He is tolerating puree and thin with no noted s/s of aspiration. Pt kept eyes closed throughout session but answered all questions. He was repositioned upright in bed. Gazelle and thin liquids via straw presented. No s/s on thin liquids via straw. Pt did present with increased oral prep time on mechanical soft solids but no s/s of aspiration. He used lingual sweep and cleared oral cavity. SLP educated on slow rate, upright position, small bites/sips, cyclic eating, and oral sweep. Pt was in agreement but required verbal cues from SLP to utilize small bites/sips. Pt is a full feeding assist. Recommend upgrade to mechanical soft and thin liquids.    SLP GOALS     Row Name 10/06/20  0815 10/04/20 0916 10/03/20 0946       Oral Nutrition/Hydration Goal 1 (SLP)    Oral Nutrition/Hydration Goal 1, SLP  Pt will safely tolerate least restricted diet w/no overt s/s of aspiration for adequate nutrition and hydration.  -EA  Pt will safely tolerate least restricted diet w/no overt s/s of aspiration for adequate nutrition and hydration.  -CK  Pt will safely tolerate least restricted diet w/no overt s/s of aspiration for adequate nutrition and hydration.  -CK    Time Frame (Oral Nutrition/Hydration Goal 1, SLP)  by discharge  -EA  by discharge  -CK  by discharge  -CK    Barriers (Oral Nutrition/Hydration Goal 1, SLP)  cognition  -EA  cognition  -CK  cognition; uncooperative  -CK    Progress/Outcomes (Oral Nutrition/Hydration Goal 1, SLP)  goal ongoing  -EA  goal ongoing  -CK  --      User Key  (r) = Recorded By, (t) = Taken By, (c) = Cosigned By    Initials Name Provider Type    Zakia Garduno, MS CCC-SLP Speech and Language Pathologist    Yenifer Hogan MS CCC-SLP Speech and Language Pathologist             Time Calculation:   Time Calculation- SLP     Row Name 10/06/20 0836             Time Calculation- SLP    SLP Start Time  0815  -EA      SLP Stop Time  0838  -EA      SLP Time Calculation (min)  23 min  -EA      Total Timed Code Minutes- SLP  23 minute(s)  -EA      SLP Received On  10/04/20  -EA      SLP Goal Re-Cert Due Date  10/17/20  -EA        User Key  (r) = Recorded By, (t) = Taken By, (c) = Cosigned By    Initials Name Provider Type    Yenifer Hogan MS CCC-SLP Speech and Language Pathologist          Therapy Charges for Today     Code Description Service Date Service Provider Modifiers Qty    [id number] HC ST TREATMENT SWALLOW 2 10/6/2020 Yenifer Kate MS CCC-SLP GN 1               Yenifer Kate MS CCC-GEORGE  10/6/2020

## 2020-10-06 NOTE — PROGRESS NOTES
Adult Nutrition  Assessment    Patient Name:  Ondina Alanis Sr.  YOB: 1940  MRN: [id number]  Admit Date:  9/23/2020    Assessment Date:  10/6/2020    Comments:  BLE edema 3+ on admit, MD notes edema resolved 10/5 and lasix decreased. Low Sodium diet- Marinol started 10/5 2.5 BID. Albumin on admit 3.6.  ST had pt on pureed 10/2-10/5 with intakes averaging 61% for those 3 days. ST upgraded to soft/ground this am. Noted wt on last admit 8/21 190# --Admit 9/24 wt 203#, BMI 24.8--today 182# w/ BMI 22.2 after diuretics. Family at bedside- pt doesnt take supplements d/t anticoagulation---pt does like milk. Will add milk TID. RD to follow hospital course.    Reason for Assessment     Row Name 10/06/20 1111          Reason for Assessment    Reason For Assessment  follow-up protocol     Diagnosis  cardiac disease;pulmonary disease;renal disease;infection/sepsis     Identified At Risk by Screening Criteria  MST SCORE 2+;need for education         Nutrition/Diet History     Row Name 10/06/20 1116          Nutrition/Diet History    Typical Food/Fluid Intake  Family at bedside- pt doesnt take supplements d/t anticoagulation---pt does like milk           Labs/Tests/Procedures/Meds     Row Name 10/06/20 1112          Labs/Procedures/Meds    Lab Results Reviewed  reviewed     Lab Results Comments  Glu 180/110/121, BUN 67H/Cr 3.3H and GFR 21L, k low and phos wnl---Alb on admit 3.6        Diagnostic Tests/Procedures    Diagnostic Test/Procedure Reviewed  reviewed        Medications    Pertinent Medications Reviewed  reviewed     Pertinent Medications Comments  today lasix decreased to 40 Qday, zaroxolyn QoD---10/5 marinol 2.5BID             Nutrition Prescription Ordered     Row Name 10/06/20 1113          Nutrition Prescription PO    Current PO Diet  Soft Texture     Texture  Ground     Fluid Consistency  Thin     Common Modifiers  Low Sodium         Evaluation of Received Nutrient/Fluid Intake     Row Name  10/06/20 1114          PO Evaluation    Number of Days PO Intake Evaluated  3 days     Number of Meals  8     % PO Intake  0x2, 25x2, 50x1, 75x2, 100x1---pt was pureed during these 3 days               Electronically signed by:  Candie Redding RD  10/06/20 11:17 CDT

## 2020-10-06 NOTE — THERAPY TREATMENT NOTE
Acute Care - Occupational Therapy Treatment Note  [facility]     Patient Name: Ondina Alanis Sr.  : 1940  MRN: [id number]  Today's Date: 10/6/2020  Onset of Illness/Injury or Date of Surgery: 20  Date of Referral to OT: 10/01/20       Admit Date: 2020       ICD-10-CM ICD-9-CM   1. Pneumonia of both lower lobes due to infectious organism  J18.9 486   2. Hypoxia  R09.02 799.02   3. Elevated troponin  R79.89 790.6   4. Congestive heart failure, unspecified HF chronicity, unspecified heart failure type (CMS/HCC)  I50.9 428.0   5. Dysphagia, unspecified type  R13.10 787.20   6. Impaired mobility and activities of daily living  Z74.09 V49.89    Z78.9    7. Impaired physical mobility  Z74.09 781.99     Patient Active Problem List   Diagnosis   • CKD (chronic kidney disease) stage 4, GFR 15-29 ml/min (CMS/[facility])   • Diabetic peripheral neuropathy associated with type 2 diabetes mellitus (CMS/[facility])   • Diabetes mellitus type II, uncontrolled (CMS/[facility])   • GERD (gastroesophageal reflux disease)   • Primary osteoarthritis of both knees   • Pulmonary embolism (CMS/HCC)   • BPH (benign prostatic hyperplasia)   • Benign essential hypertension   • Asthma   • Pleurisy   • Acute respiratory failure with hypoxia (CMS/HCC)   • Stage 1 acute kidney injury (CMS/HCC)   • Left ventricular diastolic dysfunction   • Exacerbation of asthma   • COPD exacerbation (CMS/HCC)   • Atrial flutter (CMS/HCC)   • Acute systolic CHF (congestive heart failure) (CMS/[facility])   • Pneumonia of both lower lobes due to infectious organism     Past Medical History:   Diagnosis Date   • Asthma    • Diabetes mellitus (CMS/HCC)    • GERD (gastroesophageal reflux disease)    • Hypertension    • Prostate disorder      Past Surgical History:   Procedure Laterality Date   • BACK SURGERY     • KNEE SURGERY Left    • PROSTATE SURGERY            OT ASSESSMENT FLOWSHEET (last 12 hours)      OT Evaluation and Treatment     Row Name 10/06/20 1119  10/06/20 0715                OT Time and Intention    Subjective Information  --  no complaints  -BB       Document Type  --  therapy note (daily note)  -BB       Mode of Treatment  --  individual therapy;occupational therapy  -BB       Patient Effort  --  fair  -BB          General Information    Patient/Family/Caregiver Comments/Observations  --  no family present  -BB       Limitations/Impairments  --  visual  -BB          Cognition    Affect/Mental Status (Cognitive)  --  WFL  -BB       Orientation Status (Cognition)  --  oriented x 3  -BB          Pain Scale: Numbers Pre/Post-Treatment    Pretreatment Pain Rating  --  0/10 - no pain  -BB       Posttreatment Pain Rating  --  0/10 - no pain  -BB          Shoulder (Therapeutic Exercise)    Shoulder (Therapeutic Exercise)  --  PROM (passive range of motion)  -BB       Shoulder AROM (Therapeutic Exercise)  --  right;flexion;extension;supine  -BB       Shoulder PROM (Therapeutic Exercise)  --  5 repetitions  -BB          Elbow/Forearm (Therapeutic Exercise)    Elbow/Forearm (Therapeutic Exercise)  --  PROM (passive range of motion)  -BB       Elbow/Forearm AROM (Therapeutic Exercise)  --  right;flexion;extension;5 repetitions  -BB          Wrist (Therapeutic Exercise)    Wrist (Therapeutic Exercise)  --  PROM (passive range of motion)  -BB       Wrist AROM (Therapeutic Exercise)  --  right;flexion;extension;5 repetitions  -BB          Hand (Therapeutic Exercise)    Hand (Therapeutic Exercise)  --  PROM (passive range of motion)  -BB       Hand AROM/AAROM (Therapeutic Exercise)  --  finger flexion;finger extension;5 repetitions  -BB          Grooming Assessment/Training    [place] Level (Grooming)  --  wash face, hands;dependent (less than 25% patient effort)  -BB       Position (Grooming)  --  long sitting  -BB          Self-Feeding Assessment/Training    [place] Level (Feeding)  --  liquids to mouth;scoop food and bring to mouth;dependent (less than 25%  patient effort)  -BB       Position (Self-Feeding)  --  sitting up in bed  -BB          Plan of Care Review    Plan of Care Reviewed With  --  patient  -BB       Progress  no change  -BB  no change  -BB       Outcome Summary  Pt agrees to OT this am. Pt dependent for grooming tasks and self feeding. Pt would not attempt to feed himself. Pt tolerated PROM with R UE for 5 reps. No new goals met at this time.  -BB  --          Vital Signs    Pretreatment Heart Rate (beats/min)  --  73  -BB       Posttreatment Heart Rate (beats/min)  --  76  -BB       Pre SpO2 (%)  --  97  -BB       O2 Delivery Pre Treatment  --  room air  -BB       Post SpO2 (%)  --  96  -BB       O2 Delivery Post Treatment  --  room air  -BB       Pre Patient Position  --  -- long sitting  -BB       Post Patient Position  --  -- long sitting  -BB          Positioning and Restraints    Pre-Treatment Position  --  in bed  -BB       Post Treatment Position  --  bed  -BB       In Bed  --  fowlers;call light within reach;encouraged to call for assist;exit alarm on  -BB         User Key  (r) = Recorded By, (t) = Taken By, (c) = Cosigned By    Initials Name Effective Dates    BB Lamar Trinidad, СВЕТЛАНА/DIONNE 03/07/18 -                OT Recommendation and Plan     Plan of Care Review  Plan of Care Reviewed With: patient  Progress: no change  Outcome Summary: Pt agrees to OT this am. Pt dependent for grooming tasks and self feeding. Pt would not attempt to feed himself. Pt tolerated PROM with R UE for 5 reps. No new goals met at this time.  Plan of Care Reviewed With: patient  Outcome Summary: Pt agrees to OT this am. Pt dependent for grooming tasks and self feeding. Pt would not attempt to feed himself. Pt tolerated PROM with R UE for 5 reps. No new goals met at this time.    Outcome Measures     Row Name 10/06/20 0715 10/04/20 0925          How much help from another is currently needed...    Putting on and taking off regular lower body clothing?  1  -BB  2   -     Bathing (including washing, rinsing, and drying)  1  -BB  2  -     Toileting (which includes using toilet bed pan or urinal)  1  -BB  2  -JH     Putting on and taking off regular upper body clothing  1  -BB  3  -     Taking care of personal grooming (such as brushing teeth)  1  -BB  3  -     Eating meals  1  -BB  3  -     AM-PAC 6 Clicks Score (OT)  6  -BB  15  -        Functional Assessment    Outcome Measure Options  --  AM-PAC 6 Clicks Daily Activity (OT)  -       User Key  (r) = Recorded By, (t) = Taken By, (c) = Cosigned By    Initials Name Provider Type    Lamar Guerrero COTA/L Occupational Therapy Assistant     Adarsh Hurst OT Occupational Therapist          Time Calculation:   Time Calculation- OT     Row Name 10/06/20 1137             Time Calculation- OT    OT Start Time  0715  -      OT Stop Time  0810  -      OT Time Calculation (min)  55 min  -      Total Timed Code Minutes- OT  55 minute(s)  -      OT Received On  10/06/20  -        User Key  (r) = Recorded By, (t) = Taken By, (c) = Cosigned By    Initials Name Provider Type    Lamar Guerrero COTA/L Occupational Therapy Assistant        Therapy Charges for Today     Code Description Service Date Service Provider Modifiers Qty    [id number]  OT SELF CARE/MGMT/TRAIN EA 15 MIN 10/6/2020 Lamar Trinidad COTA/L GO 4               TRACIE Salvador  10/6/2020

## 2020-10-06 NOTE — PROGRESS NOTES
"Anticoagulation by Pharmacy - Warfarin    Ondina Alanis Sr. is a 80 y.o.male being continued on warfarin for DVT    Home regimen: Recently on LTACH and tolerating Warfarin 2.5-3 mg daily. Discharged home and receiving 5 mg nightly per Wife to finish out old bottle before new prescription of 3 mg nightly. Patient only received a few doses of 3 mg before being admitted. Patient's wife checks INR at home every Monday and INR has been in range    INR Goal: 2-3    Last INR:   Lab Results   Component Value Date    INR 1.26 (H) 10/06/2020       Objective:  [Ht: 193 cm (76\"); Wt: 83 kg (182 lb 14.4 oz)]  Lab Results   Component Value Date    INR 1.26 (H) 10/06/2020    INR 1.27 (H) 10/05/2020    INR 1.60 (H) 10/04/2020    PROTIME 16.4 (H) 10/06/2020    PROTIME 16.5 (H) 10/05/2020    PROTIME  10/04/2020      Comment:      Fingerstick pt     Lab Results   Component Value Date    HGB 11.9 (L) 10/03/2020    HGB 10.6 (L) 09/29/2020    HGB 9.5 (L) 09/25/2020    HCT 35.4 (L) 10/03/2020    HCT 33.5 (L) 09/29/2020    HCT 28.9 (L) 09/25/2020     10/03/2020     09/29/2020     09/25/2020       Recent Warfarin Administrations       The 5 most recent administrations since 09/25/2020 are shown below each listed medication.    Warfarin Sodium         Order Dose Date Given     warfarin (COUMADIN) half tablet 3.5 mg 3.5 mg 10/01/2020     warfarin (COUMADIN) tablet 3 mg 3 mg 09/30/2020      3 mg 09/29/2020     warfarin (COUMADIN) tablet 2.5 mg 2.5 mg 09/28/2020      2.5 mg 09/27/2020                    Assessment  Interacting medications: Amiodarone, aspirin  Patient has recently been refusing warfarin doses so INR is subtherapeutic; patient has received his dose the last two nights  No new h/h   Will continue to follow for s/s of bleeding  We will continue with 4 mg PO nightly    Plan:  1.  Give warfarin 4 mg tablet PO @ 1800 tonight  2.  Draw a PT/INR in AM  3.  Pharmacy will continue to follow    Norm TO" Nichelle Bustamante  10/06/20 09:42 CDT

## 2020-10-06 NOTE — PROGRESS NOTES
[facility] Medicine Services  INPATIENT PROGRESS NOTE    Length of Stay: 12  Date of Admission: 9/23/2020  Primary Care Physician: Wendie Quiñonez APRN    Subjective   Chief Complaint: No new complaints.    HPI: Patient is seen for follow-up.  He is doing well, slightly deconditioned and voices no new complaints.  Oral intake is slowly improving.      Review of Systems   Constitutional: Positive for activity change and fatigue. Negative for appetite change, chills, diaphoresis and fever.   HENT: Negative for trouble swallowing and voice change.    Eyes: Positive for visual disturbance. Negative for photophobia.   Respiratory: Negative for cough, choking, chest tightness, shortness of breath, wheezing and stridor.    Cardiovascular: Negative for chest pain, palpitations and leg swelling.   Gastrointestinal: Negative for abdominal distention, abdominal pain, blood in stool, constipation, diarrhea, nausea and vomiting.   Endocrine: Negative for cold intolerance, heat intolerance, polydipsia, polyphagia and polyuria.   Genitourinary: Negative for decreased urine volume, difficulty urinating, dysuria, enuresis, flank pain, frequency, hematuria and urgency.   Musculoskeletal: Negative for arthralgias, gait problem, myalgias, neck pain and neck stiffness.   Skin: Negative for pallor, rash and wound.   Neurological: Negative for dizziness, tremors, seizures, syncope, facial asymmetry, speech difficulty, weakness, light-headedness, numbness and headaches.   Hematological: Does not bruise/bleed easily.   Psychiatric/Behavioral: Negative for agitation, behavioral problems and confusion.       Objective    Temp:  [96.7 °F (35.9 °C)-97.9 °F (36.6 °C)] 96.9 °F (36.1 °C)  Heart Rate:  [65-89] 76  Resp:  [16-18] 18  BP: (115-157)/(52-76) 157/71    Physical Exam  Vitals signs and nursing note reviewed.   Constitutional:       General: He is not in acute distress.     Appearance: He is  well-developed. He is not diaphoretic.   HENT:      Head: Normocephalic and atraumatic.   Eyes:      General: No scleral icterus.     Pupils: Pupils are equal, round, and reactive to light.      Comments: Patient is legally blind.   Neck:      Musculoskeletal: Normal range of motion and neck supple.      Thyroid: No thyromegaly.      Vascular: No JVD.   Cardiovascular:      Rate and Rhythm: Normal rate and regular rhythm.      Heart sounds: Normal heart sounds. No murmur. No friction rub. No gallop.    Pulmonary:      Effort: Pulmonary effort is normal.      Breath sounds: Normal breath sounds. No wheezing or rales.   Chest:      Chest wall: No tenderness.   Abdominal:      General: Bowel sounds are normal. There is no distension.      Palpations: Abdomen is soft. There is no mass.      Tenderness: There is no abdominal tenderness. There is no guarding or rebound.   Musculoskeletal:         General: No tenderness or deformity.      Right lower leg: No edema.      Left lower leg: No edema.   Skin:     General: Skin is warm and dry.      Coloration: Skin is not pale.      Findings: No erythema or rash.   Neurological:      General: No focal deficit present.      Mental Status: He is alert and oriented to person, place, and time. Mental status is at baseline.      Cranial Nerves: No cranial nerve deficit.      Sensory: No sensory deficit.      Motor: No abnormal muscle tone.      Coordination: Coordination normal.      Deep Tendon Reflexes: Reflexes normal.   Psychiatric:         Behavior: Behavior normal.         Thought Content: Thought content normal.         Judgment: Judgment normal.           Medication Review:    Current Facility-Administered Medications:   •  acetaminophen (TYLENOL) tablet 650 mg, 650 mg, Oral, Q6H PRN, Samira Rodrigues MD, 650 mg at 09/25/20 0007  •  amiodarone (PACERONE) tablet 200 mg, 200 mg, Oral, Q24H, Molly Gr APRN, 200 mg at 10/06/20 0916  •  aspirin EC  tablet 81 mg, 81 mg, Oral, Daily, Samira Rodrigues MD, 81 mg at 10/06/20 0921  •  atorvastatin (LIPITOR) tablet 40 mg, 40 mg, Oral, Nightly, Samira Rodrigues MD, 40 mg at 10/04/20 2034  •  bisacodyl (DULCOLAX) EC tablet 5 mg, 5 mg, Oral, Daily PRN, Samira Rodrigues MD  •  carvedilol (COREG) tablet 6.25 mg, 6.25 mg, Oral, BID With Meals, Pedro Garcia MD, 6.25 mg at 10/06/20 0914  •  dronabinol (MARINOL) capsule 2.5 mg, 2.5 mg, Oral, BID, Pedro Garcia MD  •  furosemide (LASIX) tablet 40 mg, 40 mg, Oral, Daily, Marley Akhtar MD, 40 mg at 10/06/20 0917  •  hydrALAZINE (APRESOLINE) tablet 75 mg, 75 mg, Oral, Q8H, Pedro Garcia MD, 75 mg at 10/05/20 1319  •  ipratropium-albuterol (DUO-NEB) nebulizer solution 3 mL, 3 mL, Nebulization, Q4H PRN, Samira Rodrigues MD  •  isosorbide mononitrate (IMDUR) 24 hr tablet 30 mg, 30 mg, Oral, Q24H, Samira Rodrigues MD, 30 mg at 10/06/20 0921  •  magic butt ointment, , Topical, BID, Samira Rodrigues MD  •  metOLazone (ZAROXOLYN) tablet 2.5 mg, 2.5 mg, Oral, Every Other Day, Marley Akhtar MD, 2.5 mg at 10/06/20 0918  •  ondansetron (ZOFRAN) tablet 4 mg, 4 mg, Oral, Q6H PRN **OR** ondansetron (ZOFRAN) injection 4 mg, 4 mg, Intravenous, Q6H PRN, Samira Rodrigues MD, 4 mg at 10/01/20 1824  •  pantoprazole (PROTONIX) EC tablet 40 mg, 40 mg, Oral, QAM, Samira Rodrigues MD, 40 mg at 10/05/20 0605  •  Pharmacy to dose warfarin, , Does not apply, Continuous PRN, Samira Rodrigues MD  •  sodium chloride 0.9 % flush 10 mL, 10 mL, Intravenous, PRN, Tree Waggoner, DO  •  sodium chloride 0.9 % flush 10 mL, 10 mL, Intravenous, Q12H, Samira Rodrigues MD, 10 mL at 10/06/20 0914  •  sodium chloride 0.9 % flush 10 mL, 10 mL, Intravenous, PRN, Lilia,  Samira Enamorado MD  •  warfarin (COUMADIN) tablet 4 mg, 4 mg, Oral, Daily, [facility], Samira Enamorado MD, 4 mg at 10/05/20 1826    Results Review:  I have reviewed the labs, radiology results, and diagnostic studies.    Laboratory Data:   Results from last 7 days   Lab Units 10/06/20  0635 10/05/20  0511 10/04/20  0851   SODIUM mmol/L 137 138 141   POTASSIUM mmol/L 3.2* 3.8 3.5   CHLORIDE mmol/L 93* 94* 96*   CO2 mmol/L 33.0* 35.0* 30.0*   BUN mg/dL 67* 60* 55*   CREATININE mg/dL 3.39* 3.31* 2.80*   GLUCOSE mg/dL 121* 110* 180*   CALCIUM mg/dL 9.3 9.5 9.5   ANION GAP mmol/L 11.0 9.0 15.0     Estimated Creatinine Clearance: 20.7 mL/min (A) (by C-G formula based on SCr of 3.39 mg/dL (H)).  Results from last 7 days   Lab Units 09/30/20  0710   MAGNESIUM mg/dL 1.7   PHOSPHORUS mg/dL 2.8         Results from last 7 days   Lab Units 10/03/20  0603   WBC 10*3/mm3 7.64   HEMOGLOBIN g/dL 11.9*   HEMATOCRIT % 35.4*   PLATELETS 10*3/mm3 162     Results from last 7 days   Lab Units 10/06/20  0635 10/05/20  0511 10/04/20  0851 10/03/20  0744 10/02/20  0640   INR  1.26* 1.27* 1.60* 1.70* 1.78*       Culture Data:   No results found for: BLOODCX  No results found for: URINECX  No results found for: RESPCX  No results found for: WOUNDCX  No results found for: STOOLCX  No components found for: BODYFLD    Radiology Data:   Imaging Results (Last 24 Hours)     ** No results found for the last 24 hours. **          I have reviewed the patient's current medications.     Assessment/Plan     Hospital Problem List:  Active Problems:    Pneumonia of both lower lobes due to infectious organism    Acute respiratory failure with hypoxia secondary to Pneumonia/ CHF decompensation: Resolved.  Patient is maintaining O2 saturation in the upper 90s on room air and has completed a course of antimicrobial therapy. Blood culture showed no growth.    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 Echocardiogram done 8/21/2020 showed severe global hypokinesis with an ejection fraction of 20%.  Diuretic therapy has been changed to Lasix 40 mg daily and metolazone every other day due to worsening creatinine.  Continue daily weights, salt and fluid restriction. Inputs by nephrologist/cardiologist is appreciated.     Chronic kidney disease stage III/4: Patient's baseline creatinine is reportedly between 1.9-2.0. Creatinine is 3.39 today.  Patient did receive fluid bolus of 500 cc of saline yesterday.  Will defer further adjustment of ongoing diuretic therapy to the nephrologist.  Continue to monitor renal function and avoid nephrotoxins.  Input by nephrologist is appreciated.    Hypokalemia: Continue potassium repletion protocol.    History of atrial flutter: Patient is currently in normal sinus rhythm.  Continue amiodarone, Coreg and anticoagulation with warfarin.  INR is 1.26.  Patient reportedly has not been compliant with taking prescribed warfarin hence the low INR.  May consider heparin infusion if the need arises    Hypertension: Stable.  Continue Coreg and hydralazine.  Continue to make adjustments as needed for optimal blood pressure control.    Diabetes mellitus: Stable.  Continue anti-glycemic with Accu-Cheks and sliding scale insulin.    Acute cystitis: He has completed a course of antimicrobial therapy.  Repeat urine culture showed no growth.            History of GERD: Continue PPI.    Continue DVT prophylaxis with warfarin.    Nutrition: Continue appetite stimulants and diet as recommended by speech-language pathologist.     Deconditioning: Continue PT and OT.    Discharge Planning: In progress.      Pedro Gracia MD   10/06/20   10:25 CDT

## 2020-10-06 NOTE — PLAN OF CARE
Problem: Adult Inpatient Plan of Care  Goal: Plan of Care Review  Outcome: Ongoing, Progressing  Flowsheets (Taken 10/6/2020 0827)  Progress: improving  Plan of Care Reviewed With: patient  Outcome Summary: Pt seen for skilled ST therapu this morning. Pt was alert and agreed to PO trials of mecahnical soft. He is tolerating puree and thin with no noted s/s of aspiration. Pt kept eyes closed throughout session but answered all questions. He was repositioned upright in bed. [place] and thin liquids via straw presented. No s/s on thin liquids via straw. Pt did present with increased oral prep time on mechanical soft solids but no s/s of aspiration. He used lingual sweep and cleared oral cavity. SLP educated on slow rate, upright position, small bites/sips, cyclic eating, and oral sweep. Pt was in agreement but required verbal cues from SLP to utilize small bites/sips. Pt is a full feeding assist. Recommend upgrade to mechanical soft and thin liquids.

## 2020-10-06 NOTE — PLAN OF CARE
Patient refused all oral medications this shift.  No shortness of breath observed at this time.

## 2020-10-06 NOTE — PLAN OF CARE
Problem: Adult Inpatient Plan of Care  Goal: Plan of Care Review  Flowsheets  Taken 10/6/2020 1137  Plan of Care Reviewed With: patient  Taken 10/6/2020 1119  Progress: no change  Outcome Summary: Pt agrees to OT this am. Pt dependent for grooming tasks and self feeding. Pt would not attempt to feed himself. Pt tolerated PROM with R UE for 5 reps. No new goals met at this time.  Taken 10/6/2020 0715  Progress: no change  Plan of Care Reviewed With: patient

## 2020-10-06 NOTE — PROGRESS NOTES
"[facility]  [address] [place] [address]. [address]  T - [phone]  F - [phone]     Progress Note          PATIENT  DEMOGRAPHICS   PATIENT NAME: Ondina Alanis Sr.                      PHYSICIAN: Marley Akhtar MD  : 1940  MRN: [id number]   LOS: 12 days    Patient Care Team:  Wendie Quiñonez APRN as PCP - General (Nurse Practitioner)  Subjective   SUBJECTIVE   Resting no distress, responding to some questions        Objective   OBJECTIVE   Vital Signs  Temp:  [96.7 °F (35.9 °C)-97.9 °F (36.6 °C)] 96.9 °F (36.1 °C)  Heart Rate:  [65-89] 76  Resp:  [16-18] 18  BP: (115-157)/(52-76) 157/71    Flowsheet Rows      First Filed Value   Admission Height  193 cm (76\") Documented at 2020   Admission Weight  94.8 kg (209 lb) Documented at 2020           I/O last 3 completed shifts:  In: 600 [P.O.:600]  Out: 1225 [Urine:1225]    PHYSICAL EXAM    Physical Exam  Vitals signs reviewed.   Constitutional:       General: He is not in acute distress.     Appearance: He is well-developed.   Eyes:      General:         Right eye: No discharge.   Neck:      Vascular: JVD present.   Cardiovascular:      Rate and Rhythm: Normal rate and regular rhythm.      Heart sounds: Normal heart sounds, S1 normal and S2 normal. No murmur.   Pulmonary:      Effort: Pulmonary effort is normal. No respiratory distress.      Breath sounds: No rales.   Abdominal:      General: Bowel sounds are normal. There is no distension.      Palpations: Abdomen is soft.      Tenderness: There is no abdominal tenderness.   Skin:     Coloration: Skin is not pale.      Findings: No erythema.   Neurological:      General: No focal deficit present.      Mental Status: He is alert. Mental status is at baseline.      Sensory: No sensory deficit.      Motor: No abnormal muscle tone.         RESULTS   Results Review:    Results from last 7 days   Lab Units 10/06/20  0635 10/05/20  0511 10/04/20  0851   "   SODIUM mmol/L 137 138 141   POTASSIUM mmol/L 3.2* 3.8 3.5   CHLORIDE mmol/L 93* 94* 96*   CO2 mmol/L 33.0* 35.0* 30.0*   BUN mg/dL 67* 60* 55*   CREATININE mg/dL 3.39* 3.31* 2.80*   CALCIUM mg/dL 9.3 9.5 9.5   GLUCOSE mg/dL 121* 110* 180*       Estimated Creatinine Clearance: 20.7 mL/min (A) (by C-G formula based on SCr of 3.39 mg/dL (H)).    Results from last 7 days   Lab Units 09/30/20  0710   MAGNESIUM mg/dL 1.7   PHOSPHORUS mg/dL 2.8             Results from last 7 days   Lab Units 10/03/20  0603   WBC 10*3/mm3 7.64   HEMOGLOBIN g/dL 11.9*   PLATELETS 10*3/mm3 162       Results from last 7 days   Lab Units 10/06/20  0635 10/05/20  0511 10/04/20  0851 10/03/20  0744 10/02/20  0640   INR  1.26* 1.27* 1.60* 1.70* 1.78*         Imaging Results (Last 24 Hours)     ** No results found for the last 24 hours. **           MEDICATIONS    amiodarone, 200 mg, Oral, Q24H  aspirin, 81 mg, Oral, Daily  atorvastatin, 40 mg, Oral, Nightly  carvedilol, 6.25 mg, Oral, BID With Meals  dronabinol, 2.5 mg, Oral, BID  furosemide, 40 mg, Oral, Daily  hydrALAZINE, 75 mg, Oral, Q8H  isosorbide mononitrate, 30 mg, Oral, Q24H  magic butt ointment, , Topical, BID  metOLazone, 2.5 mg, Oral, Every Other Day  pantoprazole, 40 mg, Oral, QAM  potassium chloride, 20 mEq, Oral, Once  sodium chloride, 10 mL, Intravenous, Q12H  warfarin, 4 mg, Oral, Daily      Pharmacy to dose warfarin,         Assessment/Plan   ASSESSMENT / PLAN      Pneumonia of both lower lobes due to infectious organism    1.  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 good UO with iv lasix and albumin. Lost significant weight 181 lbs. Peak 209 lbs. Cr worsening with high bicarb and low chloride (contraction alkalosis). Lasix is lowered to daily - observe cr. Same as yesterday.  Keep metolazone every other day     2.  Hypertension- poorly controlled, hydralazine increased to 75 mg 3 times daily and  carvedilol to 12.5 mg twice daily  bp overall better.      3.  CHF- systolic heart failure EF 20%. severe LV systolic dysfunction with RV systolic pressure of 60 mmHg, cardiology following. cxr bilateral infiltrates vs edema     4.  A. Fib on amiodarone and coumain     5.  Pneumonia- on antibiotics per primary team     6.  UTI- on antibiotics per primary team    7. Altered mental status now alert, speech is evaluating and tolerating po well      dispo home with home health once renal function is stablized. If tomorrow cr is improving can discharge on current diuretics.                 This document has been electronically signed by Marley Akhtar MD on October 6, 2020 10:52 CDT

## 2020-10-06 NOTE — THERAPY TREATMENT NOTE
Patient Name: Ondina Alanis Sr.  : 1940    MRN: [id number]                              Today's Date: 10/6/2020       Admit Date: 2020    Visit Dx:     ICD-10-CM ICD-9-CM   1. Pneumonia of both lower lobes due to infectious organism  J18.9 486   2. Hypoxia  R09.02 799.02   3. Elevated troponin  R79.89 790.6   4. Congestive heart failure, unspecified HF chronicity, unspecified heart failure type (CMS/[facility])  I50.9 428.0   5. Dysphagia, unspecified type  R13.10 787.20   6. Impaired mobility and activities of daily living  Z74.09 V49.89    Z78.9    7. Impaired physical mobility  Z74.09 781.99     Patient Active Problem List   Diagnosis   • CKD (chronic kidney disease) stage 4, GFR 15-29 ml/min (CMS/[facility])   • Diabetic peripheral neuropathy associated with type 2 diabetes mellitus (CMS/[facility])   • Diabetes mellitus type II, uncontrolled (CMS/[facility])   • GERD (gastroesophageal reflux disease)   • Primary osteoarthritis of both knees   • Pulmonary embolism (CMS/[facility])   • BPH (benign prostatic hyperplasia)   • Benign essential hypertension   • Asthma   • Pleurisy   • Acute respiratory failure with hypoxia (CMS/[facility])   • Stage 1 acute kidney injury (CMS/[facility])   • Left ventricular diastolic dysfunction   • Exacerbation of asthma   • COPD exacerbation (CMS/[facility])   • Atrial flutter (CMS/[facility])   • Acute systolic CHF (congestive heart failure) (CMS/[facility])   • Pneumonia of both lower lobes due to infectious organism     Past Medical History:   Diagnosis Date   • Asthma    • Diabetes mellitus (CMS/[facility])    • GERD (gastroesophageal reflux disease)    • Hypertension    • Prostate disorder      Past Surgical History:   Procedure Laterality Date   • BACK SURGERY     • KNEE SURGERY Left    • PROSTATE SURGERY       General Information     Row Name 10/06/20 0850          Physical Therapy Time and Intention    Document Type  therapy note (daily note)  -CZ     Mode of Treatment  physical therapy  -CZ     Row Name 10/06/20 0850           General Information    Patient Profile Reviewed  yes  -       User Key  (r) = Recorded By, (t) = Taken By, (c) = Cosigned By    Initials Name Provider Type    Surya Perales, PT Physical Therapist        Mobility    No documentation.       Obj/Interventions     Row Name 10/06/20 0850          Range of Motion Comprehensive    General Range of Motion  lower extremity range of motion deficits identified  -     Row Name 10/06/20 0850          Motor Skills    Therapeutic Exercise  hip;knee;ankle  -     Row Name 10/06/20 0850          Hip (Therapeutic Exercise)    Hip (Therapeutic Exercise)  PROM (passive range of motion)  -     Hip PROM (Therapeutic Exercise)  supine;bilateral;aBduction;aDduction;external rotation;internal rotation;flexion;extension;10 repetitions  -     Row Name 10/06/20 0850          Knee (Therapeutic Exercise)    Knee (Therapeutic Exercise)  PROM (passive range of motion)  -     Knee PROM (Therapeutic Exercise)  bilateral;supine;10 repetitions;extension;flexion  -     Row Name 10/06/20 0850          Ankle (Therapeutic Exercise)    Ankle (Therapeutic Exercise)  PROM (passive range of motion)  -     Ankle PROM (Therapeutic Exercise)  supine;bilateral;dorsiflexion;plantarflexion  -     Row Name 10/06/20 0850          Balance    Comment, Balance  Ther ex: PROM, patient pleasant but did not participate much.  B hips and knees very stiff, ROM improved somewhat with stretching.  -       User Key  (r) = Recorded By, (t) = Taken By, (c) = Cosigned By    Initials Name Provider Type    Surya Perales, PT Physical Therapist        Goals/Plan     Row Name 10/06/20 0850          Bed Mobility Goal 1 (PT)    Activity/Assistive Device (Bed Mobility Goal 1, PT)  sit to supine/supine to sit;rolling to right;rolling to left  -     [place] Level/Cues Needed (Bed Mobility Goal 1, PT)  minimum assist (75% or more patient effort)  -     Time Frame (Bed Mobility Goal 1, PT)  long term  goal (LTG);by discharge  -CZ     Strategies/Barriers (Bed Mobility Goal 1, PT)  Poor ability to maneuver in bed.  -CZ     Progress/Outcomes (Bed Mobility Goal 1, PT)  goal not met  -CZ     Row Name 10/06/20 0850          ROM Goal 1 (PT)    ROM Goal 1 (PT)  Patient will tolerate AAROM and stretching to BLEs, to improve functional mobility.  -CZ     Time Frame (ROM Goal 1, PT)  long-term goal (LTG);by discharge  -CZ     Strategies/Barriers (ROM Goal 1, PT)  Fatigues easily, very debilitated.  -CZ     Row Name 10/06/20 0850          Patient Education Goal (PT)    Activity (Patient Education Goal, PT)  Patient will sit EOB with min Ax1, x 5 min with BUE support.  -CZ     Time Frame (Patient Education Goal, PT)  long term goal (LTG);by discharge  -CZ     Barriers (Patient Education Goal, PT)  Decreased hip and knee flexion, poor seated balance.  -CZ       User Key  (r) = Recorded By, (t) = Taken By, (c) = Cosigned By    Initials Name Provider Type    CZ Surya Rodriguez, PT Physical Therapist        Clinical Impression     Row Name 10/06/20 0850          Pain    Additional Documentation  Pain Scale: Numbers Pre/Post-Treatment (Group)  -CZ     Row Name 10/06/20 0850          Pain Scale: Numbers Pre/Post-Treatment    Pretreatment Pain Rating  0/10 - no pain  -CZ     Posttreatment Pain Rating  0/10 - no pain  -CZ     Row Name 10/06/20 0850          Plan of Care Review    Plan of Care Reviewed With  patient  -CZ     Row Name 10/06/20 0850          Therapy Assessment/Plan (PT)    Rehab Potential (PT)  fair, will monitor progress closely  -CZ     Criteria for Skilled Interventions Met (PT)  yes;skilled treatment is necessary  -CZ     Row Name 10/06/20 0850          Vital Signs    Post Systolic BP Rehab  157  -CZ     Post Treatment Diastolic BP  71  -CZ     Pretreatment Heart Rate (beats/min)  72  -CZ     Posttreatment Heart Rate (beats/min)  76  -CZ     Pre SpO2 (%)  98  -CZ     O2 Delivery Pre Treatment  room air  -CZ      Post SpO2 (%)  95  -CZ     Pre Patient Position  Supine  -CZ     Post Patient Position  Supine  -CZ     Row Name 10/06/20 0850          Positioning and Restraints    Pre-Treatment Position  in bed  -CZ     Post Treatment Position  bed  -CZ     In Bed  supine;call light within reach;encouraged to call for assist;exit alarm on;with nsg  -CZ       User Key  (r) = Recorded By, (t) = Taken By, (c) = Cosigned By    Initials Name Provider Type    Surya Perales, PT Physical Therapist        Outcome Measures     Row Name 10/06/20 0850          How much help from another person do you currently need...    Turning from your back to your side while in flat bed without using bedrails?  2  -CZ     Moving from lying on back to sitting on the side of a flat bed without bedrails?  2  -CZ     Moving to and from a bed to a chair (including a wheelchair)?  1  -CZ     Standing up from a chair using your arms (e.g., wheelchair, bedside chair)?  1  -CZ     Climbing 3-5 steps with a railing?  1  -CZ     To walk in hospital room?  1  -CZ     AM-PAC 6 Clicks Score (PT)  8  -CZ     Row Name 10/06/20 0850          Functional Assessment    Outcome Measure Options  AM-PAC 6 Clicks Basic Mobility (PT)  -CZ       User Key  (r) = Recorded By, (t) = Taken By, (c) = Cosigned By    Initials Name Provider Type    Surya Perales, PT Physical Therapist        Physical Therapy Education                 Title: PT OT SLP Therapies (In Progress)     Topic: Physical Therapy (In Progress)     Point: Mobility training (Done)     Learning Progress Summary           Patient Acceptance, E, VU,NR by  at 10/4/2020 1244    Comment: Educated on rolling technique, sit<-->supine technique; discussed PT POC and goals.                   Point: Home exercise program (Done)     Learning Progress Summary           Patient Acceptance, E, VU,NR by  at 10/6/2020 0935    Comment: Educated on supine strengthening and stretching activities, encouraged participation.                    Point: Body mechanics (Not Started)     Learner Progress:  Not documented in this visit.          Point: Precautions (Not Started)     Learner Progress:  Not documented in this visit.                      User Key     Initials Effective Dates Name Provider Type Discipline     04/03/18 -  Surya Rodriguez, PT Physical Therapist PT              PT Recommendation and Plan  Planned Therapy Interventions (PT): balance training, bed mobility training, strengthening, stretching, transfer training  Plan of Care Reviewed With: patient  Outcome Summary: PT treatment: patient is lethargic, pleasant, agreeable to therapy but does not participate much.  Supine PROM and stretching provided to hips/knees/ankles x 10 reps; 30 sec x3 for stretching. Patient denies pain. Goals remain appropriate, continue skilled PT.      Time Calculation:   PT Charges     Row Name 10/06/20 1041             Time Calculation    Start Time  0850  -      Stop Time  0916  -      Time Calculation (min)  26 min  -      PT Received On  10/06/20  -         Time Calculation- PT    Total Timed Code Minutes- PT  26 minute(s)  -         Timed Charges    63448 - PT Therapeutic Exercise Minutes  26  -CZ        User Key  (r) = Recorded By, (t) = Taken By, (c) = Cosigned By    Initials Name Provider Type    CZ Surya Rodriguez, PT Physical Therapist        Therapy Charges for Today     Code Description Service Date Service Provider Modifiers Qty    74092058824 HC PT THER PROC EA 15 MIN 10/6/2020 Surya Rodriguez, PT GP 2          PT G-Codes  Outcome Measure Options: AM-PAC 6 Clicks Basic Mobility (PT)  AM-PAC 6 Clicks Score (PT): 8  AM-PAC 6 Clicks Score (OT): 15    Surya Rodriguez PT  10/6/2020

## 2020-10-06 NOTE — PLAN OF CARE
Problem: Adult Inpatient Plan of Care  Goal: Plan of Care Review  10/6/2020 0941 by Surya Rodriguez, PT  Flowsheets (Taken 10/6/2020 0935)  Outcome Summary:  PT treatment: patient is lethargic, pleasant, agreeable to therapy but does not participate much.  Supine PROM and stretching provided to hips/knees/ankles x 10 reps; 30 sec x3 for stretching. Patient denies pain. Goals remain appropriate, continue skilled PT.

## 2020-10-06 NOTE — PLAN OF CARE
Problem: Adult Inpatient Plan of Care  Goal: Plan of Care Review  Outcome: Ongoing, Progressing  Flowsheets (Taken 10/6/2020 1119)  Plan of Care Reviewed With: family   MD notes edema resolved 10/5 and lasix decreased. Low Sodium diet- ST had pt on pureed 10/2-10/5 with intakes averaging 61% for those 3 days. ST upgraded to soft/ground this am. Noted wt on last admit 8/21 190# -Admit 9/24 wt 203#, BMI 24.8--today 182# w/ BMI 22.2 after diuretics. No alb since admit. Marinol 2.5 BID started 10/5.  Will add milk TID. RD

## 2020-10-07 NOTE — PLAN OF CARE
Patient pulled midline line out this evening.  No shortness of breath or respiratory difficulty at this time.

## 2020-10-07 NOTE — THERAPY TREATMENT NOTE
Acute Care - Physical Therapy Treatment Note  [facility]     Patient Name: Ondina Alanis Sr.  : 1940  MRN: [id number]  Today's Date: 10/7/2020   Onset of Illness/Injury or Date of Surgery: 20       PT Assessment (last 12 hours)      PT Evaluation and Treatment     Row Name 10/07/20 0958          Physical Therapy Time and Intention    Subjective Information  no complaints  -TA     Document Type  therapy note (daily note)  -TA     Mode of Treatment  physical therapy  -TA     Patient Effort  poor  -TA     Comment  pt defered EOB  -TA     Row Name 10/07/20 0958          General Information    Patient Profile Reviewed  yes  -TA     Row Name 10/07/20 0958          Cognition    Personal Safety Interventions  fall prevention program maintained;nonskid shoes/slippers when out of bed  -TA     Row Name 10/07/20 0958          Pain Scale: Numbers Pre/Post-Treatment    Pretreatment Pain Rating  0/10 - no pain  -TA     Posttreatment Pain Rating  0/10 - no pain  -TA     Row Name 10/07/20 0958          Range of Motion Comprehensive    General Range of Motion  lower extremity range of motion deficits identified  -TA     Row Name 10/07/20 0958          Bed Mobility    Supine-Sit [place] (Bed Mobility)  not tested  -TA     Sit-Supine [place] (Bed Mobility)  not tested  -TA     Row Name 10/07/20 0958          Hip (Therapeutic Exercise)    Hip (Therapeutic Exercise)  PROM (passive range of motion)  -TA     Hip PROM (Therapeutic Exercise)  bilateral;flexion;extension;aBduction;aDduction;supine  & SLR 20 reps  -TA     Row Name 10/07/20 0958          Knee (Therapeutic Exercise)    Knee (Therapeutic Exercise)  PROM (passive range of motion)  -TA     Knee PROM (Therapeutic Exercise)  bilateral;supine;flexion;extension 20 reps  -TA     Row Name 10/07/20 0958          Ankle (Therapeutic Exercise)    Ankle (Therapeutic Exercise)  PROM (passive range of motion)  -TA     Ankle PROM (Therapeutic Exercise)   bilateral;dorsiflexion;plantarflexion;supine 20 reps  -TA     Row Name 10/07/20 0958          Plan of Care Review    Plan of Care Reviewed With  patient  -TA     Outcome Summary  pt tolerated supine PROM of  B LE . pt will require 24/7 care @ D/C  -TA     Row Name 10/07/20 0958          Vital Signs    Pre Systolic BP Rehab  159  -TA     Pre Treatment Diastolic BP  78  -TA     Post Systolic BP Rehab  154  -TA     Post Treatment Diastolic BP  72  -TA     Pretreatment Heart Rate (beats/min)  56  -TA     Posttreatment Heart Rate (beats/min)  63  -TA     Pre SpO2 (%)  99  -TA     O2 Delivery Pre Treatment  room air  -TA     Post SpO2 (%)  99  -TA     O2 Delivery Post Treatment  room air  -TA     Pre Patient Position  Supine  -TA     Intra Patient Position  Supine  -TA     Post Patient Position  Supine  -TA     Row Name 10/07/20 0958          Bed Mobility Goal 1 (PT)    Activity/Assistive Device (Bed Mobility Goal 1, PT)  sit to supine/supine to sit;rolling to right;rolling to left  -TA     [place] Level/Cues Needed (Bed Mobility Goal 1, PT)  minimum assist (75% or more patient effort)  -TA     Time Frame (Bed Mobility Goal 1, PT)  long term goal (LTG);by discharge  -TA     Strategies/Barriers (Bed Mobility Goal 1, PT)  Poor ability to maneuver in bed.  -TA     Progress/Outcomes (Bed Mobility Goal 1, PT)  goal not met  -TA     Row Name 10/07/20 0958          ROM Goal 1 (PT)    ROM Goal 1 (PT)  Patient will tolerate AAROM and stretching to BLEs, to improve functional mobility.  -TA     Time Frame (ROM Goal 1, PT)  long-term goal (LTG);by discharge  -TA     Strategies/Barriers (ROM Goal 1, PT)  Fatigues easily, very debilitated.  -TA     Row Name 10/07/20 0958          Patient Education Goal (PT)    Activity (Patient Education Goal, PT)  Patient will sit EOB with min Ax1, x 5 min with BUE support.  -TA     Time Frame (Patient Education Goal, PT)  long term goal (LTG);by discharge  -TA     Barriers (Patient Education  Goal, PT)  Decreased hip and knee flexion, poor seated balance.  -TA     Row Name 10/07/20 0958          Positioning and Restraints    Pre-Treatment Position  in bed  -TA     Post Treatment Position  bed  -TA     In Bed  supine;call light within reach;exit alarm on  -TA     Row Name 10/07/20 0958          Therapy Assessment/Plan (PT)    Rehab Potential (PT)  fair, will monitor progress closely  -TA     Criteria for Skilled Interventions Met (PT)  yes;skilled treatment is necessary  -TA     Comment, Therapy Assessment/Plan (PT)  continue  -TA     Row Name 10/07/20 0958          Progress Summary (PT)    Progress Toward Functional Goals (PT)  unable to show any progress toward functional goals  -TA       User Key  (r) = Recorded By, (t) = Taken By, (c) = Cosigned By    Initials Name Provider Type    Geneva Beyer, PTA Physical Therapy Assistant        Physical Therapy Education                 Title: PT OT SLP Therapies (In Progress)     Topic: Physical Therapy (In Progress)     Point: Mobility training (Done)     Learning Progress Summary           Patient Acceptance, E, VU,NR by  at 10/4/2020 1244    Comment: Educated on rolling technique, sit<-->supine technique; discussed PT POC and goals.                   Point: Home exercise program (Done)     Learning Progress Summary           Patient Acceptance, E, VU,NR by  at 10/6/2020 0935    Comment: Educated on supine strengthening and stretching activities, encouraged participation.                   Point: Body mechanics (Not Started)     Learner Progress:  Not documented in this visit.          Point: Precautions (Not Started)     Learner Progress:  Not documented in this visit.                      User Key     Initials Effective Dates Name Provider Type Discipline     04/03/18 -  Surya Rodriguez, PT Physical Therapist PT              PT Recommendation and Plan  Anticipated Discharge Disposition (PT): skilled nursing facility  Therapy Frequency (PT):  daily  Progress Summary (PT)  Progress Toward Functional Goals (PT): unable to show any progress toward functional goals  Plan of Care Reviewed With: patient  Outcome Summary: pt tolerated supine PROM of  B LE . pt will require 24/7 care @ D/C  Outcome Measures     Row Name 10/07/20 0825 10/06/20 0715          How much help from another person do you currently need...    Turning from your back to your side while in flat bed without using bedrails?  2  -TA  --     Moving from lying on back to sitting on the side of a flat bed without bedrails?  2  -TA  --     Moving to and from a bed to a chair (including a wheelchair)?  1  -TA  --     Standing up from a chair using your arms (e.g., wheelchair, bedside chair)?  1  -TA  --     Climbing 3-5 steps with a railing?  1  -TA  --     To walk in hospital room?  1  -TA  --     AM-PAC 6 Clicks Score (PT)  8  -TA  --        How much help from another is currently needed...    Putting on and taking off regular lower body clothing?  1  -RC  1  -BB     Bathing (including washing, rinsing, and drying)  1  -RC  1  -BB     Toileting (which includes using toilet bed pan or urinal)  1  -RC  1  -BB     Putting on and taking off regular upper body clothing  1  -RC  1  -BB     Taking care of personal grooming (such as brushing teeth)  1  -RC  1  -BB     Eating meals  1  -RC  1  -BB     AM-PAC 6 Clicks Score (OT)  6  -RC  6  -BB        Functional Assessment    Outcome Measure Options  AM-PAC 6 Clicks Basic Mobility (PT)  -TA  --       User Key  (r) = Recorded By, (t) = Taken By, (c) = Cosigned By    Initials Name Provider Type    Geneva Beyer, PTA Physical Therapy Assistant    BB Lamar Trinidad, SOTOMAYOR/L Occupational Therapy Assistant    Jaylyn Kuhn, SOTOMAYOR/L Occupational Therapy Assistant           Time Calculation:   PT Charges     Row Name 10/07/20 1257             Time Calculation    Start Time  0958  -TA      Stop Time  1026  -TA      Time Calculation (min)  28 min  -TA       PT Received On  10/07/20  -TA         Time Calculation- PT    Total Timed Code Minutes- PT  28 minute(s)  -TA        User Key  (r) = Recorded By, (t) = Taken By, (c) = Cosigned By    Initials Name Provider Type    Geneva Beyer PTA Physical Therapy Assistant        Therapy Charges for Today     Code Description Service Date Service Provider Modifiers Qty    47881283172 HC PT THER PROC EA 15 MIN 10/7/2020 Geneva Preciado PTA GP 2          PT G-Codes  Outcome Measure Options: AM-PAC 6 Clicks Basic Mobility (PT)  AM-PAC 6 Clicks Score (PT): 8  AM-PAC 6 Clicks Score (OT): 6    Geneva Preciado PTA  10/7/2020

## 2020-10-07 NOTE — PROGRESS NOTES
"[facility]  [address] [place] [address]. [address]  T - [phone]  F - [phone]     Progress Note          PATIENT  DEMOGRAPHICS   PATIENT NAME: Ondina Alanis Sr.                      PHYSICIAN: Marley Akhtar MD  : 1940  MRN: [id number]   LOS: 13 days    Patient Care Team:  Wendie Quiñonez APRN as PCP - General (Nurse Practitioner)  Subjective   SUBJECTIVE   Resting no distress, burleson in place       Objective   OBJECTIVE   Vital Signs  Temp:  [96.1 °F (35.6 °C)-98.1 °F (36.7 °C)] 98.1 °F (36.7 °C)  Heart Rate:  [58-80] 67  Resp:  [16-18] 16  BP: (121-128)/(56-68) 122/58    Flowsheet Rows      First Filed Value   Admission Height  193 cm (76\") Documented at 2020 2200   Admission Weight  94.8 kg (209 lb) Documented at 2020 220           I/O last 3 completed shifts:  In: 480 [P.O.:480]  Out: 1725 [Urine:1725]    PHYSICAL EXAM    Physical Exam  Vitals signs reviewed.   Constitutional:       General: He is not in acute distress.     Appearance: He is well-developed.   Eyes:      General:         Right eye: No discharge.   Neck:      Vascular: JVD present.   Cardiovascular:      Rate and Rhythm: Normal rate and regular rhythm.      Heart sounds: Normal heart sounds, S1 normal and S2 normal. No murmur.   Pulmonary:      Effort: Pulmonary effort is normal. No respiratory distress.      Breath sounds: No rales.   Abdominal:      General: Bowel sounds are normal. There is no distension.      Palpations: Abdomen is soft.      Tenderness: There is no abdominal tenderness.   Skin:     Coloration: Skin is not pale.      Findings: No erythema.   Neurological:      General: No focal deficit present.      Mental Status: He is alert. Mental status is at baseline.      Sensory: No sensory deficit.      Motor: No abnormal muscle tone.         RESULTS   Results Review:    Results from last 7 days   Lab Units 10/07/20  0610 10/06/20  0635 10/05/20  0511   SODIUM mmol/L " 136 137 138   POTASSIUM mmol/L 3.9 3.2* 3.8   CHLORIDE mmol/L 94* 93* 94*   CO2 mmol/L 28.0 33.0* 35.0*   BUN mg/dL 76* 67* 60*   CREATININE mg/dL 3.71* 3.39* 3.31*   CALCIUM mg/dL 8.8 9.3 9.5   GLUCOSE mg/dL 103* 121* 110*       Estimated Creatinine Clearance: 18.8 mL/min (A) (by C-G formula based on SCr of 3.71 mg/dL (H)).                Results from last 7 days   Lab Units 10/07/20  0610 10/03/20  0603   WBC 10*3/mm3 7.02 7.64   HEMOGLOBIN g/dL 10.3* 11.9*   PLATELETS 10*3/mm3 160 162       Results from last 7 days   Lab Units 10/07/20  0610 10/06/20  0635 10/05/20  0511 10/04/20  0851 10/03/20  0744   INR  1.40* 1.26* 1.27* 1.60* 1.70*         Imaging Results (Last 24 Hours)     Procedure Component Value Units Date/Time    US Guided Vascular Access [[id number]] Resulted: 10/07/20 1122     Updated: 10/07/20 1122           MEDICATIONS    amiodarone, 200 mg, Oral, Q24H  aspirin, 81 mg, Oral, Daily  atorvastatin, 40 mg, Oral, Nightly  carvedilol, 6.25 mg, Oral, BID With Meals  dronabinol, 2.5 mg, Oral, BID  furosemide, 40 mg, Oral, Daily  hydrALAZINE, 75 mg, Oral, Q8H  isosorbide mononitrate, 30 mg, Oral, Q24H  magic butt ointment, , Topical, BID  metOLazone, 2.5 mg, Oral, Every Other Day  pantoprazole, 40 mg, Oral, QAM  sodium chloride, 10 mL, Intravenous, Q12H  warfarin, 5 mg, Oral, Daily      Pharmacy to dose warfarin,         Assessment/Plan   ASSESSMENT / PLAN      Pneumonia of both lower lobes due to infectious organism    1.  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 good UO with iv lasix and albumin. Lost significant weight 181 lbs. Peak 209 lbs. Cr worsening with high bicarb and low chloride (contraction alkalosis). Likely overdiuresed. Hold diuretics. Add albumin x 2     2.  Hypertension- poorly controlled, hydralazine increased to 75 mg 3 times daily and carvedilol to 12.5 mg twice daily  bp overall better.      3.  CHF- systolic  heart failure EF 20%. severe LV systolic dysfunction with RV systolic pressure of 60 mmHg, cardiology following. cxr bilateral infiltrates vs edema     4.  A. Fib on amiodarone and coumain     5.  Pneumonia- on antibiotics per primary team     6.  UTI- on antibiotics per primary team    7. Altered mental status now alert, speech is evaluating and tolerating po well      dispo home with home health once renal function is stablized.              This document has been electronically signed by Marley Akhtar MD on October 7, 2020 11:28 CDT

## 2020-10-07 NOTE — THERAPY TREATMENT NOTE
Acute Care - Occupational Therapy Treatment Note  [facility]     Patient Name: Ondina Alanis Sr.  : 1940  MRN: [id number]  Today's Date: 10/7/2020  Onset of Illness/Injury or Date of Surgery: 20  Date of Referral to OT: 10/01/20       Admit Date: 2020       ICD-10-CM ICD-9-CM   1. Pneumonia of both lower lobes due to infectious organism  J18.9 486   2. Hypoxia  R09.02 799.02   3. Elevated troponin  R79.89 790.6   4. Congestive heart failure, unspecified HF chronicity, unspecified heart failure type (CMS/HCC)  I50.9 428.0   5. Dysphagia, unspecified type  R13.10 787.20   6. Impaired mobility and activities of daily living  Z74.09 V49.89    Z78.9    7. Impaired physical mobility  Z74.09 781.99     Patient Active Problem List   Diagnosis   • CKD (chronic kidney disease) stage 4, GFR 15-29 ml/min (CMS/[facility])   • Diabetic peripheral neuropathy associated with type 2 diabetes mellitus (CMS/[facility])   • Diabetes mellitus type II, uncontrolled (CMS/[facility])   • GERD (gastroesophageal reflux disease)   • Primary osteoarthritis of both knees   • Pulmonary embolism (CMS/HCC)   • BPH (benign prostatic hyperplasia)   • Benign essential hypertension   • Asthma   • Pleurisy   • Acute respiratory failure with hypoxia (CMS/[facility])   • Stage 1 acute kidney injury (CMS/HCC)   • Left ventricular diastolic dysfunction   • Exacerbation of asthma   • COPD exacerbation (CMS/[facility])   • Atrial flutter (CMS/HCC)   • Acute systolic CHF (congestive heart failure) (CMS/[facility])   • Pneumonia of both lower lobes due to infectious organism     Past Medical History:   Diagnosis Date   • Asthma    • Diabetes mellitus (CMS/HCC)    • GERD (gastroesophageal reflux disease)    • Hypertension    • Prostate disorder      Past Surgical History:   Procedure Laterality Date   • BACK SURGERY     • KNEE SURGERY Left    • PROSTATE SURGERY            OT ASSESSMENT FLOWSHEET (last 12 hours)      OT Evaluation and Treatment     Row Name 10/07/20 0825                    OT Time and Intention    Subjective Information  no complaints  -RC        Document Type  therapy note (daily note)  -RC        Mode of Treatment  occupational therapy;individual therapy  -RC        Total Minutes, Occupational Therapy  53  -RC        Patient Effort  adequate  -RC           General Information    Existing Precautions/Restrictions  fall legally blind   -RC           Cognition    Affect/Mental Status (Cognitive)  WFL  -RC           Pain Scale: Numbers Pre/Post-Treatment    Pretreatment Pain Rating  0/10 - no pain  -RC        Posttreatment Pain Rating  0/10 - no pain  -RC           Shoulder (Therapeutic Exercise)    Shoulder (Therapeutic Exercise)  AAROM (active assistive range of motion)  -RC        Shoulder AROM (Therapeutic Exercise)  right;left;flexion;aDduction;aBduction  -RC        Shoulder AAROM (Therapeutic Exercise)  5 repetitions 2 sets  -RC           Elbow/Forearm (Therapeutic Exercise)    Elbow/Forearm (Therapeutic Exercise)  AAROM (active assistive range of motion)  -RC        Elbow/Forearm AROM (Therapeutic Exercise)  left;right;flexion;extension;5 repetitions;2 sets;supine  -RC           Wrist (Therapeutic Exercise)    Wrist (Therapeutic Exercise)  AAROM (active assistive range of motion)  -RC        Wrist AROM (Therapeutic Exercise)  left;right;flexion;extension  -RC        Wrist AAROM (Therapeutic Exercise)  5 repetitions x2  -RC           Hand (Therapeutic Exercise)    Hand AROM/AAROM (Therapeutic Exercise)  left;right;AAROM (active assistive range of motion);finger flexion;finger extension 5x2  -RC           Grooming Assessment/Training    [place] (Grooming)  oral care regimen;wash face, hands;minimum assist (75% patient effort)  -RC        Position (Grooming)  sitting up in bed  -RC           Plan of Care Review    Outcome Summary  pt agreeable to ue ex and grooming act. needs vc's for all act   cont poc   -RC           Vital Signs    Pre Systolic BP  Rehab  185  -RC        Pre Treatment Diastolic BP  75  -RC        Pretreatment Heart Rate (beats/min)  70  -RC        Pre SpO2 (%)  97  -RC        O2 Delivery Pre Treatment  room air  -RC           Positioning and Restraints    Pre-Treatment Position  in bed  -RC        Post Treatment Position  bed  -RC           Progress Summary (OT)    Progress Toward Functional Goals (OT)  progress toward functional goals as expected  -          User Key  (r) = Recorded By, (t) = Taken By, (c) = Cosigned By    Initials Name Effective Dates    RC Marc Jaylyn EDMOND, SOTOMAYOR/DIONNE 03/07/18 -                OT Recommendation and Plan     Progress Toward Functional Goals (OT): progress toward functional goals as expected  Plan of Care Review  Plan of Care Reviewed With: patient  Progress: improving  Outcome Summary: pt agreeable to ue ex and grooming act. needs vc's for all act   cont poc   Plan of Care Reviewed With: patient  Outcome Summary: pt agreeable to ue ex and grooming act. needs vc's for all act   cont poc     Outcome Measures     Row Name 10/07/20 0825 10/06/20 0715          How much help from another person do you currently need...    Turning from your back to your side while in flat bed without using bedrails?  2  -TA  --     Moving from lying on back to sitting on the side of a flat bed without bedrails?  2  -TA  --     Moving to and from a bed to a chair (including a wheelchair)?  1  -TA  --     Standing up from a chair using your arms (e.g., wheelchair, bedside chair)?  1  -TA  --     Climbing 3-5 steps with a railing?  1  -TA  --     To walk in hospital room?  1  -TA  --     AM-PAC 6 Clicks Score (PT)  8  -TA  --        How much help from another is currently needed...    Putting on and taking off regular lower body clothing?  1  -RC  1  -BB     Bathing (including washing, rinsing, and drying)  1  -RC  1  -BB     Toileting (which includes using toilet bed pan or urinal)  1  -RC  1  -BB     Putting on and taking off regular  upper body clothing  1  -RC  1  -BB     Taking care of personal grooming (such as brushing teeth)  1  -RC  1  -BB     Eating meals  1  -RC  1  -BB     AM-PAC 6 Clicks Score (OT)  6  -RC  6  -BB        Functional Assessment    Outcome Measure Options  AM-PAC 6 Clicks Basic Mobility (PT)  -TA  --       User Key  (r) = Recorded By, (t) = Taken By, (c) = Cosigned By    Initials Name Provider Type    TA Geneva Preciado, PTA Physical Therapy Assistant    BB Lamar Trinidad SOTOMAYOR/L Occupational Therapy Assistant    Jaylyn Kuhn SOTOMAYOR/L Occupational Therapy Assistant          Time Calculation:   Time Calculation- OT     Row Name 10/07/20 1257             Time Calculation- OT    OT Start Time  0825  -      OT Stop Time  0925  -      OT Time Calculation (min)  60 min  -      Total Timed Code Minutes- OT  53 minute(s)  -      OT Non-Billable Time (min)  7 min  -      OT Received On  10/07/20  -        User Key  (r) = Recorded By, (t) = Taken By, (c) = Cosigned By    Initials Name Provider Type     Jaylyn Tran SOTOMAYOR/L Occupational Therapy Assistant        Therapy Charges for Today     Code Description Service Date Service Provider Modifiers Qty    32978884770 HC OT SELF CARE/MGMT/TRAIN EA 15 MIN 10/7/2020 Jaylyn Tran SOTOMAYOR/L GO 2    51085108862 HC OT THER PROC EA 15 MIN 10/7/2020 Jaylyn Tran, SOTOMAYOR/L GO 2               Jaylyn Tran SOTOMAYOR/L  10/7/2020

## 2020-10-07 NOTE — SIGNIFICANT NOTE
10/07/20 1123   OTHER   Discipline speech language pathologist   Rehab Time/Intention   Session Not Performed patient unavailable for treatment     Pt was not available for tx this date. He was out for procedure - midline placement. Continue current diet and POC.     Yenifer Kate MS CCC-SLP

## 2020-10-07 NOTE — PROGRESS NOTES
TWO PATIENT IDENTIFIERS WERE USED. THE PATIENT WAS DRAPED WITH A FULL BODY DRAPE AND THE PATIENT'S LEFT ARM WAS PREPPED WITH CHLORA PREP. ULTRASOUND WAS USED TO LOCALIZE THELEFT CEPHALIC VEIN. SUBCUTANEOUS TISSUE AT THE CATHETER SITE WAS INFILTRATED WITH 2% LIDOCAINE. UNDER ULTRASOUND GUIDANCE, THE VEIN WAS ACCESSED WITH A 21 GAUGE  NEEDLE. AN 0.018 WIRE WAS THEN THREADED THROUGH THE NEEDLE. THE 21 GAUGE NEEDLE WAS REMOVED AND A 4 Bulgarian SHEATH WAS PLACED OVER THE WIRE INTO THE VEIN.THE MIDLINE CATHETER WAS TRIMMED TO 20CM. THE MIDLINE CATHETER WAS THEN PLACED OVER THE WIRE INTO THE VEIN, THE SHEATH WAS PEELED AWAY, WIRE WAS REMOVED. CATHETER WAS FLUSHED WITH NORMAL SALINE AND CATHETER TIP APPLIED. BIOPATCH PLACED. CATHETER SECURED WITH STAT LOCK AND TEGADERM. PATIENT TOLERATED PROCEDURE WELL. THIS WAS DONE IN THE ANGIOSUITE      IMPRESSION:SUCCESSFUL PLACEMENT OF SINGLE LUMEN MIDLINE.           Wendie Zapata  10/7/2020  11:50 CDT

## 2020-10-07 NOTE — PLAN OF CARE
Problem: Adult Inpatient Plan of Care  Goal: Plan of Care Review  Recent Flowsheet Documentation  Taken 10/7/2020 0958 by Geneva Preciado PTA  Plan of Care Reviewed With: patient  Outcome Summary: pt tolerated supine PROM of  B LE . pt will require 24/7 care @ D/C

## 2020-10-07 NOTE — PLAN OF CARE
Problem: Adult Inpatient Plan of Care  Goal: Plan of Care Review  Outcome: Ongoing, Progressing  Flowsheets  Taken 10/7/2020 1256  Progress: improving  Plan of Care Reviewed With: patient  Taken 10/7/2020 2256  Outcome Summary: pt agreeable to ue ex and grooming act. needs vc's for all act  AAROM to shoulders elbown wrist digits . cont poc

## 2020-10-07 NOTE — PROGRESS NOTES
"Anticoagulation by Pharmacy - Warfarin    Ondina Alanis Sr. 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 Discharged home and receiving 5 mg nightly per Wife to finish out old bottle before new prescription of 3 mg nightly. Patient only received a few doses of 3 mg before being admitted. Patient's wife checks INR at home every Monday and INR has been in range     INR Goal: 2-3  Last INR:   Lab Results   Component Value Date    INR 1.40 (H) 10/07/2020       Objective:  [Ht: 193 cm (76\"); Wt: 82.4 kg (181 lb 9.6 oz)]  Lab Results   Component Value Date    INR 1.40 (H) 10/07/2020    INR 1.26 (H) 10/06/2020    INR 1.27 (H) 10/05/2020    PROTIME  10/07/2020      Comment:      fingerstick    PROTIME 16.4 (H) 10/06/2020    PROTIME 16.5 (H) 10/05/2020     Lab Results   Component Value Date    HGB 10.3 (L) 10/07/2020    HGB 11.9 (L) 10/03/2020    HGB 10.6 (L) 09/29/2020    HCT 31.3 (L) 10/07/2020    HCT 35.4 (L) 10/03/2020    HCT 33.5 (L) 09/29/2020     10/07/2020     10/03/2020     09/29/2020       Recent Warfarin Administrations       The 5 most recent administrations since 09/30/2020 are shown below each listed medication.    Warfarin Sodium         Order Dose Date Given     warfarin (COUMADIN) tablet 4 mg 4 mg 10/06/2020      4 mg 10/05/2020      4 mg 10/04/2020     warfarin (COUMADIN) half tablet 3.5 mg 3.5 mg 10/01/2020     warfarin (COUMADIN) tablet 3 mg 3 mg 09/30/2020                    Assessment  Interacting medications: Amiodarone can increase the affect of warfarin on the INR  INR is subtherapeutic, but trended up only slightly from yesterday  INR was collected via fingerstick which historically runs higher than standard  checks  H&H noted, no s/sx of bleeding documented  Will increase dose today    Plan:  1.  Give warfarin 5 mg tablet PO @ 1800 tonight  2.  Draw a PT/INR in AM  3.  Pharmacy will " continue to follow    Jaxson Dye, PharmD  10/07/20 09:39 CDT

## 2020-10-08 NOTE — PROGRESS NOTES
"Anticoagulation by Pharmacy - Warfarin    Ondina Alanis Sr. 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 Discharged home and receiving 5 mg nightly per Wife to finish out old bottle before new prescription of 3 mg nightly. Patient only received a few doses of 3 mg before being admitted. Patient's wife checks INR at home every Monday and INR has been in range    Last INR:   Lab Results   Component Value Date    INR 1.28 (H) 10/08/2020       Objective:  [Ht: 193 cm (76\"); Wt: 84.6 kg (186 lb 9.6 oz)]  Lab Results   Component Value Date    INR 1.28 (H) 10/08/2020    INR 1.40 (H) 10/07/2020    INR 1.26 (H) 10/06/2020    PROTIME 16.6 (H) 10/08/2020    PROTIME  10/07/2020      Comment:      fingerstick    PROTIME 16.4 (H) 10/06/2020     Lab Results   Component Value Date    HGB 10.3 (L) 10/07/2020    HGB 11.9 (L) 10/03/2020    HGB 10.6 (L) 09/29/2020    HCT 31.3 (L) 10/07/2020    HCT 35.4 (L) 10/03/2020    HCT 33.5 (L) 09/29/2020     10/07/2020     10/03/2020     09/29/2020       Recent Warfarin Administrations       The 5 most recent administrations since 10/01/2020 are shown below each listed medication.    Warfarin Sodium         Order Dose Date Given     warfarin (COUMADIN) tablet 5 mg 5 mg 10/07/2020     warfarin (COUMADIN) tablet 4 mg 4 mg 10/06/2020      4 mg 10/05/2020      4 mg 10/04/2020     warfarin (COUMADIN) half tablet 3.5 mg 3.5 mg 10/01/2020                    Assessment  Interacting medications: Amiodarone can increase the affect of warfarin on the INR  INR is 1.28, subtherapeutic, trending down today.  INR was collected via blood draw today.    No H/H today  Dose increased yesterday, will continue to trend warfarin 5 mg nightly tonight.  Will monitor as patient almost reached supratherapeutic level on a lower weekly regimen.     Plan:  1.  Give warfarin 5 mg tablet PO @ 1800 tonight  2.  Draw a " PT/INR in AM  3.  Pharmacy will continue to follow    Tung Colon, PharmD  10/08/20 15:13 CDT

## 2020-10-08 NOTE — THERAPY TREATMENT NOTE
Acute Care - Physical Therapy Treatment Note  [facility]     Patient Name: Ondina Alanis Sr.  : 1940  MRN: [id number]  Today's Date: 10/8/2020   Onset of Illness/Injury or Date of Surgery: 20       PT Assessment (last 12 hours)      PT Evaluation and Treatment     Row Name 10/08/20 0948          Physical Therapy Time and Intention    Subjective Information  no complaints  -     Document Type  therapy note (daily note)  -     Mode of Treatment  physical therapy  -     Patient Effort  poor  -     Symptoms Noted During/After Treatment  fatigue  -     Comment  Pt. ROM AA-PROM due to pt. would initially assist with ROM, but then PROM during performance  -     Row Name 10/08/20 0948          General Information    Patient Profile Reviewed  yes  -     Row Name 10/08/20 0948          Pain Scale: Numbers Pre/Post-Treatment    Pretreatment Pain Rating  0/10 - no pain  -     Posttreatment Pain Rating  0/10 - no pain  -     Row Name 10/08/20 0948          Bed Mobility    Supine-Sit [place] (Bed Mobility)  not tested  -     Sit-Supine [place] (Bed Mobility)  not tested  -     Row Name 10/08/20 0948          Hip (Therapeutic Exercise)    Hip PROM (Therapeutic Exercise)  aBduction;aDduction;external rotation;internal rotation SLR x20   -     Row Name 10/08/20 0948          Knee (Therapeutic Exercise)    Knee PROM (Therapeutic Exercise)  flexion;extension x20  -[facility] Name 10/08/20 0948          Ankle (Therapeutic Exercise)    Ankle PROM (Therapeutic Exercise)  bilateral;dorsiflexion;plantarflexion x20  -     Row Name 10/08/20 0948          Vital Signs    Pre Systolic BP Rehab  136  -     Pre Treatment Diastolic BP  65  -     Pretreatment Heart Rate (beats/min)  67  -JA     Pre Patient Position  Supine  -     Intra Patient Position  Supine  -     Post Patient Position  Supine  -     Row Name 10/08/20 0948          Bed Mobility Goal 1 (PT)     Activity/Assistive Device (Bed Mobility Goal 1, PT)  sit to supine/supine to sit;rolling to right;rolling to left  -     [place] Level/Cues Needed (Bed Mobility Goal 1, PT)  minimum assist (75% or more patient effort)  -     Time Frame (Bed Mobility Goal 1, PT)  long term goal (LTG);by discharge  -     Strategies/Barriers (Bed Mobility Goal 1, PT)  Poor ability to maneuver in bed.  -     Progress/Outcomes (Bed Mobility Goal 1, PT)  goal not met  -     Row Name 10/08/20 0948          ROM Goal 1 (PT)    ROM Goal 1 (PT)  Patient will tolerate AAROM and stretching to BLEs, to improve functional mobility.  -     Time Frame (ROM Goal 1, PT)  long-term goal (LTG);by discharge  -     Strategies/Barriers (ROM Goal 1, PT)  Fatigues easily, very debilitated.  -     Row Name 10/08/20 0948          Patient Education Goal (PT)    Activity (Patient Education Goal, PT)  Patient will sit EOB with min Ax1, x 5 min with BUE support.  -     Time Frame (Patient Education Goal, PT)  long term goal (LTG);by discharge  -     Barriers (Patient Education Goal, PT)  Decreased hip and knee flexion, poor seated balance.  -     Row Name 10/08/20 0948          Therapy Assessment/Plan (PT)    Rehab Potential (PT)  fair, will monitor progress closely  -     Criteria for Skilled Interventions Met (PT)  yes;skilled treatment is necessary  -     Row Name 10/08/20 0948          Progress Summary (PT)    Progress Toward Functional Goals (PT)  unable to show any progress toward functional goals  -       User Key  (r) = Recorded By, (t) = Taken By, (c) = Cosigned By    Initials Name Provider Type    Teo Deras PTA Physical Therapy Assistant        Physical Therapy Education                 Title: PT OT SLP Therapies (In Progress)     Topic: Physical Therapy (In Progress)     Point: Mobility training (Done)     Learning Progress Summary           Patient Acceptance, E, VU,NR by ANGELA at 10/4/2020 2596    Comment:  Educated on rolling technique, sit<-->supine technique; discussed PT POC and goals.                   Point: Home exercise program (Done)     Learning Progress Summary           Patient Acceptance, E, VU,NR by ANGELA at 10/6/2020 0935    Comment: Educated on supine strengthening and stretching activities, encouraged participation.                   Point: Body mechanics (Not Started)     Learner Progress:  Not documented in this visit.          Point: Precautions (Not Started)     Learner Progress:  Not documented in this visit.                      User Key     Initials Effective Dates Name Provider Type Discipline     04/03/18 -  Surya Rodriguez, PT Physical Therapist PT              PT Recommendation and Plan  Anticipated Discharge Disposition (PT): skilled nursing facility  Therapy Frequency (PT): daily  Progress Summary (PT)  Progress Toward Functional Goals (PT): unable to show any progress toward functional goals  Plan of Care Reviewed With: patient  Progress: declining  Outcome Summary: PROM performed this a.m. Pt. initially would assist with ROM, but during performance majority PROM this a.m.  Outcome Measures     Row Name 10/08/20 0900 10/07/20 0825 10/06/20 0715       How much help from another person do you currently need...    Turning from your back to your side while in flat bed without using bedrails?  --  2  -TA  --    Moving from lying on back to sitting on the side of a flat bed without bedrails?  --  2  -TA  --    Moving to and from a bed to a chair (including a wheelchair)?  --  1  -TA  --    Standing up from a chair using your arms (e.g., wheelchair, bedside chair)?  --  1  -TA  --    Climbing 3-5 steps with a railing?  --  1  -TA  --    To walk in hospital room?  --  1  -TA  --    AM-PAC 6 Clicks Score (PT)  --  8  -TA  --       How much help from another is currently needed...    Putting on and taking off regular lower body clothing?  1  -RC  1  -RC  1  -BB    Bathing (including washing,  rinsing, and drying)  1  -RC  1  -RC  1  -BB    Toileting (which includes using toilet bed pan or urinal)  1  -RC  1  -RC  1  -BB    Putting on and taking off regular upper body clothing  1  -RC  1  -RC  1  -BB    Taking care of personal grooming (such as brushing teeth)  1  -RC  1  -RC  1  -BB    Eating meals  1  -RC  1  -RC  1  -BB    AM-PAC 6 Clicks Score (OT)  6  -RC  6  -RC  6  -BB       Functional Assessment    Outcome Measure Options  --  AM-PAC 6 Clicks Basic Mobility (PT)  -TA  --      User Key  (r) = Recorded By, (t) = Taken By, (c) = Cosigned By    Initials Name Provider Type    TA Geneva Preciado PTA Physical Therapy Assistant    BB Lamar Trinidad SOTOMAYOR/L Occupational Therapy Assistant    Jaylyn Kuhn SOTOMAYOR/L Occupational Therapy Assistant           Time Calculation:   PT Charges     Row Name 10/08/20 1140             Time Calculation    Start Time  0948  -EUSEBIO      Stop Time  1005  -EUSEBIO      Time Calculation (min)  17 min  -EUSEBIO         Time Calculation- PT    Total Timed Code Minutes- PT  17 minute(s)  -EUSEBIO        User Key  (r) = Recorded By, (t) = Taken By, (c) = Cosigned By    Initials Name Provider Type    Teo Deras PTA Physical Therapy Assistant        Therapy Charges for Today     Code Description Service Date Service Provider Modifiers Qty    43768643461 HC PT THER PROC EA 15 MIN 10/8/2020 Teo Kebede PTA GP 1          PT G-Codes  Outcome Measure Options: AM-PAC 6 Clicks Basic Mobility (PT)  AM-PAC 6 Clicks Score (PT): 8  AM-PAC 6 Clicks Score (OT): 6    Teo Kebede PTA  10/8/2020

## 2020-10-08 NOTE — PROGRESS NOTES
[facility] Medicine Services  INPATIENT PROGRESS NOTE    Length of Stay: 14  Date of Admission: 9/23/2020  Primary Care Physician: Wendie Quiñonez APRN    Subjective   Chief Complaint: shortness of breath   HPI:  80 year old -American male with past medical history of asthma, DM, GERD, HTN, BPH who presented on 9- with complaints of shortness of breath.  He is currently admitted for respiratory failure related to CHF and pneumonia.  During today's visit, patient denies complaints besides chronic pain issues.  He denies shortness of breath.  Creatinine has trended upward to 3.83 today.      Review of Systems   Constitutional: Negative for chills and fever.   HENT: Negative for congestion, rhinorrhea and sore throat.    Respiratory: Negative for cough, shortness of breath and wheezing.    Cardiovascular: Positive for leg swelling. Negative for chest pain and palpitations.   Gastrointestinal: Negative for abdominal pain, nausea and vomiting.   Musculoskeletal: Positive for back pain. Negative for neck pain.   Skin: Negative for pallor.   Neurological: Negative for dizziness and weakness.        All pertinent negatives and positives are as above. All other systems have been reviewed and are negative unless otherwise stated.     Objective    Temp:  [97.1 °F (36.2 °C)-98.8 °F (37.1 °C)] 97.1 °F (36.2 °C)  Heart Rate:  [63-72] 72  Resp:  [16-18] 18  BP: (111-149)/(58-66) 144/66    Physical Exam  Vitals signs and nursing note reviewed.   Constitutional:       General: He is not in acute distress.     Appearance: Normal appearance.   HENT:      Head: Normocephalic and atraumatic.      Right Ear: External ear normal.      Left Ear: External ear normal.      Nose: Nose normal.      Mouth/Throat:      Pharynx: Oropharynx is clear.   Eyes:      General:         Right eye: No discharge.         Left eye: No discharge.      Conjunctiva/sclera: Conjunctivae normal.       Comments: Legally blind   Neck:      Musculoskeletal: Normal range of motion and neck supple.   Cardiovascular:      Rate and Rhythm: Normal rate and regular rhythm.      Heart sounds: Normal heart sounds. No murmur. No friction rub. No gallop.    Pulmonary:      Effort: No respiratory distress.      Breath sounds: Normal breath sounds. No stridor. No wheezing or rales.   Abdominal:      General: Bowel sounds are normal. There is no distension.      Palpations: Abdomen is soft.      Tenderness: There is no abdominal tenderness.   Musculoskeletal: Normal range of motion.         General: Swelling present.      Comments: Trace lower extremity edema   Skin:     General: Skin is warm and dry.   Neurological:      General: No focal deficit present.      Mental Status: He is alert. Mental status is at baseline.   Psychiatric:         Mood and Affect: Mood normal.         Behavior: Behavior normal.             Results Review:  I have reviewed the labs, radiology results, and diagnostic studies.    Laboratory Data:   Results from last 7 days   Lab Units 10/08/20  0544 10/07/20  0610 10/06/20  0635   SODIUM mmol/L 135* 136 137   POTASSIUM mmol/L 3.5 3.9 3.2*   CHLORIDE mmol/L 92* 94* 93*   CO2 mmol/L 31.0* 28.0 33.0*   BUN mg/dL 80* 76* 67*   CREATININE mg/dL 3.83* 3.71* 3.39*   GLUCOSE mg/dL 96 103* 121*   CALCIUM mg/dL 9.3 8.8 9.3   ANION GAP mmol/L 12.0 14.0 11.0     Estimated Creatinine Clearance: 18.7 mL/min (A) (by C-G formula based on SCr of 3.83 mg/dL (H)).          Results from last 7 days   Lab Units 10/07/20  0610 10/03/20  0603   WBC 10*3/mm3 7.02 7.64   HEMOGLOBIN g/dL 10.3* 11.9*   HEMATOCRIT % 31.3* 35.4*   PLATELETS 10*3/mm3 160 162     Results from last 7 days   Lab Units 10/08/20  0544 10/07/20  0610 10/06/20  0635 10/05/20  0511 10/04/20  0851   INR  1.28* 1.40* 1.26* 1.27* 1.60*       Culture Data:   No results found for: BLOODCX  No results found for: URINECX  No results found for: RESPCX  No results  found for: WOUNDCX  No results found for: STOOLCX  No components found for: BODYFLD    Radiology Data:   Imaging Results (Last 24 Hours)     ** No results found for the last 24 hours. **          I have reviewed the patient's current medications.     Assessment/Plan     Active Hospital Problems    Diagnosis   • Pneumonia of both lower lobes due to infectious organism   • Acute systolic CHF (congestive heart failure) (CMS/[facility])   • Acute respiratory failure with hypoxia (CMS/[facility])   • CKD (chronic kidney disease) stage 4, GFR 15-29 ml/min (CMS/[facility])   • Diabetes mellitus type II, uncontrolled (CMS/[facility])   • Benign essential hypertension       Plan:    1. Acute respiratory failure with hypoxia: related to pneumonia/CHF decompensation: Resolved.  Weaned to room air.  Completed antibiotic therapy.  Continue diuresis as renal function allows.   2. Acute exacerbation of heart failure with reduced EF: (secondary to ischemic cardiomyopathy): Resolved. Echocardiogram done 8/21/2020 showed severe global hypokinesis with an ejection fraction of 20%.  Continue daily weights, fluid restriction. Diuretics currently held related to worsening creatinine.  3. Chronic kidney disease stage III/IV: Patient's baseline creatinine is reportedly between 1.9-2.0. Creatinine is 3.83 today. Diuretics held per nephrology. Will reassess BMP in AM.   4. Hypokalemia: Resolved.    5. History of atrial flutter: Patient is currently in normal sinus rhythm.  Continue amiodarone, Coreg and anticoagulation with warfarin.  INR is 1.28 and pharmacy is dosing warfarin.  6. Hypertension: Stable.  Continue Coreg and hydralazine.    7. Diabetes mellitus: FSBS AC and HS with SSI.    8. Acute cystitis: completed antibiotics.  Culture shows no growth.     9. GERD: Continue PPI.        This document has been electronically signed by THIERNO Waldrop on October 8, 2020 15:22 CDT

## 2020-10-08 NOTE — THERAPY DISCHARGE NOTE
Acute Care - Speech Language Pathology   Swallow Treatment Note/Discharge [facility]     Patient Name: Ondina Alanis Sr.  : 1940  MRN: [id number]  Today's Date: 10/8/2020  Onset of Illness/Injury or Date of Surgery: 20            Admit Date: 2020    Visit Dx:    ICD-10-CM ICD-9-CM   1. Pneumonia of both lower lobes due to infectious organism  J18.9 486   2. Hypoxia  R09.02 799.02   3. Elevated troponin  R79.89 790.6   4. Congestive heart failure, unspecified HF chronicity, unspecified heart failure type (CMS/[facility])  I50.9 428.0   5. Dysphagia, unspecified type  R13.10 787.20   6. Impaired mobility and activities of daily living  Z74.09 V49.89    Z78.9    7. Impaired physical mobility  Z74.09 781.99     Patient Active Problem List   Diagnosis   • CKD (chronic kidney disease) stage 4, GFR 15-29 ml/min (CMS/[facility])   • Diabetic peripheral neuropathy associated with type 2 diabetes mellitus (CMS/[facility])   • Diabetes mellitus type II, uncontrolled (CMS/[facility])   • GERD (gastroesophageal reflux disease)   • Primary osteoarthritis of both knees   • Pulmonary embolism (CMS/[facility])   • BPH (benign prostatic hyperplasia)   • Benign essential hypertension   • Asthma   • Pleurisy   • Acute respiratory failure with hypoxia (CMS/[facility])   • Stage 1 acute kidney injury (CMS/[facility])   • Left ventricular diastolic dysfunction   • Exacerbation of asthma   • COPD exacerbation (CMS/[facility])   • Atrial flutter (CMS/[facility])   • Acute systolic CHF (congestive heart failure) (CMS/[facility])   • Pneumonia of both lower lobes due to infectious organism     Past Medical History:   Diagnosis Date   • Asthma    • Diabetes mellitus (CMS/[facility])    • GERD (gastroesophageal reflux disease)    • Hypertension    • Prostate disorder      Past Surgical History:   Procedure Laterality Date   • BACK SURGERY     • KNEE SURGERY Left    • PROSTATE SURGERY            SWALLOW EVALUATION (last 72 hours)      SLP Adult Swallow Evaluation     Row Name 10/08/20 0700  10/06/20 0815                Rehab Evaluation    Document Type  discharge treatment  -EC  therapy note (daily note)  -EA       Subjective Information  no complaints  -EC  no complaints  -EA       Patient Observations  alert;cooperative;agree to therapy  -EC  alert;cooperative;agree to therapy  -EA       Patient/Family/Caregiver Comments/Observations  none  -EC  no present   -EA       Care Plan Review  care plan/treatment goals reviewed  -EC  --       Patient Effort  adequate  -EC  adequate  -EA          General Information    Patient Profile Reviewed  yes  -EC  yes  -EA       Pertinent History Of Current Problem  pt fed per clinician with no s/s of aspiration or difficulty.  pt is alert  -EC  --       Current Method of Nutrition  soft textures;thin liquids;ground  -EC  pureed;thin liquids  -EA       Precautions/Limitations, Vision  vision impairment, bilaterally  -EC  vision impairment, bilaterally legally blind  -EA       Precautions/Limitations, Hearing  --  WFL;for purposes of eval  -EA       Prior Level of Function-Communication  --  cognitive-linguistic impairment dementia  -EA       Plans/Goals Discussed with  --  patient No evidence of learning  -EA       Barriers to Rehab  --  cognitive status  -EA          Pain Scale: Numbers Pre/Post-Treatment    Pretreatment Pain Rating  0/10 - no pain  -EC  0/10 - no pain  -EA       Posttreatment Pain Rating  0/10 - no pain  -EC  0/10 - no pain  -EA          Oral Motor Structure and Function    Dentition Assessment  --  natural, present and adequate  -EA       Mucosal Quality  --  moist, healthy  -EA       Volitional Swallow  --  WFL  -EA       Volitional Cough  --  weak  -EA          General Eating/Swallowing Observations    Respiratory Support Currently in Use  room air  -EC  room air  -EA       Eating/Swallowing Skills  fed by SLP  -EC  fed by SLP  -EA       Positioning During Eating  upright 90 degree;upright in bed  -EC  upright 90 degree;upright in bed  -EA        Utensils Used  straw  -EC  straw  -EA       Consistencies Trialed  soft textures;mixed consistency;pureed;thin liquids  -EC  soft textures;mixed consistency;pureed;thin liquids  -EA          Clinical Swallow Eval    Oral Prep Phase  --  impaired  -EA       Oral Transit  --  impaired  -EA       Oral Residue  --  WFL  -EA       Pharyngeal Phase  --  no overt signs/symptoms of pharyngeal impairment  -EA       Esophageal Phase  --  unremarkable  -EA       Clinical Swallow Evaluation Summary  pt has dentures in place.  pt requires full feed d/t blindness.  there is no s/s of aspiration with liquids via straw or mech soft solids.  pt consumed, cubed hasbrowns, scrambeled eggs, oats, mandarine oranges, and oj with good oral clearance and no deficits noted.  recommend continue current diet.  d/c skilled ST  -EC  --          Oral Prep Concerns    Oral Prep Concerns  --  prolonged mastication  -EA       Prolonged Mastication  --  mechanical soft  -EA          Oral Transit Concerns    Oral Transit Concerns  --  increased oral transit time  -EA       Increased Oral Transit Time  --  mechanical soft  -EA          Clinical Impression    Daily Summary of Progress (SLP)  progress toward functional goals as expected  -EC  --       SLP Swallowing Diagnosis  functional oral phase;functional pharyngeal phase  -EC  mild;oral dysphagia;functional pharyngeal phase  -EA       Functional Impact  --  risk of aspiration/pneumonia  -EA       Rehab Potential/Prognosis, Swallowing  --  good, to achieve stated therapy goals  -EA       Swallow Criteria for Skilled Therapeutic Interventions Met  --  demonstrates skilled criteria  -EA       Plan for Continued Treatment (SLP)  cont [facility] soft diet with ground meat  -EC  --          Recommendations    Therapy Frequency (Swallow)  --  3 days per week;5 days per week  -EA       Predicted Duration Therapy Intervention (Days)  --  until discharge  -EA       SLP Diet Recommendation  --  puree;thin liquids   -EA       Recommended Precautions and Strategies  --  upright posture during/after eating  -EA       SLP Rec. for Method of Medication Administration  --  meds crushed;with pudding or applesauce  -EA       Monitor for Signs of Aspiration  --  yes;notify SLP if any concerns  -EA       Anticipated Discharge Disposition (SLP)  --  unknown  -EA          Swallow Goals (SLP)    Oral Nutrition/Hydration Goal Selection (SLP)  --  oral nutrition/hydration, SLP goal 1  -EA          Oral Nutrition/Hydration Goal 1 (SLP)    Oral Nutrition/Hydration Goal 1, SLP  Pt will safely tolerate least restricted diet w/no overt s/s of aspiration for adequate nutrition and hydration.  -EC  Pt will safely tolerate least restricted diet w/no overt s/s of aspiration for adequate nutrition and hydration.  -EA       Time Frame (Oral Nutrition/Hydration Goal 1, SLP)  by discharge  -EC  by discharge  -EA       Barriers (Oral Nutrition/Hydration Goal 1, SLP)  none  -EC  cognition  -EA       Progress/Outcomes (Oral Nutrition/Hydration Goal 1, SLP)  (S) goal met  -EC  goal ongoing  -EA         User Key  (r) = Recorded By, (t) = Taken By, (c) = Cosigned By    Initials Name Effective Dates    EC Yenifer Vogt, CCC-SLP 02/11/20 -     EA Yenifer Kate, MS CCC-SLP 08/09/20 -           EDUCATION  The patient has been educated in the following areas:   Dysphagia (Swallowing Impairment) Modified Diet Instruction caregiver controlled compensatory strategies.    SLP Recommendation and Plan  SLP Swallowing Diagnosis: functional oral phase, functional pharyngeal phase                                Daily Summary of Progress (SLP): progress toward functional goals as expected  Plan for Continued Treatment (SLP): cont mech soft diet with ground meat                Plan of Care Reviewed With: patient  Progress: improving  Outcome Summary: dysphagia therapy: pt tolerates mech soft solids with teeth in place and full feed assist.  pt does not open eyes  when eating.  clinician/caregiver provide cyclic eating.  d/c skilled ST at this time.    SLP GOALS     Row Name 10/08/20 0700 10/06/20 0815          Oral Nutrition/Hydration Goal 1 (SLP)    Oral Nutrition/Hydration Goal 1, SLP  Pt will safely tolerate least restricted diet w/no overt s/s of aspiration for adequate nutrition and hydration.  -EC  Pt will safely tolerate least restricted diet w/no overt s/s of aspiration for adequate nutrition and hydration.  -EA     Time Frame (Oral Nutrition/Hydration Goal 1, SLP)  by discharge  -EC  by discharge  -EA     Barriers (Oral Nutrition/Hydration Goal 1, SLP)  none  -EC  cognition  -EA     Progress/Outcomes (Oral Nutrition/Hydration Goal 1, SLP)  (S) goal met  -EC  goal ongoing  -EA       User Key  (r) = Recorded By, (t) = Taken By, (c) = Cosigned By    Initials Name Provider Type    Yenifer Ospina CCC-SLP Speech and Language Pathologist    Yenifer Hogan MS CCC-SLP Speech and Language Pathologist               Time Calculation:   Time Calculation- SLP     Row Name 10/08/20 0752             Time Calculation- SLP    SLP Start Time  0725  -EC      SLP Stop Time  0803  -EC      SLP Time Calculation (min)  38 min  -EC      Total Timed Code Minutes- SLP  38 minute(s)  -EC      SLP Received On  10/08/20  -EC        User Key  (r) = Recorded By, (t) = Taken By, (c) = Cosigned By    Initials Name Provider Type    Yenifer Ospina CCC-SLP Speech and Language Pathologist          Therapy Charges for Today     Code Description Service Date Service Provider Modifiers Qty    [id number]  ST TREATMENT SWALLOW 3 10/8/2020 Yenifer Vogt CCC-SLP GN 1                    ÁLVARO Vela  10/8/2020

## 2020-10-08 NOTE — PLAN OF CARE
Problem: Adult Inpatient Plan of Care  Goal: Plan of Care Review  Outcome: Ongoing, Progressing  Flowsheets (Taken 10/8/2020 0751)  Progress: improving  Plan of Care Reviewed With: patient  Outcome Summary: dysphagia therapy: pt tolerates mech soft solids with teeth in place and full feed assist.  pt does not open eyes when eating.  clinician/caregiver provide cyclic eating.  d/c skilled ST at this time.

## 2020-10-08 NOTE — THERAPY TREATMENT NOTE
Acute Care - Occupational Therapy Treatment Note  [facility]     Patient Name: Ondina Alanis Sr.  : 1940  MRN: [id number]  Today's Date: 10/8/2020  Onset of Illness/Injury or Date of Surgery: 20  Date of Referral to OT: 10/01/20       Admit Date: 2020       ICD-10-CM ICD-9-CM   1. Pneumonia of both lower lobes due to infectious organism  J18.9 486   2. Hypoxia  R09.02 799.02   3. Elevated troponin  R79.89 790.6   4. Congestive heart failure, unspecified HF chronicity, unspecified heart failure type (CMS/HCC)  I50.9 428.0   5. Dysphagia, unspecified type  R13.10 787.20   6. Impaired mobility and activities of daily living  Z74.09 V49.89    Z78.9    7. Impaired physical mobility  Z74.09 781.99     Patient Active Problem List   Diagnosis   • CKD (chronic kidney disease) stage 4, GFR 15-29 ml/min (CMS/[facility])   • Diabetic peripheral neuropathy associated with type 2 diabetes mellitus (CMS/[facility])   • Diabetes mellitus type II, uncontrolled (CMS/[facility])   • GERD (gastroesophageal reflux disease)   • Primary osteoarthritis of both knees   • Pulmonary embolism (CMS/[facility])   • BPH (benign prostatic hyperplasia)   • Benign essential hypertension   • Asthma   • Pleurisy   • Acute respiratory failure with hypoxia (CMS/[facility])   • Stage 1 acute kidney injury (CMS/[facility])   • Left ventricular diastolic dysfunction   • Exacerbation of asthma   • COPD exacerbation (CMS/[facility])   • Atrial flutter (CMS/HCC)   • Acute systolic CHF (congestive heart failure) (CMS/[facility])   • Pneumonia of both lower lobes due to infectious organism     Past Medical History:   Diagnosis Date   • Asthma    • Diabetes mellitus (CMS/HCC)    • GERD (gastroesophageal reflux disease)    • Hypertension    • Prostate disorder      Past Surgical History:   Procedure Laterality Date   • BACK SURGERY     • KNEE SURGERY Left    • PROSTATE SURGERY            OT ASSESSMENT FLOWSHEET (last 12 hours)      OT Evaluation and Treatment     Row Name 10/08/20 0900                    OT Time and Intention    Subjective Information  complains of;pain  -RC        Document Type  therapy note (daily note)  -        Mode of Treatment  occupational therapy;individual therapy  -RC        Total Minutes, Occupational Therapy  43  -RC        Patient Effort  fair  -RC           Cognition    Affect/Mental Status (Cognitive)  WFL  -           Pain Scale: Numbers Pre/Post-Treatment    Pretreatment Pain Rating  -- gave no number   -        Pain Location  back shoulders   -        Pain Intervention(s)  Medication (See MAR)  -RC           Shoulder (Therapeutic Exercise)    Shoulder (Therapeutic Exercise)  AROM (active range of motion);AAROM (active assistive range of motion)  -RC        Shoulder AROM (Therapeutic Exercise)  left;right;flexion;aDduction;aBduction  -        Shoulder AAROM (Therapeutic Exercise)  5 repetitions x2  -RC           Elbow/Forearm (Therapeutic Exercise)    Elbow/Forearm (Therapeutic Exercise)  AAROM (active assistive range of motion);AROM (active range of motion)  -RC        Elbow/Forearm AROM (Therapeutic Exercise)  right;left;extension;pronation;supination;flexion  -RC        Elbow/Forearm AAROM (Therapeutic Exercise)  left;right;flexion;extension;supination;pronation;5 repetitions x2  -RC           Wrist (Therapeutic Exercise)    Wrist (Therapeutic Exercise)  AAROM (active assistive range of motion);AROM (active range of motion)  -        Wrist AROM (Therapeutic Exercise)  5 repetitions;2 sets  -           Hand (Therapeutic Exercise)    Hand (Therapeutic Exercise)  AROM (active range of motion)  -        Hand AROM/AAROM (Therapeutic Exercise)  right;left;AROM (active range of motion);finger flexion;finger extension;5 repetitions;2 sets  -           Grooming Assessment/Training    [place] Level (Grooming)  wash face, hands;maximum assist (25% patient effort)  -        Position (Grooming)  -- les participation w/ grooming act today   -            Plan of Care Review    Outcome Summary  poor participation w/ grooming task, aarom-arom for ue ex pt moves slow and needs vc's tc's,   will need 24/7 assist at d/c   cont poc   -RC           Vital Signs    Pre Systolic BP Rehab  160  -RC        Pre Treatment Diastolic BP  78  -RC        Pretreatment Heart Rate (beats/min)  72  -RC           Positioning and Restraints    Pre-Treatment Position  in bed  -RC        Post Treatment Position  bed  -RC        In Bed  call light within reach;encouraged to call for assist;exit alarm on  -RC           Progress Summary (OT)    Progress Toward Functional Goals (OT)  progress toward functional goals as expected  -          User Key  (r) = Recorded By, (t) = Taken By, (c) = Cosigned By    Initials Name Effective Dates     Marc, СВЕТЛАНА Patino/DIONNE 03/07/18 -                OT Recommendation and Plan     Progress Toward Functional Goals (OT): progress toward functional goals as expected  Plan of Care Review  Plan of Care Reviewed With: patient  Progress: improving  Outcome Summary: poor participation w/ grooming task, aarom-arom for ue ex pt moves slow and needs vc's tc's,   will need 24/7 assist at d/c   cont poc   Plan of Care Reviewed With: patient  Outcome Summary: poor participation w/ grooming task, aarom-arom for ue ex pt moves slow and needs vc's tc's,   will need 24/7 assist at d/c   cont poc     Outcome Measures     Row Name 10/08/20 0900 10/07/20 0825 10/06/20 0715       How much help from another person do you currently need...    Turning from your back to your side while in flat bed without using bedrails?  --  2  -TA  --    Moving from lying on back to sitting on the side of a flat bed without bedrails?  --  2  -TA  --    Moving to and from a bed to a chair (including a wheelchair)?  --  1  -TA  --    Standing up from a chair using your arms (e.g., wheelchair, bedside chair)?  --  1  -TA  --    Climbing 3-5 steps with a railing?  --  1  -TA  --    To walk in  hospital room?  --  1  -TA  --    AM-PAC 6 Clicks Score (PT)  --  8  -TA  --       How much help from another is currently needed...    Putting on and taking off regular lower body clothing?  1  -RC  1  -RC  1  -BB    Bathing (including washing, rinsing, and drying)  1  -RC  1  -RC  1  -BB    Toileting (which includes using toilet bed pan or urinal)  1  -RC  1  -RC  1  -BB    Putting on and taking off regular upper body clothing  1  -RC  1  -RC  1  -BB    Taking care of personal grooming (such as brushing teeth)  1  -RC  1  -RC  1  -BB    Eating meals  1  -RC  1  -RC  1  -BB    AM-PAC 6 Clicks Score (OT)  6  -RC  6  -RC  6  -BB       Functional Assessment    Outcome Measure Options  --  AM-PAC 6 Clicks Basic Mobility (PT)  -TA  --      User Key  (r) = Recorded By, (t) = Taken By, (c) = Cosigned By    Initials Name Provider Type    TA Geneva Preciado, KUSH Physical Therapy Assistant    BB Lamar Trinidad SOTOMAYOR/L Occupational Therapy Assistant    RC Jaylyn Tran SOTOMAYOR/L Occupational Therapy Assistant          Time Calculation:   Time Calculation- OT     Row Name 10/08/20 0957             Time Calculation- OT    OT Start Time  0905  -      OT Stop Time  0948  -      OT Time Calculation (min)  43 min  -      Total Timed Code Minutes- OT  43 minute(s)  -      OT Received On  10/08/20  -        User Key  (r) = Recorded By, (t) = Taken By, (c) = Cosigned By    Initials Name Provider Type     Jaylyn Tran SOTOMAYOR/L Occupational Therapy Assistant        Therapy Charges for Today     Code Description Service Date Service Provider Modifiers Qty    70683322596 HC OT SELF CARE/MGMT/TRAIN EA 15 MIN 10/7/2020 Jaylyn Tran, SOTOMAYOR/L GO 2    00963297498 HC OT THER PROC EA 15 MIN 10/7/2020 Jaylyn Tran, SOTOMAYOR/L GO 2    61869470873 HC OT SELF CARE/MGMT/TRAIN EA 15 MIN 10/8/2020 Jyalyn Tran, SOTOMAYOR/L GO 1    94380834127 HC OT THER PROC EA 15 MIN 10/8/2020 Jaylyn Tran, SOTOMAYOR/DIONNE GO 2               Jaylyn G  Marc, SOTOMAYOR/L  10/8/2020

## 2020-10-08 NOTE — PLAN OF CARE
Problem: Adult Inpatient Plan of Care  Goal: Plan of Care Review  Outcome: Ongoing, Progressing  Flowsheets (Taken 10/8/2020 0948)  Progress: declining  Plan of Care Reviewed With: patient  Outcome Summary: PROM performed this a.m. Pt. initially would assist with ROM, but during performance majority PROM this a.m.

## 2020-10-08 NOTE — PLAN OF CARE
Problem: Adult Inpatient Plan of Care  Goal: Plan of Care Review  Outcome: Ongoing, Progressing  Flowsheets  Taken 10/8/2020 1140  Progress: improving  Plan of Care Reviewed With: patient  Taken 10/8/2020 0900  Outcome Summary: poor participation w/ grooming task, aarom-arom for ue ex pt moves slow and needs vc's tc's,   will need 24/7 assist at d/c   cont poc

## 2020-10-08 NOTE — PROGRESS NOTES
"[facility]  [address] [place] [address]. [address]  T - [phone]  F - [phone]     Progress Note          PATIENT  DEMOGRAPHICS   PATIENT NAME: Onidna Alanis Sr.                      PHYSICIAN: Marley Akhtar MD  : 1940  MRN: [id number]   LOS: 14 days    Patient Care Team:  Wendie Quiñonez APRN as PCP - General (Nurse Practitioner)  Subjective   SUBJECTIVE   Resting no distress, burleson in place       Objective   OBJECTIVE   Vital Signs  Temp:  [98.2 °F (36.8 °C)-98.8 °F (37.1 °C)] 98.2 °F (36.8 °C)  Heart Rate:  [63-77] 72  Resp:  [16-18] 18  BP: (111-149)/(58-66) 149/66    Flowsheet Rows      First Filed Value   Admission Height  193 cm (76\") Documented at 2020   Admission Weight  94.8 kg (209 lb) Documented at 2020           I/O last 3 completed shifts:  In: 720 [P.O.:720]  Out: 2175 [Urine:2175]    PHYSICAL EXAM    Physical Exam  Vitals signs reviewed.   Constitutional:       General: He is not in acute distress.     Appearance: He is well-developed.   Eyes:      General:         Right eye: No discharge.   Neck:      Vascular: JVD present.   Cardiovascular:      Rate and Rhythm: Normal rate and regular rhythm.      Heart sounds: Normal heart sounds, S1 normal and S2 normal. No murmur.   Pulmonary:      Effort: Pulmonary effort is normal. No respiratory distress.      Breath sounds: No rales.   Abdominal:      General: Bowel sounds are normal. There is no distension.      Palpations: Abdomen is soft.      Tenderness: There is no abdominal tenderness.   Skin:     Coloration: Skin is not pale.      Findings: No erythema.   Neurological:      General: No focal deficit present.      Mental Status: He is alert. Mental status is at baseline.      Sensory: No sensory deficit.      Motor: No abnormal muscle tone.         RESULTS   Results Review:    Results from last 7 days   Lab Units 10/08/20  0544 10/07/20  0610 10/06/20  0635   SODIUM mmol/L " 135* 136 137   POTASSIUM mmol/L 3.5 3.9 3.2*   CHLORIDE mmol/L 92* 94* 93*   CO2 mmol/L 31.0* 28.0 33.0*   BUN mg/dL 80* 76* 67*   CREATININE mg/dL 3.83* 3.71* 3.39*   CALCIUM mg/dL 9.3 8.8 9.3   GLUCOSE mg/dL 96 103* 121*       Estimated Creatinine Clearance: 18.7 mL/min (A) (by C-G formula based on SCr of 3.83 mg/dL (H)).                Results from last 7 days   Lab Units 10/07/20  0610 10/03/20  0603   WBC 10*3/mm3 7.02 7.64   HEMOGLOBIN g/dL 10.3* 11.9*   PLATELETS 10*3/mm3 160 162       Results from last 7 days   Lab Units 10/08/20  0544 10/07/20  0610 10/06/20  0635 10/05/20  0511 10/04/20  0851   INR  1.28* 1.40* 1.26* 1.27* 1.60*         Imaging Results (Last 24 Hours)     Procedure Component Value Units Date/Time    US Guided Vascular Access [[id number]] Resulted: 10/07/20 1231     Updated: 10/07/20 1231    Narrative:      This procedure was auto-finalized with no dictation required.    IR Insert Midline Without Port Pump 5 Plus [[id number]] Resulted: 10/07/20 1151     Updated: 10/07/20 1151    Narrative:      This procedure was auto-finalized with no dictation required.           MEDICATIONS    albumin human, 25 g, Intravenous, BID  amiodarone, 200 mg, Oral, Q24H  aspirin, 81 mg, Oral, Daily  atorvastatin, 40 mg, Oral, Nightly  carvedilol, 6.25 mg, Oral, BID With Meals  dronabinol, 2.5 mg, Oral, BID  hydrALAZINE, 75 mg, Oral, Q8H  isosorbide mononitrate, 30 mg, Oral, Q24H  magic butt ointment, , Topical, BID  pantoprazole, 40 mg, Oral, QAM  sodium chloride, 10 mL, Intravenous, Q12H  warfarin, 5 mg, Oral, Daily      Pharmacy to dose warfarin,         Assessment/Plan   ASSESSMENT / PLAN      Pneumonia of both lower lobes due to infectious organism    1.  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 good UO with iv lasix and albumin. Lost significant weight 181 lbs. Peak 209 lbs. Cr worsening with high bicarb and low chloride  (contraction alkalosis). Likely overdiuresed. Hold diuretics. On albumin cr still worse and has gained weight. Observe for now     2.  Hypertension- poorly controlled, hydralazine increased to 75 mg 3 times daily and carvedilol to 12.5 mg twice daily  bp overall better.      3.  CHF- systolic heart failure EF 20%. severe LV systolic dysfunction with RV systolic pressure of 60 mmHg, cardiology following. cxr bilateral infiltrates vs edema     4.  A. Fib on amiodarone and coumain     5.  Pneumonia- on antibiotics per primary team     6.  UTI- on antibiotics per primary team                    This document has been electronically signed by Marley Akhtar MD on October 8, 2020 10:52 CDT

## 2020-10-09 NOTE — PLAN OF CARE
Problem: Adult Inpatient Plan of Care  Goal: Plan of Care Review  Outcome: Ongoing, Progressing  Flowsheets  Taken 10/9/2020 1501  Plan of Care Reviewed With: patient  Taken 10/9/2020 0743  Plan of Care Reviewed With: patient  Outcome Summary: dep for feeding this AM, pt was less talkative and less participation in ue ex    cont poc

## 2020-10-09 NOTE — PROGRESS NOTES
"Anticoagulation by Pharmacy - Warfarin    Ondina Alanis Sr. 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 Discharged home and receiving 5 mg nightly per Wife to finish out old bottle before new prescription of 3 mg nightly. Patient only received a few doses of 3 mg before being admitted. Patient's wife checks INR at home every Monday and INR has been in range    Last INR:   Lab Results   Component Value Date    INR 1.35 (H) 10/09/2020       Objective:  [Ht: 193 cm (76\"); Wt: 83.2 kg (183 lb 6.4 oz)]  Lab Results   Component Value Date    INR 1.35 (H) 10/09/2020    INR 1.28 (H) 10/08/2020    INR 1.40 (H) 10/07/2020    PROTIME 17.4 (H) 10/09/2020    PROTIME 16.6 (H) 10/08/2020    PROTIME  10/07/2020      Comment:      fingerstick     Lab Results   Component Value Date    HGB 10.3 (L) 10/07/2020    HGB 11.9 (L) 10/03/2020    HGB 10.6 (L) 09/29/2020    HCT 31.3 (L) 10/07/2020    HCT 35.4 (L) 10/03/2020    HCT 33.5 (L) 09/29/2020     10/07/2020     10/03/2020     09/29/2020       Recent Warfarin Administrations       The 5 most recent administrations since 10/02/2020 are shown below each listed medication.    Warfarin Sodium         Order Dose Date Given     warfarin (COUMADIN) tablet 5 mg 5 mg 10/08/2020      5 mg 10/07/2020     warfarin (COUMADIN) tablet 4 mg 4 mg 10/06/2020      4 mg 10/05/2020      4 mg 10/04/2020                    Assessment  Interacting medications: Amiodarone can increase the affect of warfarin on the INR  INR is 1.35, subtherapeutic, trending down today.  INR was collected via blood draw today.     No H/H today  Dose increased yesterday, will continue to trend warfarin 5 mg nightly tonight.  Will monitor as patient almost reached supratherapeutic level on a lower weekly regimen.     Plan:  1.  Give warfarin 5 mg tablet PO @ 1800 tonight  2.  Draw a PT/INR in AM  3.  Pharmacy will " continue to follow    Tung Colon, PharmD  10/09/20 16:48 CDT

## 2020-10-09 NOTE — PROGRESS NOTES
Adult Nutrition  Assessment    Patient Name:  Ondina Alanis Sr.  YOB: 1940  MRN: [id number]  Admit Date:  9/23/2020    Assessment Date:  10/9/2020    Comments:  BLE edema 3+ on admit, MD notes edema resolved 10/5 and lasix decreased--APRN notes 10/8 trace edema noted and creatinine trending up, nephrology held diuretics. ST has d/c w/ recs for soft/ground meats and thin liquids. Low Sodium diet and milk TID- Marinol started 10/5 2.5 BID. Average intakes over past 5 days 50% w/ upgrade from pureed on 10/6. Albumin on 9/23 3.6. GFR today 18L. Noted wt on last admit 8/21 190# --Admit 9/24 wt 203#, lowest w/ diuresis 10/5 180#. Current wt today 183#. RD to follow hospital course.    Reason for Assessment     Row Name 10/09/20 1120          Reason for Assessment    Reason For Assessment  follow-up protocol     Diagnosis  cardiac disease;pulmonary disease;renal disease;infection/sepsis     Identified At Risk by Screening Criteria  MST SCORE 2+;need for education           Anthropometrics     Row Name 10/09/20 0508          Anthropometrics    Weight  83.2 kg (183 lb 6.4 oz)         Labs/Tests/Procedures/Meds     Row Name 10/09/20 1121          Labs/Procedures/Meds    Lab Results Reviewed  reviewed     Lab Results Comments  Glu 94, Bun 83H/Cr 3.9H and GFR 18L, 9/23 Alb 3.6        Diagnostic Tests/Procedures    Diagnostic Test/Procedure Reviewed  reviewed        Medications    Pertinent Medications Comments  10/7 IV Albumin BID x2 days, marinol 2.5 BID---diuretics held per nephrology             Nutrition Prescription Ordered     Row Name 10/09/20 1122          Nutrition Prescription PO    Current PO Diet  Soft Texture     Texture  Ground     Fluid Consistency  Thin     Supplement  Milk     Supplement Frequency  3 times a day     Common Modifiers  Low Sodium         Evaluation of Received Nutrient/Fluid Intake     Row Name 10/09/20 1122          PO Evaluation    Number of Days PO Intake Evaluated  --  5days     Number of Meals  10     % PO Intake  0x1, 25x3, 50x2, 75x3, 100x1               Electronically signed by:  Candie Redding RD  10/09/20 11:24 CDT

## 2020-10-09 NOTE — THERAPY TREATMENT NOTE
Acute Care - Occupational Therapy Treatment Note  [facility]     Patient Name: Ondina Alanis Sr.  : 1940  MRN: [id number]  Today's Date: 10/9/2020  Onset of Illness/Injury or Date of Surgery: 20  Date of Referral to OT: 10/01/20       Admit Date: 2020       ICD-10-CM ICD-9-CM   1. Pneumonia of both lower lobes due to infectious organism  J18.9 486   2. Hypoxia  R09.02 799.02   3. Elevated troponin  R79.89 790.6   4. Congestive heart failure, unspecified HF chronicity, unspecified heart failure type (CMS/HCC)  I50.9 428.0   5. Dysphagia, unspecified type  R13.10 787.20   6. Impaired mobility and activities of daily living  Z74.09 V49.89    Z78.9    7. Impaired physical mobility  Z74.09 781.99     Patient Active Problem List   Diagnosis   • CKD (chronic kidney disease) stage 4, GFR 15-29 ml/min (CMS/[facility])   • Diabetic peripheral neuropathy associated with type 2 diabetes mellitus (CMS/[facility])   • Diabetes mellitus type II, uncontrolled (CMS/[facility])   • GERD (gastroesophageal reflux disease)   • Primary osteoarthritis of both knees   • Pulmonary embolism (CMS/HCC)   • BPH (benign prostatic hyperplasia)   • Benign essential hypertension   • Asthma   • Pleurisy   • Acute respiratory failure with hypoxia (CMS/[facility])   • Stage 1 acute kidney injury (CMS/HCC)   • Left ventricular diastolic dysfunction   • Exacerbation of asthma   • COPD exacerbation (CMS/[facility])   • Atrial flutter (CMS/HCC)   • Acute systolic CHF (congestive heart failure) (CMS/[facility])   • Pneumonia of both lower lobes due to infectious organism     Past Medical History:   Diagnosis Date   • Asthma    • Diabetes mellitus (CMS/HCC)    • GERD (gastroesophageal reflux disease)    • Hypertension    • Prostate disorder      Past Surgical History:   Procedure Laterality Date   • BACK SURGERY     • KNEE SURGERY Left    • PROSTATE SURGERY            OT ASSESSMENT FLOWSHEET (last 12 hours)      OT Evaluation and Treatment     Row Name 10/09/20 0743                    OT Time and Intention    Subjective Information  no complaints  -RC        Document Type  therapy note (daily note)  -RC        Mode of Treatment  occupational therapy;individual therapy  -RC        Total Minutes, Occupational Therapy  57  -RC        Patient Effort  fair  -RC           General Information    Patient Profile Reviewed  yes  -RC        Existing Precautions/Restrictions  fall blind  -RC           Cognition    Affect/Mental Status (Cognitive)  low arousal/lethargic  -RC           Pain Scale: Numbers Pre/Post-Treatment    Pretreatment Pain Rating  0/10 - no pain  -RC        Posttreatment Pain Rating  0/10 - no pain  -RC           Shoulder (Therapeutic Exercise)    Shoulder PROM (Therapeutic Exercise)  -- aarom to b ues all joints 5x   -RC           Self-Feeding Assessment/Training    [place] Level (Feeding)  feeding skills;dependent (less than 25% patient effort)  -RC        Position (Self-Feeding)  sitting up in bed  -RC           Plan of Care Review    Plan of Care Reviewed With  patient  -RC        Outcome Summary  dep for feeding this AM, pt was less talkative and less participation in ue ex    cont poc   -RC           Vital Signs    Pre Systolic BP Rehab  134  -RC        Pre Treatment Diastolic BP  76  -RC        Pretreatment Heart Rate (beats/min)  70  -RC        Pre SpO2 (%)  98  -RC        O2 Delivery Pre Treatment  room air  -RC           Positioning and Restraints    Pre-Treatment Position  in bed  -RC        Post Treatment Position  bed  -RC        In Bed  call light within reach;encouraged to call for assist;exit alarm on  -RC           Progress Summary (OT)    Progress Toward Functional Goals (OT)  progress toward functional goals is gradual  -          User Key  (r) = Recorded By, (t) = Taken By, (c) = Cosigned By    Initials Name Effective Dates    RC Jaylyn Tran COTA/L 03/07/18 -                OT Recommendation and Plan     Progress Toward Functional Goals  (OT): progress toward functional goals is gradual  Plan of Care Review  Plan of Care Reviewed With: patient  Progress: improving  Outcome Summary: dep for feeding this AM, pt was less talkative and less participation in ue ex    cont poc   Plan of Care Reviewed With: patient  Outcome Summary: dep for feeding this AM, pt was less talkative and less participation in ue ex    cont poc     Outcome Measures     Row Name 10/09/20 1019 10/09/20 0743 10/08/20 0900       How much help from another person do you currently need...    Turning from your back to your side while in flat bed without using bedrails?  1  -LN  --  --    Moving from lying on back to sitting on the side of a flat bed without bedrails?  2  -LN  --  --    Moving to and from a bed to a chair (including a wheelchair)?  1  -LN  --  --    Standing up from a chair using your arms (e.g., wheelchair, bedside chair)?  1  -LN  --  --    Climbing 3-5 steps with a railing?  1  -LN  --  --    To walk in hospital room?  1  -LN  --  --    AM-PAC 6 Clicks Score (PT)  7  -LN  --  --       How much help from another is currently needed...    Putting on and taking off regular lower body clothing?  --  1  -RC  1  -RC    Bathing (including washing, rinsing, and drying)  --  1  -RC  1  -RC    Toileting (which includes using toilet bed pan or urinal)  --  1  -RC  1  -RC    Putting on and taking off regular upper body clothing  --  1  -RC  1  -RC    Taking care of personal grooming (such as brushing teeth)  --  1  -RC  1  -RC    Eating meals  --  1  -RC  1  -RC    AM-PAC 6 Clicks Score (OT)  --  6  -RC  6  -RC       Functional Assessment    Outcome Measure Options  AM-PAC 6 Clicks Basic Mobility (PT)  -LN  --  --    Row Name 10/07/20 0825             How much help from another person do you currently need...    Turning from your back to your side while in flat bed without using bedrails?  2  -TA      Moving from lying on back to sitting on the side of a flat bed without  bedrails?  2  -TA      Moving to and from a bed to a chair (including a wheelchair)?  1  -TA      Standing up from a chair using your arms (e.g., wheelchair, bedside chair)?  1  -TA      Climbing 3-5 steps with a railing?  1  -TA      To walk in hospital room?  1  -TA      AM-PAC 6 Clicks Score (PT)  8  -TA         How much help from another is currently needed...    Putting on and taking off regular lower body clothing?  1  -RC      Bathing (including washing, rinsing, and drying)  1  -RC      Toileting (which includes using toilet bed pan or urinal)  1  -RC      Putting on and taking off regular upper body clothing  1  -RC      Taking care of personal grooming (such as brushing teeth)  1  -RC      Eating meals  1  -RC      AM-PAC 6 Clicks Score (OT)  6  -RC         Functional Assessment    Outcome Measure Options  AM-PAC 6 Clicks Basic Mobility (PT)  -TA        User Key  (r) = Recorded By, (t) = Taken By, (c) = Cosigned By    Initials Name Provider Type    TA Geneva Preciado, PTA Physical Therapy Assistant    Haydee Limon PTA Physical Therapy Assistant    Jaylyn Kuhn SOTOMAYOR/L Occupational Therapy Assistant          Time Calculation:   Time Calculation- OT     Row Name 10/09/20 0840             Time Calculation- OT    OT Start Time  0743  -RC      OT Stop Time  0840  -RC      OT Time Calculation (min)  57 min  -      Total Timed Code Minutes- OT  57 minute(s)  -      OT Received On  10/09/20  -        User Key  (r) = Recorded By, (t) = Taken By, (c) = Cosigned By    Initials Name Provider Type     Jaylyn Tran SOTOMAYOR/L Occupational Therapy Assistant        Therapy Charges for Today     Code Description Service Date Service Provider Modifiers Qty    36079350894 HC OT SELF CARE/MGMT/TRAIN EA 15 MIN 10/8/2020 Jaylyn Tran SOTOMAYOR/L GO 1    97222781343 HC OT THER PROC EA 15 MIN 10/8/2020 Jaylyn Tran SOTOMAYOR/L GO 2    75090734027 HC OT SELF CARE/MGMT/TRAIN EA 15 MIN 10/9/2020 Jaylyn Tran  G, SOTOMAYOR/L GO 1    89701146928 HC OT THER PROC EA 15 MIN 10/9/2020 Marc, Jaylyn G, SOTOMAYOR/L GO 1    19802866344 HC OT THER PROC EA 15 MIN 10/9/2020 Marc, Jaylyn G, SOTOMAYOR/L GO 1    45778169636 HC OT SELF CARE/MGMT/TRAIN EA 15 MIN 10/9/2020 Marc, Jaylyn G, SOTOMAYOR/L GO 1               Jaylyn G Marc, SOTOMAYOR/L  10/9/2020

## 2020-10-09 NOTE — PROGRESS NOTES
[facility] Medicine Services  INPATIENT PROGRESS NOTE    Length of Stay: 15  Date of Admission: 9/23/2020  Primary Care Physician: Wendie Quiñonez APRN    Subjective   Chief Complaint: shortness of breath   HPI:  80 year old -American male with past medical history of asthma, DM, GERD, HTN, BPH who presented on 9- with complaints of shortness of breath.  He is currently admitted for respiratory failure related to CHF and pneumonia.  During today's visit, patient denies complaints.  Creatinine has once again trended upward.     Review of Systems   Constitutional: Negative for chills and fever.   HENT: Negative for congestion, rhinorrhea and sore throat.    Respiratory: Negative for cough, shortness of breath and wheezing.    Cardiovascular: Negative for chest pain, palpitations and leg swelling.   Gastrointestinal: Negative for abdominal pain, nausea and vomiting.   Musculoskeletal: Positive for back pain. Negative for neck pain.   Skin: Negative for pallor.   Neurological: Negative for dizziness and weakness.        All pertinent negatives and positives are as above. All other systems have been reviewed and are negative unless otherwise stated.     Objective    Temp:  [96.9 °F (36.1 °C)-97.6 °F (36.4 °C)] 96.9 °F (36.1 °C)  Heart Rate:  [55-68] 66  Resp:  [16-18] 18  BP: (123-150)/(58-72) 150/68    Physical Exam  Vitals signs and nursing note reviewed.   Constitutional:       General: He is not in acute distress.     Appearance: Normal appearance.   HENT:      Head: Normocephalic and atraumatic.      Right Ear: External ear normal.      Left Ear: External ear normal.      Nose: Nose normal.      Mouth/Throat:      Pharynx: Oropharynx is clear.   Eyes:      General:         Right eye: No discharge.         Left eye: No discharge.      Conjunctiva/sclera: Conjunctivae normal.      Comments: Legally blind   Neck:      Musculoskeletal: Normal range of motion  and neck supple.   Cardiovascular:      Rate and Rhythm: Normal rate and regular rhythm.      Heart sounds: Normal heart sounds. No murmur. No friction rub. No gallop.    Pulmonary:      Effort: No respiratory distress.      Breath sounds: Normal breath sounds. No stridor. No wheezing or rales.   Abdominal:      General: Bowel sounds are normal. There is no distension.      Palpations: Abdomen is soft.      Tenderness: There is no abdominal tenderness.   Musculoskeletal: Normal range of motion.         General: No swelling.   Skin:     General: Skin is warm and dry.   Neurological:      General: No focal deficit present.      Mental Status: He is alert. Mental status is at baseline.   Psychiatric:         Mood and Affect: Mood normal.         Behavior: Behavior normal.             Results Review:  I have reviewed the labs, radiology results, and diagnostic studies.    Laboratory Data:   Results from last 7 days   Lab Units 10/09/20  0604 10/08/20  0544 10/07/20  0610   SODIUM mmol/L 133* 135* 136   POTASSIUM mmol/L 3.6 3.5 3.9   CHLORIDE mmol/L 92* 92* 94*   CO2 mmol/L 31.0* 31.0* 28.0   BUN mg/dL 83* 80* 76*   CREATININE mg/dL 3.95* 3.83* 3.71*   GLUCOSE mg/dL 94 96 103*   CALCIUM mg/dL 9.2 9.3 8.8   ANION GAP mmol/L 10.0 12.0 14.0     Estimated Creatinine Clearance: 17.8 mL/min (A) (by C-G formula based on SCr of 3.95 mg/dL (H)).          Results from last 7 days   Lab Units 10/07/20  0610 10/03/20  0603   WBC 10*3/mm3 7.02 7.64   HEMOGLOBIN g/dL 10.3* 11.9*   HEMATOCRIT % 31.3* 35.4*   PLATELETS 10*3/mm3 160 162     Results from last 7 days   Lab Units 10/09/20  0604 10/08/20  0544 10/07/20  0610 10/06/20  0635 10/05/20  0511   INR  1.35* 1.28* 1.40* 1.26* 1.27*       Culture Data:   No results found for: BLOODCX  No results found for: URINECX  No results found for: RESPCX  No results found for: WOUNDCX  No results found for: STOOLCX  No components found for: BODYFLD    Radiology Data:   Imaging Results (Last 24  Hours)     ** No results found for the last 24 hours. **          I have reviewed the patient's current medications.     Assessment/Plan     Active Hospital Problems    Diagnosis   • Pneumonia of both lower lobes due to infectious organism   • Acute systolic CHF (congestive heart failure) (CMS/[facility])   • Acute respiratory failure with hypoxia (CMS/[facility])   • CKD (chronic kidney disease) stage 4, GFR 15-29 ml/min (CMS/[facility])   • Diabetes mellitus type II, uncontrolled (CMS/[facility])   • Benign essential hypertension       Plan:    1. Acute respiratory failure with hypoxia: related to pneumonia/CHF decompensation: Resolved.  Weaned to room air.  Completed antibiotic therapy.  Continue diuresis as renal function allows.   2. Acute exacerbation of heart failure with reduced EF: (secondary to ischemic cardiomyopathy): Resolved. Echocardiogram done 8/21/2020 showed severe global hypokinesis with an ejection fraction of 20%.  Continue daily weights, fluid restriction. Diuretics currently held related to worsening creatinine.  3. Chronic kidney disease stage III/IV: Patient's baseline creatinine is reportedly between 1.9-2.0. Creatinine is 3.95 today. Diuretics held per nephrology and patient to receive IV fluids today. Will reassess BMP in AM.    4. Hypokalemia: Resolved.    5. History of atrial flutter: Patient is currently in normal sinus rhythm.  Continue amiodarone, Coreg and anticoagulation with warfarin.  INR is 1.35 and pharmacy is dosing warfarin.  6. Hypertension: Stable.  Continue Coreg and hydralazine.    7. Diabetes mellitus: FSBS AC and HS with SSI.    8. Acute cystitis: completed antibiotics.  Culture shows no growth.     9. GERD: Continue PPI.        This document has been electronically signed by THIERNO Waldrop on October 9, 2020 13:16 CDT

## 2020-10-09 NOTE — PLAN OF CARE
Problem: Adult Inpatient Plan of Care  Goal: Plan of Care Review  Outcome: Ongoing, Progressing   ST has d/c w/ recs for soft/ground meats and thin liquids. Low Sodium diet and milk TID- Marinol started 10/5 2.5 BID. Average intakes over past 5 days 50% w/ upgrade from pureed on 10/6. Albumin on 9/23 3.6. GFR today 18L. Noted wt on last admit 8/21 190# --Admit 9/24 wt 203#, lowest w/ diuresis 10/5 180#. Current wt today 183#. RD following.

## 2020-10-09 NOTE — THERAPY TREATMENT NOTE
Acute Care - Physical Therapy Treatment Note  [facility]     Patient Name: Ondina Alanis Sr.  : 1940  MRN: [id number]  Today's Date: 10/9/2020   Onset of Illness/Injury or Date of Surgery: 20       PT Assessment (last 12 hours)      PT Evaluation and Treatment     Row Name 10/09/20 1019          Physical Therapy Time and Intention    Subjective Information  no complaints  -LN     Document Type  therapy note (daily note)  -LN     Mode of Treatment  physical therapy  -LN     Patient Effort  poor  -LN     Comment  pt would answer questions w/o opening his mouth-much encouragement to open his mouth to talk  -     Row Name 10/09/20 1019          General Information    Patient Profile Reviewed  yes  -     Row Name 10/09/20 1019          Cognition    Orientation Status (Cognition)  -- name/bd by id bracelet  -     Row Name 10/09/20 1019          Pain Scale: Numbers Pre/Post-Treatment    Pretreatment Pain Rating  0/10 - no pain  -LN     Posttreatment Pain Rating  0/10 - no pain  -     Row Name 10/09/20 1019          Bed Mobility    Supine-Sit [place] (Bed Mobility)  not tested  -     Sit-Supine [place] (Bed Mobility)  not tested  -     Row Name 10/09/20 1019          Hip (Therapeutic Exercise)    Hip (Therapeutic Exercise)  PROM (passive range of motion)  -LN     Hip PROM (Therapeutic Exercise)  bilateral;flexion;aBduction;aDduction;external rotation;internal rotation;supine 20 reps;hs str 3x20 sec hold  -     Row Name 10/09/20 1019          Knee (Therapeutic Exercise)    Knee (Therapeutic Exercise)  PROM (passive range of motion)  -LN     Knee PROM (Therapeutic Exercise)  bilateral;flexion;extension;supine;10 repetitions 20 reps-limited movement at l knee  -     Row Name 10/09/20 1019          Ankle (Therapeutic Exercise)    Ankle (Therapeutic Exercise)  PROM (passive range of motion)  -     Ankle PROM (Therapeutic Exercise)  bilateral;dorsiflexion;plantarflexion;supine  20 reps-hc str 3x30 sec hold  -LN     Row Name 10/09/20 1019          Plan of Care Review    Plan of Care Reviewed With  patient  -LN     Row Name 10/09/20 1019          Vital Signs    Pre Systolic BP Rehab  140  -LN     Pre Treatment Diastolic BP  61  -LN     Post Systolic BP Rehab  137  -LN     Post Treatment Diastolic BP  80  -LN     Pretreatment Heart Rate (beats/min)  68  -LN     Posttreatment Heart Rate (beats/min)  70  -LN     Pre SpO2 (%)  98  -LN     O2 Delivery Pre Treatment  room air  -LN     Post SpO2 (%)  98  -LN     Pre Patient Position  Supine  -LN     Intra Patient Position  Supine  -LN     Post Patient Position  Side Lying  -LN     Row Name 10/09/20 1019          Bed Mobility Goal 1 (PT)    Activity/Assistive Device (Bed Mobility Goal 1, PT)  sit to supine/supine to sit;rolling to right;rolling to left  -LN     [place] Level/Cues Needed (Bed Mobility Goal 1, PT)  minimum assist (75% or more patient effort)  -LN     Time Frame (Bed Mobility Goal 1, PT)  long term goal (LTG);by discharge  -LN     Strategies/Barriers (Bed Mobility Goal 1, PT)  Poor ability to maneuver in bed.  -LN     Progress/Outcomes (Bed Mobility Goal 1, PT)  goal not met  -LN     Row Name 10/09/20 1019          ROM Goal 1 (PT)    ROM Goal 1 (PT)  Patient will tolerate AAROM and stretching to BLEs, to improve functional mobility.  -LN     Time Frame (ROM Goal 1, PT)  long-term goal (LTG);by discharge  -LN     Strategies/Barriers (ROM Goal 1, PT)  Fatigues easily, very debilitated.  -LN     Row Name 10/09/20 1019          Patient Education Goal (PT)    Activity (Patient Education Goal, PT)  Patient will sit EOB with min Ax1, x 5 min with BUE support.  -LN     Time Frame (Patient Education Goal, PT)  long term goal (LTG);by discharge  -LN     Barriers (Patient Education Goal, PT)  Decreased hip and knee flexion, poor seated balance.  -     Row Name 10/09/20 1019          Positioning and Restraints    Post Treatment Position   bed  -LN     In Bed  notified nsg;side lying right;call light within reach;encouraged to call for assist;exit alarm on;heels elevated  -LN     Row Name 10/09/20 1019          Therapy Assessment/Plan (PT)    Rehab Potential (PT)  fair, will monitor progress closely  -LN     Criteria for Skilled Interventions Met (PT)  yes;skilled treatment is necessary  -LN       User Key  (r) = Recorded By, (t) = Taken By, (c) = Cosigned By    Initials Name Provider Type    LN Haydee Taylor PTA Physical Therapy Assistant        Physical Therapy Education                 Title: PT OT SLP Therapies (In Progress)     Topic: Physical Therapy (In Progress)     Point: Mobility training (In Progress)     Learning Progress Summary           Patient Acceptance, E,TB, NR by  at 10/9/2020 1234    Acceptance, E, VU,NR by  at 10/4/2020 1244    Comment: Educated on rolling technique, sit<-->supine technique; discussed PT POC and goals.                   Point: Home exercise program (In Progress)     Learning Progress Summary           Patient Acceptance, E,TB, NR by  at 10/9/2020 1234    Acceptance, E, VU,NR by  at 10/6/2020 0935    Comment: Educated on supine strengthening and stretching activities, encouraged participation.                   Point: Body mechanics (Not Started)     Learner Progress:  Not documented in this visit.          Point: Precautions (In Progress)     Learning Progress Summary           Patient Acceptance, E,TB, NR by  at 10/9/2020 1234                               User Key     Initials Effective Dates Name Provider Type Discipline    LN 03/07/18 -  Haydee Taylor PTA Physical Therapy Assistant PT    CZ 04/03/18 -  Surya Rodriguez, PT Physical Therapist PT              PT Recommendation and Plan  Anticipated Discharge Disposition (PT): skilled nursing facility  Therapy Frequency (PT): daily  Plan of Care Reviewed With: patient  Outcome Summary: prom to ashia le including stretches-no goals met  Outcome Measures      Row Name 10/09/20 1019 10/08/20 0900 10/07/20 0825       How much help from another person do you currently need...    Turning from your back to your side while in flat bed without using bedrails?  1  -LN  --  2  -TA    Moving from lying on back to sitting on the side of a flat bed without bedrails?  2  -LN  --  2  -TA    Moving to and from a bed to a chair (including a wheelchair)?  1  -LN  --  1  -TA    Standing up from a chair using your arms (e.g., wheelchair, bedside chair)?  1  -LN  --  1  -TA    Climbing 3-5 steps with a railing?  1  -LN  --  1  -TA    To walk in hospital room?  1  -LN  --  1  -TA    AM-PAC 6 Clicks Score (PT)  7  -LN  --  8  -TA       How much help from another is currently needed...    Putting on and taking off regular lower body clothing?  --  1  -RC  1  -RC    Bathing (including washing, rinsing, and drying)  --  1  -RC  1  -RC    Toileting (which includes using toilet bed pan or urinal)  --  1  -RC  1  -RC    Putting on and taking off regular upper body clothing  --  1  -RC  1  -RC    Taking care of personal grooming (such as brushing teeth)  --  1  -RC  1  -RC    Eating meals  --  1  -RC  1  -RC    AM-PAC 6 Clicks Score (OT)  --  6  -RC  6  -RC       Functional Assessment    Outcome Measure Options  AM-PAC 6 Clicks Basic Mobility (PT)  -LN  --  AM-PAC 6 Clicks Basic Mobility (PT)  -TA      User Key  (r) = Recorded By, (t) = Taken By, (c) = Cosigned By    Initials Name Provider Type    Geneva Beyer PTA Physical Therapy Assistant    LN Haydee Taylor PTA Physical Therapy Assistant    RC Jaylyn Tran, СВЕТЛАНА/L Occupational Therapy Assistant           Time Calculation:   PT Charges     Row Name 10/09/20 1235             Time Calculation    Start Time  1019  -LN      Stop Time  1050  -LN      Time Calculation (min)  31 min  -LN      PT Received On  10/09/20  -LN         Time Calculation- PT    Total Timed Code Minutes- PT  31 minute(s)  -LN        User Key  (r) = Recorded By,  (t) = Taken By, (c) = Cosigned By    Initials Name Provider Type    Haydee Limon PTA Physical Therapy Assistant        Therapy Charges for Today     Code Description Service Date Service Provider Modifiers Qty    58148134373 HC PT THERAPEUTIC ACT EA 15 MIN 10/9/2020 Haydee Taylor, KUSH GP 1    43954698585 HC PT THER PROC EA 15 MIN 10/9/2020 Haydee Taylor, KUSH GP 1          PT G-Codes  Outcome Measure Options: AM-PAC 6 Clicks Basic Mobility (PT)  AM-PAC 6 Clicks Score (PT): 7  AM-PAC 6 Clicks Score (OT): 6    Haydee Taylor PTA  10/9/2020

## 2020-10-09 NOTE — PLAN OF CARE
Problem: Adult Inpatient Plan of Care  Goal: Plan of Care Review  Outcome: Ongoing, Progressing  Flowsheets  Taken 10/9/2020 1235  Plan of Care Reviewed With: patient  Outcome Summary: prom to ashia le including stretches-no goals met  Taken 10/9/2020 1019  Plan of Care Reviewed With: patient

## 2020-10-09 NOTE — THERAPY TREATMENT NOTE
Acute Care - Occupational Therapy Treatment Note  [facility]     Patient Name: Ondina Alanis Sr.  : 1940  MRN: [id number]  Today's Date: 10/9/2020  Onset of Illness/Injury or Date of Surgery: 20  Date of Referral to OT: 10/01/20       Admit Date: 2020       ICD-10-CM ICD-9-CM   1. Pneumonia of both lower lobes due to infectious organism  J18.9 486   2. Hypoxia  R09.02 799.02   3. Elevated troponin  R79.89 790.6   4. Congestive heart failure, unspecified HF chronicity, unspecified heart failure type (CMS/HCC)  I50.9 428.0   5. Dysphagia, unspecified type  R13.10 787.20   6. Impaired mobility and activities of daily living  Z74.09 V49.89    Z78.9    7. Impaired physical mobility  Z74.09 781.99     Patient Active Problem List   Diagnosis   • CKD (chronic kidney disease) stage 4, GFR 15-29 ml/min (CMS/[facility])   • Diabetic peripheral neuropathy associated with type 2 diabetes mellitus (CMS/[facility])   • Diabetes mellitus type II, uncontrolled (CMS/[facility])   • GERD (gastroesophageal reflux disease)   • Primary osteoarthritis of both knees   • Pulmonary embolism (CMS/HCC)   • BPH (benign prostatic hyperplasia)   • Benign essential hypertension   • Asthma   • Pleurisy   • Acute respiratory failure with hypoxia (CMS/[facility])   • Stage 1 acute kidney injury (CMS/HCC)   • Left ventricular diastolic dysfunction   • Exacerbation of asthma   • COPD exacerbation (CMS/[facility])   • Atrial flutter (CMS/HCC)   • Acute systolic CHF (congestive heart failure) (CMS/[facility])   • Pneumonia of both lower lobes due to infectious organism     Past Medical History:   Diagnosis Date   • Asthma    • Diabetes mellitus (CMS/HCC)    • GERD (gastroesophageal reflux disease)    • Hypertension    • Prostate disorder      Past Surgical History:   Procedure Laterality Date   • BACK SURGERY     • KNEE SURGERY Left    • PROSTATE SURGERY            OT ASSESSMENT FLOWSHEET (last 12 hours)      OT Evaluation and Treatment     Row Name 10/09/20 0743                    OT Time and Intention    Subjective Information  no complaints  -RC        Document Type  therapy note (daily note)  -RC        Mode of Treatment  occupational therapy;individual therapy  -RC        Total Minutes, Occupational Therapy  57  -RC        Patient Effort  fair  -RC           General Information    Patient Profile Reviewed  yes  -RC        Existing Precautions/Restrictions  fall blind  -RC           Cognition    Affect/Mental Status (Cognitive)  low arousal/lethargic  -RC           Pain Scale: Numbers Pre/Post-Treatment    Pretreatment Pain Rating  0/10 - no pain  -RC        Posttreatment Pain Rating  0/10 - no pain  -RC           Shoulder (Therapeutic Exercise)    Shoulder PROM (Therapeutic Exercise)  -- aarom to b ues all joints 5x   -RC           Self-Feeding Assessment/Training    [place] Level (Feeding)  feeding skills;dependent (less than 25% patient effort)  -RC        Position (Self-Feeding)  sitting up in bed  -RC           Plan of Care Review    Plan of Care Reviewed With  patient  -RC        Outcome Summary  dep for feeding this AM, pt was less talkative and less participation in ue ex    cont poc   -RC           Vital Signs    Pre Systolic BP Rehab  134  -RC        Pre Treatment Diastolic BP  76  -RC        Pretreatment Heart Rate (beats/min)  70  -RC        Pre SpO2 (%)  98  -RC        O2 Delivery Pre Treatment  room air  -RC           Positioning and Restraints    Pre-Treatment Position  in bed  -RC        Post Treatment Position  bed  -RC        In Bed  call light within reach;encouraged to call for assist;exit alarm on  -RC           Progress Summary (OT)    Progress Toward Functional Goals (OT)  progress toward functional goals is gradual  -          User Key  (r) = Recorded By, (t) = Taken By, (c) = Cosigned By    Initials Name Effective Dates    RC Jaylyn Tran COTA/L 03/07/18 -                OT Recommendation and Plan     Progress Toward Functional Goals  (OT): progress toward functional goals is gradual  Plan of Care Review  Plan of Care Reviewed With: patient  Progress: improving  Outcome Summary: dep for feeding this AM, pt was less talkative and less participation in ue ex    cont poc   Plan of Care Reviewed With: patient  Outcome Summary: dep for feeding this AM, pt was less talkative and less participation in ue ex    cont poc     Outcome Measures     Row Name 10/09/20 1019 10/09/20 0743 10/08/20 0900       How much help from another person do you currently need...    Turning from your back to your side while in flat bed without using bedrails?  1  -LN  --  --    Moving from lying on back to sitting on the side of a flat bed without bedrails?  2  -LN  --  --    Moving to and from a bed to a chair (including a wheelchair)?  1  -LN  --  --    Standing up from a chair using your arms (e.g., wheelchair, bedside chair)?  1  -LN  --  --    Climbing 3-5 steps with a railing?  1  -LN  --  --    To walk in hospital room?  1  -LN  --  --    AM-PAC 6 Clicks Score (PT)  7  -LN  --  --       How much help from another is currently needed...    Putting on and taking off regular lower body clothing?  --  1  -RC  1  -RC    Bathing (including washing, rinsing, and drying)  --  1  -RC  1  -RC    Toileting (which includes using toilet bed pan or urinal)  --  1  -RC  1  -RC    Putting on and taking off regular upper body clothing  --  1  -RC  1  -RC    Taking care of personal grooming (such as brushing teeth)  --  1  -RC  1  -RC    Eating meals  --  1  -RC  1  -RC    AM-PAC 6 Clicks Score (OT)  --  6  -RC  6  -RC       Functional Assessment    Outcome Measure Options  AM-PAC 6 Clicks Basic Mobility (PT)  -LN  --  --    Row Name 10/07/20 0825             How much help from another person do you currently need...    Turning from your back to your side while in flat bed without using bedrails?  2  -TA      Moving from lying on back to sitting on the side of a flat bed without  bedrails?  2  -TA      Moving to and from a bed to a chair (including a wheelchair)?  1  -TA      Standing up from a chair using your arms (e.g., wheelchair, bedside chair)?  1  -TA      Climbing 3-5 steps with a railing?  1  -TA      To walk in hospital room?  1  -TA      AM-PAC 6 Clicks Score (PT)  8  -TA         How much help from another is currently needed...    Putting on and taking off regular lower body clothing?  1  -RC      Bathing (including washing, rinsing, and drying)  1  -RC      Toileting (which includes using toilet bed pan or urinal)  1  -RC      Putting on and taking off regular upper body clothing  1  -RC      Taking care of personal grooming (such as brushing teeth)  1  -RC      Eating meals  1  -RC      AM-PAC 6 Clicks Score (OT)  6  -RC         Functional Assessment    Outcome Measure Options  AM-PAC 6 Clicks Basic Mobility (PT)  -TA        User Key  (r) = Recorded By, (t) = Taken By, (c) = Cosigned By    Initials Name Provider Type    TA Geneva Preciado, PTA Physical Therapy Assistant    Haydee Limon PTA Physical Therapy Assistant    Jaylyn Kuhn SOTOMAYOR/L Occupational Therapy Assistant          Time Calculation:   Time Calculation- OT     Row Name 10/09/20 0840             Time Calculation- OT    OT Start Time  0743  -RC      OT Stop Time  0840  -RC      OT Time Calculation (min)  57 min  -      Total Timed Code Minutes- OT  57 minute(s)  -      OT Received On  10/09/20  -        User Key  (r) = Recorded By, (t) = Taken By, (c) = Cosigned By    Initials Name Provider Type     Jaylyn Tran SOTOMAYOR/L Occupational Therapy Assistant        Therapy Charges for Today     Code Description Service Date Service Provider Modifiers Qty    31707328440 HC OT SELF CARE/MGMT/TRAIN EA 15 MIN 10/8/2020 Jaylyn Tran SOTOMAYOR/L GO 1    73718843787 HC OT THER PROC EA 15 MIN 10/8/2020 Jaylyn Tran SOTOMAYOR/L GO 2    68576702925 HC OT SELF CARE/MGMT/TRAIN EA 15 MIN 10/9/2020 Jaylyn Tran  G, SOTOMAYOR/L GO 1    64062449231 HC OT THER PROC EA 15 MIN 10/9/2020 Marc, Jaylyn G, SOTOMAYOR/L GO 1    67223226236 HC OT THER PROC EA 15 MIN 10/9/2020 Marc, Jaylyn G, SOTOMAYOR/L GO 1    65586798823 HC OT SELF CARE/MGMT/TRAIN EA 15 MIN 10/9/2020 Marc, Jaylyn G, SOTOAMYOR/L GO 1               Jaylyn G Marc, SOTOMAYOR/L  10/9/2020

## 2020-10-09 NOTE — PLAN OF CARE
Goal Outcome Evaluation:  Plan of Care Reviewed With: patient  Progress: no change  Outcome Summary: VSS, watching renal function, resting between care, continue to monitor.

## 2020-10-09 NOTE — PROGRESS NOTES
"[facility]  [address] [place] [address]. [id number]  T - [phone]  F - [phone]     Progress Note          PATIENT  DEMOGRAPHICS   PATIENT NAME: Ondina Alanis Sr.                      PHYSICIAN: Marley Akhtar MD  : 1940  MRN: [id number]   LOS: 15 days    Patient Care Team:  Wendie Quiñonez APRN as PCP - General (Nurse Practitioner)  Subjective   SUBJECTIVE   Resting no distress, burleson in place good UO       Objective   OBJECTIVE   Vital Signs  Temp:  [96.9 °F (36.1 °C)-97.6 °F (36.4 °C)] 96.9 °F (36.1 °C)  Heart Rate:  [55-68] 66  Resp:  [16-18] 18  BP: (123-150)/(58-72) 150/68    Flowsheet Rows      First Filed Value   Admission Height  193 cm (76\") Documented at 2020 2200   Admission Weight  94.8 kg (209 lb) Documented at 2020 2200           I/O last 3 completed shifts:  In: -   Out: 2400 [Urine:2400]    PHYSICAL EXAM    Physical Exam  Vitals signs reviewed.   Constitutional:       General: He is not in acute distress.     Appearance: He is well-developed.   Eyes:      General:         Right eye: No discharge.   Neck:      Vascular: JVD present.   Cardiovascular:      Rate and Rhythm: Normal rate and regular rhythm.      Heart sounds: Normal heart sounds, S1 normal and S2 normal. No murmur.   Pulmonary:      Effort: Pulmonary effort is normal. No respiratory distress.      Breath sounds: No rales.   Abdominal:      General: Bowel sounds are normal. There is no distension.      Palpations: Abdomen is soft.      Tenderness: There is no abdominal tenderness.   Skin:     Coloration: Skin is not pale.      Findings: No erythema.   Neurological:      General: No focal deficit present.      Mental Status: He is alert. Mental status is at baseline.      Sensory: No sensory deficit.      Motor: No abnormal muscle tone.         RESULTS   Results Review:    Results from last 7 days   Lab Units 10/09/20  0604 10/08/20  0544 10/07/20  0610   SODIUM mmol/L 133* " 135* 136   POTASSIUM mmol/L 3.6 3.5 3.9   CHLORIDE mmol/L 92* 92* 94*   CO2 mmol/L 31.0* 31.0* 28.0   BUN mg/dL 83* 80* 76*   CREATININE mg/dL 3.95* 3.83* 3.71*   CALCIUM mg/dL 9.2 9.3 8.8   GLUCOSE mg/dL 94 96 103*       Estimated Creatinine Clearance: 17.8 mL/min (A) (by C-G formula based on SCr of 3.95 mg/dL (H)).                Results from last 7 days   Lab Units 10/07/20  0610 10/03/20  0603   WBC 10*3/mm3 7.02 7.64   HEMOGLOBIN g/dL 10.3* 11.9*   PLATELETS 10*3/mm3 160 162       Results from last 7 days   Lab Units 10/09/20  0604 10/08/20  0544 10/07/20  0610 10/06/20  0635 10/05/20  0511   INR  1.35* 1.28* 1.40* 1.26* 1.27*         Imaging Results (Last 24 Hours)     ** No results found for the last 24 hours. **           MEDICATIONS    amiodarone, 200 mg, Oral, Q24H  aspirin, 81 mg, Oral, Daily  atorvastatin, 40 mg, Oral, Nightly  carvedilol, 6.25 mg, Oral, BID With Meals  dronabinol, 2.5 mg, Oral, BID  hydrALAZINE, 75 mg, Oral, Q8H  isosorbide mononitrate, 30 mg, Oral, Q24H  magic butt ointment, , Topical, BID  pantoprazole, 40 mg, Oral, QAM  sodium chloride, 10 mL, Intravenous, Q12H  warfarin, 5 mg, Oral, Daily      Pharmacy to dose warfarin,         Assessment/Plan   ASSESSMENT / PLAN      CKD (chronic kidney disease) stage 4, GFR 15-29 ml/min (CMS/[facility])    Diabetes mellitus type II, uncontrolled (CMS/[facility])    Benign essential hypertension    Acute respiratory failure with hypoxia (CMS/[facility])    Acute systolic CHF (congestive heart failure) (CMS/[facility])    Pneumonia of both lower lobes due to infectious organism    1.  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 good UO with iv lasix and albumin. Lost significant weight 181 lbs. Peak 209 lbs. Cr now worsening with high bicarb and low chloride (contraction alkalosis). Likely overdiuresed. Hold diuretics. Persistent high cr. Give ivf for a day. Dc albumin     dw Pt wife in detail and  explain advance nature of ckd and may need dialysis     2.  Hypertension- poorly controlled, hydralazine increased to 75 mg 3 times daily and carvedilol to 12.5 mg twice daily  bp overall better.      3.  CHF- systolic heart failure EF 20%. severe LV systolic dysfunction with RV systolic pressure of 60 mmHg, cardiology following. cxr bilateral infiltrates vs edema     4.  A. Fib on amiodarone and coumain     5.  Pneumonia- on antibiotics per primary team     6.  UTI- on antibiotics per primary team                    This document has been electronically signed by Marley Akhtar MD on October 9, 2020 12:17 CDT

## 2020-10-10 NOTE — PROGRESS NOTES
"Anticoagulation by Pharmacy - Warfarin    Ondina Alanis Sr. 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 Discharged home and receiving 5 mg nightly per Wife to finish out old bottle before new prescription of 3 mg nightly. Patient only received a few doses of 3 mg before being admitted. Patient's wife checks INR at home every Monday and INR has been in range    INR goal: 2-3     Last INR:   Lab Results   Component Value Date    INR 1.33 (H) 10/10/2020       Objective:  [Ht: 193 cm (76\"); Wt: 84.5 kg (186 lb 4.8 oz)]  Lab Results   Component Value Date    INR 1.33 (H) 10/10/2020    INR 1.35 (H) 10/09/2020    INR 1.28 (H) 10/08/2020    PROTIME 17.1 (H) 10/10/2020    PROTIME 17.4 (H) 10/09/2020    PROTIME 16.6 (H) 10/08/2020     Lab Results   Component Value Date    HGB 10.3 (L) 10/07/2020    HGB 11.9 (L) 10/03/2020    HGB 10.6 (L) 09/29/2020    HCT 31.3 (L) 10/07/2020    HCT 35.4 (L) 10/03/2020    HCT 33.5 (L) 09/29/2020     10/07/2020     10/03/2020     09/29/2020       Recent Warfarin Administrations     The 5 most recent administrations since 10/03/2020 are shown below each listed medication.    Warfarin Sodium       Order Dose Date Given     warfarin (COUMADIN) tablet 5 mg 5 mg 10/09/2020      5 mg 10/08/2020      5 mg 10/07/2020     warfarin (COUMADIN) tablet 4 mg 4 mg 10/06/2020      4 mg 10/05/2020                Assessment  Interacting medications: Amiodarone (can increase affect of warfarin on INR)  INR is subtherapeutic at 1.33     No new H&H available today, most recent H&H from 10/7 was below normal limits   No documentation of bleeding. Patient will continue to be monitored for signs of bleeding     Will increase to warfarin PO 6 mg tonight. Pharmacy will continue to trend and adjust warfarin dose as necessary.     Plan:  1.  Give warfarin 6 mg tablet PO @ 1800 tonight  2.  Draw a PT/INR in " AM  3.  Pharmacy will continue to follow    Concepción Garnett RPH  10/10/20 13:30 CDT

## 2020-10-10 NOTE — THERAPY TREATMENT NOTE
Acute Care - Physical Therapy Treatment Note  [facility]     Patient Name: Ondina Alanis Sr.  : 1940  MRN: [id number]  Today's Date: 10/10/2020   Onset of Illness/Injury or Date of Surgery: 20       PT Assessment (last 12 hours)      PT Evaluation and Treatment     Row Name 10/10/20 1515          Physical Therapy Time and Intention    Subjective Information  complains of;pain  -LN     Document Type  therapy note (daily note)  -LN     Mode of Treatment  physical therapy  -LN     Patient Effort  poor  -LN     Comment  better interaction this rx  -LN     Row Name 10/10/20 151          General Information    Patient Profile Reviewed  yes  -LN     Row Name 10/10/20 151          Cognition    Orientation Status (Cognition)  oriented to;person   -LN     Row Name 10/10/20 151          Pain Scale: Numbers Pre/Post-Treatment    Pretreatment Pain Rating  0/10 - no pain  -LN     Posttreatment Pain Rating  4/10  -LN     Pain Location  back  -LN     Pre/Posttreatment Pain Comment  pt deferred pain meds  -LN     Row Name 10/10/20 151          Bed Mobility    Supine-Sit [place] (Bed Mobility)  not tested  -LN     Sit-Supine [place] (Bed Mobility)  not tested  -LN     Row Name 10/10/20 1515          Hip (Therapeutic Exercise)    Hip (Therapeutic Exercise)  PROM (passive range of motion)  -LN     Hip PROM (Therapeutic Exercise)  bilateral;flexion;aBduction;aDduction;external rotation;internal rotation 20 reps;hs str 3x20 sec hold  -LN     Row Name 10/10/20 151          Knee (Therapeutic Exercise)    Knee (Therapeutic Exercise)  PROM (passive range of motion)  -LN     Knee PROM (Therapeutic Exercise)  bilateral;flexion;extension 20 reps  -LN     Row Name 10/10/20 1515          Ankle (Therapeutic Exercise)    Ankle (Therapeutic Exercise)  AAROM (active assistive range of motion)  -LN     Ankle AAROM (Therapeutic Exercise)  bilateral;dorsiflexion;plantarflexion 20 reps;3x30 sec hold hc str  -LN      Row Name 10/10/20 1515          Plan of Care Review    Plan of Care Reviewed With  patient  -LN     Row Name 10/10/20 1515          Vital Signs    Pre Systolic BP Rehab  155  -LN     Pre Treatment Diastolic BP  72  -LN     Pretreatment Heart Rate (beats/min)  67  -LN     Pre SpO2 (%)  95  -LN     O2 Delivery Pre Treatment  room air  -LN     Pre Patient Position  Supine  -LN     Intra Patient Position  Supine  -LN     Post Patient Position  Supine  -LN     Row Name 10/10/20 1515          Bed Mobility Goal 1 (PT)    Activity/Assistive Device (Bed Mobility Goal 1, PT)  sit to supine/supine to sit;rolling to right;rolling to left  -LN     [place] Level/Cues Needed (Bed Mobility Goal 1, PT)  minimum assist (75% or more patient effort)  -LN     Time Frame (Bed Mobility Goal 1, PT)  long term goal (LTG);by discharge  -LN     Strategies/Barriers (Bed Mobility Goal 1, PT)  Poor ability to maneuver in bed.  -LN     Progress/Outcomes (Bed Mobility Goal 1, PT)  goal not met  -     Row Name 10/10/20 1515          ROM Goal 1 (PT)    ROM Goal 1 (PT)  Patient will tolerate AAROM and stretching to BLEs, to improve functional mobility.  -LN     Time Frame (ROM Goal 1, PT)  long-term goal (LTG);by discharge  -LN     Strategies/Barriers (ROM Goal 1, PT)  Fatigues easily, very debilitated.  -     Row Name 10/10/20 1515          Patient Education Goal (PT)    Activity (Patient Education Goal, PT)  Patient will sit EOB with min Ax1, x 5 min with BUE support.  -LN     Time Frame (Patient Education Goal, PT)  long term goal (LTG);by discharge  -LN     Barriers (Patient Education Goal, PT)  Decreased hip and knee flexion, poor seated balance.  -     Row Name 10/10/20 1515          Positioning and Restraints    Post Treatment Position  bed  -LN     In Bed  supine;call light within reach;encouraged to call for assist;exit alarm on;with family/caregiver;heels elevated extension added to bed this  -     Row Name 10/10/20 1515           Therapy Assessment/Plan (PT)    Rehab Potential (PT)  fair, will monitor progress closely  -LN     Criteria for Skilled Interventions Met (PT)  yes;skilled treatment is necessary  -LN       User Key  (r) = Recorded By, (t) = Taken By, (c) = Cosigned By    Initials Name Provider Type    LN Haydee Taylor PTA Physical Therapy Assistant        Physical Therapy Education                 Title: PT OT SLP Therapies (In Progress)     Topic: Physical Therapy (In Progress)     Point: Mobility training (Done)     Learning Progress Summary           Patient Acceptance, E,TB, VU,NR by LN at 10/10/2020 1604    Acceptance, E,TB, NR by LN at 10/9/2020 1234    Acceptance, E, VU,NR by  at 10/4/2020 1244    Comment: Educated on rolling technique, sit<-->supine technique; discussed PT POC and goals.                   Point: Home exercise program (Done)     Learning Progress Summary           Patient Acceptance, E,TB, VU,NR by LN at 10/10/2020 1604    Acceptance, E,TB, NR by LN at 10/9/2020 1234    Acceptance, E, VU,NR by  at 10/6/2020 0935    Comment: Educated on supine strengthening and stretching activities, encouraged participation.                   Point: Body mechanics (Not Started)     Learner Progress:  Not documented in this visit.          Point: Precautions (Done)     Learning Progress Summary           Patient Acceptance, E,TB, VU,NR by LN at 10/10/2020 1604    Acceptance, E,TB, NR by  at 10/9/2020 1234                               User Key     Initials Effective Dates Name Provider Type Discipline     03/07/18 -  Haydee Taylor PTA Physical Therapy Assistant PT     04/03/18 -  Surya Rodriguez, PT Physical Therapist PT              PT Recommendation and Plan  Anticipated Discharge Disposition (PT): skilled nursing facility  Therapy Frequency (PT): daily  Plan of Care Reviewed With: patient  Outcome Summary: pt received prom to [facility] x 20 reps-more interactive this rx-no goals met  Outcome Measures      Row Name 10/10/20 1515 10/10/20 0635 10/09/20 1019       How much help from another person do you currently need...    Turning from your back to your side while in flat bed without using bedrails?  1  -LN  --  1  -LN    Moving from lying on back to sitting on the side of a flat bed without bedrails?  2  -LN  --  2  -LN    Moving to and from a bed to a chair (including a wheelchair)?  1  -LN  --  1  -LN    Standing up from a chair using your arms (e.g., wheelchair, bedside chair)?  1  -LN  --  1  -LN    Climbing 3-5 steps with a railing?  1  -LN  --  1  -LN    To walk in hospital room?  1  -LN  --  1  -LN    AM-PAC 6 Clicks Score (PT)  7  -LN  --  7  -LN       How much help from another is currently needed...    Putting on and taking off regular lower body clothing?  --  1  -BB  --    Bathing (including washing, rinsing, and drying)  --  1  -BB  --    Toileting (which includes using toilet bed pan or urinal)  --  1  -BB  --    Putting on and taking off regular upper body clothing  --  1  -BB  --    Taking care of personal grooming (such as brushing teeth)  --  1  -BB  --    Eating meals  --  1  -BB  --    AM-PAC 6 Clicks Score (OT)  --  6  -BB  --       Functional Assessment    Outcome Measure Options  AM-PAC 6 Clicks Basic Mobility (PT)  -LN  --  AM-PAC 6 Clicks Basic Mobility (PT)  -LN    Row Name 10/09/20 0743 10/08/20 0900          How much help from another is currently needed...    Putting on and taking off regular lower body clothing?  1  -RC  1  -RC     Bathing (including washing, rinsing, and drying)  1  -RC  1  -RC     Toileting (which includes using toilet bed pan or urinal)  1  -RC  1  -RC     Putting on and taking off regular upper body clothing  1  -RC  1  -RC     Taking care of personal grooming (such as brushing teeth)  1  -RC  1  -RC     Eating meals  1  -RC  1  -RC     AM-PAC 6 Clicks Score (OT)  6  -RC  6  -RC       User Key  (r) = Recorded By, (t) = Taken By, (c) = Cosigned By    Initials Name  Provider Type    LN Claudia, Haydee S, PTA Physical Therapy Assistant    BB Lamar Trinidad, SOTOMAYOR/L Occupational Therapy Assistant    Jaylyn Kuhn, SOTOMAYOR/L Occupational Therapy Assistant           Time Calculation:   PT Charges     Row Name 10/10/20 1605             Time Calculation    Start Time  1515  -LN      Stop Time  1545  -LN      Time Calculation (min)  30 min  -LN      PT Received On  10/10/20  -LN         Time Calculation- PT    Total Timed Code Minutes- PT  30 minute(s)  -LN        User Key  (r) = Recorded By, (t) = Taken By, (c) = Cosigned By    Initials Name Provider Type    LN Claudia, Haydee S, PTA Physical Therapy Assistant        Therapy Charges for Today     Code Description Service Date Service Provider Modifiers Qty    29184394304 HC PT THERAPEUTIC ACT EA 15 MIN 10/9/2020 Claudia, Haydee S, PTA GP 1    58670974645 HC PT THER PROC EA 15 MIN 10/9/2020 Claudia, Haydee S, PTA GP 1    17433783482 HC PT THERAPEUTIC ACT EA 15 MIN 10/10/2020 Claudia, Haydee S, PTA GP 1    65862106325 HC PT THER PROC EA 15 MIN 10/10/2020 Claudia, Haydee S, PTA GP 1          PT G-Codes  Outcome Measure Options: AM-PAC 6 Clicks Basic Mobility (PT)  AM-PAC 6 Clicks Score (PT): 7  AM-PAC 6 Clicks Score (OT): 6    Haydee S Claudia PTA  10/10/2020

## 2020-10-10 NOTE — PLAN OF CARE
Problem: Adult Inpatient Plan of Care  Goal: Plan of Care Review  Outcome: Ongoing, Progressing  Flowsheets  Taken 10/10/2020 1604  Plan of Care Reviewed With: patient  Outcome Summary: pt received prom to [facility]ia delacruz[facility] x 20 reps-more interactive this rx-no goals met  Taken 10/10/2020 1515  Plan of Care Reviewed With: patient

## 2020-10-10 NOTE — PLAN OF CARE
Goal Outcome Evaluation:  Plan of Care Reviewed With: patient  Progress: no change   Patient hasn't spoke very much mainly yes and no.  He did take his medications tonight

## 2020-10-10 NOTE — PROGRESS NOTES
[facility]  INPATIENT PROGRESS NOTE    Length of Stay: 16  Date of Admission: 9/23/2020  Primary Care Physician: Wendie Quiñonez APRN    Subjective   Chief Complaint: shortness of breath   HPI:  80 year old -American male with past medical history of asthma, DM, GERD, HTN, BPH who presented on 9- with complaints of shortness of breath.  He is currently admitted for respiratory failure related to CHF and pneumonia.  During today's visit, patient denies complaints.  Creatinine has once again trended upward.     Review of Systems   Constitutional: Negative for chills and fever.   HENT: Negative for congestion, rhinorrhea and sore throat.    Respiratory: Negative for cough, shortness of breath and wheezing.    Cardiovascular: Negative for chest pain, palpitations and leg swelling.   Gastrointestinal: Negative for abdominal pain, nausea and vomiting.   Musculoskeletal: Positive for back pain. Negative for neck pain.   Skin: Negative for pallor.   Neurological: Negative for dizziness and weakness.        All pertinent negatives and positives are as above. All other systems have been reviewed and are negative unless otherwise stated.     Objective    Temp:  [96.8 °F (36 °C)-97.6 °F (36.4 °C)] 97.6 °F (36.4 °C)  Heart Rate:  [64-81] 65  Resp:  [18] 18  BP: (140-164)/(68-82) 163/74    Physical Exam  Vitals signs and nursing note reviewed.   Constitutional:       General: He is not in acute distress.     Appearance: Normal appearance.   HENT:      Head: Normocephalic and atraumatic.      Right Ear: External ear normal.      Left Ear: External ear normal.      Nose: Nose normal.      Mouth/Throat:      Pharynx: Oropharynx is clear.   Eyes:      General:         Right eye: No discharge.         Left eye: No discharge.      Conjunctiva/sclera: Conjunctivae normal.      Comments: Legally blind   Neck:      Musculoskeletal: Normal range of motion and  neck supple.   Cardiovascular:      Rate and Rhythm: Normal rate and regular rhythm.      Heart sounds: Normal heart sounds. No murmur. No friction rub. No gallop.    Pulmonary:      Effort: No respiratory distress.      Breath sounds: Normal breath sounds. No stridor. No wheezing or rales.   Abdominal:      General: Bowel sounds are normal. There is no distension.      Palpations: Abdomen is soft.      Tenderness: There is no abdominal tenderness.   Musculoskeletal: Normal range of motion.         General: No swelling.   Skin:     General: Skin is warm and dry.   Neurological:      General: No focal deficit present.      Mental Status: He is alert. Mental status is at baseline.   Psychiatric:         Mood and Affect: Mood normal.         Behavior: Behavior normal.             Results Review:  I have reviewed the labs, radiology results, and diagnostic studies.    Laboratory Data:   Results from last 7 days   Lab Units 10/10/20  0627 10/09/20  0604 10/08/20  0544   SODIUM mmol/L 138 133* 135*   POTASSIUM mmol/L 3.7 3.6 3.5   CHLORIDE mmol/L 94* 92* 92*   CO2 mmol/L 29.0 31.0* 31.0*   BUN mg/dL 83* 83* 80*   CREATININE mg/dL 4.00* 3.95* 3.83*   GLUCOSE mg/dL 130* 94 96   CALCIUM mg/dL 9.1 9.2 9.3   ANION GAP mmol/L 15.0 10.0 12.0     Estimated Creatinine Clearance: 17.9 mL/min (A) (by C-G formula based on SCr of 4 mg/dL (H)).          Results from last 7 days   Lab Units 10/07/20  0610   WBC 10*3/mm3 7.02   HEMOGLOBIN g/dL 10.3*   HEMATOCRIT % 31.3*   PLATELETS 10*3/mm3 160     Results from last 7 days   Lab Units 10/10/20  0627 10/09/20  0604 10/08/20  0544 10/07/20  0610 10/06/20  0635   INR  1.33* 1.35* 1.28* 1.40* 1.26*       Culture Data:   No results found for: BLOODCX  No results found for: URINECX  No results found for: RESPCX  No results found for: WOUNDCX  No results found for: STOOLCX  No components found for: BODYFLD    Radiology Data:   Imaging Results (Last 24 Hours)     ** No results found for the last  24 hours. **          I have reviewed the patient's current medications.     Assessment/Plan     Active Hospital Problems    Diagnosis   • Pneumonia of both lower lobes due to infectious organism   • Acute systolic CHF (congestive heart failure) (CMS/[facility])   • Acute respiratory failure with hypoxia (CMS/[facility])   • CKD (chronic kidney disease) stage 4, GFR 15-29 ml/min (CMS/[facility])   • Diabetes mellitus type II, uncontrolled (CMS/[facility])   • Benign essential hypertension       Plan:    1. Acute respiratory failure with hypoxia: related to pneumonia/CHF decompensation: Resolved.  Weaned to room air.  Completed antibiotic therapy.  Continue diuresis as renal function allows.   2. Acute exacerbation of heart failure with reduced EF: (secondary to ischemic cardiomyopathy): Resolved. Echocardiogram done 8/21/2020 showed severe global hypokinesis with an ejection fraction of 20%.  Continue daily weights, fluid restriction. Diuretics currently held related to worsening creatinine.  3. Chronic kidney disease stage III/IV: Patient's baseline creatinine is reportedly between 1.9-2.0. Creatinine is 4.0 today. Diuretics held per nephrology and patient to continue receiving IV fluids per nephrology.  May require dialysis per nephrology notes.   4. Hypokalemia: Resolved.    5. History of atrial flutter: Patient is currently in normal sinus rhythm.  Continue amiodarone, Coreg and anticoagulation with warfarin.  INR is 1.33 and pharmacy is dosing warfarin.  6. Hypertension: Stable.  Continue Coreg and hydralazine.    7. Diabetes mellitus: FSBS AC and HS with SSI.    8. Acute cystitis: completed antibiotics.  Culture shows no growth.     9. GERD: Continue PPI.        This document has been electronically signed by THIERNO Waldrop on October 10, 2020 14:41 CDT

## 2020-10-10 NOTE — PROGRESS NOTES
"[facility]  [address] [place] [address]. [id number]  T - [phone]  F - [phone]     Progress Note          PATIENT  DEMOGRAPHICS   PATIENT NAME: Ondina Alanis .                      PHYSICIAN: THIERNO Horner  : 1940  MRN: [id number]   LOS: 16 days    Patient Care Team:  Wendie Quiñonez APRN as PCP - General (Nurse Practitioner)  Subjective   SUBJECTIVE   Pt is eating lunch with assistance. Denies CP, SOA. Fever or chills. No N&V. No acute distress noted. Pt is blind.         Objective   OBJECTIVE   Vital Signs  Temp:  [96.8 °F (36 °C)-97.5 °F (36.4 °C)] 96.8 °F (36 °C)  Heart Rate:  [64-81] 69  Resp:  [18] 18  BP: (140-164)/(68-82) 140/82    Flowsheet Rows      First Filed Value   Admission Height  193 cm (76\") Documented at 2020   Admission Weight  94.8 kg (209 lb) Documented at 2020           I/O last 3 completed shifts:  In: 960 [P.O.:960]  Out: 1900 [Urine:1900]    PHYSICAL EXAM    Physical Exam  Vitals signs and nursing note reviewed.   HENT:      Head: Normocephalic and atraumatic.      Mouth/Throat:      Mouth: Mucous membranes are moist.   Eyes:      Comments: Pt is blind   Cardiovascular:      Rate and Rhythm: Normal rate and regular rhythm.      Heart sounds: No murmur.   Pulmonary:      Effort: Pulmonary effort is normal.      Breath sounds: Normal breath sounds.   Abdominal:      General: Bowel sounds are normal. There is distension.      Palpations: Abdomen is soft.   Musculoskeletal:         General: Swelling present.      Comments: Trace ashia le-L>R   Skin:     General: Skin is warm and dry.   Neurological:      Mental Status: He is alert. Mental status is at baseline.   Psychiatric:         Mood and Affect: Mood normal.         Behavior: Behavior normal.         RESULTS   Results Review:    Results from last 7 days   Lab Units 10/10/20  0627 10/09/20  0604 10/08/20  0544   SODIUM mmol/L 138 133* 135*   POTASSIUM mmol/L " 3.7 3.6 3.5   CHLORIDE mmol/L 94* 92* 92*   CO2 mmol/L 29.0 31.0* 31.0*   BUN mg/dL 83* 83* 80*   CREATININE mg/dL 4.00* 3.95* 3.83*   CALCIUM mg/dL 9.1 9.2 9.3   GLUCOSE mg/dL 130* 94 96       Estimated Creatinine Clearance: 17.9 mL/min (A) (by C-G formula based on SCr of 4 mg/dL (H)).                Results from last 7 days   Lab Units 10/07/20  0610   WBC 10*3/mm3 7.02   HEMOGLOBIN g/dL 10.3*   PLATELETS 10*3/mm3 160       Results from last 7 days   Lab Units 10/10/20  0627 10/09/20  0604 10/08/20  0544 10/07/20  0610 10/06/20  0635   INR  1.33* 1.35* 1.28* 1.40* 1.26*         Imaging Results (Last 24 Hours)     ** No results found for the last 24 hours. **           MEDICATIONS    amiodarone, 200 mg, Oral, Q24H  aspirin, 81 mg, Oral, Daily  atorvastatin, 40 mg, Oral, Nightly  carvedilol, 6.25 mg, Oral, BID With Meals  dronabinol, 2.5 mg, Oral, BID  hydrALAZINE, 75 mg, Oral, Q8H  isosorbide mononitrate, 30 mg, Oral, Q24H  magic butt ointment, , Topical, BID  pantoprazole, 40 mg, Oral, QAM  sodium chloride, 10 mL, Intravenous, Q12H  warfarin, 5 mg, Oral, Daily      Pharmacy to dose warfarin,   sodium chloride, 60 mL/hr, Last Rate: 60 mL/hr (10/10/20 0525)        Assessment/Plan   ASSESSMENT / PLAN      CKD (chronic kidney disease) stage 4, GFR 15-29 ml/min (CMS/[facility])    Diabetes mellitus type II, uncontrolled (CMS/[facility])    Benign essential hypertension    Acute respiratory failure with hypoxia (CMS/[facility])    Acute systolic CHF (congestive heart failure) (CMS/[facility])    Pneumonia of both lower lobes due to infectious organism    1.  CKD 3/4- Baseline creatinine used to be around 2.0, was up to 3.1 in his most recent admission and then 2.4-2.6 range. UO acceptable with iv lasix and albumin. Has now completed albumin bid x 3 days. Lost significant weight 181 lbs.- weight is increased 1.3kg in last 24 hours.  Peak 209 lbs. Cr 3.7mg/dl which is sl increased from yesterday 3.6mg/dL. High bicarb- 29 and low chloride of 94 today  (contraction alkalosis). Likely overdiuresed. Hold diuretics.  Will continue NS 60cc/hr x 24 hours- patient needs assistance to eat/drink. Albumin and lasix has been stopped     Dr Akhtar DW Pt wife in detail and explain advance nature of ckd and may need dialysis     2.  Hypertension- BP overall better control- hydralazine was increased to 75 mg 3 times daily and carvedilol continues at 6.25mg twice daily.      3.  CHF- systolic heart failure EF 20%. severe LV systolic dysfunction with RV systolic pressure of 60 mmHg, cardiology following. cxr bilateral infiltrates vs edema. Trace edema ashia le L>R.      4.  A. Fib on amiodarone and coumain     5.  Pneumonia- has completed antibiotics per primary team     6.  UTI- has completed antibiotics per primary team           This document has been electronically signed by THIERNO Horner on October 10, 2020 12:02 CDT

## 2020-10-10 NOTE — THERAPY TREATMENT NOTE
Acute Care - Occupational Therapy Treatment Note  [facility]     Patient Name: Ondina Alanis Sr.  : 1940  MRN: [id number]  Today's Date: 10/10/2020  Onset of Illness/Injury or Date of Surgery: 20  Date of Referral to OT: 10/01/20       Admit Date: 2020       ICD-10-CM ICD-9-CM   1. Pneumonia of both lower lobes due to infectious organism  J18.9 486   2. Hypoxia  R09.02 799.02   3. Elevated troponin  R79.89 790.6   4. Congestive heart failure, unspecified HF chronicity, unspecified heart failure type (CMS/HCC)  I50.9 428.0   5. Dysphagia, unspecified type  R13.10 787.20   6. Impaired mobility and activities of daily living  Z74.09 V49.89    Z78.9    7. Impaired physical mobility  Z74.09 781.99     Patient Active Problem List   Diagnosis   • CKD (chronic kidney disease) stage 4, GFR 15-29 ml/min (CMS/[facility])   • Diabetic peripheral neuropathy associated with type 2 diabetes mellitus (CMS/[facility])   • Diabetes mellitus type II, uncontrolled (CMS/[facility])   • GERD (gastroesophageal reflux disease)   • Primary osteoarthritis of both knees   • Pulmonary embolism (CMS/HCC)   • BPH (benign prostatic hyperplasia)   • Benign essential hypertension   • Asthma   • Pleurisy   • Acute respiratory failure with hypoxia (CMS/HCC)   • Stage 1 acute kidney injury (CMS/HCC)   • Left ventricular diastolic dysfunction   • Exacerbation of asthma   • COPD exacerbation (CMS/[facility])   • Atrial flutter (CMS/HCC)   • Acute systolic CHF (congestive heart failure) (CMS/[facility])   • Pneumonia of both lower lobes due to infectious organism     Past Medical History:   Diagnosis Date   • Asthma    • Diabetes mellitus (CMS/HCC)    • GERD (gastroesophageal reflux disease)    • Hypertension    • Prostate disorder      Past Surgical History:   Procedure Laterality Date   • BACK SURGERY     • KNEE SURGERY Left    • PROSTATE SURGERY            OT ASSESSMENT FLOWSHEET (last 12 hours)      OT Evaluation and Treatment     Row Name 10/10/20 0635                    OT Time and Intention    Subjective Information  no complaints  -BB        Document Type  therapy note (daily note)  -BB        Mode of Treatment  individual therapy;occupational therapy  -BB        Total Minutes, Occupational Therapy  55  -BB        Patient Effort  poor  -BB           General Information    Patient/Family/Caregiver Comments/Observations  no family present  -BB        General Observations of Patient  long sitting in bed  -BB        Existing Precautions/Restrictions  fall blind  -BB           Cognition    Orientation Status (Cognition)  oriented to;person  -BB        Personal Safety Interventions  fall prevention program maintained;muscle strengthening facilitated;nonskid shoes/slippers when out of bed;supervised activity  -BB           Pain Scale: Numbers Pre/Post-Treatment    Pretreatment Pain Rating  0/10 - no pain  -BB        Posttreatment Pain Rating  0/10 - no pain  -BB           Shoulder (Therapeutic Exercise)    Shoulder (Therapeutic Exercise)  AAROM (active assistive range of motion)  -BB        Shoulder AROM (Therapeutic Exercise)  left;right;flexion;extension;2 sets;10 repetitions long sitting  -BB           Elbow/Forearm (Therapeutic Exercise)    Elbow/Forearm (Therapeutic Exercise)  AAROM (active assistive range of motion);AROM (active range of motion)  -BB        Elbow/Forearm AROM (Therapeutic Exercise)  left;right;flexion;extension;supination;pronation;2 sets;10 second hold long sitting  -BB           Wrist (Therapeutic Exercise)    Wrist (Therapeutic Exercise)  AAROM (active assistive range of motion)  -BB        Wrist AROM (Therapeutic Exercise)  left;right;flexion;extension;2 sets;10 repetitions long sitting  -BB           Hand (Therapeutic Exercise)    Hand (Therapeutic Exercise)  AROM (active range of motion)  -BB        Hand AROM/AAROM (Therapeutic Exercise)  left;right;finger flexion;finger extension;2 sets;10 repetitions long sitting  -BB           Grooming  Assessment/Training    [place] Level (Grooming)  wash face, hands;maximum assist (25% patient effort)  -BB        Position (Grooming)  -- long sitting  -BB           Self-Feeding Assessment/Training    [place] Level (Feeding)  liquids to mouth;dependent (less than 25% patient effort)  -BB        Position (Self-Feeding)  sitting up in bed  -BB           Plan of Care Review    Plan of Care Reviewed With  patient  -BB        Progress  no change  -BB        Outcome Summary  Pt AAROM B UE exercises while long sitting in bed. Pt Max A for grooming task and dependent for drink to mouth this am. No goals met this tx.  -BB           Vital Signs    Pretreatment Heart Rate (beats/min)  73  -BB        Posttreatment Heart Rate (beats/min)  69  -BB        Pre SpO2 (%)  98  -BB        O2 Delivery Pre Treatment  room air  -BB        Post SpO2 (%)  97  -BB        O2 Delivery Post Treatment  room air  -BB        Pre Patient Position  Supine  -BB        Post Patient Position  -- long sitting  -BB           Positioning and Restraints    Pre-Treatment Position  in bed  -BB        Post Treatment Position  bed  -BB        In Bed  fowlers;encouraged to call for assist;call light within reach;exit alarm on  -BB          User Key  (r) = Recorded By, (t) = Taken By, (c) = Cosigned By    Initials Name Effective Dates    BB Lamar Trinidad, СВЕТЛАНА/DIONNE 03/07/18 -                OT Recommendation and Plan     Plan of Care Review  Plan of Care Reviewed With: patient  Progress: no change  Outcome Summary: Pt AAROM B UE exercises while long sitting in bed. Pt Max A for grooming task and dependent for drink to mouth this am. No goals met this tx.  Plan of Care Reviewed With: patient  Outcome Summary: Pt AAROM B UE exercises while long sitting in bed. Pt Max A for grooming task and dependent for drink to mouth this am. No goals met this tx.    Outcome Measures     Row Name 10/10/20 0635 10/09/20 1019 10/09/20 0743       How much help from  another person do you currently need...    Turning from your back to your side while in flat bed without using bedrails?  --  1  -LN  --    Moving from lying on back to sitting on the side of a flat bed without bedrails?  --  2  -LN  --    Moving to and from a bed to a chair (including a wheelchair)?  --  1  -LN  --    Standing up from a chair using your arms (e.g., wheelchair, bedside chair)?  --  1  -LN  --    Climbing 3-5 steps with a railing?  --  1  -LN  --    To walk in hospital room?  --  1  -LN  --    AM-PAC 6 Clicks Score (PT)  --  7  -LN  --       How much help from another is currently needed...    Putting on and taking off regular lower body clothing?  1  -BB  --  1  -RC    Bathing (including washing, rinsing, and drying)  1  -BB  --  1  -RC    Toileting (which includes using toilet bed pan or urinal)  1  -BB  --  1  -RC    Putting on and taking off regular upper body clothing  1  -BB  --  1  -RC    Taking care of personal grooming (such as brushing teeth)  1  -BB  --  1  -RC    Eating meals  1  -BB  --  1  -RC    AM-PAC 6 Clicks Score (OT)  6  -BB  --  6  -RC       Functional Assessment    Outcome Measure Options  --  AM-PAC 6 Clicks Basic Mobility (PT)  -LN  --    Row Name 10/08/20 0900             How much help from another is currently needed...    Putting on and taking off regular lower body clothing?  1  -RC      Bathing (including washing, rinsing, and drying)  1  -RC      Toileting (which includes using toilet bed pan or urinal)  1  -RC      Putting on and taking off regular upper body clothing  1  -RC      Taking care of personal grooming (such as brushing teeth)  1  -RC      Eating meals  1  -RC      AM-PAC 6 Clicks Score (OT)  6  -RC        User Key  (r) = Recorded By, (t) = Taken By, (c) = Cosigned By    Initials Name Provider Type    LN Haydee Taylor, KUSH Physical Therapy Assistant    BB Lamar Trinidad, SOTOMAYOR/L Occupational Therapy Assistant    Jaylyn Kuhn, SOTOMAYOR/DIONNE Occupational  Therapy Assistant          Time Calculation:   Time Calculation- OT     Row Name 10/10/20 0835             Time Calculation- OT    OT Start Time  0635  -BB      OT Stop Time  0730  -BB      OT Time Calculation (min)  55 min  -      Total Timed Code Minutes- OT  55 minute(s)  -      OT Received On  10/10/20  -        User Key  (r) = Recorded By, (t) = Taken By, (c) = Cosigned By    Initials Name Provider Type     Lamar Trinidad COTA/L Occupational Therapy Assistant        Therapy Charges for Today     Code Description Service Date Service Provider Modifiers Qty    80732232090  OT SELF CARE/MGMT/TRAIN EA 15 MIN 10/10/2020 Lamar Trinidad COTA/L GO 4               TRACIE Salvador  10/10/2020

## 2020-10-10 NOTE — PLAN OF CARE
Problem: Adult Inpatient Plan of Care  Goal: Plan of Care Review  Flowsheets  Taken 10/10/2020 0834  Progress: no change  Plan of Care Reviewed With: patient  Taken 10/10/2020 0635  Progress: no change  Plan of Care Reviewed With: patient  Outcome Summary: Pt DIANA JOSÉ UE exercises while long sitting in bed. Pt Max A for grooming task and dependent for drink to mouth this am. No goals met this tx.

## 2020-10-11 NOTE — PROGRESS NOTES
"[facility]  [address] [place] [address]. [address]  T - [phone]  F - [phone]     Progress Note          PATIENT  DEMOGRAPHICS   PATIENT NAME: Ondina Alanis .                      PHYSICIAN: THIERNO Horner  : 1940  MRN: [id number]   LOS: 17 days    Patient Care Team:  Wendie Quiñonez APRN as PCP - General (Nurse Practitioner)  Subjective   SUBJECTIVE   Resting quietly- no acute distress. Denies CP, SOA, no fever or chills, no N&V. Has to be fed- skin turgor is dry- documented weight of 102 kg- which would be 18kg weight gain last 24 hours is inaccurate.          Objective   OBJECTIVE   Vital Signs  Temp:  [97.6 °F (36.4 °C)-98 °F (36.7 °C)] 98 °F (36.7 °C)  Heart Rate:  [59-78] 78  Resp:  [18] 18  BP: (138-163)/(63-74) 158/70    Flowsheet Rows      First Filed Value   Admission Height  193 cm (76\") Documented at 2020 2200   Admission Weight  94.8 kg (209 lb) Documented at 2020 2200           I/O last 3 completed shifts:  In: 1320 [P.O.:1320]  Out: 2175 [Urine:2175]    PHYSICAL EXAM    Physical Exam  Vitals signs and nursing note reviewed.   Constitutional:       General: He is not in acute distress.  HENT:      Head: Normocephalic and atraumatic.      Mouth/Throat:      Mouth: Mucous membranes are dry.   Cardiovascular:      Rate and Rhythm: Normal rate and regular rhythm.      Heart sounds: No murmur.   Pulmonary:      Effort: Pulmonary effort is normal.      Breath sounds: Normal breath sounds.   Abdominal:      General: Abdomen is flat. Bowel sounds are normal.      Palpations: Abdomen is soft.   Musculoskeletal:         General: No swelling.   Skin:     General: Skin is warm and dry.      Comments: Some skin tears ashia les- appear to be healing   Neurological:      Mental Status: Mental status is at baseline.   Psychiatric:         Mood and Affect: Mood normal.         Behavior: Behavior normal.         RESULTS   Results Review:    Results " from last 7 days   Lab Units 10/11/20  0813 10/10/20  0627 10/09/20  0604   SODIUM mmol/L 137 138 133*   POTASSIUM mmol/L 4.0 3.7 3.6   CHLORIDE mmol/L 97* 94* 92*   CO2 mmol/L 30.0* 29.0 31.0*   BUN mg/dL 83* 83* 83*   CREATININE mg/dL 3.67* 4.00* 3.95*   CALCIUM mg/dL 9.2 9.1 9.2   GLUCOSE mg/dL 112* 130* 94       Estimated Creatinine Clearance: 23.5 mL/min (A) (by C-G formula based on SCr of 3.67 mg/dL (H)).                Results from last 7 days   Lab Units 10/07/20  0610   WBC 10*3/mm3 7.02   HEMOGLOBIN g/dL 10.3*   PLATELETS 10*3/mm3 160       Results from last 7 days   Lab Units 10/11/20  0704 10/10/20  0627 10/09/20  0604 10/08/20  0544 10/07/20  0610   INR  1.40* 1.33* 1.35* 1.28* 1.40*         Imaging Results (Last 24 Hours)     ** No results found for the last 24 hours. **           MEDICATIONS    amiodarone, 200 mg, Oral, Q24H  aspirin, 81 mg, Oral, Daily  atorvastatin, 40 mg, Oral, Nightly  carvedilol, 6.25 mg, Oral, BID With Meals  dronabinol, 2.5 mg, Oral, BID  hydrALAZINE, 75 mg, Oral, Q8H  isosorbide mononitrate, 30 mg, Oral, Q24H  magic butt ointment, , Topical, BID  pantoprazole, 40 mg, Oral, QAM  sodium chloride, 10 mL, Intravenous, Q12H  warfarin, 6 mg, Oral, Daily      Pharmacy to dose warfarin,   sodium chloride, 60 mL/hr, Last Rate: 60 mL/hr (10/10/20 0525)        Assessment/Plan   ASSESSMENT / PLAN      CKD (chronic kidney disease) stage 4, GFR 15-29 ml/min (CMS/[facility])    Diabetes mellitus type II, uncontrolled (CMS/[facility])    Benign essential hypertension    Acute respiratory failure with hypoxia (CMS/[facility])    Acute systolic CHF (congestive heart failure) (CMS/[facility])    Pneumonia of both lower lobes due to infectious organism    1.  CKD 3/4- Baseline creatinine used to be around 2.0, was up to 3.1 in his most recent admission and then 2.4-2.6 range. UOP acceptable today. Lost significant weight 181 lbs.- weight is increased 1.3kg in last 24 hours.  Peak 209 lbs. Cr 3.67mg/dl which is stable over  last 48 hrs. High bicarb- 30 and low chloride of 94 today (contraction alkalosis). Likely overdiuresed. Hold diuretics.  Will continue NS 60cc/hr due to poor oral intake- patient needs assistance to eat/drink. Albumin and lasix has been stopped     Dr Akhtar DW Pt wife in detail and explain advance nature of ckd and may need dialysis     2.  Hypertension- BP acceptable 158/70 this am- hydralazine was increased to 75 mg 3 times daily and carvedilol continues at 6.25mg twice daily.      3.  CHF- systolic heart failure EF 20%. severe LV systolic dysfunction with RV systolic pressure of 60 mmHg, cardiology following. cxr bilateral infiltrates vs edema. Trace edema ashia le L>R.      4.  A. Fib on amiodarone and coumain- Hrs 60-78. INR low 1.4- management per pharmacy- he was refusing warfarin but is taking this once again.      5.  Pneumonia- has completed antibiotics per primary team     6.  UTI- has completed antibiotics per primary team           This document has been electronically signed by THIERNO Horner on October 11, 2020 10:41 CDT

## 2020-10-11 NOTE — THERAPY TREATMENT NOTE
Acute Care - Occupational Therapy Treatment Note  [facility]     Patient Name: Ondina Alanis Sr.  : 1940  MRN: [id number]  Today's Date: 10/11/2020  Onset of Illness/Injury or Date of Surgery: 20  Date of Referral to OT: 10/01/20       Admit Date: 2020       ICD-10-CM ICD-9-CM   1. Pneumonia of both lower lobes due to infectious organism  J18.9 486   2. Hypoxia  R09.02 799.02   3. Elevated troponin  R79.89 790.6   4. Congestive heart failure, unspecified HF chronicity, unspecified heart failure type (CMS/HCC)  I50.9 428.0   5. Dysphagia, unspecified type  R13.10 787.20   6. Impaired mobility and activities of daily living  Z74.09 V49.89    Z78.9    7. Impaired physical mobility  Z74.09 781.99     Patient Active Problem List   Diagnosis   • CKD (chronic kidney disease) stage 4, GFR 15-29 ml/min (CMS/[facility])   • Diabetic peripheral neuropathy associated with type 2 diabetes mellitus (CMS/[facility])   • Diabetes mellitus type II, uncontrolled (CMS/[facility])   • GERD (gastroesophageal reflux disease)   • Primary osteoarthritis of both knees   • Pulmonary embolism (CMS/HCC)   • BPH (benign prostatic hyperplasia)   • Benign essential hypertension   • Asthma   • Pleurisy   • Acute respiratory failure with hypoxia (CMS/HCC)   • Stage 1 acute kidney injury (CMS/HCC)   • Left ventricular diastolic dysfunction   • Exacerbation of asthma   • COPD exacerbation (CMS/[facility])   • Atrial flutter (CMS/HCC)   • Acute systolic CHF (congestive heart failure) (CMS/[facility])   • Pneumonia of both lower lobes due to infectious organism     Past Medical History:   Diagnosis Date   • Asthma    • Diabetes mellitus (CMS/HCC)    • GERD (gastroesophageal reflux disease)    • Hypertension    • Prostate disorder      Past Surgical History:   Procedure Laterality Date   • BACK SURGERY     • KNEE SURGERY Left    • PROSTATE SURGERY            OT ASSESSMENT FLOWSHEET (last 12 hours)      OT Evaluation and Treatment     Row Name 10/11/20 0715                    OT Time and Intention    Subjective Information  no complaints  -BB        Document Type  therapy note (daily note)  -BB        Mode of Treatment  individual therapy;occupational therapy  -BB        Total Minutes, Occupational Therapy  24  -BB        Patient Effort  fair  -BB           General Information    Patient/Family/Caregiver Comments/Observations  no family present  -BB        General Observations of Patient  long sitting in bed  -BB        Existing Precautions/Restrictions  fall legally blind  -BB           Cognition    Orientation Status (Cognition)  oriented to;person   -BB        Personal Safety Interventions  fall prevention program maintained;muscle strengthening facilitated;nonskid shoes/slippers when out of bed  -BB           Pain Scale: Numbers Pre/Post-Treatment    Pretreatment Pain Rating  0/10 - no pain  -BB        Posttreatment Pain Rating  0/10 - no pain  -BB           Shoulder (Therapeutic Exercise)    Shoulder (Therapeutic Exercise)  AAROM (active assistive range of motion)  -BB        Shoulder AAROM (Therapeutic Exercise)  bilateral;flexion;extension 1x15 reps  -BB           Elbow/Forearm (Therapeutic Exercise)    Elbow/Forearm (Therapeutic Exercise)  AAROM (active assistive range of motion)  -BB        Elbow/Forearm AROM (Therapeutic Exercise)  bilateral;flexion;extension 1x15 reps  -BB           Wrist (Therapeutic Exercise)    Wrist (Therapeutic Exercise)  AAROM (active assistive range of motion)  -BB        Wrist AAROM (Therapeutic Exercise)  bilateral;flexion;extension 1x15   -BB           Hand (Therapeutic Exercise)    Hand (Therapeutic Exercise)  AROM (active range of motion)  -BB        Hand AROM/AAROM (Therapeutic Exercise)  bilateral;finger flexion;finger extension 1x15  -BB           Plan of Care Review    Plan of Care Reviewed With  patient  -BB        Progress  no change  -BB        Outcome Summary  Pt tolerated AAROM with B UE's 1 set x15 reps. No goals met  this tx.  -BB           Vital Signs    Pre Systolic BP Rehab  143  -BB        Pre Treatment Diastolic BP  68  -BB        Pretreatment Heart Rate (beats/min)  68  -BB        Pre SpO2 (%)  96  -BB        O2 Delivery Pre Treatment  room air  -BB        Pre Patient Position  Supine  -BB           Positioning and Restraints    Pre-Treatment Position  in bed  -BB        Post Treatment Position  bed  -BB        In Bed  supine;call light within reach;encouraged to call for assist;exit alarm on  -BB          User Key  (r) = Recorded By, (t) = Taken By, (c) = Cosigned By    Initials Name Effective Dates    BB Lamar Trinidad, SOTOMAYOR/DIONNE 03/07/18 -                OT Recommendation and Plan     Plan of Care Review  Plan of Care Reviewed With: patient  Progress: no change  Outcome Summary: Pt tolerated AAROM with B UE's 1 set x15 reps. No goals met this tx.  Plan of Care Reviewed With: patient  Outcome Summary: Pt tolerated AAROM with B UE's 1 set x15 reps. No goals met this tx.    Outcome Measures     Row Name 10/11/20 0715 10/10/20 1515 10/10/20 0635       How much help from another person do you currently need...    Turning from your back to your side while in flat bed without using bedrails?  --  1  -LN  --    Moving from lying on back to sitting on the side of a flat bed without bedrails?  --  2  -LN  --    Moving to and from a bed to a chair (including a wheelchair)?  --  1  -LN  --    Standing up from a chair using your arms (e.g., wheelchair, bedside chair)?  --  1  -LN  --    Climbing 3-5 steps with a railing?  --  1  -LN  --    To walk in hospital room?  --  1  -LN  --    AM-PAC 6 Clicks Score (PT)  --  7  -LN  --       How much help from another is currently needed...    Putting on and taking off regular lower body clothing?  1  -BB  --  1  -BB    Bathing (including washing, rinsing, and drying)  1  -BB  --  1  -BB    Toileting (which includes using toilet bed pan or urinal)  1  -BB  --  1  -BB    Putting on and  taking off regular upper body clothing  1  -BB  --  1  -BB    Taking care of personal grooming (such as brushing teeth)  1  -BB  --  1  -BB    Eating meals  1  -BB  --  1  -BB    AM-PAC 6 Clicks Score (OT)  6  -BB  --  6  -BB       Functional Assessment    Outcome Measure Options  --  AM-PAC 6 Clicks Basic Mobility (PT)  -LN  --    Row Name 10/09/20 1019 10/09/20 0743          How much help from another person do you currently need...    Turning from your back to your side while in flat bed without using bedrails?  1  -LN  --     Moving from lying on back to sitting on the side of a flat bed without bedrails?  2  -LN  --     Moving to and from a bed to a chair (including a wheelchair)?  1  -LN  --     Standing up from a chair using your arms (e.g., wheelchair, bedside chair)?  1  -LN  --     Climbing 3-5 steps with a railing?  1  -LN  --     To walk in hospital room?  1  -LN  --     AM-PAC 6 Clicks Score (PT)  7  -LN  --        How much help from another is currently needed...    Putting on and taking off regular lower body clothing?  --  1  -RC     Bathing (including washing, rinsing, and drying)  --  1  -RC     Toileting (which includes using toilet bed pan or urinal)  --  1  -RC     Putting on and taking off regular upper body clothing  --  1  -RC     Taking care of personal grooming (such as brushing teeth)  --  1  -RC     Eating meals  --  1  -RC     AM-PAC 6 Clicks Score (OT)  --  6  -RC        Functional Assessment    Outcome Measure Options  AM-PAC 6 Clicks Basic Mobility (PT)  -LN  --       User Key  (r) = Recorded By, (t) = Taken By, (c) = Cosigned By    Initials Name Provider Type    LN Haydee Taylor, KUSH Physical Therapy Assistant    BB Lamar Trinidad, SOTOMAYOR/L Occupational Therapy Assistant    Jaylyn Kuhn, SOTOMAYOR/L Occupational Therapy Assistant          Time Calculation:   Time Calculation- OT     Row Name 10/11/20 1131             Time Calculation- OT    OT Start Time  0715  -BB      OT Stop  Time  0739  -      OT Time Calculation (min)  24 min  -      Total Timed Code Minutes- OT  24 minute(s)  -BB      OT Received On  10/11/20  -        User Key  (r) = Recorded By, (t) = Taken By, (c) = Cosigned By    Initials Name Provider Type    Lamar Guerrero COTA/L Occupational Therapy Assistant        Therapy Charges for Today     Code Description Service Date Service Provider Modifiers Qty    69948673724 HC OT SELF CARE/MGMT/TRAIN EA 15 MIN 10/10/2020 Lamar Trinidad COTA/L GO 4    85214760762 HC OT THER PROC EA 15 MIN 10/11/2020 Lamar Trinidad COTA/L GO 2               TRACIE Salvador  10/11/2020

## 2020-10-11 NOTE — THERAPY TREATMENT NOTE
Acute Care - Physical Therapy Treatment Note  [facility]     Patient Name: Ondina Alanis Sr.  : 1940  MRN: [id number]  Today's Date: 10/11/2020   Onset of Illness/Injury or Date of Surgery: 20       PT Assessment (last 12 hours)      PT Evaluation and Treatment     Row Name 10/11/20 1436          Physical Therapy Time and Intention    Subjective Information  no complaints  -LN     Document Type  therapy note (daily note)  -LN     Mode of Treatment  physical therapy  -LN     Patient Effort  poor  -LN     Comment  wife states pt hasn't sat on eob in a long,long time  -LN     Row Name 10/11/20 143          General Information    Patient Profile Reviewed  yes  -LN     Row Name 10/11/20 143          Cognition    Orientation Status (Cognition)  oriented to;person   -LN     Row Name 10/11/20 143          Pain Scale: Numbers Pre/Post-Treatment    Pretreatment Pain Rating  0/10 - no pain  -LN     Posttreatment Pain Rating  0/10 - no pain  -LN     Row Name 10/11/20 143          Bed Mobility    Rolling Right [place] (Bed Mobility)  dependent (less than 25% patient effort);1 person assist  -LN     Supine-Sit [place] (Bed Mobility)  not tested  -LN     Sit-Supine [place] (Bed Mobility)  not tested  -LN     Row Name 10/11/20 143          Hip (Therapeutic Exercise)    Hip (Therapeutic Exercise)  PROM (passive range of motion)  -LN     Hip PROM (Therapeutic Exercise)  bilateral;flexion;extension;aBduction;aDduction;external rotation;internal rotation;supine 20  -LN     Row Name 10/11/20 143          Knee (Therapeutic Exercise)    Knee (Therapeutic Exercise)  PROM (passive range of motion)  -LN     Knee PROM (Therapeutic Exercise)  bilateral;flexion;extension;supine 20 reps  -LN     Row Name 10/11/20 143          Ankle (Therapeutic Exercise)    Ankle (Therapeutic Exercise)  PROM (passive range of motion)  -LN     Ankle AAROM (Therapeutic Exercise)   bilateral;dorsiflexion;plantarflexion;supine 20 reps;hc str ashia 3x30 sec hold  -LN     Row Name 10/11/20 1436          Plan of Care Review    Plan of Care Reviewed With  patient  -     Row Name 10/11/20 1436          Vital Signs    Post Systolic BP Rehab  189  -LN     Post Treatment Diastolic BP  83  -LN     Posttreatment Heart Rate (beats/min)  70  -LN     Post SpO2 (%)  100  -LN     O2 Delivery Post Treatment  room air  -LN     Pre Patient Position  Supine  -LN     Intra Patient Position  Supine  -LN     Post Patient Position  Side Lying  -LN     Row Name 10/11/20 1436          Bed Mobility Goal 1 (PT)    Activity/Assistive Device (Bed Mobility Goal 1, PT)  sit to supine/supine to sit;rolling to right;rolling to left  -LN     [place] Level/Cues Needed (Bed Mobility Goal 1, PT)  minimum assist (75% or more patient effort)  -LN     Time Frame (Bed Mobility Goal 1, PT)  long term goal (LTG);by discharge  -LN     Strategies/Barriers (Bed Mobility Goal 1, PT)  Poor ability to maneuver in bed.  -LN     Progress/Outcomes (Bed Mobility Goal 1, PT)  goal not met  -     Row Name 10/11/20 1436          ROM Goal 1 (PT)    ROM Goal 1 (PT)  Patient will tolerate AAROM and stretching to BLEs, to improve functional mobility.  -LN     Time Frame (ROM Goal 1, PT)  long-term goal (LTG);by discharge  -LN     Strategies/Barriers (ROM Goal 1, PT)  Fatigues easily, very debilitated.  -     Row Name 10/11/20 1436          Patient Education Goal (PT)    Activity (Patient Education Goal, PT)  Patient will sit EOB with min Ax1, x 5 min with BUE support.  -LN     Time Frame (Patient Education Goal, PT)  long term goal (LTG);by discharge  -LN     Barriers (Patient Education Goal, PT)  Decreased hip and knee flexion, poor seated balance.  -     Row Name 10/11/20 1436          Positioning and Restraints    Post Treatment Position  bed  -LN     In Bed  notified nsg;side lying right;call light within reach;encouraged to call for  assist;exit alarm on;with family/caregiver;heels elevated  -LN     Row Name 10/11/20 1436          Therapy Assessment/Plan (PT)    Rehab Potential (PT)  fair, will monitor progress closely  -LN     Criteria for Skilled Interventions Met (PT)  yes;skilled treatment is necessary  -LN       User Key  (r) = Recorded By, (t) = Taken By, (c) = Cosigned By    Initials Name Provider Type    LN Haydee Taylor PTA Physical Therapy Assistant        Physical Therapy Education                 Title: PT OT SLP Therapies (In Progress)     Topic: Physical Therapy (In Progress)     Point: Mobility training (Done)     Learning Progress Summary           Patient Acceptance, E,TB, VU by LN at 10/11/2020 1601    Acceptance, E,TB, VU,NR by LN at 10/10/2020 1604    Acceptance, E,TB, NR by LN at 10/9/2020 1234    Acceptance, E, VU,NR by  at 10/4/2020 1244    Comment: Educated on rolling technique, sit<-->supine technique; discussed PT POC and goals.                   Point: Home exercise program (Done)     Learning Progress Summary           Patient Acceptance, E,TB, VU by LN at 10/11/2020 1601    Acceptance, E,TB, VU,NR by LN at 10/10/2020 1604    Acceptance, E,TB, NR by LN at 10/9/2020 1234    Acceptance, E, VU,NR by  at 10/6/2020 0935    Comment: Educated on supine strengthening and stretching activities, encouraged participation.                   Point: Body mechanics (Not Started)     Learner Progress:  Not documented in this visit.          Point: Precautions (Done)     Learning Progress Summary           Patient Acceptance, E,TB, VU by LN at 10/11/2020 1601    Acceptance, E,TB, VU,NR by LN at 10/10/2020 1604    Acceptance, E,TB, NR by LN at 10/9/2020 1234                               User Key     Initials Effective Dates Name Provider Type Discipline    LN 03/07/18 -  Haydee Taylor PTA Physical Therapy Assistant PT    CZ 04/03/18 -  Surya Rodriguez PT Physical Therapist PT              PT Recommendation and  Plan  Anticipated Discharge Disposition (PT): skilled nursing facility  Therapy Frequency (PT): daily  Plan of Care Reviewed With: patient  Outcome Summary: dep x 2 up in bed,rolled right dep x 1;20 reps sup ex prom-no goals met  Outcome Measures     Row Name 10/11/20 1436 10/11/20 0715 10/10/20 1515       How much help from another person do you currently need...    Turning from your back to your side while in flat bed without using bedrails?  1  -LN  --  1  -LN    Moving from lying on back to sitting on the side of a flat bed without bedrails?  1  -LN  --  2  -LN    Moving to and from a bed to a chair (including a wheelchair)?  1  -LN  --  1  -LN    Standing up from a chair using your arms (e.g., wheelchair, bedside chair)?  1  -LN  --  1  -LN    Climbing 3-5 steps with a railing?  1  -LN  --  1  -LN    To walk in hospital room?  1  -LN  --  1  -LN    AM-PAC 6 Clicks Score (PT)  6  -LN  --  7  -LN       How much help from another is currently needed...    Putting on and taking off regular lower body clothing?  --  1  -BB  --    Bathing (including washing, rinsing, and drying)  --  1  -BB  --    Toileting (which includes using toilet bed pan or urinal)  --  1  -BB  --    Putting on and taking off regular upper body clothing  --  1  -BB  --    Taking care of personal grooming (such as brushing teeth)  --  1  -BB  --    Eating meals  --  1  -BB  --    AM-PAC 6 Clicks Score (OT)  --  6  -BB  --       Functional Assessment    Outcome Measure Options  AM-PAC 6 Clicks Basic Mobility (PT)  -LN  --  AM-PAC 6 Clicks Basic Mobility (PT)  -LN    Row Name 10/10/20 0635 10/09/20 1019 10/09/20 0743       How much help from another person do you currently need...    Turning from your back to your side while in flat bed without using bedrails?  --  1  -LN  --    Moving from lying on back to sitting on the side of a flat bed without bedrails?  --  2  -LN  --    Moving to and from a bed to a chair (including a wheelchair)?  --  1   -LN  --    Standing up from a chair using your arms (e.g., wheelchair, bedside chair)?  --  1  -LN  --    Climbing 3-5 steps with a railing?  --  1  -LN  --    To walk in hospital room?  --  1  -LN  --    AM-PAC 6 Clicks Score (PT)  --  7  -LN  --       How much help from another is currently needed...    Putting on and taking off regular lower body clothing?  1  -BB  --  1  -RC    Bathing (including washing, rinsing, and drying)  1  -BB  --  1  -RC    Toileting (which includes using toilet bed pan or urinal)  1  -BB  --  1  -RC    Putting on and taking off regular upper body clothing  1  -BB  --  1  -RC    Taking care of personal grooming (such as brushing teeth)  1  -BB  --  1  -RC    Eating meals  1  -BB  --  1  -RC    AM-PAC 6 Clicks Score (OT)  6  -BB  --  6  -RC       Functional Assessment    Outcome Measure Options  --  AM-PAC 6 Clicks Basic Mobility (PT)  -LN  --      User Key  (r) = Recorded By, (t) = Taken By, (c) = Cosigned By    Initials Name Provider Type    LN Haydee Taylor PTA Physical Therapy Assistant    BB Lamar Trinidad, SOTOMAYOR/L Occupational Therapy Assistant    RC Jaylyn Tran, СВЕТЛАНА/L Occupational Therapy Assistant           Time Calculation:   PT Charges     Row Name 10/11/20 1605             Time Calculation    Start Time  1436  -LN      Stop Time  1510  -LN      Time Calculation (min)  34 min  -LN      PT Received On  10/11/20  -LN         Time Calculation- PT    Total Timed Code Minutes- PT  34 minute(s)  -LN        User Key  (r) = Recorded By, (t) = Taken By, (c) = Cosigned By    Initials Name Provider Type    LN Haydee Taylor PTA Physical Therapy Assistant        Therapy Charges for Today     Code Description Service Date Service Provider Modifiers Qty    65669622013 HC PT THERAPEUTIC ACT EA 15 MIN 10/10/2020 Haydee Taylor, KUSH GP 1    49146326557 HC PT THER PROC EA 15 MIN 10/10/2020 Haydee Taylor PTA GP 1    60781479410 HC PT THER PROC EA 15 MIN 10/11/2020 Haydee Taylor PTA GP  1    [id number]  PT THERAPEUTIC ACT EA 15 MIN 10/11/2020 Haydee Taylor, PTA GP 1          PT G-Codes  Outcome Measure Options: AM-PAC 6 Clicks Basic Mobility (PT)  AM-PAC 6 Clicks Score (PT): 6  AM-PAC 6 Clicks Score (OT): 6    Haydee Taylor PTA  10/11/2020

## 2020-10-11 NOTE — PLAN OF CARE
Goal Outcome Evaluation:  Plan of Care Reviewed With: patient  Progress: no change  Outcome Summary: pts urine dark red. pt took medication with no issues. Will continue to monitor.

## 2020-10-11 NOTE — PROGRESS NOTES
"Anticoagulation by Pharmacy - Warfarin    Ondina Alanis Sr. 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 Discharged home and receiving 5 mg nightly per Wife to finish out old bottle before new prescription of 3 mg nightly. Patient only received a few doses of 3 mg before being admitted. Patient's wife checks INR at home every Monday and INR has been in range     INR goal: 2-3     Last INR:   Lab Results   Component Value Date    INR 1.40 (H) 10/11/2020       Objective:  [Ht: 193 cm (76\"); Wt: 102 kg (223 lb 12.8 oz)]  Lab Results   Component Value Date    INR 1.40 (H) 10/11/2020    INR 1.33 (H) 10/10/2020    INR 1.35 (H) 10/09/2020    PROTIME 17.1 (H) 10/10/2020    PROTIME 17.4 (H) 10/09/2020    PROTIME 16.6 (H) 10/08/2020     Lab Results   Component Value Date    HGB 10.3 (L) 10/07/2020    HGB 11.9 (L) 10/03/2020    HGB 10.6 (L) 09/29/2020    HCT 31.3 (L) 10/07/2020    HCT 35.4 (L) 10/03/2020    HCT 33.5 (L) 09/29/2020     10/07/2020     10/03/2020     09/29/2020       Recent Warfarin Administrations       The 5 most recent administrations since 10/04/2020 are shown below each listed medication.    Warfarin Sodium         Order Dose Date Given     warfarin (COUMADIN) tablet 6 mg 6 mg 10/10/2020     warfarin (COUMADIN) tablet 5 mg 5 mg 10/09/2020      5 mg 10/08/2020      5 mg 10/07/2020     warfarin (COUMADIN) tablet 4 mg 4 mg 10/06/2020                    Assessment  Interacting medications: Amiodarone (can increase affect of warfarin on INR)  INR is subtherapeutic at 1.4.  Fingerstick may falsely elevate INR.     No new H&H available today, most recent H&H from 10/7 was below normal limits   No documentation of bleeding. Patient will continue to be monitored for signs of bleeding      Will increase to warfarin PO 6 mg tonight. Pharmacy will continue to trend and adjust warfarin dose as necessary. "     Due to rising quickly on a previous lower regimen, we have been slow to titrate on current trend of warfarin 5 and 6 mg.  Will give another 6 mg tonight.  Day 2 of warfarin 6 mg.  I'm afraid when warfarin does reach steady state, INR may rise very quickly on this regimen, will monitor.     Plan:  1.  Give warfarin 6 mg tablet PO @ 1800 tonight  2.  Draw a PT/INR in AM  3.  Pharmacy will continue to follow    Tung Colon, PharmD  10/11/20 12:12 CDT

## 2020-10-11 NOTE — PLAN OF CARE
Problem: Adult Inpatient Plan of Care  Goal: Plan of Care Review  Flowsheets  Taken 10/11/2020 1130  Progress: no change  Plan of Care Reviewed With: patient  Taken 10/11/2020 0715  Progress: no change  Plan of Care Reviewed With: patient  Outcome Summary: Pt tolerated AAROM with B UE's 1 set x15 reps. No goals met this tx.

## 2020-10-11 NOTE — PLAN OF CARE
Problem: Adult Inpatient Plan of Care  Goal: Plan of Care Review  Outcome: Ongoing, Progressing  Flowsheets  Taken 10/11/2020 1604  Plan of Care Reviewed With: patient  Outcome Summary:   dep x 2 up in bed,rolled right dep x 1   20 reps sup ex prom-no goals met  Taken 10/11/2020 1436  Plan of Care Reviewed With: patient

## 2020-10-11 NOTE — PROGRESS NOTES
"    [facility] Medicine Services  INPATIENT PROGRESS NOTE    Length of Stay: 17  Date of Admission: 9/23/2020  Primary Care Physician: Wendie Quiñonez APRN    Subjective   Chief Complaint: shortness of breath   HPI:  80 year old -American male with past medical history of asthma, DM, GERD, HTN, BPH who presented on 9- with complaints of shortness of breath.  He is currently admitted for respiratory failure related to CHF and pneumonia.  During today's visit, patient denies complaints besides being \"tired\" and \"not hungry.\"  Hematuria is noted in ubrleson bag.     Review of Systems   Constitutional: Positive for appetite change. Negative for chills and fever.   HENT: Negative for congestion, rhinorrhea and sore throat.    Respiratory: Negative for cough, shortness of breath and wheezing.    Cardiovascular: Negative for chest pain, palpitations and leg swelling.   Gastrointestinal: Negative for abdominal pain, nausea and vomiting.   Musculoskeletal: Negative for back pain and neck pain.   Skin: Negative for pallor.   Neurological: Negative for dizziness and weakness.        All pertinent negatives and positives are as above. All other systems have been reviewed and are negative unless otherwise stated.     Objective    Temp:  [97.8 °F (36.6 °C)-98 °F (36.7 °C)] 97.8 °F (36.6 °C)  Heart Rate:  [59-78] 69  Resp:  [18] 18  BP: (138-158)/(63-72) 158/72    Physical Exam  Vitals signs and nursing note reviewed.   Constitutional:       General: He is not in acute distress.     Appearance: Normal appearance.   HENT:      Head: Normocephalic and atraumatic.      Right Ear: External ear normal.      Left Ear: External ear normal.      Nose: Nose normal.      Mouth/Throat:      Pharynx: Oropharynx is clear.   Eyes:      General:         Right eye: No discharge.         Left eye: No discharge.      Conjunctiva/sclera: Conjunctivae normal.      Comments: Legally blind   Neck:   "      Musculoskeletal: Normal range of motion and neck supple.   Cardiovascular:      Rate and Rhythm: Normal rate and regular rhythm.      Heart sounds: Normal heart sounds. No murmur. No friction rub. No gallop.    Pulmonary:      Effort: No respiratory distress.      Breath sounds: Normal breath sounds. No stridor. No wheezing or rales.   Abdominal:      General: Bowel sounds are normal. There is no distension.      Palpations: Abdomen is soft.      Tenderness: There is no abdominal tenderness.   Genitourinary:     Comments: Moore cath in place.  Dark blood clots noted in bag.  Musculoskeletal: Normal range of motion.         General: No swelling.   Skin:     General: Skin is warm and dry.   Neurological:      General: No focal deficit present.      Mental Status: He is alert. Mental status is at baseline.   Psychiatric:         Mood and Affect: Mood normal.         Behavior: Behavior normal.             Results Review:  I have reviewed the labs, radiology results, and diagnostic studies.    Laboratory Data:   Results from last 7 days   Lab Units 10/11/20  0813 10/10/20  0627 10/09/20  0604   SODIUM mmol/L 137 138 133*   POTASSIUM mmol/L 4.0 3.7 3.6   CHLORIDE mmol/L 97* 94* 92*   CO2 mmol/L 30.0* 29.0 31.0*   BUN mg/dL 83* 83* 83*   CREATININE mg/dL 3.67* 4.00* 3.95*   GLUCOSE mg/dL 112* 130* 94   CALCIUM mg/dL 9.2 9.1 9.2   ANION GAP mmol/L 10.0 15.0 10.0     Estimated Creatinine Clearance: 23.5 mL/min (A) (by C-G formula based on SCr of 3.67 mg/dL (H)).          Results from last 7 days   Lab Units 10/07/20  0610   WBC 10*3/mm3 7.02   HEMOGLOBIN g/dL 10.3*   HEMATOCRIT % 31.3*   PLATELETS 10*3/mm3 160     Results from last 7 days   Lab Units 10/11/20  0704 10/10/20  0627 10/09/20  0604 10/08/20  0544 10/07/20  0610   INR  1.40* 1.33* 1.35* 1.28* 1.40*       Culture Data:   No results found for: BLOODCX  No results found for: URINECX  No results found for: RESPCX  No results found for: WOUNDCX  No results found  for: STOOLCX  No components found for: BODYFLD    Radiology Data:   Imaging Results (Last 24 Hours)     ** No results found for the last 24 hours. **          I have reviewed the patient's current medications.     Assessment/Plan     Active Hospital Problems    Diagnosis   • Pneumonia of both lower lobes due to infectious organism   • Acute systolic CHF (congestive heart failure) (CMS/[facility])   • Acute respiratory failure with hypoxia (CMS/[facility])   • CKD (chronic kidney disease) stage 4, GFR 15-29 ml/min (CMS/[facility])   • Diabetes mellitus type II, uncontrolled (CMS/[facility])   • Benign essential hypertension       Plan:    1. Acute respiratory failure with hypoxia: related to pneumonia/CHF decompensation: Resolved.  Weaned to room air.  Completed antibiotic therapy.  Continue diuresis as renal function allows.   2. Acute exacerbation of heart failure with reduced EF: (secondary to ischemic cardiomyopathy): Resolved. Echocardiogram done 8/21/2020 showed severe global hypokinesis with an ejection fraction of 20%.  Continue daily weights, fluid restriction. Diuretics currently held related to worsening creatinine.  Weight today is recorded as 223 lbs (previous weight 186 lbs).  Nursing to reassess today's weight.   3. Chronic kidney disease stage III/IV: Patient's baseline creatinine is reportedly between 1.9-2.0. Creatinine is improved today.  Trending down from 4.0 to 3.67 today. Diuretics held per nephrology and patient to continue receiving IV fluids per nephrology.  May require dialysis per nephrology notes.   4. Hypokalemia: Resolved.    5. History of atrial flutter: Patient is currently in normal sinus rhythm.  Continue amiodarone, Coreg and anticoagulation with warfarin.  INR is 1.4 and pharmacy is dosing warfarin.  6. Hypertension: Stable.  Continue Coreg and hydralazine.    7. Diabetes mellitus: FSBS AC and HS with SSI.    8. Acute cystitis: completed antibiotics.  Culture shows no growth.     9. GERD: Continue  PPI.        This document has been electronically signed by THIERNO Waldrop on October 11, 2020 14:25 CDT

## 2020-10-11 NOTE — SIGNIFICANT NOTE
Received call from Dr. Kendall.  Patient experiencing gross hematuria.  Slight hematuria with dark clots was noted this morning and thought to be related to burleson anchor being loose and causing trauma, but has apparently worsened during the shift.  Urology consult placed to Dr. Riggs.  Warfarin placed on hold and stat h/h to be obtained.

## 2020-10-11 NOTE — PLAN OF CARE
Goal Outcome Evaluation:  Plan of Care Reviewed With: patient  Progress: no change   Patient is sleeping between care, he has been cooperative and taking his meds.  Kidney function has not improved much continue to monitor.

## 2020-10-12 NOTE — NURSING NOTE
Dr. Blunt notified that pt removed midline this shift, daysdarien ROWLAND to determine if pt will need it replaced or left out.

## 2020-10-12 NOTE — PROGRESS NOTES
"Anticoagulation by Pharmacy - Warfarin    Ondina Alanis Sr. 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 Discharged home and receiving 5 mg nightly per Wife to finish out old bottle before new prescription of 3 mg nightly. Patient only received a few doses of 3 mg before being admitted. Patient's wife checks INR at home every Monday and INR has been in range     INR goal: 2-3     Last INR:   Lab Results   Component Value Date    INR 1.45 (H) 10/12/2020       Objective:  [Ht: 193 cm (76\"); Wt: 98.3 kg (216 lb 11.2 oz)]  Lab Results   Component Value Date    INR 1.45 (H) 10/12/2020    INR 1.40 (H) 10/11/2020    INR 1.33 (H) 10/10/2020    PROTIME 18.4 (H) 10/12/2020    PROTIME 17.1 (H) 10/10/2020    PROTIME 17.4 (H) 10/09/2020     Lab Results   Component Value Date    HGB 9.4 (L) 10/11/2020    HGB 10.3 (L) 10/07/2020    HGB 11.9 (L) 10/03/2020    HCT 28.1 (L) 10/11/2020    HCT 31.3 (L) 10/07/2020    HCT 35.4 (L) 10/03/2020     10/07/2020     10/03/2020     09/29/2020       Recent Warfarin Administrations     The 5 most recent administrations since 10/05/2020 are shown below each listed medication.    Warfarin Sodium       Order Dose Date Given     warfarin (COUMADIN) tablet 6 mg 6 mg 10/10/2020     warfarin (COUMADIN) tablet 5 mg 5 mg 10/09/2020      5 mg 10/08/2020      5 mg 10/07/2020     warfarin (COUMADIN) tablet 4 mg 4 mg 10/06/2020                Assessment  Interacting medications: Amiodarone (can increase affect of warfarin on INR)  INR is subtherapeutic at 1.45    No new H&H available at this time     Patient currently experiencing hematuria. Warfarin is being HELD at this time.     Plan:  1.  HOLD warfarin   2.  Draw a PT/INR in AM  3.  Pharmacy will continue to follow    Concepción Garnett RPH  10/12/20 09:07 CDT     "

## 2020-10-12 NOTE — SIGNIFICANT NOTE
10/12/20 1300   OTHER   Discipline occupational therapy assistant   Nursing denied OT at this time due to change in status

## 2020-10-12 NOTE — SIGNIFICANT NOTE
10/12/20 1103   OTHER   Discipline physical therapy assistant   Rehab Time/Intention   Session Not Performed unable to treat, medical status change;other (see comments)   Per nurse practitioner pt with worsened urinary issues that may require sx.

## 2020-10-12 NOTE — PROGRESS NOTES
[facility] Medicine Services  INPATIENT PROGRESS NOTE    Length of Stay: 18  Date of Admission: 9/23/2020  Primary Care Physician: Wendie Quiñonez APRN    Subjective   Chief Complaint: shortness of breath   HPI:  80 year old -American male with past medical history of asthma, DM, GERD, HTN, BPH who presented on 9- with complaints of shortness of breath.  He is currently admitted for respiratory failure related to CHF and pneumonia.  He developed hematuria yesterday and urology was consulted.  During today's visit, burleson is noted to have stopped draining.  Catheter was irrigated at bedside and several small clots removed but patient's catheter immediately clotted off again.  Bladder irrigation is being initiated.     Review of Systems   Constitutional: Positive for appetite change. Negative for chills and fever.   HENT: Negative for congestion, rhinorrhea and sore throat.    Respiratory: Negative for cough, shortness of breath and wheezing.    Cardiovascular: Negative for chest pain, palpitations and leg swelling.   Gastrointestinal: Negative for abdominal pain, nausea and vomiting.   Genitourinary: Positive for difficulty urinating, hematuria, penile pain and urgency.   Musculoskeletal: Negative for back pain and neck pain.   Skin: Negative for pallor.   Neurological: Negative for dizziness and weakness.        All pertinent negatives and positives are as above. All other systems have been reviewed and are negative unless otherwise stated.     Objective    Temp:  [97 °F (36.1 °C)-98.3 °F (36.8 °C)] 97 °F (36.1 °C)  Heart Rate:  [58-84] 70  Resp:  [16-18] 18  BP: (153-168)/(63-80) 168/80    Physical Exam  Vitals signs and nursing note reviewed.   Constitutional:       General: He is not in acute distress.     Appearance: Normal appearance.   HENT:      Head: Normocephalic and atraumatic.      Right Ear: External ear normal.      Left Ear: External ear  normal.      Nose: Nose normal.      Mouth/Throat:      Pharynx: Oropharynx is clear.   Eyes:      General:         Right eye: No discharge.         Left eye: No discharge.      Conjunctiva/sclera: Conjunctivae normal.      Comments: Legally blind   Neck:      Musculoskeletal: Normal range of motion and neck supple.   Cardiovascular:      Rate and Rhythm: Normal rate and regular rhythm.      Heart sounds: Normal heart sounds. No murmur. No friction rub. No gallop.    Pulmonary:      Effort: No respiratory distress.      Breath sounds: Normal breath sounds. No stridor. No wheezing or rales.   Abdominal:      General: Bowel sounds are normal. There is no distension.      Palpations: Abdomen is soft.      Tenderness: There is no abdominal tenderness.   Genitourinary:     Comments: Moore cath in place.  Bright red blood noted in bag with large dark clots founds in tubing  Musculoskeletal: Normal range of motion.         General: No swelling.   Skin:     General: Skin is warm and dry.   Neurological:      General: No focal deficit present.      Mental Status: He is alert. Mental status is at baseline.   Psychiatric:         Mood and Affect: Mood normal.         Behavior: Behavior normal.             Results Review:  I have reviewed the labs, radiology results, and diagnostic studies.    Laboratory Data:   Results from last 7 days   Lab Units 10/12/20  0600 10/11/20  0813 10/10/20  0627   SODIUM mmol/L 136 137 138   POTASSIUM mmol/L 4.1 4.0 3.7   CHLORIDE mmol/L 97* 97* 94*   CO2 mmol/L 27.0 30.0* 29.0   BUN mg/dL 81* 83* 83*   CREATININE mg/dL 3.54* 3.67* 4.00*   GLUCOSE mg/dL 103* 112* 130*   CALCIUM mg/dL 9.3 9.2 9.1   ANION GAP mmol/L 12.0 10.0 15.0     Estimated Creatinine Clearance: 23.5 mL/min (A) (by C-G formula based on SCr of 3.54 mg/dL (H)).          Results from last 7 days   Lab Units 10/11/20  1644 10/07/20  0610   WBC 10*3/mm3  --  7.02   HEMOGLOBIN g/dL 9.4* 10.3*   HEMATOCRIT % 28.1* 31.3*   PLATELETS  10*3/mm3  --  160     Results from last 7 days   Lab Units 10/12/20  0600 10/11/20  0704 10/10/20  0627 10/09/20  0604 10/08/20  0544   INR  1.45* 1.40* 1.33* 1.35* 1.28*       Culture Data:   No results found for: BLOODCX  No results found for: URINECX  No results found for: RESPCX  No results found for: WOUNDCX  No results found for: STOOLCX  No components found for: BODYFLD    Radiology Data:   Imaging Results (Last 24 Hours)     ** No results found for the last 24 hours. **          I have reviewed the patient's current medications.     Assessment/Plan     Active Hospital Problems    Diagnosis   • Pneumonia of both lower lobes due to infectious organism   • Acute systolic CHF (congestive heart failure) (CMS/[facility])   • Acute respiratory failure with hypoxia (CMS/[facility])   • CKD (chronic kidney disease) stage 4, GFR 15-29 ml/min (CMS/[facility])   • Diabetes mellitus type II, uncontrolled (CMS/[facility])   • Benign essential hypertension       Plan:    1. Acute respiratory failure with hypoxia: related to pneumonia/CHF decompensation: Resolved.  Weaned to room air.  Completed antibiotic therapy.  Continue diuresis as renal function allows.   2. Acute exacerbation of heart failure with reduced EF: (secondary to ischemic cardiomyopathy): Resolved. Echocardiogram done 8/21/2020 showed severe global hypokinesis with an ejection fraction of 20%.  Continue daily weights, fluid restriction. Diuretics currently held related to worsening creatinine.  Weight today is recorded as 216 lbs.  Over the weekend it was noted patient was being weighed with bed scale while bed extender was in place.  Charge nurse made aware of need of accurate weight daily without extender added to bed.  Nursing to reassess today's weight.   3. Chronic kidney disease stage III/IV: Patient's baseline creatinine is reportedly between 1.9-2.0. Creatinine is improved today.  Trending down from 4.0-3.67-3.54 today. Diuretics held per nephrology and patient to continue  receiving IV fluids per nephrology.  May require dialysis per nephrology notes.   4. Hypokalemia: Resolved.    5. History of atrial flutter: Patient is currently in normal sinus rhythm.  Continue amiodarone, Coreg.  Warfarin held due to gross hematuria.    6. Hypertension: Stable.  Continue Coreg and hydralazine.    7. Diabetes mellitus: FSBS AC and HS with SSI.    8. Acute cystitis: completed antibiotics.  Culture shows no growth.     9. GERD: Continue PPI.  10. Gross hematuria with clotting of burleson catheter: CBI initiated.  Urology made aware.         This document has been electronically signed by THIERNO Waldrop on October 12, 2020 09:49 CDT

## 2020-10-12 NOTE — PLAN OF CARE
Pt removed midline this shift, urine output is very bloody, burleson is still leaking, patency maintained.

## 2020-10-12 NOTE — PROGRESS NOTES
"[facility]  [address] [place] [address]. [address]  T - [phone]  F - [phone]     Progress Note          PATIENT  DEMOGRAPHICS   PATIENT NAME: Ondina Alanis Sr.                      PHYSICIAN: Marley Akhtar MD  : 1940  MRN: [id number]   LOS: 18 days    Patient Care Team:  Wendie Quiñonez APRN as PCP - General (Nurse Practitioner)  Subjective   SUBJECTIVE   Resting quietly- no acute distress. Now has cbi running         Objective   OBJECTIVE   Vital Signs  Temp:  [97 °F (36.1 °C)-98.3 °F (36.8 °C)] 97 °F (36.1 °C)  Heart Rate:  [62-84] 72  Resp:  [16-18] 18  BP: (153-168)/(63-80) 160/75    Flowsheet Rows      First Filed Value   Admission Height  193 cm (76\") Documented at 2020   Admission Weight  94.8 kg (209 lb) Documented at 2020           I/O last 3 completed shifts:  In: -   Out: 2425 [Urine:2425]    PHYSICAL EXAM    Physical Exam  Vitals signs and nursing note reviewed.   Constitutional:       General: He is not in acute distress.  HENT:      Head: Normocephalic and atraumatic.      Mouth/Throat:      Mouth: Mucous membranes are dry.   Cardiovascular:      Rate and Rhythm: Normal rate and regular rhythm.      Heart sounds: No murmur.   Pulmonary:      Effort: Pulmonary effort is normal.      Breath sounds: Normal breath sounds.   Abdominal:      General: Abdomen is flat. Bowel sounds are normal.      Palpations: Abdomen is soft.   Musculoskeletal:         General: No swelling.   Skin:     General: Skin is warm and dry.      Comments: Some skin tears ashia les- appear to be healing   Neurological:      Mental Status: Mental status is at baseline.   Psychiatric:         Mood and Affect: Mood normal.         Behavior: Behavior normal.         RESULTS   Results Review:    Results from last 7 days   Lab Units 10/12/20  0600 10/11/20  0813 10/10/20  0627   SODIUM mmol/L 136 137 138   POTASSIUM mmol/L 4.1 4.0 3.7   CHLORIDE mmol/L 97* 97* 94* "   CO2 mmol/L 27.0 30.0* 29.0   BUN mg/dL 81* 83* 83*   CREATININE mg/dL 3.54* 3.67* 4.00*   CALCIUM mg/dL 9.3 9.2 9.1   GLUCOSE mg/dL 103* 112* 130*       Estimated Creatinine Clearance: 23.5 mL/min (A) (by C-G formula based on SCr of 3.54 mg/dL (H)).                Results from last 7 days   Lab Units 10/12/20  1457 10/11/20  1644 10/07/20  0610   WBC 10*3/mm3  --   --  7.02   HEMOGLOBIN g/dL 9.4* 9.4* 10.3*   PLATELETS 10*3/mm3  --   --  160       Results from last 7 days   Lab Units 10/12/20  0600 10/11/20  0704 10/10/20  0627 10/09/20  0604 10/08/20  0544   INR  1.45* 1.40* 1.33* 1.35* 1.28*         Imaging Results (Last 24 Hours)     ** No results found for the last 24 hours. **           MEDICATIONS    amiodarone, 200 mg, Oral, Q24H  atorvastatin, 40 mg, Oral, Nightly  carvedilol, 6.25 mg, Oral, BID With Meals  hydrALAZINE, 75 mg, Oral, Q8H  isosorbide mononitrate, 30 mg, Oral, Q24H  magic butt ointment, , Topical, BID  pantoprazole, 40 mg, Oral, QAM  sodium chloride, 10 mL, Intravenous, Q12H      Pharmacy to dose warfarin,         Assessment/Plan   ASSESSMENT / PLAN      CKD (chronic kidney disease) stage 4, GFR 15-29 ml/min (CMS/[facility])    Diabetes mellitus type II, uncontrolled (CMS/[facility])    Benign essential hypertension    Acute respiratory failure with hypoxia (CMS/[facility])    Acute systolic CHF (congestive heart failure) (CMS/[facility])    Pneumonia of both lower lobes due to infectious organism    1.  CKD 3/4- Baseline creatinine used to be around 2.0, was up to 3.1 in his most recent admission and then 2.4-2.6 range. UOP acceptable today. Lost significant weight 181 lbs.  Peak 209 lbs. Cr is trending down. Now has cbi running with blood clots earlier.     Has High bicarb- 30 and low chloride of 94 today (contraction alkalosis). Likely overdiuresed. Hold diuretics.  received gentle ivf and now stopped     2.  Hypertension- BP acceptable 158/70 this am- hydralazine was increased to 75 mg 3 times daily and carvedilol  continues at 6.25mg twice daily.      3.  CHF- systolic heart failure EF 20%. severe LV systolic dysfunction with RV systolic pressure of 60 mmHg, cardiology following. cxr bilateral infiltrates vs edema. Trace edema ashia le L>R.      4.  A. Fib on amiodarone and coumain- Hrs 60-78. INR low 1.4- management per pharmacy- he was refusing warfarin but is taking this once again.      5.  Pneumonia- has completed antibiotics per primary team     6.  UTI- has completed antibiotics per primary team now has hematuria gross and cbi is running         This document has been electronically signed by Marley Akhtar MD on October 12, 2020 18:59 CDT

## 2020-10-12 NOTE — CONSULTS
Inpatient Urology Consult  Consult performed by: Ihsan Marion APRN  Consult ordered by: Nicole Canseco APRN          Patient Care Team:  Wendie Quiñonez APRN as PCP - General (Nurse Practitioner)    Chief complaint: clot hematuria    Subjective     Ondina Alanis Sr is a 80 year old male consulted to Urology for clot hematuria. He is well known to Dr. Riggs, follows him for prostate cancer, gets Lupron injection and is up to date. He has 3-way Moore in place now, continuous bladder irrigation running now, requiring manual evacuation of clots by his nurse periodically. He complains of some bladder discomfort, hematuria, difficulty voiding. Wife is present, reports he was admitted for pneumonia and was treated for bladder infection then. His anticoagulant has been discontinued. His history is reviewed.     Blood in Urine  This is a new problem. The current episode started in the past 7 days. The problem has been gradually worsening since onset. He describes the hematuria as gross hematuria. The hematuria occurs throughout his entire urinary stream. Clotting in stream: throughout -- has Moore. The pain is mild. He describes his urine color as dark red. Associated symptoms include abdominal pain. Pertinent negatives include no facial swelling, fever, flank pain, nausea or vomiting. His past medical history is significant for BPH, hypertension and recent infection. There is no history of  trauma, sickle cell disease or tobacco use.       Review of Systems   Constitutional: Positive for activity change, appetite change and fatigue. Negative for fever.   HENT: Negative.  Negative for facial swelling.    Eyes: Positive for visual disturbance.   Respiratory: Negative.    Cardiovascular: Negative for chest pain and palpitations.   Gastrointestinal: Positive for abdominal pain. Negative for constipation, diarrhea, nausea and vomiting.   Endocrine: Negative.    Genitourinary: Positive for difficulty urinating and  hematuria. Negative for flank pain.   Musculoskeletal: Positive for arthralgias and gait problem.   Allergic/Immunologic: Negative.    Neurological: Positive for weakness. Negative for headaches.   Psychiatric/Behavioral: Negative for agitation, behavioral problems and decreased concentration.        Past Medical History:   Diagnosis Date   • Asthma    • Diabetes mellitus (CMS/HCC)    • GERD (gastroesophageal reflux disease)    • Hypertension    • Prostate disorder    ,   Past Surgical History:   Procedure Laterality Date   • BACK SURGERY     • KNEE SURGERY Left    • PROSTATE SURGERY     , History reviewed. No pertinent family history.,   Social History     Tobacco Use   • Smoking status: Never Smoker   Substance Use Topics   • Alcohol use: No   • Drug use: No   ,   Medications Prior to Admission   Medication Sig Dispense Refill Last Dose   • acetaminophen (TYLENOL) 500 MG tablet Take 2 tablets by mouth 3 (Three) Times a Day. 60 tablet 0    • albuterol (PROVENTIL HFA;VENTOLIN HFA) 108 (90 BASE) MCG/ACT inhaler Inhale 2 puffs Every 6 (Six) Hours.      • amiodarone (PACERONE) 200 MG tablet Take 1 tablet by mouth Every 12 (Twelve) Hours. 30 tablet 0    • aspirin 81 MG EC tablet Take 1 tablet by mouth Daily. 30 tablet 0    • atorvastatin (LIPITOR) 40 MG tablet Take 1 tablet by mouth Every Night. 30 tablet 0    • benzonatate (TESSALON) 200 MG capsule Take 1 capsule by mouth 3 (Three) Times a Day As Needed for Cough. 30 capsule 0    • brimonidine (ALPHAGAN P) 0.1 % solution ophthalmic solution 1 drop.      • brinzolamide (AZOPT) 1 % ophthalmic suspension 1 drop.      • budesonide (PULMICORT) 0.5 MG/2ML nebulizer solution Inhale 0.5 mg.      • carvedilol (COREG) 6.25 MG tablet Take 1 tablet by mouth 2 (Two) Times a Day With Meals. 30 tablet 0    • hydrALAZINE (APRESOLINE) 25 MG tablet Take 1 tablet by mouth Every 8 (Eight) Hours. 60 tablet 0    • insulin aspart (novoLOG) 100 UNIT/ML injection Inject 0-7 Units under the  skin into the appropriate area as directed 3 (Three) Times a Day Before Meals. 10 mL 0    • insulin detemir (Levemir) 100 UNIT/ML injection Inject 10 Units under the skin into the appropriate area as directed Every 12 (Twelve) Hours. 10 mL 0    • ipratropium (ATROVENT) 0.02 % nebulizer solution Take 2.5 mL by nebulization Every 4 (Four) Hours As Needed for Wheezing or Shortness of Air. 10 mL 0    • ipratropium-albuterol (DUO-NEB) 0.5-2.5 mg/mL nebulizer Inhale 3 mL Every 6 (Six) Hours.      • isosorbide mononitrate (IMDUR) 30 MG 24 hr tablet Take 1 tablet by mouth Daily. 60 tablet 0    • lansoprazole (PREVACID) 30 MG capsule Take 30 mg by mouth.      • latanoprost (XALATAN) 0.005 % ophthalmic solution 1 drop.      • Misc. Devices (TRANSFER BENCH) misc Transfer bench r/t dementia, deconditioning. 1 each 0    • Misc. Devices (TUB TRANSFER BOARD) misc Transfer tub bench r/t deconditioning, dementia, and blind r/t glaucoma 1 each 0    • warfarin (COUMADIN) 10 MG tablet Keep INR between 2-3  Indications: Atrial Fibrillation 1 tablet 0    • warfarin (COUMADIN) 7.5 MG tablet Keep INR between 2-3  Indications: Atrial Fibrillation 1 tablet 0         Allergies:  Contrast dye    Objective      Vital Signs  Temp:  [97 °F (36.1 °C)-98.3 °F (36.8 °C)] 97 °F (36.1 °C)  Heart Rate:  [58-84] 70  Resp:  [16-18] 18  BP: (153-168)/(63-80) 168/80    Physical Exam  Constitutional:       Appearance: He is normal weight. He is not ill-appearing.   HENT:      Head: Normocephalic and atraumatic.      Mouth/Throat:      Mouth: Mucous membranes are moist.      Pharynx: No oropharyngeal exudate or posterior oropharyngeal erythema.   Eyes:      General: No scleral icterus.  Cardiovascular:      Rate and Rhythm: Normal rate.   Pulmonary:      Effort: Pulmonary effort is normal.      Breath sounds: Normal breath sounds.   Abdominal:      General: Abdomen is flat.      Palpations: Abdomen is soft.      Tenderness: There is abdominal tenderness.  There is no right CVA tenderness, left CVA tenderness or rebound.   Genitourinary:     Penis: Normal.       Comments: Moore red urine no clots in bag but several clots manually irrigated  Musculoskeletal:         General: No tenderness.   Skin:     General: Skin is warm and dry.   Neurological:      Mental Status: He is alert. Mental status is at baseline.   Psychiatric:         Mood and Affect: Mood normal.         Results Review:   Lab Results (last 24 hours)     Procedure Component Value Units Date/Time    Basic Metabolic Panel [[id number]]  (Abnormal) Collected: 10/12/20 0600    Specimen: Blood Updated: 10/12/20 0759     Glucose 103 mg/dL      BUN 81 mg/dL      Creatinine 3.54 mg/dL      Sodium 136 mmol/L      Potassium 4.1 mmol/L      Comment: Slight hemolysis detected by analyzer. Results may be affected.        Chloride 97 mmol/L      CO2 27.0 mmol/L      Calcium 9.3 mg/dL      eGFR  African Amer 20 mL/min/1.73      BUN/Creatinine Ratio 22.9     Anion Gap 12.0 mmol/L     Narrative:      GFR Normal >60  Chronic Kidney Disease <60  Kidney Failure <15      Protime-INR [[id number]]  (Abnormal) Collected: 10/12/20 0600    Specimen: Blood Updated: 10/12/20 0729     Protime 18.4 Seconds      INR 1.45    Narrative:      Therapeutic range for most indications is 2.0-3.0 INR,  or 2.5-3.5 for mechanical heart valves.    Hemoglobin & Hematocrit, Blood [[id number]]  (Abnormal) Collected: 10/11/20 1644    Specimen: Blood Updated: 10/11/20 1710     Hemoglobin 9.4 g/dL      Hematocrit 28.1 %          Imaging Results (Last 24 Hours)     ** No results found for the last 24 hours. **           I reviewed the patient's new clinical results.  I reviewed the patient's new imaging results and agree with the interpretation.  I reviewed the patient's other test results and agree with the interpretation        Assessment/Plan       CKD (chronic kidney disease) stage 4, GFR 15-29 ml/min (CMS/[facility])    Diabetes mellitus type II, uncontrolled  (CMS/[facility])    Benign essential hypertension    Acute respiratory failure with hypoxia (CMS/HCC)    Acute systolic CHF (congestive heart failure) (CMS/[facility])    Pneumonia of both lower lobes due to infectious organism      Assessment & Plan    Consulted to Urology for clot retention. 3-way Moore with CBI initiated and clots are manually irrigated. He has prostate cancer with Lupron injections. Treated for UTI cystitis this admission. Afebrile.   -Estimated Creatinine Clearance: 23.5 mL/min (A) (by C-G formula based on SCr of 3.54 mg/dL (H)).  -Hgb/Hct 9.4/28.1    Plan:  Continue CBI.   NPO until Dr. Riggs examines. He may require cystoscopy for direct visualization with clot evacuation.   Check H&H now. Hold anticoagulants.     I discussed the patient's findings and my recommendations with patient, family, nursing staff and THIERNO Gibson  10/12/20  10:11 CDT

## 2020-10-12 NOTE — DISCHARGE PLACEMENT REQUEST
"Nadir Alanis Sr. (80 y.o. Male)     Date of Birth Social Security Number Address Home Phone MRN    1940  [address] [id number] [phone] [id number]    Islam Marital Status          Yazdanism        Admission Date Admission Type Admitting Provider Attending Provider Department, Room/Bed    9/23/20 Emergency Samira Rodrigues MD Stewart-Hubbard, Takasha Latoya Renee, MD [address], 419/[address]    Discharge Date Discharge Disposition Discharge Destination                       Attending Provider: Samira Rodrigues MD    Allergies: Contrast Dye    Isolation: None   Infection: None   Code Status: CPR    Ht: 193 cm (76\")   Wt: 98.3 kg (216 lb 11.2 oz)    Admission Cmt: None   Principal Problem: None                Active Insurance as of 9/23/2020     Primary Coverage     Payor Plan Insurance Group Employer/Plan Group    MEDICARE MEDICARE A & B      Payor Plan Address Payor Plan Phone Number Payor Plan Fax Number Effective Dates    [address] [phone]  11/1/1976 - None Entered    [facility] [id number]       Subscriber Name Subscriber Birth Date Member ID       NADIR ALANIS SR. 1940 [id number]           Secondary Coverage     Payor Plan Insurance Group Employer/Plan Group    [facility] [id number]     Payor Plan Address Payor Plan Phone Number Payor Plan Fax Number Effective Dates    [address] [address]   1/23/2019 - None Entered    [facility] [phone]       Subscriber Name Subscriber Birth Date Member ID       NADIR ALANIS SR. 1940 [id number]                 Emergency Contacts      (Rel.) Home Phone Work Phone Mobile Phone    ZekeLamar (Spouse) [phone] -- [phone]    AlanisCammy (Daughter) [phone] -- [phone]              "

## 2020-10-13 NOTE — PLAN OF CARE
Problem: Adult Inpatient Plan of Care  Goal: Plan of Care Review  Outcome: Ongoing, Not Progressing  Flowsheets (Taken 10/13/2020 1144)  Progress: no change  Outcome Summary: pt kept eyes closed and did answer to name. tolerated gentle prom but is overall increased tone with limited movement.

## 2020-10-13 NOTE — DISCHARGE SUMMARY
[facility] Medicine Services  DISCHARGE SUMMARY       Date of Admission: 9/23/2020  Date of Discharge:  10/13/2020  Primary Care Physician: Wendie Quiñonez APRN    Presenting Problem/History of Present Illness:  Hypoxia [R09.02]  Elevated troponin [R77.8]  Pneumonia of both lower lobes due to infectious organism [J18.9]  Congestive heart failure, unspecified HF chronicity, unspecified heart failure type (CMS/[facility]) [I50.9]       Final Discharge Diagnoses:  Active Hospital Problems    Diagnosis   • Pneumonia of both lower lobes due to infectious organism   • Acute systolic CHF (congestive heart failure) (CMS/[facility])   • Acute respiratory failure with hypoxia (CMS/[facility])   • CKD (chronic kidney disease) stage 4, GFR 15-29 ml/min (CMS/[facility])   • Diabetes mellitus type II, uncontrolled (CMS/[facility])   • Benign essential hypertension       Consults:   Consults     Date and Time Order Name Status Description    10/11/2020 1630 Inpatient Urology Consult Completed     9/28/2020 1202 Inpatient Cardiology Consult Completed     9/28/2020 1201 Inpatient Nephrology Consult Completed     9/24/2020 0124 Hospitalist (on-call MD unless specified)      8/21/2020 0945 Inpatient Cardiology Consult Completed           Procedures Performed:                 Pertinent Test Results:   Lab Results (last 24 hours)     Procedure Component Value Units Date/Time    CBC (No Diff) [[id number]]  (Abnormal) Collected: 10/13/20 0719    Specimen: Blood Updated: 10/13/20 0842     WBC 6.59 10*3/mm3      RBC 3.46 10*6/mm3      Hemoglobin 9.3 g/dL      Hematocrit 27.8 %      MCV 80.3 fL      MCH 26.9 pg      MCHC 33.5 g/dL      RDW 14.6 %      RDW-SD 42.6 fl      MPV 11.9 fL      Platelets 166 10*3/mm3     Extra Tubes [[id number]] Collected: 10/13/20 0719    Specimen: Blood Updated: 10/13/20 0830    Narrative:      The following orders were created for panel order Extra Tubes.  Procedure                                Abnormality         Status                     ---------                               -----------         ------                     Lavender Top[[id number]]                                     Final result                 Please view results for these tests on the individual orders.    Lavender Top [[id number]] Collected: 10/13/20 0719    Specimen: Blood Updated: 10/13/20 0830     Extra Tube hold for add-on     Comment: Auto resulted       Protime-INR [[id number]]  (Abnormal) Collected: 10/13/20 0719    Specimen: Blood Updated: 10/13/20 0756     Protime 18.5 Seconds      INR 1.46    Narrative:      Therapeutic range for most indications is 2.0-3.0 INR,  or 2.5-3.5 for mechanical heart valves.    Basic Metabolic Panel [[id number]]  (Abnormal) Collected: 10/13/20 0719    Specimen: Blood Updated: 10/13/20 0754     Glucose 108 mg/dL      BUN 80 mg/dL      Creatinine 3.63 mg/dL      Sodium 137 mmol/L      Potassium 4.0 mmol/L      Chloride 97 mmol/L      CO2 28.0 mmol/L      Calcium 9.1 mg/dL      eGFR  African Amer 20 mL/min/1.73      BUN/Creatinine Ratio 22.0     Anion Gap 12.0 mmol/L     Narrative:      GFR Normal >60  Chronic Kidney Disease <60  Kidney Failure <15      Hemoglobin & Hematocrit, Blood [[id number]]  (Abnormal) Collected: 10/12/20 1457    Specimen: Blood Updated: 10/12/20 1515     Hemoglobin 9.4 g/dL      Hematocrit 28.8 %         Imaging Results (All)     Procedure Component Value Units Date/Time    US Guided Vascular Access [[id number]] Resulted: 10/07/20 1231     Updated: 10/07/20 1231    Narrative:      This procedure was auto-finalized with no dictation required.    IR Insert Midline Without Port Pump 5 Plus [[id number]] Resulted: 10/07/20 1151     Updated: 10/07/20 1151    Narrative:      This procedure was auto-finalized with no dictation required.    XR Chest 1 View [[id number]] Collected: 10/03/20 1610     Updated: 10/03/20 1706    Narrative:        PORTABLE CHEST    HISTORY: Follow-up congestive  heart failure. Pneumonia.  Hypoxemia.    Portable AP upright film of the chest was obtained at 4:07 PM.  COMPARISON: September 29, 2020    FINDINGS:   EKG leads.  Decreasing small left pleural effusion.  There may be atelectasis or infiltrate at the left lung base.  The heart is not enlarged.  The pulmonary vasculature is not increased.  No pleural effusion.  No pneumothorax.  No acute osseous abnormality.      Impression:      CONCLUSION:  Decreasing small left pleural effusion.  There may be atelectasis or infiltrate at the left lung base.  The heart is not enlarged.  The pulmonary vasculature is not increased.    [id number]    Electronically signed by:  Chris Srinivasan MD  10/3/2020 5:05 PM CDT  Workstation: [id number]    XR Chest PA & Lateral [[id number]] Collected: 09/29/20 0710     Updated: 09/29/20 0827    Narrative:          PROCEDURE: Chest PA and lateral    REASON FOR EXAM: Follow-up CHF., J18.9 Pneumonia, unspecified  organism R09.02 Hypoxemia R79.89 Other specified abnormal  findings of blood chemistry I50.9 Heart failure, unspecified    FINDINGS: Comparison study dated September 23, 2020. Cardiac size  appears within normal limits. Bilateral perihilar haziness as  well as bilateral infrahilar interstitial opacities left worse  than right. Lungs are otherwise clear.  Small to moderate left  pleural effusion. No acute osseous abnormality.      Impression:      1.  Bilateral perihilar haziness as well as bilateral infrahilar  interstitial opacities left worse than right. This would be  suspicious for pulmonary edema and/or pneumonia.  2.  Small to moderate left pleural effusion.    Electronically signed by:  Dino Wheeler MD  9/29/2020 8:25 AM CDT  Workstation: [id number]    US Guided Vascular Access [[id number]] Resulted: 09/28/20 1613     Updated: 09/28/20 1613    Narrative:      This procedure was auto-finalized with no dictation required.    IR Insert Midline Without Port Pump 5 Plus [[id number]] Resulted: 09/28/20  1610     Updated: 09/28/20 1610    Narrative:      This procedure was auto-finalized with no dictation required.    IR Insert Midline Without Port Pump 5 Plus [[id number]] Resulted: 09/24/20 0959     Updated: 09/24/20 0959    Narrative:      This procedure was auto-finalized with no dictation required.    US Guided Vascular Access [[id number]] Resulted: 09/24/20 0954     Updated: 09/24/20 0954    Narrative:      This procedure was auto-finalized with no dictation required.    XR Chest 1 View [[id number]] Collected: 09/23/20 2245     Updated: 09/23/20 2314    Narrative:        CHEST X-RAY 1 VIEW on 9/23/2020     CLINICAL INDICATION: Shortness of breath    COMPARISON: 9/7/2020    FINDINGS: There remains a small to moderate size left pleural  effusion. There has been worsening of bilateral opacities  consistent with edema and/or pneumonia. Differential diagnosis  would include viral infections. Heart is within normal limits for  size. Vascular calcification is noted in the aorta.      Impression:      Continued left pleural effusion with worsening  bilateral edema and/or pneumonia.    Electronically signed by:  Tung Cruz  9/23/2020 11:13 PM  CDT Workstation: [id number]            Chief Complaint on Day of Discharge: None    Hospital Course:  80-year-old -American male with past medical history of asthma, type 2 diabetes, gastroesophageal reflux disease, hypertension, BPH who presented on 9/23/2020 with complaints of shortness of breath.  Patient was admitted for respiratory failure related to CHF exacerbation and pneumonia.  Patient completed antibiotic therapy during hospital stay and was weaned to room air.  Patient was also provided with IV diuretics and weight trended downward her extremity edema resolved.  Patient was followed by nephrology due to worsening renal function with consideration for possible dialysis.  At this time, creatinine level remains elevated at 3.82.    Patient is also currently being  "followed by urology service due to gross hematuria noted on 10/11/2020.  Patient currently has three-way Moore catheter with continuous bladder irrigation.  Plans are for possible cystoscopy on 10/14/2020 for further evaluation.  Patient does have history of atrial fibrillation previously anticoagulated with Coumadin, however this is currently held due to patient's issues with hematuria.    Patient remains deconditioned from his baseline level of function and due to slow progression in his status, decision was made to pursue further treatment on continue care long-term acute care unit.  Patient was accepted today and will be transferred in stable condition.    Condition on Discharge: Stable    Physical Exam on Discharge:  /68 (BP Location: Left arm, Patient Position: Lying)   Pulse 67   Temp 97.9 °F (36.6 °C) (Axillary)   Resp 18   Ht 193 cm (76\")   Wt 96.5 kg (212 lb 12.8 oz) Comment: WITH BED EXTENDER   SpO2 99%   BMI 25.90 kg/m²   Physical Exam    Vitals signs and nursing note reviewed.   Constitutional:       General: He is not in acute distress.     Appearance: Normal appearance.   HENT:      Head: Normocephalic and atraumatic.      Right Ear: External ear normal.      Left Ear: External ear normal.      Nose: Nose normal.      Mouth/Throat:      Pharynx: Oropharynx is clear.   Eyes:      General:         Right eye: No discharge.         Left eye: No discharge.      Conjunctiva/sclera: Conjunctivae normal.      Comments: Legally blind   Neck:      Musculoskeletal: Normal range of motion and neck supple.   Cardiovascular:      Rate and Rhythm: Normal rate and regular rhythm.      Heart sounds: Normal heart sounds. No murmur. No friction rub. No gallop.    Pulmonary:      Effort: No respiratory distress.      Breath sounds: Normal breath sounds. No stridor. No wheezing or rales.   Abdominal:      General: Bowel sounds are normal. There is no distension.      Palpations: Abdomen is soft.      " Tenderness: There is no abdominal tenderness.   Genitourinary:     Comments: Three-way Moore catheter with continuous bladder irrigation.    Pale pink, clear urine noted in drainage bag.  Musculoskeletal: Normal range of motion.         General: No swelling.   Skin:     General: Skin is warm and dry.   Neurological:      General: No focal deficit present.      Mental Status: He is alert. Mental status is at baseline.   Psychiatric:         Mood and Affect: Mood normal.         Behavior: Behavior normal.     Discharge Disposition:  Long Term Care (DC - External)    Discharge Medications:     Discharge Medications      New Medications      Instructions Start Date   bisacodyl 5 MG EC tablet  Commonly known as: DULCOLAX   5 mg, Oral, Daily PRN      magic butt ointment   1 each, Topical, 2 Times Daily      ondansetron 4 MG/2ML injection  Commonly known as: ZOFRAN   4 mg, Intravenous, Every 6 Hours PRN      opium-belladonna 16.2-30 MG suppository  Commonly known as: B&O SUPPRETTES   30 mg, Rectal, Every 8 Hours PRN         Changes to Medications      Instructions Start Date   acetaminophen 325 MG tablet  Commonly known as: TYLENOL  What changed:   · medication strength  · how much to take  · when to take this  · reasons to take this   650 mg, Oral, Every 6 Hours PRN      amiodarone 200 MG tablet  Commonly known as: PACERONE  What changed: when to take this   200 mg, Oral, Every 24 Hours Scheduled   Start Date: October 14, 2020     hydrALAZINE 25 MG tablet  Commonly known as: APRESOLINE  What changed: how much to take   75 mg, Oral, Every 8 Hours Scheduled      ipratropium-albuterol 0.5-2.5 mg/3 ml nebulizer  Commonly known as: DUO-NEB  What changed:   · how to take this  · when to take this  · reasons to take this   3 mL, Nebulization, Every 4 Hours PRN         Continue These Medications      Instructions Start Date   atorvastatin 40 MG tablet  Commonly known as: LIPITOR   40 mg, Oral, Nightly      brimonidine 0.1 %  solution ophthalmic solution  Commonly known as: ALPHAGAN P   1 drop      brinzolamide 1 % ophthalmic suspension  Commonly known as: AZOPT   1 drop      budesonide 0.5 MG/2ML nebulizer solution  Commonly known as: PULMICORT   0.5 mg, Inhalation      carvedilol 6.25 MG tablet  Commonly known as: COREG   6.25 mg, Oral, 2 Times Daily With Meals      insulin aspart 100 UNIT/ML injection  Commonly known as: novoLOG   0-7 Units, Subcutaneous, 3 Times Daily Before Meals      insulin detemir 100 UNIT/ML injection  Commonly known as: Levemir   10 Units, Subcutaneous, Every 12 Hours Scheduled      ipratropium 0.02 % nebulizer solution  Commonly known as: ATROVENT   0.5 mg, Nebulization, Every 4 Hours PRN      isosorbide mononitrate 30 MG 24 hr tablet  Commonly known as: IMDUR   30 mg, Oral, Every 24 Hours Scheduled      lansoprazole 30 MG capsule  Commonly known as: PREVACID   30 mg, Oral      latanoprost 0.005 % ophthalmic solution  Commonly known as: XALATAN   1 drop      Transfer Bench misc   Transfer bench r/t dementia, deconditioning.      Tub Transfer Board misc   Transfer tub bench r/t deconditioning, dementia, and blind r/t glaucoma         Stop These Medications    albuterol sulfate  (90 Base) MCG/ACT inhaler  Commonly known as: PROVENTIL HFA;VENTOLIN HFA;PROAIR HFA     aspirin 81 MG EC tablet     benzonatate 200 MG capsule  Commonly known as: TESSALON     warfarin 10 MG tablet  Commonly known as: COUMADIN     warfarin 7.5 MG tablet  Commonly known as: COUMADIN            Discharge Diet:   Diet Instructions     Diet: Nothing By Mouth      Discharge Diet: Nothing By Mouth    NPO per urology for now          Activity at Discharge:   Activity Instructions     Activity as Tolerated            Discharge Care Plan/Instructions: Discharged to continue care LTAC    Follow-up Appointments:    Contact information for follow-up providers     Wendie Quiñonez APRN Follow up.    Specialty: Nurse Practitioner                   Contact information for after-discharge care     Destination     [facility] .    Service: Long Term Acute Care  Contact information:  [address] [place] [id number]  [phone]                             Test Results Pending at Discharge:           This document has been electronically signed by THIERNO Waldrop on October 13, 2020 14:54 CDT        Time: 30 minutes spent on assessment, discussion, management, and discharge planning for this patient.

## 2020-10-13 NOTE — PLAN OF CARE
Problem: Adult Inpatient Plan of Care  Goal: Plan of Care Review  Outcome: Ongoing, Progressing  Flowsheets (Taken 10/13/2020 0128)  Progress: no change  Plan of Care Reviewed With: patient  Outcome Summary: vss, CBI in place, [place] saw pt and advanced diet back to GI soft texture, urine is light red, clear, no clots at this time, will cont to monitor

## 2020-10-13 NOTE — THERAPY TREATMENT NOTE
Acute Care - Physical Therapy Treatment Note  [facility]     Patient Name: Ondina Alanis Sr.  : 1940  MRN: [id number]  Today's Date: 10/13/2020   Onset of Illness/Injury or Date of Surgery: 20       PT Assessment (last 12 hours)      PT Evaluation and Treatment     Row Name 10/13/20 0838          Physical Therapy Time and Intention    Subjective Information  no complaints  -RW     Document Type  therapy note (daily note)  -RW     Mode of Treatment  physical therapy  -RW     Patient Effort  poor  -RW     Comment  pt with overall tone and shaking like hes cold  -RW     Row Name 10/13/20 0838          General Information    Patient Profile Reviewed  yes  -RW     Patient Observations  lethargic  -RW     General Observations of Patient  pt rigid and shaking like hes cold  -RW     Barriers to Rehab  previous functional deficit;visual deficit  -RW     Row Name 10/13/20 08          Vision Assessment/Intervention    Vision Assessment Comment  legally blind notice over bed  -RW     Row Name 10/13/20 08          Cognition    Orientation Status (Cognition)  oriented to;person   -RW     Row Name 10/13/20 08          Pain Scale: Numbers Pre/Post-Treatment    Pretreatment Pain Rating  0/10 - no pain  -RW     Posttreatment Pain Rating  0/10 - no pain  -RW     Row Name 10/13/20 08          Bed Mobility    Rolling Right [place] (Bed Mobility)  dependent (less than 25% patient effort);1 person assist  -RW     Supine-Sit [place] (Bed Mobility)  not tested  -RW     Sit-Supine [place] (Bed Mobility)  not tested  -RW     Row Name 10/13/20 08          Hip (Therapeutic Exercise)    Hip (Therapeutic Exercise)  PROM (passive range of motion)  -RW     Hip PROM (Therapeutic Exercise)  bilateral;flexion;extension;external rotation;internal rotation;supine;10 repetitions  -RW     Row Name 10/13/20 08          Knee (Therapeutic Exercise)    Knee (Therapeutic Exercise)  PROM (passive range of  motion)  -RW     Knee PROM (Therapeutic Exercise)  bilateral;flexion;extension;supine;10 repetitions  -     Row Name 10/13/20 0838          Ankle (Therapeutic Exercise)    Ankle (Therapeutic Exercise)  PROM (passive range of motion)  -RW     Ankle PROM (Therapeutic Exercise)  bilateral;dorsiflexion;plantarflexion;supine  -     Row Name 10/13/20 0838          Vital Signs    Pre Systolic BP Rehab  155  -RW     Pre Treatment Diastolic BP  66  -RW     Pretreatment Heart Rate (beats/min)  83  -RW     Pre SpO2 (%)  94  -RW     Row Name 10/13/20 0838          Bed Mobility Goal 1 (PT)    Activity/Assistive Device (Bed Mobility Goal 1, PT)  sit to supine/supine to sit;rolling to right;rolling to left  -RW     [place] Level/Cues Needed (Bed Mobility Goal 1, PT)  minimum assist (75% or more patient effort)  -RW     Time Frame (Bed Mobility Goal 1, PT)  long term goal (LTG);by discharge  -RW     Strategies/Barriers (Bed Mobility Goal 1, PT)  Poor ability to maneuver in bed.  -RW     Progress/Outcomes (Bed Mobility Goal 1, PT)  goal not met  -     Row Name 10/13/20 0838          ROM Goal 1 (PT)    ROM Goal 1 (PT)  Patient will tolerate AAROM and stretching to BLEs, to improve functional mobility.  -RW     Time Frame (ROM Goal 1, PT)  long-term goal (LTG);by discharge  -RW     Strategies/Barriers (ROM Goal 1, PT)  Fatigues easily, very debilitated.  -     Row Name 10/13/20 0838          Patient Education Goal (PT)    Activity (Patient Education Goal, PT)  Patient will sit EOB with min Ax1, x 5 min with BUE support.  -RW     Time Frame (Patient Education Goal, PT)  long term goal (LTG);by discharge  -RW     Barriers (Patient Education Goal, PT)  Decreased hip and knee flexion, poor seated balance.  -     Row Name 10/13/20 0838          Therapy Assessment/Plan (PT)    Rehab Potential (PT)  fair, will monitor progress closely  -RW     Criteria for Skilled Interventions Met (PT)  yes;skilled treatment is necessary  -RW      Comment, Therapy Assessment/Plan (PT)  no participation  -       User Key  (r) = Recorded By, (t) = Taken By, (c) = Cosigned By    Initials Name Provider Type    RW Luis Hobbs, PTA Physical Therapy Assistant        Physical Therapy Education                 Title: PT OT SLP Therapies (In Progress)     Topic: Physical Therapy (In Progress)     Point: Mobility training (Done)     Learning Progress Summary           Patient Acceptance, E,TB, VU by LN at 10/11/2020 1601    Acceptance, E,TB, VU,NR by LN at 10/10/2020 1604    Acceptance, E,TB, NR by  at 10/9/2020 1234    Acceptance, E, VU,NR by  at 10/4/2020 1244    Comment: Educated on rolling technique, sit<-->supine technique; discussed PT POC and goals.                   Point: Home exercise program (Done)     Learning Progress Summary           Patient Acceptance, E,TB, VU by LN at 10/11/2020 1601    Acceptance, E,TB, VU,NR by  at 10/10/2020 1604    Acceptance, E,TB, NR by  at 10/9/2020 1234    Acceptance, E, VU,NR by  at 10/6/2020 0935    Comment: Educated on supine strengthening and stretching activities, encouraged participation.                   Point: Body mechanics (Not Started)     Learner Progress:  Not documented in this visit.          Point: Precautions (Done)     Learning Progress Summary           Patient Acceptance, E,TB, VU by LN at 10/11/2020 1601    Acceptance, E,TB, VU,NR by  at 10/10/2020 1604    Acceptance, E,TB, NR by  at 10/9/2020 1234                               User Key     Initials Effective Dates Name Provider Type Discipline     03/07/18 -  Haydee Taylor PTA Physical Therapy Assistant PT     04/03/18 -  Surya Rodriguez PT Physical Therapist PT              PT Recommendation and Plan  Anticipated Discharge Disposition (PT): skilled nursing facility  Therapy Frequency (PT): daily  Progress: no change  Outcome Summary: pt kept eyes closed and did answer to name. tolerated gentle prom but is overall increased  tone with limited movement.  Outcome Measures     Row Name 10/11/20 1436 10/11/20 0715 10/10/20 1515       How much help from another person do you currently need...    Turning from your back to your side while in flat bed without using bedrails?  1  -LN  --  1  -LN    Moving from lying on back to sitting on the side of a flat bed without bedrails?  1  -LN  --  2  -LN    Moving to and from a bed to a chair (including a wheelchair)?  1  -LN  --  1  -LN    Standing up from a chair using your arms (e.g., wheelchair, bedside chair)?  1  -LN  --  1  -LN    Climbing 3-5 steps with a railing?  1  -LN  --  1  -LN    To walk in hospital room?  1  -LN  --  1  -LN    AM-PAC 6 Clicks Score (PT)  6  -LN  --  7  -LN       How much help from another is currently needed...    Putting on and taking off regular lower body clothing?  --  1  -BB  --    Bathing (including washing, rinsing, and drying)  --  1  -BB  --    Toileting (which includes using toilet bed pan or urinal)  --  1  -BB  --    Putting on and taking off regular upper body clothing  --  1  -BB  --    Taking care of personal grooming (such as brushing teeth)  --  1  -BB  --    Eating meals  --  1  -BB  --    AM-PAC 6 Clicks Score (OT)  --  6  -BB  --       Functional Assessment    Outcome Measure Options  AM-PAC 6 Clicks Basic Mobility (PT)  -LN  --  AM-PAC 6 Clicks Basic Mobility (PT)  -LN      User Key  (r) = Recorded By, (t) = Taken By, (c) = Cosigned By    Initials Name Provider Type    LN Haydee Taylor, KUSH Physical Therapy Assistant    BB Lamar Trinidad, СВЕТЛАНА/L Occupational Therapy Assistant           Time Calculation:   PT Charges     Row Name 10/13/20 1149             Time Calculation    Start Time  0838  -RW      Stop Time  0901  -RW      Time Calculation (min)  23 min  -RW         Time Calculation- PT    Total Timed Code Minutes- PT  23 minute(s)  -RW        User Key  (r) = Recorded By, (t) = Taken By, (c) = Cosigned By    Initials Name Provider Type    RW  Luis Hobbs PTA Physical Therapy Assistant        Therapy Charges for Today     Code Description Service Date Service Provider Modifiers Qty    [id number] HC PT THER PROC EA 15 MIN 10/13/2020 Luis Hobbs, KUSH GP 2          PT G-Codes  Outcome Measure Options: AM-PAC 6 Clicks Basic Mobility (PT)  AM-PAC 6 Clicks Score (PT): 6  AM-PAC 6 Clicks Score (OT): 6    Luis Hobbs PTA  10/13/2020

## 2020-10-13 NOTE — PROGRESS NOTES
"[facility]  [address] [place] [address]. [address]  T - [phone]  F - [phone]     Progress Note          PATIENT  DEMOGRAPHICS   PATIENT NAME: Ondina Alanis Sr.                      PHYSICIAN: Marley Akhtar MD  : 1940  MRN: [id number]   LOS: 19 days    Patient Care Team:  Wendie Quiñonez APRN as PCP - General (Nurse Practitioner)  Subjective   SUBJECTIVE   Resting quietly- no acute distress. Urine is clearing         Objective   OBJECTIVE   Vital Signs  Temp:  [97.8 °F (36.6 °C)-98.4 °F (36.9 °C)] 98.4 °F (36.9 °C)  Heart Rate:  [65-76] 72  Resp:  [16-18] 18  BP: (144-161)/() 144/102    Flowsheet Rows      First Filed Value   Admission Height  193 cm (76\") Documented at 2020   Admission Weight  94.8 kg (209 lb) Documented at 2020           I/O last 3 completed shifts:  In: -   Out: 1800 [Urine:1800]    PHYSICAL EXAM    Physical Exam  Vitals signs and nursing note reviewed.   Constitutional:       General: He is not in acute distress.  HENT:      Head: Normocephalic and atraumatic.      Mouth/Throat:      Mouth: Mucous membranes are dry.   Cardiovascular:      Rate and Rhythm: Normal rate and regular rhythm.      Heart sounds: No murmur.   Pulmonary:      Effort: Pulmonary effort is normal.      Breath sounds: Normal breath sounds.   Abdominal:      General: Abdomen is flat. Bowel sounds are normal.      Palpations: Abdomen is soft.   Musculoskeletal:         General: No swelling.   Skin:     General: Skin is warm and dry.      Comments: Some skin tears ashia les- appear to be healing   Neurological:      Mental Status: Mental status is at baseline.   Psychiatric:         Mood and Affect: Mood normal.         Behavior: Behavior normal.         RESULTS   Results Review:    Results from last 7 days   Lab Units 10/13/20  0719 10/12/20  0600 10/11/20  0813   SODIUM mmol/L 137 136 137   POTASSIUM mmol/L 4.0 4.1 4.0   CHLORIDE mmol/L 97* 97* " 97*   CO2 mmol/L 28.0 27.0 30.0*   BUN mg/dL 80* 81* 83*   CREATININE mg/dL 3.63* 3.54* 3.67*   CALCIUM mg/dL 9.1 9.3 9.2   GLUCOSE mg/dL 108* 103* 112*       Estimated Creatinine Clearance: 22.5 mL/min (A) (by C-G formula based on SCr of 3.63 mg/dL (H)).                Results from last 7 days   Lab Units 10/13/20  0719 10/12/20  1457 10/11/20  1644 10/07/20  0610   WBC 10*3/mm3 6.59  --   --  7.02   HEMOGLOBIN g/dL 9.3* 9.4* 9.4* 10.3*   PLATELETS 10*3/mm3 166  --   --  160       Results from last 7 days   Lab Units 10/13/20  0719 10/12/20  0600 10/11/20  0704 10/10/20  0627 10/09/20  0604   INR  1.46* 1.45* 1.40* 1.33* 1.35*         Imaging Results (Last 24 Hours)     ** No results found for the last 24 hours. **           MEDICATIONS    amiodarone, 200 mg, Oral, Q24H  atorvastatin, 40 mg, Oral, Nightly  carvedilol, 6.25 mg, Oral, BID With Meals  hydrALAZINE, 75 mg, Oral, Q8H  isosorbide mononitrate, 30 mg, Oral, Q24H  magic butt ointment, , Topical, BID  pantoprazole, 40 mg, Oral, QAM  sodium chloride, 10 mL, Intravenous, Q12H      Pharmacy to dose warfarin,         Assessment/Plan   ASSESSMENT / PLAN      CKD (chronic kidney disease) stage 4, GFR 15-29 ml/min (CMS/[facility])    Diabetes mellitus type II, uncontrolled (CMS/[facility])    Benign essential hypertension    Acute respiratory failure with hypoxia (CMS/[facility])    Acute systolic CHF (congestive heart failure) (CMS/[facility])    Pneumonia of both lower lobes due to infectious organism    1.  CKD 3/4- Baseline creatinine used to be around 2.0, was up to 3.1 in his most recent admission and then 2.4-2.6 range. Cr then high with diuretics/ contraction alkalosis. Diuretics are on hold. ivf also stopped over the weekend. Now has cbi running with blood clots earlier. Cr stable      2.  Hypertension- BP acceptable on hydralazine was increased to 75 mg 3 times daily and carvedilol continues at 6.25mg twice daily.      3.  CHF- systolic heart failure EF 20%. severe LV systolic  dysfunction with RV systolic pressure of 60 mmHg, cardiology following. cxr bilateral infiltrates vs edema. Trace edema ashia le L>R.      4.  A. Fib on amiodarone and coumain/ hematuria - cysto for direct visualization. Off coumadin     5.  Pneumonia- has completed antibiotics per primary team     6.  UTI- has completed antibiotics          This document has been electronically signed by Marley Akhtar MD on October 13, 2020 10:44 CDT

## 2020-10-14 NOTE — PAYOR COMM NOTE
"Sandra Frias   [facility]  phone [phone]  fax [phone]    Nadir Alanis Sr. (80 y.o. Male)     Date of Birth Social Security Number Address Home Phone MRN    1940  [address] [facility] [address] [phone] [id number]    Denominational Marital Status          [facility]        Admission Date Admission Type Admitting Provider Attending Provider Department, Room/Bed    9/23/20 Emergency Samira Rodrigues MD  [address], 419/1    Discharge Date Discharge Disposition Discharge Destination        10/13/2020 Long Term Care (DC - External)              Attending Provider: (none)   Allergies: Contrast Dye    Isolation: None   Infection: None   Code Status: Prior    Ht: 193 cm (76\")   Wt: 96.5 kg (212 lb 12.8 oz)    Admission Cmt: None   Principal Problem: None                Active Insurance as of 9/23/2020     Primary Coverage     Payor Plan Insurance Group Employer/Plan Group    MEDICARE MEDICARE A & B      Payor Plan Address Payor Plan Phone Number Payor Plan Fax Number Effective Dates    [address] [phone]  11/1/1976 - None Entered    [facility] [place] [address]       Subscriber Name Subscriber Birth Date Member ID       NADIR ALANIS SR. 1940 [id number]           Secondary Coverage     Payor Plan Insurance Group Employer/Plan Group    [facility] [id number]     Payor Plan Address Payor Plan Phone Number Payor Plan Fax Number Effective Dates    [address] [address]   1/23/2019 - None Entered    [facility] [phone]       Subscriber Name Subscriber Birth Date Member ID       NADIR ALANIS SR. 1940 [id number]                 Emergency Contacts      (Rel.) Home Phone Work Phone Mobile Phone    Lamar Alanis (Spouse) [phone] -- [phone]    ZekeCammy (Daughter) [phone] -- [phone]               Discharge Summary      Nicole Canseco APRN at 10/13/20 1135     Attestation " signed by Manohar Mary MD at 10/13/20 1808    I personally evaluated and examined the patient in conjunction with THIERNO Waldrop and agree with the assessment, treatment plan, and disposition of the patient as recorded.  Heart S1S2  Lungs CTAB                      [facility]  DISCHARGE SUMMARY       Date of Admission: 9/23/2020  Date of Discharge:  10/13/2020  Primary Care Physician: Wendie Quiñonez APRN    Presenting Problem/History of Present Illness:  Hypoxia [R09.02]  Elevated troponin [R77.8]  Pneumonia of both lower lobes due to infectious organism [J18.9]  Congestive heart failure, unspecified HF chronicity, unspecified heart failure type (CMS/[facility]) [I50.9]       Final Discharge Diagnoses:  Active Hospital Problems    Diagnosis   • Pneumonia of both lower lobes due to infectious organism   • Acute systolic CHF (congestive heart failure) (CMS/[facility])   • Acute respiratory failure with hypoxia (CMS/[facility])   • CKD (chronic kidney disease) stage 4, GFR 15-29 ml/min (CMS/[facility])   • Diabetes mellitus type II, uncontrolled (CMS/[facility])   • Benign essential hypertension       Consults:   Consults     Date and Time Order Name Status Description    10/11/2020 1630 Inpatient Urology Consult Completed     9/28/2020 1202 Inpatient Cardiology Consult Completed     9/28/2020 1201 Inpatient Nephrology Consult Completed     9/24/2020 0124 Hospitalist (on-call MD unless specified)      8/21/2020 0945 Inpatient Cardiology Consult Completed           Procedures Performed:                 Pertinent Test Results:   Lab Results (last 24 hours)     Procedure Component Value Units Date/Time    CBC (No Diff) [[id number]]  (Abnormal) Collected: 10/13/20 0719    Specimen: Blood Updated: 10/13/20 0842     WBC 6.59 10*3/mm3      RBC 3.46 10*6/mm3      Hemoglobin 9.3 g/dL      Hematocrit 27.8 %      MCV 80.3 fL      MCH 26.9 pg      MCHC 33.5 g/dL      RDW 14.6 %      RDW-SD 42.6 fl       MPV 11.9 fL      Platelets 166 10*3/mm3     Extra Tubes [[id number]] Collected: 10/13/20 0719    Specimen: Blood Updated: 10/13/20 0830    Narrative:      The following orders were created for panel order Extra Tubes.  Procedure                               Abnormality         Status                     ---------                               -----------         ------                     Lavender Top[[id number]]                                     Final result                 Please view results for these tests on the individual orders.    Lavender Top [[id number]] Collected: 10/13/20 0719    Specimen: Blood Updated: 10/13/20 0830     Extra Tube hold for add-on     Comment: Auto resulted       Protime-INR [[id number]]  (Abnormal) Collected: 10/13/20 0719    Specimen: Blood Updated: 10/13/20 0756     Protime 18.5 Seconds      INR 1.46    Narrative:      Therapeutic range for most indications is 2.0-3.0 INR,  or 2.5-3.5 for mechanical heart valves.    Basic Metabolic Panel [[id number]]  (Abnormal) Collected: 10/13/20 0719    Specimen: Blood Updated: 10/13/20 0754     Glucose 108 mg/dL      BUN 80 mg/dL      Creatinine 3.63 mg/dL      Sodium 137 mmol/L      Potassium 4.0 mmol/L      Chloride 97 mmol/L      CO2 28.0 mmol/L      Calcium 9.1 mg/dL      eGFR  African Amer 20 mL/min/1.73      BUN/Creatinine Ratio 22.0     Anion Gap 12.0 mmol/L     Narrative:      GFR Normal >60  Chronic Kidney Disease <60  Kidney Failure <15      Hemoglobin & Hematocrit, Blood [[id number]]  (Abnormal) Collected: 10/12/20 1457    Specimen: Blood Updated: 10/12/20 1515     Hemoglobin 9.4 g/dL      Hematocrit 28.8 %         Imaging Results (All)     Procedure Component Value Units Date/Time    US Guided Vascular Access [[id number]] Resulted: 10/07/20 1231     Updated: 10/07/20 1231    Narrative:      This procedure was auto-finalized with no dictation required.    IR Insert Midline Without Port Pump 5 Plus [[id number]] Resulted: 10/07/20 1151      Updated: 10/07/20 1151    Narrative:      This procedure was auto-finalized with no dictation required.    XR Chest 1 View [[id number]] Collected: 10/03/20 1610     Updated: 10/03/20 1706    Narrative:        PORTABLE CHEST    HISTORY: Follow-up congestive heart failure. Pneumonia.  Hypoxemia.    Portable AP upright film of the chest was obtained at 4:07 PM.  COMPARISON: September 29, 2020    FINDINGS:   EKG leads.  Decreasing small left pleural effusion.  There may be atelectasis or infiltrate at the left lung base.  The heart is not enlarged.  The pulmonary vasculature is not increased.  No pleural effusion.  No pneumothorax.  No acute osseous abnormality.      Impression:      CONCLUSION:  Decreasing small left pleural effusion.  There may be atelectasis or infiltrate at the left lung base.  The heart is not enlarged.  The pulmonary vasculature is not increased.    [id number]    Electronically signed by:  Chris Srinivasan MD  10/3/2020 5:05 PM CDT  Workstation: [id number]    XR Chest PA & Lateral [[id number]] Collected: 09/29/20 0710     Updated: 09/29/20 0827    Narrative:          PROCEDURE: Chest PA and lateral    REASON FOR EXAM: Follow-up CHF., J18.9 Pneumonia, unspecified  organism R09.02 Hypoxemia R79.89 Other specified abnormal  findings of blood chemistry I50.9 Heart failure, unspecified    FINDINGS: Comparison study dated September 23, 2020. Cardiac size  appears within normal limits. Bilateral perihilar haziness as  well as bilateral infrahilar interstitial opacities left worse  than right. Lungs are otherwise clear.  Small to moderate left  pleural effusion. No acute osseous abnormality.      Impression:      1.  Bilateral perihilar haziness as well as bilateral infrahilar  interstitial opacities left worse than right. This would be  suspicious for pulmonary edema and/or pneumonia.  2.  Small to moderate left pleural effusion.    Electronically signed by:  Dino Wheeler MD  9/29/2020 8:25 AM CDT  Workstation:  [id number]    US Guided Vascular Access [[id number]] Resulted: 09/28/20 1613     Updated: 09/28/20 1613    Narrative:      This procedure was auto-finalized with no dictation required.    IR Insert Midline Without Port Pump 5 Plus [[id number]] Resulted: 09/28/20 1610     Updated: 09/28/20 1610    Narrative:      This procedure was auto-finalized with no dictation required.    IR Insert Midline Without Port Pump 5 Plus [[id number]] Resulted: 09/24/20 0959     Updated: 09/24/20 0959    Narrative:      This procedure was auto-finalized with no dictation required.    US Guided Vascular Access [[id number]] Resulted: 09/24/20 0954     Updated: 09/24/20 0954    Narrative:      This procedure was auto-finalized with no dictation required.    XR Chest 1 View [[id number]] Collected: 09/23/20 2245     Updated: 09/23/20 2314    Narrative:        CHEST X-RAY 1 VIEW on 9/23/2020     CLINICAL INDICATION: Shortness of breath    COMPARISON: 9/7/2020    FINDINGS: There remains a small to moderate size left pleural  effusion. There has been worsening of bilateral opacities  consistent with edema and/or pneumonia. Differential diagnosis  would include viral infections. Heart is within normal limits for  size. Vascular calcification is noted in the aorta.      Impression:      Continued left pleural effusion with worsening  bilateral edema and/or pneumonia.    Electronically signed by:  Tung Cruz  9/23/2020 11:13 PM  CDT Workstation: [id number]            Chief Complaint on Day of Discharge: None    Hospital Course:  80-year-old -American male with past medical history of asthma, type 2 diabetes, gastroesophageal reflux disease, hypertension, BPH who presented on 9/23/2020 with complaints of shortness of breath.  Patient was admitted for respiratory failure related to CHF exacerbation and pneumonia.  Patient completed antibiotic therapy during hospital stay and was weaned to room air.  Patient was also provided with IV  "diuretics and weight trended downward her extremity edema resolved.  Patient was followed by nephrology due to worsening renal function with consideration for possible dialysis.  At this time, creatinine level remains elevated at 3.82.    Patient is also currently being followed by urology service due to gross hematuria noted on 10/11/2020.  Patient currently has three-way Moore catheter with continuous bladder irrigation.  Plans are for possible cystoscopy on 10/14/2020 for further evaluation.  Patient does have history of atrial fibrillation previously anticoagulated with Coumadin, however this is currently held due to patient's issues with hematuria.    Patient remains deconditioned from his baseline level of function and due to slow progression in his status, decision was made to pursue further treatment on continue care long-term acute care unit.  Patient was accepted today and will be transferred in stable condition.    Condition on Discharge: Stable    Physical Exam on Discharge:  /68 (BP Location: Left arm, Patient Position: Lying)   Pulse 67   Temp 97.9 °F (36.6 °C) (Axillary)   Resp 18   Ht 193 cm (76\")   Wt 96.5 kg (212 lb 12.8 oz) Comment: WITH BED EXTENDER   SpO2 99%   BMI 25.90 kg/m²   Physical Exam    Vitals signs and nursing note reviewed.   Constitutional:       General: He is not in acute distress.     Appearance: Normal appearance.   HENT:      Head: Normocephalic and atraumatic.      Right Ear: External ear normal.      Left Ear: External ear normal.      Nose: Nose normal.      Mouth/Throat:      Pharynx: Oropharynx is clear.   Eyes:      General:         Right eye: No discharge.         Left eye: No discharge.      Conjunctiva/sclera: Conjunctivae normal.      Comments: Legally blind   Neck:      Musculoskeletal: Normal range of motion and neck supple.   Cardiovascular:      Rate and Rhythm: Normal rate and regular rhythm.      Heart sounds: Normal heart sounds. No murmur. No " friction rub. No gallop.    Pulmonary:      Effort: No respiratory distress.      Breath sounds: Normal breath sounds. No stridor. No wheezing or rales.   Abdominal:      General: Bowel sounds are normal. There is no distension.      Palpations: Abdomen is soft.      Tenderness: There is no abdominal tenderness.   Genitourinary:     Comments: Three-way Moore catheter with continuous bladder irrigation.    Pale pink, clear urine noted in drainage bag.  Musculoskeletal: Normal range of motion.         General: No swelling.   Skin:     General: Skin is warm and dry.   Neurological:      General: No focal deficit present.      Mental Status: He is alert. Mental status is at baseline.   Psychiatric:         Mood and Affect: Mood normal.         Behavior: Behavior normal.     Discharge Disposition:  Long Term Care (DC - External)    Discharge Medications:     Discharge Medications      New Medications      Instructions Start Date   bisacodyl 5 MG EC tablet  Commonly known as: DULCOLAX   5 mg, Oral, Daily PRN      magic butt ointment   1 each, Topical, 2 Times Daily      ondansetron 4 MG/2ML injection  Commonly known as: ZOFRAN   4 mg, Intravenous, Every 6 Hours PRN      opium-belladonna 16.2-30 MG suppository  Commonly known as: B&O SUPPRETTES   30 mg, Rectal, Every 8 Hours PRN         Changes to Medications      Instructions Start Date   acetaminophen 325 MG tablet  Commonly known as: TYLENOL  What changed:   · medication strength  · how much to take  · when to take this  · reasons to take this   650 mg, Oral, Every 6 Hours PRN      amiodarone 200 MG tablet  Commonly known as: PACERONE  What changed: when to take this   200 mg, Oral, Every 24 Hours Scheduled   Start Date: October 14, 2020     hydrALAZINE 25 MG tablet  Commonly known as: APRESOLINE  What changed: how much to take   75 mg, Oral, Every 8 Hours Scheduled      ipratropium-albuterol 0.5-2.5 mg/3 ml nebulizer  Commonly known as: DUO-NEB  What changed:   · how  to take this  · when to take this  · reasons to take this   3 mL, Nebulization, Every 4 Hours PRN         Continue These Medications      Instructions Start Date   atorvastatin 40 MG tablet  Commonly known as: LIPITOR   40 mg, Oral, Nightly      brimonidine 0.1 % solution ophthalmic solution  Commonly known as: ALPHAGAN P   1 drop      brinzolamide 1 % ophthalmic suspension  Commonly known as: AZOPT   1 drop      budesonide 0.5 MG/2ML nebulizer solution  Commonly known as: PULMICORT   0.5 mg, Inhalation      carvedilol 6.25 MG tablet  Commonly known as: COREG   6.25 mg, Oral, 2 Times Daily With Meals      insulin aspart 100 UNIT/ML injection  Commonly known as: novoLOG   0-7 Units, Subcutaneous, 3 Times Daily Before Meals      insulin detemir 100 UNIT/ML injection  Commonly known as: Levemir   10 Units, Subcutaneous, Every 12 Hours Scheduled      ipratropium 0.02 % nebulizer solution  Commonly known as: ATROVENT   0.5 mg, Nebulization, Every 4 Hours PRN      isosorbide mononitrate 30 MG 24 hr tablet  Commonly known as: IMDUR   30 mg, Oral, Every 24 Hours Scheduled      lansoprazole 30 MG capsule  Commonly known as: PREVACID   30 mg, Oral      latanoprost 0.005 % ophthalmic solution  Commonly known as: XALATAN   1 drop      Transfer Bench misc   Transfer bench r/t dementia, deconditioning.      Tub Transfer Board misc   Transfer tub bench r/t deconditioning, dementia, and blind r/t glaucoma         Stop These Medications    albuterol sulfate  (90 Base) MCG/ACT inhaler  Commonly known as: PROVENTIL HFA;VENTOLIN HFA;PROAIR HFA     aspirin 81 MG EC tablet     benzonatate 200 MG capsule  Commonly known as: TESSALON     warfarin 10 MG tablet  Commonly known as: COUMADIN     warfarin 7.5 MG tablet  Commonly known as: COUMADIN            Discharge Diet:   Diet Instructions     Diet: Nothing By Mouth      Discharge Diet: Nothing By Mouth    NPO per urology for now          Activity at Discharge:   Activity  Instructions     Activity as Tolerated            Discharge Care Plan/Instructions: Discharged to continue care LTAC    Follow-up Appointments:    Contact information for follow-up providers     Wendie Quiñonez APRN Follow up.    Specialty: Nurse Practitioner                 Contact information for after-discharge care     Destination     [facility] .    Service: Long Term Acute Care  Contact information:  [address] [place] [id number]  [phone]                             Test Results Pending at Discharge:           This document has been electronically signed by THIERNO Waldrop on October 13, 2020 14:54 CDT        Time: 30 minutes spent on assessment, discussion, management, and discharge planning for this patient.                Electronically signed by Manohar Mary MD at 10/13/20 1808       Discharge Order (From admission, onward)     Start     Ordered    10/13/20 1131  Discharge patient  Once     Expected Discharge Date: 10/13/20    Discharge Disposition: Long Term Care (DC - External)    Physician of Record for Attribution - Please select from Treatment Team: MANOHAR MARY [[id number]]    Review needed by CMO to determine Physician of Record: No    Please choose which facility the patient is currently admitted if they are being discharged to another facility or unit.: [facility]       Question Answer Comment   Physician of Record for Attribution - Please select from Treatment Team MANOHAR MARY    Review needed by CMO to determine Physician of Record No    Please choose which facility the patient is currently admitted if they are being discharged to another facility or unit. [facility]        10/13/20 1134

## 2021-01-01 ENCOUNTER — READMISSION MANAGEMENT (OUTPATIENT)
Dept: CALL CENTER | Facility: HOSPITAL | Age: 81
End: 2021-01-01

## 2021-01-01 ENCOUNTER — ANESTHESIA EVENT (OUTPATIENT)
Dept: PERIOP | Facility: HOSPITAL | Age: 81
End: 2021-01-01

## 2021-01-01 ENCOUNTER — HOSPITAL ENCOUNTER (OUTPATIENT)
Facility: HOSPITAL | Age: 81
Discharge: HOME | End: 2021-02-25
Attending: INTERNAL MEDICINE | Admitting: INTERNAL MEDICINE

## 2021-01-01 ENCOUNTER — HOSPITAL ENCOUNTER (INPATIENT)
Facility: HOSPITAL | Age: 81
LOS: 4 days | Discharge: NURSING, REHAB OR LONG-TERM CARE | End: 2021-02-09
Attending: EMERGENCY MEDICINE | Admitting: UROLOGY

## 2021-01-01 ENCOUNTER — APPOINTMENT (OUTPATIENT)
Dept: ULTRASOUND IMAGING | Facility: HOSPITAL | Age: 81
End: 2021-01-01

## 2021-01-01 ENCOUNTER — APPOINTMENT (OUTPATIENT)
Dept: GENERAL RADIOLOGY | Facility: HOSPITAL | Age: 81
End: 2021-01-01

## 2021-01-01 ENCOUNTER — APPOINTMENT (OUTPATIENT)
Dept: CT IMAGING | Facility: HOSPITAL | Age: 81
End: 2021-01-01

## 2021-01-01 ENCOUNTER — APPOINTMENT (OUTPATIENT)
Dept: INTERVENTIONAL RADIOLOGY/VASCULAR | Facility: HOSPITAL | Age: 81
End: 2021-01-01

## 2021-01-01 ENCOUNTER — ANESTHESIA (OUTPATIENT)
Dept: PERIOP | Facility: HOSPITAL | Age: 81
End: 2021-01-01

## 2021-01-01 ENCOUNTER — APPOINTMENT (OUTPATIENT)
Dept: CARDIOLOGY | Facility: HOSPITAL | Age: 81
End: 2021-01-01

## 2021-01-01 ENCOUNTER — HOSPITAL ENCOUNTER (INPATIENT)
Facility: HOSPITAL | Age: 81
LOS: 6 days | Discharge: HOME HEALTH | End: 2021-04-22
Attending: EMERGENCY MEDICINE | Admitting: INTERNAL MEDICINE

## 2021-01-01 ENCOUNTER — HOSPITAL ENCOUNTER (INPATIENT)
Facility: HOSPITAL | Age: 81
LOS: 3 days | Discharge: HOME HEALTH | End: 2021-05-02
Attending: STUDENT IN AN ORGANIZED HEALTH CARE EDUCATION/TRAINING PROGRAM | Admitting: FAMILY MEDICINE

## 2021-01-01 ENCOUNTER — HOSPITAL ENCOUNTER (INPATIENT)
Facility: HOSPITAL | Age: 81
LOS: 7 days | End: 2021-05-27
Attending: PHYSICIAN ASSISTANT | Admitting: HOSPITALIST

## 2021-01-01 VITALS
BODY MASS INDEX: 28.81 KG/M2 | WEIGHT: 212.7 LBS | OXYGEN SATURATION: 96 % | RESPIRATION RATE: 18 BRPM | HEIGHT: 72 IN | HEART RATE: 78 BPM | SYSTOLIC BLOOD PRESSURE: 163 MMHG | TEMPERATURE: 98.7 F | DIASTOLIC BLOOD PRESSURE: 89 MMHG

## 2021-01-01 VITALS
SYSTOLIC BLOOD PRESSURE: 130 MMHG | WEIGHT: 202.1 LBS | BODY MASS INDEX: 24.61 KG/M2 | TEMPERATURE: 97.4 F | DIASTOLIC BLOOD PRESSURE: 70 MMHG | HEIGHT: 76 IN | HEART RATE: 63 BPM | RESPIRATION RATE: 18 BRPM | OXYGEN SATURATION: 94 %

## 2021-01-01 VITALS
HEIGHT: 75 IN | TEMPERATURE: 96.4 F | SYSTOLIC BLOOD PRESSURE: 79 MMHG | BODY MASS INDEX: 24.28 KG/M2 | WEIGHT: 195.25 LBS | RESPIRATION RATE: 16 BRPM | DIASTOLIC BLOOD PRESSURE: 46 MMHG

## 2021-01-01 VITALS
HEART RATE: 63 BPM | DIASTOLIC BLOOD PRESSURE: 91 MMHG | OXYGEN SATURATION: 95 % | RESPIRATION RATE: 18 BRPM | HEIGHT: 76 IN | BODY MASS INDEX: 25.46 KG/M2 | WEIGHT: 209.1 LBS | TEMPERATURE: 97.4 F | SYSTOLIC BLOOD PRESSURE: 164 MMHG

## 2021-01-01 VITALS — HEART RATE: 100 BPM

## 2021-01-01 DIAGNOSIS — A41.9 SEPSIS, DUE TO UNSPECIFIED ORGANISM, UNSPECIFIED WHETHER ACUTE ORGAN DYSFUNCTION PRESENT (HCC): Primary | ICD-10-CM

## 2021-01-01 DIAGNOSIS — Z74.09 IMPAIRED FUNCTIONAL MOBILITY, BALANCE, GAIT, AND ENDURANCE: ICD-10-CM

## 2021-01-01 DIAGNOSIS — K62.89 PROCTITIS: ICD-10-CM

## 2021-01-01 DIAGNOSIS — R79.89 POSITIVE D DIMER: ICD-10-CM

## 2021-01-01 DIAGNOSIS — J90 PLEURAL EFFUSION, BILATERAL: ICD-10-CM

## 2021-01-01 DIAGNOSIS — I48.20 ATRIAL FIBRILLATION, CHRONIC (HCC): ICD-10-CM

## 2021-01-01 DIAGNOSIS — I21.4 NSTEMI (NON-ST ELEVATED MYOCARDIAL INFARCTION) (HCC): ICD-10-CM

## 2021-01-01 DIAGNOSIS — J96.01 ACUTE RESPIRATORY FAILURE WITH HYPOXIA (HCC): ICD-10-CM

## 2021-01-01 DIAGNOSIS — F03.90 DEMENTIA WITHOUT BEHAVIORAL DISTURBANCE, UNSPECIFIED DEMENTIA TYPE: ICD-10-CM

## 2021-01-01 DIAGNOSIS — T45.511A POISONING BY WARFARIN SODIUM, ACCIDENTAL OR UNINTENTIONAL, INITIAL ENCOUNTER: ICD-10-CM

## 2021-01-01 DIAGNOSIS — N39.0 URINARY TRACT INFECTION DUE TO EXTENDED-SPECTRUM BETA LACTAMASE (ESBL) PRODUCING ESCHERICHIA COLI: ICD-10-CM

## 2021-01-01 DIAGNOSIS — I16.0 HYPERTENSIVE URGENCY: ICD-10-CM

## 2021-01-01 DIAGNOSIS — N13.9 URINARY OBSTRUCTION: Primary | ICD-10-CM

## 2021-01-01 DIAGNOSIS — N17.9 AKI (ACUTE KIDNEY INJURY) (HCC): ICD-10-CM

## 2021-01-01 DIAGNOSIS — Z74.09 IMPAIRED MOBILITY AND ACTIVITIES OF DAILY LIVING: ICD-10-CM

## 2021-01-01 DIAGNOSIS — R13.11 ORAL PHASE DYSPHAGIA: ICD-10-CM

## 2021-01-01 DIAGNOSIS — I48.92 ATRIAL FLUTTER, UNSPECIFIED TYPE (HCC): ICD-10-CM

## 2021-01-01 DIAGNOSIS — R13.12 OROPHARYNGEAL DYSPHAGIA: ICD-10-CM

## 2021-01-01 DIAGNOSIS — J90 PLEURAL EFFUSION ON LEFT: ICD-10-CM

## 2021-01-01 DIAGNOSIS — R79.89 ELEVATED BRAIN NATRIURETIC PEPTIDE (BNP) LEVEL: ICD-10-CM

## 2021-01-01 DIAGNOSIS — Z16.12 URINARY TRACT INFECTION DUE TO EXTENDED-SPECTRUM BETA LACTAMASE (ESBL) PRODUCING ESCHERICHIA COLI: ICD-10-CM

## 2021-01-01 DIAGNOSIS — Z78.9 IMPAIRED MOBILITY AND ACTIVITIES OF DAILY LIVING: ICD-10-CM

## 2021-01-01 DIAGNOSIS — J44.1 COPD EXACERBATION (HCC): ICD-10-CM

## 2021-01-01 DIAGNOSIS — Z74.09 IMPAIRED MOBILITY AND ADLS: ICD-10-CM

## 2021-01-01 DIAGNOSIS — R77.8 ELEVATED TROPONIN: ICD-10-CM

## 2021-01-01 DIAGNOSIS — Z78.9 IMPAIRED MOBILITY AND ADLS: ICD-10-CM

## 2021-01-01 DIAGNOSIS — N18.9 CHRONIC KIDNEY DISEASE, UNSPECIFIED CKD STAGE: ICD-10-CM

## 2021-01-01 DIAGNOSIS — B96.29 URINARY TRACT INFECTION DUE TO EXTENDED-SPECTRUM BETA LACTAMASE (ESBL) PRODUCING ESCHERICHIA COLI: ICD-10-CM

## 2021-01-01 DIAGNOSIS — I50.23 ACUTE ON CHRONIC SYSTOLIC CHF (CONGESTIVE HEART FAILURE) (HCC): ICD-10-CM

## 2021-01-01 DIAGNOSIS — N39.0 URINARY TRACT INFECTION WITHOUT HEMATURIA, SITE UNSPECIFIED: Primary | ICD-10-CM

## 2021-01-01 DIAGNOSIS — D64.9 ANEMIA, UNSPECIFIED TYPE: ICD-10-CM

## 2021-01-01 DIAGNOSIS — Z74.09 IMPAIRED PHYSICAL MOBILITY: ICD-10-CM

## 2021-01-01 DIAGNOSIS — I50.23 ACUTE ON CHRONIC SYSTOLIC CONGESTIVE HEART FAILURE (HCC): ICD-10-CM

## 2021-01-01 DIAGNOSIS — J96.01 ACUTE RESPIRATORY FAILURE WITH HYPOXIA (HCC): Primary | ICD-10-CM

## 2021-01-01 LAB
25(OH)D3 SERPL-MCNC: 32.3 NG/ML (ref 30–100)
ABO GROUP BLD: NORMAL
ACANTHOCYTES BLD QL SMEAR: NORMAL
ALBUMIN SERPL-MCNC: 1.9 G/DL (ref 3.5–5.2)
ALBUMIN SERPL-MCNC: 2.2 G/DL (ref 3.5–5.2)
ALBUMIN SERPL-MCNC: 2.2 G/DL (ref 3.5–5.2)
ALBUMIN SERPL-MCNC: 2.3 G/DL (ref 3.5–5.2)
ALBUMIN SERPL-MCNC: 2.4 G/DL (ref 3.5–5.2)
ALBUMIN SERPL-MCNC: 2.4 G/DL (ref 3.5–5.2)
ALBUMIN SERPL-MCNC: 2.5 G/DL (ref 3.5–5.2)
ALBUMIN SERPL-MCNC: 2.6 G/DL (ref 3.5–5.2)
ALBUMIN SERPL-MCNC: 2.6 G/DL (ref 3.5–5.2)
ALBUMIN SERPL-MCNC: 2.7 G/DL (ref 3.5–5.2)
ALBUMIN SERPL-MCNC: 2.8 G/DL (ref 3.5–5.2)
ALBUMIN SERPL-MCNC: 2.8 G/DL (ref 3.5–5.2)
ALBUMIN SERPL-MCNC: 2.9 G/DL (ref 3.5–5.2)
ALBUMIN SERPL-MCNC: 2.9 G/DL (ref 3.5–5.2)
ALBUMIN SERPL-MCNC: 3.2 G/DL (ref 3.5–5.2)
ALBUMIN/GLOB SERPL: 0.5 G/DL
ALBUMIN/GLOB SERPL: 0.6 G/DL
ALBUMIN/GLOB SERPL: 0.7 G/DL
ALBUMIN/GLOB SERPL: 0.8 G/DL
ALP SERPL-CCNC: 101 U/L (ref 39–117)
ALP SERPL-CCNC: 105 U/L (ref 39–117)
ALP SERPL-CCNC: 108 U/L (ref 39–117)
ALP SERPL-CCNC: 119 U/L (ref 39–117)
ALP SERPL-CCNC: 119 U/L (ref 39–117)
ALP SERPL-CCNC: 67 U/L (ref 39–117)
ALP SERPL-CCNC: 68 U/L (ref 39–117)
ALP SERPL-CCNC: 69 U/L (ref 39–117)
ALP SERPL-CCNC: 76 U/L (ref 39–117)
ALP SERPL-CCNC: 78 U/L (ref 39–117)
ALP SERPL-CCNC: 80 U/L (ref 39–117)
ALP SERPL-CCNC: 82 U/L (ref 39–117)
ALP SERPL-CCNC: 83 U/L (ref 39–117)
ALP SERPL-CCNC: 88 U/L (ref 39–117)
ALP SERPL-CCNC: 88 U/L (ref 39–117)
ALP SERPL-CCNC: 95 U/L (ref 39–117)
ALP SERPL-CCNC: 97 U/L (ref 39–117)
ALT SERPL W P-5'-P-CCNC: 10 U/L (ref 1–41)
ALT SERPL W P-5'-P-CCNC: 13 U/L (ref 1–41)
ALT SERPL W P-5'-P-CCNC: 5 U/L (ref 1–41)
ALT SERPL W P-5'-P-CCNC: 6 U/L (ref 1–41)
ALT SERPL W P-5'-P-CCNC: 7 U/L (ref 1–41)
ALT SERPL W P-5'-P-CCNC: 8 U/L (ref 1–41)
ALT SERPL W P-5'-P-CCNC: 9 U/L (ref 1–41)
ALT SERPL W P-5'-P-CCNC: 95 U/L (ref 1–41)
ALT SERPL W P-5'-P-CCNC: <5 U/L (ref 1–41)
ANION GAP SERPL CALCULATED.3IONS-SCNC: 10 MMOL/L (ref 5–15)
ANION GAP SERPL CALCULATED.3IONS-SCNC: 11 MMOL/L (ref 5–15)
ANION GAP SERPL CALCULATED.3IONS-SCNC: 12 MMOL/L (ref 5–15)
ANION GAP SERPL CALCULATED.3IONS-SCNC: 13 MMOL/L (ref 5–15)
ANION GAP SERPL CALCULATED.3IONS-SCNC: 14 MMOL/L (ref 5–15)
ANION GAP SERPL CALCULATED.3IONS-SCNC: 15 MMOL/L (ref 5–15)
ANION GAP SERPL CALCULATED.3IONS-SCNC: 18 MMOL/L (ref 5–15)
ANION GAP SERPL CALCULATED.3IONS-SCNC: 2 MMOL/L (ref 5–15)
ANION GAP SERPL CALCULATED.3IONS-SCNC: 5 MMOL/L (ref 5–15)
ANION GAP SERPL CALCULATED.3IONS-SCNC: 5 MMOL/L (ref 5–15)
ANION GAP SERPL CALCULATED.3IONS-SCNC: 6 MMOL/L (ref 5–15)
ANION GAP SERPL CALCULATED.3IONS-SCNC: 7 MMOL/L (ref 5–15)
ANION GAP SERPL CALCULATED.3IONS-SCNC: 8 MMOL/L (ref 5–15)
ANION GAP SERPL CALCULATED.3IONS-SCNC: 9 MMOL/L (ref 5–15)
ANISOCYTOSIS BLD QL: ABNORMAL
ANISOCYTOSIS BLD QL: NORMAL
APTT PPP: 48.4 SECONDS (ref 20–40.3)
APTT PPP: 60.8 SECONDS (ref 20–40.3)
APTT PPP: 86 SECONDS (ref 20–40.3)
ARTERIAL PATENCY WRIST A: ABNORMAL
ARTERIAL PATENCY WRIST A: NORMAL
ARTERIAL PATENCY WRIST A: POSITIVE
AST SERPL-CCNC: 11 U/L (ref 1–40)
AST SERPL-CCNC: 13 U/L (ref 1–40)
AST SERPL-CCNC: 135 U/L (ref 1–40)
AST SERPL-CCNC: 14 U/L (ref 1–40)
AST SERPL-CCNC: 15 U/L (ref 1–40)
AST SERPL-CCNC: 16 U/L (ref 1–40)
AST SERPL-CCNC: 16 U/L (ref 1–40)
AST SERPL-CCNC: 18 U/L (ref 1–40)
AST SERPL-CCNC: 19 U/L (ref 1–40)
AST SERPL-CCNC: 22 U/L (ref 1–40)
AST SERPL-CCNC: 23 U/L (ref 1–40)
AST SERPL-CCNC: 28 U/L (ref 1–40)
ATMOSPHERIC PRESS: 746 MMHG
ATMOSPHERIC PRESS: 750 MMHG
ATMOSPHERIC PRESS: 753 MMHG
ATMOSPHERIC PRESS: 754 MMHG
ATMOSPHERIC PRESS: 755 MMHG
ATMOSPHERIC PRESS: 756 MMHG
BACTERIA BLD CULT: ABNORMAL
BACTERIA SPEC AEROBE CULT: ABNORMAL
BACTERIA SPEC AEROBE CULT: NO GROWTH
BACTERIA SPEC AEROBE CULT: NO GROWTH
BACTERIA SPEC AEROBE CULT: NORMAL
BACTERIA SPEC RESP CULT: NORMAL
BACTERIA UR QL AUTO: ABNORMAL /HPF
BASE EXCESS BLDA CALC-SCNC: 0.7 MMOL/L (ref 0–2)
BASE EXCESS BLDA CALC-SCNC: 1.7 MMOL/L (ref 0–2)
BASE EXCESS BLDA CALC-SCNC: 1.8 MMOL/L (ref 0–2)
BASE EXCESS BLDA CALC-SCNC: 2.2 MMOL/L (ref 0–2)
BASE EXCESS BLDA CALC-SCNC: 2.3 MMOL/L (ref 0–2)
BASE EXCESS BLDA CALC-SCNC: 2.3 MMOL/L (ref 0–2)
BASOPHILS # BLD AUTO: 0.01 10*3/MM3 (ref 0–0.2)
BASOPHILS # BLD AUTO: 0.02 10*3/MM3 (ref 0–0.2)
BASOPHILS # BLD AUTO: 0.03 10*3/MM3 (ref 0–0.2)
BASOPHILS # BLD AUTO: 0.03 10*3/MM3 (ref 0–0.2)
BASOPHILS # BLD AUTO: 0.04 10*3/MM3 (ref 0–0.2)
BASOPHILS # BLD AUTO: 0.06 10*3/MM3 (ref 0–0.2)
BASOPHILS # BLD AUTO: 0.11 10*3/MM3 (ref 0–0.2)
BASOPHILS NFR BLD AUTO: 0.1 % (ref 0–1.5)
BASOPHILS NFR BLD AUTO: 0.2 % (ref 0–1.5)
BASOPHILS NFR BLD AUTO: 0.3 % (ref 0–1.5)
BASOPHILS NFR BLD AUTO: 0.4 % (ref 0–1.5)
BASOPHILS NFR BLD AUTO: 0.5 % (ref 0–1.5)
BASOPHILS NFR BLD AUTO: 0.8 % (ref 0–1.5)
BDY SITE: ABNORMAL
BDY SITE: NORMAL
BH BB BLOOD EXPIRATION DATE: NORMAL
BH BB BLOOD TYPE BARCODE: 1700
BH BB BLOOD TYPE BARCODE: 1700
BH BB BLOOD TYPE BARCODE: 7300
BH BB BLOOD TYPE BARCODE: 7300
BH BB BLOOD TYPE BARCODE: NORMAL
BH BB DISPENSE STATUS: NORMAL
BH BB PRODUCT CODE: NORMAL
BH BB UNIT NUMBER: NORMAL
BH CV ECHO MEAS - ACS: 1.6 CM
BH CV ECHO MEAS - AI DEC SLOPE: 121 CM/SEC^2
BH CV ECHO MEAS - AI MAX PG: 49.8 MMHG
BH CV ECHO MEAS - AI MAX VEL: 353 CM/SEC
BH CV ECHO MEAS - AI P1/2T: 854.5 MSEC
BH CV ECHO MEAS - AO MAX PG (FULL): 5.5 MMHG
BH CV ECHO MEAS - AO MAX PG: 12.7 MMHG
BH CV ECHO MEAS - AO MEAN PG (FULL): 3 MMHG
BH CV ECHO MEAS - AO MEAN PG: 6 MMHG
BH CV ECHO MEAS - AO ROOT AREA (BSA CORRECTED): 1.5
BH CV ECHO MEAS - AO ROOT AREA: 8.6 CM^2
BH CV ECHO MEAS - AO ROOT DIAM: 3.3 CM
BH CV ECHO MEAS - AO V2 MAX: 178 CM/SEC
BH CV ECHO MEAS - AO V2 MEAN: 116 CM/SEC
BH CV ECHO MEAS - AO V2 VTI: 32.6 CM
BH CV ECHO MEAS - ASC AORTA: 3.7 CM
BH CV ECHO MEAS - AVA(I,A): 4.1 CM^2
BH CV ECHO MEAS - AVA(I,D): 4.1 CM^2
BH CV ECHO MEAS - AVA(V,A): 4.6 CM^2
BH CV ECHO MEAS - AVA(V,D): 4.6 CM^2
BH CV ECHO MEAS - BSA(HAYCOCK): 2.2 M^2
BH CV ECHO MEAS - BSA: 2.2 M^2
BH CV ECHO MEAS - BZI_BMI: 28.8 KILOGRAMS/M^2
BH CV ECHO MEAS - BZI_METRIC_HEIGHT: 182.9 CM
BH CV ECHO MEAS - BZI_METRIC_WEIGHT: 96.2 KG
BH CV ECHO MEAS - EDV(CUBED): 298.1 ML
BH CV ECHO MEAS - EDV(MOD-SP2): 163 ML
BH CV ECHO MEAS - EDV(MOD-SP4): 185 ML
BH CV ECHO MEAS - EDV(TEICH): 229.8 ML
BH CV ECHO MEAS - EF(CUBED): 31.1 %
BH CV ECHO MEAS - EF(MOD-SP2): 22.1 %
BH CV ECHO MEAS - EF(MOD-SP4): 41.6 %
BH CV ECHO MEAS - EF(TEICH): 24.6 %
BH CV ECHO MEAS - ESV(CUBED): 205.4 ML
BH CV ECHO MEAS - ESV(MOD-SP2): 127 ML
BH CV ECHO MEAS - ESV(MOD-SP4): 108 ML
BH CV ECHO MEAS - ESV(TEICH): 173.2 ML
BH CV ECHO MEAS - FS: 11.7 %
BH CV ECHO MEAS - IVS/LVPW: 1
BH CV ECHO MEAS - IVSD: 1.3 CM
BH CV ECHO MEAS - LA DIMENSION: 4.6 CM
BH CV ECHO MEAS - LA/AO: 1.4
BH CV ECHO MEAS - LV DIASTOLIC VOL/BSA (35-75): 84.7 ML/M^2
BH CV ECHO MEAS - LV MASS(C)D: 428.3 GRAMS
BH CV ECHO MEAS - LV MASS(C)DI: 196.1 GRAMS/M^2
BH CV ECHO MEAS - LV MAX PG: 7.2 MMHG
BH CV ECHO MEAS - LV MEAN PG: 3 MMHG
BH CV ECHO MEAS - LV SYSTOLIC VOL/BSA (12-30): 49.5 ML/M^2
BH CV ECHO MEAS - LV V1 MAX: 134 CM/SEC
BH CV ECHO MEAS - LV V1 MEAN: 67.4 CM/SEC
BH CV ECHO MEAS - LV V1 VTI: 21.8 CM
BH CV ECHO MEAS - LVIDD: 6.7 CM
BH CV ECHO MEAS - LVIDS: 5.9 CM
BH CV ECHO MEAS - LVLD AP2: 9.7 CM
BH CV ECHO MEAS - LVLD AP4: 9.5 CM
BH CV ECHO MEAS - LVLS AP2: 9.9 CM
BH CV ECHO MEAS - LVLS AP4: 8.5 CM
BH CV ECHO MEAS - LVOT AREA (M): 6.2 CM^2
BH CV ECHO MEAS - LVOT AREA: 6.2 CM^2
BH CV ECHO MEAS - LVOT DIAM: 2.8 CM
BH CV ECHO MEAS - LVPWD: 1.3 CM
BH CV ECHO MEAS - MR MAX PG: 78.9 MMHG
BH CV ECHO MEAS - MR MAX VEL: 444 CM/SEC
BH CV ECHO MEAS - MV A MAX VEL: 31.7 CM/SEC
BH CV ECHO MEAS - MV DEC SLOPE: 540 CM/SEC^2
BH CV ECHO MEAS - MV E MAX VEL: 69 CM/SEC
BH CV ECHO MEAS - MV E/A: 2.2
BH CV ECHO MEAS - MV P1/2T MAX VEL: 91.2 CM/SEC
BH CV ECHO MEAS - MV P1/2T: 49.5 MSEC
BH CV ECHO MEAS - MVA P1/2T LCG: 2.4 CM^2
BH CV ECHO MEAS - MVA(P1/2T): 4.4 CM^2
BH CV ECHO MEAS - PA MAX PG (FULL): 0.81 MMHG
BH CV ECHO MEAS - PA MAX PG: 3.6 MMHG
BH CV ECHO MEAS - PA V2 MAX: 95 CM/SEC
BH CV ECHO MEAS - RV MAX PG: 2.8 MMHG
BH CV ECHO MEAS - RV MEAN PG: 2 MMHG
BH CV ECHO MEAS - RV V1 MAX: 83.6 CM/SEC
BH CV ECHO MEAS - RV V1 MEAN: 57.7 CM/SEC
BH CV ECHO MEAS - RV V1 VTI: 17.9 CM
BH CV ECHO MEAS - RVDD: 3.1 CM
BH CV ECHO MEAS - SI(AO): 127.7 ML/M^2
BH CV ECHO MEAS - SI(CUBED): 42.4 ML/M^2
BH CV ECHO MEAS - SI(LVOT): 61.5 ML/M^2
BH CV ECHO MEAS - SI(MOD-SP2): 16.5 ML/M^2
BH CV ECHO MEAS - SI(MOD-SP4): 35.3 ML/M^2
BH CV ECHO MEAS - SI(TEICH): 25.9 ML/M^2
BH CV ECHO MEAS - SV(AO): 278.8 ML
BH CV ECHO MEAS - SV(CUBED): 92.7 ML
BH CV ECHO MEAS - SV(LVOT): 134.2 ML
BH CV ECHO MEAS - SV(MOD-SP2): 36 ML
BH CV ECHO MEAS - SV(MOD-SP4): 77 ML
BH CV ECHO MEAS - SV(TEICH): 56.6 ML
BH CV ECHO MEAS - TR MAX VEL: 339 CM/SEC
BILIRUB SERPL-MCNC: 0.3 MG/DL (ref 0–1.2)
BILIRUB SERPL-MCNC: 0.4 MG/DL (ref 0–1.2)
BILIRUB SERPL-MCNC: 0.5 MG/DL (ref 0–1.2)
BILIRUB SERPL-MCNC: 0.6 MG/DL (ref 0–1.2)
BILIRUB UR QL STRIP: ABNORMAL
BILIRUB UR QL STRIP: ABNORMAL
BILIRUB UR QL STRIP: NEGATIVE
BLD GP AB SCN SERPL QL: NEGATIVE
BUN SERPL-MCNC: 106 MG/DL (ref 8–23)
BUN SERPL-MCNC: 106 MG/DL (ref 8–23)
BUN SERPL-MCNC: 110 MG/DL (ref 8–23)
BUN SERPL-MCNC: 111 MG/DL (ref 8–23)
BUN SERPL-MCNC: 42 MG/DL (ref 8–23)
BUN SERPL-MCNC: 43 MG/DL (ref 8–23)
BUN SERPL-MCNC: 44 MG/DL (ref 8–23)
BUN SERPL-MCNC: 45 MG/DL (ref 8–23)
BUN SERPL-MCNC: 48 MG/DL (ref 8–23)
BUN SERPL-MCNC: 49 MG/DL (ref 8–23)
BUN SERPL-MCNC: 51 MG/DL (ref 8–23)
BUN SERPL-MCNC: 52 MG/DL (ref 8–23)
BUN SERPL-MCNC: 52 MG/DL (ref 8–23)
BUN SERPL-MCNC: 53 MG/DL (ref 8–23)
BUN SERPL-MCNC: 54 MG/DL (ref 8–23)
BUN SERPL-MCNC: 55 MG/DL (ref 8–23)
BUN SERPL-MCNC: 56 MG/DL (ref 8–23)
BUN SERPL-MCNC: 57 MG/DL (ref 8–23)
BUN SERPL-MCNC: 59 MG/DL (ref 8–23)
BUN SERPL-MCNC: 61 MG/DL (ref 8–23)
BUN SERPL-MCNC: 62 MG/DL (ref 8–23)
BUN SERPL-MCNC: 67 MG/DL (ref 8–23)
BUN SERPL-MCNC: 68 MG/DL (ref 8–23)
BUN SERPL-MCNC: 68 MG/DL (ref 8–23)
BUN SERPL-MCNC: 69 MG/DL (ref 8–23)
BUN SERPL-MCNC: 70 MG/DL (ref 8–23)
BUN SERPL-MCNC: 71 MG/DL (ref 8–23)
BUN SERPL-MCNC: 81 MG/DL (ref 8–23)
BUN SERPL-MCNC: 95 MG/DL (ref 8–23)
BUN/CREAT SERPL: 16.5 (ref 7–25)
BUN/CREAT SERPL: 17.7 (ref 7–25)
BUN/CREAT SERPL: 18.3 (ref 7–25)
BUN/CREAT SERPL: 18.8 (ref 7–25)
BUN/CREAT SERPL: 19.3 (ref 7–25)
BUN/CREAT SERPL: 19.4 (ref 7–25)
BUN/CREAT SERPL: 19.9 (ref 7–25)
BUN/CREAT SERPL: 20.1 (ref 7–25)
BUN/CREAT SERPL: 20.2 (ref 7–25)
BUN/CREAT SERPL: 20.6 (ref 7–25)
BUN/CREAT SERPL: 20.8 (ref 7–25)
BUN/CREAT SERPL: 20.8 (ref 7–25)
BUN/CREAT SERPL: 21.1 (ref 7–25)
BUN/CREAT SERPL: 21.2 (ref 7–25)
BUN/CREAT SERPL: 21.3 (ref 7–25)
BUN/CREAT SERPL: 21.7 (ref 7–25)
BUN/CREAT SERPL: 21.9 (ref 7–25)
BUN/CREAT SERPL: 22.2 (ref 7–25)
BUN/CREAT SERPL: 22.5 (ref 7–25)
BUN/CREAT SERPL: 23 (ref 7–25)
BUN/CREAT SERPL: 23.4 (ref 7–25)
BUN/CREAT SERPL: 23.4 (ref 7–25)
BUN/CREAT SERPL: 23.6 (ref 7–25)
BUN/CREAT SERPL: 24.1 (ref 7–25)
BUN/CREAT SERPL: 24.4 (ref 7–25)
BUN/CREAT SERPL: 25.7 (ref 7–25)
BUN/CREAT SERPL: 26.2 (ref 7–25)
BUN/CREAT SERPL: 27.4 (ref 7–25)
BUN/CREAT SERPL: 27.8 (ref 7–25)
C3 FRG RBC-MCNC: NORMAL
C3 FRG RBC-MCNC: NORMAL
CALCIUM SPEC-SCNC: 7.7 MG/DL (ref 8.6–10.5)
CALCIUM SPEC-SCNC: 7.7 MG/DL (ref 8.6–10.5)
CALCIUM SPEC-SCNC: 7.8 MG/DL (ref 8.6–10.5)
CALCIUM SPEC-SCNC: 7.9 MG/DL (ref 8.6–10.5)
CALCIUM SPEC-SCNC: 8 MG/DL (ref 8.6–10.5)
CALCIUM SPEC-SCNC: 8.1 MG/DL (ref 8.6–10.5)
CALCIUM SPEC-SCNC: 8.2 MG/DL (ref 8.6–10.5)
CALCIUM SPEC-SCNC: 8.3 MG/DL (ref 8.6–10.5)
CALCIUM SPEC-SCNC: 8.4 MG/DL (ref 8.6–10.5)
CALCIUM SPEC-SCNC: 8.5 MG/DL (ref 8.6–10.5)
CALCIUM SPEC-SCNC: 8.6 MG/DL (ref 8.6–10.5)
CALCIUM SPEC-SCNC: 8.6 MG/DL (ref 8.6–10.5)
CALCIUM SPEC-SCNC: 8.7 MG/DL (ref 8.6–10.5)
CHLORIDE SERPL-SCNC: 101 MMOL/L (ref 98–107)
CHLORIDE SERPL-SCNC: 101 MMOL/L (ref 98–107)
CHLORIDE SERPL-SCNC: 102 MMOL/L (ref 98–107)
CHLORIDE SERPL-SCNC: 103 MMOL/L (ref 98–107)
CHLORIDE SERPL-SCNC: 104 MMOL/L (ref 98–107)
CHLORIDE SERPL-SCNC: 105 MMOL/L (ref 98–107)
CHLORIDE SERPL-SCNC: 105 MMOL/L (ref 98–107)
CHLORIDE SERPL-SCNC: 107 MMOL/L (ref 98–107)
CHLORIDE SERPL-SCNC: 108 MMOL/L (ref 98–107)
CHLORIDE SERPL-SCNC: 109 MMOL/L (ref 98–107)
CHLORIDE SERPL-SCNC: 110 MMOL/L (ref 98–107)
CHLORIDE SERPL-SCNC: 111 MMOL/L (ref 98–107)
CHLORIDE SERPL-SCNC: 114 MMOL/L (ref 98–107)
CHLORIDE SERPL-SCNC: 114 MMOL/L (ref 98–107)
CHLORIDE SERPL-SCNC: 115 MMOL/L (ref 98–107)
CHLORIDE SERPL-SCNC: 115 MMOL/L (ref 98–107)
CLARITY UR: ABNORMAL
CO2 SERPL-SCNC: 20 MMOL/L (ref 22–29)
CO2 SERPL-SCNC: 21 MMOL/L (ref 22–29)
CO2 SERPL-SCNC: 21 MMOL/L (ref 22–29)
CO2 SERPL-SCNC: 22 MMOL/L (ref 22–29)
CO2 SERPL-SCNC: 23 MMOL/L (ref 22–29)
CO2 SERPL-SCNC: 24 MMOL/L (ref 22–29)
CO2 SERPL-SCNC: 25 MMOL/L (ref 22–29)
CO2 SERPL-SCNC: 26 MMOL/L (ref 22–29)
CO2 SERPL-SCNC: 27 MMOL/L (ref 22–29)
CO2 SERPL-SCNC: 28 MMOL/L (ref 22–29)
CO2 SERPL-SCNC: 31 MMOL/L (ref 22–29)
COLOR UR: ABNORMAL
COLOR UR: YELLOW
COLOR UR: YELLOW
CRE SCREEN PCR: NOT DETECTED
CRE SCREEN PCR: NOT DETECTED
CREAT SERPL-MCNC: 2.29 MG/DL (ref 0.76–1.27)
CREAT SERPL-MCNC: 2.31 MG/DL (ref 0.76–1.27)
CREAT SERPL-MCNC: 2.35 MG/DL (ref 0.76–1.27)
CREAT SERPL-MCNC: 2.37 MG/DL (ref 0.76–1.27)
CREAT SERPL-MCNC: 2.39 MG/DL (ref 0.76–1.27)
CREAT SERPL-MCNC: 2.46 MG/DL (ref 0.76–1.27)
CREAT SERPL-MCNC: 2.47 MG/DL (ref 0.76–1.27)
CREAT SERPL-MCNC: 2.48 MG/DL (ref 0.76–1.27)
CREAT SERPL-MCNC: 2.53 MG/DL (ref 0.76–1.27)
CREAT SERPL-MCNC: 2.59 MG/DL (ref 0.76–1.27)
CREAT SERPL-MCNC: 2.61 MG/DL (ref 0.76–1.27)
CREAT SERPL-MCNC: 2.61 MG/DL (ref 0.76–1.27)
CREAT SERPL-MCNC: 2.63 MG/DL (ref 0.76–1.27)
CREAT SERPL-MCNC: 2.66 MG/DL (ref 0.76–1.27)
CREAT SERPL-MCNC: 2.67 MG/DL (ref 0.76–1.27)
CREAT SERPL-MCNC: 2.69 MG/DL (ref 0.76–1.27)
CREAT SERPL-MCNC: 2.72 MG/DL (ref 0.76–1.27)
CREAT SERPL-MCNC: 2.84 MG/DL (ref 0.76–1.27)
CREAT SERPL-MCNC: 2.84 MG/DL (ref 0.76–1.27)
CREAT SERPL-MCNC: 2.91 MG/DL (ref 0.76–1.27)
CREAT SERPL-MCNC: 2.99 MG/DL (ref 0.76–1.27)
CREAT SERPL-MCNC: 3.15 MG/DL (ref 0.76–1.27)
CREAT SERPL-MCNC: 3.2 MG/DL (ref 0.76–1.27)
CREAT SERPL-MCNC: 3.27 MG/DL (ref 0.76–1.27)
CREAT SERPL-MCNC: 3.62 MG/DL (ref 0.76–1.27)
CREAT SERPL-MCNC: 4.35 MG/DL (ref 0.76–1.27)
CREAT SERPL-MCNC: 4.74 MG/DL (ref 0.76–1.27)
CREAT SERPL-MCNC: 4.89 MG/DL (ref 0.76–1.27)
CREAT SERPL-MCNC: 4.89 MG/DL (ref 0.76–1.27)
CROSSMATCH INTERPRETATION: NORMAL
D-DIMER, QUANTITATIVE (MAD,POW, STR): 1071 NG/ML (FEU) (ref 0–470)
D-DIMER, QUANTITATIVE (MAD,POW, STR): >4000 NG/ML (FEU) (ref 0–470)
D-LACTATE SERPL-SCNC: 1.2 MMOL/L (ref 0.5–2)
D-LACTATE SERPL-SCNC: 1.2 MMOL/L (ref 0.5–2)
D-LACTATE SERPL-SCNC: 1.5 MMOL/L (ref 0.5–2)
D-LACTATE SERPL-SCNC: 2.1 MMOL/L (ref 0.5–2)
D-LACTATE SERPL-SCNC: 2.2 MMOL/L (ref 0.5–2)
DEPRECATED RDW RBC AUTO: 43.8 FL (ref 37–54)
DEPRECATED RDW RBC AUTO: 44.3 FL (ref 37–54)
DEPRECATED RDW RBC AUTO: 44.5 FL (ref 37–54)
DEPRECATED RDW RBC AUTO: 44.7 FL (ref 37–54)
DEPRECATED RDW RBC AUTO: 44.7 FL (ref 37–54)
DEPRECATED RDW RBC AUTO: 44.8 FL (ref 37–54)
DEPRECATED RDW RBC AUTO: 45 FL (ref 37–54)
DEPRECATED RDW RBC AUTO: 45 FL (ref 37–54)
DEPRECATED RDW RBC AUTO: 45.4 FL (ref 37–54)
DEPRECATED RDW RBC AUTO: 45.8 FL (ref 37–54)
DEPRECATED RDW RBC AUTO: 46.3 FL (ref 37–54)
DEPRECATED RDW RBC AUTO: 46.9 FL (ref 37–54)
DEPRECATED RDW RBC AUTO: 47.5 FL (ref 37–54)
DEPRECATED RDW RBC AUTO: 47.6 FL (ref 37–54)
DEPRECATED RDW RBC AUTO: 47.6 FL (ref 37–54)
DEPRECATED RDW RBC AUTO: 47.9 FL (ref 37–54)
DEPRECATED RDW RBC AUTO: 48 FL (ref 37–54)
DEPRECATED RDW RBC AUTO: 48.5 FL (ref 37–54)
DEPRECATED RDW RBC AUTO: 48.5 FL (ref 37–54)
DEPRECATED RDW RBC AUTO: 48.9 FL (ref 37–54)
DEPRECATED RDW RBC AUTO: 48.9 FL (ref 37–54)
DEPRECATED RDW RBC AUTO: 49.1 FL (ref 37–54)
DEPRECATED RDW RBC AUTO: 49.2 FL (ref 37–54)
DEPRECATED RDW RBC AUTO: 49.2 FL (ref 37–54)
DEPRECATED RDW RBC AUTO: 50.3 FL (ref 37–54)
EOSINOPHIL # BLD AUTO: 0 10*3/MM3 (ref 0–0.4)
EOSINOPHIL # BLD AUTO: 0.02 10*3/MM3 (ref 0–0.4)
EOSINOPHIL # BLD AUTO: 0.04 10*3/MM3 (ref 0–0.4)
EOSINOPHIL # BLD AUTO: 0.06 10*3/MM3 (ref 0–0.4)
EOSINOPHIL # BLD AUTO: 0.07 10*3/MM3 (ref 0–0.4)
EOSINOPHIL # BLD AUTO: 0.07 10*3/MM3 (ref 0–0.4)
EOSINOPHIL # BLD AUTO: 0.09 10*3/MM3 (ref 0–0.4)
EOSINOPHIL # BLD AUTO: 0.09 10*3/MM3 (ref 0–0.4)
EOSINOPHIL # BLD AUTO: 0.1 10*3/MM3 (ref 0–0.4)
EOSINOPHIL # BLD AUTO: 0.18 10*3/MM3 (ref 0–0.4)
EOSINOPHIL # BLD AUTO: 0.23 10*3/MM3 (ref 0–0.4)
EOSINOPHIL NFR BLD AUTO: 0 % (ref 0.3–6.2)
EOSINOPHIL NFR BLD AUTO: 0.1 % (ref 0.3–6.2)
EOSINOPHIL NFR BLD AUTO: 0.3 % (ref 0.3–6.2)
EOSINOPHIL NFR BLD AUTO: 0.3 % (ref 0.3–6.2)
EOSINOPHIL NFR BLD AUTO: 0.4 % (ref 0.3–6.2)
EOSINOPHIL NFR BLD AUTO: 0.4 % (ref 0.3–6.2)
EOSINOPHIL NFR BLD AUTO: 0.6 % (ref 0.3–6.2)
EOSINOPHIL NFR BLD AUTO: 0.6 % (ref 0.3–6.2)
EOSINOPHIL NFR BLD AUTO: 0.7 % (ref 0.3–6.2)
EOSINOPHIL NFR BLD AUTO: 1 % (ref 0.3–6.2)
EOSINOPHIL NFR BLD AUTO: 1.1 % (ref 0.3–6.2)
EOSINOPHIL NFR BLD AUTO: 1.1 % (ref 0.3–6.2)
EOSINOPHIL NFR BLD AUTO: 1.2 % (ref 0.3–6.2)
EOSINOPHIL NFR BLD AUTO: 1.4 % (ref 0.3–6.2)
EOSINOPHIL NFR BLD AUTO: 1.5 % (ref 0.3–6.2)
EOSINOPHIL NFR BLD AUTO: 2 % (ref 0.3–6.2)
EOSINOPHIL NFR BLD AUTO: 2.1 % (ref 0.3–6.2)
EOSINOPHIL NFR BLD AUTO: 2.1 % (ref 0.3–6.2)
EOSINOPHIL NFR BLD AUTO: 2.2 % (ref 0.3–6.2)
EOSINOPHIL NFR BLD AUTO: 2.4 % (ref 0.3–6.2)
EOSINOPHIL NFR BLD AUTO: 3.9 % (ref 0.3–6.2)
ERYTHROCYTE [DISTWIDTH] IN BLOOD BY AUTOMATED COUNT: 15.5 % (ref 12.3–15.4)
ERYTHROCYTE [DISTWIDTH] IN BLOOD BY AUTOMATED COUNT: 15.5 % (ref 12.3–15.4)
ERYTHROCYTE [DISTWIDTH] IN BLOOD BY AUTOMATED COUNT: 15.6 % (ref 12.3–15.4)
ERYTHROCYTE [DISTWIDTH] IN BLOOD BY AUTOMATED COUNT: 15.6 % (ref 12.3–15.4)
ERYTHROCYTE [DISTWIDTH] IN BLOOD BY AUTOMATED COUNT: 15.8 % (ref 12.3–15.4)
ERYTHROCYTE [DISTWIDTH] IN BLOOD BY AUTOMATED COUNT: 15.9 % (ref 12.3–15.4)
ERYTHROCYTE [DISTWIDTH] IN BLOOD BY AUTOMATED COUNT: 16 % (ref 12.3–15.4)
ERYTHROCYTE [DISTWIDTH] IN BLOOD BY AUTOMATED COUNT: 16.3 % (ref 12.3–15.4)
ERYTHROCYTE [DISTWIDTH] IN BLOOD BY AUTOMATED COUNT: 16.3 % (ref 12.3–15.4)
ERYTHROCYTE [DISTWIDTH] IN BLOOD BY AUTOMATED COUNT: 16.4 % (ref 12.3–15.4)
ERYTHROCYTE [DISTWIDTH] IN BLOOD BY AUTOMATED COUNT: 16.4 % (ref 12.3–15.4)
ERYTHROCYTE [DISTWIDTH] IN BLOOD BY AUTOMATED COUNT: 16.6 % (ref 12.3–15.4)
ERYTHROCYTE [DISTWIDTH] IN BLOOD BY AUTOMATED COUNT: 16.8 % (ref 12.3–15.4)
ERYTHROCYTE [DISTWIDTH] IN BLOOD BY AUTOMATED COUNT: 17.2 % (ref 12.3–15.4)
ERYTHROCYTE [DISTWIDTH] IN BLOOD BY AUTOMATED COUNT: 17.6 % (ref 12.3–15.4)
ERYTHROCYTE [DISTWIDTH] IN BLOOD BY AUTOMATED COUNT: 17.7 % (ref 12.3–15.4)
ERYTHROCYTE [DISTWIDTH] IN BLOOD BY AUTOMATED COUNT: 17.9 % (ref 12.3–15.4)
ERYTHROCYTE [DISTWIDTH] IN BLOOD BY AUTOMATED COUNT: 18.1 % (ref 12.3–15.4)
ERYTHROCYTE [DISTWIDTH] IN BLOOD BY AUTOMATED COUNT: 18.5 % (ref 12.3–15.4)
ERYTHROCYTE [DISTWIDTH] IN BLOOD BY AUTOMATED COUNT: 18.6 % (ref 12.3–15.4)
ERYTHROCYTE [DISTWIDTH] IN BLOOD BY AUTOMATED COUNT: 18.7 % (ref 12.3–15.4)
ERYTHROCYTE [DISTWIDTH] IN BLOOD BY AUTOMATED COUNT: 18.7 % (ref 12.3–15.4)
ERYTHROCYTE [DISTWIDTH] IN BLOOD BY AUTOMATED COUNT: 19 % (ref 12.3–15.4)
ERYTHROCYTE [DISTWIDTH] IN BLOOD BY AUTOMATED COUNT: 19.3 % (ref 12.3–15.4)
ERYTHROCYTE [DISTWIDTH] IN BLOOD BY AUTOMATED COUNT: 19.5 % (ref 12.3–15.4)
FLUAV RNA RESP QL NAA+PROBE: NOT DETECTED
FLUBV RNA RESP QL NAA+PROBE: NOT DETECTED
GAS FLOW AIRWAY: 6 LPM
GFR SERPL CREATININE-BSD FRML MDRD: 14 ML/MIN/1.73
GFR SERPL CREATININE-BSD FRML MDRD: 14 ML/MIN/1.73
GFR SERPL CREATININE-BSD FRML MDRD: 15 ML/MIN/1.73
GFR SERPL CREATININE-BSD FRML MDRD: 16 ML/MIN/1.73
GFR SERPL CREATININE-BSD FRML MDRD: 20 ML/MIN/1.73
GFR SERPL CREATININE-BSD FRML MDRD: 22 ML/MIN/1.73
GFR SERPL CREATININE-BSD FRML MDRD: 23 ML/MIN/1.73
GFR SERPL CREATININE-BSD FRML MDRD: 23 ML/MIN/1.73
GFR SERPL CREATININE-BSD FRML MDRD: 25 ML/MIN/1.73
GFR SERPL CREATININE-BSD FRML MDRD: 25 ML/MIN/1.73
GFR SERPL CREATININE-BSD FRML MDRD: 26 ML/MIN/1.73
GFR SERPL CREATININE-BSD FRML MDRD: 26 ML/MIN/1.73
GFR SERPL CREATININE-BSD FRML MDRD: 27 ML/MIN/1.73
GFR SERPL CREATININE-BSD FRML MDRD: 28 ML/MIN/1.73
GFR SERPL CREATININE-BSD FRML MDRD: 29 ML/MIN/1.73
GFR SERPL CREATININE-BSD FRML MDRD: 30 ML/MIN/1.73
GFR SERPL CREATININE-BSD FRML MDRD: 31 ML/MIN/1.73
GFR SERPL CREATININE-BSD FRML MDRD: 32 ML/MIN/1.73
GFR SERPL CREATININE-BSD FRML MDRD: 32 ML/MIN/1.73
GFR SERPL CREATININE-BSD FRML MDRD: 33 ML/MIN/1.73
GFR SERPL CREATININE-BSD FRML MDRD: 33 ML/MIN/1.73
GFR SERPL CREATININE-BSD FRML MDRD: 34 ML/MIN/1.73
GFR SERPL CREATININE-BSD FRML MDRD: ABNORMAL ML/MIN/{1.73_M2}
GFR SERPL CREATININE-BSD FRML MDRD: ABNORMAL ML/MIN/{1.73_M2}
GIANT PLATELETS: NORMAL
GLOBULIN UR ELPH-MCNC: 3.6 GM/DL
GLOBULIN UR ELPH-MCNC: 3.7 GM/DL
GLOBULIN UR ELPH-MCNC: 3.7 GM/DL
GLOBULIN UR ELPH-MCNC: 3.8 GM/DL
GLOBULIN UR ELPH-MCNC: 3.9 GM/DL
GLOBULIN UR ELPH-MCNC: 3.9 GM/DL
GLOBULIN UR ELPH-MCNC: 4 GM/DL
GLOBULIN UR ELPH-MCNC: 4.1 GM/DL
GLOBULIN UR ELPH-MCNC: 4.2 GM/DL
GLOBULIN UR ELPH-MCNC: 4.4 GM/DL
GLOBULIN UR ELPH-MCNC: 4.5 GM/DL
GLOBULIN UR ELPH-MCNC: 4.8 GM/DL
GLUCOSE BLDC GLUCOMTR-MCNC: 100 MG/DL (ref 70–130)
GLUCOSE BLDC GLUCOMTR-MCNC: 101 MG/DL (ref 70–130)
GLUCOSE BLDC GLUCOMTR-MCNC: 102 MG/DL (ref 70–130)
GLUCOSE BLDC GLUCOMTR-MCNC: 103 MG/DL (ref 70–130)
GLUCOSE BLDC GLUCOMTR-MCNC: 103 MG/DL (ref 70–130)
GLUCOSE BLDC GLUCOMTR-MCNC: 104 MG/DL (ref 70–130)
GLUCOSE BLDC GLUCOMTR-MCNC: 106 MG/DL (ref 70–130)
GLUCOSE BLDC GLUCOMTR-MCNC: 106 MG/DL (ref 70–130)
GLUCOSE BLDC GLUCOMTR-MCNC: 108 MG/DL (ref 70–130)
GLUCOSE BLDC GLUCOMTR-MCNC: 109 MG/DL (ref 70–130)
GLUCOSE BLDC GLUCOMTR-MCNC: 109 MG/DL (ref 70–130)
GLUCOSE BLDC GLUCOMTR-MCNC: 110 MG/DL (ref 70–130)
GLUCOSE BLDC GLUCOMTR-MCNC: 111 MG/DL (ref 70–130)
GLUCOSE BLDC GLUCOMTR-MCNC: 111 MG/DL (ref 70–130)
GLUCOSE BLDC GLUCOMTR-MCNC: 112 MG/DL (ref 70–130)
GLUCOSE BLDC GLUCOMTR-MCNC: 113 MG/DL (ref 70–130)
GLUCOSE BLDC GLUCOMTR-MCNC: 114 MG/DL (ref 70–130)
GLUCOSE BLDC GLUCOMTR-MCNC: 115 MG/DL (ref 70–130)
GLUCOSE BLDC GLUCOMTR-MCNC: 118 MG/DL (ref 70–130)
GLUCOSE BLDC GLUCOMTR-MCNC: 118 MG/DL (ref 70–130)
GLUCOSE BLDC GLUCOMTR-MCNC: 119 MG/DL (ref 70–130)
GLUCOSE BLDC GLUCOMTR-MCNC: 120 MG/DL (ref 70–130)
GLUCOSE BLDC GLUCOMTR-MCNC: 120 MG/DL (ref 70–130)
GLUCOSE BLDC GLUCOMTR-MCNC: 123 MG/DL (ref 70–130)
GLUCOSE BLDC GLUCOMTR-MCNC: 125 MG/DL (ref 70–130)
GLUCOSE BLDC GLUCOMTR-MCNC: 125 MG/DL (ref 70–130)
GLUCOSE BLDC GLUCOMTR-MCNC: 126 MG/DL (ref 70–130)
GLUCOSE BLDC GLUCOMTR-MCNC: 127 MG/DL (ref 70–130)
GLUCOSE BLDC GLUCOMTR-MCNC: 128 MG/DL (ref 70–130)
GLUCOSE BLDC GLUCOMTR-MCNC: 128 MG/DL (ref 70–130)
GLUCOSE BLDC GLUCOMTR-MCNC: 131 MG/DL (ref 70–130)
GLUCOSE BLDC GLUCOMTR-MCNC: 132 MG/DL (ref 70–130)
GLUCOSE BLDC GLUCOMTR-MCNC: 132 MG/DL (ref 70–130)
GLUCOSE BLDC GLUCOMTR-MCNC: 133 MG/DL (ref 70–130)
GLUCOSE BLDC GLUCOMTR-MCNC: 134 MG/DL (ref 70–130)
GLUCOSE BLDC GLUCOMTR-MCNC: 135 MG/DL (ref 70–130)
GLUCOSE BLDC GLUCOMTR-MCNC: 135 MG/DL (ref 70–130)
GLUCOSE BLDC GLUCOMTR-MCNC: 136 MG/DL (ref 70–130)
GLUCOSE BLDC GLUCOMTR-MCNC: 137 MG/DL (ref 70–130)
GLUCOSE BLDC GLUCOMTR-MCNC: 139 MG/DL (ref 70–130)
GLUCOSE BLDC GLUCOMTR-MCNC: 141 MG/DL (ref 70–130)
GLUCOSE BLDC GLUCOMTR-MCNC: 143 MG/DL (ref 70–130)
GLUCOSE BLDC GLUCOMTR-MCNC: 144 MG/DL (ref 70–130)
GLUCOSE BLDC GLUCOMTR-MCNC: 146 MG/DL (ref 70–130)
GLUCOSE BLDC GLUCOMTR-MCNC: 146 MG/DL (ref 70–130)
GLUCOSE BLDC GLUCOMTR-MCNC: 147 MG/DL (ref 70–130)
GLUCOSE BLDC GLUCOMTR-MCNC: 147 MG/DL (ref 70–130)
GLUCOSE BLDC GLUCOMTR-MCNC: 148 MG/DL (ref 70–130)
GLUCOSE BLDC GLUCOMTR-MCNC: 150 MG/DL (ref 70–130)
GLUCOSE BLDC GLUCOMTR-MCNC: 152 MG/DL (ref 70–130)
GLUCOSE BLDC GLUCOMTR-MCNC: 153 MG/DL (ref 70–130)
GLUCOSE BLDC GLUCOMTR-MCNC: 160 MG/DL (ref 70–130)
GLUCOSE BLDC GLUCOMTR-MCNC: 165 MG/DL (ref 70–130)
GLUCOSE BLDC GLUCOMTR-MCNC: 178 MG/DL (ref 70–130)
GLUCOSE BLDC GLUCOMTR-MCNC: 184 MG/DL (ref 70–130)
GLUCOSE BLDC GLUCOMTR-MCNC: 187 MG/DL (ref 70–130)
GLUCOSE BLDC GLUCOMTR-MCNC: 58 MG/DL (ref 70–130)
GLUCOSE BLDC GLUCOMTR-MCNC: 58 MG/DL (ref 70–130)
GLUCOSE BLDC GLUCOMTR-MCNC: 59 MG/DL (ref 70–130)
GLUCOSE BLDC GLUCOMTR-MCNC: 60 MG/DL (ref 70–130)
GLUCOSE BLDC GLUCOMTR-MCNC: 60 MG/DL (ref 70–130)
GLUCOSE BLDC GLUCOMTR-MCNC: 61 MG/DL (ref 70–130)
GLUCOSE BLDC GLUCOMTR-MCNC: 61 MG/DL (ref 70–130)
GLUCOSE BLDC GLUCOMTR-MCNC: 62 MG/DL (ref 70–130)
GLUCOSE BLDC GLUCOMTR-MCNC: 64 MG/DL (ref 70–130)
GLUCOSE BLDC GLUCOMTR-MCNC: 65 MG/DL (ref 70–130)
GLUCOSE BLDC GLUCOMTR-MCNC: 66 MG/DL (ref 70–130)
GLUCOSE BLDC GLUCOMTR-MCNC: 67 MG/DL (ref 70–130)
GLUCOSE BLDC GLUCOMTR-MCNC: 68 MG/DL (ref 70–130)
GLUCOSE BLDC GLUCOMTR-MCNC: 69 MG/DL (ref 70–130)
GLUCOSE BLDC GLUCOMTR-MCNC: 69 MG/DL (ref 70–130)
GLUCOSE BLDC GLUCOMTR-MCNC: 71 MG/DL (ref 70–130)
GLUCOSE BLDC GLUCOMTR-MCNC: 72 MG/DL (ref 70–130)
GLUCOSE BLDC GLUCOMTR-MCNC: 72 MG/DL (ref 70–130)
GLUCOSE BLDC GLUCOMTR-MCNC: 73 MG/DL (ref 70–130)
GLUCOSE BLDC GLUCOMTR-MCNC: 75 MG/DL (ref 70–130)
GLUCOSE BLDC GLUCOMTR-MCNC: 76 MG/DL (ref 70–130)
GLUCOSE BLDC GLUCOMTR-MCNC: 76 MG/DL (ref 70–130)
GLUCOSE BLDC GLUCOMTR-MCNC: 77 MG/DL (ref 70–130)
GLUCOSE BLDC GLUCOMTR-MCNC: 78 MG/DL (ref 70–130)
GLUCOSE BLDC GLUCOMTR-MCNC: 79 MG/DL (ref 70–130)
GLUCOSE BLDC GLUCOMTR-MCNC: 79 MG/DL (ref 70–130)
GLUCOSE BLDC GLUCOMTR-MCNC: 80 MG/DL (ref 70–130)
GLUCOSE BLDC GLUCOMTR-MCNC: 81 MG/DL (ref 70–130)
GLUCOSE BLDC GLUCOMTR-MCNC: 82 MG/DL (ref 70–130)
GLUCOSE BLDC GLUCOMTR-MCNC: 83 MG/DL (ref 70–130)
GLUCOSE BLDC GLUCOMTR-MCNC: 83 MG/DL (ref 70–130)
GLUCOSE BLDC GLUCOMTR-MCNC: 84 MG/DL (ref 70–130)
GLUCOSE BLDC GLUCOMTR-MCNC: 85 MG/DL (ref 70–130)
GLUCOSE BLDC GLUCOMTR-MCNC: 85 MG/DL (ref 70–130)
GLUCOSE BLDC GLUCOMTR-MCNC: 86 MG/DL (ref 70–130)
GLUCOSE BLDC GLUCOMTR-MCNC: 87 MG/DL (ref 70–130)
GLUCOSE BLDC GLUCOMTR-MCNC: 88 MG/DL (ref 70–130)
GLUCOSE BLDC GLUCOMTR-MCNC: 89 MG/DL (ref 70–130)
GLUCOSE BLDC GLUCOMTR-MCNC: 90 MG/DL (ref 70–130)
GLUCOSE BLDC GLUCOMTR-MCNC: 91 MG/DL (ref 70–130)
GLUCOSE BLDC GLUCOMTR-MCNC: 91 MG/DL (ref 70–130)
GLUCOSE BLDC GLUCOMTR-MCNC: 92 MG/DL (ref 70–130)
GLUCOSE BLDC GLUCOMTR-MCNC: 94 MG/DL (ref 70–130)
GLUCOSE BLDC GLUCOMTR-MCNC: 94 MG/DL (ref 70–130)
GLUCOSE BLDC GLUCOMTR-MCNC: 96 MG/DL (ref 70–130)
GLUCOSE BLDC GLUCOMTR-MCNC: 96 MG/DL (ref 70–130)
GLUCOSE BLDC GLUCOMTR-MCNC: 97 MG/DL (ref 70–130)
GLUCOSE BLDC GLUCOMTR-MCNC: 98 MG/DL (ref 70–130)
GLUCOSE BLDC GLUCOMTR-MCNC: 99 MG/DL (ref 70–130)
GLUCOSE SERPL-MCNC: 102 MG/DL (ref 65–99)
GLUCOSE SERPL-MCNC: 114 MG/DL (ref 65–99)
GLUCOSE SERPL-MCNC: 115 MG/DL (ref 65–99)
GLUCOSE SERPL-MCNC: 119 MG/DL (ref 65–99)
GLUCOSE SERPL-MCNC: 125 MG/DL (ref 65–99)
GLUCOSE SERPL-MCNC: 126 MG/DL (ref 65–99)
GLUCOSE SERPL-MCNC: 132 MG/DL (ref 65–99)
GLUCOSE SERPL-MCNC: 137 MG/DL (ref 65–99)
GLUCOSE SERPL-MCNC: 158 MG/DL (ref 65–99)
GLUCOSE SERPL-MCNC: 61 MG/DL (ref 65–99)
GLUCOSE SERPL-MCNC: 66 MG/DL (ref 65–99)
GLUCOSE SERPL-MCNC: 74 MG/DL (ref 65–99)
GLUCOSE SERPL-MCNC: 77 MG/DL (ref 65–99)
GLUCOSE SERPL-MCNC: 78 MG/DL (ref 65–99)
GLUCOSE SERPL-MCNC: 80 MG/DL (ref 65–99)
GLUCOSE SERPL-MCNC: 84 MG/DL (ref 65–99)
GLUCOSE SERPL-MCNC: 85 MG/DL (ref 65–99)
GLUCOSE SERPL-MCNC: 86 MG/DL (ref 65–99)
GLUCOSE SERPL-MCNC: 87 MG/DL (ref 65–99)
GLUCOSE SERPL-MCNC: 88 MG/DL (ref 65–99)
GLUCOSE SERPL-MCNC: 89 MG/DL (ref 65–99)
GLUCOSE SERPL-MCNC: 90 MG/DL (ref 65–99)
GLUCOSE SERPL-MCNC: 92 MG/DL (ref 65–99)
GLUCOSE SERPL-MCNC: 94 MG/DL (ref 65–99)
GLUCOSE SERPL-MCNC: 98 MG/DL (ref 65–99)
GLUCOSE UR STRIP-MCNC: ABNORMAL MG/DL
GLUCOSE UR STRIP-MCNC: NEGATIVE MG/DL
GLUCOSE UR STRIP-MCNC: NEGATIVE MG/DL
GRAM STN SPEC: ABNORMAL
GRAM STN SPEC: NORMAL
GRAN CASTS URNS QL MICRO: ABNORMAL /LPF
HBA1C MFR BLD: 5.3 % (ref 4.8–5.6)
HCO3 BLDA-SCNC: 25.2 MMOL/L (ref 20–26)
HCO3 BLDA-SCNC: 25.2 MMOL/L (ref 20–26)
HCO3 BLDA-SCNC: 25.7 MMOL/L (ref 20–26)
HCO3 BLDA-SCNC: 26.3 MMOL/L (ref 20–26)
HCO3 BLDA-SCNC: 26.8 MMOL/L (ref 20–26)
HCO3 BLDA-SCNC: 27.7 MMOL/L (ref 20–26)
HCT VFR BLD AUTO: 20.2 % (ref 37.5–51)
HCT VFR BLD AUTO: 22 % (ref 37.5–51)
HCT VFR BLD AUTO: 23.6 % (ref 37.5–51)
HCT VFR BLD AUTO: 24.1 % (ref 37.5–51)
HCT VFR BLD AUTO: 24.3 % (ref 37.5–51)
HCT VFR BLD AUTO: 24.5 % (ref 37.5–51)
HCT VFR BLD AUTO: 24.5 % (ref 37.5–51)
HCT VFR BLD AUTO: 25.1 % (ref 37.5–51)
HCT VFR BLD AUTO: 25.4 % (ref 37.5–51)
HCT VFR BLD AUTO: 25.4 % (ref 37.5–51)
HCT VFR BLD AUTO: 25.8 % (ref 37.5–51)
HCT VFR BLD AUTO: 26.3 % (ref 37.5–51)
HCT VFR BLD AUTO: 26.7 % (ref 37.5–51)
HCT VFR BLD AUTO: 27 % (ref 37.5–51)
HCT VFR BLD AUTO: 27.1 % (ref 37.5–51)
HCT VFR BLD AUTO: 27.2 % (ref 37.5–51)
HCT VFR BLD AUTO: 27.2 % (ref 37.5–51)
HCT VFR BLD AUTO: 27.5 % (ref 37.5–51)
HCT VFR BLD AUTO: 27.6 % (ref 37.5–51)
HCT VFR BLD AUTO: 28.1 % (ref 37.5–51)
HCT VFR BLD AUTO: 28.2 % (ref 37.5–51)
HCT VFR BLD AUTO: 28.4 % (ref 37.5–51)
HCT VFR BLD AUTO: 29 % (ref 37.5–51)
HCT VFR BLD AUTO: 29.6 % (ref 37.5–51)
HCT VFR BLD AUTO: 31.4 % (ref 37.5–51)
HCT VFR BLD AUTO: 32.3 % (ref 37.5–51)
HCT VFR BLD AUTO: 32.5 % (ref 37.5–51)
HCT VFR BLD AUTO: 33 % (ref 37.5–51)
HELMET CELLS: NORMAL
HGB BLD-MCNC: 10 G/DL (ref 13–17.7)
HGB BLD-MCNC: 10.2 G/DL (ref 13–17.7)
HGB BLD-MCNC: 10.3 G/DL (ref 13–17.7)
HGB BLD-MCNC: 10.7 G/DL (ref 13–17.7)
HGB BLD-MCNC: 10.8 G/DL (ref 13–17.7)
HGB BLD-MCNC: 10.9 G/DL (ref 13–17.7)
HGB BLD-MCNC: 7 G/DL (ref 13–17.7)
HGB BLD-MCNC: 7.1 G/DL (ref 13–17.7)
HGB BLD-MCNC: 8 G/DL (ref 13–17.7)
HGB BLD-MCNC: 8.1 G/DL (ref 13–17.7)
HGB BLD-MCNC: 8.2 G/DL (ref 13–17.7)
HGB BLD-MCNC: 8.4 G/DL (ref 13–17.7)
HGB BLD-MCNC: 8.4 G/DL (ref 13–17.7)
HGB BLD-MCNC: 8.5 G/DL (ref 13–17.7)
HGB BLD-MCNC: 8.6 G/DL (ref 13–17.7)
HGB BLD-MCNC: 8.8 G/DL (ref 13–17.7)
HGB BLD-MCNC: 9.2 G/DL (ref 13–17.7)
HGB BLD-MCNC: 9.2 G/DL (ref 13–17.7)
HGB BLD-MCNC: 9.3 G/DL (ref 13–17.7)
HGB BLD-MCNC: 9.3 G/DL (ref 13–17.7)
HGB BLD-MCNC: 9.4 G/DL (ref 13–17.7)
HGB BLD-MCNC: 9.5 G/DL (ref 13–17.7)
HGB BLD-MCNC: 9.6 G/DL (ref 13–17.7)
HGB BLD-MCNC: 9.9 G/DL (ref 13–17.7)
HGB UR QL STRIP.AUTO: ABNORMAL
HGB UR QL STRIP.AUTO: ABNORMAL
HGB UR QL STRIP.AUTO: NEGATIVE
HOLD SPECIMEN: NORMAL
HYALINE CASTS UR QL AUTO: ABNORMAL /LPF
HYPOCHROMIA BLD QL: ABNORMAL
HYPOCHROMIA BLD QL: NORMAL
IMM GRANULOCYTES # BLD AUTO: 0.01 10*3/MM3 (ref 0–0.05)
IMM GRANULOCYTES # BLD AUTO: 0.01 10*3/MM3 (ref 0–0.05)
IMM GRANULOCYTES # BLD AUTO: 0.02 10*3/MM3 (ref 0–0.05)
IMM GRANULOCYTES # BLD AUTO: 0.03 10*3/MM3 (ref 0–0.05)
IMM GRANULOCYTES # BLD AUTO: 0.03 10*3/MM3 (ref 0–0.05)
IMM GRANULOCYTES # BLD AUTO: 0.04 10*3/MM3 (ref 0–0.05)
IMM GRANULOCYTES # BLD AUTO: 0.05 10*3/MM3 (ref 0–0.05)
IMM GRANULOCYTES # BLD AUTO: 0.06 10*3/MM3 (ref 0–0.05)
IMM GRANULOCYTES # BLD AUTO: 0.06 10*3/MM3 (ref 0–0.05)
IMM GRANULOCYTES # BLD AUTO: 0.07 10*3/MM3 (ref 0–0.05)
IMM GRANULOCYTES # BLD AUTO: 0.16 10*3/MM3 (ref 0–0.05)
IMM GRANULOCYTES # BLD AUTO: 2.17 10*3/MM3 (ref 0–0.05)
IMM GRANULOCYTES # BLD AUTO: 2.66 10*3/MM3 (ref 0–0.05)
IMM GRANULOCYTES NFR BLD AUTO: 0.3 % (ref 0–0.5)
IMM GRANULOCYTES NFR BLD AUTO: 0.3 % (ref 0–0.5)
IMM GRANULOCYTES NFR BLD AUTO: 0.4 % (ref 0–0.5)
IMM GRANULOCYTES NFR BLD AUTO: 0.5 % (ref 0–0.5)
IMM GRANULOCYTES NFR BLD AUTO: 0.5 % (ref 0–0.5)
IMM GRANULOCYTES NFR BLD AUTO: 0.6 % (ref 0–0.5)
IMM GRANULOCYTES NFR BLD AUTO: 0.7 % (ref 0–0.5)
IMM GRANULOCYTES NFR BLD AUTO: 0.8 % (ref 0–0.5)
IMM GRANULOCYTES NFR BLD AUTO: 0.9 % (ref 0–0.5)
IMM GRANULOCYTES NFR BLD AUTO: 1 % (ref 0–0.5)
IMM GRANULOCYTES NFR BLD AUTO: 1.1 % (ref 0–0.5)
IMM GRANULOCYTES NFR BLD AUTO: 1.3 % (ref 0–0.5)
IMM GRANULOCYTES NFR BLD AUTO: 1.5 % (ref 0–0.5)
IMM GRANULOCYTES NFR BLD AUTO: 1.8 % (ref 0–0.5)
IMM GRANULOCYTES NFR BLD AUTO: 12.1 % (ref 0–0.5)
IMM GRANULOCYTES NFR BLD AUTO: 17.9 % (ref 0–0.5)
IMP STRAIN: NOT DETECTED
IMP STRAIN: NOT DETECTED
INHALED O2 CONCENTRATION: 100 %
INHALED O2 CONCENTRATION: 30 %
INR PPP: 1.4 (ref 0.8–1.2)
INR PPP: 1.47 (ref 0.8–1.2)
INR PPP: 1.54 (ref 0.8–1.2)
INR PPP: 1.56 (ref 0.8–1.2)
INR PPP: 1.58 (ref 0.8–1.2)
INR PPP: 1.59 (ref 0.8–1.2)
INR PPP: 1.64 (ref 0.8–1.2)
INR PPP: 1.64 (ref 0.8–1.2)
INR PPP: 1.67 (ref 0.8–1.2)
INR PPP: 1.69 (ref 0.8–1.2)
INR PPP: 1.73 (ref 0.8–1.2)
INR PPP: 1.74 (ref 0.8–1.2)
INR PPP: 1.79 (ref 0.8–1.2)
INR PPP: 1.8 (ref 0.8–1.2)
INR PPP: 13.24 (ref 0.8–1.2)
INR PPP: 2.07 (ref 0.8–1.2)
INR PPP: 2.29 (ref 0.8–1.2)
INR PPP: 2.35 (ref 0.8–1.2)
INR PPP: 2.43 (ref 0.8–1.2)
INR PPP: 2.43 (ref 0.8–1.2)
INR PPP: 2.48 (ref 0.8–1.2)
INR PPP: 2.54 (ref 0.8–1.2)
INR PPP: 2.59 (ref 0.8–1.2)
INR PPP: 2.76 (ref 0.8–1.2)
INR PPP: 2.79 (ref 0.8–1.2)
INR PPP: 2.92 (ref 0.8–1.2)
INR PPP: 3.01 (ref 0.8–1.2)
INR PPP: 3.04 (ref 0.8–1.2)
INR PPP: 3.1 (ref 0.8–1.2)
INR PPP: 3.5 (ref 0.8–1.2)
INR PPP: 3.58 (ref 0.8–1.2)
INR PPP: 3.67 (ref 0.8–1.2)
INR PPP: 3.76 (ref 0.8–1.2)
INR PPP: 3.78 (ref 0.8–1.2)
INR PPP: 3.84 (ref 0.8–1.2)
INR PPP: 3.87 (ref 0.8–1.2)
INR PPP: 3.99 (ref 0.8–1.2)
INR PPP: 4.05 (ref 0.8–1.2)
INR PPP: 4.12 (ref 0.8–1.2)
INR PPP: 4.14 (ref 0.8–1.2)
INR PPP: 4.29 (ref 0.8–1.2)
INR PPP: 4.33 (ref 0.8–1.2)
INR PPP: 4.35 (ref 0.8–1.2)
INR PPP: 4.43 (ref 0.8–1.2)
INR PPP: 4.44 (ref 0.8–1.2)
INR PPP: 7.06 (ref 0.8–1.2)
ISOLATED FROM: ABNORMAL
ISOLATED FROM: ABNORMAL
KETONES UR QL STRIP: ABNORMAL
KETONES UR QL STRIP: NEGATIVE
KETONES UR QL STRIP: NEGATIVE
KPC STRAIN: NOT DETECTED
KPC STRAIN: NOT DETECTED
LAB AP CASE REPORT: NORMAL
LACTATE HOLD SPECIMEN: NORMAL
LARGE PLATELETS: NORMAL
LEUKOCYTE ESTERASE UR QL STRIP.AUTO: ABNORMAL
LIPASE SERPL-CCNC: 15 U/L (ref 13–60)
LYMPHOCYTES # BLD AUTO: 0.3 10*3/MM3 (ref 0.7–3.1)
LYMPHOCYTES # BLD AUTO: 0.35 10*3/MM3 (ref 0.7–3.1)
LYMPHOCYTES # BLD AUTO: 0.42 10*3/MM3 (ref 0.7–3.1)
LYMPHOCYTES # BLD AUTO: 0.46 10*3/MM3 (ref 0.7–3.1)
LYMPHOCYTES # BLD AUTO: 0.48 10*3/MM3 (ref 0.7–3.1)
LYMPHOCYTES # BLD AUTO: 0.52 10*3/MM3 (ref 0.7–3.1)
LYMPHOCYTES # BLD AUTO: 0.52 10*3/MM3 (ref 0.7–3.1)
LYMPHOCYTES # BLD AUTO: 0.54 10*3/MM3 (ref 0.7–3.1)
LYMPHOCYTES # BLD AUTO: 0.55 10*3/MM3 (ref 0.7–3.1)
LYMPHOCYTES # BLD AUTO: 0.57 10*3/MM3 (ref 0.7–3.1)
LYMPHOCYTES # BLD AUTO: 0.63 10*3/MM3 (ref 0.7–3.1)
LYMPHOCYTES # BLD AUTO: 0.67 10*3/MM3 (ref 0.7–3.1)
LYMPHOCYTES # BLD AUTO: 0.71 10*3/MM3 (ref 0.7–3.1)
LYMPHOCYTES # BLD AUTO: 0.79 10*3/MM3 (ref 0.7–3.1)
LYMPHOCYTES # BLD AUTO: 0.88 10*3/MM3 (ref 0.7–3.1)
LYMPHOCYTES # BLD AUTO: 0.88 10*3/MM3 (ref 0.7–3.1)
LYMPHOCYTES # BLD AUTO: 0.89 10*3/MM3 (ref 0.7–3.1)
LYMPHOCYTES # BLD AUTO: 0.93 10*3/MM3 (ref 0.7–3.1)
LYMPHOCYTES # BLD AUTO: 1 10*3/MM3 (ref 0.7–3.1)
LYMPHOCYTES # BLD MANUAL: 0.54 10*3/MM3 (ref 0.7–3.1)
LYMPHOCYTES NFR BLD AUTO: 1.7 % (ref 19.6–45.3)
LYMPHOCYTES NFR BLD AUTO: 11.3 % (ref 19.6–45.3)
LYMPHOCYTES NFR BLD AUTO: 11.6 % (ref 19.6–45.3)
LYMPHOCYTES NFR BLD AUTO: 11.6 % (ref 19.6–45.3)
LYMPHOCYTES NFR BLD AUTO: 13.4 % (ref 19.6–45.3)
LYMPHOCYTES NFR BLD AUTO: 14.3 % (ref 19.6–45.3)
LYMPHOCYTES NFR BLD AUTO: 14.8 % (ref 19.6–45.3)
LYMPHOCYTES NFR BLD AUTO: 15.8 % (ref 19.6–45.3)
LYMPHOCYTES NFR BLD AUTO: 16.4 % (ref 19.6–45.3)
LYMPHOCYTES NFR BLD AUTO: 16.5 % (ref 19.6–45.3)
LYMPHOCYTES NFR BLD AUTO: 17.4 % (ref 19.6–45.3)
LYMPHOCYTES NFR BLD AUTO: 18.5 % (ref 19.6–45.3)
LYMPHOCYTES NFR BLD AUTO: 20.9 % (ref 19.6–45.3)
LYMPHOCYTES NFR BLD AUTO: 21.1 % (ref 19.6–45.3)
LYMPHOCYTES NFR BLD AUTO: 21.8 % (ref 19.6–45.3)
LYMPHOCYTES NFR BLD AUTO: 29 % (ref 19.6–45.3)
LYMPHOCYTES NFR BLD AUTO: 3.8 % (ref 19.6–45.3)
LYMPHOCYTES NFR BLD AUTO: 3.8 % (ref 19.6–45.3)
LYMPHOCYTES NFR BLD AUTO: 5.9 % (ref 19.6–45.3)
LYMPHOCYTES NFR BLD AUTO: 6.5 % (ref 19.6–45.3)
LYMPHOCYTES NFR BLD AUTO: 7.1 % (ref 19.6–45.3)
LYMPHOCYTES NFR BLD MANUAL: 3 % (ref 19.6–45.3)
LYMPHOCYTES NFR BLD MANUAL: 9 % (ref 5–12)
Lab: ABNORMAL
Lab: NORMAL
Lab: NORMAL
MAGNESIUM SERPL-MCNC: 1.8 MG/DL (ref 1.6–2.4)
MAGNESIUM SERPL-MCNC: 1.8 MG/DL (ref 1.6–2.4)
MAGNESIUM SERPL-MCNC: 1.9 MG/DL (ref 1.6–2.4)
MAGNESIUM SERPL-MCNC: 1.9 MG/DL (ref 1.6–2.4)
MAGNESIUM SERPL-MCNC: 2 MG/DL (ref 1.6–2.4)
MAXIMAL PREDICTED HEART RATE: 141 BPM
MCH RBC QN AUTO: 24.3 PG (ref 26.6–33)
MCH RBC QN AUTO: 24.5 PG (ref 26.6–33)
MCH RBC QN AUTO: 24.5 PG (ref 26.6–33)
MCH RBC QN AUTO: 24.6 PG (ref 26.6–33)
MCH RBC QN AUTO: 24.8 PG (ref 26.6–33)
MCH RBC QN AUTO: 25.2 PG (ref 26.6–33)
MCH RBC QN AUTO: 25.3 PG (ref 26.6–33)
MCH RBC QN AUTO: 25.4 PG (ref 26.6–33)
MCH RBC QN AUTO: 25.5 PG (ref 26.6–33)
MCH RBC QN AUTO: 25.7 PG (ref 26.6–33)
MCH RBC QN AUTO: 25.8 PG (ref 26.6–33)
MCH RBC QN AUTO: 26.1 PG (ref 26.6–33)
MCH RBC QN AUTO: 26.2 PG (ref 26.6–33)
MCH RBC QN AUTO: 26.4 PG (ref 26.6–33)
MCH RBC QN AUTO: 26.5 PG (ref 26.6–33)
MCH RBC QN AUTO: 26.8 PG (ref 26.6–33)
MCH RBC QN AUTO: 27 PG (ref 26.6–33)
MCH RBC QN AUTO: 27.1 PG (ref 26.6–33)
MCH RBC QN AUTO: 27.2 PG (ref 26.6–33)
MCH RBC QN AUTO: 27.5 PG (ref 26.6–33)
MCH RBC QN AUTO: 27.6 PG (ref 26.6–33)
MCH RBC QN AUTO: 27.7 PG (ref 26.6–33)
MCH RBC QN AUTO: 27.7 PG (ref 26.6–33)
MCH RBC QN AUTO: 27.8 PG (ref 26.6–33)
MCH RBC QN AUTO: 28 PG (ref 26.6–33)
MCHC RBC AUTO-ENTMCNC: 32.6 G/DL (ref 31.5–35.7)
MCHC RBC AUTO-ENTMCNC: 32.7 G/DL (ref 31.5–35.7)
MCHC RBC AUTO-ENTMCNC: 32.8 G/DL (ref 31.5–35.7)
MCHC RBC AUTO-ENTMCNC: 33 G/DL (ref 31.5–35.7)
MCHC RBC AUTO-ENTMCNC: 33.1 G/DL (ref 31.5–35.7)
MCHC RBC AUTO-ENTMCNC: 33.1 G/DL (ref 31.5–35.7)
MCHC RBC AUTO-ENTMCNC: 33.2 G/DL (ref 31.5–35.7)
MCHC RBC AUTO-ENTMCNC: 33.2 G/DL (ref 31.5–35.7)
MCHC RBC AUTO-ENTMCNC: 33.3 G/DL (ref 31.5–35.7)
MCHC RBC AUTO-ENTMCNC: 33.5 G/DL (ref 31.5–35.7)
MCHC RBC AUTO-ENTMCNC: 33.8 G/DL (ref 31.5–35.7)
MCHC RBC AUTO-ENTMCNC: 33.8 G/DL (ref 31.5–35.7)
MCHC RBC AUTO-ENTMCNC: 34.2 G/DL (ref 31.5–35.7)
MCHC RBC AUTO-ENTMCNC: 34.5 G/DL (ref 31.5–35.7)
MCHC RBC AUTO-ENTMCNC: 34.6 G/DL (ref 31.5–35.7)
MCHC RBC AUTO-ENTMCNC: 35 G/DL (ref 31.5–35.7)
MCHC RBC AUTO-ENTMCNC: 35.1 G/DL (ref 31.5–35.7)
MCHC RBC AUTO-ENTMCNC: 35.1 G/DL (ref 31.5–35.7)
MCHC RBC AUTO-ENTMCNC: 35.2 G/DL (ref 31.5–35.7)
MCHC RBC AUTO-ENTMCNC: 35.2 G/DL (ref 31.5–35.7)
MCHC RBC AUTO-ENTMCNC: 35.6 G/DL (ref 31.5–35.7)
MCHC RBC AUTO-ENTMCNC: 36.5 G/DL (ref 31.5–35.7)
MCV RBC AUTO: 69.3 FL (ref 79–97)
MCV RBC AUTO: 70.5 FL (ref 79–97)
MCV RBC AUTO: 71 FL (ref 79–97)
MCV RBC AUTO: 71.2 FL (ref 79–97)
MCV RBC AUTO: 72.8 FL (ref 79–97)
MCV RBC AUTO: 73.6 FL (ref 79–97)
MCV RBC AUTO: 74.2 FL (ref 79–97)
MCV RBC AUTO: 74.5 FL (ref 79–97)
MCV RBC AUTO: 75.1 FL (ref 79–97)
MCV RBC AUTO: 76.1 FL (ref 79–97)
MCV RBC AUTO: 76.3 FL (ref 79–97)
MCV RBC AUTO: 76.5 FL (ref 79–97)
MCV RBC AUTO: 76.8 FL (ref 79–97)
MCV RBC AUTO: 77.1 FL (ref 79–97)
MCV RBC AUTO: 77.8 FL (ref 79–97)
MCV RBC AUTO: 77.9 FL (ref 79–97)
MCV RBC AUTO: 78.4 FL (ref 79–97)
MCV RBC AUTO: 79.6 FL (ref 79–97)
MCV RBC AUTO: 79.9 FL (ref 79–97)
MCV RBC AUTO: 79.9 FL (ref 79–97)
MCV RBC AUTO: 80.2 FL (ref 79–97)
MCV RBC AUTO: 80.4 FL (ref 79–97)
MCV RBC AUTO: 80.9 FL (ref 79–97)
MCV RBC AUTO: 81.4 FL (ref 79–97)
MCV RBC AUTO: 81.9 FL (ref 79–97)
MCV RBC AUTO: 82.4 FL (ref 79–97)
MCV RBC AUTO: 83.2 FL (ref 79–97)
METAMYELOCYTES NFR BLD MANUAL: 2 % (ref 0–0)
MICROCYTES BLD QL: NORMAL
MODALITY: ABNORMAL
MODALITY: NORMAL
MONOCYTES # BLD AUTO: 0.18 10*3/MM3 (ref 0.1–0.9)
MONOCYTES # BLD AUTO: 0.2 10*3/MM3 (ref 0.1–0.9)
MONOCYTES # BLD AUTO: 0.23 10*3/MM3 (ref 0.1–0.9)
MONOCYTES # BLD AUTO: 0.24 10*3/MM3 (ref 0.1–0.9)
MONOCYTES # BLD AUTO: 0.27 10*3/MM3 (ref 0.1–0.9)
MONOCYTES # BLD AUTO: 0.27 10*3/MM3 (ref 0.1–0.9)
MONOCYTES # BLD AUTO: 0.28 10*3/MM3 (ref 0.1–0.9)
MONOCYTES # BLD AUTO: 0.31 10*3/MM3 (ref 0.1–0.9)
MONOCYTES # BLD AUTO: 0.32 10*3/MM3 (ref 0.1–0.9)
MONOCYTES # BLD AUTO: 0.32 10*3/MM3 (ref 0.1–0.9)
MONOCYTES # BLD AUTO: 0.33 10*3/MM3 (ref 0.1–0.9)
MONOCYTES # BLD AUTO: 0.38 10*3/MM3 (ref 0.1–0.9)
MONOCYTES # BLD AUTO: 0.39 10*3/MM3 (ref 0.1–0.9)
MONOCYTES # BLD AUTO: 0.42 10*3/MM3 (ref 0.1–0.9)
MONOCYTES # BLD AUTO: 0.51 10*3/MM3 (ref 0.1–0.9)
MONOCYTES # BLD AUTO: 0.53 10*3/MM3 (ref 0.1–0.9)
MONOCYTES # BLD AUTO: 0.66 10*3/MM3 (ref 0.1–0.9)
MONOCYTES # BLD AUTO: 0.72 10*3/MM3 (ref 0.1–0.9)
MONOCYTES # BLD AUTO: 0.73 10*3/MM3 (ref 0.1–0.9)
MONOCYTES # BLD AUTO: 0.77 10*3/MM3 (ref 0.1–0.9)
MONOCYTES # BLD AUTO: 0.91 10*3/MM3 (ref 0.1–0.9)
MONOCYTES # BLD AUTO: 1.62 10*3/MM3 (ref 0.1–0.9)
MONOCYTES NFR BLD AUTO: 10.1 % (ref 5–12)
MONOCYTES NFR BLD AUTO: 10.2 % (ref 5–12)
MONOCYTES NFR BLD AUTO: 10.4 % (ref 5–12)
MONOCYTES NFR BLD AUTO: 11.3 % (ref 5–12)
MONOCYTES NFR BLD AUTO: 11.4 % (ref 5–12)
MONOCYTES NFR BLD AUTO: 2.3 % (ref 5–12)
MONOCYTES NFR BLD AUTO: 3.4 % (ref 5–12)
MONOCYTES NFR BLD AUTO: 4.3 % (ref 5–12)
MONOCYTES NFR BLD AUTO: 4.3 % (ref 5–12)
MONOCYTES NFR BLD AUTO: 4.9 % (ref 5–12)
MONOCYTES NFR BLD AUTO: 5.8 % (ref 5–12)
MONOCYTES NFR BLD AUTO: 6 % (ref 5–12)
MONOCYTES NFR BLD AUTO: 6.4 % (ref 5–12)
MONOCYTES NFR BLD AUTO: 7.5 % (ref 5–12)
MONOCYTES NFR BLD AUTO: 7.5 % (ref 5–12)
MONOCYTES NFR BLD AUTO: 7.9 % (ref 5–12)
MONOCYTES NFR BLD AUTO: 8.5 % (ref 5–12)
MONOCYTES NFR BLD AUTO: 9 % (ref 5–12)
MONOCYTES NFR BLD AUTO: 9.6 % (ref 5–12)
NDM STRAIN: NOT DETECTED
NDM STRAIN: NOT DETECTED
NEUTROPHILS # BLD AUTO: 15.46 10*3/MM3 (ref 1.7–7)
NEUTROPHILS NFR BLD AUTO: 1.28 10*3/MM3 (ref 1.7–7)
NEUTROPHILS NFR BLD AUTO: 1.88 10*3/MM3 (ref 1.7–7)
NEUTROPHILS NFR BLD AUTO: 10.75 10*3/MM3 (ref 1.7–7)
NEUTROPHILS NFR BLD AUTO: 10.81 10*3/MM3 (ref 1.7–7)
NEUTROPHILS NFR BLD AUTO: 12.8 10*3/MM3 (ref 1.7–7)
NEUTROPHILS NFR BLD AUTO: 14.71 10*3/MM3 (ref 1.7–7)
NEUTROPHILS NFR BLD AUTO: 2.03 10*3/MM3 (ref 1.7–7)
NEUTROPHILS NFR BLD AUTO: 2.11 10*3/MM3 (ref 1.7–7)
NEUTROPHILS NFR BLD AUTO: 2.13 10*3/MM3 (ref 1.7–7)
NEUTROPHILS NFR BLD AUTO: 2.29 10*3/MM3 (ref 1.7–7)
NEUTROPHILS NFR BLD AUTO: 2.41 10*3/MM3 (ref 1.7–7)
NEUTROPHILS NFR BLD AUTO: 2.42 10*3/MM3 (ref 1.7–7)
NEUTROPHILS NFR BLD AUTO: 2.72 10*3/MM3 (ref 1.7–7)
NEUTROPHILS NFR BLD AUTO: 2.78 10*3/MM3 (ref 1.7–7)
NEUTROPHILS NFR BLD AUTO: 3.53 10*3/MM3 (ref 1.7–7)
NEUTROPHILS NFR BLD AUTO: 3.79 10*3/MM3 (ref 1.7–7)
NEUTROPHILS NFR BLD AUTO: 4.63 10*3/MM3 (ref 1.7–7)
NEUTROPHILS NFR BLD AUTO: 5.35 10*3/MM3 (ref 1.7–7)
NEUTROPHILS NFR BLD AUTO: 5.98 10*3/MM3 (ref 1.7–7)
NEUTROPHILS NFR BLD AUTO: 55.4 % (ref 42.7–76)
NEUTROPHILS NFR BLD AUTO: 65.4 % (ref 42.7–76)
NEUTROPHILS NFR BLD AUTO: 66.6 % (ref 42.7–76)
NEUTROPHILS NFR BLD AUTO: 67.1 % (ref 42.7–76)
NEUTROPHILS NFR BLD AUTO: 67.5 % (ref 42.7–76)
NEUTROPHILS NFR BLD AUTO: 69.9 % (ref 42.7–76)
NEUTROPHILS NFR BLD AUTO: 7.02 10*3/MM3 (ref 1.7–7)
NEUTROPHILS NFR BLD AUTO: 70.6 % (ref 42.7–76)
NEUTROPHILS NFR BLD AUTO: 71.9 % (ref 42.7–76)
NEUTROPHILS NFR BLD AUTO: 72.5 % (ref 42.7–76)
NEUTROPHILS NFR BLD AUTO: 72.9 % (ref 42.7–76)
NEUTROPHILS NFR BLD AUTO: 74.3 % (ref 42.7–76)
NEUTROPHILS NFR BLD AUTO: 74.7 % (ref 42.7–76)
NEUTROPHILS NFR BLD AUTO: 78.2 % (ref 42.7–76)
NEUTROPHILS NFR BLD AUTO: 78.6 % (ref 42.7–76)
NEUTROPHILS NFR BLD AUTO: 79.4 % (ref 42.7–76)
NEUTROPHILS NFR BLD AUTO: 8.02 10*3/MM3 (ref 1.7–7)
NEUTROPHILS NFR BLD AUTO: 81.2 % (ref 42.7–76)
NEUTROPHILS NFR BLD AUTO: 81.7 % (ref 42.7–76)
NEUTROPHILS NFR BLD AUTO: 82.6 % (ref 42.7–76)
NEUTROPHILS NFR BLD AUTO: 86.3 % (ref 42.7–76)
NEUTROPHILS NFR BLD AUTO: 89.5 % (ref 42.7–76)
NEUTROPHILS NFR BLD AUTO: 92.3 % (ref 42.7–76)
NEUTROPHILS NFR BLD MANUAL: 64 % (ref 42.7–76)
NEUTS BAND NFR BLD MANUAL: 22 % (ref 0–5)
NEUTS VAC BLD QL SMEAR: NORMAL
NITRITE UR QL STRIP: NEGATIVE
NITRITE UR QL STRIP: NEGATIVE
NITRITE UR QL STRIP: POSITIVE
NRBC BLD AUTO-RTO: 0 /100 WBC (ref 0–0.2)
NRBC BLD AUTO-RTO: 0.1 /100 WBC (ref 0–0.2)
NRBC BLD AUTO-RTO: 0.3 /100 WBC (ref 0–0.2)
NT-PROBNP SERPL-MCNC: ABNORMAL PG/ML (ref 0–1800)
OXA 48 STRAIN: NOT DETECTED
OXA 48 STRAIN: NOT DETECTED
PATH REPORT.FINAL DX SPEC: NORMAL
PCO2 BLDA: 31.6 MM HG (ref 35–45)
PCO2 BLDA: 34.8 MM HG (ref 35–45)
PCO2 BLDA: 37.4 MM HG (ref 35–45)
PCO2 BLDA: 39 MM HG (ref 35–45)
PCO2 BLDA: 43.2 MM HG (ref 35–45)
PCO2 BLDA: 48.7 MM HG (ref 35–45)
PEEP RESPIRATORY: 5 CM[H2O]
PH BLDA: 7.36 PH UNITS (ref 7.35–7.45)
PH BLDA: 7.4 PH UNITS (ref 7.35–7.45)
PH BLDA: 7.42 PH UNITS (ref 7.35–7.45)
PH BLDA: 7.46 PH UNITS (ref 7.35–7.45)
PH BLDA: 7.48 PH UNITS (ref 7.35–7.45)
PH BLDA: 7.51 PH UNITS (ref 7.35–7.45)
PH UR STRIP.AUTO: <=5 [PH] (ref 5–9)
PHOSPHATE SERPL-MCNC: 3.1 MG/DL (ref 2.5–4.5)
PHOSPHATE SERPL-MCNC: 3.3 MG/DL (ref 2.5–4.5)
PLAT MORPH BLD: NORMAL
PLAT MORPH BLD: NORMAL
PLATELET # BLD AUTO: 103 10*3/MM3 (ref 140–450)
PLATELET # BLD AUTO: 109 10*3/MM3 (ref 140–450)
PLATELET # BLD AUTO: 110 10*3/MM3 (ref 140–450)
PLATELET # BLD AUTO: 115 10*3/MM3 (ref 140–450)
PLATELET # BLD AUTO: 117 10*3/MM3 (ref 140–450)
PLATELET # BLD AUTO: 120 10*3/MM3 (ref 140–450)
PLATELET # BLD AUTO: 122 10*3/MM3 (ref 140–450)
PLATELET # BLD AUTO: 134 10*3/MM3 (ref 140–450)
PLATELET # BLD AUTO: 140 10*3/MM3 (ref 140–450)
PLATELET # BLD AUTO: 147 10*3/MM3 (ref 140–450)
PLATELET # BLD AUTO: 158 10*3/MM3 (ref 140–450)
PLATELET # BLD AUTO: 158 10*3/MM3 (ref 140–450)
PLATELET # BLD AUTO: 167 10*3/MM3 (ref 140–450)
PLATELET # BLD AUTO: 219 10*3/MM3 (ref 140–450)
PLATELET # BLD AUTO: 222 10*3/MM3 (ref 140–450)
PLATELET # BLD AUTO: 237 10*3/MM3 (ref 140–450)
PLATELET # BLD AUTO: 243 10*3/MM3 (ref 140–450)
PLATELET # BLD AUTO: 296 10*3/MM3 (ref 140–450)
PLATELET # BLD AUTO: 379 10*3/MM3 (ref 140–450)
PLATELET # BLD AUTO: 380 10*3/MM3 (ref 140–450)
PLATELET # BLD AUTO: 402 10*3/MM3 (ref 140–450)
PLATELET # BLD AUTO: 84 10*3/MM3 (ref 140–450)
PLATELET # BLD AUTO: 92 10*3/MM3 (ref 140–450)
PLATELET # BLD AUTO: 93 10*3/MM3 (ref 140–450)
PLATELET # BLD AUTO: 95 10*3/MM3 (ref 140–450)
PLATELET # BLD AUTO: 95 10*3/MM3 (ref 140–450)
PLATELET # BLD AUTO: 99 10*3/MM3 (ref 140–450)
PMV BLD AUTO: 10.7 FL (ref 6–12)
PMV BLD AUTO: 11.2 FL (ref 6–12)
PMV BLD AUTO: 11.4 FL (ref 6–12)
PMV BLD AUTO: 11.6 FL (ref 6–12)
PMV BLD AUTO: 12 FL (ref 6–12)
PMV BLD AUTO: 12.1 FL (ref 6–12)
PMV BLD AUTO: 12.1 FL (ref 6–12)
PMV BLD AUTO: 12.3 FL (ref 6–12)
PMV BLD AUTO: 12.6 FL (ref 6–12)
PMV BLD AUTO: 12.9 FL (ref 6–12)
PMV BLD AUTO: 13.4 FL (ref 6–12)
PMV BLD AUTO: ABNORMAL FL
PO2 BLDA: 198 MM HG (ref 83–108)
PO2 BLDA: 405 MM HG (ref 83–108)
PO2 BLDA: 60.7 MM HG (ref 83–108)
PO2 BLDA: 66.5 MM HG (ref 83–108)
PO2 BLDA: 71.5 MM HG (ref 83–108)
PO2 BLDA: 97.6 MM HG (ref 83–108)
POIKILOCYTOSIS BLD QL SMEAR: ABNORMAL
POTASSIUM SERPL-SCNC: 2.8 MMOL/L (ref 3.5–5.2)
POTASSIUM SERPL-SCNC: 3.1 MMOL/L (ref 3.5–5.2)
POTASSIUM SERPL-SCNC: 3.2 MMOL/L (ref 3.5–5.2)
POTASSIUM SERPL-SCNC: 3.3 MMOL/L (ref 3.5–5.2)
POTASSIUM SERPL-SCNC: 3.4 MMOL/L (ref 3.5–5.2)
POTASSIUM SERPL-SCNC: 3.5 MMOL/L (ref 3.5–5.2)
POTASSIUM SERPL-SCNC: 3.6 MMOL/L (ref 3.5–5.2)
POTASSIUM SERPL-SCNC: 3.8 MMOL/L (ref 3.5–5.2)
POTASSIUM SERPL-SCNC: 3.9 MMOL/L (ref 3.5–5.2)
POTASSIUM SERPL-SCNC: 4 MMOL/L (ref 3.5–5.2)
POTASSIUM SERPL-SCNC: 4 MMOL/L (ref 3.5–5.2)
POTASSIUM SERPL-SCNC: 4.1 MMOL/L (ref 3.5–5.2)
POTASSIUM SERPL-SCNC: 4.2 MMOL/L (ref 3.5–5.2)
POTASSIUM SERPL-SCNC: 4.2 MMOL/L (ref 3.5–5.2)
POTASSIUM SERPL-SCNC: 4.3 MMOL/L (ref 3.5–5.2)
POTASSIUM SERPL-SCNC: 4.3 MMOL/L (ref 3.5–5.2)
POTASSIUM SERPL-SCNC: 4.4 MMOL/L (ref 3.5–5.2)
POTASSIUM SERPL-SCNC: 4.5 MMOL/L (ref 3.5–5.2)
POTASSIUM SERPL-SCNC: 4.6 MMOL/L (ref 3.5–5.2)
POTASSIUM SERPL-SCNC: 4.9 MMOL/L (ref 3.5–5.2)
POTASSIUM SERPL-SCNC: 4.9 MMOL/L (ref 3.5–5.2)
POTASSIUM SERPL-SCNC: 5.1 MMOL/L (ref 3.5–5.2)
POTASSIUM SERPL-SCNC: 5.2 MMOL/L (ref 3.5–5.2)
POTASSIUM SERPL-SCNC: 5.3 MMOL/L (ref 3.5–5.2)
PREALB SERPL-MCNC: 11.5 MG/DL (ref 20–40)
PROCALCITONIN SERPL-MCNC: 0.13 NG/ML (ref 0–0.25)
PROT SERPL-MCNC: 5.5 G/DL (ref 6–8.5)
PROT SERPL-MCNC: 6 G/DL (ref 6–8.5)
PROT SERPL-MCNC: 6 G/DL (ref 6–8.5)
PROT SERPL-MCNC: 6.1 G/DL (ref 6–8.5)
PROT SERPL-MCNC: 6.2 G/DL (ref 6–8.5)
PROT SERPL-MCNC: 6.3 G/DL (ref 6–8.5)
PROT SERPL-MCNC: 6.4 G/DL (ref 6–8.5)
PROT SERPL-MCNC: 6.5 G/DL (ref 6–8.5)
PROT SERPL-MCNC: 6.6 G/DL (ref 6–8.5)
PROT SERPL-MCNC: 6.7 G/DL (ref 6–8.5)
PROT SERPL-MCNC: 6.7 G/DL (ref 6–8.5)
PROT SERPL-MCNC: 6.8 G/DL (ref 6–8.5)
PROT SERPL-MCNC: 6.9 G/DL (ref 6–8.5)
PROT SERPL-MCNC: 7 G/DL (ref 6–8.5)
PROT SERPL-MCNC: 7 G/DL (ref 6–8.5)
PROT SERPL-MCNC: 7.2 G/DL (ref 6–8.5)
PROT SERPL-MCNC: 7.2 G/DL (ref 6–8.5)
PROT UR QL STRIP: ABNORMAL
PROT UR QL STRIP: ABNORMAL
PROT UR QL STRIP: NEGATIVE
PROTHROMBIN TIME: 111.1 SECONDS (ref 11.1–15.3)
PROTHROMBIN TIME: 17.9 SECONDS (ref 11.1–15.3)
PROTHROMBIN TIME: 18.6 SECONDS (ref 11.1–15.3)
PROTHROMBIN TIME: 19.3 SECONDS (ref 11.1–15.3)
PROTHROMBIN TIME: 19.5 SECONDS (ref 11.1–15.3)
PROTHROMBIN TIME: 19.7 SECONDS (ref 11.1–15.3)
PROTHROMBIN TIME: 19.8 SECONDS (ref 11.1–15.3)
PROTHROMBIN TIME: 20.3 SECONDS (ref 11.1–15.3)
PROTHROMBIN TIME: 20.3 SECONDS (ref 11.1–15.3)
PROTHROMBIN TIME: 20.6 SECONDS (ref 11.1–15.3)
PROTHROMBIN TIME: 20.8 SECONDS (ref 11.1–15.3)
PROTHROMBIN TIME: 21.2 SECONDS (ref 11.1–15.3)
PROTHROMBIN TIME: 21.3 SECONDS (ref 11.1–15.3)
PROTHROMBIN TIME: 21.8 SECONDS (ref 11.1–15.3)
PROTHROMBIN TIME: 21.9 SECONDS (ref 11.1–15.3)
PROTHROMBIN TIME: 24.6 SECONDS (ref 11.1–15.3)
PROTHROMBIN TIME: 26.7 SECONDS (ref 11.1–15.3)
PROTHROMBIN TIME: 27.2 SECONDS (ref 11.1–15.3)
PROTHROMBIN TIME: 28 SECONDS (ref 11.1–15.3)
PROTHROMBIN TIME: 28 SECONDS (ref 11.1–15.3)
PROTHROMBIN TIME: 28.5 SECONDS (ref 11.1–15.3)
PROTHROMBIN TIME: 29 SECONDS (ref 11.1–15.3)
PROTHROMBIN TIME: 29.5 SECONDS (ref 11.1–15.3)
PROTHROMBIN TIME: 31 SECONDS (ref 11.1–15.3)
PROTHROMBIN TIME: 31.3 SECONDS (ref 11.1–15.3)
PROTHROMBIN TIME: 32.5 SECONDS (ref 11.1–15.3)
PROTHROMBIN TIME: 33.3 SECONDS (ref 11.1–15.3)
PROTHROMBIN TIME: 33.6 SECONDS (ref 11.1–15.3)
PROTHROMBIN TIME: 34.1 SECONDS (ref 11.1–15.3)
PROTHROMBIN TIME: 38.4 SECONDS (ref 11.1–15.3)
PROTHROMBIN TIME: 39.1 SECONDS (ref 11.1–15.3)
PROTHROMBIN TIME: 39.9 SECONDS (ref 11.1–15.3)
PROTHROMBIN TIME: 40.1 SECONDS (ref 11.1–15.3)
PROTHROMBIN TIME: 40.6 SECONDS (ref 11.1–15.3)
PROTHROMBIN TIME: 40.9 SECONDS (ref 11.1–15.3)
PROTHROMBIN TIME: 41.9 SECONDS (ref 11.1–15.3)
PROTHROMBIN TIME: 42.4 SECONDS (ref 11.1–15.3)
PROTHROMBIN TIME: 43 SECONDS (ref 11.1–15.3)
PROTHROMBIN TIME: 43.2 SECONDS (ref 11.1–15.3)
PROTHROMBIN TIME: 44.4 SECONDS (ref 11.1–15.3)
PROTHROMBIN TIME: 44.8 SECONDS (ref 11.1–15.3)
PROTHROMBIN TIME: 44.9 SECONDS (ref 11.1–15.3)
PROTHROMBIN TIME: 45.6 SECONDS (ref 11.1–15.3)
PROTHROMBIN TIME: 45.7 SECONDS (ref 11.1–15.3)
PROTHROMBIN TIME: 66.6 SECONDS (ref 11.1–15.3)
PSV: 10 CMH2O
PTH-INTACT SERPL-MCNC: 71 PG/ML (ref 15–65)
QT INTERVAL: 446 MS
QT INTERVAL: 466 MS
QT INTERVAL: 486 MS
QT INTERVAL: 526 MS
QT INTERVAL: 532 MS
QT INTERVAL: 616 MS
QT INTERVAL: 626 MS
QTC INTERVAL: 429 MS
QTC INTERVAL: 473 MS
QTC INTERVAL: 475 MS
QTC INTERVAL: 485 MS
QTC INTERVAL: 488 MS
QTC INTERVAL: 510 MS
QTC INTERVAL: 572 MS
RBC # BLD AUTO: 2.55 10*6/MM3 (ref 4.14–5.8)
RBC # BLD AUTO: 2.96 10*6/MM3 (ref 4.14–5.8)
RBC # BLD AUTO: 2.99 10*6/MM3 (ref 4.14–5.8)
RBC # BLD AUTO: 3.03 10*6/MM3 (ref 4.14–5.8)
RBC # BLD AUTO: 3.03 10*6/MM3 (ref 4.14–5.8)
RBC # BLD AUTO: 3.04 10*6/MM3 (ref 4.14–5.8)
RBC # BLD AUTO: 3.13 10*6/MM3 (ref 4.14–5.8)
RBC # BLD AUTO: 3.16 10*6/MM3 (ref 4.14–5.8)
RBC # BLD AUTO: 3.18 10*6/MM3 (ref 4.14–5.8)
RBC # BLD AUTO: 3.19 10*6/MM3 (ref 4.14–5.8)
RBC # BLD AUTO: 3.2 10*6/MM3 (ref 4.14–5.8)
RBC # BLD AUTO: 3.43 10*6/MM3 (ref 4.14–5.8)
RBC # BLD AUTO: 3.64 10*6/MM3 (ref 4.14–5.8)
RBC # BLD AUTO: 3.65 10*6/MM3 (ref 4.14–5.8)
RBC # BLD AUTO: 3.74 10*6/MM3 (ref 4.14–5.8)
RBC # BLD AUTO: 3.75 10*6/MM3 (ref 4.14–5.8)
RBC # BLD AUTO: 3.76 10*6/MM3 (ref 4.14–5.8)
RBC # BLD AUTO: 3.81 10*6/MM3 (ref 4.14–5.8)
RBC # BLD AUTO: 3.82 10*6/MM3 (ref 4.14–5.8)
RBC # BLD AUTO: 3.87 10*6/MM3 (ref 4.14–5.8)
RBC # BLD AUTO: 3.9 10*6/MM3 (ref 4.14–5.8)
RBC # BLD AUTO: 3.91 10*6/MM3 (ref 4.14–5.8)
RBC # BLD AUTO: 4 10*6/MM3 (ref 4.14–5.8)
RBC # BLD AUTO: 4.09 10*6/MM3 (ref 4.14–5.8)
RBC # BLD AUTO: 4.19 10*6/MM3 (ref 4.14–5.8)
RBC # BLD AUTO: 4.24 10*6/MM3 (ref 4.14–5.8)
RBC # BLD AUTO: 4.26 10*6/MM3 (ref 4.14–5.8)
RBC # UR: ABNORMAL /HPF
RBC MORPH BLD: NORMAL
REF LAB TEST METHOD: ABNORMAL
RH BLD: POSITIVE
SAO2 % BLDCOA: 93.1 % (ref 94–99)
SAO2 % BLDCOA: 95.2 % (ref 94–99)
SAO2 % BLDCOA: 96.1 % (ref 94–99)
SAO2 % BLDCOA: 98.3 % (ref 94–99)
SAO2 % BLDCOA: >100 % (ref 94–99)
SAO2 % BLDCOA: >100 % (ref 94–99)
SARS-COV-2 N GENE RESP QL NAA+PROBE: NOT DETECTED
SARS-COV-2 RNA RESP QL NAA+PROBE: NOT DETECTED
SET MECH RESP RATE: 14
SMALL PLATELETS BLD QL SMEAR: ABNORMAL
SMALL PLATELETS BLD QL SMEAR: ADEQUATE
SMALL PLATELETS BLD QL SMEAR: NORMAL
SODIUM SERPL-SCNC: 137 MMOL/L (ref 136–145)
SODIUM SERPL-SCNC: 138 MMOL/L (ref 136–145)
SODIUM SERPL-SCNC: 139 MMOL/L (ref 136–145)
SODIUM SERPL-SCNC: 140 MMOL/L (ref 136–145)
SODIUM SERPL-SCNC: 140 MMOL/L (ref 136–145)
SODIUM SERPL-SCNC: 141 MMOL/L (ref 136–145)
SODIUM SERPL-SCNC: 142 MMOL/L (ref 136–145)
SODIUM SERPL-SCNC: 143 MMOL/L (ref 136–145)
SODIUM SERPL-SCNC: 145 MMOL/L (ref 136–145)
SODIUM SERPL-SCNC: 146 MMOL/L (ref 136–145)
SODIUM SERPL-SCNC: 146 MMOL/L (ref 136–145)
SP GR UR STRIP: 1.01 (ref 1–1.03)
SP GR UR STRIP: 1.01 (ref 1–1.03)
SP GR UR STRIP: 1.02 (ref 1–1.03)
SQUAMOUS #/AREA URNS HPF: ABNORMAL /HPF
STRESS TARGET HR: 120 BPM
T&S EXPIRATION DATE: NORMAL
TARGETS BLD QL SMEAR: NORMAL
TROPONIN T SERPL-MCNC: 0.14 NG/ML (ref 0–0.03)
TROPONIN T SERPL-MCNC: 0.16 NG/ML (ref 0–0.03)
TROPONIN T SERPL-MCNC: 0.16 NG/ML (ref 0–0.03)
TROPONIN T SERPL-MCNC: 0.25 NG/ML (ref 0–0.03)
TROPONIN T SERPL-MCNC: 0.3 NG/ML (ref 0–0.03)
TROPONIN T SERPL-MCNC: 0.3 NG/ML (ref 0–0.03)
TSH SERPL DL<=0.05 MIU/L-ACNC: 3.08 UIU/ML (ref 0.27–4.2)
TSH SERPL DL<=0.05 MIU/L-ACNC: 3.7 UIU/ML (ref 0.27–4.2)
UNIT  ABO: NORMAL
UNIT  RH: NORMAL
URATE SERPL-MCNC: 7.4 MG/DL (ref 3.4–7)
UROBILINOGEN UR QL STRIP: ABNORMAL
VALPROATE SERPL-MCNC: 7.2 MCG/ML (ref 50–125)
VANCOMYCIN PEAK SERPL-MCNC: 24.8 MCG/ML (ref 20–40)
VANCOMYCIN PEAK SERPL-MCNC: 31.2 MCG/ML (ref 20–40)
VANCOMYCIN SERPL-MCNC: 20.8 MCG/ML (ref 5–40)
VANCOMYCIN SERPL-MCNC: 9.6 MCG/ML (ref 5–40)
VANCOMYCIN TROUGH SERPL-MCNC: 13.4 MCG/ML (ref 5–20)
VANCOMYCIN TROUGH SERPL-MCNC: 16.2 MCG/ML (ref 5–20)
VANCOMYCIN TROUGH SERPL-MCNC: 19.4 MCG/ML (ref 5–20)
VANCOMYCIN TROUGH SERPL-MCNC: 20.8 MCG/ML (ref 5–20)
VANCOMYCIN TROUGH SERPL-MCNC: 21.2 MCG/ML (ref 5–20)
VENTILATOR MODE: ABNORMAL
VENTILATOR MODE: AC
VENTILATOR MODE: NORMAL
VIM STRAIN: NOT DETECTED
VIM STRAIN: NOT DETECTED
VT ON VENT VENT: 500 ML
WBC # BLD AUTO: 12.46 10*3/MM3 (ref 3.4–10.8)
WBC # BLD AUTO: 13.86 10*3/MM3 (ref 3.4–10.8)
WBC # BLD AUTO: 14.84 10*3/MM3 (ref 3.4–10.8)
WBC # BLD AUTO: 17.98 10*3/MM3 (ref 3.4–10.8)
WBC # BLD AUTO: 2.31 10*3/MM3 (ref 3.4–10.8)
WBC # BLD AUTO: 2.41 10*3/MM3 (ref 3.4–10.8)
WBC # BLD AUTO: 2.66 10*3/MM3 (ref 3.4–10.8)
WBC # BLD AUTO: 2.91 10*3/MM3 (ref 3.4–10.8)
WBC # BLD AUTO: 3.01 10*3/MM3 (ref 3.4–10.8)
WBC # BLD AUTO: 3.1 10*3/MM3 (ref 3.4–10.8)
WBC # BLD AUTO: 3.26 10*3/MM3 (ref 3.4–10.8)
WBC # BLD AUTO: 3.35 10*3/MM3 (ref 3.4–10.8)
WBC # BLD AUTO: 3.41 10*3/MM3 (ref 3.4–10.8)
WBC # BLD AUTO: 3.64 10*3/MM3 (ref 3.4–10.8)
WBC # BLD AUTO: 4.17 10*3/MM3 (ref 3.4–10.8)
WBC # BLD AUTO: 4.41 10*3/MM3 (ref 3.4–10.8)
WBC # BLD AUTO: 4.67 10*3/MM3 (ref 3.4–10.8)
WBC # BLD AUTO: 4.74 10*3/MM3 (ref 3.4–10.8)
WBC # BLD AUTO: 5.05 10*3/MM3 (ref 3.4–10.8)
WBC # BLD AUTO: 5.09 10*3/MM3 (ref 3.4–10.8)
WBC # BLD AUTO: 5.9 10*3/MM3 (ref 3.4–10.8)
WBC # BLD AUTO: 5.97 10*3/MM3 (ref 3.4–10.8)
WBC # BLD AUTO: 7.33 10*3/MM3 (ref 3.4–10.8)
WBC # BLD AUTO: 8.05 10*3/MM3 (ref 3.4–10.8)
WBC # BLD AUTO: 8.69 10*3/MM3 (ref 3.4–10.8)
WBC # BLD AUTO: 8.84 10*3/MM3 (ref 3.4–10.8)
WBC # BLD AUTO: 9.71 10*3/MM3 (ref 3.4–10.8)
WBC MORPH BLD: NORMAL
WBC UR QL AUTO: ABNORMAL /HPF
WHOLE BLOOD HOLD SPECIMEN: NORMAL

## 2021-01-01 PROCEDURE — 97162 PT EVAL MOD COMPLEX 30 MIN: CPT

## 2021-01-01 PROCEDURE — 83735 ASSAY OF MAGNESIUM: CPT | Performed by: INTERNAL MEDICINE

## 2021-01-01 PROCEDURE — 82962 GLUCOSE BLOOD TEST: CPT

## 2021-01-01 PROCEDURE — 94799 UNLISTED PULMONARY SVC/PX: CPT

## 2021-01-01 PROCEDURE — 25010000002 MEROPENEM PER 100 MG: Performed by: NURSE PRACTITIONER

## 2021-01-01 PROCEDURE — 93306 TTE W/DOPPLER COMPLETE: CPT | Performed by: INTERNAL MEDICINE

## 2021-01-01 PROCEDURE — 84550 ASSAY OF BLOOD/URIC ACID: CPT | Performed by: NURSE PRACTITIONER

## 2021-01-01 PROCEDURE — 25010000002 FUROSEMIDE PER 20 MG: Performed by: INTERNAL MEDICINE

## 2021-01-01 PROCEDURE — 71250 CT THORAX DX C-: CPT

## 2021-01-01 PROCEDURE — 0BH17EZ INSERTION OF ENDOTRACHEAL AIRWAY INTO TRACHEA, VIA NATURAL OR ARTIFICIAL OPENING: ICD-10-PCS | Performed by: HOSPITALIST

## 2021-01-01 PROCEDURE — 85610 PROTHROMBIN TIME: CPT | Performed by: EMERGENCY MEDICINE

## 2021-01-01 PROCEDURE — 85610 PROTHROMBIN TIME: CPT | Performed by: HOSPITALIST

## 2021-01-01 PROCEDURE — P9612 CATHETERIZE FOR URINE SPEC: HCPCS

## 2021-01-01 PROCEDURE — 82803 BLOOD GASES ANY COMBINATION: CPT

## 2021-01-01 PROCEDURE — 36415 COLL VENOUS BLD VENIPUNCTURE: CPT | Performed by: HOSPITALIST

## 2021-01-01 PROCEDURE — 63710000001 INSULIN DETEMIR PER 5 UNITS: Performed by: UROLOGY

## 2021-01-01 PROCEDURE — 83880 ASSAY OF NATRIURETIC PEPTIDE: CPT | Performed by: STUDENT IN AN ORGANIZED HEALTH CARE EDUCATION/TRAINING PROGRAM

## 2021-01-01 PROCEDURE — 85007 BL SMEAR W/DIFF WBC COUNT: CPT | Performed by: HOSPITALIST

## 2021-01-01 PROCEDURE — 25010000002 MIDAZOLAM 50 MG/10ML SOLUTION: Performed by: INTERNAL MEDICINE

## 2021-01-01 PROCEDURE — 86927 PLASMA FRESH FROZEN: CPT

## 2021-01-01 PROCEDURE — 86901 BLOOD TYPING SEROLOGIC RH(D): CPT

## 2021-01-01 PROCEDURE — 84484 ASSAY OF TROPONIN QUANT: CPT | Performed by: FAMILY MEDICINE

## 2021-01-01 PROCEDURE — 63710000001 INSULIN ASPART PER 5 UNITS: Performed by: STUDENT IN AN ORGANIZED HEALTH CARE EDUCATION/TRAINING PROGRAM

## 2021-01-01 PROCEDURE — 80053 COMPREHEN METABOLIC PANEL: CPT | Performed by: INTERNAL MEDICINE

## 2021-01-01 PROCEDURE — 84132 ASSAY OF SERUM POTASSIUM: CPT | Performed by: INTERNAL MEDICINE

## 2021-01-01 PROCEDURE — 84134 ASSAY OF PREALBUMIN: CPT | Performed by: INTERNAL MEDICINE

## 2021-01-01 PROCEDURE — 80048 BASIC METABOLIC PNL TOTAL CA: CPT | Performed by: STUDENT IN AN ORGANIZED HEALTH CARE EDUCATION/TRAINING PROGRAM

## 2021-01-01 PROCEDURE — P9017 PLASMA 1 DONOR FRZ W/IN 8 HR: HCPCS

## 2021-01-01 PROCEDURE — 80202 ASSAY OF VANCOMYCIN: CPT | Performed by: FAMILY MEDICINE

## 2021-01-01 PROCEDURE — 84100 ASSAY OF PHOSPHORUS: CPT | Performed by: INTERNAL MEDICINE

## 2021-01-01 PROCEDURE — 85610 PROTHROMBIN TIME: CPT | Performed by: FAMILY MEDICINE

## 2021-01-01 PROCEDURE — 25010000002 CEFTRIAXONE PER 250 MG: Performed by: NURSE PRACTITIONER

## 2021-01-01 PROCEDURE — 25010000002 CEFTRIAXONE: Performed by: STUDENT IN AN ORGANIZED HEALTH CARE EDUCATION/TRAINING PROGRAM

## 2021-01-01 PROCEDURE — 87635 SARS-COV-2 COVID-19 AMP PRB: CPT | Performed by: EMERGENCY MEDICINE

## 2021-01-01 PROCEDURE — 97110 THERAPEUTIC EXERCISES: CPT

## 2021-01-01 PROCEDURE — 99285 EMERGENCY DEPT VISIT HI MDM: CPT

## 2021-01-01 PROCEDURE — 85025 COMPLETE CBC W/AUTO DIFF WBC: CPT | Performed by: FAMILY MEDICINE

## 2021-01-01 PROCEDURE — 81001 URINALYSIS AUTO W/SCOPE: CPT | Performed by: FAMILY MEDICINE

## 2021-01-01 PROCEDURE — 94760 N-INVAS EAR/PLS OXIMETRY 1: CPT

## 2021-01-01 PROCEDURE — 94003 VENT MGMT INPAT SUBQ DAY: CPT

## 2021-01-01 PROCEDURE — 85610 PROTHROMBIN TIME: CPT | Performed by: NURSE PRACTITIONER

## 2021-01-01 PROCEDURE — 80202 ASSAY OF VANCOMYCIN: CPT | Performed by: INTERNAL MEDICINE

## 2021-01-01 PROCEDURE — 85025 COMPLETE CBC W/AUTO DIFF WBC: CPT | Performed by: INTERNAL MEDICINE

## 2021-01-01 PROCEDURE — 80048 BASIC METABOLIC PNL TOTAL CA: CPT | Performed by: HOSPITALIST

## 2021-01-01 PROCEDURE — 99284 EMERGENCY DEPT VISIT MOD MDM: CPT

## 2021-01-01 PROCEDURE — 25010000002 FUROSEMIDE PER 20 MG: Performed by: HOSPITALIST

## 2021-01-01 PROCEDURE — 25010000002 CEFTRIAXONE PER 250 MG: Performed by: UROLOGY

## 2021-01-01 PROCEDURE — 80053 COMPREHEN METABOLIC PANEL: CPT | Performed by: STUDENT IN AN ORGANIZED HEALTH CARE EDUCATION/TRAINING PROGRAM

## 2021-01-01 PROCEDURE — 87077 CULTURE AEROBIC IDENTIFY: CPT | Performed by: FAMILY MEDICINE

## 2021-01-01 PROCEDURE — 85025 COMPLETE CBC W/AUTO DIFF WBC: CPT | Performed by: NURSE PRACTITIONER

## 2021-01-01 PROCEDURE — 36600 WITHDRAWAL OF ARTERIAL BLOOD: CPT

## 2021-01-01 PROCEDURE — 94640 AIRWAY INHALATION TREATMENT: CPT

## 2021-01-01 PROCEDURE — 36415 COLL VENOUS BLD VENIPUNCTURE: CPT | Performed by: UROLOGY

## 2021-01-01 PROCEDURE — 25010000002 AZITHROMYCIN PER 500 MG: Performed by: INTERNAL MEDICINE

## 2021-01-01 PROCEDURE — 85610 PROTHROMBIN TIME: CPT | Performed by: INTERNAL MEDICINE

## 2021-01-01 PROCEDURE — 25010000002 ALBUMIN HUMAN 25% PER 50 ML: Performed by: INTERNAL MEDICINE

## 2021-01-01 PROCEDURE — 97530 THERAPEUTIC ACTIVITIES: CPT

## 2021-01-01 PROCEDURE — 70450 CT HEAD/BRAIN W/O DYE: CPT

## 2021-01-01 PROCEDURE — 93010 ELECTROCARDIOGRAM REPORT: CPT | Performed by: INTERNAL MEDICINE

## 2021-01-01 PROCEDURE — 25010000002 FUROSEMIDE PER 20 MG: Performed by: FAMILY MEDICINE

## 2021-01-01 PROCEDURE — 93005 ELECTROCARDIOGRAM TRACING: CPT | Performed by: FAMILY MEDICINE

## 2021-01-01 PROCEDURE — 85025 COMPLETE CBC W/AUTO DIFF WBC: CPT | Performed by: EMERGENCY MEDICINE

## 2021-01-01 PROCEDURE — 87040 BLOOD CULTURE FOR BACTERIA: CPT | Performed by: UROLOGY

## 2021-01-01 PROCEDURE — 25010000002 CEFEPIME PER 500 MG: Performed by: INTERNAL MEDICINE

## 2021-01-01 PROCEDURE — 84484 ASSAY OF TROPONIN QUANT: CPT | Performed by: HOSPITALIST

## 2021-01-01 PROCEDURE — 85379 FIBRIN DEGRADATION QUANT: CPT | Performed by: EMERGENCY MEDICINE

## 2021-01-01 PROCEDURE — 87186 SC STD MICRODIL/AGAR DIL: CPT | Performed by: EMERGENCY MEDICINE

## 2021-01-01 PROCEDURE — 71045 X-RAY EXAM CHEST 1 VIEW: CPT

## 2021-01-01 PROCEDURE — 87086 URINE CULTURE/COLONY COUNT: CPT | Performed by: UROLOGY

## 2021-01-01 PROCEDURE — G0378 HOSPITAL OBSERVATION PER HR: HCPCS

## 2021-01-01 PROCEDURE — 25010000002 AZITHROMYCIN PER 500 MG: Performed by: EMERGENCY MEDICINE

## 2021-01-01 PROCEDURE — 80202 ASSAY OF VANCOMYCIN: CPT | Performed by: NURSE PRACTITIONER

## 2021-01-01 PROCEDURE — P9047 ALBUMIN (HUMAN), 25%, 50ML: HCPCS | Performed by: INTERNAL MEDICINE

## 2021-01-01 PROCEDURE — 25010000003 ATROPINE SULFATE 1 MG/10ML SOLUTION PREFILLED SYRINGE: Performed by: INTERNAL MEDICINE

## 2021-01-01 PROCEDURE — 83970 ASSAY OF PARATHORMONE: CPT | Performed by: NURSE PRACTITIONER

## 2021-01-01 PROCEDURE — 86900 BLOOD TYPING SEROLOGIC ABO: CPT

## 2021-01-01 PROCEDURE — 85007 BL SMEAR W/DIFF WBC COUNT: CPT | Performed by: FAMILY MEDICINE

## 2021-01-01 PROCEDURE — 87040 BLOOD CULTURE FOR BACTERIA: CPT | Performed by: EMERGENCY MEDICINE

## 2021-01-01 PROCEDURE — 94002 VENT MGMT INPAT INIT DAY: CPT

## 2021-01-01 PROCEDURE — 87081 CULTURE SCREEN ONLY: CPT | Performed by: HOSPITALIST

## 2021-01-01 PROCEDURE — 85610 PROTHROMBIN TIME: CPT | Performed by: STUDENT IN AN ORGANIZED HEALTH CARE EDUCATION/TRAINING PROGRAM

## 2021-01-01 PROCEDURE — 87636 SARSCOV2 & INF A&B AMP PRB: CPT | Performed by: EMERGENCY MEDICINE

## 2021-01-01 PROCEDURE — 80053 COMPREHEN METABOLIC PANEL: CPT | Performed by: FAMILY MEDICINE

## 2021-01-01 PROCEDURE — 80048 BASIC METABOLIC PNL TOTAL CA: CPT | Performed by: UROLOGY

## 2021-01-01 PROCEDURE — 25010000002 LORAZEPAM PER 2 MG: Performed by: HOSPITALIST

## 2021-01-01 PROCEDURE — 85025 COMPLETE CBC W/AUTO DIFF WBC: CPT | Performed by: UROLOGY

## 2021-01-01 PROCEDURE — 86923 COMPATIBILITY TEST ELECTRIC: CPT

## 2021-01-01 PROCEDURE — 92526 ORAL FUNCTION THERAPY: CPT | Performed by: SPEECH-LANGUAGE PATHOLOGIST

## 2021-01-01 PROCEDURE — 30233K1 TRANSFUSION OF NONAUTOLOGOUS FROZEN PLASMA INTO PERIPHERAL VEIN, PERCUTANEOUS APPROACH: ICD-10-PCS | Performed by: STUDENT IN AN ORGANIZED HEALTH CARE EDUCATION/TRAINING PROGRAM

## 2021-01-01 PROCEDURE — 25010000002 VANCOMYCIN: Performed by: INTERNAL MEDICINE

## 2021-01-01 PROCEDURE — 36410 VNPNXR 3YR/> PHY/QHP DX/THER: CPT

## 2021-01-01 PROCEDURE — 86900 BLOOD TYPING SEROLOGIC ABO: CPT | Performed by: EMERGENCY MEDICINE

## 2021-01-01 PROCEDURE — 85025 COMPLETE CBC W/AUTO DIFF WBC: CPT | Performed by: HOSPITALIST

## 2021-01-01 PROCEDURE — 83880 ASSAY OF NATRIURETIC PEPTIDE: CPT | Performed by: EMERGENCY MEDICINE

## 2021-01-01 PROCEDURE — 87070 CULTURE OTHR SPECIMN AEROBIC: CPT | Performed by: INTERNAL MEDICINE

## 2021-01-01 PROCEDURE — 80053 COMPREHEN METABOLIC PANEL: CPT | Performed by: NURSE PRACTITIONER

## 2021-01-01 PROCEDURE — 83605 ASSAY OF LACTIC ACID: CPT | Performed by: EMERGENCY MEDICINE

## 2021-01-01 PROCEDURE — 85014 HEMATOCRIT: CPT | Performed by: NURSE PRACTITIONER

## 2021-01-01 PROCEDURE — 63710000001 INSULIN ASPART PER 5 UNITS: Performed by: INTERNAL MEDICINE

## 2021-01-01 PROCEDURE — 85610 PROTHROMBIN TIME: CPT | Performed by: UROLOGY

## 2021-01-01 PROCEDURE — 80048 BASIC METABOLIC PNL TOTAL CA: CPT | Performed by: INTERNAL MEDICINE

## 2021-01-01 PROCEDURE — 83605 ASSAY OF LACTIC ACID: CPT | Performed by: PHYSICIAN ASSISTANT

## 2021-01-01 PROCEDURE — 25010000002 MORPHINE PER 10 MG: Performed by: HOSPITALIST

## 2021-01-01 PROCEDURE — 87186 SC STD MICRODIL/AGAR DIL: CPT | Performed by: FAMILY MEDICINE

## 2021-01-01 PROCEDURE — 25010000002 VANCOMYCIN 1 G RECONSTITUTED SOLUTION: Performed by: NURSE PRACTITIONER

## 2021-01-01 PROCEDURE — 25010000002 HALOPERIDOL LACTATE PER 5 MG: Performed by: FAMILY MEDICINE

## 2021-01-01 PROCEDURE — 25010000002 ONDANSETRON PER 1 MG: Performed by: NURSE PRACTITIONER

## 2021-01-01 PROCEDURE — 92610 EVALUATE SWALLOWING FUNCTION: CPT | Performed by: SPEECH-LANGUAGE PATHOLOGIST

## 2021-01-01 PROCEDURE — 80164 ASSAY DIPROPYLACETIC ACD TOT: CPT | Performed by: STUDENT IN AN ORGANIZED HEALTH CARE EDUCATION/TRAINING PROGRAM

## 2021-01-01 PROCEDURE — 85007 BL SMEAR W/DIFF WBC COUNT: CPT | Performed by: EMERGENCY MEDICINE

## 2021-01-01 PROCEDURE — 97166 OT EVAL MOD COMPLEX 45 MIN: CPT

## 2021-01-01 PROCEDURE — 85027 COMPLETE CBC AUTOMATED: CPT | Performed by: INTERNAL MEDICINE

## 2021-01-01 PROCEDURE — 25010000002 PROPOFOL 10 MG/ML EMULSION: Performed by: NURSE ANESTHETIST, CERTIFIED REGISTERED

## 2021-01-01 PROCEDURE — 25010000003 ATROPINE SULFATE: Performed by: INTERNAL MEDICINE

## 2021-01-01 PROCEDURE — 80202 ASSAY OF VANCOMYCIN: CPT | Performed by: UROLOGY

## 2021-01-01 PROCEDURE — 25010000002 LORAZEPAM PER 2 MG: Performed by: EMERGENCY MEDICINE

## 2021-01-01 PROCEDURE — 25010000002 FENTANYL CITRATE (PF) 50 MCG/ML SOLUTION: Performed by: INTERNAL MEDICINE

## 2021-01-01 PROCEDURE — 87040 BLOOD CULTURE FOR BACTERIA: CPT | Performed by: STUDENT IN AN ORGANIZED HEALTH CARE EDUCATION/TRAINING PROGRAM

## 2021-01-01 PROCEDURE — 84145 PROCALCITONIN (PCT): CPT | Performed by: EMERGENCY MEDICINE

## 2021-01-01 PROCEDURE — 25010000002 VANCOMYCIN: Performed by: NURSE PRACTITIONER

## 2021-01-01 PROCEDURE — 25010000002 EPINEPHRINE 1 MG/10ML SOLUTION PREFILLED SYRINGE: Performed by: INTERNAL MEDICINE

## 2021-01-01 PROCEDURE — 85730 THROMBOPLASTIN TIME PARTIAL: CPT | Performed by: STUDENT IN AN ORGANIZED HEALTH CARE EDUCATION/TRAINING PROGRAM

## 2021-01-01 PROCEDURE — C1751 CATH, INF, PER/CENT/MIDLINE: HCPCS

## 2021-01-01 PROCEDURE — 87086 URINE CULTURE/COLONY COUNT: CPT | Performed by: FAMILY MEDICINE

## 2021-01-01 PROCEDURE — 88304 TISSUE EXAM BY PATHOLOGIST: CPT

## 2021-01-01 PROCEDURE — 87150 DNA/RNA AMPLIFIED PROBE: CPT | Performed by: EMERGENCY MEDICINE

## 2021-01-01 PROCEDURE — 25010000002 MORPHINE PER 10 MG: Performed by: FAMILY MEDICINE

## 2021-01-01 PROCEDURE — 63710000001 INSULIN DETEMIR PER 5 UNITS: Performed by: STUDENT IN AN ORGANIZED HEALTH CARE EDUCATION/TRAINING PROGRAM

## 2021-01-01 PROCEDURE — 97535 SELF CARE MNGMENT TRAINING: CPT

## 2021-01-01 PROCEDURE — 85730 THROMBOPLASTIN TIME PARTIAL: CPT | Performed by: EMERGENCY MEDICINE

## 2021-01-01 PROCEDURE — 25010000002 MORPHINE PER 10 MG: Performed by: STUDENT IN AN ORGANIZED HEALTH CARE EDUCATION/TRAINING PROGRAM

## 2021-01-01 PROCEDURE — 25010000002 CEFTRIAXONE PER 250 MG: Performed by: HOSPITALIST

## 2021-01-01 PROCEDURE — 73560 X-RAY EXAM OF KNEE 1 OR 2: CPT

## 2021-01-01 PROCEDURE — C1769 GUIDE WIRE: HCPCS | Performed by: UROLOGY

## 2021-01-01 PROCEDURE — 25010000002 MORPHINE PER 10 MG: Performed by: INTERNAL MEDICINE

## 2021-01-01 PROCEDURE — 99221 1ST HOSP IP/OBS SF/LOW 40: CPT | Performed by: NURSE PRACTITIONER

## 2021-01-01 PROCEDURE — 83036 HEMOGLOBIN GLYCOSYLATED A1C: CPT | Performed by: STUDENT IN AN ORGANIZED HEALTH CARE EDUCATION/TRAINING PROGRAM

## 2021-01-01 PROCEDURE — 25010000002 HYDRALAZINE PER 20 MG: Performed by: HOSPITALIST

## 2021-01-01 PROCEDURE — 25010000002 LORAZEPAM PER 2 MG

## 2021-01-01 PROCEDURE — 93005 ELECTROCARDIOGRAM TRACING: CPT | Performed by: INTERNAL MEDICINE

## 2021-01-01 PROCEDURE — 87147 CULTURE TYPE IMMUNOLOGIC: CPT | Performed by: EMERGENCY MEDICINE

## 2021-01-01 PROCEDURE — 36415 COLL VENOUS BLD VENIPUNCTURE: CPT | Performed by: STUDENT IN AN ORGANIZED HEALTH CARE EDUCATION/TRAINING PROGRAM

## 2021-01-01 PROCEDURE — 85025 COMPLETE CBC W/AUTO DIFF WBC: CPT | Performed by: STUDENT IN AN ORGANIZED HEALTH CARE EDUCATION/TRAINING PROGRAM

## 2021-01-01 PROCEDURE — 80069 RENAL FUNCTION PANEL: CPT | Performed by: INTERNAL MEDICINE

## 2021-01-01 PROCEDURE — 84484 ASSAY OF TROPONIN QUANT: CPT | Performed by: INTERNAL MEDICINE

## 2021-01-01 PROCEDURE — 84443 ASSAY THYROID STIM HORMONE: CPT | Performed by: NURSE PRACTITIONER

## 2021-01-01 PROCEDURE — 36415 COLL VENOUS BLD VENIPUNCTURE: CPT | Performed by: EMERGENCY MEDICINE

## 2021-01-01 PROCEDURE — 0WHR8YZ INSERTION OF OTHER DEVICE INTO GENITOURINARY TRACT, VIA NATURAL OR ARTIFICIAL OPENING ENDOSCOPIC: ICD-10-PCS | Performed by: UROLOGY

## 2021-01-01 PROCEDURE — 25010000002 CEFTRIAXONE: Performed by: PHYSICIAN ASSISTANT

## 2021-01-01 PROCEDURE — 36415 COLL VENOUS BLD VENIPUNCTURE: CPT | Performed by: NURSE PRACTITIONER

## 2021-01-01 PROCEDURE — 25010000002 MORPHINE PER 10 MG: Performed by: UROLOGY

## 2021-01-01 PROCEDURE — 85007 BL SMEAR W/DIFF WBC COUNT: CPT | Performed by: UROLOGY

## 2021-01-01 PROCEDURE — 30233N1 TRANSFUSION OF NONAUTOLOGOUS RED BLOOD CELLS INTO PERIPHERAL VEIN, PERCUTANEOUS APPROACH: ICD-10-PCS | Performed by: NURSE PRACTITIONER

## 2021-01-01 PROCEDURE — 86901 BLOOD TYPING SEROLOGIC RH(D): CPT | Performed by: EMERGENCY MEDICINE

## 2021-01-01 PROCEDURE — 83690 ASSAY OF LIPASE: CPT | Performed by: EMERGENCY MEDICINE

## 2021-01-01 PROCEDURE — 36430 TRANSFUSION BLD/BLD COMPNT: CPT

## 2021-01-01 PROCEDURE — 5A1955Z RESPIRATORY VENTILATION, GREATER THAN 96 CONSECUTIVE HOURS: ICD-10-PCS | Performed by: HOSPITALIST

## 2021-01-01 PROCEDURE — 87086 URINE CULTURE/COLONY COUNT: CPT | Performed by: PHYSICIAN ASSISTANT

## 2021-01-01 PROCEDURE — 99233 SBSQ HOSP IP/OBS HIGH 50: CPT | Performed by: NURSE PRACTITIONER

## 2021-01-01 PROCEDURE — 83605 ASSAY OF LACTIC ACID: CPT | Performed by: STUDENT IN AN ORGANIZED HEALTH CARE EDUCATION/TRAINING PROGRAM

## 2021-01-01 PROCEDURE — 76937 US GUIDE VASCULAR ACCESS: CPT

## 2021-01-01 PROCEDURE — 93005 ELECTROCARDIOGRAM TRACING: CPT | Performed by: STUDENT IN AN ORGANIZED HEALTH CARE EDUCATION/TRAINING PROGRAM

## 2021-01-01 PROCEDURE — 25010000002 ONDANSETRON PER 1 MG: Performed by: PHYSICIAN ASSISTANT

## 2021-01-01 PROCEDURE — 25010000002 ATROPINE PER 0.01 MG: Performed by: INTERNAL MEDICINE

## 2021-01-01 PROCEDURE — 85007 BL SMEAR W/DIFF WBC COUNT: CPT | Performed by: STUDENT IN AN ORGANIZED HEALTH CARE EDUCATION/TRAINING PROGRAM

## 2021-01-01 PROCEDURE — 85379 FIBRIN DEGRADATION QUANT: CPT | Performed by: PHYSICIAN ASSISTANT

## 2021-01-01 PROCEDURE — 93306 TTE W/DOPPLER COMPLETE: CPT

## 2021-01-01 PROCEDURE — 86850 RBC ANTIBODY SCREEN: CPT | Performed by: EMERGENCY MEDICINE

## 2021-01-01 PROCEDURE — 25010000002 CEFTRIAXONE PER 250 MG: Performed by: EMERGENCY MEDICINE

## 2021-01-01 PROCEDURE — 25010000002 VITAMIN K1 1 MG/1 ML SOLUTION: Performed by: STUDENT IN AN ORGANIZED HEALTH CARE EDUCATION/TRAINING PROGRAM

## 2021-01-01 PROCEDURE — 84443 ASSAY THYROID STIM HORMONE: CPT | Performed by: FAMILY MEDICINE

## 2021-01-01 PROCEDURE — 84484 ASSAY OF TROPONIN QUANT: CPT | Performed by: PHYSICIAN ASSISTANT

## 2021-01-01 PROCEDURE — 85018 HEMOGLOBIN: CPT | Performed by: NURSE PRACTITIONER

## 2021-01-01 PROCEDURE — 83735 ASSAY OF MAGNESIUM: CPT | Performed by: PHYSICIAN ASSISTANT

## 2021-01-01 PROCEDURE — 25010000002 LORAZEPAM PER 2 MG: Performed by: INTERNAL MEDICINE

## 2021-01-01 PROCEDURE — 25010000002 HYDRALAZINE PER 20 MG: Performed by: EMERGENCY MEDICINE

## 2021-01-01 PROCEDURE — 85007 BL SMEAR W/DIFF WBC COUNT: CPT | Performed by: NURSE PRACTITIONER

## 2021-01-01 PROCEDURE — 74176 CT ABD & PELVIS W/O CONTRAST: CPT

## 2021-01-01 PROCEDURE — 82306 VITAMIN D 25 HYDROXY: CPT | Performed by: INTERNAL MEDICINE

## 2021-01-01 PROCEDURE — 25010000002 CEFTRIAXONE PER 250 MG: Performed by: INTERNAL MEDICINE

## 2021-01-01 PROCEDURE — 05HD33Z INSERTION OF INFUSION DEVICE INTO RIGHT CEPHALIC VEIN, PERCUTANEOUS APPROACH: ICD-10-PCS | Performed by: RADIOLOGY

## 2021-01-01 PROCEDURE — 87040 BLOOD CULTURE FOR BACTERIA: CPT | Performed by: PHYSICIAN ASSISTANT

## 2021-01-01 PROCEDURE — 25010000002 VITAMIN K1 PER 1 MG: Performed by: FAMILY MEDICINE

## 2021-01-01 PROCEDURE — 71046 X-RAY EXAM CHEST 2 VIEWS: CPT

## 2021-01-01 PROCEDURE — 93005 ELECTROCARDIOGRAM TRACING: CPT | Performed by: PHYSICIAN ASSISTANT

## 2021-01-01 PROCEDURE — 0TCB8ZZ EXTIRPATION OF MATTER FROM BLADDER, VIA NATURAL OR ARTIFICIAL OPENING ENDOSCOPIC: ICD-10-PCS | Performed by: UROLOGY

## 2021-01-01 PROCEDURE — 84484 ASSAY OF TROPONIN QUANT: CPT | Performed by: STUDENT IN AN ORGANIZED HEALTH CARE EDUCATION/TRAINING PROGRAM

## 2021-01-01 PROCEDURE — 25010000002 VANCOMYCIN 5 G RECONSTITUTED SOLUTION: Performed by: FAMILY MEDICINE

## 2021-01-01 PROCEDURE — 81001 URINALYSIS AUTO W/SCOPE: CPT | Performed by: STUDENT IN AN ORGANIZED HEALTH CARE EDUCATION/TRAINING PROGRAM

## 2021-01-01 PROCEDURE — 25010000003 CEFAZOLIN PER 500 MG: Performed by: NURSE ANESTHETIST, CERTIFIED REGISTERED

## 2021-01-01 PROCEDURE — 83735 ASSAY OF MAGNESIUM: CPT | Performed by: STUDENT IN AN ORGANIZED HEALTH CARE EDUCATION/TRAINING PROGRAM

## 2021-01-01 PROCEDURE — 25010000002 VITAMIN K1 PER 1 MG: Performed by: STUDENT IN AN ORGANIZED HEALTH CARE EDUCATION/TRAINING PROGRAM

## 2021-01-01 PROCEDURE — 93005 ELECTROCARDIOGRAM TRACING: CPT | Performed by: EMERGENCY MEDICINE

## 2021-01-01 PROCEDURE — 85027 COMPLETE CBC AUTOMATED: CPT | Performed by: HOSPITALIST

## 2021-01-01 PROCEDURE — 05HB33Z INSERTION OF INFUSION DEVICE INTO RIGHT BASILIC VEIN, PERCUTANEOUS APPROACH: ICD-10-PCS | Performed by: RADIOLOGY

## 2021-01-01 PROCEDURE — 81001 URINALYSIS AUTO W/SCOPE: CPT | Performed by: PHYSICIAN ASSISTANT

## 2021-01-01 PROCEDURE — 87205 SMEAR GRAM STAIN: CPT | Performed by: INTERNAL MEDICINE

## 2021-01-01 PROCEDURE — P9016 RBC LEUKOCYTES REDUCED: HCPCS

## 2021-01-01 PROCEDURE — 87636 SARSCOV2 & INF A&B AMP PRB: CPT | Performed by: PHYSICIAN ASSISTANT

## 2021-01-01 PROCEDURE — 25010000002 PIPERACILLIN SOD-TAZOBACTAM PER 1 G: Performed by: EMERGENCY MEDICINE

## 2021-01-01 PROCEDURE — 80053 COMPREHEN METABOLIC PANEL: CPT | Performed by: EMERGENCY MEDICINE

## 2021-01-01 PROCEDURE — 84484 ASSAY OF TROPONIN QUANT: CPT | Performed by: EMERGENCY MEDICINE

## 2021-01-01 PROCEDURE — 80053 COMPREHEN METABOLIC PANEL: CPT | Performed by: HOSPITALIST

## 2021-01-01 PROCEDURE — 83880 ASSAY OF NATRIURETIC PEPTIDE: CPT | Performed by: PHYSICIAN ASSISTANT

## 2021-01-01 RX ORDER — BRIMONIDINE TARTRATE 0.15 %
1 DROPS OPHTHALMIC (EYE) 3 TIMES DAILY
Status: DISCONTINUED | OUTPATIENT
Start: 2021-01-01 | End: 2021-01-01

## 2021-01-01 RX ORDER — QUETIAPINE FUMARATE 100 MG/1
100 TABLET, FILM COATED ORAL NIGHTLY
Status: DISCONTINUED | OUTPATIENT
Start: 2021-01-01 | End: 2021-01-01

## 2021-01-01 RX ORDER — ISOSORBIDE MONONITRATE 30 MG/1
30 TABLET, EXTENDED RELEASE ORAL
Status: DISCONTINUED | OUTPATIENT
Start: 2021-01-01 | End: 2021-01-01

## 2021-01-01 RX ORDER — SODIUM CHLORIDE 0.9 % (FLUSH) 0.9 %
3 SYRINGE (ML) INJECTION AS NEEDED
Status: DISCONTINUED | OUTPATIENT
Start: 2021-01-01 | End: 2021-01-01 | Stop reason: HOSPADM

## 2021-01-01 RX ORDER — ONDANSETRON 2 MG/ML
4 INJECTION INTRAMUSCULAR; INTRAVENOUS EVERY 6 HOURS PRN
Status: DISCONTINUED | OUTPATIENT
Start: 2021-01-01 | End: 2021-01-01 | Stop reason: HOSPADM

## 2021-01-01 RX ORDER — NALOXONE HCL 0.4 MG/ML
0.4 VIAL (ML) INJECTION
Status: DISCONTINUED | OUTPATIENT
Start: 2021-01-01 | End: 2021-01-01 | Stop reason: HOSPADM

## 2021-01-01 RX ORDER — WARFARIN SODIUM 3 MG/1
3 TABLET ORAL
Status: DISCONTINUED | OUTPATIENT
Start: 2021-01-01 | End: 2021-01-01

## 2021-01-01 RX ORDER — LORAZEPAM 2 MG/ML
INJECTION INTRAMUSCULAR
Status: COMPLETED
Start: 2021-01-01 | End: 2021-01-01

## 2021-01-01 RX ORDER — ACETAMINOPHEN 325 MG/1
650 TABLET ORAL EVERY 6 HOURS PRN
Status: DISCONTINUED | OUTPATIENT
Start: 2021-01-01 | End: 2021-01-01 | Stop reason: HOSPADM

## 2021-01-01 RX ORDER — QUETIAPINE FUMARATE 50 MG/1
50 TABLET, FILM COATED ORAL EVERY MORNING
COMMUNITY

## 2021-01-01 RX ORDER — HYDRALAZINE HYDROCHLORIDE 50 MG/1
50 TABLET, FILM COATED ORAL EVERY 8 HOURS SCHEDULED
Status: DISCONTINUED | OUTPATIENT
Start: 2021-01-01 | End: 2021-01-01 | Stop reason: HOSPADM

## 2021-01-01 RX ORDER — SODIUM CHLORIDE 0.9 % (FLUSH) 0.9 %
10 SYRINGE (ML) INJECTION AS NEEDED
Status: DISCONTINUED | OUTPATIENT
Start: 2021-01-01 | End: 2021-01-01

## 2021-01-01 RX ORDER — HALOPERIDOL 1 MG/1
1 TABLET ORAL EVERY 6 HOURS PRN
Status: DISCONTINUED | OUTPATIENT
Start: 2021-01-01 | End: 2021-01-01 | Stop reason: HOSPADM

## 2021-01-01 RX ORDER — QUETIAPINE FUMARATE 25 MG/1
50 TABLET, FILM COATED ORAL EVERY MORNING
Status: DISCONTINUED | OUTPATIENT
Start: 2021-01-01 | End: 2021-01-01 | Stop reason: HOSPADM

## 2021-01-01 RX ORDER — BUDESONIDE 0.5 MG/2ML
0.5 INHALANT ORAL
Status: DISCONTINUED | OUTPATIENT
Start: 2021-01-01 | End: 2021-01-01

## 2021-01-01 RX ORDER — MORPHINE SULFATE 2 MG/ML
1 INJECTION, SOLUTION INTRAMUSCULAR; INTRAVENOUS EVERY 4 HOURS PRN
Status: DISCONTINUED | OUTPATIENT
Start: 2021-01-01 | End: 2021-01-01 | Stop reason: SDUPTHER

## 2021-01-01 RX ORDER — LATANOPROST 50 UG/ML
1 SOLUTION/ DROPS OPHTHALMIC NIGHTLY
Status: DISCONTINUED | OUTPATIENT
Start: 2021-01-01 | End: 2021-01-01

## 2021-01-01 RX ORDER — LORAZEPAM 2 MG/ML
1 INJECTION INTRAMUSCULAR ONCE
Status: COMPLETED | OUTPATIENT
Start: 2021-01-01 | End: 2021-01-01

## 2021-01-01 RX ORDER — POTASSIUM CHLORIDE 1.5 G/1.77G
40 POWDER, FOR SOLUTION ORAL AS NEEDED
Status: DISCONTINUED | OUTPATIENT
Start: 2021-01-01 | End: 2021-01-01

## 2021-01-01 RX ORDER — ATORVASTATIN CALCIUM 40 MG/1
40 TABLET, FILM COATED ORAL NIGHTLY
Status: DISCONTINUED | OUTPATIENT
Start: 2021-01-01 | End: 2021-01-01

## 2021-01-01 RX ORDER — ONDANSETRON 2 MG/ML
4 INJECTION INTRAMUSCULAR; INTRAVENOUS ONCE AS NEEDED
Status: DISCONTINUED | OUTPATIENT
Start: 2021-01-01 | End: 2021-01-01 | Stop reason: HOSPADM

## 2021-01-01 RX ORDER — BRINZOLAMIDE 10 MG/ML
1 SUSPENSION/ DROPS OPHTHALMIC 3 TIMES DAILY
Status: DISCONTINUED | OUTPATIENT
Start: 2021-01-01 | End: 2021-01-01 | Stop reason: HOSPADM

## 2021-01-01 RX ORDER — MORPHINE SULFATE 2 MG/ML
2 INJECTION, SOLUTION INTRAMUSCULAR; INTRAVENOUS
Status: DISCONTINUED | OUTPATIENT
Start: 2021-01-01 | End: 2021-01-01 | Stop reason: HOSPADM

## 2021-01-01 RX ORDER — FUROSEMIDE 10 MG/ML
40 INJECTION INTRAMUSCULAR; INTRAVENOUS EVERY 6 HOURS
Status: COMPLETED | OUTPATIENT
Start: 2021-01-01 | End: 2021-01-01

## 2021-01-01 RX ORDER — LORAZEPAM 2 MG/ML
1 INJECTION INTRAMUSCULAR EVERY 4 HOURS PRN
Status: DISCONTINUED | OUTPATIENT
Start: 2021-01-01 | End: 2021-01-01 | Stop reason: HOSPADM

## 2021-01-01 RX ORDER — IPRATROPIUM BROMIDE AND ALBUTEROL SULFATE 2.5; .5 MG/3ML; MG/3ML
3 SOLUTION RESPIRATORY (INHALATION) EVERY 4 HOURS PRN
Status: DISCONTINUED | OUTPATIENT
Start: 2021-01-01 | End: 2021-01-01 | Stop reason: HOSPADM

## 2021-01-01 RX ORDER — PANTOPRAZOLE SODIUM 40 MG/1
40 TABLET, DELAYED RELEASE ORAL DAILY
COMMUNITY

## 2021-01-01 RX ORDER — DEXTROSE MONOHYDRATE 25 G/50ML
25 INJECTION, SOLUTION INTRAVENOUS
Status: DISCONTINUED | OUTPATIENT
Start: 2021-01-01 | End: 2021-01-01 | Stop reason: HOSPADM

## 2021-01-01 RX ORDER — SCOLOPAMINE TRANSDERMAL SYSTEM 1 MG/1
1 PATCH, EXTENDED RELEASE TRANSDERMAL
Status: DISCONTINUED | OUTPATIENT
Start: 2021-01-01 | End: 2021-01-01 | Stop reason: HOSPADM

## 2021-01-01 RX ORDER — ASPIRIN 81 MG/1
324 TABLET, CHEWABLE ORAL ONCE
Status: COMPLETED | OUTPATIENT
Start: 2021-01-01 | End: 2021-01-01

## 2021-01-01 RX ORDER — CARVEDILOL 6.25 MG/1
6.25 TABLET ORAL 2 TIMES DAILY WITH MEALS
Status: DISCONTINUED | OUTPATIENT
Start: 2021-01-01 | End: 2021-01-01

## 2021-01-01 RX ORDER — BISACODYL 5 MG/1
5 TABLET, DELAYED RELEASE ORAL DAILY PRN
Status: DISCONTINUED | OUTPATIENT
Start: 2021-01-01 | End: 2021-01-01 | Stop reason: HOSPADM

## 2021-01-01 RX ORDER — HALOPERIDOL 1 MG/1
1 TABLET ORAL 3 TIMES DAILY PRN
Status: DISCONTINUED | OUTPATIENT
Start: 2021-01-01 | End: 2021-01-01

## 2021-01-01 RX ORDER — LORAZEPAM 2 MG/ML
1 INJECTION INTRAMUSCULAR EVERY 6 HOURS PRN
Status: DISCONTINUED | OUTPATIENT
Start: 2021-01-01 | End: 2021-01-01 | Stop reason: HOSPADM

## 2021-01-01 RX ORDER — KETAMINE HYDROCHLORIDE 100 MG/ML
INJECTION INTRAMUSCULAR; INTRAVENOUS AS NEEDED
Status: DISCONTINUED | OUTPATIENT
Start: 2021-01-01 | End: 2021-01-01 | Stop reason: SURG

## 2021-01-01 RX ORDER — QUETIAPINE FUMARATE 100 MG/1
100 TABLET, FILM COATED ORAL NIGHTLY
Status: DISCONTINUED | OUTPATIENT
Start: 2021-01-01 | End: 2021-01-01 | Stop reason: HOSPADM

## 2021-01-01 RX ORDER — SODIUM CHLORIDE 9 MG/ML
75 INJECTION, SOLUTION INTRAVENOUS CONTINUOUS
Status: DISCONTINUED | OUTPATIENT
Start: 2021-01-01 | End: 2021-01-01 | Stop reason: HOSPADM

## 2021-01-01 RX ORDER — FUROSEMIDE 40 MG/1
80 TABLET ORAL DAILY
Status: DISCONTINUED | OUTPATIENT
Start: 2021-01-01 | End: 2021-01-01 | Stop reason: HOSPADM

## 2021-01-01 RX ORDER — FUROSEMIDE 40 MG/1
40 TABLET ORAL DAILY
Qty: 30 TABLET | Refills: 1 | Status: SHIPPED | OUTPATIENT
Start: 2021-01-01

## 2021-01-01 RX ORDER — CARVEDILOL 3.12 MG/1
3.12 TABLET ORAL 2 TIMES DAILY WITH MEALS
Status: DISCONTINUED | OUTPATIENT
Start: 2021-01-01 | End: 2021-01-01

## 2021-01-01 RX ORDER — QUETIAPINE FUMARATE 25 MG/1
50 TABLET, FILM COATED ORAL EVERY MORNING
Status: DISCONTINUED | OUTPATIENT
Start: 2021-01-01 | End: 2021-01-01

## 2021-01-01 RX ORDER — HALOPERIDOL 1 MG/1
1 TABLET ORAL 3 TIMES DAILY PRN
Qty: 10 TABLET | Refills: 0 | Status: SHIPPED | OUTPATIENT
Start: 2021-01-01

## 2021-01-01 RX ORDER — DEXTROSE AND SODIUM CHLORIDE 5; .9 G/100ML; G/100ML
50 INJECTION, SOLUTION INTRAVENOUS CONTINUOUS
Status: DISCONTINUED | OUTPATIENT
Start: 2021-01-01 | End: 2021-01-01

## 2021-01-01 RX ORDER — ISOSORBIDE MONONITRATE 30 MG/1
30 TABLET, EXTENDED RELEASE ORAL
Status: DISCONTINUED | OUTPATIENT
Start: 2021-01-01 | End: 2021-01-01 | Stop reason: HOSPADM

## 2021-01-01 RX ORDER — CARVEDILOL 6.25 MG/1
6.25 TABLET ORAL 2 TIMES DAILY WITH MEALS
COMMUNITY
End: 2021-01-01 | Stop reason: HOSPADM

## 2021-01-01 RX ORDER — WARFARIN SODIUM 3 MG/1
3 TABLET ORAL TAKE AS DIRECTED
COMMUNITY
End: 2021-01-01

## 2021-01-01 RX ORDER — HYDRALAZINE HYDROCHLORIDE 20 MG/ML
10 INJECTION INTRAMUSCULAR; INTRAVENOUS EVERY 6 HOURS PRN
Status: DISCONTINUED | OUTPATIENT
Start: 2021-01-01 | End: 2021-01-01 | Stop reason: HOSPADM

## 2021-01-01 RX ORDER — SODIUM CHLORIDE 0.9 % (FLUSH) 0.9 %
10 SYRINGE (ML) INJECTION EVERY 12 HOURS SCHEDULED
Status: DISCONTINUED | OUTPATIENT
Start: 2021-01-01 | End: 2021-01-01 | Stop reason: HOSPADM

## 2021-01-01 RX ORDER — FUROSEMIDE 10 MG/ML
40 INJECTION INTRAMUSCULAR; INTRAVENOUS ONCE
Status: DISCONTINUED | OUTPATIENT
Start: 2021-01-01 | End: 2021-01-01

## 2021-01-01 RX ORDER — ALBUMIN (HUMAN) 12.5 G/50ML
12.5 SOLUTION INTRAVENOUS ONCE
Status: COMPLETED | OUTPATIENT
Start: 2021-01-01 | End: 2021-01-01

## 2021-01-01 RX ORDER — SODIUM CHLORIDE 9 MG/ML
50 INJECTION, SOLUTION INTRAVENOUS CONTINUOUS
Status: DISCONTINUED | OUTPATIENT
Start: 2021-01-01 | End: 2021-01-01

## 2021-01-01 RX ORDER — PANTOPRAZOLE SODIUM 40 MG/1
40 TABLET, DELAYED RELEASE ORAL
Status: DISCONTINUED | OUTPATIENT
Start: 2021-01-01 | End: 2021-01-01 | Stop reason: HOSPADM

## 2021-01-01 RX ORDER — HALOPERIDOL 5 MG/ML
0.5 INJECTION INTRAMUSCULAR EVERY 6 HOURS PRN
Status: DISCONTINUED | OUTPATIENT
Start: 2021-01-01 | End: 2021-01-01 | Stop reason: HOSPADM

## 2021-01-01 RX ORDER — CARVEDILOL 3.12 MG/1
3.12 TABLET ORAL 2 TIMES DAILY WITH MEALS
Qty: 30 TABLET | Refills: 0 | Status: SHIPPED | OUTPATIENT
Start: 2021-01-01

## 2021-01-01 RX ORDER — WARFARIN SODIUM 5 MG/1
5 TABLET ORAL
Status: DISCONTINUED | OUTPATIENT
Start: 2021-01-01 | End: 2021-01-01

## 2021-01-01 RX ORDER — SODIUM CHLORIDE 0.9 % (FLUSH) 0.9 %
10 SYRINGE (ML) INJECTION AS NEEDED
Status: DISCONTINUED | OUTPATIENT
Start: 2021-01-01 | End: 2021-01-01 | Stop reason: HOSPADM

## 2021-01-01 RX ORDER — PANTOPRAZOLE SODIUM 40 MG/1
40 TABLET, DELAYED RELEASE ORAL EVERY MORNING
Status: DISCONTINUED | OUTPATIENT
Start: 2021-01-01 | End: 2021-01-01

## 2021-01-01 RX ORDER — MINOXIDIL 2.5 MG/1
2.5 TABLET ORAL DAILY
Status: DISCONTINUED | OUTPATIENT
Start: 2021-01-01 | End: 2021-01-01 | Stop reason: HOSPADM

## 2021-01-01 RX ORDER — MINOXIDIL 2.5 MG/1
2.5 TABLET ORAL DAILY
Status: DISCONTINUED | OUTPATIENT
Start: 2021-01-01 | End: 2021-01-01

## 2021-01-01 RX ORDER — PHYTONADIONE 2 MG/ML
5 INJECTION, EMULSION INTRAMUSCULAR; INTRAVENOUS; SUBCUTANEOUS ONCE
Status: DISCONTINUED | OUTPATIENT
Start: 2021-01-01 | End: 2021-01-01

## 2021-01-01 RX ORDER — AMIODARONE HYDROCHLORIDE 200 MG/1
200 TABLET ORAL
Status: DISCONTINUED | OUTPATIENT
Start: 2021-01-01 | End: 2021-01-01 | Stop reason: HOSPADM

## 2021-01-01 RX ORDER — BUDESONIDE AND FORMOTEROL FUMARATE DIHYDRATE 160; 4.5 UG/1; UG/1
2 AEROSOL RESPIRATORY (INHALATION)
Status: DISCONTINUED | OUTPATIENT
Start: 2021-01-01 | End: 2021-01-01 | Stop reason: HOSPADM

## 2021-01-01 RX ORDER — FUROSEMIDE 10 MG/ML
20 INJECTION INTRAMUSCULAR; INTRAVENOUS ONCE
Status: COMPLETED | OUTPATIENT
Start: 2021-01-01 | End: 2021-01-01

## 2021-01-01 RX ORDER — BUDESONIDE 0.5 MG/2ML
0.5 INHALANT ORAL
Status: DISCONTINUED | OUTPATIENT
Start: 2021-01-01 | End: 2021-01-01 | Stop reason: HOSPADM

## 2021-01-01 RX ORDER — FUROSEMIDE 10 MG/ML
40 INJECTION INTRAMUSCULAR; INTRAVENOUS EVERY 12 HOURS
Status: DISCONTINUED | OUTPATIENT
Start: 2021-01-01 | End: 2021-01-01

## 2021-01-01 RX ORDER — DIAPER,BRIEF,INFANT-TODD,DISP
EACH MISCELLANEOUS DAILY
Status: DISCONTINUED | OUTPATIENT
Start: 2021-01-01 | End: 2021-01-01 | Stop reason: HOSPADM

## 2021-01-01 RX ORDER — LORAZEPAM 1 MG/1
1 TABLET ORAL ONCE
Status: DISCONTINUED | OUTPATIENT
Start: 2021-01-01 | End: 2021-01-01

## 2021-01-01 RX ORDER — ASPIRIN 300 MG/1
300 SUPPOSITORY RECTAL ONCE
Status: DISCONTINUED | OUTPATIENT
Start: 2021-01-01 | End: 2021-01-01 | Stop reason: HOSPADM

## 2021-01-01 RX ORDER — NICOTINE POLACRILEX 4 MG
15 LOZENGE BUCCAL
Status: DISCONTINUED | OUTPATIENT
Start: 2021-01-01 | End: 2021-01-01

## 2021-01-01 RX ORDER — SODIUM CHLORIDE 9 MG/ML
INJECTION, SOLUTION INTRAVENOUS
Status: COMPLETED
Start: 2021-01-01 | End: 2021-01-01

## 2021-01-01 RX ORDER — SODIUM CHLORIDE 9 MG/ML
INJECTION, SOLUTION INTRAVENOUS
Status: DISPENSED
Start: 2021-01-01 | End: 2021-01-01

## 2021-01-01 RX ORDER — IPRATROPIUM BROMIDE AND ALBUTEROL SULFATE 2.5; .5 MG/3ML; MG/3ML
3 SOLUTION RESPIRATORY (INHALATION) EVERY 4 HOURS PRN
Status: DISCONTINUED | OUTPATIENT
Start: 2021-01-01 | End: 2021-01-01

## 2021-01-01 RX ORDER — ATORVASTATIN CALCIUM 40 MG/1
40 TABLET, FILM COATED ORAL NIGHTLY
Status: DISCONTINUED | OUTPATIENT
Start: 2021-01-01 | End: 2021-01-01 | Stop reason: HOSPADM

## 2021-01-01 RX ORDER — LINEZOLID 600 MG/1
600 TABLET, FILM COATED ORAL EVERY 12 HOURS SCHEDULED
Status: DISCONTINUED | OUTPATIENT
Start: 2021-01-01 | End: 2021-01-01

## 2021-01-01 RX ORDER — CARVEDILOL 3.12 MG/1
3.12 TABLET ORAL 2 TIMES DAILY WITH MEALS
Status: DISCONTINUED | OUTPATIENT
Start: 2021-01-01 | End: 2021-01-01 | Stop reason: HOSPADM

## 2021-01-01 RX ORDER — SODIUM CHLORIDE 9 MG/ML
125 INJECTION, SOLUTION INTRAVENOUS CONTINUOUS
Status: DISCONTINUED | OUTPATIENT
Start: 2021-01-01 | End: 2021-01-01

## 2021-01-01 RX ORDER — WATER 1000 ML/1000ML
INJECTION, SOLUTION INTRAVENOUS
Status: COMPLETED
Start: 2021-01-01 | End: 2021-01-01

## 2021-01-01 RX ORDER — LATANOPROST 50 UG/ML
1 SOLUTION/ DROPS OPHTHALMIC NIGHTLY
Status: DISCONTINUED | OUTPATIENT
Start: 2021-01-01 | End: 2021-01-01 | Stop reason: HOSPADM

## 2021-01-01 RX ORDER — ATROPINE SULFATE 0.1 MG/ML
0.5 INJECTION INTRAVENOUS ONCE
Status: COMPLETED | OUTPATIENT
Start: 2021-01-01 | End: 2021-01-01

## 2021-01-01 RX ORDER — ONDANSETRON 4 MG/1
4 TABLET, FILM COATED ORAL EVERY 6 HOURS PRN
Status: DISCONTINUED | OUTPATIENT
Start: 2021-01-01 | End: 2021-01-01 | Stop reason: HOSPADM

## 2021-01-01 RX ORDER — HYDRALAZINE HYDROCHLORIDE 20 MG/ML
10 INJECTION INTRAMUSCULAR; INTRAVENOUS ONCE
Status: COMPLETED | OUTPATIENT
Start: 2021-01-01 | End: 2021-01-01

## 2021-01-01 RX ORDER — CALCIUM CARBONATE 200(500)MG
2 TABLET,CHEWABLE ORAL 2 TIMES DAILY PRN
Status: DISCONTINUED | OUTPATIENT
Start: 2021-01-01 | End: 2021-01-01 | Stop reason: HOSPADM

## 2021-01-01 RX ORDER — AMIODARONE HYDROCHLORIDE 200 MG/1
200 TABLET ORAL
Status: DISCONTINUED | OUTPATIENT
Start: 2021-01-01 | End: 2021-01-01

## 2021-01-01 RX ORDER — WARFARIN SODIUM 1 MG/1
1 TABLET ORAL
Status: COMPLETED | OUTPATIENT
Start: 2021-01-01 | End: 2021-01-01

## 2021-01-01 RX ORDER — WARFARIN SODIUM 4 MG/1
4 TABLET ORAL
Status: DISCONTINUED | OUTPATIENT
Start: 2021-01-01 | End: 2021-01-01

## 2021-01-01 RX ORDER — FUROSEMIDE 40 MG/1
40 TABLET ORAL DAILY
Status: DISCONTINUED | OUTPATIENT
Start: 2021-01-01 | End: 2021-01-01

## 2021-01-01 RX ORDER — WARFARIN SODIUM 3 MG/1
3 TABLET ORAL
Status: DISCONTINUED | OUTPATIENT
Start: 2021-01-01 | End: 2021-01-01 | Stop reason: HOSPADM

## 2021-01-01 RX ORDER — NICOTINE POLACRILEX 4 MG
15 LOZENGE BUCCAL
Status: DISCONTINUED | OUTPATIENT
Start: 2021-01-01 | End: 2021-01-01 | Stop reason: HOSPADM

## 2021-01-01 RX ORDER — PANTOPRAZOLE SODIUM 40 MG/1
40 TABLET, DELAYED RELEASE ORAL DAILY
Status: DISCONTINUED | OUTPATIENT
Start: 2021-01-01 | End: 2021-01-01

## 2021-01-01 RX ORDER — L. ACIDOPHILUS/L.BULGARICUS 1MM CELL
1 TABLET ORAL 2 TIMES DAILY
Status: DISCONTINUED | OUTPATIENT
Start: 2021-01-01 | End: 2021-01-01 | Stop reason: HOSPADM

## 2021-01-01 RX ORDER — HYDRALAZINE HYDROCHLORIDE 20 MG/ML
20 INJECTION INTRAMUSCULAR; INTRAVENOUS EVERY 6 HOURS PRN
Status: DISCONTINUED | OUTPATIENT
Start: 2021-01-01 | End: 2021-01-01 | Stop reason: HOSPADM

## 2021-01-01 RX ORDER — EPINEPHRINE 0.1 MG/ML
SYRINGE (ML) INJECTION
Status: COMPLETED | OUTPATIENT
Start: 2021-01-01 | End: 2021-01-01

## 2021-01-01 RX ORDER — FUROSEMIDE 40 MG/1
40 TABLET ORAL DAILY
COMMUNITY
End: 2021-01-01

## 2021-01-01 RX ORDER — PROPOFOL 10 MG/ML
VIAL (ML) INTRAVENOUS AS NEEDED
Status: DISCONTINUED | OUTPATIENT
Start: 2021-01-01 | End: 2021-01-01 | Stop reason: SURG

## 2021-01-01 RX ORDER — AMIODARONE HYDROCHLORIDE 200 MG/1
200 TABLET ORAL 2 TIMES DAILY
Status: DISCONTINUED | OUTPATIENT
Start: 2021-01-01 | End: 2021-01-01

## 2021-01-01 RX ORDER — MORPHINE SULFATE 2 MG/ML
1 INJECTION, SOLUTION INTRAMUSCULAR; INTRAVENOUS EVERY 4 HOURS PRN
Status: DISCONTINUED | OUTPATIENT
Start: 2021-01-01 | End: 2021-01-01 | Stop reason: HOSPADM

## 2021-01-01 RX ORDER — FUROSEMIDE 10 MG/ML
20 INJECTION INTRAMUSCULAR; INTRAVENOUS DAILY
Status: DISCONTINUED | OUTPATIENT
Start: 2021-01-01 | End: 2021-01-01

## 2021-01-01 RX ORDER — HYDRALAZINE HYDROCHLORIDE 50 MG/1
50 TABLET, FILM COATED ORAL EVERY 8 HOURS SCHEDULED
Status: DISCONTINUED | OUTPATIENT
Start: 2021-01-01 | End: 2021-01-01

## 2021-01-01 RX ORDER — PANTOPRAZOLE SODIUM 40 MG/1
40 TABLET, DELAYED RELEASE ORAL DAILY
Status: DISCONTINUED | OUTPATIENT
Start: 2021-01-01 | End: 2021-01-01 | Stop reason: HOSPADM

## 2021-01-01 RX ORDER — HYDRALAZINE HYDROCHLORIDE 25 MG/1
25 TABLET, FILM COATED ORAL EVERY 8 HOURS SCHEDULED
Status: DISCONTINUED | OUTPATIENT
Start: 2021-01-01 | End: 2021-01-01

## 2021-01-01 RX ORDER — MINOXIDIL 2.5 MG/1
2.5 TABLET ORAL DAILY
COMMUNITY

## 2021-01-01 RX ORDER — ZIPRASIDONE MESYLATE 20 MG/ML
10 INJECTION, POWDER, LYOPHILIZED, FOR SOLUTION INTRAMUSCULAR ONCE
Status: DISCONTINUED | OUTPATIENT
Start: 2021-01-01 | End: 2021-01-01

## 2021-01-01 RX ORDER — FUROSEMIDE 40 MG/1
40 TABLET ORAL DAILY
Status: DISCONTINUED | OUTPATIENT
Start: 2021-01-01 | End: 2021-01-01 | Stop reason: HOSPADM

## 2021-01-01 RX ORDER — HYDRALAZINE HYDROCHLORIDE 50 MG/1
50 TABLET, FILM COATED ORAL EVERY 8 HOURS SCHEDULED
Start: 2021-01-01

## 2021-01-01 RX ORDER — DEXTROSE MONOHYDRATE 25 G/50ML
25 INJECTION, SOLUTION INTRAVENOUS
Status: DISCONTINUED | OUTPATIENT
Start: 2021-01-01 | End: 2021-01-01

## 2021-01-01 RX ORDER — WARFARIN SODIUM 3 MG/1
3 TABLET ORAL
COMMUNITY

## 2021-01-01 RX ORDER — DEXTROSE MONOHYDRATE 50 MG/ML
30 INJECTION, SOLUTION INTRAVENOUS CONTINUOUS
Status: DISCONTINUED | OUTPATIENT
Start: 2021-01-01 | End: 2021-01-01

## 2021-01-01 RX ORDER — FENTANYL CITRATE 50 UG/ML
50 INJECTION, SOLUTION INTRAMUSCULAR; INTRAVENOUS
Status: DISCONTINUED | OUTPATIENT
Start: 2021-01-01 | End: 2021-01-01

## 2021-01-01 RX ORDER — HYDROCODONE BITARTRATE AND ACETAMINOPHEN 7.5; 325 MG/1; MG/1
1 TABLET ORAL EVERY 6 HOURS PRN
Status: DISCONTINUED | OUTPATIENT
Start: 2021-01-01 | End: 2021-01-01 | Stop reason: HOSPADM

## 2021-01-01 RX ORDER — WARFARIN SODIUM 2.5 MG/1
2.5 TABLET ORAL
Status: DISCONTINUED | OUTPATIENT
Start: 2021-01-01 | End: 2021-01-01

## 2021-01-01 RX ORDER — LORAZEPAM 2 MG/ML
INJECTION INTRAMUSCULAR
Status: DISPENSED
Start: 2021-01-01 | End: 2021-01-01

## 2021-01-01 RX ORDER — WARFARIN SODIUM 2.5 MG/1
2.5 TABLET ORAL
Status: DISCONTINUED | OUTPATIENT
Start: 2021-01-01 | End: 2021-01-01 | Stop reason: HOSPADM

## 2021-01-01 RX ORDER — ACETAMINOPHEN 325 MG/1
650 TABLET ORAL EVERY 4 HOURS PRN
Status: DISCONTINUED | OUTPATIENT
Start: 2021-01-01 | End: 2021-01-01 | Stop reason: HOSPADM

## 2021-01-01 RX ORDER — IPRATROPIUM BROMIDE AND ALBUTEROL SULFATE 2.5; .5 MG/3ML; MG/3ML
3 SOLUTION RESPIRATORY (INHALATION)
Status: DISCONTINUED | OUTPATIENT
Start: 2021-01-01 | End: 2021-01-01

## 2021-01-01 RX ORDER — BRINZOLAMIDE 10 MG/ML
1 SUSPENSION/ DROPS OPHTHALMIC 3 TIMES DAILY
Status: DISCONTINUED | OUTPATIENT
Start: 2021-01-01 | End: 2021-01-01

## 2021-01-01 RX ORDER — AMIODARONE HYDROCHLORIDE 200 MG/1
200 TABLET ORAL
Start: 2021-01-01

## 2021-01-01 RX ORDER — NOREPINEPHRINE BIT/0.9 % NACL 8 MG/250ML
.02-.3 INFUSION BOTTLE (ML) INTRAVENOUS
Status: DISCONTINUED | OUTPATIENT
Start: 2021-01-01 | End: 2021-01-01

## 2021-01-01 RX ORDER — BRIMONIDINE TARTRATE 0.15 %
1 DROPS OPHTHALMIC (EYE) 3 TIMES DAILY
Status: DISCONTINUED | OUTPATIENT
Start: 2021-01-01 | End: 2021-01-01 | Stop reason: HOSPADM

## 2021-01-01 RX ORDER — SODIUM CHLORIDE 9 MG/ML
75 INJECTION, SOLUTION INTRAVENOUS CONTINUOUS
Status: DISCONTINUED | OUTPATIENT
Start: 2021-01-01 | End: 2021-01-01

## 2021-01-01 RX ORDER — PANTOPRAZOLE SODIUM 40 MG/10ML
40 INJECTION, POWDER, LYOPHILIZED, FOR SOLUTION INTRAVENOUS
Status: DISCONTINUED | OUTPATIENT
Start: 2021-01-01 | End: 2021-01-01

## 2021-01-01 RX ORDER — CEFAZOLIN SODIUM 1 G/3ML
INJECTION, POWDER, FOR SOLUTION INTRAMUSCULAR; INTRAVENOUS AS NEEDED
Status: DISCONTINUED | OUTPATIENT
Start: 2021-01-01 | End: 2021-01-01 | Stop reason: SURG

## 2021-01-01 RX ORDER — WARFARIN SODIUM 3 MG/1
6 TABLET ORAL
Status: DISCONTINUED | OUTPATIENT
Start: 2021-01-01 | End: 2021-01-01

## 2021-01-01 RX ORDER — ONDANSETRON 2 MG/ML
4 INJECTION INTRAMUSCULAR; INTRAVENOUS ONCE
Status: COMPLETED | OUTPATIENT
Start: 2021-01-01 | End: 2021-01-01

## 2021-01-01 RX ORDER — AMIODARONE HYDROCHLORIDE 200 MG/1
200 TABLET ORAL 2 TIMES DAILY
COMMUNITY
End: 2021-01-01 | Stop reason: HOSPADM

## 2021-01-01 RX ORDER — FUROSEMIDE 10 MG/ML
40 INJECTION INTRAMUSCULAR; INTRAVENOUS DAILY
Status: DISCONTINUED | OUTPATIENT
Start: 2021-01-01 | End: 2021-01-01

## 2021-01-01 RX ORDER — LANOLIN ALCOHOL/MO/W.PET/CERES
5 CREAM (GRAM) TOPICAL NIGHTLY PRN
Status: DISCONTINUED | OUTPATIENT
Start: 2021-01-01 | End: 2021-01-01 | Stop reason: HOSPADM

## 2021-01-01 RX ORDER — SODIUM CHLORIDE 0.9 % (FLUSH) 0.9 %
3 SYRINGE (ML) INJECTION EVERY 12 HOURS SCHEDULED
Status: DISCONTINUED | OUTPATIENT
Start: 2021-01-01 | End: 2021-01-01 | Stop reason: HOSPADM

## 2021-01-01 RX ORDER — LORAZEPAM 2 MG/ML
2 INJECTION INTRAMUSCULAR ONCE
Status: COMPLETED | OUTPATIENT
Start: 2021-01-01 | End: 2021-01-01

## 2021-01-01 RX ORDER — DIAPER,BRIEF,INFANT-TODD,DISP
EACH MISCELLANEOUS DAILY
Start: 2021-01-01 | End: 2021-01-01

## 2021-01-01 RX ORDER — ATROPINE SULFATE 1 MG/ML
INJECTION, SOLUTION INTRAMUSCULAR; INTRAVENOUS; SUBCUTANEOUS
Status: COMPLETED | OUTPATIENT
Start: 2021-01-01 | End: 2021-01-01

## 2021-01-01 RX ORDER — MORPHINE SULFATE 2 MG/ML
2 INJECTION, SOLUTION INTRAMUSCULAR; INTRAVENOUS ONCE
Status: DISCONTINUED | OUTPATIENT
Start: 2021-01-01 | End: 2021-01-01

## 2021-01-01 RX ORDER — PANTOPRAZOLE SODIUM 40 MG/1
40 TABLET, DELAYED RELEASE ORAL EVERY MORNING
Status: DISCONTINUED | OUTPATIENT
Start: 2021-01-01 | End: 2021-01-01 | Stop reason: HOSPADM

## 2021-01-01 RX ORDER — HALOPERIDOL 1 MG/1
1 TABLET ORAL 3 TIMES DAILY PRN
Status: DISCONTINUED | OUTPATIENT
Start: 2021-01-01 | End: 2021-01-01 | Stop reason: HOSPADM

## 2021-01-01 RX ORDER — DEXTROSE MONOHYDRATE 25 G/50ML
50 INJECTION, SOLUTION INTRAVENOUS
Status: DISCONTINUED | OUTPATIENT
Start: 2021-01-01 | End: 2021-01-01 | Stop reason: HOSPADM

## 2021-01-01 RX ORDER — NITROGLYCERIN 0.4 MG/1
0.4 TABLET SUBLINGUAL
Status: DISCONTINUED | OUTPATIENT
Start: 2021-01-01 | End: 2021-01-01 | Stop reason: HOSPADM

## 2021-01-01 RX ORDER — BUDESONIDE AND FORMOTEROL FUMARATE DIHYDRATE 160; 4.5 UG/1; UG/1
2 AEROSOL RESPIRATORY (INHALATION)
Status: DISCONTINUED | OUTPATIENT
Start: 2021-01-01 | End: 2021-01-01

## 2021-01-01 RX ORDER — FUROSEMIDE 40 MG/1
40 TABLET ORAL
Status: DISCONTINUED | OUTPATIENT
Start: 2021-01-01 | End: 2021-01-01

## 2021-01-01 RX ORDER — BACITRACIN ZINC 500 [USP'U]/G
OINTMENT TOPICAL DAILY
Status: DISCONTINUED | OUTPATIENT
Start: 2021-01-01 | End: 2021-01-01 | Stop reason: HOSPADM

## 2021-01-01 RX ORDER — WARFARIN SODIUM 3 MG/1
3 TABLET ORAL
Status: DISCONTINUED | OUTPATIENT
Start: 2021-01-01 | End: 2021-01-01 | Stop reason: DRUGHIGH

## 2021-01-01 RX ORDER — QUETIAPINE FUMARATE 50 MG/1
100 TABLET, FILM COATED ORAL NIGHTLY
COMMUNITY

## 2021-01-01 RX ORDER — WARFARIN SODIUM 1 MG/1
1 TABLET ORAL
Status: DISCONTINUED | OUTPATIENT
Start: 2021-01-01 | End: 2021-01-01

## 2021-01-01 RX ADMIN — QUETIAPINE FUMARATE 50 MG: 25 TABLET ORAL at 06:12

## 2021-01-01 RX ADMIN — POTASSIUM CHLORIDE 40 MEQ: 1.5 POWDER, FOR SOLUTION ORAL at 06:36

## 2021-01-01 RX ADMIN — BACITRACIN: 500 OINTMENT TOPICAL at 09:16

## 2021-01-01 RX ADMIN — FUROSEMIDE 40 MG: 40 TABLET ORAL at 10:03

## 2021-01-01 RX ADMIN — HYDRALAZINE HYDROCHLORIDE 20 MG: 20 INJECTION INTRAMUSCULAR; INTRAVENOUS at 22:27

## 2021-01-01 RX ADMIN — SODIUM CHLORIDE, PRESERVATIVE FREE 10 ML: 5 INJECTION INTRAVENOUS at 07:31

## 2021-01-01 RX ADMIN — BRINZOLAMIDE 1 DROP: 10 SUSPENSION/ DROPS OPHTHALMIC at 17:04

## 2021-01-01 RX ADMIN — LATANOPROST 1 DROP: 50 SOLUTION OPHTHALMIC at 20:03

## 2021-01-01 RX ADMIN — ALBUMIN HUMAN 12.5 G: 0.25 SOLUTION INTRAVENOUS at 03:42

## 2021-01-01 RX ADMIN — MIDAZOLAM 5 MG/HR: 5 INJECTION INTRAMUSCULAR; INTRAVENOUS at 04:05

## 2021-01-01 RX ADMIN — ISOSORBIDE MONONITRATE 30 MG: 30 TABLET, EXTENDED RELEASE ORAL at 08:53

## 2021-01-01 RX ADMIN — ATORVASTATIN CALCIUM 40 MG: 40 TABLET, FILM COATED ORAL at 22:15

## 2021-01-01 RX ADMIN — MINOXIDIL 2.5 MG: 2.5 TABLET ORAL at 08:17

## 2021-01-01 RX ADMIN — LATANOPROST 1 DROP: 50 SOLUTION OPHTHALMIC at 21:25

## 2021-01-01 RX ADMIN — QUETIAPINE FUMARATE 50 MG: 25 TABLET ORAL at 06:13

## 2021-01-01 RX ADMIN — Medication 0.1 MCG/KG/MIN: at 04:37

## 2021-01-01 RX ADMIN — POTASSIUM CHLORIDE 40 MEQ: 1.5 POWDER, FOR SOLUTION ORAL at 13:46

## 2021-01-01 RX ADMIN — Medication 1 APPLICATION: at 08:25

## 2021-01-01 RX ADMIN — WATER 0.5 ML: 1 INJECTION INTRAMUSCULAR; INTRAVENOUS; SUBCUTANEOUS at 17:30

## 2021-01-01 RX ADMIN — Medication 1 APPLICATION: at 09:20

## 2021-01-01 RX ADMIN — SODIUM CHLORIDE, PRESERVATIVE FREE 10 ML: 5 INJECTION INTRAVENOUS at 09:31

## 2021-01-01 RX ADMIN — BRIMONIDINE TARTRATE 1 DROP: 1.5 SOLUTION OPHTHALMIC at 08:42

## 2021-01-01 RX ADMIN — BACITRACIN: 500 OINTMENT TOPICAL at 17:50

## 2021-01-01 RX ADMIN — BRIMONIDINE TARTRATE 1 DROP: 1.5 SOLUTION OPHTHALMIC at 16:30

## 2021-01-01 RX ADMIN — BRINZOLAMIDE 1 DROP: 10 SUSPENSION/ DROPS OPHTHALMIC at 17:00

## 2021-01-01 RX ADMIN — BRIMONIDINE TARTRATE 1 DROP: 1.5 SOLUTION OPHTHALMIC at 22:17

## 2021-01-01 RX ADMIN — Medication 1 APPLICATION: at 20:07

## 2021-01-01 RX ADMIN — SODIUM CHLORIDE, PRESERVATIVE FREE 10 ML: 5 INJECTION INTRAVENOUS at 10:02

## 2021-01-01 RX ADMIN — HYDRALAZINE HYDROCHLORIDE 50 MG: 50 TABLET, FILM COATED ORAL at 15:38

## 2021-01-01 RX ADMIN — HYDRALAZINE HYDROCHLORIDE 50 MG: 50 TABLET, FILM COATED ORAL at 21:25

## 2021-01-01 RX ADMIN — DEXTROSE AND SODIUM CHLORIDE 50 ML/HR: 5; 900 INJECTION, SOLUTION INTRAVENOUS at 23:30

## 2021-01-01 RX ADMIN — BUDESONIDE AND FORMOTEROL FUMARATE DIHYDRATE 2 PUFF: 160; 4.5 AEROSOL RESPIRATORY (INHALATION) at 20:28

## 2021-01-01 RX ADMIN — PANTOPRAZOLE SODIUM 40 MG: 40 TABLET, DELAYED RELEASE ORAL at 08:45

## 2021-01-01 RX ADMIN — CEFTRIAXONE SODIUM 1 G: 1 INJECTION, POWDER, FOR SOLUTION INTRAMUSCULAR; INTRAVENOUS at 08:30

## 2021-01-01 RX ADMIN — BRINZOLAMIDE 1 DROP: 10 SUSPENSION/ DROPS OPHTHALMIC at 08:16

## 2021-01-01 RX ADMIN — BRINZOLAMIDE 1 DROP: 10 SUSPENSION/ DROPS OPHTHALMIC at 20:51

## 2021-01-01 RX ADMIN — AZITHROMYCIN MONOHYDRATE 500 MG: 500 INJECTION, POWDER, LYOPHILIZED, FOR SOLUTION INTRAVENOUS at 19:16

## 2021-01-01 RX ADMIN — BRINZOLAMIDE 1 DROP: 10 SUSPENSION/ DROPS OPHTHALMIC at 15:39

## 2021-01-01 RX ADMIN — SODIUM CHLORIDE, PRESERVATIVE FREE 10 ML: 5 INJECTION INTRAVENOUS at 20:49

## 2021-01-01 RX ADMIN — LATANOPROST 1 DROP: 50 SOLUTION OPHTHALMIC at 20:18

## 2021-01-01 RX ADMIN — AZITHROMYCIN MONOHYDRATE 500 MG: 500 INJECTION, POWDER, LYOPHILIZED, FOR SOLUTION INTRAVENOUS at 20:54

## 2021-01-01 RX ADMIN — WARFARIN SODIUM 3 MG: 3 TABLET ORAL at 17:13

## 2021-01-01 RX ADMIN — MORPHINE SULFATE 1 MG: 2 INJECTION, SOLUTION INTRAMUSCULAR; INTRAVENOUS at 16:16

## 2021-01-01 RX ADMIN — CARVEDILOL 3.12 MG: 3.12 TABLET, FILM COATED ORAL at 08:23

## 2021-01-01 RX ADMIN — BRIMONIDINE TARTRATE 1 DROP: 1.5 SOLUTION OPHTHALMIC at 21:05

## 2021-01-01 RX ADMIN — SODIUM CHLORIDE, PRESERVATIVE FREE 10 ML: 5 INJECTION INTRAVENOUS at 20:37

## 2021-01-01 RX ADMIN — POTASSIUM CHLORIDE 40 MEQ: 1.5 POWDER, FOR SOLUTION ORAL at 06:00

## 2021-01-01 RX ADMIN — AZITHROMYCIN MONOHYDRATE 500 MG: 500 INJECTION, POWDER, LYOPHILIZED, FOR SOLUTION INTRAVENOUS at 18:44

## 2021-01-01 RX ADMIN — PANTOPRAZOLE SODIUM 40 MG: 40 INJECTION, POWDER, FOR SOLUTION INTRAVENOUS at 06:41

## 2021-01-01 RX ADMIN — ATORVASTATIN CALCIUM 40 MG: 40 TABLET, FILM COATED ORAL at 20:50

## 2021-01-01 RX ADMIN — HYDRALAZINE HYDROCHLORIDE 50 MG: 50 TABLET, FILM COATED ORAL at 06:09

## 2021-01-01 RX ADMIN — BRIMONIDINE TARTRATE 1 DROP: 1.5 SOLUTION OPHTHALMIC at 16:16

## 2021-01-01 RX ADMIN — BRIMONIDINE TARTRATE 1 DROP: 1.5 SOLUTION OPHTHALMIC at 17:29

## 2021-01-01 RX ADMIN — CEFTRIAXONE SODIUM 1 G: 1 INJECTION, POWDER, FOR SOLUTION INTRAMUSCULAR; INTRAVENOUS at 08:40

## 2021-01-01 RX ADMIN — MIDAZOLAM 10 MG/HR: 5 INJECTION INTRAMUSCULAR; INTRAVENOUS at 10:44

## 2021-01-01 RX ADMIN — LORAZEPAM 1 MG: 2 INJECTION INTRAMUSCULAR; INTRAVENOUS at 13:04

## 2021-01-01 RX ADMIN — NYSTATIN 1 APPLICATION: 100000 OINTMENT TOPICAL at 08:04

## 2021-01-01 RX ADMIN — CEFTRIAXONE SODIUM 2 G: 2 INJECTION, POWDER, FOR SOLUTION INTRAMUSCULAR; INTRAVENOUS at 17:13

## 2021-01-01 RX ADMIN — VANCOMYCIN HYDROCHLORIDE 1000 MG: 1 INJECTION, POWDER, LYOPHILIZED, FOR SOLUTION INTRAVENOUS at 00:53

## 2021-01-01 RX ADMIN — SODIUM CHLORIDE, PRESERVATIVE FREE 10 ML: 5 INJECTION INTRAVENOUS at 21:26

## 2021-01-01 RX ADMIN — MINOXIDIL 2.5 MG: 2.5 TABLET ORAL at 08:22

## 2021-01-01 RX ADMIN — Medication 1 APPLICATION: at 08:16

## 2021-01-01 RX ADMIN — BRIMONIDINE TARTRATE 1 DROP: 1.5 SOLUTION OPHTHALMIC at 20:02

## 2021-01-01 RX ADMIN — BRINZOLAMIDE 1 DROP: 10 SUSPENSION/ DROPS OPHTHALMIC at 08:25

## 2021-01-01 RX ADMIN — SODIUM CHLORIDE, PRESERVATIVE FREE 10 ML: 5 INJECTION INTRAVENOUS at 08:04

## 2021-01-01 RX ADMIN — ATORVASTATIN CALCIUM 40 MG: 40 TABLET, FILM COATED ORAL at 20:22

## 2021-01-01 RX ADMIN — KETAMINE HYDROCHLORIDE 10 MG: 100 INJECTION INTRAMUSCULAR; INTRAVENOUS at 10:11

## 2021-01-01 RX ADMIN — LORAZEPAM 1 MG: 2 INJECTION INTRAMUSCULAR; INTRAVENOUS at 15:00

## 2021-01-01 RX ADMIN — CARVEDILOL 3.12 MG: 3.12 TABLET, FILM COATED ORAL at 18:10

## 2021-01-01 RX ADMIN — CARVEDILOL 3.12 MG: 3.12 TABLET, FILM COATED ORAL at 17:01

## 2021-01-01 RX ADMIN — CARVEDILOL 3.12 MG: 3.12 TABLET, FILM COATED ORAL at 18:40

## 2021-01-01 RX ADMIN — CEFTRIAXONE SODIUM 1 G: 1 INJECTION, POWDER, FOR SOLUTION INTRAMUSCULAR; INTRAVENOUS at 14:02

## 2021-01-01 RX ADMIN — MIDAZOLAM 4 MG/HR: 5 INJECTION INTRAMUSCULAR; INTRAVENOUS at 14:18

## 2021-01-01 RX ADMIN — BRINZOLAMIDE 1 DROP: 10 SUSPENSION/ DROPS OPHTHALMIC at 16:48

## 2021-01-01 RX ADMIN — CARVEDILOL 3.12 MG: 3.12 TABLET, FILM COATED ORAL at 10:31

## 2021-01-01 RX ADMIN — CEFEPIME HYDROCHLORIDE 2 G: 2 INJECTION, POWDER, FOR SOLUTION INTRAVENOUS at 04:12

## 2021-01-01 RX ADMIN — MIDAZOLAM 3 MG/HR: 5 INJECTION INTRAMUSCULAR; INTRAVENOUS at 12:03

## 2021-01-01 RX ADMIN — FUROSEMIDE 20 MG: 10 INJECTION, SOLUTION INTRAMUSCULAR; INTRAVENOUS at 08:56

## 2021-01-01 RX ADMIN — DEXTROSE AND SODIUM CHLORIDE 50 ML/HR: 5; 900 INJECTION, SOLUTION INTRAVENOUS at 05:33

## 2021-01-01 RX ADMIN — PROPOFOL 10 MG: 10 INJECTION, EMULSION INTRAVENOUS at 10:15

## 2021-01-01 RX ADMIN — PHYTONADIONE 5 MG: 10 INJECTION, EMULSION INTRAMUSCULAR; INTRAVENOUS; SUBCUTANEOUS at 21:01

## 2021-01-01 RX ADMIN — BACITRACIN: 500 OINTMENT TOPICAL at 08:25

## 2021-01-01 RX ADMIN — DEXTROSE AND SODIUM CHLORIDE 50 ML/HR: 5; 900 INJECTION, SOLUTION INTRAVENOUS at 02:17

## 2021-01-01 RX ADMIN — SODIUM CHLORIDE, PRESERVATIVE FREE 10 ML: 5 INJECTION INTRAVENOUS at 08:25

## 2021-01-01 RX ADMIN — SODIUM BICARBONATE 75 MEQ: 84 INJECTION, SOLUTION INTRAVENOUS at 19:40

## 2021-01-01 RX ADMIN — MEROPENEM 500 MG: 500 INJECTION, POWDER, FOR SOLUTION INTRAVENOUS at 03:17

## 2021-01-01 RX ADMIN — QUETIAPINE 100 MG: 100 TABLET ORAL at 21:03

## 2021-01-01 RX ADMIN — BRIMONIDINE TARTRATE 1 DROP: 1.5 SOLUTION OPHTHALMIC at 08:25

## 2021-01-01 RX ADMIN — ATORVASTATIN CALCIUM 40 MG: 40 TABLET, FILM COATED ORAL at 20:48

## 2021-01-01 RX ADMIN — MEROPENEM 500 MG: 500 INJECTION, POWDER, FOR SOLUTION INTRAVENOUS at 20:11

## 2021-01-01 RX ADMIN — Medication 1 APPLICATION: at 09:29

## 2021-01-01 RX ADMIN — MIDAZOLAM 5 MG/HR: 5 INJECTION INTRAMUSCULAR; INTRAVENOUS at 22:29

## 2021-01-01 RX ADMIN — NYSTATIN 1 APPLICATION: 100000 OINTMENT TOPICAL at 09:13

## 2021-01-01 RX ADMIN — INSULIN DETEMIR 10 UNITS: 100 INJECTION, SOLUTION SUBCUTANEOUS at 08:38

## 2021-01-01 RX ADMIN — BRIMONIDINE TARTRATE 1 DROP: 1.5 SOLUTION OPHTHALMIC at 08:58

## 2021-01-01 RX ADMIN — Medication 1 APPLICATION: at 10:32

## 2021-01-01 RX ADMIN — FUROSEMIDE 40 MG: 40 TABLET ORAL at 18:02

## 2021-01-01 RX ADMIN — FUROSEMIDE 20 MG: 10 INJECTION, SOLUTION INTRAMUSCULAR; INTRAVENOUS at 08:21

## 2021-01-01 RX ADMIN — MINOXIDIL 2.5 MG: 2.5 TABLET ORAL at 08:24

## 2021-01-01 RX ADMIN — MORPHINE SULFATE 1 MG: 2 INJECTION, SOLUTION INTRAMUSCULAR; INTRAVENOUS at 03:20

## 2021-01-01 RX ADMIN — PHYTONADIONE 5 MG: 10 INJECTION, EMULSION INTRAMUSCULAR; INTRAVENOUS; SUBCUTANEOUS at 09:31

## 2021-01-01 RX ADMIN — HYDRALAZINE HYDROCHLORIDE 25 MG: 25 TABLET ORAL at 13:16

## 2021-01-01 RX ADMIN — DEXTROSE AND SODIUM CHLORIDE 50 ML/HR: 5; 900 INJECTION, SOLUTION INTRAVENOUS at 09:53

## 2021-01-01 RX ADMIN — BRIMONIDINE TARTRATE 1 DROP: 1.5 SOLUTION OPHTHALMIC at 20:48

## 2021-01-01 RX ADMIN — ATROPINE SULFATE 0.5 MG: 1 INJECTION, SOLUTION INTRAMUSCULAR; INTRAVENOUS; SUBCUTANEOUS at 03:49

## 2021-01-01 RX ADMIN — BRINZOLAMIDE 1 DROP: 10 SUSPENSION/ DROPS OPHTHALMIC at 08:19

## 2021-01-01 RX ADMIN — QUETIAPINE FUMARATE 50 MG: 25 TABLET ORAL at 06:22

## 2021-01-01 RX ADMIN — BRIMONIDINE TARTRATE 1 DROP: 1.5 SOLUTION OPHTHALMIC at 18:15

## 2021-01-01 RX ADMIN — SODIUM CHLORIDE, PRESERVATIVE FREE 10 ML: 5 INJECTION INTRAVENOUS at 08:48

## 2021-01-01 RX ADMIN — SODIUM BICARBONATE 75 MEQ: 84 INJECTION, SOLUTION INTRAVENOUS at 02:57

## 2021-01-01 RX ADMIN — BUDESONIDE 0.5 MG: 0.5 INHALANT RESPIRATORY (INHALATION) at 10:50

## 2021-01-01 RX ADMIN — BRIMONIDINE TARTRATE 1 DROP: 1.5 SOLUTION OPHTHALMIC at 17:04

## 2021-01-01 RX ADMIN — HYDRALAZINE HYDROCHLORIDE 25 MG: 25 TABLET ORAL at 21:00

## 2021-01-01 RX ADMIN — HYDRALAZINE HYDROCHLORIDE 50 MG: 50 TABLET, FILM COATED ORAL at 13:36

## 2021-01-01 RX ADMIN — SODIUM CHLORIDE, PRESERVATIVE FREE 10 ML: 5 INJECTION INTRAVENOUS at 21:04

## 2021-01-01 RX ADMIN — BRINZOLAMIDE 1 DROP: 10 SUSPENSION/ DROPS OPHTHALMIC at 16:30

## 2021-01-01 RX ADMIN — BRIMONIDINE TARTRATE 1 DROP: 1.5 SOLUTION OPHTHALMIC at 10:35

## 2021-01-01 RX ADMIN — ISOSORBIDE MONONITRATE 30 MG: 30 TABLET, EXTENDED RELEASE ORAL at 10:03

## 2021-01-01 RX ADMIN — QUETIAPINE FUMARATE 50 MG: 25 TABLET ORAL at 06:34

## 2021-01-01 RX ADMIN — SODIUM CHLORIDE 250 ML: 9 INJECTION, SOLUTION INTRAVENOUS at 06:22

## 2021-01-01 RX ADMIN — CARVEDILOL 6.25 MG: 6.25 TABLET, FILM COATED ORAL at 17:52

## 2021-01-01 RX ADMIN — BRIMONIDINE TARTRATE 1 DROP: 1.5 SOLUTION OPHTHALMIC at 20:52

## 2021-01-01 RX ADMIN — LATANOPROST 1 DROP: 50 SOLUTION OPHTHALMIC at 20:24

## 2021-01-01 RX ADMIN — DEXTROSE MONOHYDRATE 25 G: 500 INJECTION PARENTERAL at 00:30

## 2021-01-01 RX ADMIN — CARVEDILOL 6.25 MG: 6.25 TABLET, FILM COATED ORAL at 10:03

## 2021-01-01 RX ADMIN — SODIUM CHLORIDE 125 ML/HR: 900 INJECTION, SOLUTION INTRAVENOUS at 05:48

## 2021-01-01 RX ADMIN — ACETAMINOPHEN 650 MG: 325 TABLET, FILM COATED ORAL at 08:21

## 2021-01-01 RX ADMIN — POTASSIUM CHLORIDE 40 MEQ: 1.5 POWDER, FOR SOLUTION ORAL at 15:04

## 2021-01-01 RX ADMIN — BRIMONIDINE TARTRATE 1 DROP: 1.5 SOLUTION OPHTHALMIC at 10:29

## 2021-01-01 RX ADMIN — FENTANYL CITRATE 50 MCG: 50 INJECTION, SOLUTION INTRAMUSCULAR; INTRAVENOUS at 04:47

## 2021-01-01 RX ADMIN — MIDAZOLAM 5 MG/HR: 5 INJECTION INTRAMUSCULAR; INTRAVENOUS at 15:14

## 2021-01-01 RX ADMIN — LATANOPROST 1 DROP: 50 SOLUTION OPHTHALMIC at 20:01

## 2021-01-01 RX ADMIN — BRIMONIDINE TARTRATE 1 DROP: 1.5 SOLUTION OPHTHALMIC at 09:13

## 2021-01-01 RX ADMIN — EPINEPHRINE 1 MG: 0.1 INJECTION INTRACARDIAC; INTRAVENOUS at 03:51

## 2021-01-01 RX ADMIN — DEXTROSE MONOHYDRATE 25 G: 500 INJECTION PARENTERAL at 04:48

## 2021-01-01 RX ADMIN — ISOSORBIDE MONONITRATE 30 MG: 30 TABLET, EXTENDED RELEASE ORAL at 08:16

## 2021-01-01 RX ADMIN — FUROSEMIDE 20 MG: 10 INJECTION, SOLUTION INTRAMUSCULAR; INTRAVENOUS at 09:13

## 2021-01-01 RX ADMIN — Medication 1 APPLICATION: at 09:16

## 2021-01-01 RX ADMIN — HYDRALAZINE HYDROCHLORIDE 25 MG: 25 TABLET ORAL at 14:44

## 2021-01-01 RX ADMIN — BRIMONIDINE TARTRATE 1 DROP: 1.5 SOLUTION/ DROPS OPHTHALMIC at 15:28

## 2021-01-01 RX ADMIN — QUETIAPINE 100 MG: 100 TABLET ORAL at 20:22

## 2021-01-01 RX ADMIN — FUROSEMIDE 20 MG: 10 INJECTION, SOLUTION INTRAMUSCULAR; INTRAVENOUS at 08:41

## 2021-01-01 RX ADMIN — HYDRALAZINE HYDROCHLORIDE 50 MG: 50 TABLET, FILM COATED ORAL at 06:41

## 2021-01-01 RX ADMIN — PANTOPRAZOLE SODIUM 40 MG: 40 TABLET, DELAYED RELEASE ORAL at 06:09

## 2021-01-01 RX ADMIN — Medication 0.04 MCG/KG/MIN: at 22:29

## 2021-01-01 RX ADMIN — QUETIAPINE 100 MG: 100 TABLET ORAL at 20:31

## 2021-01-01 RX ADMIN — LATANOPROST 1 DROP: 50 SOLUTION OPHTHALMIC at 21:47

## 2021-01-01 RX ADMIN — INSULIN ASPART 2 UNITS: 100 INJECTION, SOLUTION INTRAVENOUS; SUBCUTANEOUS at 10:47

## 2021-01-01 RX ADMIN — ISOSORBIDE MONONITRATE 30 MG: 30 TABLET, EXTENDED RELEASE ORAL at 08:23

## 2021-01-01 RX ADMIN — BRINZOLAMIDE 1 DROP: 10 SUSPENSION/ DROPS OPHTHALMIC at 20:37

## 2021-01-01 RX ADMIN — ONDANSETRON HYDROCHLORIDE 4 MG: 2 INJECTION, SOLUTION INTRAMUSCULAR; INTRAVENOUS at 12:54

## 2021-01-01 RX ADMIN — BUDESONIDE 0.5 MG: 0.5 INHALANT RESPIRATORY (INHALATION) at 08:56

## 2021-01-01 RX ADMIN — PANTOPRAZOLE SODIUM 40 MG: 40 INJECTION, POWDER, FOR SOLUTION INTRAVENOUS at 06:05

## 2021-01-01 RX ADMIN — QUETIAPINE 100 MG: 100 TABLET ORAL at 20:50

## 2021-01-01 RX ADMIN — BUDESONIDE 0.5 MG: 0.5 INHALANT RESPIRATORY (INHALATION) at 08:38

## 2021-01-01 RX ADMIN — HYDRALAZINE HYDROCHLORIDE 25 MG: 25 TABLET ORAL at 20:25

## 2021-01-01 RX ADMIN — FUROSEMIDE 40 MG: 40 TABLET ORAL at 10:27

## 2021-01-01 RX ADMIN — BRINZOLAMIDE 1 DROP: 10 SUSPENSION/ DROPS OPHTHALMIC at 18:03

## 2021-01-01 RX ADMIN — BRIMONIDINE TARTRATE 1 DROP: 1.5 SOLUTION/ DROPS OPHTHALMIC at 08:25

## 2021-01-01 RX ADMIN — BRIMONIDINE TARTRATE 1 DROP: 1.5 SOLUTION OPHTHALMIC at 08:05

## 2021-01-01 RX ADMIN — ATROPINE SULFATE 0.5 MG: 0.1 INJECTION INTRAVENOUS at 18:13

## 2021-01-01 RX ADMIN — HALOPERIDOL LACTATE 0.5 MG: 5 INJECTION, SOLUTION INTRAMUSCULAR at 20:12

## 2021-01-01 RX ADMIN — LIDOCAINE HYDROCHLORIDE: 20 JELLY TOPICAL at 20:14

## 2021-01-01 RX ADMIN — SODIUM CHLORIDE: 900 INJECTION, SOLUTION INTRAVENOUS at 10:42

## 2021-01-01 RX ADMIN — BRINZOLAMIDE 1 DROP: 10 SUSPENSION/ DROPS OPHTHALMIC at 16:16

## 2021-01-01 RX ADMIN — BRINZOLAMIDE 1 DROP: 10 SUSPENSION/ DROPS OPHTHALMIC at 21:03

## 2021-01-01 RX ADMIN — VANCOMYCIN HYDROCHLORIDE 1000 MG: 1 INJECTION, POWDER, LYOPHILIZED, FOR SOLUTION INTRAVENOUS at 13:37

## 2021-01-01 RX ADMIN — BRINZOLAMIDE 1 DROP: 10 SUSPENSION/ DROPS OPHTHALMIC at 15:51

## 2021-01-01 RX ADMIN — ISOSORBIDE MONONITRATE 30 MG: 30 TABLET, EXTENDED RELEASE ORAL at 08:27

## 2021-01-01 RX ADMIN — BRINZOLAMIDE 1 DROP: 10 SUSPENSION/ DROPS OPHTHALMIC at 18:40

## 2021-01-01 RX ADMIN — BRIMONIDINE TARTRATE 1 DROP: 1.5 SOLUTION/ DROPS OPHTHALMIC at 20:24

## 2021-01-01 RX ADMIN — BRINZOLAMIDE 1 DROP: 10 SUSPENSION/ DROPS OPHTHALMIC at 21:47

## 2021-01-01 RX ADMIN — BRIMONIDINE TARTRATE 1 DROP: 1.5 SOLUTION OPHTHALMIC at 16:05

## 2021-01-01 RX ADMIN — MIDAZOLAM 5 MG/HR: 5 INJECTION INTRAMUSCULAR; INTRAVENOUS at 12:35

## 2021-01-01 RX ADMIN — BRINZOLAMIDE 1 DROP: 10 SUSPENSION/ DROPS OPHTHALMIC at 20:24

## 2021-01-01 RX ADMIN — NITROGLYCERIN 1 INCH: 20 OINTMENT TOPICAL at 22:18

## 2021-01-01 RX ADMIN — FUROSEMIDE 80 MG: 40 TABLET ORAL at 08:24

## 2021-01-01 RX ADMIN — ISOSORBIDE MONONITRATE 30 MG: 30 TABLET, EXTENDED RELEASE ORAL at 08:39

## 2021-01-01 RX ADMIN — Medication 1 APPLICATION: at 18:15

## 2021-01-01 RX ADMIN — MINOXIDIL 2.5 MG: 2.5 TABLET ORAL at 08:56

## 2021-01-01 RX ADMIN — SODIUM BICARBONATE 75 MEQ: 84 INJECTION, SOLUTION INTRAVENOUS at 14:53

## 2021-01-01 RX ADMIN — NYSTATIN 1 APPLICATION: 100000 OINTMENT TOPICAL at 08:42

## 2021-01-01 RX ADMIN — ACETAMINOPHEN 650 MG: 325 TABLET, FILM COATED ORAL at 15:28

## 2021-01-01 RX ADMIN — QUETIAPINE 100 MG: 100 TABLET ORAL at 20:01

## 2021-01-01 RX ADMIN — SODIUM CHLORIDE, PRESERVATIVE FREE 10 ML: 5 INJECTION INTRAVENOUS at 08:24

## 2021-01-01 RX ADMIN — Medication 1 APPLICATION: at 21:46

## 2021-01-01 RX ADMIN — QUETIAPINE FUMARATE 50 MG: 25 TABLET ORAL at 06:00

## 2021-01-01 RX ADMIN — PANTOPRAZOLE SODIUM 40 MG: 40 INJECTION, POWDER, FOR SOLUTION INTRAVENOUS at 06:07

## 2021-01-01 RX ADMIN — QUETIAPINE 100 MG: 100 TABLET ORAL at 20:07

## 2021-01-01 RX ADMIN — NYSTATIN 1 APPLICATION: 100000 OINTMENT TOPICAL at 21:46

## 2021-01-01 RX ADMIN — BRINZOLAMIDE 1 DROP: 10 SUSPENSION/ DROPS OPHTHALMIC at 08:05

## 2021-01-01 RX ADMIN — MIDAZOLAM 5 MG/HR: 5 INJECTION INTRAMUSCULAR; INTRAVENOUS at 13:00

## 2021-01-01 RX ADMIN — POTASSIUM CHLORIDE 40 MEQ: 1.5 POWDER, FOR SOLUTION ORAL at 02:10

## 2021-01-01 RX ADMIN — CARVEDILOL 6.25 MG: 6.25 TABLET, FILM COATED ORAL at 09:25

## 2021-01-01 RX ADMIN — INSULIN ASPART 2 UNITS: 100 INJECTION, SOLUTION INTRAVENOUS; SUBCUTANEOUS at 17:12

## 2021-01-01 RX ADMIN — HYDRALAZINE HYDROCHLORIDE 25 MG: 25 TABLET ORAL at 20:51

## 2021-01-01 RX ADMIN — BRIMONIDINE TARTRATE 1 DROP: 1.5 SOLUTION/ DROPS OPHTHALMIC at 20:09

## 2021-01-01 RX ADMIN — CARVEDILOL 3.12 MG: 3.12 TABLET, FILM COATED ORAL at 09:14

## 2021-01-01 RX ADMIN — AMIODARONE HYDROCHLORIDE 200 MG: 200 TABLET ORAL at 08:45

## 2021-01-01 RX ADMIN — Medication 1 APPLICATION: at 08:45

## 2021-01-01 RX ADMIN — LATANOPROST 1 DROP: 50 SOLUTION OPHTHALMIC at 22:17

## 2021-01-01 RX ADMIN — NYSTATIN 1 APPLICATION: 100000 OINTMENT TOPICAL at 09:20

## 2021-01-01 RX ADMIN — BRINZOLAMIDE 1 DROP: 10 SUSPENSION/ DROPS OPHTHALMIC at 18:16

## 2021-01-01 RX ADMIN — NYSTATIN 1 APPLICATION: 100000 OINTMENT TOPICAL at 08:25

## 2021-01-01 RX ADMIN — BRINZOLAMIDE 1 DROP: 10 SUSPENSION/ DROPS OPHTHALMIC at 22:16

## 2021-01-01 RX ADMIN — CEFEPIME HYDROCHLORIDE 2 G: 2 INJECTION, POWDER, FOR SOLUTION INTRAVENOUS at 05:23

## 2021-01-01 RX ADMIN — SODIUM CHLORIDE 50 ML/HR: 9 INJECTION, SOLUTION INTRAVENOUS at 22:00

## 2021-01-01 RX ADMIN — HYDRALAZINE HYDROCHLORIDE 50 MG: 50 TABLET, FILM COATED ORAL at 21:01

## 2021-01-01 RX ADMIN — AMIODARONE HYDROCHLORIDE 200 MG: 200 TABLET ORAL at 13:36

## 2021-01-01 RX ADMIN — SODIUM BICARBONATE 75 MEQ: 84 INJECTION, SOLUTION INTRAVENOUS at 10:45

## 2021-01-01 RX ADMIN — SODIUM CHLORIDE, PRESERVATIVE FREE 10 ML: 5 INJECTION INTRAVENOUS at 20:24

## 2021-01-01 RX ADMIN — BRINZOLAMIDE 1 DROP: 10 SUSPENSION/ DROPS OPHTHALMIC at 20:06

## 2021-01-01 RX ADMIN — NITROGLYCERIN 1 INCH: 20 OINTMENT TOPICAL at 16:26

## 2021-01-01 RX ADMIN — BRIMONIDINE TARTRATE 1 DROP: 1.5 SOLUTION OPHTHALMIC at 08:19

## 2021-01-01 RX ADMIN — LATANOPROST 1 DROP: 50 SOLUTION/ DROPS OPHTHALMIC at 20:23

## 2021-01-01 RX ADMIN — SODIUM CHLORIDE, PRESERVATIVE FREE 10 ML: 5 INJECTION INTRAVENOUS at 20:08

## 2021-01-01 RX ADMIN — PANTOPRAZOLE SODIUM 40 MG: 40 TABLET, DELAYED RELEASE ORAL at 05:52

## 2021-01-01 RX ADMIN — Medication 1 APPLICATION: at 20:23

## 2021-01-01 RX ADMIN — BRINZOLAMIDE 1 DROP: 10 SUSPENSION/ DROPS OPHTHALMIC at 20:18

## 2021-01-01 RX ADMIN — FUROSEMIDE 40 MG: 10 INJECTION INTRAMUSCULAR; INTRAVENOUS at 18:15

## 2021-01-01 RX ADMIN — BUDESONIDE AND FORMOTEROL FUMARATE DIHYDRATE 2 PUFF: 160; 4.5 AEROSOL RESPIRATORY (INHALATION) at 19:51

## 2021-01-01 RX ADMIN — Medication 1 APPLICATION: at 10:02

## 2021-01-01 RX ADMIN — WARFARIN SODIUM 1 MG: 1 TABLET ORAL at 17:15

## 2021-01-01 RX ADMIN — Medication 1 APPLICATION: at 21:25

## 2021-01-01 RX ADMIN — BRINZOLAMIDE 1 DROP: 10 SUSPENSION/ DROPS OPHTHALMIC at 10:02

## 2021-01-01 RX ADMIN — NYSTATIN 1 APPLICATION: 100000 OINTMENT TOPICAL at 08:17

## 2021-01-01 RX ADMIN — HYDRALAZINE HYDROCHLORIDE 50 MG: 50 TABLET, FILM COATED ORAL at 20:48

## 2021-01-01 RX ADMIN — ATORVASTATIN CALCIUM 40 MG: 40 TABLET, FILM COATED ORAL at 21:03

## 2021-01-01 RX ADMIN — MEROPENEM 500 MG: 500 INJECTION, POWDER, FOR SOLUTION INTRAVENOUS at 11:31

## 2021-01-01 RX ADMIN — NYSTATIN 1 APPLICATION: 100000 OINTMENT TOPICAL at 08:05

## 2021-01-01 RX ADMIN — BRINZOLAMIDE 1 DROP: 10 SUSPENSION/ DROPS OPHTHALMIC at 20:03

## 2021-01-01 RX ADMIN — BRINZOLAMIDE 1 DROP: 10 SUSPENSION/ DROPS OPHTHALMIC at 21:25

## 2021-01-01 RX ADMIN — CEFTRIAXONE SODIUM 1 G: 1 INJECTION, POWDER, FOR SOLUTION INTRAMUSCULAR; INTRAVENOUS at 08:49

## 2021-01-01 RX ADMIN — MIDAZOLAM 5 MG/HR: 5 INJECTION INTRAMUSCULAR; INTRAVENOUS at 03:51

## 2021-01-01 RX ADMIN — LATANOPROST 1 DROP: 50 SOLUTION OPHTHALMIC at 21:03

## 2021-01-01 RX ADMIN — POTASSIUM CHLORIDE 40 MEQ: 1.5 POWDER, FOR SOLUTION ORAL at 10:54

## 2021-01-01 RX ADMIN — NYSTATIN 1 APPLICATION: 100000 OINTMENT TOPICAL at 08:16

## 2021-01-01 RX ADMIN — MIDAZOLAM 5 MG/HR: 5 INJECTION INTRAMUSCULAR; INTRAVENOUS at 00:17

## 2021-01-01 RX ADMIN — BUDESONIDE 0.5 MG: 0.5 INHALANT RESPIRATORY (INHALATION) at 19:21

## 2021-01-01 RX ADMIN — ASPIRIN 324 MG: 81 TABLET, CHEWABLE ORAL at 16:25

## 2021-01-01 RX ADMIN — AZITHROMYCIN MONOHYDRATE 500 MG: 500 INJECTION, POWDER, LYOPHILIZED, FOR SOLUTION INTRAVENOUS at 20:36

## 2021-01-01 RX ADMIN — HYDRALAZINE HYDROCHLORIDE 50 MG: 50 TABLET, FILM COATED ORAL at 13:16

## 2021-01-01 RX ADMIN — ATORVASTATIN CALCIUM 40 MG: 40 TABLET, FILM COATED ORAL at 20:01

## 2021-01-01 RX ADMIN — SODIUM CHLORIDE 50 ML/HR: 9 INJECTION, SOLUTION INTRAVENOUS at 22:41

## 2021-01-01 RX ADMIN — ISOSORBIDE MONONITRATE 30 MG: 30 TABLET, EXTENDED RELEASE ORAL at 08:45

## 2021-01-01 RX ADMIN — ATORVASTATIN CALCIUM 40 MG: 40 TABLET, FILM COATED ORAL at 20:31

## 2021-01-01 RX ADMIN — SODIUM CHLORIDE 75 ML/HR: 9 INJECTION, SOLUTION INTRAVENOUS at 20:50

## 2021-01-01 RX ADMIN — BRINZOLAMIDE 1 DROP: 10 SUSPENSION/ DROPS OPHTHALMIC at 07:30

## 2021-01-01 RX ADMIN — FUROSEMIDE 20 MG: 10 INJECTION, SOLUTION INTRAMUSCULAR; INTRAVENOUS at 05:46

## 2021-01-01 RX ADMIN — CARVEDILOL 3.12 MG: 3.12 TABLET, FILM COATED ORAL at 17:15

## 2021-01-01 RX ADMIN — SODIUM CHLORIDE, PRESERVATIVE FREE 10 ML: 5 INJECTION INTRAVENOUS at 20:27

## 2021-01-01 RX ADMIN — CEFEPIME HYDROCHLORIDE 2 G: 2 INJECTION, POWDER, FOR SOLUTION INTRAVENOUS at 03:59

## 2021-01-01 RX ADMIN — CEFTRIAXONE 2 G: 2 INJECTION, POWDER, FOR SOLUTION INTRAMUSCULAR; INTRAVENOUS at 04:44

## 2021-01-01 RX ADMIN — WARFARIN SODIUM 3 MG: 3 TABLET ORAL at 17:17

## 2021-01-01 RX ADMIN — NYSTATIN 1 APPLICATION: 100000 OINTMENT TOPICAL at 20:25

## 2021-01-01 RX ADMIN — DEXTROSE MONOHYDRATE 25 G: 500 INJECTION PARENTERAL at 12:03

## 2021-01-01 RX ADMIN — BUDESONIDE AND FORMOTEROL FUMARATE DIHYDRATE 2 PUFF: 160; 4.5 AEROSOL RESPIRATORY (INHALATION) at 19:01

## 2021-01-01 RX ADMIN — SODIUM CHLORIDE, PRESERVATIVE FREE 10 ML: 5 INJECTION INTRAVENOUS at 20:48

## 2021-01-01 RX ADMIN — Medication: at 08:38

## 2021-01-01 RX ADMIN — HYDRALAZINE HYDROCHLORIDE 25 MG: 25 TABLET ORAL at 05:51

## 2021-01-01 RX ADMIN — BRINZOLAMIDE 1 DROP: 10 SUSPENSION/ DROPS OPHTHALMIC at 21:05

## 2021-01-01 RX ADMIN — BRIMONIDINE TARTRATE 1 DROP: 1.5 SOLUTION OPHTHALMIC at 20:35

## 2021-01-01 RX ADMIN — BRINZOLAMIDE 1 DROP: 10 SUSPENSION/ DROPS OPHTHALMIC at 08:22

## 2021-01-01 RX ADMIN — BUDESONIDE 0.5 MG: 0.5 INHALANT RESPIRATORY (INHALATION) at 19:42

## 2021-01-01 RX ADMIN — Medication 1 APPLICATION: at 09:13

## 2021-01-01 RX ADMIN — ATORVASTATIN CALCIUM 40 MG: 40 TABLET, FILM COATED ORAL at 21:01

## 2021-01-01 RX ADMIN — BRIMONIDINE TARTRATE 1 DROP: 1.5 SOLUTION OPHTHALMIC at 17:00

## 2021-01-01 RX ADMIN — MEROPENEM 500 MG: 500 INJECTION, POWDER, FOR SOLUTION INTRAVENOUS at 03:41

## 2021-01-01 RX ADMIN — ACETAMINOPHEN 650 MG: 325 TABLET, FILM COATED ORAL at 05:51

## 2021-01-01 RX ADMIN — CEFTRIAXONE SODIUM 1 G: 1 INJECTION, POWDER, FOR SOLUTION INTRAMUSCULAR; INTRAVENOUS at 20:02

## 2021-01-01 RX ADMIN — HYDRALAZINE HYDROCHLORIDE 50 MG: 50 TABLET, FILM COATED ORAL at 20:37

## 2021-01-01 RX ADMIN — DEXTROSE MONOHYDRATE 25 G: 500 INJECTION PARENTERAL at 04:30

## 2021-01-01 RX ADMIN — MIDAZOLAM 5 MG/HR: 5 INJECTION INTRAMUSCULAR; INTRAVENOUS at 15:15

## 2021-01-01 RX ADMIN — BRIMONIDINE TARTRATE 1 DROP: 1.5 SOLUTION OPHTHALMIC at 16:48

## 2021-01-01 RX ADMIN — SODIUM CHLORIDE, PRESERVATIVE FREE 10 ML: 5 INJECTION INTRAVENOUS at 21:47

## 2021-01-01 RX ADMIN — AMIODARONE HYDROCHLORIDE 200 MG: 200 TABLET ORAL at 08:23

## 2021-01-01 RX ADMIN — BRIMONIDINE TARTRATE 1 DROP: 1.5 SOLUTION OPHTHALMIC at 20:18

## 2021-01-01 RX ADMIN — MELATONIN 5.25 MG: at 20:48

## 2021-01-01 RX ADMIN — SODIUM CHLORIDE 125 ML/HR: 900 INJECTION, SOLUTION INTRAVENOUS at 20:19

## 2021-01-01 RX ADMIN — FUROSEMIDE 40 MG: 10 INJECTION, SOLUTION INTRAMUSCULAR; INTRAVENOUS at 09:31

## 2021-01-01 RX ADMIN — BRINZOLAMIDE 1 DROP: 10 SUSPENSION/ DROPS OPHTHALMIC at 20:02

## 2021-01-01 RX ADMIN — MORPHINE SULFATE 1 MG: 2 INJECTION, SOLUTION INTRAMUSCULAR; INTRAVENOUS at 10:01

## 2021-01-01 RX ADMIN — BRINZOLAMIDE 1 DROP: 10 SUSPENSION/ DROPS OPHTHALMIC at 20:48

## 2021-01-01 RX ADMIN — ATORVASTATIN CALCIUM 40 MG: 40 TABLET, FILM COATED ORAL at 20:24

## 2021-01-01 RX ADMIN — ACETAMINOPHEN 650 MG: 325 TABLET, FILM COATED ORAL at 20:25

## 2021-01-01 RX ADMIN — MIDAZOLAM 5 MG/HR: 5 INJECTION INTRAMUSCULAR; INTRAVENOUS at 00:24

## 2021-01-01 RX ADMIN — BACITRACIN: 500 OINTMENT TOPICAL at 08:16

## 2021-01-01 RX ADMIN — BUDESONIDE 0.5 MG: 0.5 INHALANT RESPIRATORY (INHALATION) at 19:30

## 2021-01-01 RX ADMIN — LORAZEPAM 1 MG: 2 INJECTION INTRAMUSCULAR; INTRAVENOUS at 17:29

## 2021-01-01 RX ADMIN — BACITRACIN: 500 OINTMENT TOPICAL at 20:08

## 2021-01-01 RX ADMIN — ATORVASTATIN CALCIUM 40 MG: 40 TABLET, FILM COATED ORAL at 20:06

## 2021-01-01 RX ADMIN — POTASSIUM CHLORIDE 40 MEQ: 1.5 POWDER, FOR SOLUTION ORAL at 22:00

## 2021-01-01 RX ADMIN — ATROPINE SULFATE 0.5 MG: 0.1 INJECTION PARENTERAL at 19:12

## 2021-01-01 RX ADMIN — QUETIAPINE FUMARATE 50 MG: 25 TABLET ORAL at 06:36

## 2021-01-01 RX ADMIN — PANTOPRAZOLE SODIUM 40 MG: 40 INJECTION, POWDER, FOR SOLUTION INTRAVENOUS at 06:23

## 2021-01-01 RX ADMIN — HYDRALAZINE HYDROCHLORIDE 50 MG: 50 TABLET ORAL at 20:22

## 2021-01-01 RX ADMIN — BRINZOLAMIDE 1 DROP: 10 SUSPENSION/ DROPS OPHTHALMIC at 09:30

## 2021-01-01 RX ADMIN — AMIODARONE HYDROCHLORIDE 200 MG: 200 TABLET ORAL at 10:03

## 2021-01-01 RX ADMIN — NYSTATIN 1 APPLICATION: 100000 OINTMENT TOPICAL at 10:29

## 2021-01-01 RX ADMIN — HYDRALAZINE HYDROCHLORIDE 50 MG: 50 TABLET ORAL at 06:12

## 2021-01-01 RX ADMIN — Medication 1 APPLICATION: at 08:42

## 2021-01-01 RX ADMIN — BRINZOLAMIDE 1 DROP: 10 SUSPENSION/ DROPS OPHTHALMIC at 10:28

## 2021-01-01 RX ADMIN — BRIMONIDINE TARTRATE 1 DROP: 1.5 SOLUTION OPHTHALMIC at 20:03

## 2021-01-01 RX ADMIN — DEXTROSE MONOHYDRATE 30 ML/HR: 50 INJECTION, SOLUTION INTRAVENOUS at 10:27

## 2021-01-01 RX ADMIN — CARVEDILOL 3.12 MG: 3.12 TABLET, FILM COATED ORAL at 17:13

## 2021-01-01 RX ADMIN — CEFTRIAXONE SODIUM 1 G: 1 INJECTION, POWDER, FOR SOLUTION INTRAMUSCULAR; INTRAVENOUS at 08:38

## 2021-01-01 RX ADMIN — INSULIN ASPART 2 UNITS: 100 INJECTION, SOLUTION INTRAVENOUS; SUBCUTANEOUS at 17:14

## 2021-01-01 RX ADMIN — Medication 1 APPLICATION: at 21:01

## 2021-01-01 RX ADMIN — SODIUM CHLORIDE, PRESERVATIVE FREE 10 ML: 5 INJECTION INTRAVENOUS at 20:02

## 2021-01-01 RX ADMIN — CARVEDILOL 3.12 MG: 3.12 TABLET, FILM COATED ORAL at 08:16

## 2021-01-01 RX ADMIN — BRIMONIDINE TARTRATE 1 DROP: 1.5 SOLUTION OPHTHALMIC at 21:03

## 2021-01-01 RX ADMIN — SODIUM CHLORIDE, PRESERVATIVE FREE 10 ML: 5 INJECTION INTRAVENOUS at 21:01

## 2021-01-01 RX ADMIN — ATROPINE SULFATE 0.5 MG: 1 INJECTION, SOLUTION INTRAMUSCULAR; INTRAVENOUS; SUBCUTANEOUS at 03:50

## 2021-01-01 RX ADMIN — WARFARIN SODIUM 3 MG: 3 TABLET ORAL at 18:02

## 2021-01-01 RX ADMIN — Medication: at 00:53

## 2021-01-01 RX ADMIN — CEFEPIME HYDROCHLORIDE 2 G: 2 INJECTION, POWDER, FOR SOLUTION INTRAVENOUS at 06:25

## 2021-01-01 RX ADMIN — DEXTROSE MONOHYDRATE 25 G: 500 INJECTION PARENTERAL at 06:30

## 2021-01-01 RX ADMIN — HYDRALAZINE HYDROCHLORIDE 50 MG: 50 TABLET ORAL at 20:07

## 2021-01-01 RX ADMIN — BRIMONIDINE TARTRATE 1 DROP: 1.5 SOLUTION OPHTHALMIC at 18:40

## 2021-01-01 RX ADMIN — BRINZOLAMIDE 1 DROP: 10 SUSPENSION/ DROPS OPHTHALMIC at 08:58

## 2021-01-01 RX ADMIN — HYDRALAZINE HYDROCHLORIDE 50 MG: 50 TABLET ORAL at 13:06

## 2021-01-01 RX ADMIN — VANCOMYCIN HYDROCHLORIDE 1500 MG: 5 INJECTION, POWDER, LYOPHILIZED, FOR SOLUTION INTRAVENOUS at 00:13

## 2021-01-01 RX ADMIN — BACITRACIN: 500 OINTMENT TOPICAL at 08:04

## 2021-01-01 RX ADMIN — LATANOPROST 1 DROP: 50 SOLUTION OPHTHALMIC at 20:06

## 2021-01-01 RX ADMIN — QUETIAPINE 100 MG: 100 TABLET ORAL at 20:24

## 2021-01-01 RX ADMIN — ACETAMINOPHEN 650 MG: 325 TABLET, FILM COATED ORAL at 22:40

## 2021-01-01 RX ADMIN — WARFARIN SODIUM 1 MG: 1 TABLET ORAL at 17:01

## 2021-01-01 RX ADMIN — Medication 1 APPLICATION: at 20:25

## 2021-01-01 RX ADMIN — FUROSEMIDE 40 MG: 10 INJECTION, SOLUTION INTRAMUSCULAR; INTRAVENOUS at 04:07

## 2021-01-01 RX ADMIN — LORAZEPAM 1 MG: 2 INJECTION INTRAMUSCULAR; INTRAVENOUS at 22:27

## 2021-01-01 RX ADMIN — QUETIAPINE 100 MG: 100 TABLET ORAL at 22:15

## 2021-01-01 RX ADMIN — SODIUM CHLORIDE 75 ML/HR: 9 INJECTION, SOLUTION INTRAVENOUS at 12:39

## 2021-01-01 RX ADMIN — SODIUM CHLORIDE, PRESERVATIVE FREE 10 ML: 5 INJECTION INTRAVENOUS at 01:55

## 2021-01-01 RX ADMIN — LATANOPROST 1 DROP: 50 SOLUTION/ DROPS OPHTHALMIC at 20:09

## 2021-01-01 RX ADMIN — BRINZOLAMIDE 1 DROP: 10 SUSPENSION/ DROPS OPHTHALMIC at 17:01

## 2021-01-01 RX ADMIN — NYSTATIN 1 APPLICATION: 100000 OINTMENT TOPICAL at 09:30

## 2021-01-01 RX ADMIN — SODIUM CHLORIDE, PRESERVATIVE FREE 10 ML: 5 INJECTION INTRAVENOUS at 10:29

## 2021-01-01 RX ADMIN — CARVEDILOL 6.25 MG: 6.25 TABLET, FILM COATED ORAL at 18:02

## 2021-01-01 RX ADMIN — Medication 1 APPLICATION: at 08:04

## 2021-01-01 RX ADMIN — PANTOPRAZOLE SODIUM 40 MG: 40 TABLET, DELAYED RELEASE ORAL at 06:34

## 2021-01-01 RX ADMIN — HYDRALAZINE HYDROCHLORIDE 10 MG: 20 INJECTION INTRAMUSCULAR; INTRAVENOUS at 19:37

## 2021-01-01 RX ADMIN — DEXTROSE MONOHYDRATE 25 G: 500 INJECTION PARENTERAL at 13:50

## 2021-01-01 RX ADMIN — LORAZEPAM 2 MG: 2 INJECTION INTRAMUSCULAR; INTRAVENOUS at 04:01

## 2021-01-01 RX ADMIN — MORPHINE SULFATE 1 MG: 2 INJECTION, SOLUTION INTRAMUSCULAR; INTRAVENOUS at 05:52

## 2021-01-01 RX ADMIN — QUETIAPINE FUMARATE 50 MG: 25 TABLET ORAL at 08:23

## 2021-01-01 RX ADMIN — QUETIAPINE 100 MG: 100 TABLET ORAL at 20:48

## 2021-01-01 RX ADMIN — MIDAZOLAM 5 MG/HR: 5 INJECTION INTRAMUSCULAR; INTRAVENOUS at 13:42

## 2021-01-01 RX ADMIN — CEFEPIME HYDROCHLORIDE 2 G: 2 INJECTION, POWDER, FOR SOLUTION INTRAVENOUS at 04:07

## 2021-01-01 RX ADMIN — MINOXIDIL 2.5 MG: 2.5 TABLET ORAL at 10:27

## 2021-01-01 RX ADMIN — FUROSEMIDE 40 MG: 40 TABLET ORAL at 08:25

## 2021-01-01 RX ADMIN — DEXTROSE MONOHYDRATE 25 G: 500 INJECTION PARENTERAL at 06:34

## 2021-01-01 RX ADMIN — ISOSORBIDE MONONITRATE 30 MG: 30 TABLET, EXTENDED RELEASE ORAL at 08:28

## 2021-01-01 RX ADMIN — FUROSEMIDE 40 MG: 10 INJECTION, SOLUTION INTRAMUSCULAR; INTRAVENOUS at 21:54

## 2021-01-01 RX ADMIN — BRIMONIDINE TARTRATE 1 DROP: 1.5 SOLUTION/ DROPS OPHTHALMIC at 08:45

## 2021-01-01 RX ADMIN — MORPHINE SULFATE 2 MG: 2 INJECTION, SOLUTION INTRAMUSCULAR; INTRAVENOUS at 13:04

## 2021-01-01 RX ADMIN — CEFEPIME HYDROCHLORIDE 2 G: 2 INJECTION, POWDER, FOR SOLUTION INTRAVENOUS at 04:52

## 2021-01-01 RX ADMIN — CEFTRIAXONE SODIUM 1 G: 1 INJECTION, POWDER, FOR SOLUTION INTRAMUSCULAR; INTRAVENOUS at 16:14

## 2021-01-01 RX ADMIN — BRIMONIDINE TARTRATE 1 DROP: 1.5 SOLUTION OPHTHALMIC at 08:21

## 2021-01-01 RX ADMIN — MORPHINE SULFATE 1 MG: 2 INJECTION, SOLUTION INTRAMUSCULAR; INTRAVENOUS at 04:04

## 2021-01-01 RX ADMIN — DEXTROSE AND SODIUM CHLORIDE 50 ML/HR: 5; 900 INJECTION, SOLUTION INTRAVENOUS at 21:04

## 2021-01-01 RX ADMIN — ACETAMINOPHEN 650 MG: 325 TABLET, FILM COATED ORAL at 20:48

## 2021-01-01 RX ADMIN — LATANOPROST 1 DROP: 50 SOLUTION OPHTHALMIC at 20:48

## 2021-01-01 RX ADMIN — Medication: at 09:32

## 2021-01-01 RX ADMIN — MIDAZOLAM 2 MG/HR: 5 INJECTION INTRAMUSCULAR; INTRAVENOUS at 03:14

## 2021-01-01 RX ADMIN — BACITRACIN: 500 OINTMENT TOPICAL at 16:13

## 2021-01-01 RX ADMIN — FUROSEMIDE 20 MG: 10 INJECTION, SOLUTION INTRAMUSCULAR; INTRAVENOUS at 19:49

## 2021-01-01 RX ADMIN — MINOXIDIL 2.5 MG: 2.5 TABLET ORAL at 08:07

## 2021-01-01 RX ADMIN — BUDESONIDE 0.5 MG: 0.5 INHALANT RESPIRATORY (INHALATION) at 07:12

## 2021-01-01 RX ADMIN — NYSTATIN 1 APPLICATION: 100000 OINTMENT TOPICAL at 07:34

## 2021-01-01 RX ADMIN — KETAMINE HYDROCHLORIDE 10 MG: 100 INJECTION INTRAMUSCULAR; INTRAVENOUS at 10:16

## 2021-01-01 RX ADMIN — MEROPENEM 1 G: 1 INJECTION, POWDER, FOR SOLUTION INTRAVENOUS at 13:16

## 2021-01-01 RX ADMIN — ATROPINE SULFATE 0.5 MG: 0.1 INJECTION PARENTERAL at 19:56

## 2021-01-01 RX ADMIN — PROPOFOL 20 MG: 10 INJECTION, EMULSION INTRAVENOUS at 10:11

## 2021-01-01 RX ADMIN — PANTOPRAZOLE SODIUM 40 MG: 40 TABLET, DELAYED RELEASE ORAL at 06:13

## 2021-01-01 RX ADMIN — POTASSIUM CHLORIDE 40 MEQ: 1.5 POWDER, FOR SOLUTION ORAL at 14:50

## 2021-01-01 RX ADMIN — BUDESONIDE 0.5 MG: 0.5 INHALANT RESPIRATORY (INHALATION) at 22:02

## 2021-01-01 RX ADMIN — Medication 1 APPLICATION: at 08:05

## 2021-01-01 RX ADMIN — BRINZOLAMIDE 1 DROP: 10 SUSPENSION/ DROPS OPHTHALMIC at 16:05

## 2021-01-01 RX ADMIN — AMIODARONE HYDROCHLORIDE 200 MG: 200 TABLET ORAL at 08:16

## 2021-01-01 RX ADMIN — LATANOPROST 1 DROP: 50 SOLUTION OPHTHALMIC at 20:53

## 2021-01-01 RX ADMIN — MELATONIN 5.25 MG: at 20:24

## 2021-01-01 RX ADMIN — BRINZOLAMIDE 1 DROP: 10 SUSPENSION/ DROPS OPHTHALMIC at 09:13

## 2021-01-01 RX ADMIN — MORPHINE SULFATE 4 MG: 4 INJECTION INTRAVENOUS at 12:54

## 2021-01-01 RX ADMIN — CEFAZOLIN SODIUM 2 G: 1 INJECTION, POWDER, FOR SOLUTION INTRAMUSCULAR; INTRAVENOUS at 10:20

## 2021-01-01 RX ADMIN — CEFTRIAXONE 1 G: 1 INJECTION, POWDER, FOR SOLUTION INTRAMUSCULAR; INTRAVENOUS at 12:00

## 2021-01-01 RX ADMIN — LATANOPROST 1 DROP: 50 SOLUTION OPHTHALMIC at 20:33

## 2021-01-01 RX ADMIN — ATORVASTATIN CALCIUM 40 MG: 40 TABLET, FILM COATED ORAL at 20:08

## 2021-01-01 RX ADMIN — SODIUM CHLORIDE, PRESERVATIVE FREE 10 ML: 5 INJECTION INTRAVENOUS at 08:45

## 2021-01-01 RX ADMIN — FUROSEMIDE 20 MG: 10 INJECTION, SOLUTION INTRAMUSCULAR; INTRAVENOUS at 08:17

## 2021-01-01 RX ADMIN — AMIODARONE HYDROCHLORIDE 200 MG: 200 TABLET ORAL at 10:28

## 2021-01-01 RX ADMIN — MIDAZOLAM 1 MG/HR: 5 INJECTION INTRAMUSCULAR; INTRAVENOUS at 04:59

## 2021-01-01 RX ADMIN — BRINZOLAMIDE 1 DROP: 10 SUSPENSION/ DROPS OPHTHALMIC at 10:34

## 2021-01-01 RX ADMIN — MINOXIDIL 2.5 MG: 2.5 TABLET ORAL at 08:45

## 2021-01-01 RX ADMIN — HYDRALAZINE HYDROCHLORIDE 50 MG: 50 TABLET, FILM COATED ORAL at 06:07

## 2021-01-01 RX ADMIN — Medication 1 APPLICATION: at 20:08

## 2021-01-01 RX ADMIN — ATORVASTATIN CALCIUM 40 MG: 40 TABLET, FILM COATED ORAL at 20:25

## 2021-01-01 RX ADMIN — PHYTONADIONE 5 MG: 10 INJECTION, EMULSION INTRAMUSCULAR; INTRAVENOUS; SUBCUTANEOUS at 23:23

## 2021-01-01 RX ADMIN — Medication 1 APPLICATION: at 07:31

## 2021-01-01 RX ADMIN — SODIUM CHLORIDE 50 ML/HR: 9 INJECTION, SOLUTION INTRAVENOUS at 10:19

## 2021-01-01 RX ADMIN — PROPOFOL 10 MG: 10 INJECTION, EMULSION INTRAVENOUS at 10:23

## 2021-01-01 RX ADMIN — AMIODARONE HYDROCHLORIDE 200 MG: 200 TABLET ORAL at 08:25

## 2021-01-01 RX ADMIN — FUROSEMIDE 40 MG: 40 TABLET ORAL at 17:13

## 2021-01-01 RX ADMIN — SODIUM CHLORIDE, PRESERVATIVE FREE 10 ML: 5 INJECTION INTRAVENOUS at 22:16

## 2021-01-01 RX ADMIN — ATORVASTATIN CALCIUM 40 MG: 40 TABLET, FILM COATED ORAL at 21:00

## 2021-01-01 RX ADMIN — ISOSORBIDE MONONITRATE 30 MG: 30 TABLET, EXTENDED RELEASE ORAL at 13:37

## 2021-01-01 RX ADMIN — MINOXIDIL 2.5 MG: 2.5 TABLET ORAL at 10:28

## 2021-01-01 RX ADMIN — BUDESONIDE AND FORMOTEROL FUMARATE DIHYDRATE 2 PUFF: 160; 4.5 AEROSOL RESPIRATORY (INHALATION) at 06:55

## 2021-01-01 RX ADMIN — INSULIN DETEMIR 10 UNITS: 100 INJECTION, SOLUTION SUBCUTANEOUS at 22:00

## 2021-01-01 RX ADMIN — SODIUM CHLORIDE, PRESERVATIVE FREE 10 ML: 5 INJECTION INTRAVENOUS at 08:39

## 2021-01-01 RX ADMIN — FUROSEMIDE 20 MG: 10 INJECTION, SOLUTION INTRAMUSCULAR; INTRAVENOUS at 10:28

## 2021-01-01 RX ADMIN — CARVEDILOL 3.12 MG: 3.12 TABLET, FILM COATED ORAL at 17:50

## 2021-01-01 RX ADMIN — HYDRALAZINE HYDROCHLORIDE 50 MG: 50 TABLET, FILM COATED ORAL at 15:00

## 2021-01-01 RX ADMIN — FUROSEMIDE 40 MG: 40 TABLET ORAL at 08:16

## 2021-01-01 RX ADMIN — DEXTROSE MONOHYDRATE 25 G: 500 INJECTION PARENTERAL at 00:22

## 2021-01-01 RX ADMIN — IPRATROPIUM BROMIDE AND ALBUTEROL SULFATE 3 ML: 2.5; .5 SOLUTION RESPIRATORY (INHALATION) at 21:37

## 2021-01-01 RX ADMIN — BRINZOLAMIDE 1 DROP: 10 SUSPENSION/ DROPS OPHTHALMIC at 17:29

## 2021-01-01 RX ADMIN — ISOSORBIDE MONONITRATE 30 MG: 30 TABLET, EXTENDED RELEASE ORAL at 10:28

## 2021-01-01 RX ADMIN — Medication 1 APPLICATION: at 08:21

## 2021-01-01 RX ADMIN — NYSTATIN OINTMENT 1 APPLICATION: 100000 OINTMENT TOPICAL at 20:08

## 2021-01-01 RX ADMIN — SODIUM CHLORIDE 50 ML/HR: 9 INJECTION, SOLUTION INTRAVENOUS at 04:12

## 2021-01-01 RX ADMIN — Medication 1 APPLICATION: at 20:38

## 2021-01-01 RX ADMIN — Medication 1 APPLICATION: at 10:29

## 2021-01-01 RX ADMIN — Medication 0.06 MCG/KG/MIN: at 00:28

## 2021-01-01 RX ADMIN — NYSTATIN OINTMENT 1 APPLICATION: 100000 OINTMENT TOPICAL at 08:25

## 2021-01-01 RX ADMIN — PROPOFOL 10 MG: 10 INJECTION, EMULSION INTRAVENOUS at 10:19

## 2021-01-01 RX ADMIN — BRINZOLAMIDE 1 DROP: 10 SUSPENSION/ DROPS OPHTHALMIC at 20:34

## 2021-01-01 RX ADMIN — LORAZEPAM 1 MG: 2 INJECTION INTRAMUSCULAR; INTRAVENOUS at 17:12

## 2021-01-01 RX ADMIN — QUETIAPINE 100 MG: 100 TABLET ORAL at 21:01

## 2021-01-01 RX ADMIN — WARFARIN SODIUM 3 MG: 3 TABLET ORAL at 20:51

## 2021-01-01 RX ADMIN — SODIUM CHLORIDE, PRESERVATIVE FREE 10 ML: 5 INJECTION INTRAVENOUS at 08:30

## 2021-01-01 RX ADMIN — SODIUM CHLORIDE 75 ML/HR: 9 INJECTION, SOLUTION INTRAVENOUS at 17:13

## 2021-01-01 RX ADMIN — FUROSEMIDE 20 MG: 10 INJECTION, SOLUTION INTRAMUSCULAR; INTRAVENOUS at 08:07

## 2021-01-01 RX ADMIN — BUDESONIDE 0.5 MG: 0.5 INHALANT RESPIRATORY (INHALATION) at 07:42

## 2021-01-01 RX ADMIN — MORPHINE SULFATE 1 MG: 2 INJECTION, SOLUTION INTRAMUSCULAR; INTRAVENOUS at 20:25

## 2021-01-01 RX ADMIN — LORAZEPAM 2 MG: 2 INJECTION INTRAMUSCULAR at 04:01

## 2021-01-01 RX ADMIN — MEROPENEM 500 MG: 500 INJECTION, POWDER, FOR SOLUTION INTRAVENOUS at 20:15

## 2021-01-01 RX ADMIN — FUROSEMIDE 40 MG: 10 INJECTION INTRAMUSCULAR; INTRAVENOUS at 06:05

## 2021-01-01 RX ADMIN — HYDRALAZINE HYDROCHLORIDE 50 MG: 50 TABLET, FILM COATED ORAL at 21:48

## 2021-01-01 RX ADMIN — CEFTRIAXONE SODIUM 1 G: 1 INJECTION, POWDER, FOR SOLUTION INTRAMUSCULAR; INTRAVENOUS at 18:02

## 2021-01-01 RX ADMIN — BRINZOLAMIDE 1 DROP: 10 SUSPENSION/ DROPS OPHTHALMIC at 08:42

## 2021-01-01 RX ADMIN — LATANOPROST 1 DROP: 50 SOLUTION OPHTHALMIC at 21:06

## 2021-01-01 RX ADMIN — BUDESONIDE 0.5 MG: 0.5 INHALANT RESPIRATORY (INHALATION) at 21:37

## 2021-01-01 RX ADMIN — LATANOPROST 1 DROP: 50 SOLUTION OPHTHALMIC at 20:37

## 2021-01-01 RX ADMIN — PANTOPRAZOLE SODIUM 40 MG: 40 TABLET, DELAYED RELEASE ORAL at 08:28

## 2021-01-01 RX ADMIN — BUDESONIDE 0.5 MG: 0.5 INHALANT RESPIRATORY (INHALATION) at 06:55

## 2021-01-01 RX ADMIN — PANTOPRAZOLE SODIUM 40 MG: 40 TABLET, DELAYED RELEASE ORAL at 08:23

## 2021-01-01 RX ADMIN — AMIODARONE HYDROCHLORIDE 200 MG: 200 TABLET ORAL at 08:22

## 2021-01-01 RX ADMIN — FUROSEMIDE 80 MG: 40 TABLET ORAL at 08:45

## 2021-02-04 PROBLEM — A41.9 SEPSIS DUE TO URINARY TRACT INFECTION (HCC): Status: ACTIVE | Noted: 2021-01-01

## 2021-02-04 PROBLEM — I50.22 CHRONIC HFREF (HEART FAILURE WITH REDUCED EJECTION FRACTION) (HCC): Status: ACTIVE | Noted: 2021-01-01

## 2021-02-04 PROBLEM — F03.90 DEMENTIA (HCC): Status: ACTIVE | Noted: 2021-01-01

## 2021-02-04 PROBLEM — R79.1 SUPRATHERAPEUTIC INR: Status: ACTIVE | Noted: 2021-01-01

## 2021-02-04 PROBLEM — R00.1 BRADYCARDIA: Status: ACTIVE | Noted: 2021-01-01

## 2021-02-04 PROBLEM — N39.0 SEPSIS DUE TO URINARY TRACT INFECTION (HCC): Status: ACTIVE | Noted: 2021-01-01

## 2021-02-04 PROBLEM — N13.9 URINARY OBSTRUCTION: Status: ACTIVE | Noted: 2021-01-01

## 2021-02-05 PROBLEM — D62 ACUTE BLOOD LOSS ANEMIA: Status: ACTIVE | Noted: 2021-01-01

## 2021-02-05 NOTE — ANESTHESIA POSTPROCEDURE EVALUATION
Patient: Ondina Alanis Sr.    Procedure Summary     Date: 02/05/21 Room / Location: [facility] OR 02 / [facility] OR    Anesthesia Start: 1004 Anesthesia Stop: 1054    Procedure: CYSTOSCOPY WITH CATHETER PLACEMENT (N/A Urethra) Diagnosis:       Urinary obstruction      (Urinary obstruction [N13.9])    Surgeon: Orlin Riggs MD Provider: Ean Blair MD    Anesthesia Type: MAC ASA Status: 4 - Emergent          Anesthesia Type: MAC    Vitals  No vitals data found for the desired time range.          Post Anesthesia Care and Evaluation    Patient location during evaluation: PHASE II  Level of consciousness: awake and sleepy but conscious  Pain score: 0  Pain management: adequate  Airway patency: patent  Anesthetic complications: No anesthetic complications  PONV Status: none  Cardiovascular status: acceptable and hemodynamically stable  Respiratory status: acceptable and spontaneous ventilation  Hydration status: acceptable

## 2021-02-05 NOTE — PERIOPERATIVE NURSING NOTE
Dr. Riggs spoke with wife, Lamar, concerning procedure plan and risk and benefits. Anesthesia (Dr. Blair) previously spoke with wife about anesthesia plan, risk and benefits. Phone consent obtained with RN x2 (SHAUNNA Donovan RN, ROSALBA Francisco RN).

## 2021-02-05 NOTE — H&P
HISTORY AND PHYSICAL  NAME: Ondina Alanis Sr.  : 1940  MRN: [id number]    DATE OF ADMISSION:  2021     DATE & TIME SEEN: 21 at 2220    PCP: Mark Sheldon APRN    CODE STATUS: Full code    CHIEF COMPLAINT: Urinary retention    HPI:  Ondina Alanis Sr. is a 80 y.o. male with a CMH of dementia, COPD, congestive heart failure, hypertension, BPH, a flutter, diabetes, GERD who presents complaining of no urine output x24 hours.  Patient is currently being treated for UTI with unknown antibiotic.  Today, home health came out to insert a Moore catheter since patient had not had any urine output in over 24 hours.  Moore did not produce any urine in the bag and patient was sent to ED.  On arrival, Moore catheter bulb was noted to be midshaft and penis.  Moore was removed and several attempts made without success.  Dr. Riggs, urologist, unable to insert catheter.  Patient's INR is 7.06 and his renal function is markedly declined.    CONCURRENT MEDICAL HISTORY:  Past Medical History:   Diagnosis Date   • Asthma    • Benign prostatic hyperplasia    • CHF (congestive heart failure) (CMS/[facility])    • Coronary artery disease    • Diabetes mellitus (CMS/[facility])    • GERD (gastroesophageal reflux disease)    • Hyperlipidemia    • Hypertension    • Prostate disorder    • Renal insufficiency    • Urinary tract infection        PAST SURGICAL HISTORY:  Past Surgical History:   Procedure Laterality Date   • BACK SURGERY     • KNEE SURGERY Left    • PROSTATE SURGERY         FAMILY HISTORY:  History reviewed. No pertinent family history.     SOCIAL HISTORY:  Social History     Socioeconomic History   • Marital status:      Spouse name: Not on file   • Number of children: Not on file   • Years of education: Not on file   • Highest education level: Not on file   Tobacco Use   • Smoking status: Never Smoker   Substance and Sexual Activity   • Alcohol use: No   • Drug use: No   • Sexual activity: Defer        HOME MEDICATIONS:  Prior to Admission medications    Medication Sig Start Date End Date Taking? Authorizing Provider   acetaminophen (TYLENOL) 325 MG tablet Take 2 tablets by mouth Every 6 (Six) Hours As Needed for Mild Pain . 10/13/20   Nicole Canseco APRN   amiodarone (PACERONE) 200 MG tablet Take 1 tablet by mouth Daily. 10/14/20   Nicole Canseco APRN   atorvastatin (LIPITOR) 40 MG tablet Take 1 tablet by mouth Every Night. 8/28/20   Anup Atkinson MD   Bacitracin Zinc (magic butt ointment) Apply 1 each topically to the appropriate area as directed 2 (Two) Times a Day. 10/13/20   Nicole Canseco APRN   bisacodyl (DULCOLAX) 5 MG EC tablet Take 1 tablet by mouth Daily As Needed for Constipation. 10/13/20   Nicole Canseco APRN   brimonidine (ALPHAGAN P) 0.1 % solution ophthalmic solution 1 drop. 1/26/16   Selina Rosario MD   brinzolamide (AZOPT) 1 % ophthalmic suspension 1 drop. 1/26/16   Selina Rosario MD   budesonide (PULMICORT) 0.5 MG/2ML nebulizer solution Inhale 0.5 mg. 1/26/16   Selina Rosario MD   carvedilol (COREG) 6.25 MG tablet Take 1 tablet by mouth 2 (Two) Times a Day With Meals. 8/28/20   Anup Atkinson MD   hydrALAZINE (APRESOLINE) 25 MG tablet Take 3 tablets by mouth Every 8 (Eight) Hours. 10/13/20   Nicole Canseco APRN   insulin aspart (novoLOG) 100 UNIT/ML injection Inject 0-7 Units under the skin into the appropriate area as directed 3 (Three) Times a Day Before Meals. 8/28/20   Anup Atkinson MD   insulin detemir (Levemir) 100 UNIT/ML injection Inject 10 Units under the skin into the appropriate area as directed Every 12 (Twelve) Hours. 8/28/20   Anup Atkinson MD   ipratropium (ATROVENT) 0.02 % nebulizer solution Take 2.5 mL by nebulization Every 4 (Four) Hours As Needed for Wheezing or Shortness of Air. 8/28/20   Anup Atkinson MD   ipratropium-albuterol (DUO-NEB) 0.5-2.5 mg/3 ml nebulizer Take 3 mL by nebulization Every 4 (Four) Hours As  Needed for Wheezing or Shortness of Air. 10/13/20   Nicole Canseco APRN   isosorbide mononitrate (IMDUR) 30 MG 24 hr tablet Take 1 tablet by mouth Daily. 8/29/20   Anup Atkinson MD   lansoprazole (PREVACID) 30 MG capsule Take 30 mg by mouth. 4/10/17   Selina Rosario MD   latanoprost (XALATAN) 0.005 % ophthalmic solution 1 drop. 1/26/16   Selina Rosario MD   Misc. Devices (TRANSFER BENCH) misc Transfer bench r/t dementia, deconditioning. 6/8/17   Nicole Canseco APRN   [facility]UB [place]SFER [place] [facility] [place] r/t deconditioning, dementia, and blind r/t glaucoma 6/9/17   Nicole Canseco APRN   ondansetron (ZOFRAN) 4 MG/2ML injection Infuse 2 mL into a venous catheter Every 6 (Six) Hours As Needed for Nausea or Vomiting. 10/13/20   Nicole Canseco APRN       ALLERGIES:  Contrast dye    REVIEW OF SYSTEMS  Review of Systems  Review of systems limited as patient has severe dementia and limited interaction with staff  PHYSICAL EXAM:  Temp:  [97 °F (36.1 °C)] 97 °F (36.1 °C)  Heart Rate:  [46-59] 59  Resp:  [18] 18  BP: ()/(50-66) 103/52  Body mass index is 28.75 kg/m².  Physical Exam  Constitutional:       Appearance: He is well-developed.   HENT:      Head: Normocephalic and atraumatic.      Mouth/Throat:      Mouth: Mucous membranes are moist.   Eyes:      Pupils: Pupils are equal, round, and reactive to light.   Neck:      Musculoskeletal: Neck supple.      Thyroid: No thyromegaly.      Trachea: No tracheal deviation.   Cardiovascular:      Rate and Rhythm: Bradycardia present.      Pulses:           Radial pulses are 2+ on the left side.        Dorsalis pedis pulses are 2+ on the right side and 2+ on the left side.      Heart sounds: S1 normal and S2 normal. Murmur present.   Pulmonary:      Effort: Pulmonary effort is normal.      Breath sounds: Normal breath sounds. Decreased air movement present.   Abdominal:      Palpations: Abdomen is soft.      Tenderness: There is  abdominal tenderness (suprapubic).   Musculoskeletal: Normal range of motion.   Skin:     General: Skin is warm and dry.      Capillary Refill: Capillary refill takes 2 to 3 seconds.   Neurological:      Mental Status: He is alert.      GCS: GCS eye subscore is 3. GCS verbal subscore is 4. GCS motor subscore is 5.   Psychiatric:         Mood and Affect: Affect is angry.         Speech: Speech normal.         Behavior: Behavior is uncooperative and combative.         Cognition and Memory: Cognition is impaired.         DIAGNOSTIC DATA:   Lab Results (last 24 hours)     Procedure Component Value Units Date/Time    Urinalysis With Microscopic If Indicated (No Culture) - Urine, Catheter [[id number]] Updated: 02/04/21 2236    Specimen: Urine, Catheter     Blood Culture - Blood, Arm, Left [[id number]] Collected: 02/04/21 2206    Specimen: Blood from Arm, Left Updated: 02/04/21 2211    Lactic Acid, Plasma [[id number]]  (Abnormal) Collected: 02/04/21 2130    Specimen: Blood from Arm, Left Updated: 02/04/21 2157     Lactate 2.2 mmol/L     Lactic Acid, Reflex Timer (This will reflex a repeat order 3-3:15 hours after ordered.) [[id number]] Collected: 02/04/21 2130    Specimen: Blood from Arm, Left Updated: 02/04/21 2157    Blood Culture - Blood, Arm, Left [[id number]] Collected: 02/04/21 2130    Specimen: Blood from Arm, Left Updated: 02/04/21 2138    Protime-INR [[id number]]  (Abnormal) Collected: 02/04/21 2008    Specimen: Blood Updated: 02/04/21 2100     Protime 66.6 Seconds      INR 7.06    Narrative:      Therapeutic range for most indications is 2.0-3.0 INR,  or 2.5-3.5 for mechanical heart valves.    aPTT [[id number]]  (Abnormal) Collected: 02/04/21 2008    Specimen: Blood Updated: 02/04/21 2100     PTT 60.8 seconds     Narrative:      The recommended Heparin therapeutic range is 68-97 seconds.    Manual Differential [[id number]]  (Abnormal) Collected: 02/04/21 2008    Specimen: Blood Updated: 02/04/21 2051     Neutrophil %  64.0 %      Lymphocyte % 3.0 %      Monocyte % 9.0 %      Bands %  22.0 %      Metamyelocyte % 2.0 %      Neutrophils Absolute 15.46 10*3/mm3      Lymphocytes Absolute 0.54 10*3/mm3      Monocytes Absolute 1.62 10*3/mm3      Anisocytosis Slight/1+     Hypochromia Mod/2+     Poikilocytes Mod/2+     Comment: WITH A COMBINATION OF ECHINOCYTES,ACANTHOCYTES, ELLIPTOCYTES, TARGET CELLS, AND HELMET CELLS         WBC Morphology Normal     Platelet Estimate Decreased    Comprehensive Metabolic Panel [[id number]]  (Abnormal) Collected: 02/04/21 2008    Specimen: Blood Updated: 02/04/21 2041     Glucose 102 mg/dL       mg/dL      Creatinine 4.89 mg/dL      Sodium 138 mmol/L      Potassium 5.1 mmol/L      Chloride 101 mmol/L      CO2 22.0 mmol/L      Calcium 8.2 mg/dL      Total Protein 7.2 g/dL      Albumin 3.20 g/dL      ALT (SGPT) 95 U/L      AST (SGOT) 135 U/L      Alkaline Phosphatase 80 U/L      Total Bilirubin 0.5 mg/dL      eGFR Non  Amer --     Comment: <15 Indicative of kidney failure.        eGFR   Amer 14 mL/min/1.73      Comment: <15 Indicative of kidney failure.        Globulin 4.0 gm/dL      A/G Ratio 0.8 g/dL      BUN/Creatinine Ratio 22.5     Anion Gap 15.0 mmol/L     Narrative:      GFR Normal >60  Chronic Kidney Disease <60  Kidney Failure <15      Lipase [[id number]]  (Normal) Collected: 02/04/21 2008    Specimen: Blood Updated: 02/04/21 2036     Lipase 15 U/L     COVID-19 and FLU A/B PCR - Swab, Nasopharynx [[id number]]  (Normal) Collected: 02/04/21 2001    Specimen: Swab from Nasopharynx Updated: 02/04/21 2032     COVID19 Not Detected     Influenza A PCR Not Detected     Influenza B PCR Not Detected    Narrative:      Fact sheet for providers: https://www.fda.gov/media/342520/download    Fact sheet for patients: https://www.fda.gov/media/096199/download    Test performed by PCR.    CBC & Differential [[id number]]  (Abnormal) Collected: 02/04/21 2008    Specimen: Blood Updated: 02/04/21  2019    Narrative:      The following orders were created for panel order CBC & Differential.  Procedure                               Abnormality         Status                     ---------                               -----------         ------                     Scan Slide[[id number]]                                                                  CBC Auto Differential[[id number]]        Abnormal            Final result                 Please view results for these tests on the individual orders.    CBC Auto Differential [[id number]]  (Abnormal) Collected: 02/04/21 2008    Specimen: Blood Updated: 02/04/21 2018     WBC 17.98 10*3/mm3      RBC 3.04 10*6/mm3      Hemoglobin 8.5 g/dL      Hematocrit 24.3 %      MCV 79.9 fL      MCH 28.0 pg      MCHC 35.0 g/dL      RDW 16.0 %      RDW-SD 46.3 fl      MPV 12.1 fL      Platelets 140 10*3/mm3      Neutrophil % 81.7 %      Lymphocyte % 1.7 %      Monocyte % 4.3 %      Eosinophil % 0.0 %      Basophil % 0.2 %      Immature Grans % 12.1 %      Neutrophils, Absolute 14.71 10*3/mm3      Lymphocytes, Absolute 0.30 10*3/mm3      Monocytes, Absolute 0.77 10*3/mm3      Eosinophils, Absolute 0.00 10*3/mm3      Basophils, Absolute 0.03 10*3/mm3      Immature Grans, Absolute 2.17 10*3/mm3      nRBC 0.0 /100 WBC            Imaging Results (Last 24 Hours)     Procedure Component Value Units Date/Time    XR Chest 1 View [[id number]] Collected: 02/04/21 2025     Updated: 02/04/21 2035    Narrative:        PORTABLE CHEST    HISTORY: Oliguria. Abdominal pain.    Portable AP film of the chest was obtained at 8:26 PM.  COMPARISON: October 13, 2020    FINDINGS:   EKG leads.  The lungs are clear of an acute process.  The heart is not enlarged.  The pulmonary vasculature is not increased.  Small left pleural effusion.  No pneumothorax.  No acute osseous abnormality.  Degenerative changes are present in the thoracic spine.  Hypertrophic change right acromioclavicular joint.      Impression:       CONCLUSION:  Small left pleural effusion.    [id number]    Electronically signed by:  Chris Srinivasan MD  2/4/2021 8:34 PM CST  Workstation: [id number]    CT Abdomen Pelvis Without Contrast [[id number]] Collected: 02/04/21 2005     Updated: 02/04/21 2033    Narrative:        CT Abdomen and Pelvis Without Contrast    History: Abdominal pain    Axials spiral scans of the abdomen and pelvis were obtained  without contrast.  Coronal and sagital reconstructions were  preformed.      This exam was performed according to our departmental  dose-optimization program, which includes automated exposure  control, adjustment of the mA and/or kV according to patient size  and/or use of iterative reconstruction technique.    DLP: 458.20    Comparison: None    Findings:   Small left pleural effusion.  Coronary artery calcifications.    No acute osseous abnormality.  Degenerative changes and degenerative disc disease thoracic and  lumbar spine.    The liver is unremarkable.  The gallbladder is distended.  The spleen is unremarkable.  The pancreas is unremarkable.  The adrenal glands are unremarkable.    Bilateral renal cysts.  Nonobstructive renal calculi.  No hydronephrosis.    Air in the urinary bladder presumably related to recent  instrumentation or catheterization.  Radiopaque debris, gravel or hemorrhage posteriorly in the  bladder.  Minimally enlarged prostate.  No pelvic mass.    No abdominal aortic aneurysm.  No adenopathy.    Thickening of the rectal wall with stranding in the perirectal  fat.  This could represent proctitis or neoplasm.  No bowel obstruction.  Normal appendix.  No free air.  No free fluid.    No hernia.  Subcutaneous edema.      Impression:      Conclusion:  Distended gallbladder.  Bilateral renal cysts.  Nonobstructive renal calculi.  Air in the urinary bladder presumably related to recent  instrumentation or catheterization.  Radiopaque debris, gravel or hemorrhage posteriorly in the  bladder.  Minimally  enlarged prostate.  Thickening of the rectal wall with stranding in the perirectal  fat.  This could represent proctitis or neoplasm.  Subcutaneous edema.  Small left pleural effusion.  Coronary artery calcifications.    [id number]    Electronically signed by:  Chris Srinivasan MD  2/4/2021 8:32 PM CST  Workstation: [id number]            I reviewed the patient's new clinical results.    ASSESSMENT AND PLAN: This is a 80 y.o. male with:    Active Hospital Problems    Diagnosis POA   • **Urinary obstruction [N13.9] Yes     Priority: High     -Dr Forbes unable to insert burleson  -surgery in am   -will need INR reversed     • Supratherapeutic INR [R79.1] Yes     -INR 7.06  -Will need urological surgery in am, Forbes requesting reversal- 4 u FFP and IV vit K     • Sepsis due to urinary tract infection (CMS/[facility]) [A41.9, N39.0] Yes     -lactate 2.2  -sepsis bolus unable as pt has EF 20% (TTE 8/2020)  -currently on antibiotic for UTI, but wife unsure name (she read me divalproex as the antibiotic)  -urine unable to be collected at this time  -urine culture pending  Ns 125 mls/hr     • Chronic HFrEF (heart failure with reduced ejection fraction) (CMS/[facility]) [I50.22] Yes     -last TTE 8/2020, 20%     • Bradycardia [R00.1] Yes     -SB 40 bpm     • Dementia (CMS/[facility]) [F03.90] Yes   • Atrial flutter (CMS/[facility]) [I48.92] Yes     -coumadin     • COPD exacerbation (CMS/[facility]) [J44.1] Yes     -continue home inhalers     • CKD (chronic kidney disease) stage 4, GFR 15-29 ml/min (CMS/[facility]) [N18.4] Yes     - baseline Cr around 3  -Cr 4.89 on admission, GFR 14  -no urine output past 24 hours  -avoid nephrotoxic agents     • Diabetic peripheral neuropathy associated with type 2 diabetes mellitus (CMS/[facility]) [E11.42] Yes     -continue home doses of novolog and levemir  -wife states he hasn't had to use any extra coverage and only currently taking levemir 10 u nightly     • Benign essential hypertension [I10] Yes     -continue home meds         DVT  prophylaxis: Contraindicated supratherapeutic and need surgery in am     Ondina Bowman Zeke Landry. and I have discussed pain goals for this hospitalization after reviewing his current clinical condition, medical history and prior pain experiences.  The goal is to keep the pain level below a 4 out of 10.  To help achieve this, I plan to medicate appropriately.    ANA #[id number] , reviewed and consistent with patient reported medications.    Expected Length of Stay: Where: home health and SNF and When:  3-4days    I discussed the patient's findings and my recommendations with patient and primary care team.     Dr Kay is the attending on record at time of admission, He is aware of the patient's status and agrees with the above history and physical.      This document has been electronically signed by Parminder Collado MD on February 4, 2021 23:41 CST    Parminder Collado MD PGY-2  Part of this note may be an electronic transcription/translation of spoken language to printed text using the Dragon Dictation System.

## 2021-02-05 NOTE — ED NOTES
Patient arrived to ER with with a burleson that was placed prior to arrival. Burleson appears brand new and there is zero urine in bag and no condensation in the tubing. Blood noted at the head of the penis an on his thigh. Bladder scan done and bladder has approx 3-400 urine in bladder. Burleson removed. Attempted x3 nurses with 3 different size coude foleys and we are unable to place a new catheter at this time.      Jocelin Ontiveros, RN  02/04/21 2014

## 2021-02-05 NOTE — CONSULTS
[facility]  [address] [place] [address]. [address]   - [phone]   - [phone]     Consultation         PATIENT  DEMOGRAPHICS   PATIENT NAME: Ondina Alanis Sr.                      PHYSICIAN: Marley Akhtar MD  : 1940  MRN: [id number]    Subjective   SUBJECTIVE   Referring Provider: Rlaph Markham APRBROOKLYNN  Reason for Consultation: ckd 4 rizwan  History of present illness:      Mr. Alanis is a 80-year-old gentleman with a past medical history of diabetes mellitus type 2 hypertension pulmonary embolism systolic heart failure EF 20%, A. fib BPH and CKD stage IV.  He has episode of acute kidney injury back in 2020.  His baseline creatinine is close to 2.4-2.6.    Patient came here yesterday when he was not able to urinate for almost 24 hours.  Home health place the Moore but no marked urine output, only blood clots.  He was therefore came to the ER.  Moore is then removed in the ER.  His INR was 7.  Dr. Riggs has placed a Moore under the cystoscopy today.  He has prior history of hematuria/UTI with blood clots and requiring CBI.  He also has a history of prostate cancer and on Lupron injection in the outpatient setting.  We have been asked to evaluate for creatinine of 4.89.  Bicarb is 20.  Patient hemoglobin is up to 7.0 now.  Creatinine was close to 3 in .    Past Medical History:   Diagnosis Date   • Asthma    • Benign prostatic hyperplasia    • CHF (congestive heart failure) (CMS/HCC)    • Coronary artery disease    • Diabetes mellitus (CMS/HCC)    • GERD (gastroesophageal reflux disease)    • Hyperlipidemia    • Hypertension    • Prostate disorder    • Renal insufficiency    • Urinary tract infection      Past Surgical History:   Procedure Laterality Date   • BACK SURGERY     • KNEE SURGERY Left    • PROSTATE SURGERY       History reviewed. No pertinent family history.  Social History     Tobacco Use   • Smoking status: Never Smoker   Substance  "Use Topics   • Alcohol use: No   • Drug use: No     Allergies:  Contrast dye     REVIEW OF SYSTEMS    Review of Systems   Unable to perform ROS: Dementia       Objective   OBJECTIVE   Vital Signs  Temp:  [96.7 °F (35.9 °C)-97.7 °F (36.5 °C)] 96.7 °F (35.9 °C)  Heart Rate:  [42-59] 45  Resp:  [16-20] 18  BP: ()/(49-67) 106/53    Flowsheet Rows      First Filed Value   Admission Height  182.9 cm (72\") Documented at 02/04/2021 2021   Admission Weight  96.2 kg (212 lb) Documented at 02/04/2021 2021           I/O last 3 completed shifts:  In: 3616 [I.V.:1880; Blood:1636; IV Piggyback:100]  Out: 200 [Stool:200]    PHYSICAL EXAM    Physical Exam  Constitutional:       General: He is not in acute distress.     Appearance: He is well-developed. He is ill-appearing.   HENT:      Head: Normocephalic.   Eyes:      Pupils: Pupils are equal, round, and reactive to light.   Cardiovascular:      Rate and Rhythm: Normal rate and regular rhythm.      Heart sounds: Normal heart sounds.   Pulmonary:      Effort: Pulmonary effort is normal.      Breath sounds: Normal breath sounds.   Abdominal:      General: Abdomen is flat. Bowel sounds are normal.      Palpations: Abdomen is soft.   Musculoskeletal:         General: No swelling or tenderness.   Skin:     General: Skin is warm.   Neurological:      General: No focal deficit present.      Coordination: Coordination normal.         RESULTS   Results Review:    Results from last 7 days   Lab Units 02/05/21 0622 02/04/21 2008   SODIUM mmol/L 141 138   POTASSIUM mmol/L 4.6 5.1   CHLORIDE mmol/L 103 101   CO2 mmol/L 20.0* 22.0   BUN mg/dL 106* 110*   CREATININE mg/dL 4.89* 4.89*   CALCIUM mg/dL 7.7* 8.2*   BILIRUBIN mg/dL  --  0.5   ALK PHOS U/L  --  80   ALT (SGPT) U/L  --  95*   AST (SGOT) U/L  --  135*   GLUCOSE mg/dL 94 102*       Estimated Creatinine Clearance: 14.7 mL/min (A) (by C-G formula based on SCr of 4.89 mg/dL (H)).                Results from last 7 days   Lab Units " 02/05/21  1203 02/05/21  0622 02/04/21 2008   WBC 10*3/mm3  --  14.84* 17.98*   HEMOGLOBIN g/dL 7.0* 7.1* 8.5*   PLATELETS 10*3/mm3  --  110* 140       Results from last 7 days   Lab Units 02/05/21  0622 02/04/21 2008   INR  2.29* 7.06*        MEDICATIONS    atorvastatin, 40 mg, Oral, Nightly  budesonide, 0.5 mg, Nebulization, BID - RT  carvedilol, 6.25 mg, Oral, BID With Meals  [START ON 2/6/2021] cefTRIAXone, 1 g, Intravenous, Q24H  hydrALAZINE, 75 mg, Oral, Q8H  insulin aspart, 0-7 Units, Subcutaneous, TID AC  insulin detemir, 10 Units, Subcutaneous, Q12H  isosorbide mononitrate, 30 mg, Oral, Q24H  pantoprazole, 40 mg, Oral, QAM  [MAR Hold] sodium chloride, 10 mL, Intravenous, Q12H      sodium chloride, 125 mL/hr, Last Rate: 125 mL/hr (02/05/21 0548)      Medications Prior to Admission   Medication Sig Dispense Refill Last Dose   • acetaminophen (TYLENOL) 325 MG tablet Take 2 tablets by mouth Every 6 (Six) Hours As Needed for Mild Pain .      • amiodarone (PACERONE) 200 MG tablet Take 1 tablet by mouth Daily.      • atorvastatin (LIPITOR) 40 MG tablet Take 1 tablet by mouth Every Night. 30 tablet 0    • Bacitracin Zinc (magic butt ointment) Apply 1 each topically to the appropriate area as directed 2 (Two) Times a Day.      • bisacodyl (DULCOLAX) 5 MG EC tablet Take 1 tablet by mouth Daily As Needed for Constipation.      • brimonidine (ALPHAGAN P) 0.1 % solution ophthalmic solution 1 drop.      • brinzolamide (AZOPT) 1 % ophthalmic suspension 1 drop.      • budesonide (PULMICORT) 0.5 MG/2ML nebulizer solution Inhale 0.5 mg.      • carvedilol (COREG) 6.25 MG tablet Take 1 tablet by mouth 2 (Two) Times a Day With Meals. 30 tablet 0    • hydrALAZINE (APRESOLINE) 25 MG tablet Take 3 tablets by mouth Every 8 (Eight) Hours.      • insulin aspart (novoLOG) 100 UNIT/ML injection Inject 0-7 Units under the skin into the appropriate area as directed 3 (Three) Times a Day Before Meals. 10 mL 0    • insulin detemir  (Levemir) 100 UNIT/ML injection Inject 10 Units under the skin into the appropriate area as directed Every 12 (Twelve) Hours. 10 mL 0    • ipratropium (ATROVENT) 0.02 % nebulizer solution Take 2.5 mL by nebulization Every 4 (Four) Hours As Needed for Wheezing or Shortness of Air. 10 mL 0    • ipratropium-albuterol (DUO-NEB) 0.5-2.5 mg/3 ml nebulizer Take 3 mL by nebulization Every 4 (Four) Hours As Needed for Wheezing or Shortness of Air. 360 mL     • isosorbide mononitrate (IMDUR) 30 MG 24 hr tablet Take 1 tablet by mouth Daily. 60 tablet 0    • lansoprazole (PREVACID) 30 MG capsule Take 30 mg by mouth.      • latanoprost (XALATAN) 0.005 % ophthalmic solution 1 drop.      • Misc. Devices (TRANSFER BENCH) misc Transfer bench r/t dementia, deconditioning. 1 each 0    • Misc. Devices (TUB TRANSFER BOARD) misc Transfer tub bench r/t deconditioning, dementia, and blind r/t glaucoma 1 each 0    • ondansetron (ZOFRAN) 4 MG/2ML injection Infuse 2 mL into a venous catheter Every 6 (Six) Hours As Needed for Nausea or Vomiting.        Assessment/Plan   ASSESSMENT / PLAN      Urinary obstruction    CKD (chronic kidney disease) stage 4, GFR 15-29 ml/min (CMS/[facility])    Diabetic peripheral neuropathy associated with type 2 diabetes mellitus (CMS/[facility])    Benign essential hypertension    COPD exacerbation (CMS/[facility])    Atrial flutter (CMS/[facility])    Supratherapeutic INR    Sepsis due to urinary tract infection (CMS/[facility])    Chronic HFrEF (heart failure with reduced ejection fraction) (CMS/[facility])    Bradycardia    Dementia (CMS/[facility])       1.  CKD 3/4- Baseline creatinine used to be around 2.0, and now up to 2.9-3.0 in the last few readings.  It is up to 4.89 today.  ANT likely due to post renal obstruction.  He has now Moore catheter placed under cystoscopy.    I will try gentle IV fluid because of postobstructive diuresis.  We will add the bicarbonate in the drip as well.  We will watch the respiratory status closely because of his EF of  20%.  We will hold any diuretics for now, discussed with patient's wife in detail.      2.  Hypertension- BP borderline low.  Will lower the hydralazine to 25 mg 3 times daily.     3.  CHF- systolic heart failure EF 20%. severe LV systolic dysfunction with RV systolic pressure of 60 mmHg,     4.  A. Fib on amiodarone and on coumadin/ hematuria -off Coumadin for now.  INR is up to 7 plan for 1 unit of packed RBC    Thank you for the referral we will continue to follow him       I discussed the patients findings and my recommendations with family, nursing staff and primary care team         This document has been electronically signed by Marley Akhtar MD on February 5, 2021 13:31 CST

## 2021-02-05 NOTE — ED NOTES
Dr. Riggs here to attempted burleson unsuccessful.  Pt incontinent of bm.  Pt repositioned and depends placed.      Nan Hernandes, RN  02/04/21 3026

## 2021-02-05 NOTE — ED PROVIDER NOTES
Subjective   81yo male pmh significant htn/dm2/chf/ckd/gerd/bph/atrial fibrillation/dementia/PE presents ED via EMS reported no urinary output x24hrs.  Pt reportedly had burleson catheter placed per home health without urinary drainage.  Pt unable to provide hx secondary to dementia.      History provided by:  Patient  History limited by:  Dementia  Illness      Review of Systems   Unable to perform ROS: Dementia       Past Medical History:   Diagnosis Date   • Asthma    • Diabetes mellitus (CMS/HCC)    • GERD (gastroesophageal reflux disease)    • Hypertension    • Prostate disorder        Allergies   Allergen Reactions   • Contrast Dye        Past Surgical History:   Procedure Laterality Date   • BACK SURGERY     • KNEE SURGERY Left    • PROSTATE SURGERY         No family history on file.    Social History     Socioeconomic History   • Marital status:      Spouse name: Not on file   • Number of children: Not on file   • Years of education: Not on file   • Highest education level: Not on file   Tobacco Use   • Smoking status: Never Smoker   Substance and Sexual Activity   • Alcohol use: No   • Drug use: No   • Sexual activity: Defer           Objective   Physical Exam  Vitals signs and nursing note reviewed.   Constitutional:       Appearance: Normal appearance.   HENT:      Head: Normocephalic and atraumatic.      Mouth/Throat:      Mouth: Mucous membranes are dry.   Eyes:      Pupils: Pupils are equal, round, and reactive to light.   Neck:      Musculoskeletal: Normal range of motion and neck supple. No neck rigidity.   Cardiovascular:      Rate and Rhythm: Normal rate and regular rhythm.      Pulses: Normal pulses.      Heart sounds: Normal heart sounds. No murmur. No friction rub. No gallop.    Pulmonary:      Effort: Pulmonary effort is normal. No respiratory distress.      Breath sounds: Normal breath sounds. No wheezing, rhonchi or rales.   Abdominal:      General: Abdomen is flat.      Palpations:  Abdomen is soft.      Tenderness: There is abdominal tenderness in the suprapubic area. There is no guarding or rebound. Negative signs include Eden's sign, Rovsing's sign and McBurney's sign.       Musculoskeletal:      Right lower leg: No edema.      Left lower leg: No edema.   Lymphadenopathy:      Cervical: No cervical adenopathy.   Skin:     General: Skin is warm and dry.   Neurological:      General: No focal deficit present.      Mental Status: He is alert.      GCS: GCS eye subscore is 1. GCS verbal subscore is 4. GCS motor subscore is 6.      Sensory: Sensation is intact.      Motor: Motor function is intact.         ECG 12 Lead      Date/Time: 2/4/2021 9:14 PM  Performed by: Neil Coleman MD  Authorized by: Neil Coleman MD   Interpreted by physician  Rhythm: sinus bradycardia  Rate: bradycardic  BPM: 52  QRS axis: normal  Conduction: left bundle branch block  ST Segments: ST segments normal  T Waves: T waves normal  T flattening: aVL  Other findings: prolonged QTc interval  Clinical impression: abnormal ECG                 ED Course      Labs Reviewed   COMPREHENSIVE METABOLIC PANEL - Abnormal; Notable for the following components:       Result Value    Glucose 102 (*)      (*)     Creatinine 4.89 (*)     Calcium 8.2 (*)     Albumin 3.20 (*)     ALT (SGPT) 95 (*)     AST (SGOT) 135 (*)     eGFR   Amer 14 (*)     All other components within normal limits    Narrative:     GFR Normal >60  Chronic Kidney Disease <60  Kidney Failure <15     PROTIME-INR - Abnormal; Notable for the following components:    Protime 66.6 (*)     INR 7.06 (*)     All other components within normal limits    Narrative:     Therapeutic range for most indications is 2.0-3.0 INR,  or 2.5-3.5 for mechanical heart valves.   APTT - Abnormal; Notable for the following components:    PTT 60.8 (*)     All other components within normal limits    Narrative:     The recommended Heparin therapeutic range is 68-97 seconds.    CBC WITH AUTO DIFFERENTIAL - Abnormal; Notable for the following components:    WBC 17.98 (*)     RBC 3.04 (*)     Hemoglobin 8.5 (*)     Hematocrit 24.3 (*)     RDW 16.0 (*)     MPV 12.1 (*)     Neutrophil % 81.7 (*)     Lymphocyte % 1.7 (*)     Monocyte % 4.3 (*)     Eosinophil % 0.0 (*)     Immature Grans % 12.1 (*)     Neutrophils, Absolute 14.71 (*)     Lymphocytes, Absolute 0.30 (*)     Immature Grans, Absolute 2.17 (*)     All other components within normal limits   MANUAL DIFFERENTIAL - Abnormal; Notable for the following components:    Lymphocyte % 3.0 (*)     Bands %  22.0 (*)     Metamyelocyte % 2.0 (*)     Neutrophils Absolute 15.46 (*)     Lymphocytes Absolute 0.54 (*)     Monocytes Absolute 1.62 (*)     All other components within normal limits   COVID-19 AND FLU A/B, NP SWAB IN TRANSPORT MEDIA 8-12 HR TAT - Normal    Narrative:     Fact sheet for providers: https://www.fda.gov/media/567540/download    Fact sheet for patients: https://www.fda.gov/media/760461/download    Test performed by PCR.   LIPASE - Normal   URINALYSIS W/ MICROSCOPIC IF INDICATED (NO CULTURE)   CBC AND DIFFERENTIAL    Narrative:     The following orders were created for panel order CBC & Differential.  Procedure                               Abnormality         Status                     ---------                               -----------         ------                     Scan Slide[[id number]]                                                                  CBC Auto Differential[[id number]]        Abnormal            Final result                 Please view results for these tests on the individual orders.     Ct Abdomen Pelvis Without Contrast    Result Date: 2/4/2021  Narrative: CT Abdomen and Pelvis Without Contrast History: Abdominal pain Axials spiral scans of the abdomen and pelvis were obtained without contrast.  Coronal and sagital reconstructions were preformed.  This exam was performed according to our departmental  dose-optimization program, which includes automated exposure control, adjustment of the mA and/or kV according to patient size and/or use of iterative reconstruction technique. DLP: 458.20 Comparison: None Findings: Small left pleural effusion. Coronary artery calcifications. No acute osseous abnormality. Degenerative changes and degenerative disc disease thoracic and lumbar spine. The liver is unremarkable. The gallbladder is distended. The spleen is unremarkable. The pancreas is unremarkable. The adrenal glands are unremarkable. Bilateral renal cysts. Nonobstructive renal calculi. No hydronephrosis. Air in the urinary bladder presumably related to recent instrumentation or catheterization. Radiopaque debris, gravel or hemorrhage posteriorly in the bladder. Minimally enlarged prostate. No pelvic mass. No abdominal aortic aneurysm. No adenopathy. Thickening of the rectal wall with stranding in the perirectal fat. This could represent proctitis or neoplasm. No bowel obstruction. Normal appendix. No free air. No free fluid. No hernia. Subcutaneous edema.     Impression: Conclusion: Distended gallbladder. Bilateral renal cysts. Nonobstructive renal calculi. Air in the urinary bladder presumably related to recent instrumentation or catheterization. Radiopaque debris, gravel or hemorrhage posteriorly in the bladder. Minimally enlarged prostate. Thickening of the rectal wall with stranding in the perirectal fat. This could represent proctitis or neoplasm. Subcutaneous edema. Small left pleural effusion. Coronary artery calcifications. [id number] Electronically signed by:  Chris Srinivasan MD  2/4/2021 8:32 PM CST Workstation: [employer] Chest 1 View    Result Date: 2/4/2021  Narrative: PORTABLE CHEST HISTORY: Oliguria. Abdominal pain. Portable AP film of the chest was obtained at 8:26 PM. COMPARISON: October 13, 2020 FINDINGS: EKG leads. The lungs are clear of an acute process. The heart is not enlarged. The pulmonary  vasculature is not increased. Small left pleural effusion. No pneumothorax. No acute osseous abnormality. Degenerative changes are present in the thoracic spine. Hypertrophic change right acromioclavicular joint.     Impression: CONCLUSION: Small left pleural effusion. [id number] Electronically signed by:  Chris Srinivasan MD  2/4/2021 8:34 PM CST Workstation: [employer]                                         [facility]  Number of Diagnoses or Management Options  ANT (acute kidney injury) (CMS/HCC):   Pleural effusion on left:   Proctitis:   Urinary obstruction:   Diagnosis management comments: RN unable to place coudet burleson catheter.  Dr. Riggs consulted. Will place burleson catheter in ED.  Plan admit.       Amount and/or Complexity of Data Reviewed  Clinical lab tests: reviewed  Tests in the radiology section of CPT®: reviewed  Tests in the medicine section of CPT®: reviewed  Decide to obtain previous medical records or to obtain history from someone other than the patient: yes        Final diagnoses:   Urinary obstruction   ANT (acute kidney injury) (CMS/HCC)   Pleural effusion on left   Proctitis            Neil Coleman MD  02/04/21 7640

## 2021-02-05 NOTE — CONSULTS
Referring Provider: Tobias  Reason for Consultation: Urinary retention    Patient Care Team:  Mark Shedlon APRN as PCP - General (Nurse Practitioner)    Chief complaint: Urinary retention    Subjective .     History of present illness: Patient with severe dementia who has a chronic indwelling Moore which inadvertently was removed and unable to be placed patient has been in the emergency room attempts at placing catheter  were unable to be catheterized.  Is of note that he had a INR of 7.  Attempts with filiforms were made and only had grossly bloody urine and unable to access bladder    Review of Systems:  Pertinent items are noted in HPI    History:  Past Medical History:   Diagnosis Date   • Asthma    • Benign prostatic hyperplasia    • CHF (congestive heart failure) (CMS/[facility])    • Coronary artery disease    • Diabetes mellitus (CMS/[facility])    • GERD (gastroesophageal reflux disease)    • Hyperlipidemia    • Hypertension    • Prostate disorder    • Renal insufficiency    • Urinary tract infection    ,   Past Surgical History:   Procedure Laterality Date   • BACK SURGERY     • KNEE SURGERY Left    • PROSTATE SURGERY     , History reviewed. No pertinent family history.,   Social History     Tobacco Use   • Smoking status: Never Smoker   Substance Use Topics   • Alcohol use: No   • Drug use: No   , (Not in a hospital admission)   allergies:  Contrast dye    Objective     Vital Signs:   Temp:  [97 °F (36.1 °C)-97.7 °F (36.5 °C)] 97.5 °F (36.4 °C)  Heart Rate:  [44-59] 44  Resp:  [16-20] 18  BP: ()/(50-67) 119/54    Physical Exam:     General Appearance:    Alert, cooperative, in no acute distress.   Head:    Normocephalic, without obvious abnormality, atraumatic.   Eyes:            Lids and lashes normal, conjunctivae and sclerae normal, no   icterus, no pallor, corneas clear, PERRLA.   Ears:    Ears appear intact with no abnormalities noted.   Throat:   No oral lesions, no thrush, oral mucosa moist.      Lungs:     Clear to auscultation, respirations regular, even and                  unlabored.    Heart:    Regular rhythm and normal rate, normal S1 and S2, no            murmur, no gallop, no rub, no click.   Abdomen:     Normal bowel sounds, no masses, no organomegaly, soft        nontender, nondistended, no guarding, no rebound          tenderness.   Genitourinary:  Urinary retention bloody urine.   Rectal:    Deferred3.   Extremities:   Moves all extremities well, no edema, no cyanosis, no             redness.   Pulses:   Pulses palpable and equal bilaterally.   Skin:   No bleeding, bruising or rash.   Neurologic:   Cranial nerves 2 - 12 grossly intact, sensation intact, DTR       present and equal bilaterally.       Results Review:    Lab Results (last 24 hours)     Procedure Component Value Units Date/Time    Basic Metabolic Panel [[id number]] Collected: 02/05/21 0622    Specimen: Blood Updated: 02/05/21 0627    Protime-INR [[id number]] Collected: 02/05/21 0622    Specimen: Blood Updated: 02/05/21 0627    CBC & Differential [[id number]] Collected: 02/05/21 0622    Specimen: Blood Updated: 02/05/21 0627    Narrative:      The following orders were created for panel order CBC & Differential.  Procedure                               Abnormality         Status                     ---------                               -----------         ------                     CBC Auto Differential[[id number]]                            In process                   Please view results for these tests on the individual orders.    CBC Auto Differential [[id number]] Collected: 02/05/21 0622    Specimen: Blood Updated: 02/05/21 0627    Lactic Acid, Reflex [[id number]]  (Abnormal) Collected: 02/05/21 0118    Specimen: Blood Updated: 02/05/21 0136     Lactate 2.1 mmol/L     Lactic Acid, Reflex Timer (This will reflex a repeat order 3-3:15 hours after ordered.) [[id number]] Collected: 02/04/21 2130    Specimen: Blood from Arm, Left  Updated: 02/05/21 0100     Hold Tube Hold for add-ons.     Comment: Auto resulted.       Urinalysis With Microscopic If Indicated (No Culture) - Urine, Catheter [[id number]] Updated: 02/04/21 2236    Specimen: Urine, Catheter     Blood Culture - Blood, Arm, Left [[id number]] Collected: 02/04/21 2206    Specimen: Blood from Arm, Left Updated: 02/04/21 2211    Lactic Acid, Plasma [[id number]]  (Abnormal) Collected: 02/04/21 2130    Specimen: Blood from Arm, Left Updated: 02/04/21 2157     Lactate 2.2 mmol/L     Blood Culture - Blood, Arm, Left [[id number]] Collected: 02/04/21 2130    Specimen: Blood from Arm, Left Updated: 02/04/21 2138    Protime-INR [[id number]]  (Abnormal) Collected: 02/04/21 2008    Specimen: Blood Updated: 02/04/21 2100     Protime 66.6 Seconds      INR 7.06    Narrative:      Therapeutic range for most indications is 2.0-3.0 INR,  or 2.5-3.5 for mechanical heart valves.    aPTT [[id number]]  (Abnormal) Collected: 02/04/21 2008    Specimen: Blood Updated: 02/04/21 2100     PTT 60.8 seconds     Narrative:      The recommended Heparin therapeutic range is 68-97 seconds.    Manual Differential [[id number]]  (Abnormal) Collected: 02/04/21 2008    Specimen: Blood Updated: 02/04/21 2051     Neutrophil % 64.0 %      Lymphocyte % 3.0 %      Monocyte % 9.0 %      Bands %  22.0 %      Metamyelocyte % 2.0 %      Neutrophils Absolute 15.46 10*3/mm3      Lymphocytes Absolute 0.54 10*3/mm3      Monocytes Absolute 1.62 10*3/mm3      Anisocytosis Slight/1+     Hypochromia Mod/2+     Poikilocytes Mod/2+     Comment: WITH A COMBINATION OF ECHINOCYTES,ACANTHOCYTES, ELLIPTOCYTES, TARGET CELLS, AND HELMET CELLS         WBC Morphology Normal     Platelet Estimate Decreased    Comprehensive Metabolic Panel [[id number]]  (Abnormal) Collected: 02/04/21 2008    Specimen: Blood Updated: 02/04/21 2041     Glucose 102 mg/dL       mg/dL      Creatinine 4.89 mg/dL      Sodium 138 mmol/L      Potassium 5.1 mmol/L      Chloride  101 mmol/L      CO2 22.0 mmol/L      Calcium 8.2 mg/dL      Total Protein 7.2 g/dL      Albumin 3.20 g/dL      ALT (SGPT) 95 U/L      AST (SGOT) 135 U/L      Alkaline Phosphatase 80 U/L      Total Bilirubin 0.5 mg/dL      eGFR Non  Amer --     Comment: <15 Indicative of kidney failure.        eGFR   Amer 14 mL/min/1.73      Comment: <15 Indicative of kidney failure.        Globulin 4.0 gm/dL      A/G Ratio 0.8 g/dL      BUN/Creatinine Ratio 22.5     Anion Gap 15.0 mmol/L     Narrative:      GFR Normal >60  Chronic Kidney Disease <60  Kidney Failure <15      Lipase [[id number]]  (Normal) Collected: 02/04/21 2008    Specimen: Blood Updated: 02/04/21 2036     Lipase 15 U/L     COVID-19 and FLU A/B PCR - Swab, Nasopharynx [[id number]]  (Normal) Collected: 02/04/21 2001    Specimen: Swab from Nasopharynx Updated: 02/04/21 2032     COVID19 Not Detected     Influenza A PCR Not Detected     Influenza B PCR Not Detected    Narrative:      Fact sheet for providers: https://www.fda.gov/media/639896/download    Fact sheet for patients: https://www.fda.gov/media/892321/download    Test performed by PCR.    CBC & Differential [[id number]]  (Abnormal) Collected: 02/04/21 2008    Specimen: Blood Updated: 02/04/21 2019    Narrative:      The following orders were created for panel order CBC & Differential.  Procedure                               Abnormality         Status                     ---------                               -----------         ------                     Scan Slide[[id number]]                                                                  CBC Auto Differential[[id number]]        Abnormal            Final result                 Please view results for these tests on the individual orders.    CBC Auto Differential [[id number]]  (Abnormal) Collected: 02/04/21 2008    Specimen: Blood Updated: 02/04/21 2018     WBC 17.98 10*3/mm3      RBC 3.04 10*6/mm3      Hemoglobin 8.5 g/dL      Hematocrit 24.3 %       MCV 79.9 fL      MCH 28.0 pg      MCHC 35.0 g/dL      RDW 16.0 %      RDW-SD 46.3 fl      MPV 12.1 fL      Platelets 140 10*3/mm3      Neutrophil % 81.7 %      Lymphocyte % 1.7 %      Monocyte % 4.3 %      Eosinophil % 0.0 %      Basophil % 0.2 %      Immature Grans % 12.1 %      Neutrophils, Absolute 14.71 10*3/mm3      Lymphocytes, Absolute 0.30 10*3/mm3      Monocytes, Absolute 0.77 10*3/mm3      Eosinophils, Absolute 0.00 10*3/mm3      Basophils, Absolute 0.03 10*3/mm3      Immature Grans, Absolute 2.17 10*3/mm3      nRBC 0.0 /100 WBC          Imaging Results (Last 24 Hours)     Procedure Component Value Units Date/Time    XR Chest 1 View [[id number]] Collected: 02/04/21 2025     Updated: 02/04/21 2035    Narrative:        PORTABLE CHEST    HISTORY: Oliguria. Abdominal pain.    Portable AP film of the chest was obtained at 8:26 PM.  COMPARISON: October 13, 2020    FINDINGS:   EKG leads.  The lungs are clear of an acute process.  The heart is not enlarged.  The pulmonary vasculature is not increased.  Small left pleural effusion.  No pneumothorax.  No acute osseous abnormality.  Degenerative changes are present in the thoracic spine.  Hypertrophic change right acromioclavicular joint.      Impression:      CONCLUSION:  Small left pleural effusion.    [id number]    Electronically signed by:  Chris Srinivasan MD  2/4/2021 8:34 PM CST  Workstation: [email or web address]    CT Abdomen Pelvis Without Contrast [[id number]] Collected: 02/04/21 2005     Updated: 02/04/21 2033    Narrative:        CT Abdomen and Pelvis Without Contrast    History: Abdominal pain    Axials spiral scans of the abdomen and pelvis were obtained  without contrast.  Coronal and sagital reconstructions were  preformed.      This exam was performed according to our departmental  dose-optimization program, which includes automated exposure  control, adjustment of the mA and/or kV according to patient size  and/or use of iterative reconstruction technique.    DLP:  458.20    Comparison: None    Findings:   Small left pleural effusion.  Coronary artery calcifications.    No acute osseous abnormality.  Degenerative changes and degenerative disc disease thoracic and  lumbar spine.    The liver is unremarkable.  The gallbladder is distended.  The spleen is unremarkable.  The pancreas is unremarkable.  The adrenal glands are unremarkable.    Bilateral renal cysts.  Nonobstructive renal calculi.  No hydronephrosis.    Air in the urinary bladder presumably related to recent  instrumentation or catheterization.  Radiopaque debris, gravel or hemorrhage posteriorly in the  bladder.  Minimally enlarged prostate.  No pelvic mass.    No abdominal aortic aneurysm.  No adenopathy.    Thickening of the rectal wall with stranding in the perirectal  fat.  This could represent proctitis or neoplasm.  No bowel obstruction.  Normal appendix.  No free air.  No free fluid.    No hernia.  Subcutaneous edema.      Impression:      Conclusion:  Distended gallbladder.  Bilateral renal cysts.  Nonobstructive renal calculi.  Air in the urinary bladder presumably related to recent  instrumentation or catheterization.  Radiopaque debris, gravel or hemorrhage posteriorly in the  bladder.  Minimally enlarged prostate.  Thickening of the rectal wall with stranding in the perirectal  fat.  This could represent proctitis or neoplasm.  Subcutaneous edema.  Small left pleural effusion.  Coronary artery calcifications.    [id number]    Electronically signed by:  Chris Srinivasan MD  2/4/2021 8:32 PM CST  Workstation: [email or web address]          I reviewed the patient's new clinical results.  I reviewed the patient's new imaging results and agree with the interpretation.  I reviewed the patient's other test results and agree with the interpretation.      Assessment/Plan       Urinary obstruction    CKD (chronic kidney disease) stage 4, GFR 15-29 ml/min (CMS/[facility])    Diabetic peripheral neuropathy associated with type 2 diabetes  mellitus (CMS/HCC)    Benign essential hypertension    COPD exacerbation (CMS/HCC)    Atrial flutter (CMS/HCC)    Supratherapeutic INR    Sepsis due to urinary tract infection (CMS/HCC)    Chronic HFrEF (heart failure with reduced ejection fraction) (CMS/HCC)    Bradycardia    Dementia (CMS/HCC)      Cystoscopy after INR corrected    I discussed the patient's findings and my recommendations with patient.     Orlin Riggs MD  02/05/21  06:27 CST

## 2021-02-05 NOTE — OP NOTE
CYSTOSCOPY  Procedure Note    Ondina Alanis Sr.  2/5/2021    Pre-op Diagnosis:   Urinary obstruction [N13.9]    Post-op Diagnosis:     Post-Op Diagnosis Codes:     * Urinary obstruction [N13.9]    Procedure(s):  CYSTOSCOPY WITH CATHETER PLACEMENT    Surgeon(s):  Orlin Riggs MD    Anesthesia: Monitored Anesthesia Care    Staff:   Circulator: Kathleen Adkins RN  Scrub Person: SanchezDaniela angela          Estimated Blood Loss: <500ml    Specimens:                None      Drains:   Continuous Bladder Irrigation Triple-lumen 22 Fr (Active)       Findings: False passages of urethra and also obstructing prostate    Complications: None    Indications: Same    Description of Procedure: Patient brought to surgery placed in the dorsolithotomy position.  Sterile prep and drape genitalia routine fashion I passed a 22 [place] cystoscope into the anterior urethra found a false passage and negotiated past that in a couple spots then back through the continuity of the prostatic urethra went into the bladder the prostate was obstructed once in the bladder the bladder was evacuated of clots and then put a 038 guidewire in the bladder then over top of the guidewire we placed a 22 [place] three-way catheter with 30 cc placed in the balloon.This was th;en placed on continuous irrigation.The patient taken recovery having tolerated the  procedure well.    Orlin Riggs MD     Date: 2/5/2021  Time: 10:35 CST

## 2021-02-05 NOTE — H&P
[facility] Medicine Admission      Date of Admission: 2/4/2021      Primary Care Physician: Mark Sheldon APRN      Chief Complaint: Urinary retention    HPI:  Patient was seen on 2/4/2021    This is a 80-year-old male with concurrent medical history of hyperlipidemia, hypertension, BPH, diabetes mellitus, coronary artery disease, asthma, renal insufficiency who presented to the hospital for urinary retention.  Home health came to his house today to insert a Moore catheter as he did not have any urine output for 24 hours.  Patient did not have any urine output even then and then he was taken to the ER.  ER removed Moore catheter and then try to replace it and urology also came to insert the catheter however he did have some bleeding and his INR was around 7 so the catheter was not placed.    Past Medical History:  has a past medical history of Asthma, Benign prostatic hyperplasia, CHF (congestive heart failure) (CMS/[facility]), Coronary artery disease, Diabetes mellitus (CMS/[facility]), GERD (gastroesophageal reflux disease), Hyperlipidemia, Hypertension, Prostate disorder, Renal insufficiency, and Urinary tract infection.    Past Surgical History:  has a past surgical history that includes Back surgery; Prostate surgery; and Knee surgery (Left).    Family History: family history is not on file.  Hypertension    Social History:  reports that he has never smoked. He does not have any smokeless tobacco history on file. He reports that he does not drink alcohol or use drugs.    Allergies:   Allergies   Allergen Reactions   • Contrast Dye        Medications: Scheduled Meds:cefTRIAXone, 2 g, Intravenous, Q24H  vitamin K1, 5 mg, Oral, Once      Continuous Infusions:sodium chloride, 125 mL/hr, Last Rate: 125 mL/hr (02/04/21 2019)      PRN Meds:.[COMPLETED] Insert peripheral IV **AND** sodium chloride  No current facility-administered medications on file prior to encounter.      Current  Outpatient Medications on File Prior to Encounter   Medication Sig Dispense Refill   • acetaminophen (TYLENOL) 325 MG tablet Take 2 tablets by mouth Every 6 (Six) Hours As Needed for Mild Pain .     • amiodarone (PACERONE) 200 MG tablet Take 1 tablet by mouth Daily.     • atorvastatin (LIPITOR) 40 MG tablet Take 1 tablet by mouth Every Night. 30 tablet 0   • Bacitracin Zinc (magic butt ointment) Apply 1 each topically to the appropriate area as directed 2 (Two) Times a Day.     • bisacodyl (DULCOLAX) 5 MG EC tablet Take 1 tablet by mouth Daily As Needed for Constipation.     • brimonidine (ALPHAGAN P) 0.1 % solution ophthalmic solution 1 drop.     • brinzolamide (AZOPT) 1 % ophthalmic suspension 1 drop.     • budesonide (PULMICORT) 0.5 MG/2ML nebulizer solution Inhale 0.5 mg.     • carvedilol (COREG) 6.25 MG tablet Take 1 tablet by mouth 2 (Two) Times a Day With Meals. 30 tablet 0   • hydrALAZINE (APRESOLINE) 25 MG tablet Take 3 tablets by mouth Every 8 (Eight) Hours.     • insulin aspart (novoLOG) 100 UNIT/ML injection Inject 0-7 Units under the skin into the appropriate area as directed 3 (Three) Times a Day Before Meals. 10 mL 0   • insulin detemir (Levemir) 100 UNIT/ML injection Inject 10 Units under the skin into the appropriate area as directed Every 12 (Twelve) Hours. 10 mL 0   • ipratropium (ATROVENT) 0.02 % nebulizer solution Take 2.5 mL by nebulization Every 4 (Four) Hours As Needed for Wheezing or Shortness of Air. 10 mL 0   • ipratropium-albuterol (DUO-NEB) 0.5-2.5 mg/3 ml nebulizer Take 3 mL by nebulization Every 4 (Four) Hours As Needed for Wheezing or Shortness of Air. 360 mL    • isosorbide mononitrate (IMDUR) 30 MG 24 hr tablet Take 1 tablet by mouth Daily. 60 tablet 0   • lansoprazole (PREVACID) 30 MG capsule Take 30 mg by mouth.     • latanoprost (XALATAN) 0.005 % ophthalmic solution 1 drop.     • Misc. Devices (TRANSFER BENCH) misc Transfer bench r/t dementia, deconditioning. 1 each 0   • Misc.  Devices (TUB TRANSFER BOARD) misc Transfer tub bench r/t deconditioning, dementia, and blind r/t glaucoma 1 each 0   • ondansetron (ZOFRAN) 4 MG/2ML injection Infuse 2 mL into a venous catheter Every 6 (Six) Hours As Needed for Nausea or Vomiting.         Review of Systems:  Review of Systems   Unable to perform ROS: Dementia      Otherwise complete ROS is negative except as mentioned above.    Physical Exam:   Temp:  [97 °F (36.1 °C)-97.5 °F (36.4 °C)] 97.5 °F (36.4 °C)  Heart Rate:  [46-59] 51  Resp:  [16-18] 16  BP: ()/(50-66) 116/54  Physical Exam  Vitals signs and nursing note reviewed.   Constitutional:       General: He is not in acute distress.     Appearance: He is well-developed. He is not diaphoretic.   HENT:      Head: Normocephalic and atraumatic.   Cardiovascular:      Rate and Rhythm: Normal rate.   Pulmonary:      Effort: Pulmonary effort is normal. No respiratory distress.      Breath sounds: No wheezing.   Abdominal:      General: There is no distension.      Palpations: Abdomen is soft.   Musculoskeletal: Normal range of motion.   Skin:     General: Skin is warm and dry.   Neurological:      Mental Status: He is alert.      Cranial Nerves: No cranial nerve deficit.   Psychiatric:         Behavior: Behavior normal.           Results Reviewed:  I have personally reviewed current lab, radiology, and data and agree with results.  Lab Results (last 24 hours)     Procedure Component Value Units Date/Time    Urinalysis With Microscopic If Indicated (No Culture) - Urine, Catheter [[id number]] Updated: 02/04/21 2236    Specimen: Urine, Catheter     Blood Culture - Blood, Arm, Left [[id number]] Collected: 02/04/21 2206    Specimen: Blood from Arm, Left Updated: 02/04/21 2211    Lactic Acid, Plasma [[id number]]  (Abnormal) Collected: 02/04/21 2130    Specimen: Blood from Arm, Left Updated: 02/04/21 2157     Lactate 2.2 mmol/L     Lactic Acid, Reflex Timer (This will reflex a repeat order 3-3:15 hours  after ordered.) [[id number]] Collected: 02/04/21 2130    Specimen: Blood from Arm, Left Updated: 02/04/21 2157    Blood Culture - Blood, Arm, Left [[id number]] Collected: 02/04/21 2130    Specimen: Blood from Arm, Left Updated: 02/04/21 2138    Protime-INR [[id number]]  (Abnormal) Collected: 02/04/21 2008    Specimen: Blood Updated: 02/04/21 2100     Protime 66.6 Seconds      INR 7.06    Narrative:      Therapeutic range for most indications is 2.0-3.0 INR,  or 2.5-3.5 for mechanical heart valves.    aPTT [[id number]]  (Abnormal) Collected: 02/04/21 2008    Specimen: Blood Updated: 02/04/21 2100     PTT 60.8 seconds     Narrative:      The recommended Heparin therapeutic range is 68-97 seconds.    Manual Differential [[id number]]  (Abnormal) Collected: 02/04/21 2008    Specimen: Blood Updated: 02/04/21 2051     Neutrophil % 64.0 %      Lymphocyte % 3.0 %      Monocyte % 9.0 %      Bands %  22.0 %      Metamyelocyte % 2.0 %      Neutrophils Absolute 15.46 10*3/mm3      Lymphocytes Absolute 0.54 10*3/mm3      Monocytes Absolute 1.62 10*3/mm3      Anisocytosis Slight/1+     Hypochromia Mod/2+     Poikilocytes Mod/2+     Comment: WITH A COMBINATION OF ECHINOCYTES,ACANTHOCYTES, ELLIPTOCYTES, TARGET CELLS, AND HELMET CELLS         WBC Morphology Normal     Platelet Estimate Decreased    Comprehensive Metabolic Panel [[id number]]  (Abnormal) Collected: 02/04/21 2008    Specimen: Blood Updated: 02/04/21 2041     Glucose 102 mg/dL       mg/dL      Creatinine 4.89 mg/dL      Sodium 138 mmol/L      Potassium 5.1 mmol/L      Chloride 101 mmol/L      CO2 22.0 mmol/L      Calcium 8.2 mg/dL      Total Protein 7.2 g/dL      Albumin 3.20 g/dL      ALT (SGPT) 95 U/L      AST (SGOT) 135 U/L      Alkaline Phosphatase 80 U/L      Total Bilirubin 0.5 mg/dL      eGFR Non  Amer --     Comment: <15 Indicative of kidney failure.        eGFR   Amer 14 mL/min/1.73      Comment: <15 Indicative of kidney failure.        Globulin  4.0 gm/dL      A/G Ratio 0.8 g/dL      BUN/Creatinine Ratio 22.5     Anion Gap 15.0 mmol/L     Narrative:      GFR Normal >60  Chronic Kidney Disease <60  Kidney Failure <15      Lipase [[id number]]  (Normal) Collected: 02/04/21 2008    Specimen: Blood Updated: 02/04/21 2036     Lipase 15 U/L     COVID-19 and FLU A/B PCR - Swab, Nasopharynx [[id number]]  (Normal) Collected: 02/04/21 2001    Specimen: Swab from Nasopharynx Updated: 02/04/21 2032     COVID19 Not Detected     Influenza A PCR Not Detected     Influenza B PCR Not Detected    Narrative:      Fact sheet for providers: https://www.fda.gov/media/414268/download    Fact sheet for patients: https://www.fda.gov/media/537792/download    Test performed by PCR.    CBC & Differential [[id number]]  (Abnormal) Collected: 02/04/21 2008    Specimen: Blood Updated: 02/04/21 2019    Narrative:      The following orders were created for panel order CBC & Differential.  Procedure                               Abnormality         Status                     ---------                               -----------         ------                     Scan Slide[[id number]]                                                                  CBC Auto Differential[[id number]]        Abnormal            Final result                 Please view results for these tests on the individual orders.    CBC Auto Differential [[id number]]  (Abnormal) Collected: 02/04/21 2008    Specimen: Blood Updated: 02/04/21 2018     WBC 17.98 10*3/mm3      RBC 3.04 10*6/mm3      Hemoglobin 8.5 g/dL      Hematocrit 24.3 %      MCV 79.9 fL      MCH 28.0 pg      MCHC 35.0 g/dL      RDW 16.0 %      RDW-SD 46.3 fl      MPV 12.1 fL      Platelets 140 10*3/mm3      Neutrophil % 81.7 %      Lymphocyte % 1.7 %      Monocyte % 4.3 %      Eosinophil % 0.0 %      Basophil % 0.2 %      Immature Grans % 12.1 %      Neutrophils, Absolute 14.71 10*3/mm3      Lymphocytes, Absolute 0.30 10*3/mm3      Monocytes, Absolute 0.77 10*3/mm3       Eosinophils, Absolute 0.00 10*3/mm3      Basophils, Absolute 0.03 10*3/mm3      Immature Grans, Absolute 2.17 10*3/mm3      nRBC 0.0 /100 WBC         Imaging Results (Last 24 Hours)     Procedure Component Value Units Date/Time    XR Chest 1 View [[id number]] Collected: 02/04/21 2025     Updated: 02/04/21 2035    Narrative:        PORTABLE CHEST    HISTORY: Oliguria. Abdominal pain.    Portable AP film of the chest was obtained at 8:26 PM.  COMPARISON: October 13, 2020    FINDINGS:   EKG leads.  The lungs are clear of an acute process.  The heart is not enlarged.  The pulmonary vasculature is not increased.  Small left pleural effusion.  No pneumothorax.  No acute osseous abnormality.  Degenerative changes are present in the thoracic spine.  Hypertrophic change right acromioclavicular joint.      Impression:      CONCLUSION:  Small left pleural effusion.    [id number]    Electronically signed by:  Chris Srinivasan MD  2/4/2021 8:34 PM CST  Workstation: [id number]    CT Abdomen Pelvis Without Contrast [[id number]] Collected: 02/04/21 2005     Updated: 02/04/21 2033    Narrative:        CT Abdomen and Pelvis Without Contrast    History: Abdominal pain    Axials spiral scans of the abdomen and pelvis were obtained  without contrast.  Coronal and sagital reconstructions were  preformed.      This exam was performed according to our departmental  dose-optimization program, which includes automated exposure  control, adjustment of the mA and/or kV according to patient size  and/or use of iterative reconstruction technique.    DLP: 458.20    Comparison: None    Findings:   Small left pleural effusion.  Coronary artery calcifications.    No acute osseous abnormality.  Degenerative changes and degenerative disc disease thoracic and  lumbar spine.    The liver is unremarkable.  The gallbladder is distended.  The spleen is unremarkable.  The pancreas is unremarkable.  The adrenal glands are unremarkable.    Bilateral renal  cysts.  Nonobstructive renal calculi.  No hydronephrosis.    Air in the urinary bladder presumably related to recent  instrumentation or catheterization.  Radiopaque debris, gravel or hemorrhage posteriorly in the  bladder.  Minimally enlarged prostate.  No pelvic mass.    No abdominal aortic aneurysm.  No adenopathy.    Thickening of the rectal wall with stranding in the perirectal  fat.  This could represent proctitis or neoplasm.  No bowel obstruction.  Normal appendix.  No free air.  No free fluid.    No hernia.  Subcutaneous edema.      Impression:      Conclusion:  Distended gallbladder.  Bilateral renal cysts.  Nonobstructive renal calculi.  Air in the urinary bladder presumably related to recent  instrumentation or catheterization.  Radiopaque debris, gravel or hemorrhage posteriorly in the  bladder.  Minimally enlarged prostate.  Thickening of the rectal wall with stranding in the perirectal  fat.  This could represent proctitis or neoplasm.  Subcutaneous edema.  Small left pleural effusion.  Coronary artery calcifications.    [id number]    Electronically signed by:  Chris Srinivasan MD  2/4/2021 8:32 PM [employer]  Workstation: [email or web address]            Assessment:    Active Hospital Problems    Diagnosis   • **Urinary obstruction     -Dr Dannemora unable to insert burleson  -surgery in am   -will need INR reversed     • Supratherapeutic INR     -INR 7.06  -Will need urological surgery in am, Dannemora requesting reversal- 4 u FFP and IV vit K     • Sepsis due to urinary tract infection (CMS/[facility])     -lactate 2.2  -sepsis bolus unable as pt has EF 20% (TTE 8/2020)  -currently on antibiotic for UTI, but wife unsure name (she read me divalproex as the antibiotic)  -urine unable to be collected at this time  -urine culture pending  Ns 125 mls/hr     • Chronic HFrEF (heart failure with reduced ejection fraction) (CMS/[facility])     -last TTE 8/2020, 20%     • Bradycardia     -SB 40 bpm     • Dementia (CMS/[facility])   • Atrial flutter  (CMS/[facility])     -coumadin     • COPD exacerbation (CMS/[facility])     -continue home inhalers     • CKD (chronic kidney disease) stage 4, GFR 15-29 ml/min (CMS/[facility])     - baseline Cr around 3  -Cr 4.89 on admission, GFR 14  -no urine output past 24 hours  -avoid nephrotoxic agents     • Diabetic peripheral neuropathy associated with type 2 diabetes mellitus (CMS/[facility])     -continue home doses of novolog and levemir  -wife states he hasn't had to use any extra coverage and only currently taking levemir 10 u nightly     • Benign essential hypertension     -continue home meds           Urinary retention-unable to place Moore catheter.  Patient will be taken to the OR in the morning by urology to place a suprapubic catheter.    Sepsis-UTI-start on IV antibiotics, monitor cultures.    Supratherapeutic INR-FFP will be given to the patient.  Risks and benefits have been discussed with his wife about blood and blood products.    Acute on chronic renal failure-creatinine is around 4, we will start him on IV fluids    Hypertension-continue monitoring    Bradycardia-hold any beta-blockers    COPD-nebulizers as needed    Dementia-provide supportive care                Jad Kay MD  02/05/21  00:36 CST

## 2021-02-05 NOTE — SIGNIFICANT NOTE
Blood Transfusion Consent    I spoke with the patient's wife, Lamar, about the risks and benefits of blood transfusion, as outlined below:    Benefits:   Blood transfusion is a life-saving treatment that benefits patients by treating or preventing blood loss, which can lead to a seriously low hemoglobin level and cause damage to body organs due to a lack of oxygen.      Risks:   Patient acknowledged understanding that the use of blood or blood products has the following general risks:      Uncommon (1-5% chance)  • Mild reactions resulting in itching, rash, fever, headaches.      Rare (<1% chance)   • Respiratory distress (shortness of breath) or lung injury   • Exposure to blood borne micro-organisms (bacteria and parasites) that could result in an infection   • Possible effects on the immune system, which may decrease the body’s ability to fight infection   • Exposure to blood borne viruses such as hepatitis B (an inflammatory disease affecting the liver)   • Shock      Extremely rare (one in a million or less)   • Exposure to blood borne viruses such as hepatitis C (an inflammatory disease affecting the liver) and Human Immunodeficiency Virus (HIV, the virus that causes AIDS)   • Death     I answered all the patient's/family's questions, inquires, and concerns. Patient gave consent to receive blood products if it becomes medically necessary. Patient did not have any questions at the time I left the room.          This document has been electronically signed by Parminder Collado MD on February 4, 2021 22:31 CST        This document has been electronically signed by Parminder Collado MD on February 4, 2021 22:31 CST

## 2021-02-05 NOTE — PROGRESS NOTES
LOS: 0 days     Patient Care Team:  Mark Sheldon APRN as PCP - General (Nurse Practitioner)      Subjective     Urinary retention     Objective       Vital Signs  Temp:  [97 °F (36.1 °C)-97.7 °F (36.5 °C)] 97.5 °F (36.4 °C)  Heart Rate:  [44-59] 44  Resp:  [16-20] 18  BP: ()/(50-67) 119/54    Physical Exam:      General Appearance:    dementia     Respiratory:    UNLABORED RESPIRATIONS.     Abdomen:     SOFT.       Genitourinary:   Bloody urine     Rectal:     DEFERRED       Results Review:       Imaging Results (Last 24 Hours)     Procedure Component Value Units Date/Time    XR Chest 1 View [[id number]] Collected: 02/04/21 2025     Updated: 02/04/21 2035    Narrative:        PORTABLE CHEST    HISTORY: Oliguria. Abdominal pain.    Portable AP film of the chest was obtained at 8:26 PM.  COMPARISON: October 13, 2020    FINDINGS:   EKG leads.  The lungs are clear of an acute process.  The heart is not enlarged.  The pulmonary vasculature is not increased.  Small left pleural effusion.  No pneumothorax.  No acute osseous abnormality.  Degenerative changes are present in the thoracic spine.  Hypertrophic change right acromioclavicular joint.      Impression:      CONCLUSION:  Small left pleural effusion.    [id number]    Electronically signed by:  Chris Srinivasan MD  2/4/2021 8:34 PM CST  Workstation: "[employer]    CT Abdomen Pelvis Without Contrast [[id number]] Collected: 02/04/21 2005     Updated: 02/04/21 2033    Narrative:        CT Abdomen and Pelvis Without Contrast    History: Abdominal pain    Axials spiral scans of the abdomen and pelvis were obtained  without contrast.  Coronal and sagital reconstructions were  preformed.      This exam was performed according to our departmental  dose-optimization program, which includes automated exposure  control, adjustment of the mA and/or kV according to patient size  and/or use of iterative reconstruction technique.    DLP: 458.20    Comparison: None    Findings:     Small left pleural effusion.  Coronary artery calcifications.    No acute osseous abnormality.  Degenerative changes and degenerative disc disease thoracic and  lumbar spine.    The liver is unremarkable.  The gallbladder is distended.  The spleen is unremarkable.  The pancreas is unremarkable.  The adrenal glands are unremarkable.    Bilateral renal cysts.  Nonobstructive renal calculi.  No hydronephrosis.    Air in the urinary bladder presumably related to recent  instrumentation or catheterization.  Radiopaque debris, gravel or hemorrhage posteriorly in the  bladder.  Minimally enlarged prostate.  No pelvic mass.    No abdominal aortic aneurysm.  No adenopathy.    Thickening of the rectal wall with stranding in the perirectal  fat.  This could represent proctitis or neoplasm.  No bowel obstruction.  Normal appendix.  No free air.  No free fluid.    No hernia.  Subcutaneous edema.      Impression:      Conclusion:  Distended gallbladder.  Bilateral renal cysts.  Nonobstructive renal calculi.  Air in the urinary bladder presumably related to recent  instrumentation or catheterization.  Radiopaque debris, gravel or hemorrhage posteriorly in the  bladder.  Minimally enlarged prostate.  Thickening of the rectal wall with stranding in the perirectal  fat.  This could represent proctitis or neoplasm.  Subcutaneous edema.  Small left pleural effusion.  Coronary artery calcifications.    [id number]    Electronically signed by:  Chris Srinivasan MD  2/4/2021 8:32 PM CST  Workstation: [employer]        Lab Results (last 24 hours)     Procedure Component Value Units Date/Time    POC Glucose Once [[id number]]  (Normal) Collected: 02/05/21 0613    Specimen: Blood Updated: 02/05/21 0718     Glucose 99 mg/dL      Comment: : [id number] MARILOU JOYMeter ID: [id number]       Protime-INR [[id number]]  (Abnormal) Collected: 02/05/21 0622    Specimen: Blood Updated: 02/05/21 0658     Protime 26.7 Seconds      INR 2.29     Narrative:      Therapeutic range for most indications is 2.0-3.0 INR,  or 2.5-3.5 for mechanical heart valves.    Hemoglobin A1c [[id number]]  (Normal) Collected: 02/05/21 0622    Specimen: Blood Updated: 02/05/21 0656     Hemoglobin A1C 5.30 %     Narrative:      Hemoglobin A1C Ranges:    Increased Risk for Diabetes  5.7% to 6.4%  Diabetes                     >= 6.5%  Diabetic Goal                < 7.0%    Basic Metabolic Panel [[id number]]  (Abnormal) Collected: 02/05/21 0622    Specimen: Blood Updated: 02/05/21 0649     Glucose 94 mg/dL       mg/dL      Creatinine 4.89 mg/dL      Sodium 141 mmol/L      Potassium 4.6 mmol/L      Chloride 103 mmol/L      CO2 20.0 mmol/L      Calcium 7.7 mg/dL      eGFR  African Amer 14 mL/min/1.73      Comment: <15 Indicative of kidney failure.        eGFR Non  Amer --     Comment: <15 Indicative of kidney failure.        BUN/Creatinine Ratio 21.7     Anion Gap 18.0 mmol/L     Narrative:      GFR Normal >60  Chronic Kidney Disease <60  Kidney Failure <15      CBC & Differential [[id number]]  (Abnormal) Collected: 02/05/21 0622    Specimen: Blood Updated: 02/05/21 0645    Narrative:      The following orders were created for panel order CBC & Differential.  Procedure                               Abnormality         Status                     ---------                               -----------         ------                     Scan Slide[[id number]]                                                                  CBC Auto Differential[[id number]]        Abnormal            Final result                 Please view results for these tests on the individual orders.    CBC Auto Differential [[id number]]  (Abnormal) Collected: 02/05/21 0622    Specimen: Blood Updated: 02/05/21 0631     WBC 14.84 10*3/mm3      RBC 2.55 10*6/mm3      Hemoglobin 7.1 g/dL      Hematocrit 22.0 %      MCV 80.4 fL      MCH 27.8 pg      MCHC 34.6 g/dL      RDW 16.3 %      RDW-SD 47.6 fl      MPV 12.6  fL      Platelets 110 10*3/mm3      Neutrophil % 72.9 %      Lymphocyte % 3.8 %      Monocyte % 4.9 %      Eosinophil % 0.1 %      Basophil % 0.4 %      Immature Grans % 17.9 %      Neutrophils, Absolute 10.81 10*3/mm3      Lymphocytes, Absolute 0.57 10*3/mm3      Monocytes, Absolute 0.72 10*3/mm3      Eosinophils, Absolute 0.02 10*3/mm3      Basophils, Absolute 0.06 10*3/mm3      Immature Grans, Absolute 2.66 10*3/mm3      nRBC 0.0 /100 WBC     Lactic Acid, Reflex [[id number]]  (Abnormal) Collected: 02/05/21 0118    Specimen: Blood Updated: 02/05/21 0136     Lactate 2.1 mmol/L     Lactic Acid, Reflex Timer (This will reflex a repeat order 3-3:15 hours after ordered.) [[id number]] Collected: 02/04/21 2130    Specimen: Blood from Arm, Left Updated: 02/05/21 0100     Hold Tube Hold for add-ons.     Comment: Auto resulted.       Urinalysis With Microscopic If Indicated (No Culture) - Urine, Catheter [[id number]] Updated: 02/04/21 2236    Specimen: Urine, Catheter     Blood Culture - Blood, Arm, Left [[id number]] Collected: 02/04/21 2206    Specimen: Blood from Arm, Left Updated: 02/04/21 2211    Lactic Acid, Plasma [[id number]]  (Abnormal) Collected: 02/04/21 2130    Specimen: Blood from Arm, Left Updated: 02/04/21 2157     Lactate 2.2 mmol/L     Blood Culture - Blood, Arm, Left [[id number]] Collected: 02/04/21 2130    Specimen: Blood from Arm, Left Updated: 02/04/21 2138    Protime-INR [[id number]]  (Abnormal) Collected: 02/04/21 2008    Specimen: Blood Updated: 02/04/21 2100     Protime 66.6 Seconds      INR 7.06    Narrative:      Therapeutic range for most indications is 2.0-3.0 INR,  or 2.5-3.5 for mechanical heart valves.    aPTT [[id number]]  (Abnormal) Collected: 02/04/21 2008    Specimen: Blood Updated: 02/04/21 2100     PTT 60.8 seconds     Narrative:      The recommended Heparin therapeutic range is 68-97 seconds.    Manual Differential [[id number]]  (Abnormal) Collected: 02/04/21 2008    Specimen: Blood Updated:  02/04/21 2051     Neutrophil % 64.0 %      Lymphocyte % 3.0 %      Monocyte % 9.0 %      Bands %  22.0 %      Metamyelocyte % 2.0 %      Neutrophils Absolute 15.46 10*3/mm3      Lymphocytes Absolute 0.54 10*3/mm3      Monocytes Absolute 1.62 10*3/mm3      Anisocytosis Slight/1+     Hypochromia Mod/2+     Poikilocytes Mod/2+     Comment: WITH A COMBINATION OF ECHINOCYTES,ACANTHOCYTES, ELLIPTOCYTES, TARGET CELLS, AND HELMET CELLS         WBC Morphology Normal     Platelet Estimate Decreased    Comprehensive Metabolic Panel [[id number]]  (Abnormal) Collected: 02/04/21 2008    Specimen: Blood Updated: 02/04/21 2041     Glucose 102 mg/dL       mg/dL      Creatinine 4.89 mg/dL      Sodium 138 mmol/L      Potassium 5.1 mmol/L      Chloride 101 mmol/L      CO2 22.0 mmol/L      Calcium 8.2 mg/dL      Total Protein 7.2 g/dL      Albumin 3.20 g/dL      ALT (SGPT) 95 U/L      AST (SGOT) 135 U/L      Alkaline Phosphatase 80 U/L      Total Bilirubin 0.5 mg/dL      eGFR Non  Amer --     Comment: <15 Indicative of kidney failure.        eGFR   Amer 14 mL/min/1.73      Comment: <15 Indicative of kidney failure.        Globulin 4.0 gm/dL      A/G Ratio 0.8 g/dL      BUN/Creatinine Ratio 22.5     Anion Gap 15.0 mmol/L     Narrative:      GFR Normal >60  Chronic Kidney Disease <60  Kidney Failure <15      Lipase [[id number]]  (Normal) Collected: 02/04/21 2008    Specimen: Blood Updated: 02/04/21 2036     Lipase 15 U/L     COVID-19 and FLU A/B PCR - Swab, Nasopharynx [[id number]]  (Normal) Collected: 02/04/21 2001    Specimen: Swab from Nasopharynx Updated: 02/04/21 2032     COVID19 Not Detected     Influenza A PCR Not Detected     Influenza B PCR Not Detected    Narrative:      Fact sheet for providers: https://www.fda.gov/media/124172/download    Fact sheet for patients: https://www.fda.gov/media/041817/download    Test performed by PCR.    CBC & Differential [[id number]]  (Abnormal) Collected: 02/04/21 2008     Specimen: Blood Updated: 02/04/21 2019    Narrative:      The following orders were created for panel order CBC & Differential.  Procedure                               Abnormality         Status                     ---------                               -----------         ------                     Scan Slide[[id number]]                                                                  CBC Auto Differential[[id number]]        Abnormal            Final result                 Please view results for these tests on the individual orders.    CBC Auto Differential [[id number]]  (Abnormal) Collected: 02/04/21 2008    Specimen: Blood Updated: 02/04/21 2018     WBC 17.98 10*3/mm3      RBC 3.04 10*6/mm3      Hemoglobin 8.5 g/dL      Hematocrit 24.3 %      MCV 79.9 fL      MCH 28.0 pg      MCHC 35.0 g/dL      RDW 16.0 %      RDW-SD 46.3 fl      MPV 12.1 fL      Platelets 140 10*3/mm3      Neutrophil % 81.7 %      Lymphocyte % 1.7 %      Monocyte % 4.3 %      Eosinophil % 0.0 %      Basophil % 0.2 %      Immature Grans % 12.1 %      Neutrophils, Absolute 14.71 10*3/mm3      Lymphocytes, Absolute 0.30 10*3/mm3      Monocytes, Absolute 0.77 10*3/mm3      Eosinophils, Absolute 0.00 10*3/mm3      Basophils, Absolute 0.03 10*3/mm3      Immature Grans, Absolute 2.17 10*3/mm3      nRBC 0.0 /100 WBC             I reviewed the patient's new clinical results.  I reviewed the patient's new imaging results and agree with the interpretation.  I reviewed the patient's other test results and agree with the interpretation        Assessment/Plan       Urinary obstruction    CKD (chronic kidney disease) stage 4, GFR 15-29 ml/min (CMS/[facility])    Diabetic peripheral neuropathy associated with type 2 diabetes mellitus (CMS/HCC)    Benign essential hypertension    COPD exacerbation (CMS/HCC)    Atrial flutter (CMS/HCC)    Supratherapeutic INR    Sepsis due to urinary tract infection (CMS/HCC)    Chronic HFrEF (heart failure with reduced ejection  fraction) (CMS/HCC)    Bradycardia    Dementia (CMS/HCC)      Cystoscopy anchor benjy Riggs MD  02/05/21  09:24 CST

## 2021-02-05 NOTE — PROGRESS NOTES
Discharge Planning Assessment  [facility]     Patient Name: Ondina Alanis Sr.  MRN: [id number]  Today's Date: 2/5/2021    Admit Date: 2/4/2021    Discharge Needs Assessment     Row Name 02/05/21 1535       Living Environment    Lives With  spouse    Name(s) of Who Lives With Patient  she states that their adult children come by and help take care of pt.    Current Living Arrangements  home/apartment/condo    Primary Care Provided by  spouse/significant other;child(bia);homecare agency    Provides Primary Care For  no one, unable/limited ability to care for self    Family Caregiver if Needed  spouse;child(bia), adult    Family Caregiver Names  spouse states that they all take care of pt.  has 2sons and 2 dtrs    Quality of Family Relationships  supportive;helpful    Able to Return to Prior Arrangements  yes       Resource/Environmental Concerns    Resource/Environmental Concerns  none    Transportation Concerns  car, none       Transition Planning    Patient/Family Anticipates Transition to  home with family;home with help/services    Patient/Family Anticipated Services at Transition  home health care    Transportation Anticipated  family or friend will provide       Discharge Needs Assessment    Equipment Currently Used at Home  walker, rolling;hospital bed;glucometer;wheelchair;commode    Concerns to be Addressed  denies needs/concerns at this time    Anticipated Changes Related to Illness  none    Equipment Needed After Discharge  none    Outpatient/Agency/Support Group Needs  homecare agency    Discharge Facility/Level of Care Needs  home with home health    Patient's Choice of Community Agency(s)  active with [facility]    Current Discharge Risk  dependent with mobility/activities of daily living    Discharge Coordination/Progress  pt lives with spouse.  he is not able to take care of himself.  she and the family provide care for him.  active with [facility].   has rx coverage and short term meds  they get from [employer].  other meds are from [facility].  plan is for pt to return home with family and [facility] ehealth.  has hospital bed,  wc  bsc and walker also a glucometer that spouse will check his blood sugar bid and will give insulin        Discharge Plan    No documentation.       Continued Care and Services - Admitted Since 2/4/2021    Coordination has not been started for this encounter.       Expected Discharge Date and Time     Expected Discharge Date Expected Discharge Time    Feb 8, 2021         Demographic Summary     Row Name 02/05/21 1534       General Information    Admission Type  inpatient    Arrived From  home    Required Notices Provided  Important Message from Medicare    Referral Source  high risk screening    Reason for Consult  discharge planning    Preferred Language  English     Used During This Interaction  no    General Information Comments  discussed with spouse over the phone        Functional Status    No documentation.       Psychosocial    No documentation.       Abuse/Neglect    No documentation.       Legal    No documentation.       Substance Abuse    No documentation.       Patient Forms    No documentation.           Osiris Naranjo RN

## 2021-02-05 NOTE — SIGNIFICANT NOTE
02/05/21 1532   OTHER   Discipline speech language pathologist   Rehab Time/Intention   Session Not Performed other (see comments)  (Pt just arrived up to floor from surgery. Pt not awake and alert for safe po intake at this time. SLP will attempt in am.)

## 2021-02-05 NOTE — SIGNIFICANT NOTE
02/05/21 1416   OTHER   Discipline occupational therapist;physical therapist   Rehab Time/Intention   Session Not Performed other (see comments)   Recommendation   PT - Next Appointment 02/06/21   Recommendation   OT - Next Appointment 02/06/21   OT/PT evaluations attempted this date. Pt is getting blood. RN states hold until tomorrow.

## 2021-02-05 NOTE — ANESTHESIA PREPROCEDURE EVALUATION
Anesthesia Evaluation     no history of anesthetic complications:  NPO Solid Status: > 8 hours  NPO Liquid Status: > 8 hours           Airway   Dental    (+) poor dentition    Pulmonary    (+) pulmonary embolism (few yrs ago), COPD moderate, asthma,decreased breath sounds,   (-) sleep apnea, not a smoker, no home oxygen    ROS comment: Small left pleural effusion.  Cardiovascular   Exercise tolerance: poor (<4 METS)    ECG reviewed  PT is on anticoagulation therapy  Patient on routine beta blocker  Rhythm: regular  Rate: abnormal    (+) hypertension, CAD, dysrhythmias Atrial Flutter, CHF Systolic <55%, hyperlipidemia,   (-) past MI, angina, cardiac stents, DVT    ROS comment: SB rate 52 with LVH    · The left ventricular cavity is severely dilated.  · Left ventricular wall thickness is consistent with mild concentric hypertrophy.  · Estimated EF 20% Severe Global hypokinesis of the left ventricle  · Left ventricular systolic function is severely decreased.  · Right ventricular cavity is mildly dilated.  · Left atrial cavity size is moderate-to-severely dilated.  · There is calcification of the aortic valve.  · Mild aortic valve regurgitation is present.  · Mild mitral valve regurgitation is present  · Mild tricuspid valve regurgitation is present.  · Pulmonary hypertension with RVSP of 60 mmHg    Neuro/Psych  (+) numbness, dementia,     (-) seizures, TIA, CVA, headaches  GI/Hepatic/Renal/Endo    (+)  GERD well controlled,  renal disease CRI and ESRD, diabetes mellitus type 2 well controlled using insulin,   (-) hepatitis, liver disease, no thyroid disorder    Musculoskeletal     (+) arthralgias, back pain, chronic pain,   Abdominal    Substance History   (-) alcohol use, drug use     OB/GYN          Other   arthritis, blood dyscrasia anemia,   history of cancer (prostate)    ROS/Med Hx Other: Urinary obstruction  INR 7 to 2.3  Low Ca++  Coumadin 2 days ago      Phys Exam Other: Unable to follow commands                 Anesthesia Plan    ASA 4 - emergent     general and MAC   total IV anesthesia(Spoke with wife Lamar over the phone at 8:50am and she is aware of risks and benefits)  intravenous induction     Anesthetic plan, all risks, benefits, and alternatives have been provided, discussed and informed consent has been obtained with: patient and spouse/significant other.

## 2021-02-05 NOTE — PROGRESS NOTES
[facility] Medicine Services  INPATIENT PROGRESS NOTE    Length of Stay: 0  Date of Admission: 2/4/2021  Primary Care Physician: Mark Sheldon APRN    Subjective   Chief Complaint: Urinary retention  HPI:  80 year old male with a history of BPH, DM, HTN, HLD, CAD, asthma, PE on chronic anticoagulation with coumadin, and CKD 4 who presented with 24 hours without urinating.  Burleson was unable to be placed in the ED.  Urology attempted to place in the ED as well unsuccessfully.  He developed bleeding.  INR was 7.0. He received FFP and vitamin K.  He underwent cystoscopy and burleson placement.  Creatinine has worsened from his baseline of approximately 2.0 to 4.89 with a BUN of 109. Hgb is 7.0.  BP has been borderline.  He is still under the effects of anesthesia.     Review of Systems   Unable to perform ROS: Patient unresponsive    antesthesia     All pertinent negatives and positives are as above. All other systems have been reviewed and are negative unless otherwise stated.     Objective    Temp:  [96.7 °F (35.9 °C)-97.7 °F (36.5 °C)] 96.7 °F (35.9 °C)  Heart Rate:  [42-59] 45  Resp:  [16-20] 18  BP: ()/(49-67) 106/53    Physical Exam  Vitals signs reviewed.   Constitutional:       General: He is sleeping.      Appearance: Normal appearance.   HENT:      Head: Normocephalic and atraumatic.   Cardiovascular:      Rate and Rhythm: Normal rate and regular rhythm.   Pulmonary:      Effort: Pulmonary effort is normal.      Breath sounds: Normal breath sounds.   Abdominal:      General: There is no distension.      Palpations: Abdomen is soft.      Tenderness: There is no abdominal tenderness.   Genitourinary:     Comments: Burleson, clear  Musculoskeletal:         General: No swelling or deformity.   Skin:     General: Skin is warm and dry.   Neurological:      General: No focal deficit present.      Mental Status: He is unresponsive.             Results Review:  I have  reviewed the labs, radiology results, and diagnostic studies.    Laboratory Data:   Results from last 7 days   Lab Units 02/05/21 0622 02/04/21 2008   SODIUM mmol/L 141 138   POTASSIUM mmol/L 4.6 5.1   CHLORIDE mmol/L 103 101   CO2 mmol/L 20.0* 22.0   BUN mg/dL 106* 110*   CREATININE mg/dL 4.89* 4.89*   GLUCOSE mg/dL 94 102*   CALCIUM mg/dL 7.7* 8.2*   BILIRUBIN mg/dL  --  0.5   ALK PHOS U/L  --  80   ALT (SGPT) U/L  --  95*   AST (SGOT) U/L  --  135*   ANION GAP mmol/L 18.0* 15.0     Estimated Creatinine Clearance: 14.7 mL/min (A) (by C-G formula based on SCr of 4.89 mg/dL (H)).          Results from last 7 days   Lab Units 02/05/21  1203 02/05/21 0622 02/04/21 2008   WBC 10*3/mm3  --  14.84* 17.98*   HEMOGLOBIN g/dL 7.0* 7.1* 8.5*   HEMATOCRIT % 20.2* 22.0* 24.3*   PLATELETS 10*3/mm3  --  110* 140     Results from last 7 days   Lab Units 02/05/21 0622 02/04/21 2008   INR  2.29* 7.06*       Culture Data:   No results found for: BLOODCX  No results found for: URINECX  No results found for: RESPCX  No results found for: WOUNDCX  No results found for: STOOLCX  No components found for: BODYFLD    Radiology Data:   Imaging Results (Last 24 Hours)     Procedure Component Value Units Date/Time    XR Chest 1 View [[id number]] Collected: 02/04/21 2025     Updated: 02/04/21 2035    Narrative:        PORTABLE CHEST    HISTORY: Oliguria. Abdominal pain.    Portable AP film of the chest was obtained at 8:26 PM.  COMPARISON: October 13, 2020    FINDINGS:   EKG leads.  The lungs are clear of an acute process.  The heart is not enlarged.  The pulmonary vasculature is not increased.  Small left pleural effusion.  No pneumothorax.  No acute osseous abnormality.  Degenerative changes are present in the thoracic spine.  Hypertrophic change right acromioclavicular joint.      Impression:      CONCLUSION:  Small left pleural effusion.    [id number]    Electronically signed by:  Chris Srinivasan MD  2/4/2021 8:34 PM CST  Workstation:  [id number]    CT Abdomen Pelvis Without Contrast [[id number]] Collected: 02/04/21 2005     Updated: 02/04/21 2033    Narrative:        CT Abdomen and Pelvis Without Contrast    History: Abdominal pain    Axials spiral scans of the abdomen and pelvis were obtained  without contrast.  Coronal and sagital reconstructions were  preformed.      This exam was performed according to our departmental  dose-optimization program, which includes automated exposure  control, adjustment of the mA and/or kV according to patient size  and/or use of iterative reconstruction technique.    DLP: 458.20    Comparison: None    Findings:   Small left pleural effusion.  Coronary artery calcifications.    No acute osseous abnormality.  Degenerative changes and degenerative disc disease thoracic and  lumbar spine.    The liver is unremarkable.  The gallbladder is distended.  The spleen is unremarkable.  The pancreas is unremarkable.  The adrenal glands are unremarkable.    Bilateral renal cysts.  Nonobstructive renal calculi.  No hydronephrosis.    Air in the urinary bladder presumably related to recent  instrumentation or catheterization.  Radiopaque debris, gravel or hemorrhage posteriorly in the  bladder.  Minimally enlarged prostate.  No pelvic mass.    No abdominal aortic aneurysm.  No adenopathy.    Thickening of the rectal wall with stranding in the perirectal  fat.  This could represent proctitis or neoplasm.  No bowel obstruction.  Normal appendix.  No free air.  No free fluid.    No hernia.  Subcutaneous edema.      Impression:      Conclusion:  Distended gallbladder.  Bilateral renal cysts.  Nonobstructive renal calculi.  Air in the urinary bladder presumably related to recent  instrumentation or catheterization.  Radiopaque debris, gravel or hemorrhage posteriorly in the  bladder.  Minimally enlarged prostate.  Thickening of the rectal wall with stranding in the perirectal  fat.  This could represent proctitis or  neoplasm.  Subcutaneous edema.  Small left pleural effusion.  Coronary artery calcifications.    [id number]    Electronically signed by:  Chris Srinivasan MD  2/4/2021 8:32 PM CST  Workstation: [email or web address]          I have reviewed the patient's current medications.     Assessment/Plan     Active Hospital Problems    Diagnosis   • **Urinary obstruction   • Supratherapeutic INR   • Sepsis due to urinary tract infection (CMS/[facility])   • Chronic HFrEF (heart failure with reduced ejection fraction) (CMS/[facility])     -last TTE 8/2020, 20%     • Bradycardia   • Dementia (CMS/[facility])   • COPD exacerbation (CMS/[facility])   • CKD (chronic kidney disease) stage 4, GFR 15-29 ml/min (CMS/[facility])   • Diabetic peripheral neuropathy associated with type 2 diabetes mellitus (CMS/[facility])   • Benign essential hypertension   Rectal wall thickening on CT    Plan:    S/P Cystoscopy and Moore placement  Urology consultation appreciated  Rocephin day 1  Follow cultures  INR improved with FFP and vitamin K  Nephrology consultation  Will transfuse 1 unit PRBC, risks and benefits discussed with wife who agrees to proceed  BP control: decrease coreg to 3.25 mg BID, hydralazine/imdur  No ACEI/ARB due to renal disease  Glucose control: Levemir, WOODY  PT/OT/SLP  Discharge planning: will need GI referral for colonoscopy for evaluation of rectal wall thickening      The patient was evaluated during the global COVID-19 pandemic, and the diagnosis was suspected/considered upon their initial presentation.  Evaluation, treatment, and testing were consistent with current guidelines for patients who present with complaints or symptoms that may be related to COVID-19.        This document has been electronically signed by THIERNO Griffin on February 5, 2021 13:54 CST

## 2021-02-06 PROBLEM — R78.81 MRSA BACTEREMIA: Status: ACTIVE | Noted: 2021-01-01

## 2021-02-06 PROBLEM — B95.62 MRSA BACTEREMIA: Status: ACTIVE | Noted: 2021-01-01

## 2021-02-06 NOTE — PLAN OF CARE
Goal Outcome Evaluation:  Plan of Care Reviewed With: patient     Outcome Summary: Pt seen for skilled ST services this date to address swallow function.  Pt was admitted for urniary retention and not voiding for 24 hours.  Pt required respositioning and initially declined any and all attempts of ice chips and water.  However, pt did agree to apple juice and pudding.  Pt kept eyes closed during entire session, but did respond to SLP's questions.  Pt consumed thin liqiuds via spoon and straw w/no overt s/ s of aspiration.  Pt consumed puree solids w/good oral clearance and timely AP transit.  Pt not appropriate for [facility] soft solids this date and SLP will trial when pt is appropriate.  SLP educated pt on diet advancement; however, unable to fully educate pt d/t cognitive deficits.  Nsg informed of diet recommendations.

## 2021-02-06 NOTE — PROGRESS NOTES
[facility] Medicine Services  INPATIENT PROGRESS NOTE    Length of Stay: 1  Date of Admission: 2/4/2021  Primary Care Physician: Mark Sheldon APRN    Subjective   Chief Complaint: Urinary retention  HPI:  80 year old male with a history of BPH, DM, HTN, HLD, CAD, asthma, PE on chronic anticoagulation with coumadin, and CKD 4 who presented with 24 hours without urinating.  Burleson was unable to be placed in the ED.  Urology attempted to place in the ED as well unsuccessfully.  He developed bleeding.  INR was 7.0. He received FFP and vitamin K.  He underwent cystoscopy and burleson placement.  Creatinine has worsened from his baseline of approximately 2.0 to 4.89 with a BUN of 109. Hgb was 7.0.  He received 1 unit PRBC and H&H and BP have improved.  Blood cultures are positive for MRSA.  He is more alert today but remains listless.     Review of Systems   Constitutional: Negative for chills and fever.   Respiratory: Negative for shortness of breath.    Cardiovascular: Negative for chest pain.   Gastrointestinal: Negative for abdominal pain, nausea and vomiting.     All pertinent negatives and positives are as above. All other systems have been reviewed and are negative unless otherwise stated.     Objective    Temp:  [96.8 °F (36 °C)-98.9 °F (37.2 °C)] 97.5 °F (36.4 °C)  Heart Rate:  [43-60] 60  Resp:  [15-18] 18  BP: (100-144)/(53-69) 142/69    Physical Exam  Vitals signs reviewed.   Constitutional:       Appearance: Normal appearance.      Comments: More alert   HENT:      Head: Normocephalic and atraumatic.   Cardiovascular:      Rate and Rhythm: Normal rate and regular rhythm.   Pulmonary:      Effort: Pulmonary effort is normal.      Breath sounds: Normal breath sounds.   Abdominal:      General: There is no distension.      Palpations: Abdomen is soft.      Tenderness: There is no abdominal tenderness.   Genitourinary:     Comments: Burleson, dark yellow, no blood or  clots  Musculoskeletal:         General: No swelling or deformity.   Skin:     General: Skin is warm and dry.   Neurological:      General: No focal deficit present.      Mental Status: Mental status is at baseline. He is lethargic.   Psychiatric:         Behavior: Behavior is cooperative.             Results Review:  I have reviewed the labs, radiology results, and diagnostic studies.    Laboratory Data:   Results from last 7 days   Lab Units 02/06/21  0634 02/05/21 0622 02/04/21 2008   SODIUM mmol/L 140 141 138   POTASSIUM mmol/L 4.2 4.6 5.1   CHLORIDE mmol/L 105 103 101   CO2 mmol/L 21.0* 20.0* 22.0   BUN mg/dL 111* 106* 110*   CREATININE mg/dL 4.74* 4.89* 4.89*   GLUCOSE mg/dL 89 94 102*   CALCIUM mg/dL 8.1* 7.7* 8.2*   BILIRUBIN mg/dL  --   --  0.5   ALK PHOS U/L  --   --  80   ALT (SGPT) U/L  --   --  95*   AST (SGOT) U/L  --   --  135*   ANION GAP mmol/L 14.0 18.0* 15.0     Estimated Creatinine Clearance: 15.2 mL/min (A) (by C-G formula based on SCr of 4.74 mg/dL (H)).          Results from last 7 days   Lab Units 02/06/21  0634 02/05/21  1203 02/05/21 0622 02/04/21 2008   WBC 10*3/mm3 13.86*  --  14.84* 17.98*   HEMOGLOBIN g/dL 8.4* 7.0* 7.1* 8.5*   HEMATOCRIT % 23.6* 20.2* 22.0* 24.3*   PLATELETS 10*3/mm3 93*  --  110* 140     Results from last 7 days   Lab Units 02/06/21  0634 02/05/21  0622 02/04/21 2008   INR  1.54* 2.29* 7.06*       Culture Data:   Blood Culture   Date Value Ref Range Status   02/04/2021 Culture in progress  Preliminary   02/04/2021 Culture in progress  Preliminary     Urine Culture   Date Value Ref Range Status   02/05/2021 No growth  Final     No results found for: RESPCX  No results found for: WOUNDCX  No results found for: STOOLCX  No components found for: BODYFLD    Radiology Data:   Imaging Results (Last 24 Hours)     ** No results found for the last 24 hours. **          I have reviewed the patient's current medications.     Assessment/Plan     Active Hospital Problems     Diagnosis   • **Urinary obstruction   • MRSA bacteremia   • Acute blood loss anemia   • Supratherapeutic INR   • Sepsis due to urinary tract infection (CMS/[facility])   • Chronic HFrEF (heart failure with reduced ejection fraction) (CMS/[facility])     -last TTE 8/2020, 20%     • Bradycardia   • Dementia (CMS/[facility])   • COPD exacerbation (CMS/[facility])   • CKD (chronic kidney disease) stage 4, GFR 15-29 ml/min (CMS/[facility])   • Diabetic peripheral neuropathy associated with type 2 diabetes mellitus (CMS/[facility])   • Benign essential hypertension   Rectal wall thickening on CT    Plan:    S/P Cystoscopy and Moore placement  Urology consultation appreciated  Rocephin day 2  Add Vancomycin, discussed with pharmacy, will have random vancomycin draw in 36 hours, will consider Daptomycin in the setting of advanced renal failure  Follow cultures  Echocardiogram  Nephrology consultation appreciated  S/P 1 unit PRBC, follow H&H, transfuse if Hgb <7  BP control: D/C coreg, hydralazine/imdur, PRN hydralazine  No ACEI/ARB due to renal disease  Glucose control: Levemir, SSI  PT/OT/SLP  Resume coumadin tomorrow  Discharge planning: will need GI referral for colonoscopy for evaluation of rectal wall thickening  VTE PPx: coumadin    The patient was evaluated during the global COVID-19 pandemic, and the diagnosis was suspected/considered upon their initial presentation.  Evaluation, treatment, and testing were consistent with current guidelines for patients who present with complaints or symptoms that may be related to COVID-19.        This document has been electronically signed by THIERNO Griffin on February 6, 2021 14:09 CST

## 2021-02-06 NOTE — THERAPY EVALUATION
Patient Name: Ondina Alanis Sr.  : 1940    MRN: [id number]                              Today's Date: 2021       Admit Date: 2021    Visit Dx:     ICD-10-CM ICD-9-CM   1. Urinary obstruction  N13.9 599.60   2. ANT (acute kidney injury) (CMS/[facility])  N17.9 584.9   3. Pleural effusion on left  J90 511.9   4. Proctitis  K62.89 569.49   5. Oral phase dysphagia  R13.11 787.21   6. Impaired functional mobility, balance, gait, and endurance  Z74.09 V49.89     Patient Active Problem List   Diagnosis   • CKD (chronic kidney disease) stage 4, GFR 15-29 ml/min (CMS/[facility])   • Diabetic peripheral neuropathy associated with type 2 diabetes mellitus (CMS/[facility])   • Diabetes mellitus type II, uncontrolled (CMS/[facility])   • GERD (gastroesophageal reflux disease)   • Primary osteoarthritis of both knees   • Pulmonary embolism (CMS/[facility])   • BPH (benign prostatic hyperplasia)   • Benign essential hypertension   • Asthma   • Pleurisy   • Acute respiratory failure with hypoxia (CMS/[facility])   • Stage 1 acute kidney injury (CMS/[facility])   • Left ventricular diastolic dysfunction   • Exacerbation of asthma   • COPD exacerbation (CMS/[facility])   • Atrial flutter (CMS/[facility])   • Acute systolic CHF (congestive heart failure) (CMS/[facility])   • Pneumonia of both lower lobes due to infectious organism   • Urinary obstruction   • Supratherapeutic INR   • Sepsis due to urinary tract infection (CMS/[facility])   • Chronic HFrEF (heart failure with reduced ejection fraction) (CMS/[facility])   • Bradycardia   • Dementia (CMS/[facility])   • Acute blood loss anemia     Past Medical History:   Diagnosis Date   • Asthma    • Benign prostatic hyperplasia    • CHF (congestive heart failure) (CMS/[facility])    • Coronary artery disease    • Diabetes mellitus (CMS/[facility])    • GERD (gastroesophageal reflux disease)    • Hyperlipidemia    • Hypertension    • Prostate disorder    • Renal insufficiency    • Urinary tract infection      Past Surgical History:   Procedure Laterality Date   • BACK  SURGERY     • KNEE SURGERY Left    • PROSTATE SURGERY       General Information     Row Name 02/06/21 1017          Physical Therapy Time and Intention    Document Type  evaluation  -KW     Mode of Treatment  co-treatment;physical therapy;occupational therapy  -     Row Name 02/06/21 1017          General Information    Patient Profile Reviewed  yes  -KW     Prior Level of Function  min assist:;ADL's;gait;transfer pt questionable historian  -     Existing Precautions/Restrictions  fall  -     Row Name 02/06/21 1017          Living Environment    Lives With  spouse  -     Row Name 02/06/21 1017          Home Main Entrance    Number of Stairs, Main Entrance  -- unsure; pt not able to answer  -     Row Name 02/06/21 1017          Cognition    Orientation Status (Cognition)  oriented to;person;place  -     Row Name 02/06/21 1017          Safety Issues, Functional Mobility    Impairments Affecting Function (Mobility)  balance;coordination;endurance/activity tolerance;motor planning;pain;strength;sensation/sensory awareness;range of motion (ROM)  -       User Key  (r) = Recorded By, (t) = Taken By, (c) = Cosigned By    Initials Name Provider Type    KW Rochelle Rudd PT Physical Therapist        Mobility     Row Name 02/06/21 1212          Bed Mobility    Bed Mobility  supine-sit;sit-supine  -KW     Supine-Sit [place] (Bed Mobility)  2 person assist;maximum assist (25% patient effort)  -KW     Sit-Supine [place] (Bed Mobility)  2 person assist;maximum assist (25% patient effort)  -KW     Assistive Device (Bed Mobility)  bed rails;draw sheet;head of bed elevated  -     Row Name 02/06/21 1212          Transfers    Comment (Transfers)  transfers deferred at this time as pt requiring mod-maxA to stay sitting EOB  -KW       User Key  (r) = Recorded By, (t) = Taken By, (c) = Cosigned By    Initials Name Provider Type    Rochelle Lopez PT Physical Therapist        Obj/Interventions     Row Name  02/06/21 1213          Range of Motion Comprehensive    Comment, General Range of Motion  pt resisting PROM and does not follow directions well to formally assess AROM; limited knee flexion/ extension, otherwise appears to be WFL based on movement and positioning in bed  -KW     Row Name 02/06/21 1213          Strength Comprehensive (MMT)    Comment, General Manual Muscle Testing (MMT) Assessment  BLE groslsy 3/5  -KW     Row Name 02/06/21 1213          Balance    Balance Assessment  sitting static balance  -KW     Static Sitting Balance  severe impairment;sitting, edge of bed  -KW     Row Name 02/06/21 1213          Sensory Assessment (Somatosensory)    Sensory Assessment (Somatosensory)  LE sensation intact BLE light touch assessed; pt reporting diminished sensation on R compared to L  -KW       User Key  (r) = Recorded By, (t) = Taken By, (c) = Cosigned By    Initials Name Provider Type    KW Rochelle Rudd, PT Physical Therapist        Goals/Plan     Row Name 02/06/21 1215          Bed Mobility Goal 1 (PT)    Activity/Assistive Device (Bed Mobility Goal 1, PT)  sit to supine;supine to sit  -KW     [place] Level/Cues Needed (Bed Mobility Goal 1, PT)  minimum assist (75% or more patient effort)  -KW     Time Frame (Bed Mobility Goal 1, PT)  3 days  -KW     Progress/Outcomes (Bed Mobility Goal 1, PT)  goal not met  -KW     Row Name 02/06/21 1215          Transfer Goal 1 (PT)    Activity/Assistive Device (Transfer Goal 1, PT)  sit-to-stand/stand-to-sit;bed-to-chair/chair-to-bed;walker, rolling  -KW     [place] Level/Cues Needed (Transfer Goal 1, PT)  minimum assist (75% or more patient effort)  -KW     Time Frame (Transfer Goal 1, PT)  5 days  -KW     Progress/Outcome (Transfer Goal 1, PT)  goal not met  -KW     Row Name 02/06/21 1215          Gait Training Goal 1 (PT)    Activity/Assistive Device (Gait Training Goal 1, PT)  gait (walking locomotion);assistive device use;decrease fall risk;increase  endurance/gait distance;walker, rolling  -KW     [place] Level (Gait Training Goal 1, PT)  minimum assist (75% or more patient effort)  -KW     Distance (Gait Training Goal 1, PT)  5ft or more  -KW     Time Frame (Gait Training Goal 1, PT)  1 week  -KW     Progress/Outcome (Gait Training Goal 1, PT)  goal not met  -KW       User Key  (r) = Recorded By, (t) = Taken By, (c) = Cosigned By    Initials Name Provider Type    KW Rochelle Rudd, PT Physical Therapist        Clinical Impression     Row Name 02/06/21 1017          Pain    Additional Documentation  Pain Scale: Numbers Pre/Post-Treatment (Group)  -KW     Row Name 02/06/21 1017          Pain Scale: Numbers Pre/Post-Treatment    Pretreatment Pain Rating  6/10  -KW     Posttreatment Pain Rating  6/10  -KW     Pain Location  back  -KW     Pain Intervention(s)  Ambulation/increased activity;Distraction;Repositioned;Rest  -KW     Row Name 02/06/21 1017          Plan of Care Review    Plan of Care Reviewed With  patient  -KW     Row Name 02/06/21 1017          Therapy Assessment/Plan (PT)    Rehab Potential (PT)  fair, will monitor progress closely  -KW     Criteria for Skilled Interventions Met (PT)  yes;meets criteria  -KW     Row Name 02/06/21 1017          Vital Signs    Pre Systolic BP Rehab  130  -KW     Pre Treatment Diastolic BP  59  -KW     Post Systolic BP Rehab  142  -KW     Post Treatment Diastolic BP  67  -KW     Pretreatment Heart Rate (beats/min)  56  -KW     Posttreatment Heart Rate (beats/min)  57  -KW     Pre SpO2 (%)  97  -KW     O2 Delivery Pre Treatment  room air  -KW     Post SpO2 (%)  100  -KW     O2 Delivery Post Treatment  room air  -KW     Pre Patient Position  Supine  -KW     Post Patient Position  Supine  -KW     Row Name 02/06/21 1017          Positioning and Restraints    Pre-Treatment Position  in bed  -KW     Post Treatment Position  bed  -KW     In Bed  notified nsg;fowlers;call light within reach;encouraged to call for assist;exit  alarm on;side rails up x2  -KW       User Key  (r) = Recorded By, (t) = Taken By, (c) = Cosigned By    Initials Name Provider Type    Rochelle Lopez PT Physical Therapist        Outcome Measures     Row Name 02/06/21 1216          How much help from another person do you currently need...    Turning from your back to your side while in flat bed without using bedrails?  2  -KW     Moving from lying on back to sitting on the side of a flat bed without bedrails?  1  -KW     Moving to and from a bed to a chair (including a wheelchair)?  1  -KW     Standing up from a chair using your arms (e.g., wheelchair, bedside chair)?  1  -KW     Climbing 3-5 steps with a railing?  1  -KW     To walk in hospital room?  1  -KW     AM-PAC 6 Clicks Score (PT)  7  -KW     Row Name 02/06/21 1216          Functional Assessment    Outcome Measure Options  AM-PAC 6 Clicks Basic Mobility (PT)  -KW       User Key  (r) = Recorded By, (t) = Taken By, (c) = Cosigned By    Initials Name Provider Type    Rochelle Lopez PT Physical Therapist        Physical Therapy Education                 Title: PT OT SLP Therapies (In Progress)     Topic: Physical Therapy (In Progress)     Point: Mobility training (Done)     Learning Progress Summary           Patient Acceptance, E, VU by  at 2/6/2021 1216    Comment: Role of PT, POC, mobility/ changing positions to decrease back pain                   Point: Home exercise program (Not Started)     Learner Progress:  Not documented in this visit.          Point: Body mechanics (Not Started)     Learner Progress:  Not documented in this visit.          Point: Precautions (Done)     Learning Progress Summary           Patient Acceptance, E, VU by RONNIE at 2/6/2021 1216    Comment: Role of PT, POC, mobility/ changing positions to decrease back pain                               User Key     Initials Effective Dates Name Provider Type [facility] 08/09/20 -  Rochelle Rudd PT Physical Therapist PT               PT Recommendation and Plan  Planned Therapy Interventions (PT): balance training, bed mobility training, gait training, home exercise program, transfer training, stretching, strengthening, stair training, ROM (range of motion), patient/family education  Plan of Care Reviewed With: patient  Outcome Summary: PT evaluation completed as co-eval with OT this date. Pt agreeable to evaluaiton, however confused at times throughout. Pt requiring maxA to roll L and maxAx2 to perform sit<>supine. Pt reqiring mod-maxA to sit EOB and sitting EOB for ~15 mins total. Further mobility deferred at this time d/t assistance needed to sit EOB. Pt crying out at times with minimal and transitional movement throughout but calmed once stationary. Pt would benefit from further skilled PT to increase functional mobility, endurance, strength, safety, and to progress towards PLOF. Upon d/c from acute care, recommend SNF for further therapy prior to home.     Time Calculation:   PT Charges     Row Name 02/06/21 1220             Time Calculation    Start Time  0940  -KW      Stop Time  1022  -KW      Time Calculation (min)  42 min  -KW      PT Received On  02/06/21  -KW      PT Goal Re-Cert Due Date  02/19/21  -KW        User Key  (r) = Recorded By, (t) = Taken By, (c) = Cosigned By    Initials Name Provider Type    Rochelle Lopez PT Physical Therapist        Therapy Charges for Today     Code Description Service Date Service Provider Modifiers Qty    [id number] HC PT EVAL MOD COMPLEXITY 3 2/6/2021 Rochelle Rudd PT GP 1          PT G-Codes  Outcome Measure Options: AM-PAC 6 Clicks Basic Mobility (PT)  AM-PAC 6 Clicks Score (PT): 7    Rochelle Rudd PT  2/6/2021

## 2021-02-06 NOTE — THERAPY EVALUATION
Acute Care - Occupational Therapy Initial Evaluation  [facility]     Patient Name: Ondina Alanis Sr.  : 1940  MRN: [id number]  Today's Date: 2021     Date of Referral to OT: 21       Admit Date: 2021       ICD-10-CM ICD-9-CM   1. Urinary obstruction  N13.9 599.60   2. ANT (acute kidney injury) (CMS/[facility])  N17.9 584.9   3. Pleural effusion on left  J90 511.9   4. Proctitis  K62.89 569.49   5. Oral phase dysphagia  R13.11 787.21   6. Impaired functional mobility, balance, gait, and endurance  Z74.09 V49.89   7. Impaired mobility and activities of daily living  Z74.09 V49.89    Z78.9      Patient Active Problem List   Diagnosis   • CKD (chronic kidney disease) stage 4, GFR 15-29 ml/min (CMS/[facility])   • Diabetic peripheral neuropathy associated with type 2 diabetes mellitus (CMS/[facility])   • Diabetes mellitus type II, uncontrolled (CMS/[facility])   • GERD (gastroesophageal reflux disease)   • Primary osteoarthritis of both knees   • Pulmonary embolism (CMS/[facility])   • BPH (benign prostatic hyperplasia)   • Benign essential hypertension   • Asthma   • Pleurisy   • Acute respiratory failure with hypoxia (CMS/[facility])   • Stage 1 acute kidney injury (CMS/[facility])   • Left ventricular diastolic dysfunction   • Exacerbation of asthma   • COPD exacerbation (CMS/[facility])   • Atrial flutter (CMS/[facility])   • Acute systolic CHF (congestive heart failure) (CMS/[facility])   • Pneumonia of both lower lobes due to infectious organism   • Urinary obstruction   • Supratherapeutic INR   • Sepsis due to urinary tract infection (CMS/[facility])   • Chronic HFrEF (heart failure with reduced ejection fraction) (CMS/[facility])   • Bradycardia   • Dementia (CMS/[facility])   • Acute blood loss anemia     Past Medical History:   Diagnosis Date   • Asthma    • Benign prostatic hyperplasia    • CHF (congestive heart failure) (CMS/[facility])    • Coronary artery disease    • Diabetes mellitus (CMS/[facility])    • GERD (gastroesophageal reflux disease)    • Hyperlipidemia    •  I have reviewed discharge information with parent. Parent verbalizes understanding. Patient ambulatory out of ER with steady gait. No distress noted. Hypertension    • Prostate disorder    • Renal insufficiency    • Urinary tract infection      Past Surgical History:   Procedure Laterality Date   • BACK SURGERY     • KNEE SURGERY Left    • PROSTATE SURGERY              OT ASSESSMENT FLOWSHEET (last 12 hours)      OT Evaluation and Treatment     Row Name 02/06/21 0840                   OT Time and Intention    Subjective Information  complains of;pain  -RB        Document Type  evaluation  -RB        Mode of Treatment  co-treatment;physical therapy;occupational therapy  -RB        Patient Effort  poor  -RB           General Information    Patient Profile Reviewed  yes  -RB        Prior Level of Function  min assist:;gait;transfer;ADL's  -RB        Existing Precautions/Restrictions  fall  -RB           Living Environment    Current Living Arrangements  home/apartment/condo  -RB        Lives With  spouse  -RB           Home Use of Assistive/Adaptive Equipment    Equipment Currently Used at Home  wheelchair, motorized;walker, rolling  -RB           Pain Assessment    Additional Documentation  Pain Scale: Numbers Pre/Post-Treatment (Group)  -RB           Pain Scale: Numbers Pre/Post-Treatment    Pretreatment Pain Rating  6/10  -RB        Posttreatment Pain Rating  6/10  -RB        Pain Location  back  -RB        Pain Intervention(s)  Medication (See MAR);Ambulation/increased activity  -RB           Bed Mobility    Bed Mobility  supine-sit;sit-supine  -RB        Supine-Sit Cascade (Bed Mobility)  maximum assist (25% patient effort);2 person assist  -RB        Sit-Supine Cascade (Bed Mobility)  maximum assist (25% patient effort);2 person assist  -RB        Assistive Device (Bed Mobility)  bed rails;head of bed elevated;draw sheet  -RB           Functional Mobility    Functional Mobility- Ind. Level  not tested  -RB           Transfer Assessment/Treatment    Comment (Transfers)  deferred.  -RB           Balance    Balance Assessment  sitting static balance   -RB        Static Sitting Balance  severe impairment  -RB           Activities of Daily Living    BADL Assessment/Intervention  grooming  -RB           Grooming Assessment/Training    Phelps Level (Grooming)  grooming skills;hair care, combing/brushing;maximum assist (25% patient effort)  -RB           Vital Signs    Pre Systolic BP Rehab  130  -RB        Pre Treatment Diastolic BP  59  -RB        Post Systolic BP Rehab  142  -RB        Post Treatment Diastolic BP  67  -RB        Pretreatment Heart Rate (beats/min)  56  -RB        Posttreatment Heart Rate (beats/min)  57  -RB        Pre SpO2 (%)  97  -RB        O2 Delivery Pre Treatment  room air  -RB        Post SpO2 (%)  100  -RB        O2 Delivery Post Treatment  room air  -RB        Pre Patient Position  Supine  -RB        Intra Patient Position  Sitting  -RB        Post Patient Position  Supine  -RB           OT Goals    Bathing Goal Selection (OT)  bathing, OT goal 1  -RB        Dressing Goal Selection (OT)  dressing, OT goal 1  -RB        Grooming Goal Selection (OT)  grooming, OT goal 1  -RB           Bathing Goal 1 (OT)    Activity/Device (Bathing Goal 1, OT)  upper body bathing  -RB        Phelps Level/Cues Needed (Bathing Goal 1, OT)  contact guard assist  -RB        Time Frame (Bathing Goal 1, OT)  long term goal (LTG)  -RB        Progress/Outcomes (Bathing Goal 1, OT)  goal not met  -RB           Dressing Goal 1 (OT)    Activity/Device (Dressing Goal 1, OT)  upper body dressing  -RB        Phelps/Cues Needed (Dressing Goal 1, OT)  contact guard assist  -RB        Time Frame (Dressing Goal 1, OT)  long term goal (LTG)  -RB        Progress/Outcome (Dressing Goal 1, OT)  goal not met  -RB           Grooming Goal 1 (OT)    Activity/Device (Grooming Goal 1, OT)  grooming skills, all  -RB        Phelps (Grooming Goal 1, OT)  contact guard assist  -RB        Time Frame (Grooming Goal 1, OT)  long term goal (LTG)  -RB         Progress/Outcome (Grooming Goal 1, OT)  goal not met  -RB           Positioning and Restraints    Pre-Treatment Position  in bed  -RB        Post Treatment Position  bed  -RB        In Bed  supine;notified nsg;call light within reach;exit alarm on  -RB           Therapy Assessment/Plan (OT)    Date of Referral to OT  02/05/21  -RB        Functional Level at Time of Evaluation (OT)  Impaired mob and ADLs.  -RB        OT Diagnosis  Impaired mob and ADLs.  -RB        Rehab Potential (OT)  fair, will monitor progress closely  -RB        Criteria for Skilled Therapeutic Interventions Met (OT)  yes;meets criteria  -RB        Therapy Frequency (OT)  other (see comments) 5-7 days/wk  -RB        Predicted Duration of Therapy Intervention (OT)  Until D/C or goals met.  -RB        Problem List (OT)  problems related to;balance;cognition;coordination;mobility;strength;inability to direct their own care;postural control;pain  -RB        Activity Limitations Related to Problem List (OT)  unable to ambulate safely;unable to transfer safely;BADLs not performed adequately or safely  -RB        Planned Therapy Interventions (OT)  activity tolerance training;adaptive equipment training;BADL retraining;functional balance retraining;occupation/activity based interventions;patient/caregiver education/training;ROM/therapeutic exercise;strengthening exercise;transfer/mobility retraining  -RB           Evaluation Complexity (OT)    Review Occupational Profile/Medical/Therapy History Complexity  moderate complexity  -RB        Assessment, Occupational Performance/Identification of Deficit Complexity  moderate complexity  -RB        Clinical Decision Making Complexity (OT)  moderate complexity  -RB        Overall Complexity of Evaluation (OT)  moderate complexity  -RB           Therapy Plan Review/Discharge Plan (OT)    Anticipated Discharge Disposition (OT)  home with 24/7 care;home with assist;skilled nursing facility  -RB          User Key   (r) = Recorded By, (t) = Taken By, (c) = Cosigned By    Initials Name Effective Dates     Romero Munguia OT 07/24/19 -          Occupational Therapy Education                 Title: PT OT SLP Therapies (In Progress)     Topic: Occupational Therapy (In Progress)     Point: ADL training (Not Started)     Description:   Instruct learner(s) on proper safety adaptation and remediation techniques during self care or transfers.   Instruct in proper use of assistive devices.              Learner Progress:  Not documented in this visit.          Point: Home exercise program (Not Started)     Description:   Instruct learner(s) on appropriate technique for monitoring, assisting and/or progressing therapeutic exercises/activities.              Learner Progress:  Not documented in this visit.          Point: Precautions (Done)     Description:   Instruct learner(s) on prescribed precautions during self-care and functional transfers.              Learning Progress Summary           Patient Acceptance, E, VU,NR by  at 2/6/2021 0366    Comment: Edu pt on no OOB without assist.                   Point: Body mechanics (Not Started)     Description:   Instruct learner(s) on proper positioning and spine alignment during self-care, functional mobility activities and/or exercises.              Learner Progress:  Not documented in this visit.                      User Key     Initials Effective Dates Name Provider Type Discipline     07/24/19 -  Romero Munguia OT Occupational Therapist OT                  OT Recommendation and Plan  Planned Therapy Interventions (OT): activity tolerance training, adaptive equipment training, BADL retraining, functional balance retraining, occupation/activity based interventions, patient/caregiver education/training, ROM/therapeutic exercise, strengthening exercise, transfer/mobility retraining  Therapy Frequency (OT): other (see comments)(5-7 days/wk)  Plan of Care Review  Plan of Care Reviewed  With: patient  Outcome Summary: OT eval on this date as co-eval with PT.  Pt confused and painful throughout evaluation.  Pt was max A for rolling and max of 2 for sup to sit to supine.  Mod to max A for static sitting balance.  Pt could benefit from OT services to increase safety and independence with ADLs and functional mobility.  Rec cont therapy @ home or SNF when D/C from hospital.  Plan of Care Reviewed With: patient  Outcome Summary: OT eval on this date as co-eval with PT.  Pt confused and painful throughout evaluation.  Pt was max A for rolling and max of 2 for sup to sit to supine.  Mod to max A for static sitting balance.  Pt could benefit from OT services to increase safety and independence with ADLs and functional mobility.  Rec cont therapy @ home or SNF when D/C from hospital.    Outcome Measures     Row Name 02/06/21 0940             How much help from another is currently needed...    Putting on and taking off regular lower body clothing?  1  -RB      Bathing (including washing, rinsing, and drying)  2  -RB      Toileting (which includes using toilet bed pan or urinal)  1  -RB      Putting on and taking off regular upper body clothing  2  -RB      Taking care of personal grooming (such as brushing teeth)  2  -RB      Eating meals  2  -RB      AM-PAC 6 Clicks Score (OT)  10  -RB         Functional Assessment    Outcome Measure Options  AM-PAC 6 Clicks Daily Activity (OT)  -RB        User Key  (r) = Recorded By, (t) = Taken By, (c) = Cosigned By    Initials Name Provider Type    RB Romero Munguia OT Occupational Therapist          Time Calculation:   Time Calculation- OT     Row Name 02/06/21 1256             Time Calculation- OT    OT Start Time  0940  -RB      OT Stop Time  1022  -RB      OT Time Calculation (min)  42 min  -RB      OT Received On  02/06/21  -RB      OT Goal Re-Cert Due Date  02/19/21  -RB        User Key  (r) = Recorded By, (t) = Taken By, (c) = Cosigned By    Initials Name Provider  Type    RB Romero Munguia OT Occupational Therapist        Therapy Charges for Today     Code Description Service Date Service Provider Modifiers Qty    12577234146 HC OT EVAL MOD COMPLEXITY 3 2/6/2021 Romero Munguia OT GO 1               Romero Munguia OT  2/6/2021

## 2021-02-06 NOTE — THERAPY EVALUATION
Acute Care - Speech Language Pathology   Swallow Initial Evaluation [facility]     Patient Name: Ondina Alnais Sr.  : 1940  MRN: [id number]  Today's Date: 2021               Admit Date: 2021     Pt seen for skilled ST services this date to address swallow function. Pt was admitted for urniary retention and not voiding for 24 hours. Pt required respositioning and initially declined any and all attempts of ice chips and water. However, pt did agree to apple juice and pudding. Pt kept eyes closed during entire session, but did respond to SLP's questions. Pt consumed thin liqiuds via spoon and straw w/no overt s/ s of aspiration. Pt consumed puree solids w/good oral clearance and timely AP transit. [facility] and SLP will trial when pt is appropriate. SLP educated pt on diet advancement; however, unable to fully educate pt d/t cognitive deficits. Nsg informed of diet recommendations.    Goal:  Patient will safely tolerate least restricted diet w/no overt s/s of aspiration for adequate nutrition and hydration:      Visit Dx:     ICD-10-CM ICD-9-CM   1. Urinary obstruction  N13.9 599.60   2. ANT (acute kidney injury) (CMS/HCC)  N17.9 584.9   3. Pleural effusion on left  J90 511.9   4. Proctitis  K62.89 569.49   5. Oral phase dysphagia  R13.11 787.21     Patient Active Problem List   Diagnosis   • CKD (chronic kidney disease) stage 4, GFR 15-29 ml/min (CMS/HCC)   • Diabetic peripheral neuropathy associated with type 2 diabetes mellitus (CMS/HCC)   • Diabetes mellitus type II, uncontrolled (CMS/HCC)   • GERD (gastroesophageal reflux disease)   • Primary osteoarthritis of both knees   • Pulmonary embolism (CMS/HCC)   • BPH (benign prostatic hyperplasia)   • Benign essential hypertension   • Asthma   • Pleurisy   • Acute respiratory failure with hypoxia (CMS/HCC)   • Stage 1 acute kidney injury (CMS/HCC)   • Left ventricular diastolic dysfunction   •  Exacerbation of asthma   • COPD exacerbation (CMS/[facility])   • Atrial flutter (CMS/[facility])   • Acute systolic CHF (congestive heart failure) (CMS/[facility])   • Pneumonia of both lower lobes due to infectious organism   • Urinary obstruction   • Supratherapeutic INR   • Sepsis due to urinary tract infection (CMS/[facility])   • Chronic HFrEF (heart failure with reduced ejection fraction) (CMS/[facility])   • Bradycardia   • Dementia (CMS/[facility])   • Acute blood loss anemia     Past Medical History:   Diagnosis Date   • Asthma    • Benign prostatic hyperplasia    • CHF (congestive heart failure) (CMS/[facility])    • Coronary artery disease    • Diabetes mellitus (CMS/[facility])    • GERD (gastroesophageal reflux disease)    • Hyperlipidemia    • Hypertension    • Prostate disorder    • Renal insufficiency    • Urinary tract infection      Past Surgical History:   Procedure Laterality Date   • BACK SURGERY     • KNEE SURGERY Left    • PROSTATE SURGERY          SWALLOW EVALUATION (last 72 hours)      SLP Adult Swallow Evaluation     Row Name 02/06/21 0853                   Rehab Evaluation    Document Type  evaluation  -CK        Total Evaluation Minutes, SLP  31  -CK        Subjective Information  no complaints  -CK        Patient Observations  lethargic;poorly cooperative  -CK        Patient/Family/Caregiver Comments/Observations  No family present at bedside  -CK        Care Plan Review  evaluation/treatment results reviewed;care plan/treatment goals reviewed;risks/benefits reviewed;current/potential barriers reviewed;patient/other agree to care plan No evidence of learning noted d/t cognitive deficits  -CK        Patient Effort  poor  -CK           General Information    Patient Profile Reviewed  yes  -CK        Pertinent History Of Current Problem  Pt admited for urinary retention; pt known to SLP from previous admissions  -CK        Current Method of Nutrition  NPO  -CK        Prior Level of Function-Communication  cognitive-linguistic impairment  -CK         Prior Level of Function-Swallowing  mechanical soft textures;thin liquids  -CK        Plans/Goals Discussed with  patient  -CK        Barriers to Rehab  previous functional deficit;cognitive status  -CK        Patient's Goals for Discharge  patient did not state  -CK           Pain    Additional Documentation  Pain Scale: FACES Pre/Post-Treatment (Group)  -CK           Pain Scale: FACES Pre/Post-Treatment    Pain: FACES Scale, Pretreatment  0-->no hurt  -CK        Posttreatment Pain Rating  0-->no hurt  -CK           Oral Motor Structure and Function    Dentition Assessment  natural, present and adequate  -CK        Secretion Management  dried secretions in oral cavity  -CK        Mucosal Quality  cracked;dry  -CK        Volitional Swallow  unable to elicit  -CK        Volitional Cough  unable to elicit  -CK           General Eating/Swallowing Observations    Respiratory Support Currently in Use  room air  -CK        Eating/Swallowing Skills  fed by SLP  -CK        Positioning During Eating  upright 90 degree;upright in bed  -CK        Utensils Used  spoon;straw  -CK        Consistencies Trialed  thin liquids;pureed  -CK           Clinical Swallow Eval    Oral Prep Phase  WFL for puree  -CK        Oral Transit  WFL for puree  -CK        Oral Residue  WFL for puree  -CK        Pharyngeal Phase  no overt signs/symptoms of pharyngeal impairment  -CK        Esophageal Phase  unremarkable  -CK           Clinical Impression    Barriers to Overall Progress (SLP)  previous deficits  -CK        SLP Swallowing Diagnosis  mild;oral dysphagia  -CK        Functional Impact  risk of aspiration/pneumonia;risk of malnutrition;risk of dehydration  -CK        Rehab Potential/Prognosis, Swallowing  good, to achieve stated therapy goals  -CK        Swallow Criteria for Skilled Therapeutic Interventions Met  demonstrates skilled criteria  -CK        Plan for Continued Treatment (SLP)  Advance diet to puree/thin; initiate POC  -CK            Recommendations    Therapy Frequency (Swallow)  3 days per week;5 days per week  -CK        Predicted Duration Therapy Intervention (Days)  until discharge  -CK        SLP Diet Recommendation  puree;thin liquids  -CK        Recommended Precautions and Strategies  upright posture during/after eating;small bites of food and sips of liquid;fatigue precautions;general aspiration precautions;1:1 supervision;assist with feeding  -CK        Oral Care Recommendations  Oral Care before breakfast, after meals and PRN  -CK        SLP Rec. for Method of Medication Administration  meds crushed;with pudding or applesauce  -CK        Monitor for Signs of Aspiration  yes;notify SLP if any concerns  -CK        Anticipated Discharge Disposition (SLP)  home with home health  -CK           Swallow Goals (SLP)    Oral Nutrition/Hydration Goal Selection (SLP)  oral nutrition/hydration, SLP goal 1  -CK           Oral Nutrition/Hydration Goal 1 (SLP)    Oral Nutrition/Hydration Goal 1, SLP  Pt will safely tolerate least restricted diet w/no overt s/s of aspiration for adequate nutrition and hydration.  -CK        Time Frame (Oral Nutrition/Hydration Goal 1, SLP)  by discharge  -CK        Barriers (Oral Nutrition/Hydration Goal 1, SLP)  previous deficits  -CK          User Key  (r) = Recorded By, (t) = Taken By, (c) = Cosigned By    Initials Name Effective Dates    CK Zakia Wagner MS CCC-SLP 02/11/20 -           EDUCATION  The patient has been educated in the following areas:   Dysphagia (Swallowing Impairment) Modified Diet Instruction.    SLP Recommendation and Plan  SLP Swallowing Diagnosis: mild, oral dysphagia  SLP Diet Recommendation: puree, thin liquids  Recommended Precautions and Strategies: upright posture during/after eating, small bites of food and sips of liquid, fatigue precautions, general aspiration precautions, 1:1 supervision, assist with feeding  SLP Rec. for Method of Medication Administration: meds  crushed, with pudding or applesauce     Monitor for Signs of Aspiration: yes, notify SLP if any concerns     Swallow Criteria for Skilled Therapeutic Interventions Met: demonstrates skilled criteria  Anticipated Discharge Disposition (SLP): home with home health  Rehab Potential/Prognosis, Swallowing: good, to achieve stated therapy goals  Therapy Frequency (Swallow): 3 days per week, 5 days per week  Predicted Duration Therapy Intervention (Days): until discharge          Plan for Continued Treatment (SLP): Advance diet to puree/thin; initiate POC              Plan of Care Reviewed With: patient  Outcome Summary: Pt seen for skilled ST services this date to address swallow function.  Pt was admitted for urniary retention and not voiding for 24 hours.  Pt required respositioning and initially declined any and all attempts of ice chips and water.  However, pt did agree to apple juice and pudding.  Pt kept eyes closed during entire session, but did respond to SLP's questions.  Pt consumed thin liqiuds via spoon and straw w/no overt s/ s of aspiration.  Pt consumed puree solids w/good oral clearance and timely AP transit.  [facility] and SLP will trial when pt is appropriate.  SLP educated pt on diet advancement; however, unable to fully educate pt d/t cognitive deficits.  Nsg informed of diet recommendations.    SLP GOALS     Row Name 02/06/21 0853             Oral Nutrition/Hydration Goal 1 (SLP)    Oral Nutrition/Hydration Goal 1, SLP  Pt will safely tolerate least restricted diet w/no overt s/s of aspiration for adequate nutrition and hydration.  -CK      Time Frame (Oral Nutrition/Hydration Goal 1, SLP)  by discharge  -CK      Barriers (Oral Nutrition/Hydration Goal 1, SLP)  previous deficits  -CK        User Key  (r) = Recorded By, (t) = Taken By, (c) = Cosigned By    Initials Name Provider Type    CK Zakia Wagner MS CCC-SLP Speech and Language Pathologist              Time Calculation:   Time Calculation- SLP     Row Name 02/06/21 0924             Time Calculation- SLP    SLP Start Time  0853  -CK      SLP Stop Time  0924  -CK      SLP Time Calculation (min)  31 min  -CK      Total Timed Code Minutes- SLP  31 minute(s)  -CK      SLP Received On  02/06/21  -CK      SLP Goal Re-Cert Due Date  02/20/21  -        User Key  (r) = Recorded By, (t) = Taken By, (c) = Cosigned By    Initials Name Provider Type     Zakia Wagner MS CCC-SLP Speech and Language Pathologist          Therapy Charges for Today     Code Description Service Date Service Provider Modifiers Qty    71801872019 HC ST EVAL ORAL PHARYNG SWALLOW 2 2/6/2021 Zakia Wagner MS CCC-SLP GN 1               Zakia Wagner MS CCC-GEORGE  2/6/2021

## 2021-02-06 NOTE — PLAN OF CARE
Goal Outcome Evaluation:  Plan of Care Reviewed With: patient     Outcome Summary: OT eval on this date as co-eval with PT.  Pt confused and painful throughout evaluation.  Pt was max A for rolling and max of 2 for sup to sit to supine.  Mod to max A for static sitting balance.  Pt could benefit from OT services to increase safety and independence with ADLs and functional mobility.  Rec cont therapy @ home or SNF when D/C from hospital.

## 2021-02-06 NOTE — PROGRESS NOTES
"   LOS: 1 day   Patient Care Team:  Mark Sheldon APRN as PCP - General (Nurse Practitioner)    Subjective     Subjective:  Symptoms:  Stable.  (No brittany hematuria, no clots. Complains of chills. ).    Pain:  He reports no pain.        History taken from: patient chart    Objective     Vital Signs  Temp:  [96.8 °F (36 °C)-98.9 °F (37.2 °C)] 97.5 °F (36.4 °C)  Heart Rate:  [43-60] 60  Resp:  [15-18] 18  BP: (100-144)/(53-69) 142/69    Objective:  General Appearance:  In no acute distress.    Vital signs: (most recent): Blood pressure 142/69, pulse 60, temperature 97.5 °F (36.4 °C), temperature source Oral, resp. rate 18, height 182.9 cm (72.01\"), weight 96.4 kg (212 lb 8 oz), SpO2 98 %.  Vital signs are normal.    Output: Minimal urine output (Moore).    Lungs:  Normal effort.    Chest: Symmetric chest wall expansion.   Abdomen: Abdomen is soft and non-distended.  There is no abdominal tenderness.     Neurological: Patient is alert.    Skin:  Warm and dry.              Results Review:    Lab Results (last 24 hours)     Procedure Component Value Units Date/Time    POC Glucose Once [[id number]]  (Abnormal) Collected: 02/06/21 1003    Specimen: Blood Updated: 02/06/21 1027     Glucose 60 mg/dL      Comment: RN NotifiedOperator: [id number] ELIER MCKINLEYMeter ID: [id number]       CBC & Differential [[id number]]  (Abnormal) Collected: 02/06/21 0634    Specimen: Blood Updated: 02/06/21 1000    Narrative:      The following orders were created for panel order CBC & Differential.  Procedure                               Abnormality         Status                     ---------                               -----------         ------                     Scan Slide[[id number]]                                       Final result               CBC Auto Differential[[id number]]        Abnormal            Final result                 Please view results for these tests on the individual orders.    Scan Slide [[id number]] " Collected: 02/06/21 0634    Specimen: Blood Updated: 02/06/21 1000     Acanthocytes Mod/2+     Anisocytosis Mod/2+     Target Cells Mod/2+     Vacuolated Neutrophils Mod/2+     Platelet Estimate Decreased     Giant Platelets Slight/1+    Narrative:      Moderate amount of banded neutrophils observed.    Protime-INR [[id number]]  (Abnormal) Collected: 02/06/21 0634    Specimen: Blood Updated: 02/06/21 0829     Protime 19.3 Seconds      INR 1.54    Narrative:      Therapeutic range for most indications is 2.0-3.0 INR,  or 2.5-3.5 for mechanical heart valves.    CBC Auto Differential [[id number]]  (Abnormal) Collected: 02/06/21 0634    Specimen: Blood Updated: 02/06/21 0730     WBC 13.86 10*3/mm3      RBC 3.03 10*6/mm3      Hemoglobin 8.4 g/dL      Hematocrit 23.6 %      MCV 77.9 fL      MCH 27.7 pg      MCHC 35.6 g/dL      RDW 15.8 %      RDW-SD 44.3 fl      MPV --     Comment: Instrument unable to calculate mpv        Platelets 93 10*3/mm3      Neutrophil % 92.3 %      Lymphocyte % 3.8 %      Monocyte % 2.3 %      Eosinophil % 0.4 %      Basophil % 0.8 %      Immature Grans % 0.4 %      Neutrophils, Absolute 12.80 10*3/mm3      Lymphocytes, Absolute 0.52 10*3/mm3      Monocytes, Absolute 0.32 10*3/mm3      Eosinophils, Absolute 0.06 10*3/mm3      Basophils, Absolute 0.11 10*3/mm3      Immature Grans, Absolute 0.05 10*3/mm3      nRBC 0.1 /100 WBC     POC Glucose Once [[id number]]  (Normal) Collected: 02/06/21 0633    Specimen: Blood Updated: 02/06/21 0721     Glucose 92 mg/dL      Comment: RN NotifiedOperator: [id number] ALVARO Flowers ID: [id number]       Basic Metabolic Panel [[id number]]  (Abnormal) Collected: 02/06/21 0634    Specimen: Blood Updated: 02/06/21 0716     Glucose 89 mg/dL       mg/dL      Creatinine 4.74 mg/dL      Sodium 140 mmol/L      Potassium 4.2 mmol/L      Chloride 105 mmol/L      CO2 21.0 mmol/L      Calcium 8.1 mg/dL      eGFR  African Amer 15 mL/min/1.73      BUN/Creatinine Ratio  23.4     Anion Gap 14.0 mmol/L     Narrative:      GFR Normal >60  Chronic Kidney Disease <60  Kidney Failure <15      POC Glucose Once [[id number]]  (Abnormal) Collected: 02/06/21 0423    Specimen: Blood Updated: 02/06/21 0435     Glucose 68 mg/dL      Comment: RN NotifiedOperator: [id number] EVELYNE Cheung ID: [id number]       POC Glucose Once [[id number]]  (Normal) Collected: 02/06/21 0255    Specimen: Blood Updated: 02/06/21 0313     Glucose 84 mg/dL      Comment: : [id number] EVELYNE Cheung ID: [id number]       POC Glucose Once [[id number]]  (Normal) Collected: 02/06/21 0157    Specimen: Blood Updated: 02/06/21 0220     Glucose 106 mg/dL      Comment: : [id number] EVELYNE Cheung ID: [id number]       POC Glucose Once [[id number]]  (Abnormal) Collected: 02/06/21 0049    Specimen: Blood Updated: 02/06/21 0100     Glucose 134 mg/dL      Comment: RN NotifiedOperator: [id number] GABBY Patiño ID: [id number]       POC Glucose Once [[id number]]  (Abnormal) Collected: 02/06/21 0005    Specimen: Blood Updated: 02/06/21 0028     Glucose 62 mg/dL      Comment: : [id number] EVELYNE Cheung ID: [id number]       Blood Culture ID, PCR - Blood, Arm, Left [[id number]]  (Abnormal) Collected: 02/04/21 2206    Specimen: Blood from Arm, Left Updated: 02/05/21 2159     BCID, PCR Staphylococcus aureus. mecA (methicillin resistance gene) detected. Identification by BCID PCR.    Narrative:      Sensitivity to Follow    POC Glucose Once [[id number]]  (Normal) Collected: 02/05/21 1633    Specimen: Blood Updated: 02/05/21 1651     Glucose 87 mg/dL      Comment: RN NotifiedOperator: [id number] DANE Liu ID: [id number]       Valproic Acid Level, Total [[id number]]  (Abnormal) Collected: 02/05/21 1203    Specimen: Blood Updated: 02/05/21 1316     Valproic Acid 7.2 mcg/mL     Hemoglobin & Hematocrit, Blood [[id number]]  (Abnormal) Collected: 02/05/21 1203    Specimen: Blood Updated: 02/05/21 4980      Hemoglobin 7.0 g/dL      Hematocrit 20.2 %     POC Glucose Once [[id number]]  (Normal) Collected: 02/05/21 1244    Specimen: Blood Updated: 02/05/21 1255     Glucose 86 mg/dL      Comment: RN NotifiedOperator: [id number] DANE Liu ID: [id number]            Imaging Results (Last 24 Hours)     ** No results found for the last 24 hours. **           I reviewed the patient's new clinical results.  I reviewed the patient's new imaging results and agree with the interpretation.  I reviewed the patient's other test results and agree with the interpretation      Assessment/Plan       Urinary obstruction    CKD (chronic kidney disease) stage 4, GFR 15-29 ml/min (CMS/[facility])    Diabetic peripheral neuropathy associated with type 2 diabetes mellitus (CMS/[facility])    Benign essential hypertension    COPD exacerbation (CMS/[facility])    Supratherapeutic INR    Sepsis due to urinary tract infection (CMS/[facility])    Chronic HFrEF (heart failure with reduced ejection fraction) (CMS/[facility])    Bradycardia    Dementia (CMS/[facility])    Acute blood loss anemia      Assessment & Plan    Consulted to Urology for urinary retention unable to be catheterized, patient usually has chronic Moore related to MENENDEZ and history of CAP on Lupron therapy,  POD #1 cystoscopy and Moore placement with Dr. Riggs. Had supra therapeutic INR taking Coumadin due to history of AFib, ANT on CKD baseline Cr about 2.5. Anemia is chronic with no acute changes at present. Being treated for sepsis due to UTI and bacteremia present.   -WBC 17.98-->14.84-->13.86  -Lactate 2.2-->2.1  -Blood culture 2/4/21 Staph aureus in blood culture with mecA gene detected, on vancomycin. Urine culture IP. On Rocephin as well.   -Estimated Creatinine Clearance: 15.2 mL/min (A) (by C-G formula based on SCr of 4.74 mg/dL (H)).  -Cr 4.89-->4.89-->4.74  -Urine output 300 mL last 24 hours    Plan:  Keep Moore due to urethral trauma.   Urology following.     THIERNO Forte  02/06/21  11:52 CST

## 2021-02-06 NOTE — PROGRESS NOTES
"[facility]  [address] [place] [address]. [address]   - [phone]  F - [phone]     Progress Note          PATIENT  DEMOGRAPHICS   PATIENT NAME: Ondina Alanis .                      PHYSICIAN: Marley Akhtar MD  : [id number]  MRN: [id number]   LOS: 1 day    Patient Care Team:  Mark Sheldon APRN as PCP - General (Nurse Practitioner)  Subjective   SUBJECTIVE   Likes to take some rest, no soa.          Objective   OBJECTIVE   Vital Signs  Temp:  [96.8 °F (36 °C)-98.9 °F (37.2 °C)] 97.5 °F (36.4 °C)  Heart Rate:  [43-60] 60  Resp:  [15-18] 18  BP: (100-144)/(53-69) 142/69    Flowsheet Rows      First Filed Value   Admission Height  182.9 cm (72\") Documented at 2021   Admission Weight  96.2 kg (212 lb) Documented at 2021           I/O last 3 completed shifts:  In: 4327 [I.V.:2180; Blood:7; IV Piggyback:100]  Out: 500 [Urine:300; Stool:200]    PHYSICAL EXAM    Physical Exam  Constitutional:       Appearance: He is well-developed.   HENT:      Head: Normocephalic.   Eyes:      Pupils: Pupils are equal, round, and reactive to light.   Cardiovascular:      Rate and Rhythm: Normal rate and regular rhythm.      Heart sounds: Normal heart sounds.   Pulmonary:      Effort: Pulmonary effort is normal.      Breath sounds: Normal breath sounds.   Abdominal:      General: Bowel sounds are normal.      Palpations: Abdomen is soft.   Musculoskeletal:         General: No swelling.   Skin:     General: Skin is warm.   Neurological:      General: No focal deficit present.      Mental Status: He is alert.         RESULTS   Results Review:    Results from last 7 days   Lab Units 21  0634 21  0621   SODIUM mmol/L 140 141 138   POTASSIUM mmol/L 4.2 4.6 5.1   CHLORIDE mmol/L 105 103 101   CO2 mmol/L 21.0* 20.0* 22.0   BUN mg/dL 111* 106* 110*   CREATININE mg/dL 4.74* 4.89* 4.89*   CALCIUM mg/dL 8.1* 7.7* 8.2*   BILIRUBIN mg/dL  --   --  0.5   "   ALK PHOS U/L  --   --  80   ALT (SGPT) U/L  --   --  95*   AST (SGOT) U/L  --   --  135*   GLUCOSE mg/dL 89 94 102*       Estimated Creatinine Clearance: 15.2 mL/min (A) (by C-G formula based on SCr of 4.74 mg/dL (H)).                Results from last 7 days   Lab Units 02/06/21  0634 02/05/21  1203 02/05/21 0622 02/04/21 2008   WBC 10*3/mm3 13.86*  --  14.84* 17.98*   HEMOGLOBIN g/dL 8.4* 7.0* 7.1* 8.5*   PLATELETS 10*3/mm3 93*  --  110* 140       Results from last 7 days   Lab Units 02/06/21  0634 02/05/21  0622 02/04/21 2008   INR  1.54* 2.29* 7.06*         Imaging Results (Last 24 Hours)     ** No results found for the last 24 hours. **           MEDICATIONS    atorvastatin, 40 mg, Oral, Nightly  budesonide, 0.5 mg, Nebulization, BID - RT  cefTRIAXone, 1 g, Intravenous, Q24H  hydrALAZINE, 25 mg, Oral, Q8H  insulin aspart, 0-7 Units, Subcutaneous, TID AC  insulin detemir, 10 Units, Subcutaneous, Q12H  isosorbide mononitrate, 30 mg, Oral, Q24H  pantoprazole, 40 mg, Oral, QAM  [MAR Hold] sodium chloride, 10 mL, Intravenous, Q12H      Pharmacy to dose vancomycin,   Pharmacy to dose warfarin,   sodium bicarbonate drip less than 75 mEq/bag, 75 mEq, Last Rate: 75 mEq (02/06/21 0257)        Assessment/Plan   ASSESSMENT / PLAN      Urinary obstruction    CKD (chronic kidney disease) stage 4, GFR 15-29 ml/min (CMS/[facility])    Diabetic peripheral neuropathy associated with type 2 diabetes mellitus (CMS/[facility])    Benign essential hypertension    COPD exacerbation (CMS/[facility])    Supratherapeutic INR    Sepsis due to urinary tract infection (CMS/[facility])    Chronic HFrEF (heart failure with reduced ejection fraction) (CMS/[facility])    Bradycardia    Dementia (CMS/[facility])    Acute blood loss anemia       1.  CKD 3/4- Baseline creatinine used to be around 2.0, and now up to 2.9-3.0 in the last few readings.  It peaked to 4.9. cr same, keep gentle hydration.  ANT likely due to post renal obstruction.  He has now Moore catheter placed under  cystoscopy.      watch the respiratory status closely because of his EF of 20%.      2.  Hypertension- BP borderline low , now better.  hydralazine is now 25 mg 3 times daily.     3.  CHF- systolic heart failure EF 20%. severe LV systolic dysfunction with RV systolic pressure of 60 mmHg,      4.  A. Fib on amiodarone and on coumadin/ hematuria -off Coumadin for now.  INR is better, s/p 1 unit of packed RBC    5. MRSA bacteremia plan to try daptomycin bc of advance ckd. Plan for [facility] to look the source. Had previous + mrsa October 2020                This document has been electronically signed by Marley Akhtar MD on February 6, 2021 12:21 CST

## 2021-02-06 NOTE — PLAN OF CARE
Goal Outcome Evaluation:  Plan of Care Reviewed With: patient     Outcome Summary: PT evaluation completed as co-eval with OT this date. Pt agreeable to evaluaiton, however confused at times throughout. Pt requiring maxA to roll L and maxAx2 to perform sit<>supine. Pt reqiring mod-maxA to sit EOB and sitting EOB for ~15 mins total. Further mobility deferred at this time d/t assistance needed to sit EOB. Pt crying out at times with minimal and transitional movement throughout but calmed once stationary. Pt would benefit from further skilled PT to increase functional mobility, endurance, strength, safety, and to progress towards PLOF. Upon d/c from acute care, recommend SNF for further therapy prior to home.

## 2021-02-06 NOTE — PLAN OF CARE
Goal Outcome Evaluation:  Plan of Care Reviewed With: patient  Progress: no change  Outcome Summary: Patient c/o pain. He is fearful and confused. Reassurrance offered frequently. VSS. History of bradycardia. MD aware. Will continue to notify.

## 2021-02-06 NOTE — PROGRESS NOTES
"  Pharmacokinetics by Pharmacy - Vancomycin    Ondina Alanis Sr. is a 80 y.o. male   [Ht: 182.9 cm (72.01\"); Wt: 96.4 kg (212 lb 8 oz)]    Estimated Creatinine Clearance: 15.2 mL/min (A) (by C-G formula based on SCr of 4.74 mg/dL (H)).   Creatinine   Date Value Ref Range Status   02/06/2021 4.74 (H) 0.76 - 1.27 mg/dL Final   02/05/2021 4.89 (H) 0.76 - 1.27 mg/dL Final   02/04/2021 4.89 (H) 0.76 - 1.27 mg/dL Final   02/15/2018 2.4 (H) 0.6 - 1.3 mg/dL Final      Lab Results   Component Value Date    WBC 13.86 (H) 02/06/2021    WBC 14.84 (H) 02/05/2021    WBC 17.98 (H) 02/04/2021      Temp Readings from Last 1 Encounters:   02/06/21 97.8 °F (36.6 °C) (Oral)      C-Reactive Protein   Date Value Ref Range Status   09/23/2020 3.63 (H) 0.00 - 0.50 mg/dL Final     Lactate   Date Value Ref Range Status   02/05/2021 2.1 (C) 0.5 - 2.0 mmol/L Final   02/04/2021 2.2 (C) 0.5 - 2.0 mmol/L Final   09/24/2020 1.4 0.5 - 2.0 mmol/L Final       Indication for use: Bacteremia     Baseline culture results:  Microbiology Results (last 10 days)       Procedure Component Value - Date/Time    Blood Culture ID, PCR - Blood, Arm, Left [[id number]]  (Abnormal) Collected: 02/04/21 2206    Lab Status: Final result Specimen: Blood from Arm, Left Updated: 02/05/21 2159     BCID, PCR Staphylococcus aureus. mecA (methicillin resistance gene) detected. Identification by BCID PCR.    Narrative:      Sensitivity to Follow    COVID-19 and FLU A/B PCR - Swab, Nasopharynx [[id number]]  (Normal) Collected: 02/04/21 2001    Lab Status: Final result Specimen: Swab from Nasopharynx Updated: 02/04/21 2032     COVID19 Not Detected     Influenza A PCR Not Detected     Influenza B PCR Not Detected    Narrative:      Fact sheet for providers: https://www.fda.gov/media/054403/download    Fact sheet for patients: https://www.fda.gov/media/901145/download    Test performed by PCR.          No results found for: RESPCX    Assessment/Plan  Vancomycin 1500 mg IVPB x " 1 dose was started 2/6/21 at 0013.  Pt was also started on Ceftriaxone for UTI  Reviewed above labs and cultures. Staph aureus in blood culture with mecA gene detected.  Urine culture pending  WBC is 13.86 and Cr is 4.74, over double baseline  Calculated dose would be every 48 hours base on CrCl.  Will order a random level at 36 hours to see how pt is clearing Vancomycin and if CrCl improves.  Pharmacy will have to pulse dose.  Based on initial findings, dose of 1250 mg IV every 48 hours would give the following calculations.  Calculated AUC is 543.85 (goal 400-600), calculated trough level is 14.84  (goal 10-20) and calculated peak level is 32.75 (goal 30-40).   Pharmacy will monitor renal function and adjust dose accordingly.    Fany Byers, PharmD  02/06/21 08:30 CST

## 2021-02-07 NOTE — PROGRESS NOTES
"Anticoagulation by Pharmacy - Warfarin    Ondina Alanis Sr. is a 80 y.o.male  [Ht: 182.9 cm (72.01\"); Wt: 96.4 kg (212 lb 8 oz)] on Warfarin  for indication of dvt/pe.    Goal INR: 2-3  Home dose is last known fill was at [facility] for warfarin 3 mg in September 2020     Today's INR:   Lab Results   Component Value Date    INR 1.47 (H) 02/07/2021          Lab Results   Component Value Date    INR 1.47 (H) 02/07/2021    INR 1.54 (H) 02/06/2021    INR 2.29 (H) 02/05/2021    PROTIME 18.6 (H) 02/07/2021    PROTIME 19.3 (H) 02/06/2021    PROTIME 26.7 (H) 02/05/2021     Lab Results   Component Value Date    HGB 8.8 (L) 02/07/2021    HGB 8.4 (L) 02/06/2021    HGB 7.0 (L) 02/05/2021     Lab Results   Component Value Date    HCT 25.1 (L) 02/07/2021    HCT 23.6 (L) 02/06/2021    HCT 20.2 (C) 02/05/2021     Lab Results   Component Value Date    PLT 95 (L) 02/07/2021    PLT 93 (L) 02/06/2021     (L) 02/05/2021           Assessment/Plan:  Reviewed above labs. INR is 1.47.  INR is below goal. No changes in medication list. Concurrent medications include none. Pt did receive dose of warfarin last night.  Will start warfarin 3 mg daily. Rx will continue to follow and adjust dose accordingly.      Fany Byers, PharmD  02/07/21 13:47 CST     "

## 2021-02-07 NOTE — THERAPY TREATMENT NOTE
Acute Care - Speech Language Pathology   Swallow Treatment Note [facility]     Patient Name: Ondina Alanis Sr.  : 1940  MRN: [id number]  Today's Date: 2021               Admit Date: 2021     Goal:  Patient will safely tolerate least restricted diet w/no overt s/s of aspiration for adequate nutrition and hydration:  Pt seen for skilled ST services this date to address oral dysphagia. Pt repositioned to upright in bed and continues to keep eyes closed during session. Pt will respond to SLP's questions w/short responses. Pt consumed puree solids w/complete oral clearance and timely AP transit. Pt consumed thin liquids via straw (120 cc) w/no overt s/s of aspiration. Pt not appropriate for [facility] soft trials at this time d/t decreased level of alertness. Continue current diet and POC; unable to fully educate pt on recommendations d/t cognitive deficits.      Visit Dx:     ICD-10-CM ICD-9-CM   1. Urinary obstruction  N13.9 599.60   2. ANT (acute kidney injury) (CMS/HCC)  N17.9 584.9   3. Pleural effusion on left  J90 511.9   4. Proctitis  K62.89 569.49   5. Oral phase dysphagia  R13.11 787.21   6. Impaired functional mobility, balance, gait, and endurance  Z74.09 V49.89   7. Impaired mobility and activities of daily living  Z74.09 V49.89    Z78.9      Patient Active Problem List   Diagnosis   • CKD (chronic kidney disease) stage 4, GFR 15-29 ml/min (CMS/[facility])   • Diabetic peripheral neuropathy associated with type 2 diabetes mellitus (CMS/[facility])   • Diabetes mellitus type II, uncontrolled (CMS/[facility])   • GERD (gastroesophageal reflux disease)   • Primary osteoarthritis of both knees   • Pulmonary embolism (CMS/HCC)   • BPH (benign prostatic hyperplasia)   • Benign essential hypertension   • Asthma   • Pleurisy   • Acute respiratory failure with hypoxia (CMS/HCC)   • Stage 1 acute kidney injury (CMS/[facility])   • Left ventricular diastolic dysfunction   • Exacerbation of asthma   • COPD exacerbation  (CMS/[facility])   • Atrial flutter (CMS/[facility])   • Acute systolic CHF (congestive heart failure) (CMS/[facility])   • Pneumonia of both lower lobes due to infectious organism   • Urinary obstruction   • Supratherapeutic INR   • Sepsis due to urinary tract infection (CMS/[facility])   • Chronic HFrEF (heart failure with reduced ejection fraction) (CMS/[facility])   • Bradycardia   • Dementia (CMS/[facility])   • Acute blood loss anemia   • MRSA bacteremia     Past Medical History:   Diagnosis Date   • Asthma    • Benign prostatic hyperplasia    • CHF (congestive heart failure) (CMS/[facility])    • Coronary artery disease    • Diabetes mellitus (CMS/[facility])    • GERD (gastroesophageal reflux disease)    • Hyperlipidemia    • Hypertension    • Prostate disorder    • Renal insufficiency    • Urinary tract infection      Past Surgical History:   Procedure Laterality Date   • BACK SURGERY     • KNEE SURGERY Left    • PROSTATE SURGERY          SWALLOW EVALUATION (last 72 hours)      SLP Adult Swallow Evaluation     Row Name 02/07/21 1006 02/06/21 0853                Rehab Evaluation    Document Type  therapy note (daily note)  -CK  evaluation  -CK       Total Evaluation Minutes, SLP  23  -CK  31  -CK       Subjective Information  no complaints  -CK  no complaints  -CK       Patient Observations  lethargic;poorly cooperative  -CK  lethargic;poorly cooperative  -CK       Patient/Family/Caregiver Comments/Observations  No family present at bedside  -CK  No family present at bedside  -CK       Care Plan Review  evaluation/treatment results reviewed;care plan/treatment goals reviewed;risks/benefits reviewed;current/potential barriers reviewed;patient/other agree to care plan no evidence of learning noted d/t cognitive deficits  -CK  evaluation/treatment results reviewed;care plan/treatment goals reviewed;risks/benefits reviewed;current/potential barriers reviewed;patient/other agree to care plan No evidence of learning noted d/t cognitive deficits  -CK       Patient  Effort  poor  -CK  poor  -CK          General Information    Patient Profile Reviewed  yes  -CK  yes  -CK       Pertinent History Of Current Problem  --  Pt admited for urinary retention; pt known to SLP from previous admissions  -CK       Current Method of Nutrition  pureed;thin liquids  -CK  NPO  -CK       Prior Level of Function-Communication  cognitive-linguistic impairment  -CK  cognitive-linguistic impairment  -CK       Prior Level of Function-Swallowing  mechanical soft textures;thin liquids  -CK  mechanical soft textures;thin liquids  -CK       Plans/Goals Discussed with  patient  -CK  patient  -CK       Barriers to Rehab  previous functional deficit;cognitive status  -CK  previous functional deficit;cognitive status  -CK       Patient's Goals for Discharge  --  patient did not state  -CK          Pain    Additional Documentation  --  Pain Scale: FACES Pre/Post-Treatment (Group)  -CK          Pain Scale: FACES Pre/Post-Treatment    Pain: FACES Scale, Pretreatment  0-->no hurt  -CK  0-->no hurt  -CK       Posttreatment Pain Rating  0-->no hurt  -CK  0-->no hurt  -CK          Oral Motor Structure and Function    Dentition Assessment  natural, present and adequate  -CK  natural, present and adequate  -CK       Secretion Management  WNL/WFL  -CK  dried secretions in oral cavity  -CK       Mucosal Quality  dry  -CK  cracked;dry  -CK       Volitional Swallow  unable to elicit  -CK  unable to elicit  -CK       Volitional Cough  unable to elicit  -CK  unable to elicit  -CK          General Eating/Swallowing Observations    Respiratory Support Currently in Use  room air  -CK  room air  -CK       Eating/Swallowing Skills  fed by SLP  -CK  fed by SLP  -CK       Positioning During Eating  upright 90 degree;upright in bed  -CK  upright 90 degree;upright in bed  -CK       Utensils Used  spoon;straw  -CK  spoon;straw  -CK       Consistencies Trialed  thin liquids;pureed  -CK  thin liquids;pureed  -CK          Clinical  Swallow Eval    Oral Prep Phase  WFL for puree  -CK  WFL for puree  -CK       Oral Transit  WFL for puree  -CK  WFL for puree  -CK       Oral Residue  WFL for puree  -CK  WFL for puree  -CK       Pharyngeal Phase  no overt signs/symptoms of pharyngeal impairment  -CK  no overt signs/symptoms of pharyngeal impairment  -CK       Esophageal Phase  unremarkable  -CK  unremarkable  -CK          Clinical Impression    Daily Summary of Progress (SLP)  progress toward functional goals is gradual  -CK  --       Barriers to Overall Progress (SLP)  cognitive status  -CK  previous deficits  -CK       SLP Swallowing Diagnosis  mild;oral dysphagia  -CK  mild;oral dysphagia  -CK       Functional Impact  risk of aspiration/pneumonia;risk of malnutrition;risk of dehydration  -CK  risk of aspiration/pneumonia;risk of malnutrition;risk of dehydration  -CK       Rehab Potential/Prognosis, Swallowing  good, to achieve stated therapy goals  -CK  good, to achieve stated therapy goals  -CK       Swallow Criteria for Skilled Therapeutic Interventions Met  demonstrates skilled criteria  -CK  demonstrates skilled criteria  -CK       Plan for Continued Treatment (SLP)  Continue POC  -CK  Advance diet to puree/thin; initiate POC  -CK          Recommendations    Therapy Frequency (Swallow)  3 days per week;5 days per week  -CK  3 days per week;5 days per week  -CK       Predicted Duration Therapy Intervention (Days)  until discharge  -CK  until discharge  -CK       SLP Diet Recommendation  puree;thin liquids  -CK  puree;thin liquids  -CK       Recommended Precautions and Strategies  upright posture during/after eating;small bites of food and sips of liquid;fatigue precautions;general aspiration precautions;1:1 supervision;assist with feeding  -CK  upright posture during/after eating;small bites of food and sips of liquid;fatigue precautions;general aspiration precautions;1:1 supervision;assist with feeding  -CK       Oral Care Recommendations   Oral Care before breakfast, after meals and PRN  -CK  Oral Care before breakfast, after meals and PRN  -CK       SLP Rec. for Method of Medication Administration  meds crushed;with pudding or applesauce  -CK  meds crushed;with pudding or applesauce  -CK       Monitor for Signs of Aspiration  yes;notify SLP if any concerns  -CK  yes;notify SLP if any concerns  -CK       Anticipated Discharge Disposition (SLP)  unknown  -CK  home with home health  -CK          Swallow Goals (SLP)    Oral Nutrition/Hydration Goal Selection (SLP)  oral nutrition/hydration, SLP goal 1  -CK  oral nutrition/hydration, SLP goal 1  -CK          Oral Nutrition/Hydration Goal 1 (SLP)    Oral Nutrition/Hydration Goal 1, SLP  Pt will safely tolerate least restricted diet w/no overt s/s of aspiration for adequate nutrition and hydration.  -CK  Pt will safely tolerate least restricted diet w/no overt s/s of aspiration for adequate nutrition and hydration.  -CK       Time Frame (Oral Nutrition/Hydration Goal 1, SLP)  by discharge  -CK  by discharge  -CK       Barriers (Oral Nutrition/Hydration Goal 1, SLP)  previous deficits  -CK  previous deficits  -CK       Progress/Outcomes (Oral Nutrition/Hydration Goal 1, SLP)  goal ongoing  -CK  --         User Key  (r) = Recorded By, (t) = Taken By, (c) = Cosigned By    Initials Name Effective Dates    CK Zakia Wagner MS CCC-SLP 02/11/20 -           EDUCATION  The patient has been educated in the following areas:   Dysphagia (Swallowing Impairment) Modified Diet Instruction.    SLP Recommendation and Plan  SLP Swallowing Diagnosis: mild, oral dysphagia  SLP Diet Recommendation: puree, thin liquids  Recommended Precautions and Strategies: upright posture during/after eating, small bites of food and sips of liquid, fatigue precautions, general aspiration precautions, 1:1 supervision, assist with feeding  SLP Rec. for Method of Medication Administration: meds crushed, with pudding or applesauce      Monitor for Signs of Aspiration: yes, notify SLP if any concerns     Swallow Criteria for Skilled Therapeutic Interventions Met: demonstrates skilled criteria  Anticipated Discharge Disposition (SLP): unknown  Rehab Potential/Prognosis, Swallowing: good, to achieve stated therapy goals  Therapy Frequency (Swallow): 3 days per week, 5 days per week  Predicted Duration Therapy Intervention (Days): until discharge     Daily Summary of Progress (SLP): progress toward functional goals is gradual    Plan for Continued Treatment (SLP): Continue POC              Plan of Care Reviewed With: patient  Progress: no change  Outcome Summary: Pt seen for skilled ST services this date to address oral dysphagia.  Pt repositioned to upright in bed and continues to keep eyes closed during session.  Pt will respond to SLP's questions w/short responses.  Pt consumed puree solids w/complete oral clearance and timely AP transit.  Pt consumed thin liquids via straw (120 cc) w/no overt s/s of aspiration.  Pt not appropriate for [facility] soft trials at this time d/t decreased level of alertness.  Continue current diet and POC; unable to fully educate pt on recommendations d/t cognitive deficits.    SLP GOALS     Row Name 02/07/21 1006 02/06/21 0853          Oral Nutrition/Hydration Goal 1 (SLP)    Oral Nutrition/Hydration Goal 1, SLP  Pt will safely tolerate least restricted diet w/no overt s/s of aspiration for adequate nutrition and hydration.  -CK  Pt will safely tolerate least restricted diet w/no overt s/s of aspiration for adequate nutrition and hydration.  -CK     Time Frame (Oral Nutrition/Hydration Goal 1, SLP)  by discharge  -CK  by discharge  -CK     Barriers (Oral Nutrition/Hydration Goal 1, SLP)  previous deficits  -CK  previous deficits  -CK     Progress/Outcomes (Oral Nutrition/Hydration Goal 1, SLP)  goal ongoing  -CK  --       User Key  (r) = Recorded By, (t) = Taken By, (c) = Cosigned By    Initials Name Provider Type    CK  Zakia Wagner MS CCC-SLP Speech and Language Pathologist             Time Calculation:   Time Calculation- SLP     Row Name 02/07/21 1029             Time Calculation- SLP    SLP Start Time  1006  -CK      SLP Stop Time  1029  -CK      SLP Time Calculation (min)  23 min  -CK      Total Timed Code Minutes- SLP  1029 minute(s)  -CK      SLP Received On  02/07/21  -      SLP Goal Re-Cert Due Date  02/20/21  -        User Key  (r) = Recorded By, (t) = Taken By, (c) = Cosigned By    Initials Name Provider Type     Zakia Wagner MS CCC-SLP Speech and Language Pathologist          Therapy Charges for Today     Code Description Service Date Service Provider Modifiers Qty    88365651066 HC ST EVAL ORAL PHARYNG SWALLOW 2 2/6/2021 Zakia Wagner MS CCC-SLP GN 1    [id number] HC ST TREATMENT SWALLOW 2 2/7/2021 Zakia Wagner, MS CCC-SLP GN 1               Zakia Wagner MS CCC-GEORGE  2/7/2021

## 2021-02-07 NOTE — PROGRESS NOTES
"   LOS: 2 days   Patient Care Team:  Mark Sheldon APRN as PCP - General (Nurse Practitioner)    Subjective     Subjective:  Symptoms:  Stable.  (Clear yellow urine).    Pain:  He reports no pain.        History taken from: patient chart    Objective     Vital Signs  Temp:  [97.1 °F (36.2 °C)-98.3 °F (36.8 °C)] 98 °F (36.7 °C)  Heart Rate:  [] 64  Resp:  [16-20] 16  BP: (133-175)/(66-88) 175/75    Objective:  General Appearance:  In no acute distress.    Vital signs: (most recent): Blood pressure 175/75, pulse 64, temperature 98 °F (36.7 °C), temperature source Oral, resp. rate 16, height 182.9 cm (72.01\"), weight 96.4 kg (212 lb 8 oz), SpO2 97 %.  Vital signs are normal.    Output: Producing urine (Moore).    Lungs:  Normal effort.    Chest: Symmetric chest wall expansion.   Abdomen: Abdomen is soft and non-distended.  There is no abdominal tenderness.     Neurological: Patient is alert.    Skin:  Warm and dry.              Results Review:    Lab Results (last 24 hours)     Procedure Component Value Units Date/Time    POC Glucose Once [[id number]]  (Abnormal) Collected: 02/07/21 1046    Specimen: Blood Updated: 02/07/21 1106     Glucose 153 mg/dL      Comment: RN NotifiedOperator: [id number] JANIS Bone ID: [id number]       Protime-INR [[id number]]  (Abnormal) Collected: 02/07/21 0618    Specimen: Blood Updated: 02/07/21 0745     Protime 18.6 Seconds      INR 1.47    Narrative:      Therapeutic range for most indications is 2.0-3.0 INR,  or 2.5-3.5 for mechanical heart valves.    Basic Metabolic Panel [[id number]]  (Abnormal) Collected: 02/07/21 0618    Specimen: Blood Updated: 02/07/21 0741     Glucose 87 mg/dL       mg/dL      Creatinine 4.35 mg/dL      Sodium 140 mmol/L      Potassium 3.8 mmol/L      Chloride 104 mmol/L      CO2 23.0 mmol/L      Calcium 8.4 mg/dL      eGFR  African Amer 16 mL/min/1.73      BUN/Creatinine Ratio 24.4     Anion Gap 13.0 mmol/L     Narrative:      GFR Normal " >60  Chronic Kidney Disease <60  Kidney Failure <15      Vancomycin, Random [[id number]]  (Normal) Collected: 02/07/21 0618    Specimen: Blood Updated: 02/07/21 0733     Vancomycin Random 9.60 mcg/mL     CBC & Differential [[id number]]  (Abnormal) Collected: 02/07/21 0618    Specimen: Blood Updated: 02/07/21 0718    Narrative:      The following orders were created for panel order CBC & Differential.  Procedure                               Abnormality         Status                     ---------                               -----------         ------                     Scan Slide[[id number]]                                                                  CBC Auto Differential[[id number]]        Abnormal            Final result                 Please view results for these tests on the individual orders.    CBC Auto Differential [[id number]]  (Abnormal) Collected: 02/07/21 0618    Specimen: Blood Updated: 02/07/21 0718     WBC 12.46 10*3/mm3      RBC 3.20 10*6/mm3      Hemoglobin 8.8 g/dL      Hematocrit 25.1 %      MCV 78.4 fL      MCH 27.5 pg      MCHC 35.1 g/dL      RDW 15.5 %      RDW-SD 44.5 fl      MPV --     Comment: Instrument unable to calculate mpv        Platelets 95 10*3/mm3      Neutrophil % 86.3 %      Lymphocyte % 7.1 %      Monocyte % 4.3 %      Eosinophil % 0.7 %      Basophil % 0.3 %      Immature Grans % 1.3 %      Neutrophils, Absolute 10.75 10*3/mm3      Lymphocytes, Absolute 0.89 10*3/mm3      Monocytes, Absolute 0.53 10*3/mm3      Eosinophils, Absolute 0.09 10*3/mm3      Basophils, Absolute 0.04 10*3/mm3      Immature Grans, Absolute 0.16 10*3/mm3      nRBC 0.0 /100 WBC     Blood Culture - Blood, Arm, Left [[id number]]  (Abnormal) Collected: 02/04/21 2130    Specimen: Blood from Arm, Left Updated: 02/07/21 0651     Blood Culture Staphylococcus aureus, MRSA     Comment: Infectious disease consultation is highly recommended to rule out distant foci of infection.  Methicillin resistant  Staphylococcus aureus, Patient may be an isolation risk.        Isolated from Aerobic and Anaerobic Bottles     Gram Stain Aerobic Bottle Gram positive cocci in clusters     Comment: Second bottle positive of four drawn         Anaerobic Bottle Gram positive cocci in clusters     Comment: Forth bottle positive of four drawn; all same organism       Blood Culture - Blood, Arm, Left [[id number]]  (Abnormal) Collected: 02/04/21 2206    Specimen: Blood from Arm, Left Updated: 02/07/21 0651     Blood Culture Staphylococcus aureus, MRSA     Comment: Infectious disease consultation is highly recommended to rule out distant foci of infection.  Methicillin resistant Staphylococcus aureus, Patient may be an isolation risk.        Isolated from Aerobic and Anaerobic Bottles     Gram Stain Anaerobic Bottle Gram positive cocci in clusters     Comment: One bottle positive of four drawn         Aerobic Bottle Gram positive cocci in clusters     Comment: Third bottle positive of four drawn       POC Glucose Once [[id number]]  (Normal) Collected: 02/07/21 0620    Specimen: Blood Updated: 02/07/21 0643     Glucose 89 mg/dL      Comment: : [id number] MEZA JENNIFERMeter ID: [id number]       POC Glucose Once [[id number]]  (Normal) Collected: 02/06/21 1934    Specimen: Blood Updated: 02/06/21 1959     Glucose 78 mg/dL      Comment: RN NotifiedOperator: [id number] RoboDynamicsNIFERMeter ID: [id number]       POC Glucose Once [[id number]]  (Abnormal) Collected: 02/06/21 1611    Specimen: Blood Updated: 02/06/21 1627     Glucose 143 mg/dL      Comment: RN NotifiedOperator: [id number] ELIER JUAREZCartoDBMeter ID: [id number]       POC Glucose Once [[id number]]  (Normal) Collected: 02/06/21 1155    Specimen: Blood Updated: 02/06/21 1626     Glucose 88 mg/dL      Comment: : [id number] ELIER FantastecINAMeter ID: [id number]       POC Glucose Once [[id number]]  (Abnormal) Collected: 02/06/21 1056    Specimen: Blood Updated: 02/06/21  1626     Glucose 65 mg/dL      Comment: RN NotifiedOperator: [id number] ELIER Felix ID: [id number]       Urine Culture - Urine, Urine, Catheter [[id number]]  (Normal) Collected: 02/05/21 1043    Specimen: Urine, Catheter Updated: 02/06/21 1340     Urine Culture No growth         Imaging Results (Last 24 Hours)     ** No results found for the last 24 hours. **           I reviewed the patient's new clinical results.  I reviewed the patient's new imaging results and agree with the interpretation.  I reviewed the patient's other test results and agree with the interpretation      Assessment/Plan       Urinary obstruction    CKD (chronic kidney disease) stage 4, GFR 15-29 ml/min (CMS/[facility])    Diabetic peripheral neuropathy associated with type 2 diabetes mellitus (CMS/[facility])    Benign essential hypertension    COPD exacerbation (CMS/[facility])    Supratherapeutic INR    Sepsis due to urinary tract infection (CMS/[facility])    Chronic HFrEF (heart failure with reduced ejection fraction) (CMS/[facility])    Bradycardia    Dementia (CMS/[facility])    Acute blood loss anemia    MRSA bacteremia      Assessment & Plan    Consulted to Urology for urinary retention unable to be catheterized, patient usually has chronic Moore related to MENENDEZ and history of CAP on Lupron therapy,  POD #2 cystoscopy and Moore placement with Dr. 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 Had supra therapeutic INR taking Coumadin due to history of AFib, ANT on CKD baseline Cr about 2.5. Anemia is chronic with no acute changes at present. Being treated for sepsis due to UTI and bacteremia present.   -WBC 17.98-->14.84-->13.86-->12.46  -Lactate 2.2-->2.1  -Blood culture 2/4/21 Staph aureus in blood culture with mecA gene detected, on vancomycin. Urine culture IP. On Rocephin as well.   -Estimated Creatinine Clearance: 16.6 mL/min (A) (by C-G formula based on SCr of 4.35 mg/dL (H)).  -Cr 4.89-->4.89-->4.74-->4.35  -24 hour urine output  300 mL-->700 mL    Plan:  Keep Moore chronic.     Ihsan Marion, APRN  02/07/21  12:42 CST

## 2021-02-07 NOTE — PROGRESS NOTES
"  Pharmacokinetics by Pharmacy - Vancomycin    Ondina Alanis Sr. is a 80 y.o. male   [Ht: 182.9 cm (72.01\"); Wt: 96.4 kg (212 lb 8 oz)]    Estimated Creatinine Clearance: 16.6 mL/min (A) (by C-G formula based on SCr of 4.35 mg/dL (H)).   Creatinine   Date Value Ref Range Status   02/07/2021 4.35 (H) 0.76 - 1.27 mg/dL Final   02/06/2021 4.74 (H) 0.76 - 1.27 mg/dL Final   02/05/2021 4.89 (H) 0.76 - 1.27 mg/dL Final   02/15/2018 2.4 (H) 0.6 - 1.3 mg/dL Final      Lab Results   Component Value Date    WBC 12.46 (H) 02/07/2021    WBC 13.86 (H) 02/06/2021    WBC 14.84 (H) 02/05/2021      Temp Readings from Last 1 Encounters:   02/07/21 98 °F (36.7 °C) (Oral)      C-Reactive Protein   Date Value Ref Range Status   09/23/2020 3.63 (H) 0.00 - 0.50 mg/dL Final     Lactate   Date Value Ref Range Status   02/05/2021 2.1 (C) 0.5 - 2.0 mmol/L Final   02/04/2021 2.2 (C) 0.5 - 2.0 mmol/L Final   09/24/2020 1.4 0.5 - 2.0 mmol/L Final       Indication for use: bacteremia, MRSA     Baseline culture results:  Microbiology Results (last 10 days)       Procedure Component Value - Date/Time    Urine Culture - Urine, Urine, Catheter [[id number]]  (Normal) Collected: 02/05/21 1043    Lab Status: Final result Specimen: Urine, Catheter Updated: 02/06/21 1340     Urine Culture No growth    Blood Culture - Blood, Arm, Left [[id number]]  (Abnormal) Collected: 02/04/21 2206    Lab Status: Preliminary result Specimen: Blood from Arm, Left Updated: 02/07/21 0651     Blood Culture Staphylococcus aureus, MRSA     Comment: Infectious disease consultation is highly recommended to rule out distant foci of infection.  Methicillin resistant Staphylococcus aureus, Patient may be an isolation risk.        Isolated from Aerobic and Anaerobic Bottles     Gram Stain Anaerobic Bottle Gram positive cocci in clusters     Comment: One bottle positive of four drawn         Aerobic Bottle Gram positive cocci in clusters     Comment: Third bottle positive of " four drawn       Blood Culture ID, PCR - Blood, Arm, Left [[id number]]  (Abnormal) Collected: 02/04/21 2206    Lab Status: Final result Specimen: Blood from Arm, Left Updated: 02/05/21 2159     BCID, PCR Staphylococcus aureus. mecA (methicillin resistance gene) detected. Identification by BCID PCR.    Narrative:      Sensitivity to Follow    Blood Culture - Blood, Arm, Left [[id number]]  (Abnormal) Collected: 02/04/21 2130    Lab Status: Preliminary result Specimen: Blood from Arm, Left Updated: 02/07/21 0651     Blood Culture Staphylococcus aureus, MRSA     Comment: Infectious disease consultation is highly recommended to rule out distant foci of infection.  Methicillin resistant Staphylococcus aureus, Patient may be an isolation risk.        Isolated from Aerobic and Anaerobic Bottles     Gram Stain Aerobic Bottle Gram positive cocci in clusters     Comment: Second bottle positive of four drawn         Anaerobic Bottle Gram positive cocci in clusters     Comment: Forth bottle positive of four drawn; all same organism       COVID-19 and FLU A/B PCR - Swab, Nasopharynx [[id number]]  (Normal) Collected: 02/04/21 2001    Lab Status: Final result Specimen: Swab from Nasopharynx Updated: 02/04/21 2032     COVID19 Not Detected     Influenza A PCR Not Detected     Influenza B PCR Not Detected    Narrative:      Fact sheet for providers: https://www.fda.gov/media/223333/download    Fact sheet for patients: https://www.fda.gov/media/475911/download    Test performed by PCR.          No results found for: RESPCX    Assessment/Plan  Vancomycin 1500 mg IVPB x 1 dose was started 2/6/21 at 0013.  Pt was also started on Ceftriaxone for UTI.  Reviewed above labs and cultures.   Urine level -No growth  Need repeat BC  Random Vancomycin level was ordered for today at 1200.  Level was drawn this morning at 0618 and was 9.6  WBC is 12.46, decreasing and Cr is 4.35, slight improvement.   Recalculated AUC dosing.   Will dose at 1000 mg IV  every 36 hours and ordered a peak after today dose at 1600 and a trough level 2/9 at 0130.   Calculated AUC is 494.37 (goal 400-600), calculated trough level is 14.21  (goal 10-20) and calculated peak level is 28.6 (goal 30-40).  Pharmacy will monitor renal function and adjust dose accordingly.    Fany Byers, PharmD  02/07/21 13:18 CST

## 2021-02-07 NOTE — PLAN OF CARE
Goal Outcome Evaluation:  Plan of Care Reviewed With: patient  Progress: no change  Outcome Summary: Pt seen for skilled ST services this date to address oral dysphagia.  Pt repositioned to upright in bed and continues to keep eyes closed during session.  Pt will respond to SLP's questions w/short responses.  Pt consumed puree solids w/complete oral clearance and timely AP transit.  Pt consumed thin liquids via straw (120 cc) w/no overt s/s of aspiration.  [facility] not [facility] soft trials at this time d/t decreased level of alertness.  Continue current diet and POC; unable to fully educate pt on recommendations d/t cognitive deficits.

## 2021-02-07 NOTE — PROGRESS NOTES
"[facility]  [address] [place] [address]. [id number]  T - [phone]  F - [phone]     Progress Note          PATIENT  DEMOGRAPHICS   PATIENT NAME: Ondina Alanis .                      PHYSICIAN: Marley Akhtar MD  : 1940  MRN: [id number]   LOS: 2 days    Patient Care Team:  Mark Sheldon APRN as PCP - General (Nurse Practitioner)  Subjective   SUBJECTIVE   Resting no distress, UO some better         Objective   OBJECTIVE   Vital Signs  Temp:  [97.1 °F (36.2 °C)-98.3 °F (36.8 °C)] 98 °F (36.7 °C)  Heart Rate:  [] 64  Resp:  [16-20] 16  BP: (133-175)/(66-88) 175/75    Flowsheet Rows      First Filed Value   Admission Height  182.9 cm (72\") Documented at 2021   Admission Weight  96.2 kg (212 lb) Documented at 2021           I/O last 3 completed shifts:  In: -   Out: 800 [Urine:800]    PHYSICAL EXAM    Physical Exam  Constitutional:       Appearance: He is well-developed.   HENT:      Head: Normocephalic.   Eyes:      Pupils: Pupils are equal, round, and reactive to light.   Cardiovascular:      Rate and Rhythm: Normal rate and regular rhythm.      Heart sounds: Normal heart sounds.   Pulmonary:      Effort: Pulmonary effort is normal.      Breath sounds: Normal breath sounds.   Abdominal:      General: Bowel sounds are normal.      Palpations: Abdomen is soft.   Musculoskeletal:         General: No swelling.   Skin:     General: Skin is warm.   Neurological:      General: No focal deficit present.      Mental Status: He is alert.         RESULTS   Results Review:    Results from last 7 days   Lab Units 21  0618 21  0634 21  0622 21   SODIUM mmol/L 140 140 141 138   POTASSIUM mmol/L 3.8 4.2 4.6 5.1   CHLORIDE mmol/L 104 105 103 101   CO2 mmol/L 23.0 21.0* 20.0* 22.0   BUN mg/dL 106* 111* 106* 110*   CREATININE mg/dL 4.35* 4.74* 4.89* 4.89*   CALCIUM mg/dL 8.4* 8.1* 7.7* 8.2*   BILIRUBIN mg/dL  --   --   --  0.5 "   ALK PHOS U/L  --   --   --  80   ALT (SGPT) U/L  --   --   --  95*   AST (SGOT) U/L  --   --   --  135*   GLUCOSE mg/dL 87 89 94 102*       Estimated Creatinine Clearance: 16.6 mL/min (A) (by C-G formula based on SCr of 4.35 mg/dL (H)).                Results from last 7 days   Lab Units 02/07/21  0618 02/06/21  0634 02/05/21  1203 02/05/21 0622 02/04/21 2008   WBC 10*3/mm3 12.46* 13.86*  --  14.84* 17.98*   HEMOGLOBIN g/dL 8.8* 8.4* 7.0* 7.1* 8.5*   PLATELETS 10*3/mm3 95* 93*  --  110* 140       Results from last 7 days   Lab Units 02/07/21  0618 02/06/21  0634 02/05/21 0622 02/04/21 2008   INR  1.47* 1.54* 2.29* 7.06*         Imaging Results (Last 24 Hours)     ** No results found for the last 24 hours. **           MEDICATIONS    atorvastatin, 40 mg, Oral, Nightly  budesonide, 0.5 mg, Nebulization, BID - RT  cefTRIAXone, 1 g, Intravenous, Q24H  hydrALAZINE, 25 mg, Oral, Q8H  insulin aspart, 0-7 Units, Subcutaneous, TID AC  insulin detemir, 10 Units, Subcutaneous, Q12H  isosorbide mononitrate, 30 mg, Oral, Q24H  pantoprazole, 40 mg, Oral, QAM  [MAR Hold] sodium chloride, 10 mL, Intravenous, Q12H      Pharmacy to dose vancomycin,   sodium bicarbonate drip less than 75 mEq/bag, 75 mEq, Last Rate: 75 mEq (02/07/21 1045)        Assessment/Plan   ASSESSMENT / PLAN      Urinary obstruction    CKD (chronic kidney disease) stage 4, GFR 15-29 ml/min (CMS/[facility])    Diabetic peripheral 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  CKD 3/4- Baseline creatinine used to be around 2.0, and now up to 2.9-3.0 in the last few readings.  It peaked to 4.9. cr down to 4.3.  keep gentle hydration, no orthopnea.  ANT  likely due to post renal obstruction.  He has now Moore catheter placed under cystoscopy.     2.  Hypertension- BP now high and increase hydralazine to 50mg tid     3.  CHF- systolic heart failure EF 20%. severe LV systolic dysfunction with RV systolic pressure of 60 mmHg,      4.  A. Fib on amiodarone and on coumadin/ hematuria -off Coumadin for now.  INR is better, s/p 1 unit of packed RBC    5. MRSA bacteremia now on vancomycin. Plan for TTE to look the source. Had previous + mrsa October 2020                This document has been electronically signed by Marley Akhtar MD on February 7, 2021 11:31 CST

## 2021-02-07 NOTE — PROGRESS NOTES
[facility] Medicine Services  INPATIENT PROGRESS NOTE    Length of Stay: 2  Date of Admission: 2/4/2021  Primary Care Physician: Mark Sheldon APRN    Subjective   Chief Complaint: Urinary retention  HPI:  80 year old male with a history of BPH, DM, HTN, HLD, CAD, asthma, PE on chronic anticoagulation with coumadin, and CKD 4 who presented with 24 hours without urinating.  Burleson was unable to be placed in the ED.  Urology attempted to place in the ED as well unsuccessfully.  He developed bleeding.  INR was 7.0. He received FFP and vitamin K.  He underwent cystoscopy and burleson placement.  Creatinine has worsened from his baseline of approximately 2.0 to 4.89 with a BUN of 109. Hgb was 7.0.  He received 1 unit PRBC and H&H and BP have improved.  Blood cultures are positive for MRSA.  Creatinine is improving.  No new complaints or overnight events.     Review of Systems   Constitutional: Negative for chills and fever.   Respiratory: Negative for shortness of breath.    Cardiovascular: Negative for chest pain.   Gastrointestinal: Negative for abdominal pain, nausea and vomiting.     All pertinent negatives and positives are as above. All other systems have been reviewed and are negative unless otherwise stated.     Objective    Temp:  [97.1 °F (36.2 °C)-98.3 °F (36.8 °C)] 98 °F (36.7 °C)  Heart Rate:  [] 64  Resp:  [16-20] 16  BP: (133-175)/(66-88) 175/75    Physical Exam  Vitals signs reviewed.   Constitutional:       Appearance: Normal appearance.      Comments: More alert   HENT:      Head: Normocephalic and atraumatic.   Cardiovascular:      Rate and Rhythm: Normal rate and regular rhythm.   Pulmonary:      Effort: Pulmonary effort is normal.      Breath sounds: Normal breath sounds.   Abdominal:      General: There is no distension.      Palpations: Abdomen is soft.      Tenderness: There is no abdominal tenderness.   Genitourinary:     Comments: Burleson, dark  yellow, no blood or clots  Musculoskeletal:         General: No swelling or deformity.   Skin:     General: Skin is warm and dry.   Neurological:      General: No focal deficit present.      Mental Status: He is alert and easily aroused. Mental status is at baseline.   Psychiatric:         Behavior: Behavior is cooperative.       Results Review:  I have reviewed the labs, radiology results, and diagnostic studies.    Laboratory Data:   Results from last 7 days   Lab Units 02/07/21  0618 02/06/21  0634 02/05/21 0622 02/04/21 2008   SODIUM mmol/L 140 140 141 138   POTASSIUM mmol/L 3.8 4.2 4.6 5.1   CHLORIDE mmol/L 104 105 103 101   CO2 mmol/L 23.0 21.0* 20.0* 22.0   BUN mg/dL 106* 111* 106* 110*   CREATININE mg/dL 4.35* 4.74* 4.89* 4.89*   GLUCOSE mg/dL 87 89 94 102*   CALCIUM mg/dL 8.4* 8.1* 7.7* 8.2*   BILIRUBIN mg/dL  --   --   --  0.5   ALK PHOS U/L  --   --   --  80   ALT (SGPT) U/L  --   --   --  95*   AST (SGOT) U/L  --   --   --  135*   ANION GAP mmol/L 13.0 14.0 18.0* 15.0     Estimated Creatinine Clearance: 16.6 mL/min (A) (by C-G formula based on SCr of 4.35 mg/dL (H)).          Results from last 7 days   Lab Units 02/07/21 0618 02/06/21  0634 02/05/21  1203 02/05/21 0622 02/04/21 2008   WBC 10*3/mm3 12.46* 13.86*  --  14.84* 17.98*   HEMOGLOBIN g/dL 8.8* 8.4* 7.0* 7.1* 8.5*   HEMATOCRIT % 25.1* 23.6* 20.2* 22.0* 24.3*   PLATELETS 10*3/mm3 95* 93*  --  110* 140     Results from last 7 days   Lab Units 02/07/21  0618 02/06/21  0634 02/05/21 0622 02/04/21 2008   INR  1.47* 1.54* 2.29* 7.06*       Culture Data:   Blood Culture   Date Value Ref Range Status   02/04/2021 Staphylococcus aureus, MRSA (C)  Preliminary     Comment:     Infectious disease consultation is highly recommended to rule out distant foci of infection.  Methicillin resistant Staphylococcus aureus, Patient may be an isolation risk.   02/04/2021 Staphylococcus aureus, MRSA (C)  Preliminary     Comment:     Infectious disease  consultation is highly recommended to rule out distant foci of infection.  Methicillin resistant Staphylococcus aureus, Patient may be an isolation risk.     Urine Culture   Date Value Ref Range Status   02/05/2021 No growth  Final     No results found for: RESPCX  No results found for: WOUNDCX  No results found for: STOOLCX  No components found for: BODYFLD    Radiology Data:   Imaging Results (Last 24 Hours)     ** No results found for the last 24 hours. **          I have reviewed the patient's current medications.     Assessment/Plan     Active Hospital Problems    Diagnosis   • **Urinary obstruction   • MRSA bacteremia   • Acute blood loss anemia   • Supratherapeutic INR   • Sepsis due to urinary tract infection (CMS/[facility])   • Chronic HFrEF (heart failure with reduced ejection fraction) (CMS/[facility])     -last TTE 8/2020, 20%     • Bradycardia   • Dementia (CMS/[facility])   • COPD exacerbation (CMS/[facility])   • CKD (chronic kidney disease) stage 4, GFR 15-29 ml/min (CMS/[facility])   • Diabetic peripheral neuropathy associated with type 2 diabetes mellitus (CMS/[facility])   • Benign essential hypertension   Rectal wall thickening on CT    Plan:    S/P Cystoscopy and Moore placement  Urology consultation appreciated  Rocephin day 3  Vancomycin day 2, will need 14 days from first negative culture  Repeat cultures in the AM   Echocardiogram, was canceled yesterday, will reorder  IV fluid: Off bicarb drip, NS 75 ml/hr  Nephrology consultation appreciated  S/P 1 unit PRBC, follow H&H, transfuse if Hgb <7  BP control: hydralazine/imdur, PRN hydralazine  No Beta-blocker due to bradycardia  No ACEI/ARB due to renal disease  Glucose control: Levemir, SSI  PT/OT/SLP  Resume coumadin  Discharge planning: will need GI referral for colonoscopy for evaluation of rectal wall thickening  VTE PPx: coumadin    The patient was evaluated during the global COVID-19 pandemic, and the diagnosis was suspected/considered upon their initial presentation.   Evaluation, treatment, and testing were consistent with current guidelines for patients who present with complaints or symptoms that may be related to COVID-19.        This document has been electronically signed by THIERNO Griffin on February 7, 2021 13:07 CST

## 2021-02-08 NOTE — THERAPY TREATMENT NOTE
Acute Care - Physical Therapy Treatment Note  [facility]     Patient Name: Ondina Alanis Sr.  : 1940  MRN: [id number]  Today's Date: 2021           PT Assessment (last 12 hours)      PT Evaluation and Treatment     [facility] Name 21 1425 21 1053       Physical Therapy Time and Intention    Subjective Information  no complaints  -RW  --    Document Type  therapy note (daily note)  -RW  therapy note (daily note)  -YARITZA    Mode of Treatment  physical therapy  -RW  --    Patient Effort  fair  -RW  poor  -YARITZA    Comment  spouse in room reports '' he doesnt get oob at home''  -  --    Row Name 21 1425          General Information    Patient Profile Reviewed  yes  -RW     Existing Precautions/Restrictions  fall  -[facility] Name 21 142          Living Environment    Lives With  spouse  -RW     Row Name 21 142          Vision Assessment/Intervention    Visual Impairment/Limitations  legally blind  -[facility] Name 21 142          Cognition    Orientation Status (Cognition)  oriented to;person  -[facility] Name 21 142          Pain Scale: Numbers Pre/Post-Treatment    Pretreatment Pain Rating  6/10  -     Posttreatment Pain Rating  6/10  -[facility] Name 21 1425          Pain Scale: Word Pre/Post-Treatment    Pain Location - Orientation  generalized  -[facility] Name 21 1053          Pain Scale: FACES Pre/Post-Treatment    Pain: FACES Scale, Pretreatment  0-->no hurt  -YARITZA     Posttreatment Pain Rating  0-->no hurt  -YARITZA     [facility] Name 21 142          Bed Mobility    Comment (Bed Mobility)  dep to scoot upper body over in bed  -[facility] Name 21 142          Transfers    Comment (Transfers)  none  -[facility] Name 21 142          Safety Issues, Functional Mobility    Impairments Affecting Function (Mobility)  balance;coordination;endurance/activity tolerance;motor planning;pain;strength;sensation/sensory awareness;range of motion  (ROM)  -     Row Name 02/08/21 1425          Motor Skills    Therapeutic Exercise  hip;knee;ankle  -     Row Name 02/08/21 1425          Hip (Therapeutic Exercise)    Hip (Therapeutic Exercise)  AAROM (active assistive range of motion)  -RW     Hip AAROM (Therapeutic Exercise)  bilateral;flexion;extension;aBduction;aDduction;external rotation;internal rotation;supine;10 repetitions;2 sets  -     Row Name 02/08/21 1425          Knee (Therapeutic Exercise)    Knee (Therapeutic Exercise)  AAROM (active assistive range of motion)  -RW     Knee AAROM (Therapeutic Exercise)  bilateral;flexion;extension;10 repetitions;2 sets;supine aa-prom  -     Row Name 02/08/21 1425          Ankle (Therapeutic Exercise)    Ankle (Therapeutic Exercise)  AAROM (active assistive range of motion) hc stretch  -RW     Ankle AAROM (Therapeutic Exercise)  bilateral;dorsiflexion;plantarflexion;10 repetitions;2 sets;supine  -     Row Name 02/08/21 1425          Positioning and Restraints    Pre-Treatment Position  in bed  -RW     Post Treatment Position  bed  -RW     In Bed  call light within reach;with family/caregiver  -     Row Name 02/08/21 1425          Therapy Assessment/Plan (PT)    Rehab Potential (PT)  fair, will monitor progress closely  -RW     Criteria for Skilled Interventions Met (PT)  yes;meets criteria  -     Row Name 02/08/21 1425          Progress Summary (PT)    Progress Toward Functional Goals (PT)  progress toward functional goals is gradual  -RW       User Key  (r) = Recorded By, (t) = Taken By, (c) = Cosigned By    Initials Name Provider Type    Rajendra Lafleur, PTA Physical Therapy Assistant    Luis Olivares, KUSH Physical Therapy Assistant        Physical Therapy Education                 Title: PT OT SLP Therapies (In Progress)     Topic: Physical Therapy (In Progress)     Point: Mobility training (Done)     Learning Progress Summary           Patient Acceptance, E, VU by KW at 2/6/2021 8368     Comment: Role of PT, POC, mobility/ changing positions to decrease back pain                   Point: Home exercise program (Not Started)     Learner Progress:  Not documented in this visit.          Point: Body mechanics (Not Started)     Learner Progress:  Not documented in this visit.          Point: Precautions (Done)     Learning Progress Summary           Patient Acceptance, E, VU by RONNIE at 2/6/2021 0556    Comment: Role of PT, POC, mobility/ changing positions to decrease back pain                               User Key     Initials Effective Dates Name Provider Type Discipline    RONNIE 08/09/20 -  Rochelle Rudd, PT Physical Therapist PT              PT Recommendation and Plan  Anticipated Discharge Disposition (PT): skilled nursing facility, home with 24/7 care, home with home health(pending progress in acute care)  Therapy Frequency (PT): 5 times/wk(6-11x/week)  Progress Summary (PT)  Progress Toward Functional Goals (PT): progress toward functional goals is gradual  Plan of Care Reviewed With: patient  Progress: no change  Outcome Summary: pt agreeable as is spouse to whatever is tolerated. spouse reports that pt does not get oob at home. tolertates sup therex aa-prom as well as hc stretchs. very limited rom tolerated.  Outcome Measures     Row Name 02/06/21 0940             How much help from another is currently needed...    Putting on and taking off regular lower body clothing?  1  -RB      Bathing (including washing, rinsing, and drying)  2  -RB      Toileting (which includes using toilet bed pan or urinal)  1  -RB      Putting on and taking off regular upper body clothing  2  -RB      Taking care of personal grooming (such as brushing teeth)  2  -RB      Eating meals  2  -RB      AM-PAC 6 Clicks Score (OT)  10  -RB         Functional Assessment    Outcome Measure Options  AM-PAC 6 Clicks Daily Activity (OT)  -RB        User Key  (r) = Recorded By, (t) = Taken By, (c) = Cosigned By    Initials Name  Provider Type    RB Romero Munguia, VINCENT Occupational Therapist           Time Calculation:   PT Charges     Row Name 02/08/21 1514             Time Calculation    Start Time  1425  -RW      Stop Time  1450  -RW      Time Calculation (min)  25 min  -RW         Time Calculation- PT    Total Timed Code Minutes- PT  25 minute(s)  -RW        User Key  (r) = Recorded By, (t) = Taken By, (c) = Cosigned By    Initials Name Provider Type    RW Luis Hobbs PTA Physical Therapy Assistant        Therapy Charges for Today     Code Description Service Date Service Provider Modifiers Qty    [id number] HC PT THER PROC EA 15 MIN 2/8/2021 Luis Hobbs PTA GP 2          PT G-Codes  Outcome Measure Options: AM-PAC 6 Clicks Basic Mobility (PT)  AM-PAC 6 Clicks Score (PT): 7  AM-PAC 6 Clicks Score (OT): 10    Luis Hobbs PTA  2/8/2021

## 2021-02-08 NOTE — PLAN OF CARE
Goal Outcome Evaluation:  Plan of Care Reviewed With: patient  Progress: no change  Outcome Summary: pt agreeable as is spouse to whatever is tolerated. spouse reports that pt does not get oob at home. tolertates sup therex aa-prom as well as hc stretchs. very limited rom tolerated.

## 2021-02-08 NOTE — PROGRESS NOTES
"Pharmacokinetics by Pharmacy - Vancomycin    Ondina Alanis . is a 80 y.o. male receiving vancomycin 1000mg IV Q36H day 3 for bacteremia/MRSA  Patient is also receiving ceftriaxone    Objective:  [Ht: 182.9 cm (72.01\"); Wt: 95.5 kg (210 lb 9.6 oz)]     WBC   Date Value Ref Range Status   02/08/2021 8.84 3.40 - 10.80 10*3/mm3 Final   02/07/2021 12.46 (H) 3.40 - 10.80 10*3/mm3 Final   02/06/2021 13.86 (H) 3.40 - 10.80 10*3/mm3 Final   02/15/2018 7.9 4.0 - 11.0 10*3/uL Final      C-Reactive Protein   Date Value Ref Range Status   09/23/2020 3.63 (H) 0.00 - 0.50 mg/dL Final     Lactate   Date Value Ref Range Status   02/05/2021 2.1 (C) 0.5 - 2.0 mmol/L Final   02/04/2021 2.2 (C) 0.5 - 2.0 mmol/L Final   09/24/2020 1.4 0.5 - 2.0 mmol/L Final      Temp Readings from Last 1 Encounters:   02/08/21 98.1 °F (36.7 °C) (Axillary)     Estimated Creatinine Clearance: 19.8 mL/min (A) (by C-G formula based on SCr of 3.62 mg/dL (H)).   Creatinine   Date Value Ref Range Status   02/08/2021 3.62 (H) 0.76 - 1.27 mg/dL Final   02/07/2021 4.35 (H) 0.76 - 1.27 mg/dL Final   02/06/2021 4.74 (H) 0.76 - 1.27 mg/dL Final   02/15/2018 2.4 (H) 0.6 - 1.3 mg/dL Final       Vancomycin Peak   Date Value Ref Range Status   02/07/2021 24.80 20.00 - 40.00 mcg/mL Final     Vancomycin Random   Date Value Ref Range Status   02/07/2021 9.60 5.00 - 40.00 mcg/mL Final   09/25/2020 11.50 5.00 - 40.00 mcg/mL Final       Culture Results:  Microbiology Results (last 10 days)       Procedure Component Value - Date/Time    Urine Culture - Urine, Urine, Catheter [[id number]]  (Normal) Collected: 02/05/21 1043    Lab Status: Final result Specimen: Urine, Catheter Updated: 02/06/21 1340     Urine Culture No growth    Blood Culture - Blood, Arm, Left [[id number]]  (Abnormal) Collected: 02/04/21 2206    Lab Status: Preliminary result Specimen: Blood from Arm, Left Updated: 02/07/21 0651     Blood Culture Staphylococcus aureus, MRSA     Comment: Infectious " disease consultation is highly recommended to rule out distant foci of infection.  Methicillin resistant Staphylococcus aureus, Patient may be an isolation risk.        Isolated from Aerobic and Anaerobic Bottles     Gram Stain Anaerobic Bottle Gram positive cocci in clusters     Comment: One bottle positive of four drawn         Aerobic Bottle Gram positive cocci in clusters     Comment: Third bottle positive of four drawn       Blood Culture ID, PCR - Blood, Arm, Left [[id number]]  (Abnormal) Collected: 02/04/21 2206    Lab Status: Final result Specimen: Blood from Arm, Left Updated: 02/05/21 2159     BCID, PCR Staphylococcus aureus. mecA (methicillin resistance gene) detected. Identification by BCID PCR.    Narrative:      Sensitivity to Follow    Blood Culture - Blood, Arm, Left [[id number]]  (Abnormal) Collected: 02/04/21 2130    Lab Status: Preliminary result Specimen: Blood from Arm, Left Updated: 02/07/21 0651     Blood Culture Staphylococcus aureus, MRSA     Comment: Infectious disease consultation is highly recommended to rule out distant foci of infection.  Methicillin resistant Staphylococcus aureus, Patient may be an isolation risk.        Isolated from Aerobic and Anaerobic Bottles     Gram Stain Aerobic Bottle Gram positive cocci in clusters     Comment: Second bottle positive of four drawn         Anaerobic Bottle Gram positive cocci in clusters     Comment: Forth bottle positive of four drawn; all same organism       COVID-19 and FLU A/B PCR - Swab, Nasopharynx [[id number]]  (Normal) Collected: 02/04/21 2001    Lab Status: Final result Specimen: Swab from Nasopharynx Updated: 02/04/21 2032     COVID19 Not Detected     Influenza A PCR Not Detected     Influenza B PCR Not Detected    Narrative:      Fact sheet for providers: https://www.fda.gov/media/198931/download    Fact sheet for patients: https://www.fda.gov/media/865749/download    Test performed by PCR.          No results found for:  RESPCX      Assessment:  WBC trended into normal limits today  SCr trending down slowly, 3.62 today, CKD 3/4  Afebrile    Labs in progress:  2/8 BCx2 IP  2/4 BCx2 MRSA    Awaiting negative blood cultures, will extend vancomycin consult as appropriate.     With SCr trending down, estimated AUC is slightly lower than initial 494, to 478.  Checking peak after second (24.8) and trough before third due to CrCl less than 30.  There was discussion of changing to daptomycin yesterday if unable to obtain proper levels with vancomycin for MRSA bacteremia.      Plan:  1. Continue vancomycin 1000mg IV Q36H  2. Trough 2/9 0100  3. Pharmacy will monitor renal function and adjust dose accordingly.      Tung Colon, PharmD   02/08/21 08:57 CST

## 2021-02-08 NOTE — PROGRESS NOTES
[facility] Medicine Services  INPATIENT PROGRESS NOTE    Length of Stay: 3  Date of Admission: 2/4/2021  Primary Care Physician: Mark Sheldon APRN    Subjective   Chief Complaint: Urinary retention  HPI:  80 year old male with a history of BPH, DM, HTN, HLD, CAD, asthma, PE on chronic anticoagulation with coumadin, and CKD 4 who presented with 24 hours without urinating.  Burleson was unable to be placed in the ED.  Urology attempted to place in the ED as well unsuccessfully.  He developed bleeding.  INR was 7.0. He received FFP and vitamin K.  He underwent cystoscopy and burleson placement.  Creatinine has worsened from his baseline of approximately 2.0 to 4.89 with a BUN of 109. Hgb was 7.0.  He received 1 unit PRBC and H&H and BP have improved.  Blood cultures are positive for MRSA.  Creatinine is improving.  Repeat cultures were drawn.  No new complaints.     Review of Systems   Constitutional: Negative for chills and fever.   Respiratory: Negative for shortness of breath.    Cardiovascular: Negative for chest pain.   Gastrointestinal: Negative for abdominal pain, nausea and vomiting.     All pertinent negatives and positives are as above. All other systems have been reviewed and are negative unless otherwise stated.     Objective    Temp:  [97.2 °F (36.2 °C)-99 °F (37.2 °C)] 99 °F (37.2 °C)  Heart Rate:  [70-82] 74  Resp:  [16-18] 18  BP: (134-177)/(60-73) 145/65    Physical Exam  Vitals signs reviewed.   Constitutional:       Appearance: Normal appearance.   HENT:      Head: Normocephalic and atraumatic.   Cardiovascular:      Rate and Rhythm: Normal rate and regular rhythm.   Pulmonary:      Effort: Pulmonary effort is normal.      Breath sounds: Normal breath sounds.   Abdominal:      General: There is no distension.      Palpations: Abdomen is soft.      Tenderness: There is no abdominal tenderness.   Genitourinary:     Comments: Burleson, yellow and  clear  Musculoskeletal:         General: No swelling or deformity.   Skin:     General: Skin is warm and dry.   Neurological:      General: No focal deficit present.      Mental Status: He is alert and easily aroused. Mental status is at baseline.   Psychiatric:         Behavior: Behavior is cooperative.       Results Review:  I have reviewed the labs, radiology results, and diagnostic studies.    Laboratory Data:   Results from last 7 days   Lab Units 02/08/21  0728 02/07/21  0618 02/06/21  0634  02/04/21 2008   SODIUM mmol/L 142 140 140   < > 138   POTASSIUM mmol/L 4.0 3.8 4.2   < > 5.1   CHLORIDE mmol/L 107 104 105   < > 101   CO2 mmol/L 23.0 23.0 21.0*   < > 22.0   BUN mg/dL 95* 106* 111*   < > 110*   CREATININE mg/dL 3.62* 4.35* 4.74*   < > 4.89*   GLUCOSE mg/dL 92 87 89   < > 102*   CALCIUM mg/dL 8.0* 8.4* 8.1*   < > 8.2*   BILIRUBIN mg/dL  --   --   --   --  0.5   ALK PHOS U/L  --   --   --   --  80   ALT (SGPT) U/L  --   --   --   --  95*   AST (SGOT) U/L  --   --   --   --  135*   ANION GAP mmol/L 12.0 13.0 14.0   < > 15.0    < > = values in this interval not displayed.     Estimated Creatinine Clearance: 19.8 mL/min (A) (by C-G formula based on SCr of 3.62 mg/dL (H)).          Results from last 7 days   Lab Units 02/08/21  0728 02/07/21  0618 02/06/21  0634 02/05/21  1203 02/05/21  0622 02/04/21 2008   WBC 10*3/mm3 8.84 12.46* 13.86*  --  14.84* 17.98*   HEMOGLOBIN g/dL 8.8* 8.8* 8.4* 7.0* 7.1* 8.5*   HEMATOCRIT % 25.4* 25.1* 23.6* 20.2* 22.0* 24.3*   PLATELETS 10*3/mm3 95* 95* 93*  --  110* 140     Results from last 7 days   Lab Units 02/08/21  0728 02/07/21  0618 02/06/21  0634 02/05/21  0622 02/04/21 2008   INR  1.40* 1.47* 1.54* 2.29* 7.06*       Culture Data:   No results found for: BLOODCX  No results found for: URINECX  No results found for: RESPCX  No results found for: WOUNDCX  No results found for: STOOLCX  No components found for: BODYFLD    Radiology Data:   Imaging Results (Last 24 Hours)      ** No results found for the last 24 hours. **          I have reviewed the patient's current medications.     Assessment/Plan     Active Hospital Problems    Diagnosis   • **Urinary obstruction   • MRSA bacteremia   • Acute blood loss anemia   • Supratherapeutic INR   • Sepsis due to urinary tract infection (CMS/[facility])   • Chronic HFrEF (heart failure with reduced ejection fraction) (CMS/[facility])     -last TTE 8/2020, 20%     • Bradycardia   • Dementia (CMS/[facility])   • COPD exacerbation (CMS/[facility])   • CKD (chronic kidney disease) stage 4, GFR 15-29 ml/min (CMS/[facility])   • Diabetic peripheral neuropathy associated with type 2 diabetes mellitus (CMS/[facility])   • Benign essential hypertension   Rectal wall thickening on CT    Plan:    S/P Cystoscopy and Moore placement  Urology consultation appreciated  Rocephin day 4/5  Vancomycin day 3, will need 14 days from first negative culture  Repeat cultures today  Echocardiogram is without noted vegetation   IV fluid: Off bicarb drip, NS 75 ml/hr  Nephrology consultation appreciated  S/P 1 unit PRBC, follow H&H, transfuse if Hgb <7  BP control: hydralazine/imdur, PRN hydralazine  No Beta-blocker due to bradycardia  No ACEI/ARB due to renal disease  Glucose control: Levemir, SSI  PT/OT/SLP  Discharge planning: will need GI referral for colonoscopy for evaluation of rectal wall thickening  VTE PPx: coumadin    The patient was evaluated during the global COVID-19 pandemic, and the diagnosis was suspected/considered upon their initial presentation.  Evaluation, treatment, and testing were consistent with current guidelines for patients who present with complaints or symptoms that may be related to COVID-19.        This document has been electronically signed by THIERNO Griffin on February 8, 2021 14:15 CST

## 2021-02-08 NOTE — PLAN OF CARE
Goal Outcome Evaluation:  Plan of Care Reviewed With: patient  Progress: no change  Outcome Summary: vss. diet changed per SLP. no changes at this time and will cont to monitor.

## 2021-02-08 NOTE — PROGRESS NOTES
"   LOS: 3 days   Patient Care Team:  Mark Sheldon APRN as PCP - General (Nurse Practitioner)    Subjective     Subjective:  Symptoms:  Stable.  (Clear yellow urine in Moore's gravity bag. ).    Pain:  He reports no pain.        History taken from: patient chart    Objective     Vital Signs  Temp:  [97.2 °F (36.2 °C)-99 °F (37.2 °C)] 99 °F (37.2 °C)  Heart Rate:  [70-82] 74  Resp:  [16-18] 18  BP: (134-177)/(60-73) 145/65    Objective:  General Appearance:  In no acute distress.    Vital signs: (most recent): Blood pressure 145/65, pulse 74, temperature 99 °F (37.2 °C), temperature source Axillary, resp. rate 18, height 182.9 cm (72.01\"), weight 95.5 kg (210 lb 9.6 oz), SpO2 98 %.  Vital signs are normal.    Output: Producing urine (Moore).    Lungs:  Normal effort.    Chest: Symmetric chest wall expansion.   Neurological: Patient is alert.    Skin:  Warm and dry.              Results Review:    Lab Results (last 24 hours)     Procedure Component Value Units Date/Time    POC Glucose Once [[id number]]  (Abnormal) Collected: 02/08/21 1034    Specimen: Blood Updated: 02/08/21 1055     Glucose 147 mg/dL      Comment: RN NotifiedOperator: [id number] DINORAH NOAHMeter ID: [id number]       Blood Culture - Blood, Arm, Left [[id number]]  (Abnormal) Collected: 02/04/21 2130    Specimen: Blood from Arm, Left Updated: 02/08/21 0956     Blood Culture Staphylococcus aureus, MRSA     Comment: Infectious disease consultation is highly recommended to rule out distant foci of infection.  Methicillin resistant Staphylococcus aureus, Patient may be an isolation risk.        Isolated from Aerobic and Anaerobic Bottles     Gram Stain Aerobic Bottle Gram positive cocci in clusters     Comment: Second bottle positive of four drawn         Anaerobic Bottle Gram positive cocci in clusters     Comment: Forth bottle positive of four drawn; all same organism       Narrative:      Refer to previous blood culture collected on 2/4/2021 at 2306 " for [facility]ICS    Blood Culture - Blood, Arm, Left [[id number]]  (Abnormal)  (Susceptibility) Collected: 02/04/21 2206    Specimen: Blood from Arm, Left Updated: 02/08/21 0956     Blood Culture Staphylococcus aureus, MRSA     Comment: Infectious disease consultation is highly recommended to rule out distant foci of infection.  Methicillin resistant Staphylococcus aureus, Patient may be an isolation risk.        Isolated from Aerobic and Anaerobic Bottles     Gram Stain Anaerobic Bottle Gram positive cocci in clusters     Comment: One bottle positive of four drawn         Aerobic Bottle Gram positive cocci in clusters     Comment: Third bottle positive of four drawn       Susceptibility      Staphylococcus aureus, MRSA     TIFFANY     Gentamicin Susceptible     Oxacillin Resistant     Rifampin Susceptible     Vancomycin Susceptible                Susceptibility Comments     Staphylococcus aureus, MRSA    This isolate does not demonstrate inducible clindamycin resistance in vitro.               Blood Culture - Blood, Arm, Right [[id number]] Collected: 02/08/21 0813    Specimen: Blood from Arm, Right Updated: 02/08/21 0849    Protime-INR [[id number]]  (Abnormal) Collected: 02/08/21 0728    Specimen: Blood Updated: 02/08/21 0810     Protime 17.9 Seconds      INR 1.40    Narrative:      Therapeutic range for most indications is 2.0-3.0 INR,  or 2.5-3.5 for mechanical heart valves.    Basic Metabolic Panel [[id number]]  (Abnormal) Collected: 02/08/21 0728    Specimen: Blood Updated: 02/08/21 0801     Glucose 92 mg/dL      BUN 95 mg/dL      Creatinine 3.62 mg/dL      Sodium 142 mmol/L      Potassium 4.0 mmol/L      Chloride 107 mmol/L      CO2 23.0 mmol/L      Calcium 8.0 mg/dL      eGFR  African Amer 20 mL/min/1.73      BUN/Creatinine Ratio 26.2     Anion Gap 12.0 mmol/L     Narrative:      GFR Normal >60  Chronic Kidney Disease <60  Kidney Failure <15      CBC & Differential [[id number]]  (Abnormal) Collected: 02/08/21 0728     Specimen: Blood Updated: 02/08/21 0750    Narrative:      The following orders were created for panel order CBC & Differential.  Procedure                               Abnormality         Status                     ---------                               -----------         ------                     Scan Slide[[id number]]                                                                  CBC Auto Differential[[id number]]        Abnormal            Final result                 Please view results for these tests on the individual orders.    CBC Auto Differential [[id number]]  (Abnormal) Collected: 02/08/21 0728    Specimen: Blood Updated: 02/08/21 0750     WBC 8.84 10*3/mm3      RBC 3.19 10*6/mm3      Hemoglobin 8.8 g/dL      Hematocrit 25.4 %      MCV 79.6 fL      MCH 27.6 pg      MCHC 34.6 g/dL      RDW 15.5 %      RDW-SD 44.8 fl      MPV --     Comment: Instrument unable to calculate.        Platelets 95 10*3/mm3      Neutrophil % 79.4 %      Lymphocyte % 11.3 %      Monocyte % 7.5 %      Eosinophil % 1.1 %      Basophil % 0.2 %      Immature Grans % 0.5 %      Neutrophils, Absolute 7.02 10*3/mm3      Lymphocytes, Absolute 1.00 10*3/mm3      Monocytes, Absolute 0.66 10*3/mm3      Eosinophils, Absolute 0.10 10*3/mm3      Basophils, Absolute 0.02 10*3/mm3      Immature Grans, Absolute 0.04 10*3/mm3      nRBC 0.0 /100 WBC     Blood Culture - Blood, Arm, Right [[id number]] Collected: 02/08/21 0728    Specimen: Blood from Arm, Right Updated: 02/08/21 0735    POC Glucose Once [[id number]]  (Normal) Collected: 02/07/21 1958    Specimen: Blood Updated: 02/07/21 2024     Glucose 97 mg/dL      Comment: RN NotifiedOperator: [id number] GABBY ALVAROMeter ID: [id number]       POC Glucose Once [[id number]]  (Normal) Collected: 02/07/21 1655    Specimen: Blood Updated: 02/07/21 1708     Glucose 127 mg/dL      Comment: RN NotifiedOperator: [id number] JANIS AMYMeter ID: [id number]       Vancomycin, Peak [[id number]]  (Normal)  Collected: 02/07/21 1544    Specimen: Blood Updated: 02/07/21 1610     Vancomycin Peak 24.80 mcg/mL          Imaging Results (Last 24 Hours)     ** No results found for the last 24 hours. **           I reviewed the patient's new clinical results.  I reviewed the patient's new imaging results and agree with the interpretation.  I reviewed the patient's other test results and agree with the interpretation      Assessment/Plan       Urinary obstruction    CKD (chronic kidney disease) stage 4, GFR 15-29 ml/min (CMS/[facility])    Diabetic peripheral neuropathy associated with type 2 diabetes mellitus (CMS/[facility])    Benign essential hypertension    COPD exacerbation (CMS/[facility])    Supratherapeutic INR    Sepsis due to urinary tract infection (CMS/[facility])    Chronic HFrEF (heart failure with reduced ejection fraction) (CMS/[facility])    Bradycardia    Dementia (CMS/[facility])    Acute blood loss anemia    MRSA bacteremia      Assessment & Plan    2/8/21 Moore without problems, no hematuria.   Consulted to Urology for urinary retention unable to be catheterized, patient usually has chronic Moore related to MENENDEZ and history of CAP on Lupron therapy,  Post op 2/5/21 cystoscopy and Moore placement with Dr. 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 Had supra therapeutic INR taking Coumadin due to history of AFib, ANT on CKD baseline Cr about 2.5. Anemia is chronic with no acute changes at present. Being treated for sepsis due to UTI and bacteremia present.   -WBC 17.98-->14.84-->13.86-->12.46-->8.84  -Lactate 2.2-->2.1  -Blood culture 2/4/21 Staph aureus in blood culture with mecA gene detected, on vancomycin. Urine culture 2/5/21 no growth. On Rocephin as well.   -Estimated Creatinine Clearance: 19.8 mL/min (A) (by C-G formula based on SCr of 3.62 mg/dL (H)).  -Cr 4.89-->4.89-->4.74-->4.35-->3.62  -24 hour urine output 300 mL-->700 mL-->750 mL    Plan:  Keep Moore chronic.   Urology will sign off, call if needed.      Chu is given outpatient per [facility], next Lupron due in March. Dr. Riggs' office will arrange outpatient.     THIERNO Forte  02/08/21  14:44 CST

## 2021-02-08 NOTE — THERAPY TREATMENT NOTE
Acute Care - Speech Language Pathology   Swallow Treatment Note [facility]     Patient Name: Ondina Alanis Sr.  : 1940  MRN: [id number]  Today's Date: 2021               Admit Date: 2021     Goal:  Patient will safely tolerate least restricted diet w/no overt s/s of aspiration for adequate nutrition and hydration:  Pt seen for skilled ST services this date to address oral dysphagia. Pt continues to keep eyes closed during session, and had limited communication this date. However, pt would open mouth for additional bites independently and when asked if he wanted more. Pt consumed mech soft/mixed consistency (cheerios in milk) w/efficient mastication, good oral clearance, timely AP transit, and no overt s/s of aspiration. Pt consumed 120cc of thin liquids via straw w/no overt s/s of aspiration. SLP advanced diet to mech soft solids/thin liquids at this time w/full feeding assist and continued safe swallowing strategies. SLP unable to fully educate pt d/t cognitive deficits. SLP to f/u w/pt for diet safety/toleration of mech soft solids/thin liquids which is pt's baseline diet.      Visit Dx:     ICD-10-CM ICD-9-CM   1. Urinary obstruction  N13.9 599.60   2. ANT (acute kidney injury) (CMS/HCC)  N17.9 584.9   3. Pleural effusion on left  J90 511.9   4. Proctitis  K62.89 569.49   5. Oral phase dysphagia  R13.11 787.21   6. Impaired functional mobility, balance, gait, and endurance  Z74.09 V49.89   7. Impaired mobility and activities of daily living  Z74.09 V49.89    Z78.9      Patient Active Problem List   Diagnosis   • CKD (chronic kidney disease) stage 4, GFR 15-29 ml/min (CMS/[facility])   • Diabetic peripheral neuropathy associated with type 2 diabetes mellitus (CMS/[facility])   • Diabetes mellitus type II, uncontrolled (CMS/[facility])   • GERD (gastroesophageal reflux disease)   • Primary osteoarthritis of both knees   • Pulmonary embolism (CMS/[facility])   • BPH (benign prostatic hyperplasia)   • Benign  essential hypertension   • Asthma   • Pleurisy   • Acute respiratory failure with hypoxia (CMS/[facility])   • Stage 1 acute kidney injury (CMS/[facility])   • Left ventricular diastolic dysfunction   • Exacerbation of asthma   • COPD exacerbation (CMS/[facility])   • Atrial flutter (CMS/[facility])   • Acute systolic CHF (congestive heart failure) (CMS/[facility])   • Pneumonia of both lower lobes due to infectious organism   • Urinary obstruction   • Supratherapeutic INR   • Sepsis due to urinary tract infection (CMS/[facility])   • Chronic HFrEF (heart failure with reduced ejection fraction) (CMS/[facility])   • Bradycardia   • Dementia (CMS/[facility])   • Acute blood loss anemia   • MRSA bacteremia     Past Medical History:   Diagnosis Date   • Asthma    • Benign prostatic hyperplasia    • CHF (congestive heart failure) (CMS/[facility])    • Coronary artery disease    • Diabetes mellitus (CMS/[facility])    • GERD (gastroesophageal reflux disease)    • Hyperlipidemia    • Hypertension    • Prostate disorder    • Renal insufficiency    • Urinary tract infection      Past Surgical History:   Procedure Laterality Date   • BACK SURGERY     • KNEE SURGERY Left    • PROSTATE SURGERY          SWALLOW EVALUATION (last 72 hours)      SLP Adult Swallow Evaluation     Row Name 02/08/21 0937 02/07/21 1006 02/06/21 0853             Rehab Evaluation    Document Type  therapy note (daily note)  -CK  therapy note (daily note)  -CK  evaluation  -CK      Total Evaluation Minutes, SLP  26  -CK  23  -CK  31  -CK      Subjective Information  --  no complaints  -CK  no complaints  -CK      Patient Observations  --  lethargic;poorly cooperative  -CK  lethargic;poorly cooperative  -CK      Patient/Family/Caregiver Comments/Observations  --  No family present at bedside  -CK  No family present at bedside  -CK      Care Plan Review  --  evaluation/treatment results reviewed;care plan/treatment goals reviewed;risks/benefits reviewed;current/potential barriers reviewed;patient/other agree to care plan no  evidence of learning noted d/t cognitive deficits  -CK  evaluation/treatment results reviewed;care plan/treatment goals reviewed;risks/benefits reviewed;current/potential barriers reviewed;patient/other agree to care plan No evidence of learning noted d/t cognitive deficits  -CK      Patient Effort  --  poor  -CK  poor  -CK         General Information    Patient Profile Reviewed  yes  -CK  yes  -CK  yes  -CK      Pertinent History Of Current Problem  --  --  Pt admited for urinary retention; pt known to SLP from previous admissions  -CK      Current Method of Nutrition  pureed;thin liquids  -CK  pureed;thin liquids  -CK  NPO  -CK      Prior Level of Function-Communication  cognitive-linguistic impairment  -CK  cognitive-linguistic impairment  -CK  cognitive-linguistic impairment  -CK      Prior Level of Function-Swallowing  mechanical soft textures;thin liquids  -CK  mechanical soft textures;thin liquids  -CK  mechanical soft textures;thin liquids  -CK      Plans/Goals Discussed with  patient  -CK  patient  -CK  patient  -CK      Barriers to Rehab  previous functional deficit;cognitive status  -CK  previous functional deficit;cognitive status  -CK  previous functional deficit;cognitive status  -CK      Patient's Goals for Discharge  --  --  patient did not state  -CK         Pain    Additional Documentation  --  --  Pain Scale: FACES Pre/Post-Treatment (Group)  -CK         Pain Scale: FACES Pre/Post-Treatment    Pain: FACES Scale, Pretreatment  0-->no hurt  -CK  0-->no hurt  -CK  0-->no hurt  -CK      Posttreatment Pain Rating  0-->no hurt  -CK  0-->no hurt  -CK  0-->no hurt  -CK         Oral Motor Structure and Function    Dentition Assessment  natural, present and adequate  -CK  natural, present and adequate  -CK  natural, present and adequate  -CK      Secretion Management  WNL/WFL  -CK  WNL/WFL  -CK  dried secretions in oral cavity  -CK      Mucosal Quality  dry  -CK  dry  -CK  cracked;dry  -CK      Volitional  Swallow  unable to elicit  -CK  unable to elicit  -CK  unable to elicit  -CK      Volitional Cough  unable to elicit  -CK  unable to elicit  -CK  unable to elicit  -CK         General Eating/Swallowing Observations    Respiratory Support Currently in Use  room air  -CK  room air  -CK  room air  -CK      Eating/Swallowing Skills  fed by SLP  -CK  fed by SLP  -CK  fed by SLP  -CK      Positioning During Eating  upright 90 degree;upright in bed  -CK  upright 90 degree;upright in bed  -CK  upright 90 degree;upright in bed  -CK      Utensils Used  straw  -CK  spoon;straw  -CK  spoon;straw  -CK      Consistencies Trialed  thin liquids;soft textures;mixed consistency  -CK  thin liquids;pureed  -CK  thin liquids;pureed  -CK         Clinical Swallow Eval    Oral Prep Phase  WFL  -CK  WFL for puree  -CK  WFL for puree  -CK      Oral Transit  WFL  -CK  WFL for puree  -CK  WFL for puree  -CK      Oral Residue  WFL  -CK  WFL for puree  -CK  WFL for puree  -CK      Pharyngeal Phase  no overt signs/symptoms of pharyngeal impairment  -CK  no overt signs/symptoms of pharyngeal impairment  -CK  no overt signs/symptoms of pharyngeal impairment  -CK      Esophageal Phase  unremarkable  -CK  unremarkable  -CK  unremarkable  -CK         Clinical Impression    Daily Summary of Progress (SLP)  progress toward functional goals is good  -CK  progress toward functional goals is gradual  -CK  --      Barriers to Overall Progress (SLP)  previous deficits; cognitive status  -CK  cognitive status  -CK  previous deficits  -CK      SLP Swallowing Diagnosis  mild;oral dysphagia  -CK  mild;oral dysphagia  -CK  mild;oral dysphagia  -CK      Functional Impact  risk of aspiration/pneumonia;risk of malnutrition;risk of dehydration  -CK  risk of aspiration/pneumonia;risk of malnutrition;risk of dehydration  -CK  risk of aspiration/pneumonia;risk of malnutrition;risk of dehydration  -CK      Rehab Potential/Prognosis, Swallowing  good, to achieve stated  therapy goals  -CK  good, to achieve stated therapy goals  -CK  good, to achieve stated therapy goals  -CK      Swallow Criteria for Skilled Therapeutic Interventions Met  demonstrates skilled criteria  -CK  demonstrates skilled criteria  -CK  demonstrates skilled criteria  -CK      Plan for Continued Treatment (SLP)  [facility]/thin liquids  -CK  Continue POC  -CK  Advance diet to puree/thin; initiate POC  -CK         Recommendations    Therapy Frequency (Swallow)  3 days per week;5 days per week  -CK  3 days per week;5 days per week  -CK  3 days per week;5 days per week  -CK      Predicted Duration Therapy Intervention (Days)  until discharge  -CK  until discharge  -CK  until discharge  -CK      SLP Diet Recommendation  thin liquids;soft textures;ground  -CK  puree;thin liquids  -CK  puree;thin liquids  -CK      Recommended Precautions and Strategies  upright posture during/after eating;small bites of food and sips of liquid;fatigue precautions;general aspiration precautions;1:1 supervision;assist with feeding  -CK  upright posture during/after eating;small bites of food and sips of liquid;fatigue precautions;general aspiration precautions;1:1 supervision;assist with feeding  -CK  upright posture during/after eating;small bites of food and sips of liquid;fatigue precautions;general aspiration precautions;1:1 supervision;assist with feeding  -CK      Oral Care Recommendations  Oral Care before breakfast, after meals and PRN  -CK  Oral Care before breakfast, after meals and PRN  -CK  Oral Care before breakfast, after meals and PRN  -CK      SLP Rec. for Method of Medication Administration  meds crushed;with pudding or applesauce  -CK  meds crushed;with pudding or applesauce  -CK  meds crushed;with pudding or applesauce  -CK      Monitor for Signs of Aspiration  yes;notify SLP if any concerns  -CK  yes;notify SLP if any concerns  -CK  yes;notify SLP if any concerns  -CK      Anticipated  Discharge Disposition (SLP)  unknown  -CK  unknown  -CK  home with home health  -CK         Swallow Goals (SLP)    Oral Nutrition/Hydration Goal Selection (SLP)  oral nutrition/hydration, SLP goal 1  -CK  oral nutrition/hydration, SLP goal 1  -CK  oral nutrition/hydration, SLP goal 1  -CK         Oral Nutrition/Hydration Goal 1 (SLP)    Oral Nutrition/Hydration Goal 1, SLP  Pt will safely tolerate least restricted diet w/no overt s/s of aspiration for adequate nutrition and hydration.  -CK  Pt will safely tolerate least restricted diet w/no overt s/s of aspiration for adequate nutrition and hydration.  -CK  Pt will safely tolerate least restricted diet w/no overt s/s of aspiration for adequate nutrition and hydration.  -CK      Time Frame (Oral Nutrition/Hydration Goal 1, SLP)  by discharge  -CK  by discharge  -CK  by discharge  -CK      Barriers (Oral Nutrition/Hydration Goal 1, SLP)  previous deficits  -CK  previous deficits  -CK  previous deficits  -CK      Progress/Outcomes (Oral Nutrition/Hydration Goal 1, SLP)  goal ongoing  -CK  goal ongoing  -CK  --        User Key  (r) = Recorded By, (t) = Taken By, (c) = Cosigned By    Initials Name Effective Dates    CK Zakia Wagner MS CCC-SLP 02/11/20 -           EDUCATION  The patient has been educated in the following areas:   Dysphagia (Swallowing Impairment) Modified Diet Instruction.    SLP Recommendation and Plan  SLP Swallowing Diagnosis: mild, oral dysphagia  SLP Diet Recommendation: thin liquids, soft textures, ground  Recommended Precautions and Strategies: upright posture during/after eating, small bites of food and sips of liquid, fatigue precautions, general aspiration precautions, 1:1 supervision, assist with feeding  SLP Rec. for Method of Medication Administration: meds crushed, with pudding or applesauce     Monitor for Signs of Aspiration: yes, notify SLP if any concerns     Swallow Criteria for Skilled Therapeutic Interventions Met: demonstrates  skilled criteria  Anticipated Discharge Disposition (SLP): unknown  Rehab Potential/Prognosis, Swallowing: good, to achieve stated therapy goals  Therapy Frequency (Swallow): 3 days per week, 5 days per week  Predicted Duration Therapy Intervention (Days): until discharge     Daily Summary of Progress (SLP): progress toward functional goals is good    Plan for Continued Treatment (SLP): Advance diet to mech soft solids/thin liquids              Plan of Care Reviewed With: patient  Progress: improving  Outcome Summary: Pt seen for skilled ST services this date to address oral dysphagia.  Pt continues to keep eyes closed during session, and had limited communication this date.  However, pt would open mouth for additional bites independently and when asked if he wanted more.  Pt consumed mech soft/mixed consistency (cheerios in milk) w/efficient mastication, good oral clearance, timely AP transit, and no overt s/s of aspiration.  Pt consumed 120cc of thin liquids via straw w/no overt s/s of aspiration.  SLP advanced diet to mech soft solids/thin liquids at this time w/full feeding assist and continued safe swallowing strategies.  SLP unable to fully educate pt d/t cognitive deficits.  SLP to f/u w/pt for diet safety/toleration of mech soft solids/thin liquids which is pt's baseline diet.    SLP GOALS     Row Name 02/08/21 0937 02/07/21 1006 02/06/21 0853       Oral Nutrition/Hydration Goal 1 (SLP)    Oral Nutrition/Hydration Goal 1, SLP  Pt will safely tolerate least restricted diet w/no overt s/s of aspiration for adequate nutrition and hydration.  -CK  Pt will safely tolerate least restricted diet w/no overt s/s of aspiration for adequate nutrition and hydration.  -CK  Pt will safely tolerate least restricted diet w/no overt s/s of aspiration for adequate nutrition and hydration.  -CK    Time Frame (Oral Nutrition/Hydration Goal 1, SLP)  by discharge  -CK  by discharge  -CK  by discharge  -CK    Barriers (Oral  Nutrition/Hydration Goal 1, SLP)  previous deficits  -CK  previous deficits  -CK  previous deficits  -CK    Progress/Outcomes (Oral Nutrition/Hydration Goal 1, SLP)  goal ongoing  -CK  goal ongoing  -CK  --      User Key  (r) = Recorded By, (t) = Taken By, (c) = Cosigned By    Initials Name Provider Type    Zakia Garduno MS CCC-SLP Speech and Language Pathologist             Time Calculation:   Time Calculation- SLP     Row Name 02/08/21 1003             Time Calculation- SLP    SLP Start Time  0937  -CK      SLP Stop Time  1003  -CK      SLP Time Calculation (min)  26 min  -CK      Total Timed Code Minutes- SLP  1003 minute(s)  -CK      SLP Received On  02/08/21  -CK      SLP Goal Re-Cert Due Date  02/20/21  -CK        User Key  (r) = Recorded By, (t) = Taken By, (c) = Cosigned By    Initials Name Provider Type    Zakia Garduno MS CCC-SLP Speech and Language Pathologist          Therapy Charges for Today     Code Description Service Date Service Provider Modifiers Qty    02416621875 HC ST TREATMENT SWALLOW 2 2/7/2021 Zakia Wagner MS CCC-SLP GN 1    [id number] HC ST TREATMENT SWALLOW 2 2/8/2021 Zakia Wagner MS CCC-SLP GN 1               Zakia Wagner MS CCC-GEORGE  2/8/2021

## 2021-02-08 NOTE — PLAN OF CARE
Goal Outcome Evaluation:  Plan of Care Reviewed With: patient  Progress: improving  Outcome Summary: Pt seen for skilled ST services this date to address oral dysphagia.  Pt continues to keep eyes closed during session, and had limited communication this date.  However, pt would open mouth for additional bites independently and when asked if he wanted more.  Pt consumed mech soft/mixed consistency (cheerios in milk) w/efficient mastication, good oral clearance, timely AP transit, and no overt s/s of aspiration.  Pt consumed 120cc of thin liquids via straw w/no overt s/s of aspiration.  SLP advanced diet to mech soft solids/thin liquids at this time w/full feeding assist and continued safe swallowing strategies.  SLP unable to fully educate pt d/t cognitive deficits.  SLP to f/u w/pt for diet safety/toleration of mech soft solids/thin liquids which is pt's baseline diet.

## 2021-02-08 NOTE — PROGRESS NOTES
"Anticoagulation by Pharmacy - Warfarin    Ondina Alnais Sr. is a 80 y.o.male being continued on warfarin for history of PE    Home regimen: Home dose is last known fill was at [facility] for warfarin 3 mg in September 2020   INR Goal: 2-3    Last INR:   Lab Results   Component Value Date    INR 1.40 (H) 02/08/2021       Objective:  [Ht: 182.9 cm (72.01\"); Wt: 95.5 kg (210 lb 9.6 oz)]  Lab Results   Component Value Date    INR 1.40 (H) 02/08/2021    INR 1.47 (H) 02/07/2021    INR 1.54 (H) 02/06/2021    PROTIME 17.9 (H) 02/08/2021    PROTIME 18.6 (H) 02/07/2021    PROTIME 19.3 (H) 02/06/2021     Lab Results   Component Value Date    HGB 8.8 (L) 02/08/2021    HGB 8.8 (L) 02/07/2021    HGB 8.4 (L) 02/06/2021    HCT 25.4 (L) 02/08/2021    HCT 25.1 (L) 02/07/2021    HCT 23.6 (L) 02/06/2021    PLT 95 (L) 02/08/2021    PLT 95 (L) 02/07/2021    PLT 93 (L) 02/06/2021       Recent Warfarin Administrations     The 5 most recent administrations since 02/01/2021 are shown below each listed medication.    Warfarin Sodium       Order Dose Date Given     warfarin (COUMADIN) tablet 3 mg 3 mg 02/07/2021                Assessment  Interacting medications: none  INR is subtherapeutic; received 5mg vitamin K on 2/4, patient received 1 unit RPBC on 2/6 and 2/7-had acute blood loss anemia  H&H is below normal limits but trending up; will continue to monitor for signs and symptoms of bleeding     Plan:  1.  Give warfarin 3 mg tablet PO @ 1800 tonight  2.  Draw a PT/INR in AM  3.  Pharmacy will continue to follow    Doris Adams  02/08/21 14:58 CST     "

## 2021-02-09 PROBLEM — N17.9 AKI (ACUTE KIDNEY INJURY) (HCC): Status: ACTIVE | Noted: 2021-01-01

## 2021-02-09 NOTE — PROGRESS NOTES
[facility]  INPATIENT PROGRESS NOTE    Length of Stay: 4  Date of Admission: 2/4/2021  Primary Care Physician: Mark Sheldon APRN    Subjective   Chief Complaint: Urinary retention  HPI:  80 year old male with a history of BPH, DM, HTN, HLD, CAD, asthma, PE on chronic anticoagulation with coumadin, and CKD 4 who presented with 24 hours without urinating.  Burleson was unable to be placed in the ED.  Urology attempted to place in the ED as well unsuccessfully.  He developed bleeding.  INR was 7.0. He received FFP and vitamin K.  He underwent cystoscopy and burleson placement.  Creatinine has worsened from his baseline of approximately 2.0 to 4.89 with a BUN of 109. Hgb was 7.0.  He received 1 unit PRBC and H&H and BP have improved.  Blood cultures are positive for MRSA.  Creatinine is improving.  Repeat cultures are negative at 24 hours.  No new complaints or overnight events.     Review of Systems   Constitutional: Negative for chills and fever.   Respiratory: Negative for shortness of breath.    Cardiovascular: Negative for chest pain.   Gastrointestinal: Negative for abdominal pain, nausea and vomiting.     All pertinent negatives and positives are as above. All other systems have been reviewed and are negative unless otherwise stated.     Objective    Temp:  [98.6 °F (37 °C)-99.1 °F (37.3 °C)] 99 °F (37.2 °C)  Heart Rate:  [72-78] 76  Resp:  [16-18] 16  BP: (145-159)/(52-65) 159/65    Physical Exam  Vitals signs reviewed.   Constitutional:       Appearance: Normal appearance.   HENT:      Head: Normocephalic and atraumatic.   Cardiovascular:      Rate and Rhythm: Normal rate and regular rhythm.   Pulmonary:      Effort: Pulmonary effort is normal.      Breath sounds: Normal breath sounds.   Abdominal:      General: There is no distension.      Palpations: Abdomen is soft.      Tenderness: There is no abdominal tenderness.   Genitourinary:     Comments:  Moore, yellow and clear  Musculoskeletal:         General: No swelling or deformity.   Skin:     General: Skin is warm and dry.   Neurological:      General: No focal deficit present.      Mental Status: He is alert and easily aroused. Mental status is at baseline.      Comments: Pleasantly demented   Psychiatric:         Behavior: Behavior is cooperative.       Results Review:  I have reviewed the labs, radiology results, and diagnostic studies.    Laboratory Data:   Results from last 7 days   Lab Units 02/09/21 0726 02/08/21 0728 02/07/21 0618 02/04/21 2008   SODIUM mmol/L 143 142 140   < > 138   POTASSIUM mmol/L 4.2 4.0 3.8   < > 5.1   CHLORIDE mmol/L 111* 107 104   < > 101   CO2 mmol/L 24.0 23.0 23.0   < > 22.0   BUN mg/dL 81* 95* 106*   < > 110*   CREATININE mg/dL 2.91* 3.62* 4.35*   < > 4.89*   GLUCOSE mg/dL 125* 92 87   < > 102*   CALCIUM mg/dL 8.6 8.0* 8.4*   < > 8.2*   BILIRUBIN mg/dL  --   --   --   --  0.5   ALK PHOS U/L  --   --   --   --  80   ALT (SGPT) U/L  --   --   --   --  95*   AST (SGOT) U/L  --   --   --   --  135*   ANION GAP mmol/L 8.0 12.0 13.0   < > 15.0    < > = values in this interval not displayed.     Estimated Creatinine Clearance: 24.8 mL/min (A) (by C-G formula based on SCr of 2.91 mg/dL (H)).          Results from last 7 days   Lab Units 02/09/21  0726 02/08/21  0728 02/07/21  0618 02/06/21  0634 02/05/21  1203 02/05/21  0622   WBC 10*3/mm3 8.05 8.84 12.46* 13.86*  --  14.84*   HEMOGLOBIN g/dL 8.8* 8.8* 8.8* 8.4* 7.0* 7.1*   HEMATOCRIT % 25.4* 25.4* 25.1* 23.6* 20.2* 22.0*   PLATELETS 10*3/mm3 122* 95* 95* 93*  --  110*     Results from last 7 days   Lab Units 02/09/21  0726 02/08/21  0728 02/07/21  0618 02/06/21  0634 02/05/21  0622   INR  1.56* 1.40* 1.47* 1.54* 2.29*       Culture Data:   Blood Culture   Date Value Ref Range Status   02/08/2021 No growth at 24 hours  Preliminary   02/08/2021 No growth at 24 hours  Preliminary     No results found for: URINECX  No results  found for: RESPCX  No results found for: WOUNDCX  No results found for: STOOLCX  No components found for: BODYFLD    Radiology Data:   Imaging Results (Last 24 Hours)     ** No results found for the last 24 hours. **          I have reviewed the patient's current medications.     Assessment/Plan     Active Hospital Problems    Diagnosis   • **Urinary obstruction   • MRSA bacteremia   • Acute blood loss anemia   • Supratherapeutic INR   • Sepsis due to urinary tract infection (CMS/[facility])   • Chronic HFrEF (heart failure with reduced ejection fraction) (CMS/[facility])     -last TTE 8/2020, 20%     • Bradycardia   • Dementia (CMS/[facility])   • COPD exacerbation (CMS/[facility])   • CKD (chronic kidney disease) stage 4, GFR 15-29 ml/min (CMS/[facility])   • Diabetic peripheral neuropathy associated with type 2 diabetes mellitus (CMS/[facility])   • Benign essential hypertension   Rectal wall thickening on CT    Plan:    S/P Cystoscopy and Moore placement  Urology consultation appreciated  Rocephin day 5/5  Vancomycin 2/14  Cultures negative at 24 hours  Echocardiogram is without noted vegetation   IV fluid: Off bicarb drip, NS 75 ml/hr  Nephrology consultation appreciated  S/P 1 unit PRBC, follow H&H, transfuse if Hgb <7  BP control: hydralazine/imdur, PRN hydralazine  No Beta-blocker due to bradycardia  No ACEI/ARB due to renal disease  Glucose control: Levemir, SSI  PT/OT/SLP  Discharge planning: will need GI referral for colonoscopy for evaluation of rectal wall thickening  VTE PPx: coumadin  Discharge planning: consider LTACH, discussed with case management    The patient was evaluated during the global COVID-19 pandemic, and the diagnosis was suspected/considered upon their initial presentation.  Evaluation, treatment, and testing were consistent with current guidelines for patients who present with complaints or symptoms that may be related to COVID-19.        This document has been electronically signed by THIERNO Griffin on  February 9, 2021 11:09 CST

## 2021-02-09 NOTE — DISCHARGE SUMMARY
[facility]  DISCHARGE SUMMARY       Date of Admission: 2/4/2021  Date of Discharge:  2/9/2021  Primary Care Physician: Mark Sheldon APRN    Presenting Problem/History of Present Illness:  Proctitis [K62.89]  Pleural effusion on left [J90]  ANT (acute kidney injury) (CMS/[facility]) [N17.9]  Urinary obstruction [N13.9]     Final Discharge Diagnoses:    Urinary obstruction    MRSA bacteremia    ANT (acute kidney injury) (CMS/[facility])    CKD (chronic kidney disease) stage 4, GFR 15-29 ml/min (CMS/[facility])    Diabetic peripheral neuropathy associated with type 2 diabetes mellitus (CMS/[facility])    Benign essential hypertension    COPD exacerbation (CMS/[facility])    Supratherapeutic INR    Sepsis due to urinary tract infection (CMS/HCC)    Chronic HFrEF (heart failure with reduced ejection fraction) (CMS/HCC)    Bradycardia    Dementia (CMS/HCC)    Acute blood loss anemia      Consults:   Consults     Date and Time Order Name Status Description    2/5/2021 1331 Inpatient Nephrology Consult Completed     2/4/2021 2303 Inpatient Urology Consult Completed     2/4/2021 2106 Urology (on-call MD unless specified)      2/4/2021 2106 Hospitalist (on-call MD unless specified)            Procedures Performed: Procedure(s):  CYSTOSCOPY WITH CATHETER PLACEMENT                Pertinent Test Results:   Ct Abdomen Pelvis Without Contrast    Result Date: 2/4/2021  CT Abdomen and Pelvis Without Contrast History: Abdominal pain Axials spiral scans of the abdomen and pelvis were obtained without contrast.  Coronal and sagital reconstructions were preformed.  This exam was performed according to our departmental dose-optimization program, which includes automated exposure control, adjustment of the mA and/or kV according to patient size and/or use of iterative reconstruction technique. DLP: 458.20 Comparison: None Findings: Small left pleural effusion. Coronary artery calcifications. No acute osseous  abnormality. Degenerative changes and degenerative disc disease thoracic and lumbar spine. The liver is unremarkable. The gallbladder is distended. The spleen is unremarkable. The pancreas is unremarkable. The adrenal glands are unremarkable. Bilateral renal cysts. Nonobstructive renal calculi. No hydronephrosis. Air in the urinary bladder presumably related to recent instrumentation or catheterization. Radiopaque debris, gravel or hemorrhage posteriorly in the bladder. Minimally enlarged prostate. No pelvic mass. No abdominal aortic aneurysm. No adenopathy. Thickening of the rectal wall with stranding in the perirectal fat. This could represent proctitis or neoplasm. No bowel obstruction. Normal appendix. No free air. No free fluid. No hernia. Subcutaneous edema.     Conclusion: Distended gallbladder. Bilateral renal cysts. Nonobstructive renal calculi. Air in the urinary bladder presumably related to recent instrumentation or catheterization. Radiopaque debris, gravel or hemorrhage posteriorly in the bladder. Minimally enlarged prostate. Thickening of the rectal wall with stranding in the perirectal fat. This could represent proctitis or neoplasm. Subcutaneous edema. Small left pleural effusion. Coronary artery calcifications. [id number] Electronically signed by:  Chris Srinivasan MD  2/4/2021 8:32 PM CST Workstation: [facility] Chest 1 View    Result Date: 2/4/2021  PORTABLE CHEST HISTORY: Oliguria. Abdominal pain. Portable AP film of the chest was obtained at 8:26 PM. COMPARISON: October 13, 2020 FINDINGS: EKG leads. The lungs are clear of an acute process. The heart is not enlarged. The pulmonary vasculature is not increased. Small left pleural effusion. No pneumothorax. No acute osseous abnormality. Degenerative changes are present in the thoracic spine. Hypertrophic change right acromioclavicular joint.     CONCLUSION: Small left pleural effusion. [id number] Electronically signed by:  Chris Srinivasan MD  2/4/2021  "8:34 PM CST Workstation: [email or web address]      HPI/Hospital Course:  The patient is a 80 y.o. male with a history of BPH, DM, HTN, HLD, CAD, asthma, PE on chronic anticoagulation with coumadin, and CKD 4 who presented to [facility] with inability to urinate for 24 hours.  Burleson was unable to be placed in the ED.  Urology attempted to place in the ED as well unsuccessfully.  He developed bleeding.  INR was 7.0. He received FFP and vitamin K.  He underwent cystoscopy and burleson placement.  Creatinine had worsened from his baseline of approximately 2.0 to 4.89 with a BUN of 109. Hgb was 7.0 and he was hypotensive.  He received 1 unit PRBC and H&H improved to 8.8 and has remained stable.  Blood pressure improved.  He was bradycardic and amiodarone and coreg were held. Nephrology consulted.  He was managed with bicarbonate infusion and fluid. Creatinine is improving.  Blood cultures grew MRSA and he was initiated on vancomycin.  Echocardiogram is without noted vegetation and repeat blood cultures are negative.  He will require 14 days of IV vancomycin from negative culture drawn on 2/8/21.  Burleson is recommended to remain chronically.  It is to be changed every 4 weeks.  He is stable and will be discharged to [facility] to complete antibiotic course and physical rehabilitation.      Condition on Discharge:  Stable    Physical Exam on Discharge:  /75 (BP Location: Right arm, Patient Position: Lying)   Pulse 75   Temp 99 °F (37.2 °C) (Axillary)   Resp 18   Ht 182.9 cm (72.01\")   Wt 96.5 kg (212 lb 11.2 oz)   SpO2 96%   BMI 28.84 kg/m²   Physical Exam  Vitals signs reviewed.   Constitutional:       Appearance: Normal appearance.   HENT:      Head: Normocephalic and atraumatic.   Cardiovascular:      Rate and Rhythm: Normal rate and regular rhythm.   Pulmonary:      Effort: Pulmonary effort is normal.      Breath sounds: Normal breath sounds.   Abdominal:      General: There is no distension.      " Palpations: Abdomen is soft.      Tenderness: There is no abdominal tenderness.   Genitourinary:     Comments: Moore, yellow and clear  Musculoskeletal:         General: No swelling or deformity.   Skin:     General: Skin is warm and dry.   Neurological:      General: No focal deficit present.      Mental Status: He is alert and easily aroused. Mental status is at baseline.      Comments: Pleasantly demented   Psychiatric:         Behavior: Behavior is cooperative.     Discharge Disposition:  Long Term Care (DC - External)    Discharge Medications:     Discharge Medications      New Medications      Instructions Start Date   vancomycin   1,000 mg, Intravenous, Every 36 Hours   Start Date: February 10, 2021        Changes to Medications      Instructions Start Date   hydrALAZINE 50 MG tablet  Commonly known as: APRESOLINE  What changed:   · medication strength  · how much to take   50 mg, Oral, Every 8 Hours Scheduled         Continue These Medications      Instructions Start Date   acetaminophen 325 MG tablet  Commonly known as: TYLENOL   650 mg, Oral, Every 6 Hours PRN      atorvastatin 40 MG tablet  Commonly known as: LIPITOR   40 mg, Oral, Nightly      bisacodyl 5 MG EC tablet  Commonly known as: DULCOLAX   5 mg, Oral, Daily PRN      brimonidine 0.1 % solution ophthalmic solution  Commonly known as: ALPHAGAN P   1 drop      brinzolamide 1 % ophthalmic suspension  Commonly known as: AZOPT   1 drop      budesonide 0.5 MG/2ML nebulizer solution  Commonly known as: PULMICORT   0.5 mg, Inhalation      insulin aspart 100 UNIT/ML injection  Commonly known as: novoLOG   0-7 Units, Subcutaneous, 3 Times Daily Before Meals      insulin detemir 100 UNIT/ML injection  Commonly known as: Levemir   10 Units, Subcutaneous, Every 12 Hours Scheduled      ipratropium 0.02 % nebulizer solution  Commonly known as: ATROVENT   0.5 mg, Nebulization, Every 4 Hours PRN      ipratropium-albuterol 0.5-2.5 mg/3 ml nebulizer  Commonly known  as: DUO-NEB   3 mL, Nebulization, Every 4 Hours PRN      isosorbide mononitrate 30 MG 24 hr tablet  Commonly known as: IMDUR   30 mg, Oral, Every 24 Hours Scheduled      lansoprazole 30 MG capsule  Commonly known as: PREVACID   30 mg, Oral      latanoprost 0.005 % ophthalmic solution  Commonly known as: XALATAN   1 drop      magic butt ointment   1 each, Topical, 2 Times Daily      ondansetron 4 MG/2ML injection  Commonly known as: ZOFRAN   4 mg, Intravenous, Every 6 Hours PRN      Transfer Bench misc   Transfer bench r/t dementia, deconditioning.      Tub Transfer Board misc   Transfer tub bench r/t deconditioning, dementia, and blind r/t glaucoma         Stop These Medications    amiodarone 200 MG tablet  Commonly known as: PACERONE     carvedilol 6.25 MG tablet  Commonly known as: COREG            Discharge Diet:   Diet Instructions     Diet: Soft Texture; Thin Liquids, No Restrictions; Ground      Discharge Diet: Soft Texture    Fluid Consistency: Thin Liquids, No Restrictions    Soft Options: Ground          Activity at Discharge:   Activity Instructions     Activity as Tolerated            Follow-up Appointments:   1. Dr. Riggs 4 weeks    Test Results Pending at Discharge:   Pending Labs     Order Current Status    Blood Culture - Blood, Arm, Right Preliminary result    Blood Culture - Blood, Arm, Right Preliminary result          Ralph Weaver, APRN  02/09/21  15:18 CST    Time: Discharge planning and teaching took greater than 30 minutes.

## 2021-02-09 NOTE — PROGRESS NOTES
"[facility]  [address] [place] [address]. [address]  T - [phone]  F - [phone]     Progress Note          PATIENT  DEMOGRAPHICS   PATIENT NAME: Ondina Alanis .                      PHYSICIAN: Marley Akhtar MD  : 1940  MRN: [id number]   LOS: 4 days    Patient Care Team:  Mark Sheldon APRN as PCP - General (Nurse Practitioner)  Subjective   SUBJECTIVE   Resting no orthopnea UO upto 2.3L          Objective   OBJECTIVE   Vital Signs  Temp:  [98.6 °F (37 °C)-99.1 °F (37.3 °C)] 99 °F (37.2 °C)  Heart Rate:  [70-78] 76  Resp:  [16-18] 16  BP: (145-159)/(52-65) 159/65    Flowsheet Rows      First Filed Value   Admission Height  182.9 cm (72\") Documented at 2021   Admission Weight  96.2 kg (212 lb) Documented at 2021           I/O last 3 completed shifts:  In: 1140 [P.O.:1140]  Out: 2300 [Urine:2300]    PHYSICAL EXAM    Physical Exam  Constitutional:       Appearance: He is well-developed.   HENT:      Head: Normocephalic.   Eyes:      Pupils: Pupils are equal, round, and reactive to light.   Cardiovascular:      Rate and Rhythm: Normal rate and regular rhythm.      Heart sounds: Normal heart sounds.   Pulmonary:      Effort: Pulmonary effort is normal.      Breath sounds: Normal breath sounds.   Abdominal:      General: Bowel sounds are normal.      Palpations: Abdomen is soft.   Musculoskeletal:         General: No swelling.   Skin:     General: Skin is warm.   Neurological:      General: No focal deficit present.      Mental Status: He is alert.         RESULTS   Results Review:    Results from last 7 days   Lab Units 21  0726 21  0728 21  0618  21   SODIUM mmol/L 143 142 140   < > 138   POTASSIUM mmol/L 4.2 4.0 3.8   < > 5.1   CHLORIDE mmol/L 111* 107 104   < > 101   CO2 mmol/L 24.0 23.0 23.0   < > 22.0   BUN mg/dL 81* 95* 106*   < > 110*   CREATININE mg/dL 2.91* 3.62* 4.35*   < > 4.89*   CALCIUM mg/dL 8.6 8.0* " 8.4*   < > 8.2*   BILIRUBIN mg/dL  --   --   --   --  0.5   ALK PHOS U/L  --   --   --   --  80   ALT (SGPT) U/L  --   --   --   --  95*   AST (SGOT) U/L  --   --   --   --  135*   GLUCOSE mg/dL 125* 92 87   < > 102*    < > = values in this interval not displayed.       Estimated Creatinine Clearance: 24.8 mL/min (A) (by C-G formula based on SCr of 2.91 mg/dL (H)).                Results from last 7 days   Lab Units 02/09/21  0726 02/08/21  0728 02/07/21  0618 02/06/21  0634 02/05/21  1203 02/05/21  0622   WBC 10*3/mm3 8.05 8.84 12.46* 13.86*  --  14.84*   HEMOGLOBIN g/dL 8.8* 8.8* 8.8* 8.4* 7.0* 7.1*   PLATELETS 10*3/mm3 122* 95* 95* 93*  --  110*       Results from last 7 days   Lab Units 02/09/21  0726 02/08/21  0728 02/07/21  0618 02/06/21  0634 02/05/21  0622   INR  1.56* 1.40* 1.47* 1.54* 2.29*         Imaging Results (Last 24 Hours)     ** No results found for the last 24 hours. **           MEDICATIONS    Pharmacy to dose vancomycin, , Does not apply, Once  atorvastatin, 40 mg, Oral, Nightly  budesonide, 0.5 mg, Nebulization, BID - RT  hydrALAZINE, 25 mg, Oral, Q8H  insulin aspart, 0-7 Units, Subcutaneous, TID AC  insulin detemir, 10 Units, Subcutaneous, Q12H  isosorbide mononitrate, 30 mg, Oral, Q24H  magic butt ointment, , Topical, BID  pantoprazole, 40 mg, Oral, QAM  sodium chloride, 10 mL, Intravenous, Q12H  vancomycin, 1,000 mg, Intravenous, Q36H  warfarin, 3 mg, Oral, Daily      Pharmacy to dose vancomycin,   Pharmacy to dose warfarin,   sodium chloride, 75 mL/hr, Last Rate: 75 mL/hr (02/08/21 2050)        Assessment/Plan   ASSESSMENT / PLAN      Urinary obstruction    CKD (chronic kidney disease) stage 4, GFR 15-29 ml/min (CMS/HCC)    Diabetic peripheral neuropathy associated with type 2 diabetes mellitus (CMS/HCC)    Benign essential hypertension    COPD exacerbation (CMS/HCC)    Supratherapeutic INR    Sepsis due to urinary tract infection (CMS/HCC)    Chronic HFrEF (heart failure with reduced  ejection fraction) (CMS/[facility])    Bradycardia    Dementia (CMS/[facility])    Acute blood loss anemia    MRSA bacteremia       1.  CKD 3/4- Baseline creatinine used to be around 2.0, and now up to 2.9-3.0 in the last few readings.  It peaked to 4.9 cr down to 2.9.  keep gentle hydration for another day, no orthopnea.  ANT likely due to post renal obstruction.  He has now Burleson catheter placed under cystoscopy. Plan for chronic burleson     2.  Hypertension- BP now high and increase hydralazine to 50mg tid     3.  CHF- systolic heart failure EF 20%. severe LV systolic dysfunction with RV systolic pressure of 60 mmHg,      4.  A. Fib on amiodarone and on coumadin/ hematuria -off Coumadin for now.  INR is better, s/p 1 unit of packed RBC hgb stable    5. MRSA bacteremia now on vancomycin. Had previous + mrsa October 2020. Keep vancomycin for now. Repeat culture .                 This document has been electronically signed by Marley Akhtar MD on February 9, 2021 09:50 CST

## 2021-02-09 NOTE — PROGRESS NOTES
"    Pharmacokinetics by Pharmacy - Vancomycin    Ondina Alanis Sr. is a 80 y.o. male  [Ht: 182.9 cm (72.01\"); Wt: 96.5 kg (212 lb 11.2 oz)]    Estimated Creatinine Clearance: 24.8 mL/min (A) (by C-G formula based on SCr of 2.91 mg/dL (H)).   Creatinine   Date Value Ref Range Status   02/09/2021 2.91 (H) 0.76 - 1.27 mg/dL Final   02/08/2021 3.62 (H) 0.76 - 1.27 mg/dL Final   02/07/2021 4.35 (H) 0.76 - 1.27 mg/dL Final   02/15/2018 2.4 (H) 0.6 - 1.3 mg/dL Final      Lab Results   Component Value Date    WBC 8.05 02/09/2021    WBC 8.84 02/08/2021    WBC 12.46 (H) 02/07/2021      Temp Readings from Last 1 Encounters:   02/09/21 99 °F (37.2 °C) (Axillary)      Lab Results   Component Value Date    VANCOPEAK 24.80 02/07/2021    VANCOTROUGH 13.40 02/09/2021    VANCORANDOM 9.60 02/07/2021        C-Reactive Protein   Date Value Ref Range Status   09/23/2020 3.63 (H) 0.00 - 0.50 mg/dL Final     Lactate   Date Value Ref Range Status   02/05/2021 2.1 (C) 0.5 - 2.0 mmol/L Final   02/04/2021 2.2 (C) 0.5 - 2.0 mmol/L Final   09/24/2020 1.4 0.5 - 2.0 mmol/L Final       Culture Results:  Microbiology Results (last 10 days)       Procedure Component Value - Date/Time    Blood Culture - Blood, Arm, Right [[id number]] Collected: 02/08/21 0728    Lab Status: Preliminary result Specimen: Blood from Arm, Right Updated: 02/09/21 0746     Blood Culture No growth at 24 hours    Urine Culture - Urine, Urine, Catheter [[id number]]  (Normal) Collected: 02/05/21 1043    Lab Status: Final result Specimen: Urine, Catheter Updated: 02/06/21 1340     Urine Culture No growth    Blood Culture - Blood, Arm, Left [[id number]]  (Abnormal)  (Susceptibility) Collected: 02/04/21 2206    Lab Status: Final result Specimen: Blood from Arm, Left Updated: 02/08/21 0956     Blood Culture Staphylococcus aureus, MRSA     Comment: Infectious disease consultation is highly recommended to rule out distant foci of infection.  Methicillin resistant Staphylococcus " aureus, Patient may be an isolation risk.        Isolated from Aerobic and Anaerobic Bottles     Gram Stain Anaerobic Bottle Gram positive cocci in clusters     Comment: One bottle positive of four drawn         Aerobic Bottle Gram positive cocci in clusters     Comment: Third bottle positive of four drawn       Susceptibility        Staphylococcus aureus, MRSA     TIFFANY     Gentamicin Susceptible     Oxacillin Resistant     Rifampin Susceptible     Vancomycin Susceptible                  Susceptibility Comments       Staphylococcus aureus, MRSA    This isolate does not demonstrate inducible clindamycin resistance in vitro.                 Blood Culture ID, PCR - Blood, Arm, Left [[id number]]  (Abnormal) Collected: 02/04/21 2206    Lab Status: Final result Specimen: Blood from Arm, Left Updated: 02/05/21 2159     BCID, PCR Staphylococcus aureus. mecA (methicillin resistance gene) detected. Identification by BCID PCR.    Narrative:      Sensitivity to Follow    Blood Culture - Blood, Arm, Left [[id number]]  (Abnormal) Collected: 02/04/21 2130    Lab Status: Final result Specimen: Blood from Arm, Left Updated: 02/08/21 0956     Blood Culture Staphylococcus aureus, MRSA     Comment: Infectious disease consultation is highly recommended to rule out distant foci of infection.  Methicillin resistant Staphylococcus aureus, Patient may be an isolation risk.        Isolated from Aerobic and Anaerobic Bottles     Gram Stain Aerobic Bottle Gram positive cocci in clusters     Comment: Second bottle positive of four drawn         Anaerobic Bottle Gram positive cocci in clusters     Comment: Forth bottle positive of four drawn; all same organism       Narrative:      Refer to previous blood culture collected on 2/4/2021 at 2306 for MICS    COVID-19 and FLU A/B PCR - Swab, Nasopharynx [[id number]]  (Normal) Collected: 02/04/21 2001    Lab Status: Final result Specimen: Swab from Nasopharynx Updated: 02/04/21 2032     COVID19 Not  Detected     Influenza A PCR Not Detected     Influenza B PCR Not Detected    Narrative:      Fact sheet for providers: https://www.fda.gov/media/831828/download    Fact sheet for patients: https://www.fda.gov/media/458472/download    Test performed by PCR.          No results found for: RESPCX    Indication for use: MRSA bacteremia    Current Vancomycin Dose:  1000 mg IVPB every 36 hours, day 4 of therapy.      Assessment/Plan:  Reviewed above labs and cultures. Results above  WBC is 8.05, in goal range and Cr is 2.91, improving.  Vancomycin peak level from 2/7 at 1544 is 24.8 (goal 30-40). Vancomycin trough level from 2/9 at 0041 is 13.4 (goal 10-20). Lab levels were drawn at the correct time. This gives an AUC of 451.69 (goal 400-600. Will continue current dose. Pharmacy will continue to monitor renal function and adjust dose accordingly.    Fany Byers, PharmD   02/09/21 08:44 CST

## 2021-02-09 NOTE — PROGRESS NOTES
"[facility]  [address] [place] [address]. [address]  T - [phone]  F - [phone]     Progress Note          PATIENT  DEMOGRAPHICS   PATIENT NAME: Ondina Alanis .                      PHYSICIAN: Marley Akhtar MD  : 1940  MRN: [id number]   LOS: 4 days    Patient Care Team:  Mark Sheldon APRN as PCP - General (Nurse Practitioner)  Subjective   SUBJECTIVE   Resting no distress, UO better          Objective   OBJECTIVE   Vital Signs  Temp:  [98.6 °F (37 °C)-99.1 °F (37.3 °C)] 99 °F (37.2 °C)  Heart Rate:  [70-78] 76  Resp:  [16-18] 16  BP: (145-159)/(52-65) 159/65    Flowsheet Rows      First Filed Value   Admission Height  182.9 cm (72\") Documented at 2021   Admission Weight  96.2 kg (212 lb) Documented at 2021           I/O last 3 completed shifts:  In: 1140 [P.O.:1140]  Out: 2300 [Urine:2300]    PHYSICAL EXAM    Physical Exam  Constitutional:       Appearance: He is well-developed.   HENT:      Head: Normocephalic.   Eyes:      Pupils: Pupils are equal, round, and reactive to light.   Cardiovascular:      Rate and Rhythm: Normal rate and regular rhythm.      Heart sounds: Normal heart sounds.   Pulmonary:      Effort: Pulmonary effort is normal.      Breath sounds: Normal breath sounds.   Abdominal:      General: Bowel sounds are normal.      Palpations: Abdomen is soft.   Musculoskeletal:         General: No swelling.   Skin:     General: Skin is warm.   Neurological:      General: No focal deficit present.      Mental Status: He is alert.         RESULTS   Results Review:    Results from last 7 days   Lab Units 21  0726 21  0728 21  0618  21   SODIUM mmol/L 143 142 140   < > 138   POTASSIUM mmol/L 4.2 4.0 3.8   < > 5.1   CHLORIDE mmol/L 111* 107 104   < > 101   CO2 mmol/L 24.0 23.0 23.0   < > 22.0   BUN mg/dL 81* 95* 106*   < > 110*   CREATININE mg/dL 2.91* 3.62* 4.35*   < > 4.89*   CALCIUM mg/dL 8.6 8.0* 8.4* "   < > 8.2*   BILIRUBIN mg/dL  --   --   --   --  0.5   ALK PHOS U/L  --   --   --   --  80   ALT (SGPT) U/L  --   --   --   --  95*   AST (SGOT) U/L  --   --   --   --  135*   GLUCOSE mg/dL 125* 92 87   < > 102*    < > = values in this interval not displayed.       Estimated Creatinine Clearance: 24.8 mL/min (A) (by C-G formula based on SCr of 2.91 mg/dL (H)).                Results from last 7 days   Lab Units 02/09/21  0726 02/08/21  0728 02/07/21  0618 02/06/21  0634 02/05/21  1203 02/05/21  0622   WBC 10*3/mm3 8.05 8.84 12.46* 13.86*  --  14.84*   HEMOGLOBIN g/dL 8.8* 8.8* 8.8* 8.4* 7.0* 7.1*   PLATELETS 10*3/mm3 122* 95* 95* 93*  --  110*       Results from last 7 days   Lab Units 02/09/21  0726 02/08/21  0728 02/07/21  0618 02/06/21  0634 02/05/21  0622   INR  1.56* 1.40* 1.47* 1.54* 2.29*         Imaging Results (Last 24 Hours)     ** No results found for the last 24 hours. **           MEDICATIONS    Pharmacy to dose vancomycin, , Does not apply, Once  atorvastatin, 40 mg, Oral, Nightly  budesonide, 0.5 mg, Nebulization, BID - RT  hydrALAZINE, 25 mg, Oral, Q8H  insulin aspart, 0-7 Units, Subcutaneous, TID AC  insulin detemir, 10 Units, Subcutaneous, Q12H  isosorbide mononitrate, 30 mg, Oral, Q24H  magic butt ointment, , Topical, BID  pantoprazole, 40 mg, Oral, QAM  sodium chloride, 10 mL, Intravenous, Q12H  vancomycin, 1,000 mg, Intravenous, Q36H  warfarin, 3 mg, Oral, Daily      Pharmacy to dose vancomycin,   Pharmacy to dose warfarin,   sodium chloride, 75 mL/hr, Last Rate: 75 mL/hr (02/08/21 2050)        Assessment/Plan   ASSESSMENT / PLAN      Urinary obstruction    CKD (chronic kidney disease) stage 4, GFR 15-29 ml/min (CMS/HCC)    Diabetic peripheral neuropathy associated with type 2 diabetes mellitus (CMS/HCC)    Benign essential hypertension    COPD exacerbation (CMS/HCC)    Supratherapeutic INR    Sepsis due to urinary tract infection (CMS/HCC)    Chronic HFrEF (heart failure with reduced ejection  fraction) (CMS/[facility])    Bradycardia    Dementia (CMS/[facility])    Acute blood loss anemia    MRSA bacteremia       1.  CKD 3/4- Baseline creatinine used to be around 2.0, and now up to 2.9-3.0 in the last few readings.  It peaked to 4.9 cr down to 3.6.  keep gentle hydration, no orthopnea.  ANT likely due to post renal obstruction.  He has now Moore catheter placed under cystoscopy.     2.  Hypertension- BP now high and increase hydralazine to 50mg tid     3.  CHF- systolic heart failure EF 20%. severe LV systolic dysfunction with RV systolic pressure of 60 mmHg,      4.  A. Fib on amiodarone and on coumadin/ hematuria -off Coumadin for now.  INR is better, s/p 1 unit of packed RBC hgb stable    5. MRSA bacteremia now on vancomycin. Plan for TTE to look the source. Had previous + mrsa October 2020. Keep vancomycin for now                This document has been electronically signed by Marley Akhtar MD on February 9, 2021 09:49 CST

## 2021-02-09 NOTE — PLAN OF CARE
Goal Outcome Evaluation:  Plan of Care Reviewed With: patient     Outcome Summary: OT treatment this date. Pt was alert but resisted reposition and easily agitated pt appears to be hallucinating. Attempted to get pt to engage in self feeding but pt became very agitated and upset resisted assisted or HHA, pt making verbal outbursts. Pt noted with tremor at times when holding the fork in his left hand. Pt was HHA to get a bite to his mouth appeared to want OT to feed him. Pt currently not appropriate for skilled OT at this time, and appears to be at baseline, pt is easily agitated and resisitve to assistance and education and/or has been refusing therapy since eval. OT will d/c at this time, please re-consult if needed. Recommend 24/7 care at d/c.

## 2021-02-09 NOTE — PLAN OF CARE
Goal Outcome Evaluation:     Progress: no change      Quality 431: Preventive Care And Screening: Unhealthy Alcohol Use - Screening: Patient screened for unhealthy alcohol use using a single question and scores less than 2 times per year Quality 137: Melanoma: Continuity Of Care - Recall System: Patient information entered into a recall system that includes: target date for the next exam specified AND a process to follow up with patients regarding missed or unscheduled appointments Quality 130: Documentation Of Current Medications In The Medical Record: Current Medications Documented Detail Level: Detailed Quality 226: Preventive Care And Screening: Tobacco Use: Screening And Cessation Intervention: Patient screened for tobacco use and is an ex/non-smoker

## 2021-02-09 NOTE — THERAPY DISCHARGE NOTE
Acute Care - Occupational Therapy Discharge  [facility]    Patient Name: Ondina Alanis Sr.  : 1940    MRN: [id number]                              Today's Date: 2021       Admit Date: 2021    Visit Dx:     ICD-10-CM ICD-9-CM   1. Urinary obstruction  N13.9 599.60   2. ANT (acute kidney injury) (CMS/[facility])  N17.9 584.9   3. Pleural effusion on left  J90 511.9   4. Proctitis  K62.89 569.49   5. Oral phase dysphagia  R13.11 787.21   6. Impaired functional mobility, balance, gait, and endurance  Z74.09 V49.89   7. Impaired mobility and activities of daily living  Z74.09 V49.89    Z78.9      Patient Active Problem List   Diagnosis   • CKD (chronic kidney disease) stage 4, GFR 15-29 ml/min (CMS/[facility])   • Diabetic peripheral neuropathy associated with type 2 diabetes mellitus (CMS/[facility])   • Diabetes mellitus type II, uncontrolled (CMS/[facility])   • GERD (gastroesophageal reflux disease)   • Primary osteoarthritis of both knees   • Pulmonary embolism (CMS/[facility])   • BPH (benign prostatic hyperplasia)   • Benign essential hypertension   • Asthma   • Pleurisy   • Acute respiratory failure with hypoxia (CMS/[facility])   • Stage 1 acute kidney injury (CMS/[facility])   • Left ventricular diastolic dysfunction   • Exacerbation of asthma   • COPD exacerbation (CMS/[facility])   • Atrial flutter (CMS/[facility])   • Acute systolic CHF (congestive heart failure) (CMS/[facility])   • Pneumonia of both lower lobes due to infectious organism   • Urinary obstruction   • Supratherapeutic INR   • Sepsis due to urinary tract infection (CMS/[facility])   • Chronic HFrEF (heart failure with reduced ejection fraction) (CMS/[facility])   • Bradycardia   • Dementia (CMS/[facility])   • Acute blood loss anemia   • MRSA bacteremia     Past Medical History:   Diagnosis Date   • Asthma    • Benign prostatic hyperplasia    • CHF (congestive heart failure) (CMS/[facility])    • Coronary artery disease    • Diabetes mellitus (CMS/[facility])    • GERD (gastroesophageal reflux disease)    •  Hyperlipidemia    • Hypertension    • Prostate disorder    • Renal insufficiency    • Urinary tract infection      Past Surgical History:   Procedure Laterality Date   • BACK SURGERY     • CYSTOSCOPY N/A 2/5/2021    Procedure: CYSTOSCOPY WITH CATHETER PLACEMENT;  Surgeon: Orlin Riggs MD;  Location: [facility];  Service: Urology;  Laterality: N/A;   • KNEE SURGERY Left    • PROSTATE SURGERY       General Information     Row Name 02/09/21 0748          OT Time and Intention    Document Type  therapy note (daily note)  -     Mode of Treatment  individual therapy;occupational therapy  -     Row Name 02/09/21 0748          General Information    Patient Profile Reviewed  yes  -     Existing Precautions/Restrictions  fall  -     Barriers to Rehab  medically complex;previous functional deficit;cognitive status  -     Row Name 02/09/21 0748          Cognition    Orientation Status (Cognition)  oriented to;person would respond to name would not answer questions  -     Row Name 02/09/21 0748          Safety Issues, Functional Mobility    Safety Issues Affecting Function (Mobility)  ability to follow commands;at risk behavior observed;awareness of need for assistance;impulsivity;safety precaution awareness;problem-solving;judgment;insight into deficits/self-awareness;safety precautions follow-through/compliance;sequencing abilities  -     Impairments Affecting Function (Mobility)  balance;coordination;endurance/activity tolerance;motor planning;pain;strength;sensation/sensory awareness;range of motion (ROM);cognition;motor control;postural/trunk control  -     Cognitive Impairments, Mobility Safety/Performance  attention;awareness, need for assistance;impulsivity;insight into deficits/self-awareness;judgment;problem-solving/reasoning;sequencing abilities;safety precaution awareness  -       User Key  (r) = Recorded By, (t) = Taken By, (c) = Cosigned By    Initials Name Provider Type     Genny Greenwood  R, OTR/L Occupational Therapist        Mobility/ADL's     Row Name 02/09/21 0748          Bed Mobility    Comment (Bed Mobility)  pt dependent to reposition in bed, pt very agitated and getting made did not want to move, would not allow OT to reposition him again or move the pillow so his head was not on the rail; informed CNA  -     Row Name 02/09/21 0748          Activities of Daily Living    BADL Assessment/Intervention  feeding  -     Row Name 02/09/21 0748          Self-Feeding Assessment/Training    Comment (Feeding)  attempted to engage pt in self feeding, pt would not answer if left or right handed. OT placed the fork with a pice of banana and then pancake in his hand and pt became more agitated. pt was HHA to move to his mouth and get into his mouth tremor noted when holding the fork in left hand, no movement noted with right hand, pt resisted the assist, appeared to just want OT to feed him, Pt did not answer when asked who helped him at home or if he was helping his family feed himself  -       User Key  (r) = Recorded By, (t) = Taken By, (c) = Cosigned By    Initials Name Provider Type     Genny Greenwood R, OTR/L Occupational Therapist        Obj/Interventions    No documentation.       Goals/Plan     Row Name 02/09/21 0748          Bathing Goal 1 (OT)    Activity/Device (Bathing Goal 1, OT)  upper body bathing  -     [place] Level/Cues Needed (Bathing Goal 1, OT)  contact guard assist  -     Time Frame (Bathing Goal 1, OT)  long term goal (LTG)  -     Progress/Outcomes (Bathing Goal 1, OT)  goal not met  -[facility] Name 02/09/21 0748          Dressing Goal 1 (OT)    Activity/Device (Dressing Goal 1, OT)  upper body dressing  -     [place]/Cues Needed (Dressing Goal 1, OT)  contact guard assist  -     Time Frame (Dressing Goal 1, OT)  long term goal (LTG)  -     Progress/Outcome (Dressing Goal 1, OT)  goal not met  -[facility] Name 02/09/21 0748          Grooming Goal 1 (OT)     Activity/Device (Grooming Goal 1, OT)  grooming skills, all  -     [place] (Grooming Goal 1, OT)  contact guard assist  -     Time Frame (Grooming Goal 1, OT)  long term goal (LTG)  -     Progress/Outcome (Grooming Goal 1, OT)  goal not met  -       User Key  (r) = Recorded By, (t) = Taken By, (c) = Cosigned By    Initials Name Provider Type     Genny Greenwood, OTR/L Occupational Therapist        Clinical Impression     Row Name 02/09/21 0748          Pain Assessment    Additional Documentation  Pain Scale: Numbers Pre/Post-Treatment (Group)  -     Row Name 02/09/21 0748          Pain Scale: Numbers Pre/Post-Treatment    Pre/Posttreatment Pain Comment  pt does not appear to be in pain does not verbalize any but easily agitated and upset  -     Row Name 02/09/21 0748          Plan of Care Review    Plan of Care Reviewed With  patient  -     Outcome Summary  OT treatment this date. Pt was alert but resisted reposition and easily agitated pt appears to be hallucinating. Attempted to get pt to engage in self feeding but pt became very agitated and upset resisted assisted or HHA, pt making verbal outbursts. Pt noted with tremor at times when holding the fork in his left hand. Pt was HHA to get a bite to his mouth appeared to want OT to feed him. Pt currently not appropriate for skilled OT at this time, and appears to be at baseline, pt is easily agitated and resisitve to assistance and education and/or has been refusing therapy since eval. OT will d/c at this time, please re-consult if needed. Recommend 24/7 care at d/c.  -     Row Name 02/09/21 0748          Therapy Plan Review/Discharge Plan (OT)    Anticipated Discharge Disposition (OT)  home with 24/7 care;home with assist;skilled nursing facility  -     Row Name 02/09/21 0748          Vital Signs    Pre Patient Position  Supine  -     Intra Patient Position  Supine  -     Post Patient Position  Supine  -     Row Name 02/09/21 0748           Positioning and Restraints    Pre-Treatment Position  in bed  -     Post Treatment Position  bed  -BH     In Bed  supine;call light within reach;encouraged to call for assist;exit alarm on;side rails up x2;notified [facility]  -       User Key  (r) = Recorded By, (t) = Taken By, (c) = Cosigned By    Initials Name Provider Type     Genny Greenwood, OTR/L Occupational Therapist        Outcome Measures     Row Name 02/09/21 0748          How much help from another is currently needed...    Putting on and taking off regular lower body clothing?  1  -     Bathing (including washing, rinsing, and drying)  2  -BH     Toileting (which includes using toilet bed pan or urinal)  1  -     Putting on and taking off regular upper body clothing  2  -     Taking care of personal grooming (such as brushing teeth)  2  -     Eating meals  2  -     AM-PAC 6 Clicks Score (OT)  10  -     Row Name 02/09/21 0748          Functional Assessment    Outcome Measure Options  AM-PAC 6 Clicks Daily Activity (OT)  [facility]       User Key  (r) = Recorded By, (t) = Taken By, (c) = Cosigned By    Initials Name Provider Type     Genny Greenwood, OTR/L Occupational Therapist        Occupational Therapy Education                 Title: PT OT SLP Therapies (In Progress)     Topic: Occupational Therapy (In Progress)     Point: ADL training (In Progress)     Description:   Instruct learner(s) on proper safety adaptation and remediation techniques during self care or transfers.   Instruct in proper use of assistive devices.              Learning Progress Summary           Patient Nonacceptance, E, NR,NL by  at 2/9/2021 0591    Comment: Educated about OT and POC. Educated on benefits of therapy. Educated to call for assistance.                   Point: Home exercise program (Not Started)     Description:   Instruct learner(s) on appropriate technique for monitoring, assisting and/or progressing therapeutic exercises/activities.               Learner Progress:  Not documented in this visit.          Point: Precautions (In Progress)     Description:   Instruct learner(s) on prescribed precautions during self-care and functional transfers.              Learning Progress Summary           Patient Nonacceptance, E, NR,NL by  at 2/9/2021 1347    Comment: Educated about OT and POC. Educated on benefits of therapy. Educated to call for assistance.    Acceptance, E, VU,NR by  at 2/6/2021 1249    Comment: Edu pt on no OOB without assist.                   Point: Body mechanics (Not Started)     Description:   Instruct learner(s) on proper positioning and spine alignment during self-care, functional mobility activities and/or exercises.              Learner Progress:  Not documented in this visit.                      User Key     Initials Effective Dates Name Provider Type Discipline     07/24/19 -  Romero Munguia OT Occupational Therapist OT     06/08/18 -  Genny Greenwood OTR/L Occupational Therapist OT              OT Recommendation and Plan  Retired Outcome Summary/Treatment Plan (OT)  Anticipated Discharge Disposition (OT): home with 24/7 care, skilled nursing facility  Plan of Care Review  Plan of Care Reviewed With: patient  Outcome Summary: OT treatment this date. Pt was alert but resisted reposition and easily agitated pt appears to be hallucinating. Attempted to get pt to engage in self feeding but pt became very agitated and upset resisted assisted or HHA, pt making verbal outbursts. Pt noted with tremor at times when holding the fork in his left hand. Pt was HHA to get a bite to his mouth appeared to want OT to feed him. Pt currently not appropriate for skilled OT at this time, and appears to be at baseline, pt is easily agitated and resisitve to assistance and education and/or has been refusing therapy since eval. OT will d/c at this time, please re-consult if needed. Recommend 24/7 care at d/c.  Plan of Care Reviewed With:  patient  Outcome Summary: OT treatment this date. Pt was alert but resisted reposition and easily agitated pt appears to be hallucinating. Attempted to get pt to engage in self feeding but pt became very agitated and upset resisted assisted or HHA, pt making verbal outbursts. Pt noted with tremor at times when holding the fork in his left hand. Pt was HHA to get a bite to his mouth appeared to want OT to feed him. Pt currently not appropriate for skilled OT at this time, and appears to be at baseline, pt is easily agitated and resisitve to assistance and education and/or has been refusing therapy since eval. OT will d/c at this time, please re-consult if needed. Recommend 24/7 care at d/c.     Time Calculation:   Time Calculation- OT     Row Name 02/09/21 1349             Time Calculation-     OT Start Time  0748  -      OT Stop Time  0816  -      OT Time Calculation (min)  28 min  -      Total Timed Code Minutes- OT  28 minute(s)  -      OT Received On  02/09/21  -        User Key  (r) = Recorded By, (t) = Taken By, (c) = Cosigned By    Initials Name Provider Type     Genny Greenwood, OTR/L Occupational Therapist        Therapy Charges for Today     Code Description Service Date Service Provider Modifiers Qty    20583521177  OT SELF CARE/MGMT/TRAIN EA 15 MIN 2/9/2021 Genny Greenwood OTR/L GO 2               Genny Greenwood OTR/DIONNE  2/9/2021

## 2021-02-09 NOTE — THERAPY TREATMENT NOTE
Acute Care - Physical Therapy Treatment Note  [facility]     Patient Name: Ondina Alanis Sr.  : 1940  MRN: [id number]  Today's Date: 2021           PT Assessment (last 12 hours)      PT Evaluation and Treatment     Row Name 21          Physical Therapy Time and Intention    Document Type  therapy note (daily note)  -RW     Mode of Treatment  physical therapy  -RW     Patient Effort  poor  -RW     Comment  pt combative and angry/confused  -     Row Name 21          General Information    Patient Profile Reviewed  yes  -RW     Existing Precautions/Restrictions  fall  -     Row Name 21          Vision Assessment/Intervention    Visual Impairment/Limitations  legally blind  -     Row Name 21          Cognition    Orientation Status (Cognition)  oriented to;person  -     Row Name 21          Pain Scale: Numbers Pre/Post-Treatment    Pre/Posttreatment Pain Comment  gives no rating  -     Row Name 21          Bed Mobility    Comment (Bed Mobility)  dependant to move in bed to adjust pillows and untangle iv lines  -     Row Name 21          Transfers    Comment (Transfers)  none attempted  -     Row Name 21          Safety Issues, Functional Mobility    Impairments Affecting Function (Mobility)  balance;coordination;endurance/activity tolerance;motor planning;pain;strength;sensation/sensory awareness;range of motion (ROM)  -     Row Name 21          Plan of Care Review    Outcome Summary  pt angry and resistive  -     Row Name 21          Bed Mobility Goal 1 (PT)    Activity/Assistive Device (Bed Mobility Goal 1, PT)  sit to supine;supine to sit  -RW     [place] Level/Cues Needed (Bed Mobility Goal 1, PT)  minimum assist (75% or more patient effort)  -RW     Time Frame (Bed Mobility Goal 1, PT)  3 days  -RW     Progress/Outcomes (Bed Mobility Goal 1, PT)  goal not met   -RW     Row Name 02/09/21 0955          Transfer Goal 1 (PT)    Activity/Assistive Device (Transfer Goal 1, PT)  sit-to-stand/stand-to-sit;bed-to-chair/chair-to-bed;walker, rolling  -RW     [place] Level/Cues Needed (Transfer Goal 1, PT)  minimum assist (75% or more patient effort)  -RW     Time Frame (Transfer Goal 1, PT)  5 days  -RW     Progress/Outcome (Transfer Goal 1, PT)  goal not met  -RW     Row Name 02/09/21 0955          Gait Training Goal 1 (PT)    Activity/Assistive Device (Gait Training Goal 1, PT)  gait (walking locomotion);assistive device use;decrease fall risk;increase endurance/gait distance;walker, rolling  -RW     [place] Level (Gait Training Goal 1, PT)  minimum assist (75% or more patient effort)  -RW     Distance (Gait Training Goal 1, PT)  5ft or more  -RW     Time Frame (Gait Training Goal 1, PT)  1 week  -RW     Progress/Outcome (Gait Training Goal 1, PT)  goal not met  -RW     Row Name 02/09/21 0955          Positioning and Restraints    Pre-Treatment Position  in bed  -RW     Post Treatment Position  bed  -RW     Row Name 02/09/21 0955          Therapy Assessment/Plan (PT)    Rehab Potential (PT)  fair, will monitor progress closely  -RW     Criteria for Skilled Interventions Met (PT)  yes;meets criteria  -RW       User Key  (r) = Recorded By, (t) = Taken By, (c) = Cosigned By    Initials Name Provider Type    RW Luis Hobbs, PTA Physical Therapy Assistant        Physical Therapy Education                 Title: PT OT SLP Therapies (In Progress)     Topic: Physical Therapy (In Progress)     Point: Mobility training (Done)     Learning Progress Summary           Patient Acceptance, E, VU by RONNIE at 2/6/2021 1216    Comment: Role of PT, POC, mobility/ changing positions to decrease back pain                   Point: Home exercise program (Not Started)     Learner Progress:  Not documented in this visit.          Point: Body mechanics (Not Started)     Learner Progress:  Not  documented in this visit.          Point: Precautions (Done)     Learning Progress Summary           Patient Acceptance, E, VU by RONNIE at 2/6/2021 1216    Comment: Role of PT, POC, mobility/ changing positions to decrease back pain                               User Key     Initials Effective Dates Name Provider Type Discipline     08/09/20 -  Rochelle Rudd, PT Physical Therapist PT              PT Recommendation and Plan  Anticipated Discharge Disposition (PT): skilled nursing facility, home with 24/7 care, home with home health(pending progress in acute care)  Therapy Frequency (PT): 5 times/wk(6-11x/week)  Progress Summary (PT)  Progress Toward Functional Goals (PT): progress toward functional goals is gradual  Plan of Care Reviewed With: patient  Progress: no change  Outcome Summary: pt is having a bad day and is angry about home issues that are not happening. he thinks he is at home and someone is tearing things up. needs adjusting in bed to prevent pressure areas. pt not participating.       Time Calculation:   PT Charges     Row Name 02/09/21 1136             Time Calculation    Start Time  0955  -RW      Stop Time  1005  -RW      Time Calculation (min)  10 min  -RW         Time Calculation- PT    Total Timed Code Minutes- PT  10 minute(s)  -RW        User Key  (r) = Recorded By, (t) = Taken By, (c) = Cosigned By    Initials Name Provider Type    Luis Olivares PTA Physical Therapy Assistant        Therapy Charges for Today     Code Description Service Date Service Provider Modifiers Qty    00191193694 HC PT THER PROC EA 15 MIN 2/8/2021 Luis Hobbs PTA GP 2    57760820120 HC PT THERAPEUTIC ACT EA 15 MIN 2/9/2021 Luis Hobbs PTA GP 1          PT G-Codes  Outcome Measure Options: AM-PAC 6 Clicks Daily Activity (OT)  AM-PAC 6 Clicks Score (PT): 7  AM-PAC 6 Clicks Score (OT): 10    Luis Hobbs PTA  2/9/2021

## 2021-02-09 NOTE — PLAN OF CARE
Goal Outcome Evaluation:  Plan of Care Reviewed With: patient  Progress: no change  Outcome Summary: pt is having a bad day and is angry about home issues that are not happening. he thinks he is at home and someone is tearing things up. needs adjusting in bed to prevent pressure areas. pt not participating.

## 2021-02-09 NOTE — PLAN OF CARE
Goal Outcome Evaluation:  Plan of Care Reviewed With: patient  Progress: no change  Outcome Summary: Pt seen for skilled ST services this date to address oral dysphagia.  SLP repositioned pt upright in bed w/assist from staff.  Pt kept eyes closed during session; however, accepted solids and liquids when presented.  Pt consumed mech soft solids/mixed consistency (cheerios w/milk) w/efficient mastication, complete oral clearance, and timely AP transit.  Pt consumed thin liquids via straw w/no overt s/s of aspiration.  SLP recommend d/c on current baseline diet of mech soft solids/thin liquids at this time w/feeding assist and safe swallowing strategies.  SLP unable to fully educate pt on d/c instructions d/t cognitive deficits.

## 2021-02-09 NOTE — PLAN OF CARE
Goal Outcome Evaluation:  Plan of Care Reviewed With: patient  Progress: improving  Outcome Summary: Patient is alert and pleasantly confused at beginning of shift. He eats 100% of snacks and drinks 480ml of fluids including prune juice. He believes he is at his home. After about 2130, he becomes angry, agitated, and combative during all care provided. He is non trusting and uncooperative. VSS. Will continue to monitor.

## 2021-02-09 NOTE — THERAPY DISCHARGE NOTE
Acute Care - Speech Language Pathology   Swallow Treatment Note/Discharge [facility]     Patient Name: Ondina Alanis Sr.  : 1940  MRN: [id number]  Today's Date: 2021               Admit Date: 2021     Goal:  Patient will safely tolerate least restricted diet w/no overt s/s of aspiration for adequate nutrition and hydration:  Pt seen for skilled ST services this date to address oral dysphagia. SLP repositioned pt upright in bed w/assist from staff. Pt kept eyes closed during session; however, accepted solids and liquids when presented. Pt consumed [facility] soft solids/mixed consistency (cheerios w/milk) w/efficient mastication, complete oral clearance, and timely AP transit. Pt consumed thin liquids via straw w/no overt s/s of aspiration. SLP recommend d/c on current baseline diet of [facility] soft solids/thin liquids at this time w/feeding assist and safe swallowing strategies. SLP unable to fully educate pt on d/c instructions d/t cognitive deficits.      Visit Dx:    ICD-10-CM ICD-9-CM   1. Urinary obstruction  N13.9 599.60   2. ANT (acute kidney injury) (CMS/HCC)  N17.9 584.9   3. Pleural effusion on left  J90 511.9   4. Proctitis  K62.89 569.49   5. Oral phase dysphagia  R13.11 787.21   6. Impaired functional mobility, balance, gait, and endurance  Z74.09 V49.89   7. Impaired mobility and activities of daily living  Z74.09 V49.89    Z78.9      Patient Active Problem List   Diagnosis   • CKD (chronic kidney disease) stage 4, GFR 15-29 ml/min (CMS/HCC)   • Diabetic peripheral neuropathy associated with type 2 diabetes mellitus (CMS/HCC)   • Diabetes mellitus type II, uncontrolled (CMS/HCC)   • GERD (gastroesophageal reflux disease)   • Primary osteoarthritis of both knees   • Pulmonary embolism (CMS/HCC)   • BPH (benign prostatic hyperplasia)   • Benign essential hypertension   • Asthma   • Pleurisy   • Acute respiratory failure with hypoxia (CMS/HCC)   • Stage 1 acute kidney injury (CMS/HCC)    • Left ventricular diastolic dysfunction   • Exacerbation of asthma   • COPD exacerbation (CMS/HCC)   • Atrial flutter (CMS/HCC)   • Acute systolic CHF (congestive heart failure) (CMS/HCC)   • Pneumonia of both lower lobes due to infectious organism   • Urinary obstruction   • Supratherapeutic INR   • Sepsis due to urinary tract infection (CMS/HCC)   • Chronic HFrEF (heart failure with reduced ejection fraction) (CMS/HCC)   • Bradycardia   • Dementia (CMS/HCC)   • Acute blood loss anemia   • MRSA bacteremia     Past Medical History:   Diagnosis Date   • Asthma    • Benign prostatic hyperplasia    • CHF (congestive heart failure) (CMS/HCC)    • Coronary artery disease    • Diabetes mellitus (CMS/HCC)    • GERD (gastroesophageal reflux disease)    • Hyperlipidemia    • Hypertension    • Prostate disorder    • Renal insufficiency    • Urinary tract infection      Past Surgical History:   Procedure Laterality Date   • BACK SURGERY     • CYSTOSCOPY N/A 2/5/2021    Procedure: CYSTOSCOPY WITH CATHETER PLACEMENT;  Surgeon: Orlin Riggs MD;  Location: [facility];  Service: Urology;  Laterality: N/A;   • KNEE SURGERY Left    • PROSTATE SURGERY            SWALLOW EVALUATION (last 72 hours)      SLP Adult Swallow Evaluation     Row Name 02/09/21 0748 02/09/21 0716 02/08/21 0937 02/07/21 1006          Rehab Evaluation    Document Type  --  therapy note (daily note)  -CK  therapy note (daily note)  -CK  therapy note (daily note)  -CK     Total Evaluation Minutes, SLP  --  -CK  27  -CK  26  -CK  23  -CK     Subjective Information  --  no complaints  -CK  --  no complaints  -CK     Patient Observations  --  cooperative  -CK  --  lethargic;poorly cooperative  -CK     Patient/Family/Caregiver Comments/Observations  --  No family present at bedside  -CK  --  No family present at bedside  -CK     Care Plan Review  --  evaluation/treatment results reviewed;care plan/treatment goals reviewed;risks/benefits  reviewed;current/potential barriers reviewed;patient/other agree to care plan No evidence of learning noted  -CK  --  evaluation/treatment results reviewed;care plan/treatment goals reviewed;risks/benefits reviewed;current/potential barriers reviewed;patient/other agree to care plan no evidence of learning noted d/t cognitive deficits  -CK     Patient Effort  --  good  -CK  --  poor  -CK        General Information    Patient Profile Reviewed  --  yes  -CK  yes  -CK  yes  -CK     Current Method of Nutrition  --  pureed;thin liquids  -CK  pureed;thin liquids  -CK  pureed;thin liquids  -CK     Prior Level of Function-Communication  --  cognitive-linguistic impairment  -CK  cognitive-linguistic impairment  -CK  cognitive-linguistic impairment  -CK     Prior Level of Function-Swallowing  --  mechanical soft textures;thin liquids  -CK  mechanical soft textures;thin liquids  -CK  mechanical soft textures;thin liquids  -CK     Plans/Goals Discussed with  --  patient  -CK  patient  -CK  patient  -CK     Barriers to Rehab  --  previous functional deficit;cognitive status  -CK  previous functional deficit;cognitive status  -CK  previous functional deficit;cognitive status  -CK        Pain Scale: FACES Pre/Post-Treatment    Pain: FACES Scale, Pretreatment  --  0-->no hurt  -CK  0-->no hurt  -CK  0-->no hurt  -CK     Posttreatment Pain Rating  --  0-->no hurt  -CK  0-->no hurt  -CK  0-->no hurt  -CK        Oral Motor Structure and Function    Dentition Assessment  --  natural, present and adequate  -CK  natural, present and adequate  -CK  natural, present and adequate  -CK     Secretion Management  --  WNL/WFL  -CK  WNL/WFL  -CK  WNL/WFL  -CK     Mucosal Quality  --  dry  -CK  dry  -CK  dry  -CK     Volitional Swallow  --  unable to elicit  -CK  unable to elicit  -CK  unable to elicit  -CK     Volitional Cough  --  unable to elicit  -CK  unable to elicit  -CK  unable to elicit  -CK        General Eating/Swallowing Observations     Respiratory Support Currently in Use  --  room air  -CK  room air  -CK  room air  -CK     Eating/Swallowing Skills  --  fed by SLP  -CK  fed by SLP  -CK  fed by SLP  -CK     Positioning During Eating  --  upright 90 degree;upright in bed  -CK  upright 90 degree;upright in bed  -CK  upright 90 degree;upright in bed  -CK     Utensils Used  --  straw  -CK  straw  -CK  spoon;straw  -CK     Consistencies Trialed  --  thin liquids;soft textures;mixed consistency  -CK  thin liquids;soft textures;mixed consistency  -CK  thin liquids;pureed  -CK        Clinical Swallow Eval    Oral Prep Phase  --  WFL  -CK  WFL  -CK  WFL for puree  -CK     Oral Transit  --  WFL  -CK  WFL  -CK  WFL for puree  -CK     Oral Residue  --  WFL  -CK  WFL  -CK  WFL for puree  -CK     Pharyngeal Phase  --  no overt signs/symptoms of pharyngeal impairment  -CK  no overt signs/symptoms of pharyngeal impairment  -CK  no overt signs/symptoms of pharyngeal impairment  -CK     Esophageal Phase  --  unremarkable  -CK  unremarkable  -CK  unremarkable  -CK        Clinical Impression    Daily Summary of Progress (SLP)  --  progress toward functional goals as expected  -CK  progress toward functional goals is good  -CK  progress toward functional goals is gradual  -CK     Barriers to Overall Progress (SLP)  --  --  previous deficits; cognitive status  -CK  cognitive status  -CK     SLP Swallowing Diagnosis  --  mild;oral dysphagia  -CK  mild;oral dysphagia  -CK  mild;oral dysphagia  -CK     Functional Impact  --  risk of aspiration/pneumonia;risk of malnutrition;risk of dehydration  -CK  risk of aspiration/pneumonia;risk of malnutrition;risk of dehydration  -CK  risk of aspiration/pneumonia;risk of malnutrition;risk of dehydration  -CK     Rehab Potential/Prognosis, Swallowing  --  good, to achieve stated therapy goals  -CK  good, to achieve stated therapy goals  -CK  good, to achieve stated therapy goals  -CK     Swallow Criteria for Skilled Therapeutic  Interventions Met  --  demonstrates skilled criteria  -CK  demonstrates skilled criteria  -CK  demonstrates skilled criteria  -CK     Plan for Continued Treatment (SLP)  --  --  Advance diet to [facility] soft solids/thin liquids  -CK  Continue POC  -CK        Recommendations    Therapy Frequency (Swallow)  --  3 days per week;5 days per week  -CK  3 days per week;5 days per week  -CK  3 days per week;5 days per week  -CK     Predicted Duration Therapy Intervention (Days)  --  until discharge  -CK  until discharge  -CK  until discharge  -CK     SLP Diet Recommendation  --  thin liquids;soft textures;ground  -CK  thin liquids;soft textures;ground  -CK  puree;thin liquids  -CK     Recommended Precautions and Strategies  --  upright posture during/after eating;small bites of food and sips of liquid;fatigue precautions;general aspiration precautions;1:1 supervision;assist with feeding  -CK  upright posture during/after eating;small bites of food and sips of liquid;fatigue precautions;general aspiration precautions;1:1 supervision;assist with feeding  -CK  upright posture during/after eating;small bites of food and sips of liquid;fatigue precautions;general aspiration precautions;1:1 supervision;assist with feeding  -CK     Oral Care Recommendations  --  Oral Care before breakfast, after meals and PRN  -CK  Oral Care before breakfast, after meals and PRN  -CK  Oral Care before breakfast, after meals and PRN  -CK     SLP Rec. for Method of Medication Administration  --  meds crushed;with pudding or applesauce  -CK  meds crushed;with pudding or applesauce  -CK  meds crushed;with pudding or applesauce  -CK     Monitor for Signs of Aspiration  --  yes;notify SLP if any concerns  -CK  yes;notify SLP if any concerns  -CK  yes;notify SLP if any concerns  -CK     Anticipated Discharge Disposition (SLP)  --  unknown  -CK  unknown  -CK  unknown  -CK        Swallow Goals (SLP)    Oral Nutrition/Hydration Goal Selection (SLP)  --  oral  nutrition/hydration, SLP goal 1  -CK  oral nutrition/hydration, SLP goal 1  -CK  oral nutrition/hydration, SLP goal 1  -CK        Oral Nutrition/Hydration Goal 1 (SLP)    Oral Nutrition/Hydration Goal 1, SLP  --  Pt will safely tolerate least restricted diet w/no overt s/s of aspiration for adequate nutrition and hydration.  -CK  Pt will safely tolerate least restricted diet w/no overt s/s of aspiration for adequate nutrition and hydration.  -CK  Pt will safely tolerate least restricted diet w/no overt s/s of aspiration for adequate nutrition and hydration.  -CK     Time Frame (Oral Nutrition/Hydration Goal 1, SLP)  --  by discharge  -CK  by discharge  -CK  by discharge  -CK     Barriers (Oral Nutrition/Hydration Goal 1, SLP)  --  previous deficits  -CK  previous deficits  -CK  previous deficits  -CK     Progress/Outcomes (Oral Nutrition/Hydration Goal 1, SLP)  --  (S) goal met  -CK  goal ongoing  -CK  goal ongoing  -CK       User Key  (r) = Recorded By, (t) = Taken By, (c) = Cosigned By    Initials Name Effective Dates    Zakia Garduno MS CCC-SLP 02/11/20 -           EDUCATION  The patient has been educated in the following areas:   Unable to educate pt d/t cognitive deficits..    SLP Recommendation and Plan  SLP Swallowing Diagnosis: mild, oral dysphagia  SLP Diet Recommendation: thin liquids, soft textures, ground     Monitor for Signs of Aspiration: yes, notify SLP if any concerns     Swallow Criteria for Skilled Therapeutic Interventions Met: demonstrates skilled criteria  Anticipated Discharge Disposition (SLP): unknown  Rehab Potential/Prognosis, Swallowing: good, to achieve stated therapy goals  Therapy Frequency (Swallow): 3 days per week, 5 days per week  Predicted Duration Therapy Intervention (Days): until discharge     Daily Summary of Progress (SLP): progress toward functional goals as expected     Anticipated Discharge Disposition (SLP): unknown        Reason for Discharge: all goals and  outcomes met, no further needs identified    Plan of Care Reviewed With: patient  Progress: no change  Outcome Summary: Pt seen for skilled ST services this date to address oral dysphagia.  SLP repositioned pt upright in bed w/assist from staff.  Pt kept eyes closed during session; however, accepted solids and liquids when presented.  Pt consumed [facility] soft solids/mixed consistency (cheerios w/milk) w/efficient mastication, complete oral clearance, and timely AP transit.  Pt consumed thin liquids via straw w/no overt s/s of aspiration.  SLP recommend d/c on current baseline diet of [facility] soft solids/thin liquids at this time w/feeding assist and safe swallowing strategies.  SLP unable to fully educate pt on d/c instructions d/t cognitive deficits.    SLP GOALS     Row Name 02/09/21 0716 02/08/21 0937 02/07/21 1006       Oral Nutrition/Hydration Goal 1 (SLP)    Oral Nutrition/Hydration Goal 1, SLP  Pt will safely tolerate least restricted diet w/no overt s/s of aspiration for adequate nutrition and hydration.  -CK  Pt will safely tolerate least restricted diet w/no overt s/s of aspiration for adequate nutrition and hydration.  -CK  Pt will safely tolerate least restricted diet w/no overt s/s of aspiration for adequate nutrition and hydration.  -CK    Time Frame (Oral Nutrition/Hydration Goal 1, SLP)  by discharge  -CK  by discharge  -CK  by discharge  -CK    Barriers (Oral Nutrition/Hydration Goal 1, SLP)  previous deficits  -CK  previous deficits  -CK  previous deficits  -CK    Progress/Outcomes (Oral Nutrition/Hydration Goal 1, SLP)  (S) goal met  -CK  goal ongoing  -CK  goal ongoing  -CK      User Key  (r) = Recorded By, (t) = Taken By, (c) = Cosigned By    Initials Name Provider Type    CK Zakia Wagner MS CCC-SLP Speech and Language Pathologist               Time Calculation:   Time Calculation- SLP     Row Name 02/09/21 1130             Time Calculation- SLP    SLP Start Time  0716  -CK      SLP Stop Time   0743  -LARRY      SLP Time Calculation (min)  27 min  -CK      Total Timed Code Minutes- SLP  27 minute(s)  -CK      SLP Received On  02/09/21  -CK        User Key  (r) = Recorded By, (t) = Taken By, (c) = Cosigned By    Initials Name Provider Type    Zakia Garduno MS CCC-SLP Speech and Language Pathologist          Therapy Charges for Today     Code Description Service Date Service Provider Modifiers Qty    [id number] HC ST TREATMENT SWALLOW 2 2/8/2021 Zakia Wagner MS CCC-SLP GN 1    [id number] HC ST TREATMENT SWALLOW 2 2/9/2021 Zakia Wagner MS CCC-SLP GN 1               SLP Discharge Summary  Anticipated Discharge Disposition (SLP): unknown  Reason for Discharge: all goals and outcomes met, no further needs identified  Progress Toward Achieving Short/long Term Goals: all goals met within established timelines    Zakia Wagner MS CCC-SLP  2/9/2021

## 2021-02-09 NOTE — PROGRESS NOTES
"Anticoagulation by Pharmacy - Warfarin    Ondina Alanis Sr. 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    Goal INR: 2-3  Home dose is last known fill was at [facility] for warfarin 3 mg in September 2020   Today's INR:   Lab Results   Component Value Date    INR 1.56 (H) 02/09/2021          Lab Results   Component Value Date    INR 1.56 (H) 02/09/2021    INR 1.40 (H) 02/08/2021    INR 1.47 (H) 02/07/2021    PROTIME 19.5 (H) 02/09/2021    PROTIME 17.9 (H) 02/08/2021    PROTIME 18.6 (H) 02/07/2021     Lab Results   Component Value Date    HGB 8.8 (L) 02/09/2021    HGB 8.8 (L) 02/08/2021    HGB 8.8 (L) 02/07/2021     Lab Results   Component Value Date    HCT 25.4 (L) 02/09/2021    HCT 25.4 (L) 02/08/2021    HCT 25.1 (L) 02/07/2021     Lab Results   Component Value Date     (L) 02/09/2021    PLT 95 (L) 02/08/2021    PLT 95 (L) 02/07/2021       Recent Warfarin Administrations       The 5 most recent administrations since 02/02/2021 are shown below each listed medication.    Warfarin Sodium         Order Dose Date Given     warfarin (COUMADIN) tablet 3 mg 3 mg 02/08/2021      3 mg 02/07/2021                    Assessment/Plan:  Reviewed above labs. INR is 1.56.  INR is increasing towards goal. No changes in medication list. Concurrent medications include none. Pt did receive dose of warfarin last night.  Will continue current dose of  3 mg for one more night. If not in range, will increase dose tomorrow.. Rx will continue to follow and adjust dose accordingly.      Fany Byers, PharmD  02/09/21 09:29 CST     "

## 2021-02-10 NOTE — PROGRESS NOTES
Pharmacokinetics by Pharmacy - Vancomycin    Ondina Alanis Sr. is a 80 y.o. male on vancomycin for bacteremia      WBC   Date Value Ref Range Status   02/09/2021 8.05 3.40 - 10.80 10*3/mm3 Final   02/08/2021 8.84 3.40 - 10.80 10*3/mm3 Final    No results found for: CRP, LACTATE   Temp Readings from Last 1 Encounters:   02/09/21 98.7 °F (37.1 °C) (Temporal)     Estimated Creatinine Clearance: 24.8 mL/min (A) (by C-G formula based on SCr of 2.91 mg/dL (H)).   Creatinine   Date Value Ref Range Status   02/09/2021 2.91 (H) 0.76 - 1.27 mg/dL Final   02/08/2021 3.62 (H) 0.76 - 1.27 mg/dL Final       Vancomycin Peak   Date Value Ref Range Status   02/07/2021 24.80 20.00 - 40.00 mcg/mL Final     Vancomycin Trough   Date Value Ref Range Status   02/09/2021 13.40 5.00 - 20.00 mcg/mL Final       Culture Results:  Microbiology Results (last 10 days)     Procedure Component Value - Date/Time    Blood Culture - Blood, Arm, Right [[id number]] Collected: 02/08/21 0813    Lab Status: Preliminary result Specimen: Blood from Arm, Right Updated: 02/10/21 0901     Blood Culture No growth at 2 days    Blood Culture - Blood, Arm, Right [[id number]] Collected: 02/08/21 0728    Lab Status: Preliminary result Specimen: Blood from Arm, Right Updated: 02/10/21 0745     Blood Culture No growth at 2 days    Urine Culture - Urine, Urine, Catheter [[id number]]  (Normal) Collected: 02/05/21 1043    Lab Status: Final result Specimen: Urine, Catheter Updated: 02/06/21 1340     Urine Culture No growth    Blood Culture - Blood, Arm, Left [[id number]]  (Abnormal)  (Susceptibility) Collected: 02/04/21 2206    Lab Status: Final result Specimen: Blood from Arm, Left Updated: 02/08/21 0956     Blood Culture Staphylococcus aureus, MRSA     Comment: Infectious disease consultation is highly recommended to rule out distant foci of infection.  Methicillin resistant Staphylococcus aureus, Patient may be an isolation risk.        Isolated from Aerobic and  Anaerobic Bottles     Gram Stain Anaerobic Bottle Gram positive cocci in clusters     Comment: One bottle positive of four drawn         Aerobic Bottle Gram positive cocci in clusters     Comment: Third bottle positive of four drawn       Susceptibility      Staphylococcus aureus, MRSA     TIFFANY     Gentamicin Susceptible     Oxacillin Resistant     Rifampin Susceptible     Vancomycin Susceptible                Susceptibility Comments     Staphylococcus aureus, MRSA    This isolate does not demonstrate inducible clindamycin resistance in vitro.               Blood Culture ID, PCR - Blood, Arm, Left [[id number]]  (Abnormal) Collected: 02/04/21 2206    Lab Status: Final result Specimen: Blood from Arm, Left Updated: 02/05/21 2159     BCID, PCR Staphylococcus aureus. mecA (methicillin resistance gene) detected. Identification by BCID PCR.    Narrative:      Sensitivity to Follow    Blood Culture - Blood, Arm, Left [[id number]]  (Abnormal) Collected: 02/04/21 2130    Lab Status: Final result Specimen: Blood from Arm, Left Updated: 02/08/21 0956     Blood Culture Staphylococcus aureus, MRSA     Comment: Infectious disease consultation is highly recommended to rule out distant foci of infection.  Methicillin resistant Staphylococcus aureus, Patient may be an isolation risk.        Isolated from Aerobic and Anaerobic Bottles     Gram Stain Aerobic Bottle Gram positive cocci in clusters     Comment: Second bottle positive of four drawn         Anaerobic Bottle Gram positive cocci in clusters     Comment: Forth bottle positive of four drawn; all same organism       Narrative:      Refer to previous blood culture collected on 2/4/2021 at 2306 for MICS    COVID-19 and FLU A/B PCR - Swab, Nasopharynx [[id number]]  (Normal) Collected: 02/04/21 2001    Lab Status: Final result Specimen: Swab from Nasopharynx Updated: 02/04/21 2032     COVID19 Not Detected     Influenza A PCR Not Detected     Influenza B PCR Not Detected    Narrative:       Fact sheet for providers: https://www.fda.gov/media/300697/download    Fact sheet for patients: https://www.fda.gov/media/188164/download    Test performed by PCR.        No results found for: RESPCX    Assessment:  MRSA bacteremia  No new labs available today  Scr seems to be improving   Trough was checked yesterday and then dose given yesterday    We will check another trough before today's dose at 1230  If trough is less than 20 we will give a dose of 1250 mg IV vancomycin once  We will continue pulse dosing at this time until Scr has stabilized    Trough check today at 1230  Give dose after if less than 20  Would check another level in roughly 36-48 hours depending on Scr improvement  We will give a slightly larger dose today if the trough is good to stretch out to roughly a 48 hour interval    Plan:  1. Give vancomycin 1250 mg IV once today after trough at 1300  2. Trough today at 1230  3. Pharmacy will monitor renal function and adjust dose accordingly.      Norm Bustamante, PharmD   02/10/21 09:03 CST

## 2021-02-10 NOTE — PAYOR COMM NOTE
"  Sandra Frias   [facility]  phone [phone]  fax [phone]    Nadir Alanis Sr. (80 y.o. Male)     Date of Birth Social Security Number Address Home Phone MRN    1940  [address] [facility] [address] [phone] [id number]    [employer]op[employer] Marital Status          Yarsani        Admission Date Admission Type Admitting Provider Attending Provider Department, Room/Bed    2/4/21 Emergency Jad Kay MD  [facility] 3 EAST, 366/1    Discharge Date Discharge Disposition Discharge Destination        2/9/2021 Long Term Care (DC - External)              Attending Provider: (none)   Allergies: Contrast Dye    Isolation: Contact   Infection: MRSA (02/05/21)   Code Status: Prior    Ht: 182.9 cm (72.01\")   Wt: 96.5 kg (212 lb 11.2 oz)    Admission Cmt: None   Principal Problem: Urinary obstruction [N13.9]                 Active Insurance as of 2/4/2021     Primary Coverage     Payor Plan Insurance Group Employer/Plan Group    MEDICARE MEDICARE A & B      Payor Plan Address Payor Plan Phone Number Payor Plan Fax Number Effective Dates    [address] [phone]  11/1/1976 - None Entered    [facility] [id number]       Subscriber Name Subscriber Birth Date Member ID       NADIR ALANIS SR. 1940 [id number]           Secondary Coverage     Payor Plan Insurance Group Employer/Plan Group    [facility] [id number]     Payor Plan Address Payor Plan Phone Number Payor Plan Fax Number Effective Dates    [address] [address]   1/23/2019 - None Entered    [facility] [id number]       Subscriber Name Subscriber Birth Date Member ID       NADIR ALANIS SR. 1940 [id number]                 Emergency Contacts      (Rel.) Home Phone Work Phone Mobile Phone    Lamar Alanis (Spouse) [phone] -- [phone]    ZekeCammy (Daughter) [phone] -- [phone]               Discharge Summary      Ralph Weaver, APRBROOKLYNN at " 02/09/21 1517              [facility] Medicine Services  DISCHARGE SUMMARY       Date of Admission: 2/4/2021  Date of Discharge:  2/9/2021  Primary Care Physician: Mark Sheldon APRN    Presenting Problem/History of Present Illness:  Proctitis [K62.89]  Pleural effusion on left [J90]  ANT (acute kidney injury) (CMS/[facility]) [N17.9]  Urinary obstruction [N13.9]     Final Discharge Diagnoses:    Urinary obstruction    MRSA bacteremia    ANT (acute kidney injury) (CMS/[facility])    CKD (chronic kidney disease) stage 4, GFR 15-29 ml/min (CMS/[facility])    Diabetic peripheral neuropathy associated with type 2 diabetes mellitus (CMS/[facility])    Benign essential hypertension    COPD exacerbation (CMS/[facility])    Supratherapeutic INR    Sepsis due to urinary tract infection (CMS/[facility])    Chronic HFrEF (heart failure with reduced ejection fraction) (CMS/[facility])    Bradycardia    Dementia (CMS/[facility])    Acute blood loss anemia      Consults:   Consults     Date and Time Order Name Status Description    2/5/2021 1331 Inpatient Nephrology Consult Completed     2/4/2021 2303 Inpatient Urology Consult Completed     2/4/2021 2106 Urology (on-call MD unless specified)      2/4/2021 2106 Hospitalist (on-call MD unless specified)            Procedures Performed: Procedure(s):  CYSTOSCOPY WITH CATHETER PLACEMENT                Pertinent Test Results:   Ct Abdomen Pelvis Without Contrast    Result Date: 2/4/2021  CT Abdomen and Pelvis Without Contrast History: Abdominal pain Axials spiral scans of the abdomen and pelvis were obtained without contrast.  Coronal and sagital reconstructions were preformed.  This exam was performed according to our departmental dose-optimization program, which includes automated exposure control, adjustment of the mA and/or kV according to patient size and/or use of iterative reconstruction technique. DLP: 458.20 Comparison: None Findings: Small left pleural effusion. Coronary artery  calcifications. No acute osseous abnormality. Degenerative changes and degenerative disc disease thoracic and lumbar spine. The liver is unremarkable. The gallbladder is distended. The spleen is unremarkable. The pancreas is unremarkable. The adrenal glands are unremarkable. Bilateral renal cysts. Nonobstructive renal calculi. No hydronephrosis. Air in the urinary bladder presumably related to recent instrumentation or catheterization. Radiopaque debris, gravel or hemorrhage posteriorly in the bladder. Minimally enlarged prostate. No pelvic mass. No abdominal aortic aneurysm. No adenopathy. Thickening of the rectal wall with stranding in the perirectal fat. This could represent proctitis or neoplasm. No bowel obstruction. Normal appendix. No free air. No free fluid. No hernia. Subcutaneous edema.     Conclusion: Distended gallbladder. Bilateral renal cysts. Nonobstructive renal calculi. Air in the urinary bladder presumably related to recent instrumentation or catheterization. Radiopaque debris, gravel or hemorrhage posteriorly in the bladder. Minimally enlarged prostate. Thickening of the rectal wall with stranding in the perirectal fat. This could represent proctitis or neoplasm. Subcutaneous edema. Small left pleural effusion. Coronary artery calcifications. [id number] Electronically signed by:  Chris Srinivasan MD  2/4/2021 8:32 PM CST Workstation: [employer] Chest 1 View    Result Date: 2/4/2021  PORTABLE CHEST HISTORY: Oliguria. Abdominal pain. Portable AP film of the chest was obtained at 8:26 PM. COMPARISON: October 13, 2020 FINDINGS: EKG leads. The lungs are clear of an acute process. The heart is not enlarged. The pulmonary vasculature is not increased. Small left pleural effusion. No pneumothorax. No acute osseous abnormality. Degenerative changes are present in the thoracic spine. Hypertrophic change right acromioclavicular joint.     CONCLUSION: Small left pleural effusion. [id number] Electronically signed  "by:  Chris Srinivasan MD  2/4/2021 8:34 PM CST Workstation: [employer]      HPI/Hospital Course:  The patient is a 80 y.o. male with a history of BPH, DM, HTN, HLD, CAD, asthma, PE on chronic anticoagulation with coumadin, and CKD 4 who presented to [facility] with inability to urinate for 24 hours.  Burleson was unable to be placed in the ED.  Urology attempted to place in the ED as well unsuccessfully.  He developed bleeding.  INR was 7.0. He received FFP and vitamin K.  He underwent cystoscopy and burleson placement.  Creatinine had worsened from his baseline of approximately 2.0 to 4.89 with a BUN of 109. Hgb was 7.0 and he was hypotensive.  He received 1 unit PRBC and H&H improved to 8.8 and has remained stable.  Blood pressure improved.  He was bradycardic and amiodarone and coreg were held. Nephrology consulted.  He was managed with bicarbonate infusion and fluid. Creatinine is improving.  Blood cultures grew MRSA and he was initiated on vancomycin.  Echocardiogram is without noted vegetation and repeat blood cultures are negative.  He will require 14 days of IV vancomycin from negative culture drawn on 2/8/21.  Burleson is recommended to remain chronically.  It is to be changed every 4 weeks.  He is stable and will be discharged to [facility] to complete antibiotic course and physical rehabilitation.      Condition on Discharge:  Stable    Physical Exam on Discharge:  /75 (BP Location: Right arm, Patient Position: Lying)   Pulse 75   Temp 99 °F (37.2 °C) (Axillary)   Resp 18   Ht 182.9 cm (72.01\")   Wt 96.5 kg (212 lb 11.2 oz)   SpO2 96%   BMI 28.84 kg/m²   Physical Exam  Vitals signs reviewed.   Constitutional:       Appearance: Normal appearance.   HENT:      Head: Normocephalic and atraumatic.   Cardiovascular:      Rate and Rhythm: Normal rate and regular rhythm.   Pulmonary:      Effort: Pulmonary effort is normal.      Breath sounds: Normal breath sounds.   Abdominal:      General: " There is no distension.      Palpations: Abdomen is soft.      Tenderness: There is no abdominal tenderness.   Genitourinary:     Comments: Moore, yellow and clear  Musculoskeletal:         General: No swelling or deformity.   Skin:     General: Skin is warm and dry.   Neurological:      General: No focal deficit present.      Mental Status: He is alert and easily aroused. Mental status is at baseline.      Comments: Pleasantly demented   Psychiatric:         Behavior: Behavior is cooperative.     Discharge Disposition:  Long Term Care (DC - External)    Discharge Medications:     Discharge Medications      New Medications      Instructions Start Date   vancomycin   1,000 mg, Intravenous, Every 36 Hours   Start Date: February 10, 2021        Changes to Medications      Instructions Start Date   hydrALAZINE 50 MG tablet  Commonly known as: APRESOLINE  What changed:   · medication strength  · how much to take   50 mg, Oral, Every 8 Hours Scheduled         Continue These Medications      Instructions Start Date   acetaminophen 325 MG tablet  Commonly known as: TYLENOL   650 mg, Oral, Every 6 Hours PRN      atorvastatin 40 MG tablet  Commonly known as: LIPITOR   40 mg, Oral, Nightly      bisacodyl 5 MG EC tablet  Commonly known as: DULCOLAX   5 mg, Oral, Daily PRN      brimonidine 0.1 % solution ophthalmic solution  Commonly known as: ALPHAGAN P   1 drop      brinzolamide 1 % ophthalmic suspension  Commonly known as: AZOPT   1 drop      budesonide 0.5 MG/2ML nebulizer solution  Commonly known as: PULMICORT   0.5 mg, Inhalation      insulin aspart 100 UNIT/ML injection  Commonly known as: novoLOG   0-7 Units, Subcutaneous, 3 Times Daily Before Meals      insulin detemir 100 UNIT/ML injection  Commonly known as: Levemir   10 Units, Subcutaneous, Every 12 Hours Scheduled      ipratropium 0.02 % nebulizer solution  Commonly known as: ATROVENT   0.5 mg, Nebulization, Every 4 Hours PRN      ipratropium-albuterol 0.5-2.5 mg/3  ml nebulizer  Commonly known as: DUO-NEB   3 mL, Nebulization, Every 4 Hours PRN      isosorbide mononitrate 30 MG 24 hr tablet  Commonly known as: IMDUR   30 mg, Oral, Every 24 Hours Scheduled      lansoprazole 30 MG capsule  Commonly known as: PREVACID   30 mg, Oral      latanoprost 0.005 % ophthalmic solution  Commonly known as: XALATAN   1 drop      magic butt ointment   1 each, Topical, 2 Times Daily      ondansetron 4 MG/2ML injection  Commonly known as: ZOFRAN   4 mg, Intravenous, Every 6 Hours PRN      Transfer Bench misc   Transfer bench r/t dementia, deconditioning.      Tub Transfer Board misc   Transfer tub bench r/t deconditioning, dementia, and blind r/t glaucoma         Stop These Medications    amiodarone 200 MG tablet  Commonly known as: PACERONE     carvedilol 6.25 MG tablet  Commonly known as: COREG            Discharge Diet:   Diet Instructions     Diet: Soft Texture; Thin Liquids, No Restrictions; Ground      Discharge Diet: Soft Texture    Fluid Consistency: Thin Liquids, No Restrictions    Soft Options: Ground          Activity at Discharge:   Activity Instructions     Activity as Tolerated            Follow-up Appointments:   1. Dr. Riggs 4 weeks    Test Results Pending at Discharge:   Pending Labs     Order Current Status    Blood Culture - Blood, Arm, Right Preliminary result    Blood Culture - Blood, Arm, Right Preliminary result          THIERNO Griffin  02/09/21  15:18 CST    Time: Discharge planning and teaching took greater than 30 minutes.                 Electronically signed by Ralph Weaver APRN at 02/09/21 1522       Discharge Order (From admission, onward)     Start     Ordered    02/09/21 1515  Discharge patient  Once     Expected Discharge Date: 02/09/21    Discharge Disposition: Long Term Care (DC - External)    Physician of Record for Attribution - Please select from Treatment Team: LAURENT LORD [[id number]]    Review needed by CMO to determine  Physician of Record: No       Question Answer Comment   Physician of Record for Attribution - Please select from Treatment Team LAURENT LORD    Review needed by CMO to determine Physician of Record No        02/09/21 6988

## 2021-02-10 NOTE — PROGRESS NOTES
Anticoagulation by Pharmacy - Warfarin    Ondina Alanis Sr. 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    Home regimen: warfarin 3 mg - unknown  INR Goal: 2-3    Last INR:   Lab Results   Component Value Date    INR 1.64 (H) 02/09/2021       Objective:  [Ht:  ; Wt:  ]  Lab Results   Component Value Date    INR 1.64 (H) 02/09/2021    INR 1.56 (H) 02/09/2021    INR 1.40 (H) 02/08/2021    PROTIME 20.3 (H) 02/09/2021    PROTIME 19.5 (H) 02/09/2021    PROTIME 17.9 (H) 02/08/2021     Lab Results   Component Value Date    HGB 8.8 (L) 02/09/2021    HGB 8.8 (L) 02/08/2021    HGB 8.8 (L) 02/07/2021    HCT 25.4 (L) 02/09/2021    HCT 25.4 (L) 02/08/2021    HCT 25.1 (L) 02/07/2021     (L) 02/09/2021    PLT 95 (L) 02/08/2021    PLT 95 (L) 02/07/2021       Recent Warfarin Administrations     The 5 most recent administrations since 02/03/2021 are shown below each listed medication.    Warfarin Sodium       Order Dose Date Given     warfarin (COUMADIN) tablet 3 mg 3 mg 02/08/2021      3 mg 02/07/2021                Assessment  Interacting medications: none significant  INR is below goal yesterday, not yet available today  H&H is stable but not available today  We will continue to follow for s/s of bleeding  We will continue 3 mg PO warfarin at this time - if new INR warrants change we will make it. Labs are still waiting to be collected today    Plan:  1.  Give warfarin 3 mg tablet PO @ 1800 tonight  2.  Draw a PT/INR in AM  3.  Pharmacy will continue to follow    Norm Bustamante, PharmD  02/10/21 09:00 CST

## 2021-02-11 NOTE — PROGRESS NOTES
Anticoagulation by Pharmacy - Warfarin    Ondina Alanis Sr. 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     Home regimen: warfarin 3 mg - unknown  INR Goal: 2-3    Last INR:   Lab Results   Component Value Date    INR 1.67 (H) 02/11/2021       Objective:  [Ht: 182.9 cm  ; Wt: 96.5 kg  ]  Lab Results   Component Value Date    INR 1.67 (H) 02/11/2021    INR 1.58 (H) 02/10/2021    INR 1.64 (H) 02/09/2021    PROTIME 20.6 (H) 02/11/2021    PROTIME 19.7 (H) 02/10/2021    PROTIME 20.3 (H) 02/09/2021     Lab Results   Component Value Date    HGB 8.2 (L) 02/11/2021    HGB 9.3 (L) 02/10/2021    HGB 8.8 (L) 02/09/2021    HCT 24.5 (L) 02/11/2021    HCT 27.5 (L) 02/10/2021    HCT 25.4 (L) 02/09/2021     (L) 02/11/2021     (L) 02/10/2021     (L) 02/09/2021       Recent Warfarin Administrations       The 5 most recent administrations since 02/04/2021 are shown below each listed medication.    Warfarin Sodium         Order Dose Date Given     warfarin (COUMADIN) tablet 3 mg 3 mg 02/08/2021      3 mg 02/07/2021                    Assessment  Interacting medications: no significant interactions  INR is 1.67, subtherapeutic, trending upwards slowly.  Blood draw.  Warfarin increased to 4 mg nightly 2/10.  Will continue warfarin 4 mg tonight.  H/H trended down today.    Plan:  1.  Give warfarin 4 mg tablet PO @ 1800 tonight  2.  Draw a PT/INR in AM  3.  Pharmacy will continue to follow    Tung Colon, PharmD  02/11/21 10:29 CST

## 2021-02-11 NOTE — PROGRESS NOTES
Pharmacokinetics by Pharmacy - Vancomycin    Ondina Alanis . is a 80 y.o. male receiving vancomycin pulse dosing currently (ANT) for MRSA bacteremia      Objective:  [Ht: 182.9 cm  ; Wt: 96.5 kg  ]     WBC   Date Value Ref Range Status   02/11/2021 8.69 3.40 - 10.80 10*3/mm3 Final   02/10/2021 9.71 3.40 - 10.80 10*3/mm3 Final   02/09/2021 8.05 3.40 - 10.80 10*3/mm3 Final   02/15/2018 7.9 4.0 - 11.0 10*3/uL Final      C-Reactive Protein   Date Value Ref Range Status   09/23/2020 3.63 (H) 0.00 - 0.50 mg/dL Final     Lactate   Date Value Ref Range Status   02/05/2021 2.1 (C) 0.5 - 2.0 mmol/L Final   02/04/2021 2.2 (C) 0.5 - 2.0 mmol/L Final   09/24/2020 1.4 0.5 - 2.0 mmol/L Final      Temp Readings from Last 1 Encounters:   02/09/21 98.7 °F (37.1 °C) (Temporal)     Estimated Creatinine Clearance: 27.9 mL/min (A) (by C-G formula based on SCr of 2.59 mg/dL (H)).   Creatinine   Date Value Ref Range Status   02/11/2021 2.59 (H) 0.76 - 1.27 mg/dL Final   02/10/2021 2.61 (H) 0.76 - 1.27 mg/dL Final   02/09/2021 2.91 (H) 0.76 - 1.27 mg/dL Final   02/15/2018 2.4 (H) 0.6 - 1.3 mg/dL Final       Vancomycin Peak   Date Value Ref Range Status   02/07/2021 24.80 20.00 - 40.00 mcg/mL Final     Vancomycin Trough   Date Value Ref Range Status   02/10/2021 16.20 5.00 - 20.00 mcg/mL Final   02/09/2021 13.40 5.00 - 20.00 mcg/mL Final     Vancomycin Random   Date Value Ref Range Status   02/11/2021 20.80 5.00 - 40.00 mcg/mL Final   02/07/2021 9.60 5.00 - 40.00 mcg/mL Final       Culture Results:  Microbiology Results (last 10 days)       Procedure Component Value - Date/Time    Blood Culture - Blood, Arm, Right [[id number]] Collected: 02/08/21 0813    Lab Status: Preliminary result Specimen: Blood from Arm, Right Updated: 02/11/21 0901     Blood Culture No growth at 3 days    Blood Culture - Blood, Arm, Right [[id number]] Collected: 02/08/21 0728    Lab Status: Preliminary result Specimen: Blood from Arm, Right Updated: 02/11/21  0745     Blood Culture No growth at 3 days    Urine Culture - Urine, Urine, Catheter [[id number]]  (Normal) Collected: 02/05/21 1043    Lab Status: Final result Specimen: Urine, Catheter Updated: 02/06/21 1340     Urine Culture No growth    Blood Culture - Blood, Arm, Left [[id number]]  (Abnormal)  (Susceptibility) Collected: 02/04/21 2206    Lab Status: Final result Specimen: Blood from Arm, Left Updated: 02/08/21 0956     Blood Culture Staphylococcus aureus, MRSA     Comment: Infectious disease consultation is highly recommended to rule out distant foci of infection.  Methicillin resistant Staphylococcus aureus, Patient may be an isolation risk.        Isolated from Aerobic and Anaerobic Bottles     Gram Stain Anaerobic Bottle Gram positive cocci in clusters     Comment: One bottle positive of four drawn         Aerobic Bottle Gram positive cocci in clusters     Comment: Third bottle positive of four drawn       Susceptibility        Staphylococcus aureus, MRSA     TIFFANY     Gentamicin Susceptible     Oxacillin Resistant     Rifampin Susceptible     Vancomycin Susceptible                  Susceptibility Comments       Staphylococcus aureus, MRSA    This isolate does not demonstrate inducible clindamycin resistance in vitro.                 Blood Culture ID, PCR - Blood, Arm, Left [[id number]]  (Abnormal) Collected: 02/04/21 2206    Lab Status: Final result Specimen: Blood from Arm, Left Updated: 02/05/21 2159     BCID, PCR Staphylococcus aureus. mecA (methicillin resistance gene) detected. Identification by BCID PCR.    Narrative:      Sensitivity to Follow    Blood Culture - Blood, Arm, Left [[id number]]  (Abnormal) Collected: 02/04/21 2130    Lab Status: Final result Specimen: Blood from Arm, Left Updated: 02/08/21 0956     Blood Culture Staphylococcus aureus, MRSA     Comment: Infectious disease consultation is highly recommended to rule out distant foci of infection.  Methicillin resistant Staphylococcus aureus,  Patient may be an isolation risk.        Isolated from Aerobic and Anaerobic Bottles     Gram Stain Aerobic Bottle Gram positive cocci in clusters     Comment: Second bottle positive of four drawn         Anaerobic Bottle Gram positive cocci in clusters     Comment: Forth bottle positive of four drawn; all same organism       Narrative:      Refer to previous blood culture collected on 2/4/2021 at 2306 for MICS    COVID-19 and FLU A/B PCR - Swab, Nasopharynx [[id number]]  (Normal) Collected: 02/04/21 2001    Lab Status: Final result Specimen: Swab from Nasopharynx Updated: 02/04/21 2032     COVID19 Not Detected     Influenza A PCR Not Detected     Influenza B PCR Not Detected    Narrative:      Fact sheet for providers: https://www.fda.gov/media/418213/download    Fact sheet for patients: https://www.fda.gov/media/817524/download    Test performed by PCR.          No results found for: RESPCX      Assessment:  WBC WNL  SCr trending down, above normal limits, 2.59 today  Afebrile  No significant findings today    Labs in progress:  2/8 BCx2 NG3d (first negative)    We have been pulse dosing past few days due to elevated SCr.   Trough yesterday was 16.2 and given 1250 mg IV x 1.  Random today was 20.8, slightly above goal.  Time to trough of 15 is approx 12 more hours.  Will leave vancomycin 1250 mg IV q36h for now.  Next vancomycin dose 2/12 0100.    Estimated AUC with previous peak of 24.8 and random 20.9 gives AUC of  approx 470 using vancomycin 1250 mg IV q36h dosing.  Peak might actually be slightly higher due to previous peak being with 1000 mg.    Peak post 2nd and trough prior to 3rd due to CrCl.    Plan:  1. Continue vancomycin 1250mg IV Q36H  2. Peak 2/12 0330, Trough 2/13 1230  3. Pharmacy will monitor renal function and adjust dose accordingly.      Tung Colon, PharmD   02/11/21 12:50 CST

## 2021-02-12 NOTE — PROGRESS NOTES
Anticoagulation by Pharmacy - Warfarin    Ondina Alanis Sr. is a 80 y.o.male who has been consulted for warfarin for DVT/PE.     Home regimen: Warfarin ~3 mg PO daily / unknown  INR Goal: 2-3    Objective:  [Ht:  ; Wt:  ]    Lab Results   Component Value Date    INR 1.69 (H) 02/12/2021    INR 1.67 (H) 02/11/2021    INR 1.58 (H) 02/10/2021    PROTIME 20.8 (H) 02/12/2021    PROTIME 20.6 (H) 02/11/2021    PROTIME 19.7 (H) 02/10/2021     Lab Results   Component Value Date    HGB 8.2 (L) 02/11/2021    HGB 9.3 (L) 02/10/2021    HGB 8.8 (L) 02/09/2021    HCT 24.5 (L) 02/11/2021    HCT 27.5 (L) 02/10/2021    HCT 25.4 (L) 02/09/2021     (L) 02/11/2021     (L) 02/10/2021     (L) 02/09/2021       Recent Warfarin Administrations     The 5 most recent administrations since 02/05/2021 are shown below each listed medication.    Warfarin Sodium       Order Dose Date Given     warfarin (COUMADIN) tablet 3 mg 3 mg 02/08/2021      3 mg 02/07/2021                Assessment  • H/H/plts slightly low yesterday. No repeat today.  • Interacting medications: none significant   • INR is subtherapeutic but trending up after increase on 2/10. Will continue current regimen at this time.     Plan:  1. Give warfarin 4 mg tablet PO @ 1800 tonight  2. PT/INR ordered daily  3. Pharmacy will continue to follow    John Galicia RPH  02/12/21 10:33 CST

## 2021-02-12 NOTE — PROGRESS NOTES
Pharmacokinetics by Pharmacy - Vancomycin    Ondina Alanis Sr. is a 80 y.o. male receiving vancomycin day 5 (since negative blood cx) MRSA bacteremia     Objective:    Temp Readings from Last 1 Encounters:   02/09/21 98.7 °F (37.1 °C) (Temporal)     WBC   Date Value Ref Range Status   02/11/2021 8.69 3.40 - 10.80 10*3/mm3 Final   02/10/2021 9.71 3.40 - 10.80 10*3/mm3 Final    No results found for: CRP, LACTATE   Creatinine   Date Value Ref Range Status   02/11/2021 2.59 (H) 0.76 - 1.27 mg/dL Final   02/10/2021 2.61 (H) 0.76 - 1.27 mg/dL Final       Vancomycin Peak   Date Value Ref Range Status   02/12/2021 31.20 20.00 - 40.00 mcg/mL Final     Vancomycin Trough   Date Value Ref Range Status   02/10/2021 16.20 5.00 - 20.00 mcg/mL Final     Vancomycin Random   Date Value Ref Range Status   02/11/2021 20.80 5.00 - 40.00 mcg/mL Final       Culture Results:  Microbiology Results (last 10 days)     Procedure Component Value - Date/Time    Blood Culture - Blood, Arm, Right [[id number]] Collected: 02/08/21 0813    Lab Status: Preliminary result Specimen: Blood from Arm, Right Updated: 02/12/21 0901     Blood Culture No growth at 4 days    Blood Culture - Blood, Arm, Right [[id number]] Collected: 02/08/21 0728    Lab Status: Preliminary result Specimen: Blood from Arm, Right Updated: 02/12/21 0746     Blood Culture No growth at 4 days    Urine Culture - Urine, Urine, Catheter [[id number]]  (Normal) Collected: 02/05/21 1043    Lab Status: Final result Specimen: Urine, Catheter Updated: 02/06/21 1340     Urine Culture No growth    Blood Culture - Blood, Arm, Left [[id number]]  (Abnormal)  (Susceptibility) Collected: 02/04/21 2206    Lab Status: Final result Specimen: Blood from Arm, Left Updated: 02/08/21 0956     Blood Culture Staphylococcus aureus, MRSA     Comment: Infectious disease consultation is highly recommended to rule out distant foci of infection.  Methicillin resistant Staphylococcus aureus, Patient may be an  isolation risk.        Isolated from Aerobic and Anaerobic Bottles     Gram Stain Anaerobic Bottle Gram positive cocci in clusters     Comment: One bottle positive of four drawn         Aerobic Bottle Gram positive cocci in clusters     Comment: Third bottle positive of four drawn       Susceptibility      Staphylococcus aureus, MRSA     TIFFANY     Gentamicin Susceptible     Oxacillin Resistant     Rifampin Susceptible     Vancomycin Susceptible                Susceptibility Comments     Staphylococcus aureus, MRSA    This isolate does not demonstrate inducible clindamycin resistance in vitro.               Blood Culture ID, PCR - Blood, Arm, Left [[id number]]  (Abnormal) Collected: 02/04/21 2206    Lab Status: Final result Specimen: Blood from Arm, Left Updated: 02/05/21 2159     BCID, PCR Staphylococcus aureus. mecA (methicillin resistance gene) detected. Identification by BCID PCR.    Narrative:      Sensitivity to Follow    Blood Culture - Blood, Arm, Left [[id number]]  (Abnormal) Collected: 02/04/21 2130    Lab Status: Final result Specimen: Blood from Arm, Left Updated: 02/08/21 0956     Blood Culture Staphylococcus aureus, MRSA     Comment: Infectious disease consultation is highly recommended to rule out distant foci of infection.  Methicillin resistant Staphylococcus aureus, Patient may be an isolation risk.        Isolated from Aerobic and Anaerobic Bottles     Gram Stain Aerobic Bottle Gram positive cocci in clusters     Comment: Second bottle positive of four drawn         Anaerobic Bottle Gram positive cocci in clusters     Comment: Forth bottle positive of four drawn; all same organism       Narrative:      Refer to previous blood culture collected on 2/4/2021 at 2306 for MICS    COVID-19 and FLU A/B PCR - Swab, Nasopharynx [[id number]]  (Normal) Collected: 02/04/21 2001    Lab Status: Final result Specimen: Swab from Nasopharynx Updated: 02/04/21 2032     COVID19 Not Detected     Influenza A PCR Not  Detected     Influenza B PCR Not Detected    Narrative:      Fact sheet for providers: https://www.fda.gov/media/121219/download    Fact sheet for patients: https://www.fda.gov/media/368969/download    Test performed by PCR.        No results found for: RESPCX    [Ht:  ; Wt:  ]   There is no height or weight on file to calculate BMI.  Ideal body weight: 77.6 kg (171 lb 1.9 oz)  Adjusted ideal body weight: 85.2 kg (187 lb 12 oz)      Assessment:  • WBCs WNL.   • Renal function appears to be improving and close to baseline? Patient has CKD.   • 2/4 blood cx growing MRSA. 2/8 blood cx negative.   • Vancomycin peak and trough resulted at 31.2 mcg/mL. Vancomycin trough to be collected tomorrow at 1230 prior to the next dose.   • AUC and trough goals are 400-600 and 10-20 mcg/mL, respectively.     Plan:  1. Continue Vancomycin 1250 mg IV q36hrs  2. Vancomycin trough to be collected tomorrow at 1230.   3. Pharmacy will monitor renal function and adjust dose accordingly.      John Galicia [facility]   02/12/21 09:26 CST

## 2021-02-13 NOTE — PROGRESS NOTES
Pharmacokinetics by Pharmacy - Vancomycin    Ondina Alanis Sr. is a 80 y.o. male receiving vancomycin day 6 (since negative blood cx) MRSA bacteremia     Objective:    Temp Readings from Last 1 Encounters:   02/09/21 98.7 °F (37.1 °C) (Temporal)     WBC   Date Value Ref Range Status   02/11/2021 8.69 3.40 - 10.80 10*3/mm3 Final   02/10/2021 9.71 3.40 - 10.80 10*3/mm3 Final    No results found for: CRP, LACTATE   Creatinine   Date Value Ref Range Status   02/11/2021 2.59 (H) 0.76 - 1.27 mg/dL Final   02/10/2021 2.61 (H) 0.76 - 1.27 mg/dL Final       Vancomycin Peak   Date Value Ref Range Status   02/12/2021 31.20 20.00 - 40.00 mcg/mL Final     Vancomycin Trough   Date Value Ref Range Status   02/10/2021 16.20 5.00 - 20.00 mcg/mL Final     Vancomycin Random   Date Value Ref Range Status   02/11/2021 20.80 5.00 - 40.00 mcg/mL Final       Culture Results:  Microbiology Results (last 10 days)     Procedure Component Value - Date/Time    Blood Culture - Blood, Arm, Right [[id number]] Collected: 02/08/21 0813    Lab Status: Preliminary result Specimen: Blood from Arm, Right Updated: 02/12/21 0901     Blood Culture No growth at 4 days    Blood Culture - Blood, Arm, Right [[id number]] Collected: 02/08/21 0728    Lab Status: Final result Specimen: Blood from Arm, Right Updated: 02/13/21 0746     Blood Culture No growth at 5 days    Urine Culture - Urine, Urine, Catheter [[id number]]  (Normal) Collected: 02/05/21 1043    Lab Status: Final result Specimen: Urine, Catheter Updated: 02/06/21 1340     Urine Culture No growth    Blood Culture - Blood, Arm, Left [[id number]]  (Abnormal)  (Susceptibility) Collected: 02/04/21 2206    Lab Status: Final result Specimen: Blood from Arm, Left Updated: 02/08/21 0956     Blood Culture Staphylococcus aureus, MRSA     Comment: Infectious disease consultation is highly recommended to rule out distant foci of infection.  Methicillin resistant Staphylococcus aureus, Patient may be an  isolation risk.        Isolated from Aerobic and Anaerobic Bottles     Gram Stain Anaerobic Bottle Gram positive cocci in clusters     Comment: One bottle positive of four drawn         Aerobic Bottle Gram positive cocci in clusters     Comment: Third bottle positive of four drawn       Susceptibility      Staphylococcus aureus, MRSA     TIFFANY     Gentamicin Susceptible     Oxacillin Resistant     Rifampin Susceptible     Vancomycin Susceptible                Susceptibility Comments     Staphylococcus aureus, MRSA    This isolate does not demonstrate inducible clindamycin resistance in vitro.               Blood Culture ID, PCR - Blood, Arm, Left [[id number]]  (Abnormal) Collected: 02/04/21 2206    Lab Status: Final result Specimen: Blood from Arm, Left Updated: 02/05/21 2159     BCID, PCR Staphylococcus aureus. mecA (methicillin resistance gene) detected. Identification by BCID PCR.    Narrative:      Sensitivity to Follow    Blood Culture - Blood, Arm, Left [[id number]]  (Abnormal) Collected: 02/04/21 2130    Lab Status: Final result Specimen: Blood from Arm, Left Updated: 02/08/21 0956     Blood Culture Staphylococcus aureus, MRSA     Comment: Infectious disease consultation is highly recommended to rule out distant foci of infection.  Methicillin resistant Staphylococcus aureus, Patient may be an isolation risk.        Isolated from Aerobic and Anaerobic Bottles     Gram Stain Aerobic Bottle Gram positive cocci in clusters     Comment: Second bottle positive of four drawn         Anaerobic Bottle Gram positive cocci in clusters     Comment: Forth bottle positive of four drawn; all same organism       Narrative:      Refer to previous blood culture collected on 2/4/2021 at 2306 for MICS    COVID-19 and FLU A/B PCR - Swab, Nasopharynx [[id number]]  (Normal) Collected: 02/04/21 2001    Lab Status: Final result Specimen: Swab from Nasopharynx Updated: 02/04/21 2032     COVID19 Not Detected     Influenza A PCR Not  Detected     Influenza B PCR Not Detected    Narrative:      Fact sheet for providers: https://www.fda.gov/media/288270/download    Fact sheet for patients: https://www.fda.gov/media/215470/download    Test performed by PCR.        No results found for: RESPCX    [Ht:  ; Wt:  ]   There is no height or weight on file to calculate BMI.  Ideal body weight: 77.6 kg (171 lb 1.9 oz)  Adjusted ideal body weight: 85.2 kg (187 lb 12 oz)      Assessment:  • WBCs WNL. Last checked 2/11.   • Renal function appears to be improving and close to baseline? Patient has a diagnosis of CKD.   • 2/4 blood cx growing MRSA. 2/8 blood cx negative.   • Vancomycin peak resulted at 31.2 mcg/mL yesterday. Vancomycin trough resulted today at 21.2 mcg/mL. This gives a calculated AUC and trough of 625 and 21 mcg/mL, respectively. Thus will increase the dosing interval to 48 hours    • AUC and trough goals are 400-600 and 10-20 mcg/mL, respectively.     Plan:  1. Change Vancomycin 1250 mg IV to q48hrs, estimated AUC and trough 470 and 14.6 mcg/mL, respectively.   2. Vancomycin trough to occur on 2/16 @ 0830   3. Pharmacy will monitor renal function and adjust dose accordingly.      John Galicia [facility]   02/13/21 08:32 CST

## 2021-02-13 NOTE — PROGRESS NOTES
Anticoagulation by Pharmacy - Warfarin    Ondina Alanis Sr. is a 80 y.o.male who has been consulted for warfarin for DVT/PE.     Home regimen: Warfarin ~3 mg PO daily / unknown  INR Goal: 2-3    Objective:  [Ht:  ; Wt:  ]    Lab Results   Component Value Date    INR 1.73 (H) 02/13/2021    INR 1.69 (H) 02/12/2021    INR 1.67 (H) 02/11/2021    PROTIME 21.2 (H) 02/13/2021    PROTIME 20.8 (H) 02/12/2021    PROTIME 20.6 (H) 02/11/2021     Lab Results   Component Value Date    HGB 8.2 (L) 02/11/2021    HGB 9.3 (L) 02/10/2021    HGB 8.8 (L) 02/09/2021    HCT 24.5 (L) 02/11/2021    HCT 27.5 (L) 02/10/2021    HCT 25.4 (L) 02/09/2021     (L) 02/11/2021     (L) 02/10/2021     (L) 02/09/2021       Recent Warfarin Administrations     The 5 most recent administrations since 02/05/2021 are shown below each listed medication.    Warfarin Sodium       Order Dose Date Given     warfarin (COUMADIN) tablet 3 mg 3 mg 02/08/2021      3 mg 02/07/2021                Assessment  • H/H/plts slightly low yesterday. No repeat today.  • Interacting medications: none significant   • INR is subtherapeutic but trending up after increased on 2/10 to 4 mg. Will continue current regimen at this time.     Plan:  1. Give warfarin 4 mg tablet PO @ 1800 tonight  2. PT/INR ordered daily  3. Pharmacy will continue to follow    John Galicia RPH  02/13/21 10:24 CST

## 2021-02-14 NOTE — PROGRESS NOTES
Anticoagulation by Pharmacy - Warfarin    Ondina Alanis Sr. is a 80 y.o.male who has been consulted for warfarin for DVT/PE.     Home regimen: Warfarin ~3 mg PO daily / unknown  INR Goal: 2-3    Objective:  [Ht:  ; Wt:  ]    Lab Results   Component Value Date    INR 1.74 (H) 02/14/2021    INR 1.73 (H) 02/13/2021    INR 1.69 (H) 02/12/2021    PROTIME 21.3 (H) 02/14/2021    PROTIME 21.2 (H) 02/13/2021    PROTIME 20.8 (H) 02/12/2021     Lab Results   Component Value Date    HGB 8.2 (L) 02/11/2021    HGB 9.3 (L) 02/10/2021    HGB 8.8 (L) 02/09/2021    HCT 24.5 (L) 02/11/2021    HCT 27.5 (L) 02/10/2021    HCT 25.4 (L) 02/09/2021     (L) 02/11/2021     (L) 02/10/2021     (L) 02/09/2021       Recent Warfarin Administrations     The 5 most recent administrations since 02/05/2021 are shown below each listed medication.    Warfarin Sodium       Order Dose Date Given     warfarin (COUMADIN) tablet 3 mg 3 mg 02/08/2021      3 mg 02/07/2021                Assessment  • H/H/plts slightly low on 2/11. Next CBC is tomorrow.   • Interacting medications: none significant   • INR is subtherapeutic and slowly trending up after increased on 2/10 to 4 mg. Will increase to 5 mg given trend has not changed much the past few days.     Plan:  1. Give warfarin 5 mg tablet PO @ 1800 tonight  2. PT/INR ordered daily  3. Pharmacy will continue to follow    John Galicia [facility]  02/14/21 09:14 CST

## 2021-02-14 NOTE — PROGRESS NOTES
Pharmacokinetics by Pharmacy - Vancomycin    Ondina Alanis Sr. is a 80 y.o. male receiving vancomycin day 7 (since negative blood cx) MRSA bacteremia     Objective:    Temp Readings from Last 1 Encounters:   02/09/21 98.7 °F (37.1 °C) (Temporal)     No results found for: WBC No results found for: CRP, LACTATE   No results found for: CREATININE    Vancomycin Peak   Date Value Ref Range Status   02/12/2021 31.20 20.00 - 40.00 mcg/mL Final     Vancomycin Trough   Date Value Ref Range Status   02/13/2021 21.20 (H) 5.00 - 20.00 mcg/mL Final     Vancomycin Random   Date Value Ref Range Status   02/11/2021 20.80 5.00 - 40.00 mcg/mL Final       Culture Results:  Microbiology Results (last 10 days)     Procedure Component Value - Date/Time    Blood Culture - Blood, Arm, Right [[id number]] Collected: 02/08/21 0813    Lab Status: Final result Specimen: Blood from Arm, Right Updated: 02/13/21 0901     Blood Culture No growth at 5 days    Blood Culture - Blood, Arm, Right [[id number]] Collected: 02/08/21 0728    Lab Status: Final result Specimen: Blood from Arm, Right Updated: 02/13/21 0746     Blood Culture No growth at 5 days    Urine Culture - Urine, Urine, Catheter [[id number]]  (Normal) Collected: 02/05/21 1043    Lab Status: Final result Specimen: Urine, Catheter Updated: 02/06/21 1340     Urine Culture No growth    Blood Culture - Blood, Arm, Left [[id number]]  (Abnormal)  (Susceptibility) Collected: 02/04/21 2206    Lab Status: Final result Specimen: Blood from Arm, Left Updated: 02/08/21 0956     Blood Culture Staphylococcus aureus, MRSA     Comment: Infectious disease consultation is highly recommended to rule out distant foci of infection.  Methicillin resistant Staphylococcus aureus, Patient may be an isolation risk.        Isolated from Aerobic and Anaerobic Bottles     Gram Stain Anaerobic Bottle Gram positive cocci in clusters     Comment: One bottle positive of four drawn         Aerobic Bottle Gram positive  cocci in clusters     Comment: Third bottle positive of four drawn       Susceptibility      Staphylococcus aureus, MRSA     TIFFANY     Gentamicin Susceptible     Oxacillin Resistant     Rifampin Susceptible     Vancomycin Susceptible                Susceptibility Comments     Staphylococcus aureus, MRSA    This isolate does not demonstrate inducible clindamycin resistance in vitro.               Blood Culture ID, PCR - Blood, Arm, Left [[id number]]  (Abnormal) Collected: 02/04/21 2206    Lab Status: Final result Specimen: Blood from Arm, Left Updated: 02/05/21 2159     BCID, PCR Staphylococcus aureus. mecA (methicillin resistance gene) detected. Identification by BCID PCR.    Narrative:      Sensitivity to Follow    Blood Culture - Blood, Arm, Left [[id number]]  (Abnormal) Collected: 02/04/21 2130    Lab Status: Final result Specimen: Blood from Arm, Left Updated: 02/08/21 0956     Blood Culture Staphylococcus aureus, MRSA     Comment: Infectious disease consultation is highly recommended to rule out distant foci of infection.  Methicillin resistant Staphylococcus aureus, Patient may be an isolation risk.        Isolated from Aerobic and Anaerobic Bottles     Gram Stain Aerobic Bottle Gram positive cocci in clusters     Comment: Second bottle positive of four drawn         Anaerobic Bottle Gram positive cocci in clusters     Comment: Forth bottle positive of four drawn; all same organism       Narrative:      Refer to previous blood culture collected on 2/4/2021 at 2306 for MICS    COVID-19 and FLU A/B PCR - Swab, Nasopharynx [[id number]]  (Normal) Collected: 02/04/21 2001    Lab Status: Final result Specimen: Swab from Nasopharynx Updated: 02/04/21 2032     COVID19 Not Detected     Influenza A PCR Not Detected     Influenza B PCR Not Detected    Narrative:      Fact sheet for providers: https://www.fda.gov/media/373936/download    Fact sheet for patients: https://www.fda.gov/media/635403/download    Test performed by  PCR.        No results found for: RESPCX    [Ht:  ; Wt:  ]   There is no height or weight on file to calculate BMI.  Ideal body weight: 77.6 kg (171 lb 1.9 oz)  Adjusted ideal body weight: 85.2 kg (187 lb 12 oz)      Assessment:  • WBCs WNL on 2/11. Next CBC tomorrow.   • Renal function appears to be improving and close to baseline? Patient has a diagnosis of CKD. Next CMP tomorrow.   • 2/4 blood cx growing MRSA. 2/8 blood cx negative.   • Vancomycin regimen adjusted on 2/13 based on levels.    • AUC and trough goals are 400-600 and 10-20 mcg/mL, respectively.     Plan:  1. Continue Vancomycin 1250 mg IV to q48hrs, estimated AUC and trough 470 and 14.6 mcg/mL, respectively.   2. Vancomycin trough to occur on 2/16 @ 0830   3. Pharmacy will monitor renal function and adjust dose accordingly.      John Galicia [facility]   02/14/21 09:03 CST

## 2021-02-15 NOTE — PROGRESS NOTES
Pharmacokinetics by Pharmacy - Vancomycin    Ondina Alanis Sr. is a 80 y.o. male on vancomycin for bacteremia      No results found for: WBC No results found for: CRP, LACTATE   Temp Readings from Last 1 Encounters:   02/09/21 98.7 °F (37.1 °C) (Temporal)     Estimated Creatinine Clearance: 27.9 mL/min (A) (by C-G formula based on SCr of 2.59 mg/dL (H)).   No results found for: CREATININE    Vancomycin Trough   Date Value Ref Range Status   02/13/2021 21.20 (H) 5.00 - 20.00 mcg/mL Final       Culture Results:  Microbiology Results (last 10 days)     Procedure Component Value - Date/Time    Blood Culture - Blood, Arm, Right [[id number]] Collected: 02/08/21 0813    Lab Status: Final result Specimen: Blood from Arm, Right Updated: 02/13/21 0901     Blood Culture No growth at 5 days    Blood Culture - Blood, Arm, Right [[id number]] Collected: 02/08/21 0728    Lab Status: Final result Specimen: Blood from Arm, Right Updated: 02/13/21 0746     Blood Culture No growth at 5 days    Urine Culture - Urine, Urine, Catheter [[id number]]  (Normal) Collected: 02/05/21 1043    Lab Status: Final result Specimen: Urine, Catheter Updated: 02/06/21 1340     Urine Culture No growth        No results found for: RESPCX    Assessment:  Scr stable from previous   WBC WNL  No levels today  Trough tomorrow morning at 0900  Continue with current dose for now  No changes needed at this time    Plan:  1. Continue vancomycin 1250 mg IV Q48H  2. Trough 2/16 0900  3. Pharmacy will monitor renal function and adjust dose accordingly.      Norm Bustamante, PharmD   02/15/21 08:49 CST

## 2021-02-15 NOTE — PROGRESS NOTES
Anticoagulation by Pharmacy - Warfarin    Ondina Alanis Sr. 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    Home regimen: Warfarin ~3 mg PO daily / unknown  INR Goal: 2-3    Last INR:   Lab Results   Component Value Date    INR 1.80 (H) 02/15/2021       Objective:  [Ht:  ; Wt:  ]  Lab Results   Component Value Date    INR 1.80 (H) 02/15/2021    INR 1.74 (H) 02/14/2021    INR 1.73 (H) 02/13/2021    PROTIME 21.9 (H) 02/15/2021    PROTIME 21.3 (H) 02/14/2021    PROTIME 21.2 (H) 02/13/2021     Lab Results   Component Value Date    HGB 8.0 (L) 02/15/2021    HGB 8.2 (L) 02/11/2021    HGB 9.3 (L) 02/10/2021    HCT 24.1 (L) 02/15/2021    HCT 24.5 (L) 02/11/2021    HCT 27.5 (L) 02/10/2021     02/15/2021     (L) 02/11/2021     (L) 02/10/2021       Recent Warfarin Administrations     The 5 most recent administrations since 02/08/2021 are shown below each listed medication.    Warfarin Sodium       Order Dose Date Given     warfarin (COUMADIN) tablet 3 mg 3 mg 02/08/2021                Assessment  Interacting medications: none signficant  INR is slowly trending up but remains subtherapeutic  H&H is being followed for s/s of bleeding  We will continue current dose of 5 mg PO warfarin at this time    Plan:  1.  Give warfarin 5 mg tablet PO @ 1800 tonight  2.  Draw a PT/INR in AM  3.  Pharmacy will continue to follow    Norm Bustamante, Nichelle  02/15/21 12:35 CST

## 2021-02-16 NOTE — PROGRESS NOTES
Anticoagulation by Pharmacy - Warfarin    Ondina Alanis Sr. 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    Home regimen: Warfarin ~3 mg PO daily / unknown  INR Goal: 2-3    Last INR:   Lab Results   Component Value Date    INR 1.79 (H) 02/16/2021       Objective:  [Ht:  ; Wt:  ]  Lab Results   Component Value Date    INR 1.79 (H) 02/16/2021    INR 1.80 (H) 02/15/2021    INR 1.74 (H) 02/14/2021    PROTIME 21.8 (H) 02/16/2021    PROTIME 21.9 (H) 02/15/2021    PROTIME 21.3 (H) 02/14/2021     Lab Results   Component Value Date    HGB 8.0 (L) 02/15/2021    HGB 8.2 (L) 02/11/2021    HGB 9.3 (L) 02/10/2021    HCT 24.1 (L) 02/15/2021    HCT 24.5 (L) 02/11/2021    HCT 27.5 (L) 02/10/2021     02/15/2021     (L) 02/11/2021     (L) 02/10/2021       Recent Warfarin Administrations     The 5 most recent administrations since 02/08/2021 are shown below each listed medication.    Warfarin Sodium       Order Dose Date Given     warfarin (COUMADIN) tablet 3 mg 3 mg 02/08/2021                Assessment  Interacting medications: none signficant  INR remains subtherapeutic and is not trending up yet  H&H is being followed for s/s of bleeding  We will increase dose to 6 mg PO warfarin nightly    Plan:  1.  Give warfarin 6 mg tablet PO @ 1800 tonight  2.  Draw a PT/INR in AM  3.  Pharmacy will continue to follow    Norm Bustamante PharmD  02/16/21 10:05 CST

## 2021-02-16 NOTE — PROGRESS NOTES
Pharmacokinetics by Pharmacy - Vancomycin    Ondina Alanis Sr. is a 80 y.o. male on vancomycin for bacteremia      WBC   Date Value Ref Range Status   02/15/2021 7.33 3.40 - 10.80 10*3/mm3 Final    No results found for: CRP, LACTATE   Temp Readings from Last 1 Encounters:   02/09/21 98.7 °F (37.1 °C) (Temporal)     Estimated Creatinine Clearance: 28.5 mL/min (A) (by C-G formula based on SCr of 2.53 mg/dL (H)).   Creatinine   Date Value Ref Range Status   02/16/2021 2.53 (H) 0.76 - 1.27 mg/dL Final   02/15/2021 2.63 (H) 0.76 - 1.27 mg/dL Final       Vancomycin Trough   Date Value Ref Range Status   02/16/2021 19.40 5.00 - 20.00 mcg/mL Final   02/13/2021 21.20 (H) 5.00 - 20.00 mcg/mL Final       Culture Results:  Microbiology Results (last 10 days)     Procedure Component Value - Date/Time    Blood Culture - Blood, Arm, Right [[id number]] Collected: 02/08/21 0813    Lab Status: Final result Specimen: Blood from Arm, Right Updated: 02/13/21 0901     Blood Culture No growth at 5 days    Blood Culture - Blood, Arm, Right [[id number]] Collected: 02/08/21 0728    Lab Status: Final result Specimen: Blood from Arm, Right Updated: 02/13/21 0746     Blood Culture No growth at 5 days        No results found for: RESPCX    Assessment:  Scr stable from previous   WBC not available today    Trough collected today resulted as 19.4, it was gathered a little early  We will hold the dose a few hours this morning and then reduce dose to 1000 mg IV every 48 hours    Therapy to end 2/22  We will check a trough 2/18 at 1130    Plan:  1. Change vancomycin to 1000 mg IV Q48H  2. Trough 2/18 1130  3. Pharmacy will monitor renal function and adjust dose accordingly.      Norm Bustamante, PharmD   02/16/21 10:07 CST

## 2021-02-17 NOTE — PROGRESS NOTES
Pharmacokinetics by Pharmacy - Vancomycin    Ondina Alanis Sr. is a 80 y.o. male receiving vancomycin day 10 (since negative blood cx) MRSA bacteremia     Objective:    Temp Readings from Last 1 Encounters:   02/09/21 98.7 °F (37.1 °C) (Temporal)     WBC   Date Value Ref Range Status   02/15/2021 7.33 3.40 - 10.80 10*3/mm3 Final    No results found for: CRP, LACTATE   Creatinine   Date Value Ref Range Status   02/16/2021 2.53 (H) 0.76 - 1.27 mg/dL Final   02/15/2021 2.63 (H) 0.76 - 1.27 mg/dL Final       Vancomycin Trough   Date Value Ref Range Status   02/16/2021 19.40 5.00 - 20.00 mcg/mL Final       Culture Results:  Microbiology Results (last 10 days)     Procedure Component Value - Date/Time    Blood Culture - Blood, Arm, Right [[id number]] Collected: 02/08/21 0813    Lab Status: Final result Specimen: Blood from Arm, Right Updated: 02/13/21 0901     Blood Culture No growth at 5 days    Blood Culture - Blood, Arm, Right [[id number]] Collected: 02/08/21 0728    Lab Status: Final result Specimen: Blood from Arm, Right Updated: 02/13/21 0746     Blood Culture No growth at 5 days        No results found for: RESPCX    [Ht:  ; Wt:  ]   There is no height or weight on file to calculate BMI.  Ideal body weight: 77.6 kg (171 lb 1.9 oz)  Adjusted ideal body weight: 85.2 kg (187 lb 12 oz)      Assessment:  • WBCs WNL on 2/15.  • Renal function appears to be  close to baseline. Patient has a diagnosis of CKD.  • 2/4 blood cx growing MRSA. 2/8 blood cx negative.   • Vancomycin regimen adjusted on 2/16   • AUC and trough goals are 400-600 and 10-20 mcg/mL, respectively.     Plan:  1. Continue Vancomycin 1000 mg IV to q48hrs   2. Vancomycin trough to occur on 2/18 @ 1530   3. Pharmacy will monitor renal function and adjust dose accordingly.      John Galicia [facility]   02/17/21 10:46 CST

## 2021-02-17 NOTE — PROGRESS NOTES
Anticoagulation by Pharmacy - Warfarin    Ondina Alanis Sr. is a 80 y.o.male who has been consulted for warfarin for DVT/PE.     Home regimen: Warfarin ~3 mg PO daily / unknown  INR Goal: 2-3    Objective:  [Ht:  ; Wt:  ]    Lab Results   Component Value Date    INR 2.07 (H) 02/17/2021    INR 1.79 (H) 02/16/2021    INR 1.80 (H) 02/15/2021    PROTIME 24.6 (H) 02/17/2021    PROTIME 21.8 (H) 02/16/2021    PROTIME 21.9 (H) 02/15/2021     Lab Results   Component Value Date    HGB 8.0 (L) 02/15/2021    HGB 8.2 (L) 02/11/2021    HGB 9.3 (L) 02/10/2021    HCT 24.1 (L) 02/15/2021    HCT 24.5 (L) 02/11/2021    HCT 27.5 (L) 02/10/2021     02/15/2021     (L) 02/11/2021     (L) 02/10/2021       Recent Warfarin Administrations     The 5 most recent administrations since 02/05/2021 are shown below each listed medication.    Warfarin Sodium       Order Dose Date Given     warfarin (COUMADIN) tablet 3 mg 3 mg 02/08/2021      3 mg 02/07/2021                Assessment  • H/H slightly low on 2/15. Plts WNL  • Interacting medications: none significant   • INR is therapeutic as seen above. Warfarin increased yesterday.    Plan:  1. Continue warfarin 6 mg tablet PO @ 1800 tonight  2. PT/INR ordered daily  3. Pharmacy will continue to follow    John Galicia [facility]  02/17/21 10:51 CST

## 2021-02-18 NOTE — PROGRESS NOTES
Pharmacokinetics by Pharmacy - Vancomycin    Ondina Alanis . is a 80 y.o. male receiving vancomycin pulse dosing currently (ANT) for MRSA bacteremia        Objective:  [Ht: 182.9 cm  ; Wt: 96.5 kg  ]     WBC   Date Value Ref Range Status   02/18/2021 4.41 3.40 - 10.80 10*3/mm3 Final   02/15/2021 7.33 3.40 - 10.80 10*3/mm3 Final   02/11/2021 8.69 3.40 - 10.80 10*3/mm3 Final   02/15/2018 7.9 4.0 - 11.0 10*3/uL Final      C-Reactive Protein   Date Value Ref Range Status   09/23/2020 3.63 (H) 0.00 - 0.50 mg/dL Final     Lactate   Date Value Ref Range Status   02/05/2021 2.1 (C) 0.5 - 2.0 mmol/L Final   02/04/2021 2.2 (C) 0.5 - 2.0 mmol/L Final   09/24/2020 1.4 0.5 - 2.0 mmol/L Final      Temp Readings from Last 1 Encounters:   02/09/21 98.7 °F (37.1 °C) (Temporal)     Estimated Creatinine Clearance: 27.7 mL/min (A) (by C-G formula based on SCr of 2.61 mg/dL (H)).   Creatinine   Date Value Ref Range Status   02/18/2021 2.61 (H) 0.76 - 1.27 mg/dL Final   02/16/2021 2.53 (H) 0.76 - 1.27 mg/dL Final   02/15/2021 2.63 (H) 0.76 - 1.27 mg/dL Final   02/15/2018 2.4 (H) 0.6 - 1.3 mg/dL Final       Vancomycin Peak   Date Value Ref Range Status   02/12/2021 31.20 20.00 - 40.00 mcg/mL Final   02/07/2021 24.80 20.00 - 40.00 mcg/mL Final     Vancomycin Trough   Date Value Ref Range Status   02/18/2021 20.80 (H) 5.00 - 20.00 mcg/mL Final   02/16/2021 19.40 5.00 - 20.00 mcg/mL Final     Vancomycin Random   Date Value Ref Range Status   02/11/2021 20.80 5.00 - 40.00 mcg/mL Final   02/07/2021 9.60 5.00 - 40.00 mcg/mL Final       Culture Results:  Microbiology Results (last 10 days)       ** No results found for the last 240 hours. **          No results found for: RESPCX      Assessment:  WBC WNL  SCr 2.61, trending around 2.6 this week  Afebrile  No significant findings    All labs finalized    Day 11 / 14    Trough 2/18 1523 is 20.8, drawn appropriately, above goal slightly.  Time to trough of 15 is another 12 hours.  Will  hold and restart the next dose 2/19 0400.  Will check one last trough 2/21 0330, before last dose.    Goal trough for MRSA bacteremia: 15-20      Plan:  1. Continue vancomycin 1000mg q48h  2. Will get another trough before the last dose  3. Pharmacy will monitor renal function and adjust dose accordingly.      Tung Colon, PharmD   02/18/21 16:06 CST

## 2021-02-18 NOTE — PROGRESS NOTES
Anticoagulation by Pharmacy - Warfarin    Ondina Alanis Sr. is a 80 y.o.male who has been consulted for warfarin for DVT/PE.      Home regimen: Warfarin ~3 mg PO daily / unknown  INR Goal: 2-3    Last INR:   Lab Results   Component Value Date    INR 2.35 (H) 02/18/2021       Objective:  [Ht: 182.9 cm  ; Wt: 96.5 kg ]  Lab Results   Component Value Date    INR 2.35 (H) 02/18/2021    INR 2.07 (H) 02/17/2021    INR 1.79 (H) 02/16/2021    PROTIME 27.2 (H) 02/18/2021    PROTIME 24.6 (H) 02/17/2021    PROTIME 21.8 (H) 02/16/2021     Lab Results   Component Value Date    HGB 8.1 (L) 02/18/2021    HGB 8.0 (L) 02/15/2021    HGB 8.2 (L) 02/11/2021    HCT 24.5 (L) 02/18/2021    HCT 24.1 (L) 02/15/2021    HCT 24.5 (L) 02/11/2021     02/18/2021     02/15/2021     (L) 02/11/2021           Assessment  Interacting medications: no significant interactions  INR is 2.35, therapeutic.  Warfarin recently increased to 6 mg two days ago.  H/H below normal limits, but stable    Plan:  1.  Give warfarin 6 mg tablet PO @ 1800 tonight  2.  Draw a PT/INR in AM  3.  Pharmacy will continue to follow    Tung Colon, PharmD  02/18/21 14:43 CST

## 2021-02-19 NOTE — PROGRESS NOTES
Anticoagulation by Pharmacy - Warfarin    nOdina Alanis Sr. is a 80 y.o.male who has been consulted for warfarin for DVT/PE.      Home regimen: Warfarin ~3 mg PO daily / unknown  INR Goal: 2-3    Last INR:   Lab Results   Component Value Date    INR 2.43 (H) 02/19/2021       Objective:  [Ht: 182.9 cm  ; Wt: 96.5 kg ]  Lab Results   Component Value Date    INR 2.43 (H) 02/19/2021    INR 2.35 (H) 02/18/2021    INR 2.07 (H) 02/17/2021    PROTIME 28.0 (H) 02/19/2021    PROTIME 27.2 (H) 02/18/2021    PROTIME 24.6 (H) 02/17/2021     Lab Results   Component Value Date    HGB 8.1 (L) 02/18/2021    HGB 8.0 (L) 02/15/2021    HGB 8.2 (L) 02/11/2021    HCT 24.5 (L) 02/18/2021    HCT 24.1 (L) 02/15/2021    HCT 24.5 (L) 02/11/2021     02/18/2021     02/15/2021     (L) 02/11/2021           Assessment  Interacting medications: no significant interactions  INR is therapeutic  We will continue to trend 6 mg PO warfarin  No h/h today  We will follow for s/s of bleeding    Plan:  1.  Give warfarin 6 mg tablet PO @ 1800 tonight  2.  Draw a PT/INR in AM  3.  Pharmacy will continue to follow    Norm Bustamante, Nichelle  02/19/21 09:38 CST

## 2021-02-19 NOTE — PROGRESS NOTES
Pharmacokinetics by Pharmacy - Vancomycin    Ondina Alanis . is a 80 y.o. male receiving vancomycin pulse dosing currently (ANT) for MRSA bacteremia     Objective:  [Ht: 182.9 cm  ; Wt: 96.5 kg  ]     WBC   Date Value Ref Range Status   02/18/2021 4.41 3.40 - 10.80 10*3/mm3 Final   02/15/2021 7.33 3.40 - 10.80 10*3/mm3 Final   02/11/2021 8.69 3.40 - 10.80 10*3/mm3 Final   02/15/2018 7.9 4.0 - 11.0 10*3/uL Final      C-Reactive Protein   Date Value Ref Range Status   09/23/2020 3.63 (H) 0.00 - 0.50 mg/dL Final     Lactate   Date Value Ref Range Status   02/05/2021 2.1 (C) 0.5 - 2.0 mmol/L Final   02/04/2021 2.2 (C) 0.5 - 2.0 mmol/L Final   09/24/2020 1.4 0.5 - 2.0 mmol/L Final      Temp Readings from Last 1 Encounters:   02/09/21 98.7 °F (37.1 °C) (Temporal)     Estimated Creatinine Clearance: 27.7 mL/min (A) (by C-G formula based on SCr of 2.61 mg/dL (H)).   Creatinine   Date Value Ref Range Status   02/18/2021 2.61 (H) 0.76 - 1.27 mg/dL Final   02/16/2021 2.53 (H) 0.76 - 1.27 mg/dL Final   02/15/2021 2.63 (H) 0.76 - 1.27 mg/dL Final   02/15/2018 2.4 (H) 0.6 - 1.3 mg/dL Final       Vancomycin Peak   Date Value Ref Range Status   02/12/2021 31.20 20.00 - 40.00 mcg/mL Final   02/07/2021 24.80 20.00 - 40.00 mcg/mL Final     Vancomycin Trough   Date Value Ref Range Status   02/18/2021 20.80 (H) 5.00 - 20.00 mcg/mL Final   02/16/2021 19.40 5.00 - 20.00 mcg/mL Final     Vancomycin Random   Date Value Ref Range Status   02/11/2021 20.80 5.00 - 40.00 mcg/mL Final   02/07/2021 9.60 5.00 - 40.00 mcg/mL Final       Culture Results:  Microbiology Results (last 10 days)     ** No results found for the last 240 hours. **        No results found for: RESPCX      Assessment:  No labs yet today  Trough to be checked 2/21  Will ensure dose was given this morning  No other changes at this point    Plan:  1. Continue vancomycin 1000mg q48h  2. Will get another trough before the last dose  3. Pharmacy will monitor renal  function and adjust dose accordingly.      Norm Bustamante, PharmD   02/19/21 09:25 CST

## 2021-02-20 NOTE — PROGRESS NOTES
Anticoagulation by Pharmacy - Warfarin    Ondina Alanis Sr. is a 80 y.o.male who has been consulted for warfarin for DVT/PE.      Home regimen: Warfarin ~3 mg PO daily / unknown  INR Goal: 2-3    Last INR:   Lab Results   Component Value Date    INR 2.48 (H) 02/20/2021       Objective:  [Ht: 182.9 cm  ; Wt: 96.5 kg ]  Lab Results   Component Value Date    INR 2.48 (H) 02/20/2021    INR 2.43 (H) 02/19/2021    INR 2.35 (H) 02/18/2021    PROTIME 28.5 (H) 02/20/2021    PROTIME 28.0 (H) 02/19/2021    PROTIME 27.2 (H) 02/18/2021     Lab Results   Component Value Date    HGB 8.1 (L) 02/18/2021    HGB 8.0 (L) 02/15/2021    HGB 8.2 (L) 02/11/2021    HCT 24.5 (L) 02/18/2021    HCT 24.1 (L) 02/15/2021    HCT 24.5 (L) 02/11/2021     02/18/2021     02/15/2021     (L) 02/11/2021           Assessment  Interacting medications: no significant interactions  INR is therapeutic and trending appropriattely  We will continue 6 mg PO warfarin nightly  No h/h today  We will follow for s/s of bleeding    Plan:  1.  Give warfarin 6 mg tablet PO @ 1800 tonight  2.  Draw a PT/INR in AM  3.  Pharmacy will continue to follow    Norm Bustamante, Nichelle  02/20/21 10:19 CST

## 2021-02-20 NOTE — PROGRESS NOTES
Pharmacokinetics by Pharmacy - Vancomycin    Ondina Alanis . is a 80 y.o. male receiving vancomycin pulse dosing currently (ANT) for MRSA bacteremia     Objective:  [Ht: 182.9 cm  ; Wt: 96.5 kg  ]     WBC   Date Value Ref Range Status   02/18/2021 4.41 3.40 - 10.80 10*3/mm3 Final   02/15/2021 7.33 3.40 - 10.80 10*3/mm3 Final   02/11/2021 8.69 3.40 - 10.80 10*3/mm3 Final   02/15/2018 7.9 4.0 - 11.0 10*3/uL Final      C-Reactive Protein   Date Value Ref Range Status   09/23/2020 3.63 (H) 0.00 - 0.50 mg/dL Final     Lactate   Date Value Ref Range Status   02/05/2021 2.1 (C) 0.5 - 2.0 mmol/L Final   02/04/2021 2.2 (C) 0.5 - 2.0 mmol/L Final   09/24/2020 1.4 0.5 - 2.0 mmol/L Final      Temp Readings from Last 1 Encounters:   02/09/21 98.7 °F (37.1 °C) (Temporal)     Estimated Creatinine Clearance: 27.7 mL/min (A) (by C-G formula based on SCr of 2.61 mg/dL (H)).   Creatinine   Date Value Ref Range Status   02/18/2021 2.61 (H) 0.76 - 1.27 mg/dL Final   02/16/2021 2.53 (H) 0.76 - 1.27 mg/dL Final   02/15/2021 2.63 (H) 0.76 - 1.27 mg/dL Final   02/15/2018 2.4 (H) 0.6 - 1.3 mg/dL Final       Vancomycin Peak   Date Value Ref Range Status   02/12/2021 31.20 20.00 - 40.00 mcg/mL Final   02/07/2021 24.80 20.00 - 40.00 mcg/mL Final     Vancomycin Trough   Date Value Ref Range Status   02/18/2021 20.80 (H) 5.00 - 20.00 mcg/mL Final   02/16/2021 19.40 5.00 - 20.00 mcg/mL Final     Vancomycin Random   Date Value Ref Range Status   02/11/2021 20.80 5.00 - 40.00 mcg/mL Final   02/07/2021 9.60 5.00 - 40.00 mcg/mL Final       Culture Results:  Microbiology Results (last 10 days)     ** No results found for the last 240 hours. **        No results found for: RESPCX      Assessment:  Vancomycin will end 2/22  No new labs today  Dose was given yesterday  No other changes at this time  Trough 2/21 @ 0330    Plan:  1. Continue vancomycin 1000mg q48h  2. Trough 2/21  3. Pharmacy will monitor renal function and adjust dose  accordingly.      Norm Bustamante, PharmD   02/20/21 08:45 CST

## 2021-02-21 NOTE — PROGRESS NOTES
Pharmacokinetics by Pharmacy - Vancomycin    Ondina Alanis . is a 80 y.o. male receiving vancomycin pulse dosing currently (ANT) for MRSA bacteremia     Objective:  [Ht: 182.9 cm  ; Wt: 96.5 kg  ]     WBC   Date Value Ref Range Status   02/18/2021 4.41 3.40 - 10.80 10*3/mm3 Final   02/15/2021 7.33 3.40 - 10.80 10*3/mm3 Final   02/11/2021 8.69 3.40 - 10.80 10*3/mm3 Final   02/15/2018 7.9 4.0 - 11.0 10*3/uL Final      C-Reactive Protein   Date Value Ref Range Status   09/23/2020 3.63 (H) 0.00 - 0.50 mg/dL Final     Lactate   Date Value Ref Range Status   02/05/2021 2.1 (C) 0.5 - 2.0 mmol/L Final   02/04/2021 2.2 (C) 0.5 - 2.0 mmol/L Final   09/24/2020 1.4 0.5 - 2.0 mmol/L Final      Temp Readings from Last 1 Encounters:   02/09/21 98.7 °F (37.1 °C) (Temporal)     Estimated Creatinine Clearance: 27.7 mL/min (A) (by C-G formula based on SCr of 2.61 mg/dL (H)).   Creatinine   Date Value Ref Range Status   02/18/2021 2.61 (H) 0.76 - 1.27 mg/dL Final   02/16/2021 2.53 (H) 0.76 - 1.27 mg/dL Final   02/15/2021 2.63 (H) 0.76 - 1.27 mg/dL Final   02/15/2018 2.4 (H) 0.6 - 1.3 mg/dL Final       Vancomycin Peak   Date Value Ref Range Status   02/12/2021 31.20 20.00 - 40.00 mcg/mL Final   02/07/2021 24.80 20.00 - 40.00 mcg/mL Final     Vancomycin Trough   Date Value Ref Range Status   02/18/2021 20.80 (H) 5.00 - 20.00 mcg/mL Final   02/16/2021 19.40 5.00 - 20.00 mcg/mL Final     Vancomycin Random   Date Value Ref Range Status   02/11/2021 20.80 5.00 - 40.00 mcg/mL Final   02/07/2021 9.60 5.00 - 40.00 mcg/mL Final       Culture Results:  Microbiology Results (last 10 days)     ** No results found for the last 240 hours. **        No results found for: RESPCX    Assessment:  Patient will have completed 14 days of vancomycin tomorrow  No more doses are needed for patient to complete therapy  Trough scheduled for this AM was mistakenly cancelled and dose was given without level prior. No need to gather a level now since the  patient is not scheduled for any doses.  No new labs today  Pharmacy will be signing off    Plan:  1. No more doses needed  2. No more levels needed  3. Pharmacy will monitor renal function and adjust dose accordingly.      Norm Bustamante, PharmD   02/21/21 09:05 CST

## 2021-02-21 NOTE — PROGRESS NOTES
Anticoagulation by Pharmacy - Warfarin    Ondina Alanis Sr. is a 80 y.o.male who has been consulted for warfarin for DVT/PE.      Home regimen: Warfarin ~3 mg PO daily / unknown  INR Goal: 2-3    Last INR:   Lab Results   Component Value Date    INR 3.50 (H) 02/21/2021       Objective:  [Ht: 182.9 cm  ; Wt: 96.5 kg ]  Lab Results   Component Value Date    INR 3.50 (H) 02/21/2021    INR 2.48 (H) 02/20/2021    INR 2.43 (H) 02/19/2021    PROTIME 28.5 (H) 02/20/2021    PROTIME 28.0 (H) 02/19/2021    PROTIME 27.2 (H) 02/18/2021     Lab Results   Component Value Date    HGB 8.1 (L) 02/18/2021    HGB 8.0 (L) 02/15/2021    HGB 8.2 (L) 02/11/2021    HCT 24.5 (L) 02/18/2021    HCT 24.1 (L) 02/15/2021    HCT 24.5 (L) 02/11/2021     02/18/2021     02/15/2021     (L) 02/11/2021           Assessment  Interacting medications: no significant interactions  INR is supratherapeutic and jumped largely overnight  Of note, today's INR was a fingerstick rather than a blood draw  We will hold today's dose    Plan:  1.  HOLD warfarin tonight  2.  Draw a PT/INR in AM  3.  Pharmacy will continue to follow    Norm Bustamante, PharmD  02/21/21 08:54 CST

## 2021-02-22 NOTE — PROGRESS NOTES
Anticoagulation by Pharmacy - Warfarin    Ondina Alanis Sr. is a 80 y.o.male who has been consulted for warfarin for DVT/PE.     Home regimen: Warfarin ~3 mg PO daily / unknown  INR Goal: 2-3    Objective:  [Ht:  ; Wt:  ]    Lab Results   Component Value Date    INR 2.76 (H) 02/22/2021    INR 3.50 (H) 02/21/2021    INR 2.48 (H) 02/20/2021    PROTIME 31.0 (H) 02/22/2021    PROTIME 28.5 (H) 02/20/2021    PROTIME 28.0 (H) 02/19/2021     Lab Results   Component Value Date    HGB 8.6 (L) 02/22/2021    HGB 8.1 (L) 02/18/2021    HGB 8.0 (L) 02/15/2021    HCT 26.3 (L) 02/22/2021    HCT 24.5 (L) 02/18/2021    HCT 24.1 (L) 02/15/2021     02/22/2021     02/18/2021     02/15/2021       Recent Warfarin Administrations     The 5 most recent administrations since 02/05/2021 are shown below each listed medication.    Warfarin Sodium       Order Dose Date Given     warfarin (COUMADIN) tablet 3 mg 3 mg 02/08/2021      3 mg 02/07/2021                Assessment  • H/H slightly low but stable. Plts WNL  • Interacting medications: none significant   • INR is therapeutic as seen above. Warfarin held yesterday due to elevated INR. Given INR drop from yesterday will restart warfarin again.     Plan:   1. Give warfarin 5 mg tablet PO @ 1800 tonight  2. PT/INR ordered daily  3. Pharmacy will continue to follow    John Galicia [facility]  02/22/21 10:21 CST

## 2021-02-23 NOTE — PROGRESS NOTES
Anticoagulation by Pharmacy - Warfarin    Ondina Alanis Sr. is a 80 y.o.male who has been consulted for warfarin for DVT/PE.     Home regimen: Warfarin ~3 mg PO daily / unknown  INR Goal: 2-3    Objective:  [Ht:  ; Wt:  ]    Lab Results   Component Value Date    INR 2.59 (H) 02/23/2021    INR 2.76 (H) 02/22/2021    INR 3.50 (H) 02/21/2021    PROTIME 29.5 (H) 02/23/2021    PROTIME 31.0 (H) 02/22/2021    PROTIME 28.5 (H) 02/20/2021     Lab Results   Component Value Date    HGB 8.6 (L) 02/22/2021    HGB 8.1 (L) 02/18/2021    HGB 8.0 (L) 02/15/2021    HCT 26.3 (L) 02/22/2021    HCT 24.5 (L) 02/18/2021    HCT 24.1 (L) 02/15/2021     02/22/2021     02/18/2021     02/15/2021       Recent Warfarin Administrations     The 5 most recent administrations since 02/05/2021 are shown below each listed medication.    Warfarin Sodium       Order Dose Date Given     warfarin (COUMADIN) tablet 3 mg 3 mg 02/08/2021      3 mg 02/07/2021                Assessment  • H/H slightly low but stable. Plts WNL.  • Interacting medications: none significant   • INR is therapeutic as seen above. Warfarin held on 2/21 due to elevated INR. Restarted yesterday due to within range again.     Plan:   1. Give warfarin 5 mg tablet PO @ 1800 tonight  2. PT/INR ordered daily  3. Pharmacy will continue to follow    John Galicia [facility]  02/23/21 08:24 CST

## 2021-02-24 NOTE — PROGRESS NOTES
Anticoagulation by Pharmacy - Warfarin    Ondina Alanis Sr. is a 80 y.o.male  [Ht:  ; Wt:  ] on Warfarin for indication of DVT/PE.    Goal INR: 2-3  Home dose is Warfarin ~3 mg PO daily / unknown    Today's INR:   Lab Results   Component Value Date    INR 2.54 (H) 02/24/2021          Lab Results   Component Value Date    INR 2.54 (H) 02/24/2021    INR 2.59 (H) 02/23/2021    INR 2.76 (H) 02/22/2021    PROTIME 29.0 (H) 02/24/2021    PROTIME 29.5 (H) 02/23/2021    PROTIME 31.0 (H) 02/22/2021     Lab Results   Component Value Date    HGB 8.6 (L) 02/22/2021    HGB 8.1 (L) 02/18/2021    HGB 8.0 (L) 02/15/2021     Lab Results   Component Value Date    HCT 26.3 (L) 02/22/2021    HCT 24.5 (L) 02/18/2021    HCT 24.1 (L) 02/15/2021     Lab Results   Component Value Date     02/22/2021     02/18/2021     02/15/2021           Assessment/Plan:  Reviewed above labs. INR is 2.54.  INR is at goal. No changes in medication list. Concurrent medications include none. Pt did receive dose of warfarin last night.  Will continue current dose of  5 mg. Rx will continue to follow and adjust dose accordingly.      Fany Byers, PharmD  02/24/21 08:53 CST

## 2021-02-25 NOTE — PROGRESS NOTES
Anticoagulation by Pharmacy - Warfarin    Ondina Alanis Sr. is a 80 y.o.male  [Ht:  ; Wt:  ] on Warfarin for indication of DVT/PE.    Goal INR: 2-3  Home dose is Warfarin ~3 mg PO daily / unknown  Today's INR:   Lab Results   Component Value Date    INR 2.92 (H) 02/25/2021          Lab Results   Component Value Date    INR 2.92 (H) 02/25/2021    INR 2.54 (H) 02/24/2021    INR 2.59 (H) 02/23/2021    PROTIME 32.5 (H) 02/25/2021    PROTIME 29.0 (H) 02/24/2021    PROTIME 29.5 (H) 02/23/2021     Lab Results   Component Value Date    HGB 8.4 (L) 02/25/2021    HGB 8.6 (L) 02/22/2021    HGB 8.1 (L) 02/18/2021     Lab Results   Component Value Date    HCT 25.8 (L) 02/25/2021    HCT 26.3 (L) 02/22/2021    HCT 24.5 (L) 02/18/2021     Lab Results   Component Value Date     02/25/2021     02/22/2021     02/18/2021           Assessment/Plan:  Reviewed above labs. INR is 2.97.  INR is  therapeutic but at high end of goal range. Pt did receive dose of warfarin last night. No changes in medication list. Will decrease current dose of warfarin to  2.5 mg. Rx will continue to follow and adjust dose accordingly.     Fany Byers, PharmD  02/25/21 09:04 CST

## 2021-04-16 PROBLEM — I50.23 ACUTE ON CHRONIC SYSTOLIC CHF (CONGESTIVE HEART FAILURE) (HCC): Status: ACTIVE | Noted: 2021-01-01

## 2021-04-16 PROBLEM — A41.9 SEPSIS (HCC): Status: ACTIVE | Noted: 2021-01-01

## 2021-04-16 NOTE — ED TRIAGE NOTES
Pt presents to ED via EMS sent by [facility]. Pt's wife reports pt has had left sided edema, shortness of breath, and cough.

## 2021-04-16 NOTE — ED NOTES
Lab notified. Will draw cultures and start antibiotic after cultures drawn     Madiha Bustillo RN  04/16/21 [id number]

## 2021-04-16 NOTE — H&P
[facility] Admission      Date of Admission: 4/16/2021      Primary Care Physician: Mark Sheldon APRN      Chief Complaint: Shortness of breath    HPI: Patient is a 81-year-old male with past medical history notable for hypertension, hyperlipidemia, type 2 diabetes mellitus, CKD 3B, BPH, and CAD who presented to the emergency department due to worsening shortness of breath and reports of hypothermia at home.  Due to patient's dementia history is obtained from his wife.  She notes that he has been declining over the past week or so.  She states that he is essentially bedbound but has also been developing worsening edema in his lower extremities.  She states that he is not having any fevers but has had low body temperatures.  She notes he is also been developing some worsening shortness of breath.  She states that he will randomly talk to himself and talk to people that are not there at home.    Evaluation of the emergency department was notable for CBC which showed a WBC of 2.91, hemoglobin 10.9, hematocrit 33, platelets at 115.  D-dimer was elevated at 1071.  PTT was 86.  PT was 111.1 with an INR of 13.24.  Troponin T was 0.16 which is improved from 6 months ago when it was 0.241.  proBNP was elevated at 65,746.  CMP showed glucose of 132, BUN 48, creatinine 2.47, chloride 110, calcium 8.3, albumin 2.7.  Covid and influenza screen was negative.  Lactate was normal at 1.2.  Chest x-ray showed cardiomegaly with interval development of bilateral infiltrative changes suggesting pulmonary edema and/or bilateral pneumonitis.    Patient was given Rocephin and azithromycin along with vitamin K.  Vital signs in the ER were notable for temp of 94.2, pulse 52, /81 with a normal SPO2 on room air.  Admission was requested for further evaluation and care.    Concurrent Medical History:  has a past medical history of Asthma, Benign prostatic hyperplasia, CHF  (congestive heart failure) (CMS/[facility]), Coronary artery disease, Diabetes mellitus (CMS/[facility]), GERD (gastroesophageal reflux disease), Hyperlipidemia, Hypertension, Prostate disorder, Renal insufficiency, and Urinary tract infection.    Past Surgical History:  has a past surgical history that includes Back surgery; Prostate surgery; Knee surgery (Left); and Cystoscopy (N/A, 2/5/2021).    Family History: family history is not on file.  No changes    Social History:  reports that he has never smoked. He does not have any smokeless tobacco history on file. He reports that he does not drink alcohol and does not use drugs.    Allergies:   Allergies   Allergen Reactions   • Contrast Dye        Medications:   Prior to Admission medications    Medication Sig Start Date End Date Taking? Authorizing Provider   acetaminophen (TYLENOL) 325 MG tablet Take 2 tablets by mouth Every 6 (Six) Hours As Needed for Mild Pain . 10/13/20   Nicole Canseco APRN   atorvastatin (LIPITOR) 40 MG tablet Take 1 tablet by mouth Every Night. 8/28/20   Anup Atkinson MD   Bacitracin Zinc (magic butt ointment) Apply 1 each topically to the appropriate area as directed 2 (Two) Times a Day. 10/13/20   Nicole Canseco APRN   bisacodyl (DULCOLAX) 5 MG EC tablet Take 1 tablet by mouth Daily As Needed for Constipation. 10/13/20   Nicole Canseco APRN   brimonidine (ALPHAGAN P) 0.1 % solution ophthalmic solution 1 drop. 1/26/16   Selina Rosario MD   brinzolamide (AZOPT) 1 % ophthalmic suspension 1 drop. 1/26/16   ProviderSelina MD   budesonide (PULMICORT) 0.5 MG/2ML nebulizer solution Inhale 0.5 mg. 1/26/16   ProviderSelina MD   hydrALAZINE (APRESOLINE) 50 MG tablet Take 1 tablet by mouth Every 8 (Eight) Hours. 2/9/21   Ralph Weaver APRN   insulin aspart (novoLOG) 100 UNIT/ML injection Inject 0-7 Units under the skin into the appropriate area as directed 3 (Three) Times a Day Before Meals. 8/28/20   Anup Atkinson  MD   insulin detemir (Levemir) 100 UNIT/ML injection Inject 10 Units under the skin into the appropriate area as directed Every 12 (Twelve) Hours. 8/28/20   Anup Atkinson MD   ipratropium (ATROVENT) 0.02 % nebulizer solution Take 2.5 mL by nebulization Every 4 (Four) Hours As Needed for Wheezing or Shortness of Air. 8/28/20   Anup Atkinson MD   ipratropium-albuterol (DUO-NEB) 0.5-2.5 mg/3 ml nebulizer Take 3 mL by nebulization Every 4 (Four) Hours As Needed for Wheezing or Shortness of Air. 10/13/20   Nicole Canseco APRN   isosorbide mononitrate (IMDUR) 30 MG 24 hr tablet Take 1 tablet by mouth Daily. 8/29/20   Anup Atkinson MD   lansoprazole (PREVACID) 30 MG capsule Take 30 mg by mouth. 4/10/17   ProviderSelina MD   latanoprost (XALATAN) 0.005 % ophthalmic solution 1 drop. 1/26/16   Provider, MD Selina   Misc. Devices (TRANSFER BENCH) misc Transfer bench r/t dementia, deconditioning. 6/8/17   Nicole Canseco APRN   [facility] [place] Devices (TUB TRANSFER BOARD) [facility] [place] Transfer tub bench r/t deconditioning, dementia, and blind r/t glaucoma 6/9/17   Nicole Canseco APRN   ondansetron (ZOFRAN) 4 MG/2ML injection Infuse 2 mL into a venous catheter Every 6 (Six) Hours As Needed for Nausea or Vomiting. 10/13/20   Nicole Canseco APRN       Review of Systems:  Review of Systems   Constitutional: Negative for chills and fever.   HENT: Negative for congestion.    Eyes: Negative.    Respiratory: Positive for shortness of breath. Negative for cough.    Cardiovascular: Negative for chest pain and palpitations.   Gastrointestinal: Negative for abdominal pain, constipation, diarrhea, nausea and vomiting.   Genitourinary: Negative.    Musculoskeletal: Negative.         Bilateral lower extremity edema   Skin: Negative for rash.   Neurological: Negative.    Psychiatric/Behavioral: Negative.    All other systems reviewed and are negative.     Otherwise complete ROS is negative except as mentioned above.    Physical  Exam:   Temp:  [94.2 °F (34.6 °C)-94.6 °F (34.8 °C)] 94.2 °F (34.6 °C)  Heart Rate:  [] 52  Resp:  [16-17] 17  BP: (143-181)/(67-81) 181/81  Physical Exam  Constitutional:       General: He is not in acute distress.     Appearance: He is not toxic-appearing.   HENT:      Head: Normocephalic and atraumatic.      Right Ear: External ear normal.      Left Ear: External ear normal.      Nose: Nose normal.      Mouth/Throat:      Mouth: Mucous membranes are moist.      Pharynx: Oropharynx is clear.   Eyes:      Conjunctiva/sclera: Conjunctivae normal.   Cardiovascular:      Rate and Rhythm: Normal rate and regular rhythm.      Pulses: Normal pulses.      Heart sounds: Normal heart sounds.   Pulmonary:      Comments: Diminished coarse breath sounds bilaterally with crackles at the bases.  Abdominal:      General: Bowel sounds are normal.      Tenderness: There is no abdominal tenderness.   Musculoskeletal:      Cervical back: Neck supple.      Right lower leg: Edema present.      Left lower leg: Edema present.   Skin:     General: Skin is warm and dry.      Capillary Refill: Capillary refill takes less than 2 seconds.   Neurological:      Comments: Pleasantly demented   Psychiatric:      Comments: Pleasantly demented           Results Reviewed:  I have personally reviewed current lab, radiology, and data and agree with results.  Lab Results (last 24 hours)     Procedure Component Value Units Date/Time    Procalcitonin [[id number]] Collected: 04/16/21 1505    Specimen: Blood Updated: 04/16/21 1704    COVID-19 and FLU A/B PCR - Swab, Nasopharynx [[id number]]  (Normal) Collected: 04/16/21 1554    Specimen: Swab from Nasopharynx Updated: 04/16/21 1703     COVID19 Not Detected     Influenza A PCR Not Detected     Influenza B PCR Not Detected    Narrative:      Fact sheet for providers: https://www.fda.gov/media/175016/download    Fact sheet for patients: https://www.fda.gov/media/041324/download    Test performed by PCR.       Lactic Acid, Plasma [[id number]]  (Normal) Collected: 04/16/21 1603    Specimen: Blood Updated: 04/16/21 1627     Lactate 1.2 mmol/L     [place] Draw [[id number]] Collected: 04/16/21 1505    Specimen: Blood Updated: 04/16/21 1615    Narrative:      The following orders were created for panel order [place] Draw.  Procedure                               Abnormality         Status                     ---------                               -----------         ------                     Light Blue Top[[id number]]                                   Final result               Green Top (Gel)[[id number]]                                  Final result               Lavender Top[[id number]]                                     Final result               Gold Top - SST[[id number]]                                   Final result                 Please view results for these tests on the individual orders.    Gold Top - SST [[id number]] Collected: 04/16/21 1505    Specimen: Blood Updated: 04/16/21 1615     Extra Tube Hold for add-ons.     Comment: Auto resulted.       Light Blue Top [[id number]] Collected: 04/16/21 1505    Specimen: Blood Updated: 04/16/21 1615     Extra Tube hold for add-on     Comment: Auto resulted       Green Top (Gel) [[id number]] Collected: 04/16/21 1505    Specimen: Blood Updated: 04/16/21 1615     Extra Tube Hold for add-ons.     Comment: Auto resulted.       Lavender Top [[id number]] Collected: 04/16/21 1505    Specimen: Blood Updated: 04/16/21 1615     Extra Tube hold for add-on     Comment: Auto resulted       CBC & Differential [[id number]]  (Abnormal) Collected: 04/16/21 1505    Specimen: Blood Updated: 04/16/21 1610    Narrative:      The following orders were created for panel order CBC & Differential.  Procedure                               Abnormality         Status                     ---------                               -----------         ------                     Scan Slide[[id number]]                                        Final result               CBC Auto Differential[[id number]]        Abnormal            Final result                 Please view results for these tests on the individual orders.    Scan Slide [[id number]] Collected: 04/16/21 1505    Specimen: Blood Updated: 04/16/21 1610     Acanthocytes Slight/1+     Anisocytosis Slight/1+     Hypochromia Slight/1+     Target Cells Slight/1+     WBC Morphology Normal     Platelet Estimate Decreased    aPTT [[id number]]  (Abnormal) Collected: 04/16/21 1505    Specimen: Blood Updated: 04/16/21 1603     PTT 86.0 seconds     Narrative:      The recommended Heparin therapeutic range is 68-97 seconds.    D-dimer, Quantitative [[id number]]  (Abnormal) Collected: 04/16/21 1505    Specimen: Blood Updated: 04/16/21 1603     D-Dimer, Quantitative 1,071 ng/mL (FEU)     Narrative:      Dimer values <500 ng/ml FEU are FDA approved as aid in diagnosis of deep venous thrombosis and pulmonary embolism.  This test should not be used in an exclusion strategy with pretest probability alone.    A recent guideline regarding diagnosis for pulmonary thromboembolism recommends an adjusted exclusion criterion of age x 10 ng/ml FEU for patients >50 years of age ([employer]da Intern Med 2015; 163: 701-711).      Protime-INR [[id number]]  (Abnormal) Collected: 04/16/21 1505    Specimen: Blood Updated: 04/16/21 1603     Protime 111.1 Seconds      INR 13.24    Narrative:      Therapeutic range for most indications is 2.0-3.0 INR,  or 2.5-3.5 for mechanical heart valves.    BNP [[id number]]  (Abnormal) Collected: 04/16/21 1505    Specimen: Blood Updated: 04/16/21 1545     proBNP 65,746.0 pg/mL     Narrative:      Among patients with dyspnea, NT-proBNP is highly sensitive for the detection of acute congestive heart failure. In addition NT-proBNP of <300 pg/ml effectively rules out acute congestive heart failure with 99% negative predictive value.    Results may be falsely decreased if patient taking  Biotin.      Troponin [[id number]]  (Abnormal) Collected: 04/16/21 1505    Specimen: Blood Updated: 04/16/21 1538     Troponin T 0.160 ng/mL     Narrative:      Troponin T Reference Range:  <= 0.03 ng/mL-   Negative for AMI  >0.03 ng/mL-     Abnormal for myocardial necrosis.  Clinicians would have to utilize clinical acumen, EKG, Troponin and serial changes to determine if it is an Acute Myocardial Infarction or myocardial injury due to an underlying chronic condition.       Results may be falsely decreased if patient taking Biotin.      Comprehensive Metabolic Panel [[id number]]  (Abnormal) Collected: 04/16/21 1505    Specimen: Blood Updated: 04/16/21 1535     Glucose 132 mg/dL      BUN 48 mg/dL      Creatinine 2.47 mg/dL      Sodium 139 mmol/L      Potassium 4.4 mmol/L      Chloride 110 mmol/L      CO2 27.0 mmol/L      Calcium 8.3 mg/dL      Total Protein 6.8 g/dL      Albumin 2.70 g/dL      ALT (SGPT) 7 U/L      AST (SGOT) 14 U/L      Alkaline Phosphatase 78 U/L      Total Bilirubin 0.4 mg/dL      eGFR   Amer 31 mL/min/1.73      Globulin 4.1 gm/dL      A/G Ratio 0.7 g/dL      BUN/Creatinine Ratio 19.4     Anion Gap 2.0 mmol/L     Narrative:      GFR Normal >60  Chronic Kidney Disease <60  Kidney Failure <15      CBC Auto Differential [[id number]]  (Abnormal) Collected: 04/16/21 1505    Specimen: Blood Updated: 04/16/21 1533     WBC 2.91 10*3/mm3      RBC 4.24 10*6/mm3      Hemoglobin 10.9 g/dL      Hematocrit 33.0 %      MCV 77.8 fL      MCH 25.7 pg      MCHC 33.0 g/dL      RDW 17.9 %      RDW-SD 49.2 fl      MPV --     Comment: Instrument unable to calculate  Slide scan to follow        Platelets 115 10*3/mm3      Neutrophil % 72.5 %      Lymphocyte % 16.5 %      Monocyte % 7.9 %      Eosinophil % 2.1 %      Basophil % 0.3 %      Immature Grans % 0.7 %      Neutrophils, Absolute 2.11 10*3/mm3      Lymphocytes, Absolute 0.48 10*3/mm3      Monocytes, Absolute 0.23 10*3/mm3      Eosinophils, Absolute 0.06  10*3/mm3      Basophils, Absolute 0.01 10*3/mm3      Immature Grans, Absolute 0.02 10*3/mm3      nRBC 0.0 /100 WBC         Imaging Results (Last 24 Hours)     Procedure Component Value Units Date/Time    XR Chest 1 View [[id number]] Collected: 04/16/21 1522     Updated: 04/16/21 1541    Narrative:      Chest x-ray single view.         CLINICAL INDICATION: Shortness of breath    COMPARISON: Chest February 4, 2021.    FINDINGS: Cardiac silhouette is enlarged in size. Interval  development of bilateral infiltrative changes suggesting  pulmonary edema pattern and/or bilateral pneumonitis. Relative  sparing of portions of the right apex. Small left-sided pleural  effusion.      Impression:      Cardiomegaly. Interval development of bilateral  infiltrative changes suggesting pulmonary edema type pattern  and/or bilateral pneumonitis with relative sparing of the right  apex. Small left-sided pleural effusion. Unfavorable change since  prior exam.    Electronically signed by:  Herbert Clement MD  4/16/2021 3:40 PM CDT  Workstation: [id number]            Assessment:    Active Hospital Problems    Diagnosis    • Sepsis (CMS/HCC)    • Acute on chronic systolic CHF (congestive heart failure) (CMS/HCC)    • Dementia (CMS/HCC)    • CKD (chronic kidney disease) stage 4, GFR 15-29 ml/min (CMS/HCC)    • Diabetes mellitus type II, uncontrolled (CMS/HCC)              Plan:  -Patient will be admitted to the CCU for closer monitoring for now  -We will continue with antibiotic coverage with Rocephin and azithromycin pending further evaluation  -We will give him a small dose of IV Lasix this evening to diurese him some but nephrology will be consulted to help out with diuretic and fluid management given his underlying CKD 4.  -We will resume his home medications as appropriate  -We will monitor his blood sugars with glucose checks have a sliding scale insulin, his wife reports that he does not take basal insulin anymore  -We will hold his  warfarin for the time being and monitor his INRs with daily checks  -We will go ahead and give him some more vitamin K tomorrow.  -As needed Ativan and Haldol will be available for agitation given his underlying dementia  -DVT prophylaxis not indicated at this time given his elevated INR  -CODE STATUS: DNR/DNI which was confirmed with the patient's wife in the emergency department    The patient was evaluated during the global COVID-19 pandemic, and the diagnosis was suspected/considered upon their initial presentation.  Evaluation, treatment, and testing were consistent with current guidelines for patients who present with complaints or symptoms that may be related to COVID-19.      I discussed the patient's findings and my recommendations with: The patient and his wife    Manohar Mary MD

## 2021-04-16 NOTE — ED NOTES
Attempted to give report, nurse unavailable at this time. Will call back.      Madiha Bustillo, RN  04/16/21 3708

## 2021-04-16 NOTE — ED NOTES
Incontinent of stool. Pt cleaned. Fresh pad placed under patient.     Dianne Roman, RN  04/16/21 1122

## 2021-04-16 NOTE — ED PROVIDER NOTES
Subjective   82yo male pmh significant htn/hyperlipidemia/dm2/ckd/copd/chf/dementia/cad/pe/atrial flutter/blindness presents ED with reported 1d hx nonproductive cough/congestion with home health nurse evaluation today demonstrating hypothermia as well as bilateral adventitious breath sounds.  Pt referred to ED for further evaluation.  Pt denies physical complaints at time of exam.  ROS limited secondary to dementia.      History provided by:  Patient and spouse  History limited by:  Dementia  Illness      Review of Systems   Unable to perform ROS: Dementia       Past Medical History:   Diagnosis Date   • Asthma    • Benign prostatic hyperplasia    • CHF (congestive heart failure) (CMS/HCC)    • Coronary artery disease    • Diabetes mellitus (CMS/HCC)    • GERD (gastroesophageal reflux disease)    • Hyperlipidemia    • Hypertension    • Prostate disorder    • Renal insufficiency    • Urinary tract infection        Allergies   Allergen Reactions   • Contrast Dye        Past Surgical History:   Procedure Laterality Date   • BACK SURGERY     • CYSTOSCOPY N/A 2/5/2021    Procedure: CYSTOSCOPY WITH CATHETER PLACEMENT;  Surgeon: Keely, Orlin TRIMBLE MD;  Location: [facility];  Service: Urology;  Laterality: N/A;   • KNEE SURGERY Left    • PROSTATE SURGERY         No family history on file.    Social History     Socioeconomic History   • Marital status:      Spouse name: Not on file   • Number of children: Not on file   • Years of education: Not on file   • Highest education level: Not on file   Tobacco Use   • Smoking status: Never Smoker   Substance and Sexual Activity   • Alcohol use: No   • Drug use: No   • Sexual activity: Defer           Objective   Physical Exam  Vitals and nursing note reviewed.   Constitutional:       Appearance: Normal appearance.   HENT:      Head: Normocephalic and atraumatic.      Nose: Nose normal.      Mouth/Throat:      Mouth: Mucous membranes are dry.   Eyes:      Comments: Bilateral  blindness   Cardiovascular:      Rate and Rhythm: Normal rate and regular rhythm.      Pulses: Normal pulses.      Heart sounds: Normal heart sounds. No murmur heard.   No gallop.    Pulmonary:      Effort: Pulmonary effort is normal.      Breath sounds: Examination of the right-upper field reveals rhonchi and rales. Examination of the left-upper field reveals rhonchi and rales. Examination of the right-middle field reveals rhonchi. Examination of the left-middle field reveals decreased breath sounds and rhonchi. Examination of the left-lower field reveals decreased breath sounds. Decreased breath sounds, rhonchi and rales present. No wheezing.   Abdominal:      General: Abdomen is flat. Bowel sounds are normal. There is no distension.      Palpations: Abdomen is soft.      Tenderness: There is no abdominal tenderness. There is no rebound.   Musculoskeletal:         General: No deformity.      Cervical back: Normal range of motion and neck supple. No rigidity.      Right lower leg: Edema present.      Left lower leg: Edema present.   Lymphadenopathy:      Cervical: No cervical adenopathy.   Skin:     General: Skin is warm and dry.   Neurological:      General: No focal deficit present.      Mental Status: He is alert.      GCS: GCS eye subscore is 4. GCS verbal subscore is 4.      Sensory: Sensation is intact.      Motor: Motor function is intact.         ECG 12 Lead      Date/Time: 4/16/2021 4:55 PM  Performed by: Neil Coleman MD  Authorized by: Neil Coleman MD   Interpreted by physician  Rhythm: atrial flutter  Rate: bradycardic  BPM: 49  QRS axis: normal  Conduction: conduction normal  ST Segments: ST segments normal  T Waves: T waves normal  Clinical impression: abnormal ECG and dysrhythmia - atrial                 ED Course      Labs Reviewed   COMPREHENSIVE METABOLIC PANEL - Abnormal; Notable for the following components:       Result Value    Glucose 132 (*)     BUN 48 (*)     Creatinine 2.47 (*)      Chloride 110 (*)     Calcium 8.3 (*)     Albumin 2.70 (*)     eGFR   Amer 31 (*)     Anion Gap 2.0 (*)     All other components within normal limits    Narrative:     GFR Normal >60  Chronic Kidney Disease <60  Kidney Failure <15     BNP (IN-HOUSE) - Abnormal; Notable for the following components:    proBNP 65,746.0 (*)     All other components within normal limits    Narrative:     Among patients with dyspnea, NT-proBNP is highly sensitive for the detection of acute congestive heart failure. In addition NT-proBNP of <300 pg/ml effectively rules out acute congestive heart failure with 99% negative predictive value.    Results may be falsely decreased if patient taking Biotin.     TROPONIN (IN-HOUSE) - Abnormal; Notable for the following components:    Troponin T 0.160 (*)     All other components within normal limits    Narrative:     Troponin T Reference Range:  <= 0.03 ng/mL-   Negative for AMI  >0.03 ng/mL-     Abnormal for myocardial necrosis.  Clinicians would have to utilize clinical acumen, EKG, Troponin and serial changes to determine if it is an Acute Myocardial Infarction or myocardial injury due to an underlying chronic condition.       Results may be falsely decreased if patient taking Biotin.     CBC WITH AUTO DIFFERENTIAL - Abnormal; Notable for the following components:    WBC 2.91 (*)     Hemoglobin 10.9 (*)     Hematocrit 33.0 (*)     MCV 77.8 (*)     MCH 25.7 (*)     RDW 17.9 (*)     Platelets 115 (*)     Lymphocyte % 16.5 (*)     Immature Grans % 0.7 (*)     Lymphocytes, Absolute 0.48 (*)     All other components within normal limits   PROTIME-INR - Abnormal; Notable for the following components:    Protime 111.1 (*)     INR 13.24 (*)     All other components within normal limits    Narrative:     Therapeutic range for most indications is 2.0-3.0 INR,  or 2.5-3.5 for mechanical heart valves.   APTT - Abnormal; Notable for the following components:    PTT 86.0 (*)     All other components  within normal limits    Narrative:     The recommended Heparin therapeutic range is 68-97 seconds.   D-DIMER, QUANTITATIVE - Abnormal; Notable for the following components:    D-Dimer, Quantitative 1,071 (*)     All other components within normal limits    Narrative:     Dimer values <500 ng/ml FEU are FDA approved as aid in diagnosis of deep venous thrombosis and pulmonary embolism.  This test should not be used in an exclusion strategy with pretest probability alone.    A recent guideline regarding diagnosis for pulmonary thromboembolism recommends an adjusted exclusion criterion of age x 10 ng/ml FEU for patients >50 years of age (Brenda Intern Med 2015; 163: 701-711).     LACTIC ACID, PLASMA - Normal   COVID-19 AND FLU A/B, NP SWAB IN TRANSPORT MEDIA 8-12 HR TAT   BLOOD CULTURE   BLOOD CULTURE   RAINBOW DRAW    Narrative:     The following orders were created for panel order [place] Draw.  Procedure                               Abnormality         Status                     ---------                               -----------         ------                     Light Blue Top[[id number]]                                   Final result               Green Top (Gel)[[id number]]                                  Final result               Lavender Top[[id number]]                                     Final result               Gold Top - SST[[id number]]                                   Final result                 Please view results for these tests on the individual orders.   SCAN SLIDE   TROPONIN (IN-HOUSE)   PROCALCITONIN   CBC AND DIFFERENTIAL    Narrative:     The following orders were created for panel order CBC & Differential.  Procedure                               Abnormality         Status                     ---------                               -----------         ------                     Scan Slide[[id number]]                                       Final result               CBC Auto Differential[[id number]]         Abnormal            Final result                 Please view results for these tests on the individual orders.   LIGHT BLUE TOP   GREEN TOP   LAVENDER TOP   GOLD TOP - SST     XR Chest 1 View    Result Date: 4/16/2021  Narrative: Chest x-ray single view. CLINICAL INDICATION: Shortness of breath COMPARISON: Chest February 4, 2021. FINDINGS: Cardiac silhouette is enlarged in size. Interval development of bilateral infiltrative changes suggesting pulmonary edema pattern and/or bilateral pneumonitis. Relative sparing of portions of the right apex. Small left-sided pleural effusion.     Impression: Cardiomegaly. Interval development of bilateral infiltrative changes suggesting pulmonary edema type pattern and/or bilateral pneumonitis with relative sparing of the right apex. Small left-sided pleural effusion. Unfavorable change since prior exam. Electronically signed by:  Herbert Clement MD  4/16/2021 3:40 PM CDT Workstation: [id number]                                         [facility]    Final diagnoses:   Sepsis, due to unspecified organism, unspecified whether acute organ dysfunction present (CMS/HCC)   Acute on chronic systolic congestive heart failure (CMS/HCC)   Atrial flutter, unspecified type (CMS/HCC)   Poisoning by warfarin sodium, accidental or unintentional, initial encounter       ED Disposition  ED Disposition     ED Disposition Condition Comment    Decision to Admit  Level of Care: Stepdown [25]   Admitting Physician: SANAZ ROJO [[id number]]   Attending Physician: SANAZ ROJO [[id number]]   Patient Class: Inpatient [101]            No follow-up provider specified.       Medication List      No changes were made to your prescriptions during this visit.          Neil Coleman MD  04/16/21 3920

## 2021-04-16 NOTE — ED NOTES
Dr. Mary at bedside, will transport patient after he is finished with assessment.     Katina Aldana RN  04/16/21 0815

## 2021-04-17 PROBLEM — T45.511A COUMADIN TOXICITY: Status: ACTIVE | Noted: 2021-01-01

## 2021-04-17 PROBLEM — E11.9 DIABETES MELLITUS (HCC): Chronic | Status: ACTIVE | Noted: 2021-01-01

## 2021-04-17 PROBLEM — N39.0 UTI (URINARY TRACT INFECTION): Status: ACTIVE | Noted: 2021-01-01

## 2021-04-17 PROBLEM — I48.20 ATRIAL FIBRILLATION, CHRONIC (HCC): Chronic | Status: ACTIVE | Noted: 2021-01-01

## 2021-04-17 PROBLEM — J90 BILATERAL PLEURAL EFFUSION: Chronic | Status: ACTIVE | Noted: 2021-01-01

## 2021-04-17 PROBLEM — F03.90 DEMENTIA (HCC): Chronic | Status: ACTIVE | Noted: 2021-01-01

## 2021-04-17 PROBLEM — E11.9 DIABETES MELLITUS (HCC): Status: ACTIVE | Noted: 2021-01-01

## 2021-04-17 NOTE — PLAN OF CARE
Goal Outcome Evaluation:  Plan of Care Reviewed With: patient     Outcome Summary: Swallow evaluation completed. Pt with decreased alertnes this date. RN reports given haldol last pm. Pt was able to chew ice chips however SLP could not get pt awake for additional trials. SLP will return in a.m to re-assess diet and upgrade as tolerated. Continue NPO at this time.

## 2021-04-17 NOTE — PROGRESS NOTES
"Anticoagulation by Pharmacy - Warfarin    Ondina Alanis Sr. is a 81 y.o.male being continued on warfarin for atrial fibrillation    Home regimen: 3 mg nightly  INR Goal: 2-3    Last INR:   Lab Results   Component Value Date    INR 3.01 (H) 04/17/2021       Objective:  [Ht: 193 cm (75.98\"); Wt: 91.7 kg (202 lb 2.6 oz)]  Lab Results   Component Value Date    INR 3.01 (H) 04/17/2021    INR 13.24 (C) 04/16/2021    INR 2.92 (H) 02/25/2021    PROTIME 33.3 (H) 04/17/2021    PROTIME 111.1 (H) 04/16/2021    PROTIME 32.5 (H) 02/25/2021     Lab Results   Component Value Date    HGB 10.0 (L) 04/17/2021    HGB 10.9 (L) 04/16/2021    HGB 8.4 (L) 02/25/2021    HCT 29.0 (L) 04/17/2021    HCT 33.0 (L) 04/16/2021    HCT 25.8 (L) 02/25/2021    PLT 99 (L) 04/17/2021     (L) 04/16/2021     02/25/2021           Assessment  Interacting medications: azithromycin  INR is supratherapeutic   Vitamin K administered today  Spoke with provider and he would like for me to hold the dose tonight and restart tomorrow    Plan:  1.  No dose tonight, restart tomorrow  2.  Draw a PT/INR in AM  3.  Pharmacy will continue to follow    Norm Bustamante, PharmD  04/17/21 14:04 CDT     "

## 2021-04-17 NOTE — CONSULTS
Adult Nutrition  Assessment    Patient Name:  Ondina Alanis Sr.  YOB: 1940  MRN: [id number]  Admit Date:  4/16/2021    Assessment Date:  4/17/2021    Comments:  Pt admitted due to infection in bloodstream.  Dx include Heart Failure, Type 2 DM, CKD, UTI, Atrial Fibrillation, UTI, Dementia.  Speech unable to complete swallowing eval this date due to pt with decreased alertness but will try again tomorrow.  Therefore pt remains NPO.  Blood glucose is elevated on occasion.  Sliding scale novolog prescribed.  BUN, Creat are elevated consistent with dx CKD.  Nephrology following.  Pt with perineum pressure injury, right heel pressure injury, penis pressure injury.  Bag [place] prescribed.  Lasix prescribed due to Heart Failure.  Pt not appropriate for Low Sodium Diet ed at present due to advanced age, dementia.  Will monitor for PO intake to be resumed.        Reason for Assessment     Row Name 04/17/21 1156          Reason for Assessment    Reason For Assessment  per organizational policy;identified at risk by screening criteria Low Sodium Diet ed     Diagnosis  cardiac disease dx Heart Failure     Identified At Risk by Screening Criteria  MST SCORE 2+;large or nonhealing wound, burn or pressure injury;need for education perinneum pressure injury, right heel pressure injury, penis pressuer injury             Labs/Tests/Procedures/Meds     Row Name 04/17/21 1157          Labs/Procedures/Meds    Lab Results Reviewed  reviewed, pertinent        Medications    Pertinent Medications Reviewed  reviewed, pertinent         Physical Findings     Row Name 04/17/21 1157          Physical Findings    Skin  pressure injury;other (see comments) perineum pressure injury, right heel pressure injury, penis pressuer injury         Estimated/Assessed Needs     Row Name 04/17/21 1152          Calculation Measurements    Weight Used For Calculations  97.1 kg (214 lb 1.1 oz)        Estimated/Assessed Needs    Additional  Documentation  Calorie Requirements (Group);Fluid Requirements (Group);Juana Diaz-St. Jeor Equation (Group);Protein Requirements (Group)        Calorie Requirements    Estimated Calorie Requirement (kcal/day)  2316     Estimated Calorie Need Method  Juana Diaz-St Jeor        Juana Diaz-St. Jeor Equation    RMR (Juana Diaz-St. Jeor Equation)  1782.246        Protein Requirements    Weight Used For Protein Calculations  91.7 kg (202 lb 2.6 oz)     Est Protein Requirement Amount (gms/kg)  1.2 gm protein     Estimated Protein Requirements (gms/day)  110.04        Fluid Requirements    Fluid Requirements (mL/day)  2292     RDA Method (mL)  2292         Nutrition Prescription Ordered     Row Name 04/17/21 1200          Nutrition Prescription PO    Current PO Diet  NPO                 Electronically signed by:  Malini Alaniz RD  04/17/21 12:02 CDT

## 2021-04-17 NOTE — THERAPY EVALUATION
Acute Care - Speech Language Pathology   Swallow Initial Evaluation [facility]     Patient Name: Ondina Alanis Sr.  : 1940  MRN: [id number]  Today's Date: 2021               Admit Date: 2021    Visit Dx:     ICD-10-CM ICD-9-CM   1. Sepsis, due to unspecified organism, unspecified whether acute organ dysfunction present (CMS/[facility])  A41.9 038.9     995.91   2. Acute on chronic systolic congestive heart failure (CMS/[facility])  I50.23 428.23     428.0   3. Atrial flutter, unspecified type (CMS/[facility])  I48.92 427.32   4. Poisoning by warfarin sodium, accidental or unintentional, initial encounter  T45.511A 964.2     E858.2     Patient Active Problem List   Diagnosis   • CKD (chronic kidney disease) stage 4, GFR 15-29 ml/min (CMS/[facility])   • Diabetic peripheral neuropathy associated with type 2 diabetes mellitus (CMS/[facility])   • Diabetes mellitus type II, uncontrolled (CMS/[facility])   • GERD (gastroesophageal reflux disease)   • Primary osteoarthritis of both knees   • Pulmonary embolism (CMS/[facility])   • BPH (benign prostatic hyperplasia)   • Benign essential hypertension   • Asthma   • Pleurisy   • Acute respiratory failure with hypoxia (CMS/[facility])   • Stage 1 acute kidney injury (CMS/[facility])   • Left ventricular diastolic dysfunction   • Exacerbation of asthma   • COPD exacerbation (CMS/[facility])   • Atrial flutter (CMS/[facility])   • Acute systolic CHF (congestive heart failure) (CMS/[facility])   • Bilateral pneumonia   • Urinary obstruction   • Supratherapeutic INR   • Sepsis due to urinary tract infection (CMS/[facility])   • Acute on chronic systolic CHF (congestive heart failure) (CMS/[facility])   • Bradycardia   • Dementia (CMS/[facility])   • Acute blood loss anemia   • MRSA bacteremia   • ANT (acute kidney injury) (CMS/[facility])   • Sepsis (CMS/[facility])   • Bilateral pleural effusion   • Coumadin toxicity   • Diabetes mellitus (CMS/[facility])   • UTI (urinary tract infection)   • Atrial fibrillation, chronic (CMS/[facility])     Past Medical History:   Diagnosis Date    • Asthma    • Benign prostatic hyperplasia    • CHF (congestive heart failure) (CMS/HCC)    • Coronary artery disease    • Diabetes mellitus (CMS/[facility])    • GERD (gastroesophageal reflux disease)    • Hyperlipidemia    • Hypertension    • Prostate disorder    • Renal insufficiency    • Urinary tract infection      Past Surgical History:   Procedure Laterality Date   • BACK SURGERY     • CYSTOSCOPY N/A 2/5/2021    Procedure: CYSTOSCOPY WITH CATHETER PLACEMENT;  Surgeon: Keely, Orlin TRIMBLE MD;  Location: [facility];  Service: Urology;  Laterality: N/A;   • KNEE SURGERY Left    • PROSTATE SURGERY          SWALLOW EVALUATION (last 72 hours)      SLP Adult Swallow Evaluation     Row Name 04/17/21 0927                   Rehab Evaluation    Document Type  evaluation  -EK        Subjective Information  no complaints  -EK        Patient Observations  decreased LOC Will shake head yes/no  -EK        Care Plan Review  evaluation/treatment results reviewed  -EK        Patient Effort  adequate  -EK           General Information    Patient Profile Reviewed  yes  -EK           Pain    Additional Documentation  Pain Scale: Numbers Pre/Post-Treatment (Group)  -EK           Pain Scale: Numbers Pre/Post-Treatment    Pretreatment Pain Rating  0/10 - no pain  -EK        Posttreatment Pain Rating  0/10 - no pain  -EK           Oral Motor Structure and Function    Dentition Assessment  natural, present and adequate  -EK        Mucosal Quality  moist, healthy  -EK           General Eating/Swallowing Observations    Respiratory Support Currently in Use  nasal cannula  -EK        Eating/Swallowing Skills  fed by SLP  -EK        Positioning During Eating  upright 90 degree;upright in bed  -EK        Utensils Used  spoon  -EK        Consistencies Trialed  ice chips;thin liquids  -EK        Pre SpO2 (%)  100  -EK        Post SpO2 (%)  100  -EK           Clinical Swallow Eval    Clinical Swallow Evaluation Summary  Swallow evaluation completed.  Pt with decreased alertnes this date. RN reports given haldol last pm. Pt was able to chew ice chips however SLP could not get pt awake for additional trials   -EK           Clinical Impression    SLP Swallowing Diagnosis  mild-moderate;oral dysphagia;suspected pharyngeal dysphagia decreased alertness  -EK        Functional Impact  risk of aspiration/pneumonia;risk of malnutrition;risk of dehydration  -EK        Rehab Potential/Prognosis, Swallowing  good, to achieve stated therapy goals  -EK           Recommendations    SLP Diet Recommendation  NPO  -EK        Oral Care Recommendations  Oral Care BID/PRN  -EK        SLP Rec. for Method of Medication Administration  meds via alternate route  -EK        Monitor for Signs of Aspiration  yes;notify SLP if any concerns  -EK           Swallow Goals (SLP)    Oral Nutrition/Hydration Goal Selection (SLP)  oral nutrition/hydration, SLP goal 1  -EK           Oral Nutrition/Hydration Goal 1 (SLP)    Oral Nutrition/Hydration Goal 1, SLP  Pt to tolerate least restrictive diet with no s/s of aspiration for adequate nutrition and hydration.   -EK        Time Frame (Oral Nutrition/Hydration Goal 1, SLP)  by discharge  -EK        Progress/Outcomes (Oral Nutrition/Hydration Goal 1, SLP)  other (see comments) new goal   -EK          User Key  (r) = Recorded By, (t) = Taken By, (c) = Cosigned By    Initials Name Effective Dates    Yenifer Forbes, CCC-SLP 07/24/19 -           EDUCATION  The patient has been educated in the following areas:   Dysphagia (Swallowing Impairment).    SLP Recommendation and Plan  SLP Swallowing Diagnosis: mild-moderate, oral dysphagia, suspected pharyngeal dysphagia (decreased alertness)  SLP Diet Recommendation: NPO     SLP Rec. for Method of Medication Administration: meds via alternate route     Monitor for Signs of Aspiration: yes, notify SLP if any concerns           Rehab Potential/Prognosis, Swallowing: good, to achieve stated therapy  goals                               Plan of Care Reviewed With: patient  Outcome Summary: Swallow evaluation completed. Pt with decreased alertnes this date. RN reports given haldol last pm. Pt was able to chew ice chips however SLP could not get pt awake for additional trials. SLP will return in a.m to re-assess diet and upgrade as tolerated.    SLP GOALS     Row Name 04/17/21 0927             Oral Nutrition/Hydration Goal 1 (SLP)    Oral Nutrition/Hydration Goal 1, SLP  Pt to tolerate least restrictive diet with no s/s of aspiration for adequate nutrition and hydration.   -EK      Time Frame (Oral Nutrition/Hydration Goal 1, SLP)  by discharge  -EK      Progress/Outcomes (Oral Nutrition/Hydration Goal 1, SLP)  other (see comments) new goal   -EK        User Key  (r) = Recorded By, (t) = Taken By, (c) = Cosigned By    Initials Name Provider Type    Yenifer Forbes CCC-SLP Speech and Language Pathologist             Time Calculation:   Time Calculation- SLP     Row Name 04/17/21 0944             Time Calculation- SLP    SLP Start Time  0927  -EK      SLP Stop Time  0950  -EK      SLP Time Calculation (min)  23 min  -EK      SLP Received On  04/17/21  -EK      SLP Goal Re-Cert Due Date  04/30/21  -EK        User Key  (r) = Recorded By, (t) = Taken By, (c) = Cosigned By    Initials Name Provider Type    Yenifer Forbes CCC-SLP Speech and Language Pathologist          Therapy Charges for Today     Code Description Service Date Service Provider Modifiers Qty    [id number] HC ST EVAL ORAL PHARYNG SWALLOW 2 4/17/2021 Yenifer Wagner CCC-SLP GN 1               ÁLVARO Lemus  4/17/2021

## 2021-04-17 NOTE — SIGNIFICANT NOTE
Pt experienced notable desaturation while asleep, nursing placed 2L NC, pt stable, will continue to monitor.

## 2021-04-17 NOTE — PLAN OF CARE
Goal Outcome Evaluation:  Plan of Care Reviewed With: other (see comments) (RN)  Progress: no change  Outcome Summary: Initital assesment.  Resume PO intake as appropriate.

## 2021-04-17 NOTE — CONSULTS
[facility]  [address] [place] [address]. [address]  T - [phone]    [phone]     Consultation         PATIENT  DEMOGRAPHICS   PATIENT NAME: Ondina Alanis .                      PHYSICIAN: THIERNO Horner  : 1940  MRN: [id number]    Subjective   SUBJECTIVE   Referring Provider: Dr Mary  Reason for Consultation: ANT on CKD 3  History of present illness:    Patient is a 81-year-old male with past medical history notable for hypertension, systolic heart failure EF 20%, CAD, Afib, hyperlipidemia, type 2 diabetes mellitus, CKD 3B-IV  who presented to the emergency department due to worsening shortness of breath and reports of hypothermia at home.  He has a history prostate cancer requiring lupron injections. Past history of  PE as well. Due to patient's dementia history is obtained from his wife.  She notes that he has been declining over the past week or so.  She states that he is essentially bedbound but has also been developing worsening edema in his lower extremities.   Chest x-ray showed cardiomegaly with interval development of bilateral infiltrative changes suggesting pulmonary edema and/or bilateral pneumonitis.  Patient had episodes of ANT during hospitalizations in 2020 and also 2021.   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 consulted for CKD/ANT    Past Medical History:   Diagnosis Date   • Asthma    • Benign prostatic hyperplasia    • CHF (congestive heart failure) (CMS/HCC)    • Coronary artery disease    • Diabetes mellitus (CMS/HCC)    • GERD (gastroesophageal reflux disease)    • Hyperlipidemia    • Hypertension    • Prostate disorder    • Renal insufficiency    • Urinary tract infection      Past Surgical History:   Procedure Laterality Date   • BACK SURGERY     • CYSTOSCOPY N/A 2021    Procedure: CYSTOSCOPY WITH CATHETER PLACEMENT;  Surgeon: [place], Orlin TRIMBLE MD;  Location: [facility];  Service: Urology;  Laterality: N/A;   • KNEE SURGERY Left  "   • PROSTATE SURGERY       No family history on file.  Social History     Tobacco Use   • Smoking status: Never Smoker   Substance Use Topics   • Alcohol use: No   • Drug use: No     Allergies:  Contrast dye     REVIEW OF SYSTEMS    Review of Systems   Unable to perform ROS: Acuity of condition       Objective   OBJECTIVE   Vital Signs  Temp:  [94.2 °F (34.6 °C)-98.1 °F (36.7 °C)] 98.1 °F (36.7 °C)  Heart Rate:  [] 53  Resp:  [10-18] 16  BP: ()/(50-93) 144/61    Flowsheet Rows      First Filed Value   Admission Height  193 cm (76\") Documented at 04/16/2021 1448   Admission Weight  106 kg (233 lb 12.8 oz) Documented at 04/16/2021 1448           I/O last 3 completed shifts:  In: 450 [IV Piggyback:450]  Out: 900 [Urine:900]    PHYSICAL EXAM    Physical Exam  Vitals and nursing note reviewed.   Constitutional:       General: He is not in acute distress.     Appearance: He is ill-appearing.   HENT:      Head: Normocephalic and atraumatic.   Cardiovascular:      Rate and Rhythm: Rhythm irregular.   Pulmonary:      Breath sounds: Rhonchi present.   Abdominal:      General: Bowel sounds are normal.      Palpations: Abdomen is soft.   Musculoskeletal:      Comments: Trace ashia le edema         RESULTS   Results Review:    Results from last 7 days   Lab Units 04/17/21  0530 04/16/21  1505   SODIUM mmol/L 145 139   POTASSIUM mmol/L 4.4 4.4   CHLORIDE mmol/L 114* 110*   CO2 mmol/L 25.0 27.0   BUN mg/dL 45* 48*   CREATININE mg/dL 2.46* 2.47*   CALCIUM mg/dL 8.2* 8.3*   BILIRUBIN mg/dL 0.4 0.4   ALK PHOS U/L 68 78   ALT (SGPT) U/L 5 7   AST (SGOT) U/L 13 14   GLUCOSE mg/dL 78 132*       Estimated Creatinine Clearance: 31.1 mL/min (A) (by C-G formula based on SCr of 2.46 mg/dL (H)).              CT chest without intravenous contrast.     Radiation dose-limiting techniques also utilized, including  automated exposure control and adjustment of mA and/or KVP to the  patient size according to ALARA (as low as " reasonably  achievable).     There is mild cardiomegaly. There is small pericardial effusion.  There are calcifications in the coronary arteries. There are a  few mediastinal lymph nodes normal by size criteria. There are  emphysematous changes in the right lung apex There are large  bilateral pleural effusions with almost complete collapse of  lower lobes bilaterally. There are scattered groundglass  infiltrates in the upper lobes bilaterally.     No acute abnormality is seen in the upper abdomen. There is small  liver. There are degenerative changes of the thoracic spine.  There is scoliosis of the thoracic spine with convexity to the  right. No acute bony abnormality is seen.     IMPRESSION:  CONCLUSION:  1. Large bilateral pleural effusions with almost complete  collapse of the lower lobes bilaterally.  2. Groundglass infiltrates in the upper lobes bilaterally.  Imaging features can be seen with COVID pneumonia, though are  nonspecific and can occur with a variety of infectious and  noninfectious processes.   Results from last 7 days   Lab Units 04/17/21  0530 04/16/21  1505   WBC 10*3/mm3 2.66* 2.91*   HEMOGLOBIN g/dL 10.0* 10.9*   PLATELETS 10*3/mm3 99* 115*       Results from last 7 days   Lab Units 04/17/21  0530 04/16/21  1505   INR  3.01* 13.24*        MEDICATIONS    amiodarone, 200 mg, Oral, BID  atorvastatin, 40 mg, Oral, Nightly  cefTRIAXone, 1 g, Intravenous, Q24H   And  azithromycin, 500 mg, Intravenous, Q24H  Bag Balm, 1 application, Topical, BID  brinzolamide, 1 drop, Both Eyes, TID  carvedilol, 6.25 mg, Oral, BID With Meals  furosemide, 40 mg, Intravenous, Daily  hydrALAZINE, 50 mg, Oral, Q8H  insulin aspart, 0-7 Units, Subcutaneous, TID AC  isosorbide mononitrate, 30 mg, Oral, Q24H  latanoprost, 1 drop, Both Eyes, Nightly  minoxidil, 2.5 mg, Oral, Daily  pantoprazole, 40 mg, Oral, Daily  phytonadione (VITAMIN K) IVPB, 5 mg, Intravenous, Once  QUEtiapine, 100 mg, Oral, Nightly  QUEtiapine, 50 mg,  Oral, QAM  sodium chloride, 10 mL, Intravenous, Q12H         Medications Prior to Admission   Medication Sig Dispense Refill Last Dose   • amiodarone (PACERONE) 200 MG tablet Take 200 mg by mouth 2 (Two) Times a Day.      • carvedilol (COREG) 6.25 MG tablet Take 6.25 mg by mouth 2 (Two) Times a Day With Meals.      • fluticasone-salmeterol (ADVAIR) 250-50 MCG/DOSE DISKUS Inhale 1 puff 2 (Two) Times a Day.      • furosemide (LASIX) 40 MG tablet Take 40 mg by mouth Daily.      • minoxidil (LONITEN) 2.5 MG tablet Take 2.5 mg by mouth Daily.      • pantoprazole (PROTONIX) 40 MG EC tablet Take 40 mg by mouth Daily.      • QUEtiapine (SEROquel) 50 MG tablet Take 100 mg by mouth Every Night.      • QUEtiapine (SEROquel) 50 MG tablet Take 50 mg by mouth Every Morning.      • warfarin (COUMADIN) 3 MG tablet Take 3 mg by mouth Take As Directed.      • acetaminophen (TYLENOL) 325 MG tablet Take 2 tablets by mouth Every 6 (Six) Hours As Needed for Mild Pain .      • atorvastatin (LIPITOR) 40 MG tablet Take 1 tablet by mouth Every Night. 30 tablet 0    • brimonidine (ALPHAGAN P) 0.1 % solution ophthalmic solution Administer 1 drop into the left eye 3 (Three) Times a Day.      • brinzolamide (AZOPT) 1 % ophthalmic suspension Administer 1 drop to both eyes 3 (Three) Times a Day.      • budesonide (PULMICORT) 0.5 MG/2ML nebulizer solution Take 0.5 mg by nebulization Daily.      • hydrALAZINE (APRESOLINE) 50 MG tablet Take 1 tablet by mouth Every 8 (Eight) Hours.      • ipratropium-albuterol (DUO-NEB) 0.5-2.5 mg/3 ml nebulizer Take 3 mL by nebulization Every 4 (Four) Hours As Needed for Wheezing or Shortness of Air. 360 mL     • isosorbide mononitrate (IMDUR) 30 MG 24 hr tablet Take 1 tablet by mouth Daily. 60 tablet 0    • latanoprost (XALATAN) 0.005 % ophthalmic solution Administer 1 drop to both eyes Every Night.        Assessment/Plan   ASSESSMENT / PLAN      CKD (chronic kidney disease) stage 4, GFR 15-29 ml/min (CMS/[facility])     Diabetes mellitus type II, uncontrolled (CMS/[facility])    Acute on chronic systolic CHF (congestive heart failure) (CMS/[facility])    Dementia (CMS/[facility])    Sepsis (CMS/[facility])    1. CKD 3B/4- Baseline creatinine felt to be 2.3-2.6 mg /dL. Creatinine on admission 2.47mg/dL- which is improved from his February discharge from this hospital of 2.67mg/dL. GFR 31. UOP 900cc. UA protein negative, blood 3+, has UTI. On furosemide 40mg IV daily. Trace tony le edema. Last MIKE done 8/23/20- right kidney 10.3 in length renal cysts. Left kidney is small 6.9 in length- no hydronephrosis, calculi or masses. Will request UPCR, iPTH, Phos, Vit D, Mag in am    2. HTN/HF- Severe LV systolic dysfunction last EF 20%. BP variable. Coreg 6.25mg bid. Imdur 30mg qd    3. A fib- on amiodarone and coumadin.  INR 13 on arrival to ER - now 3.01- receiving Vit K.  D dimer over 1000.    4. Anemia- Hgb 10.0    5. Dementia- on seroquell    6. UTI/Chest infiltrates- WBC 2.91- lactate is acceptable 1.2. Hypothermia. Blood and urine cultures are pending. Chest cardiomegaly, question of volume overload -vs- pneumonitis. Tony pleural effusions. COVID was negative. On Rocephin. On 2 liters NC    Thank you Dr Mary for your referral will continue to follow.         I discussed the patients findings and my recommendations with nursing staff and consulting provider      This document has been electronically signed by THIERNO Horner on April 17, 2021 09:03 CDT

## 2021-04-17 NOTE — PROGRESS NOTES
Progress Note  Edwin Long MD  Hospitalist    Date of visit: 4/17/2021     LOS: 1 day   Patient Care Team:  Mark Sheldon APRN as PCP - General (Nurse Practitioner)    Chief Complaint: Worsening weakness    Subjective     Interval History:     Patient Complaints: Worsening weakness, less responsive    History taken from: Nursing    Medication Review:   Current Facility-Administered Medications   Medication Dose Route Frequency Provider Last Rate Last Admin   • acetaminophen (TYLENOL) tablet 650 mg  650 mg Oral Q6H PRN Manohar Mary MD       • amiodarone (PACERONE) tablet 200 mg  200 mg Oral BID Manohar Mary MD       • atorvastatin (LIPITOR) tablet 40 mg  40 mg Oral Nightly Manohar Mary MD       • cefTRIAXone (ROCEPHIN) 1 g/100 mL 0.9% NS (MBP)  1 g Intravenous Q24H Manohar Mary MD        And   • AZITHROMYCIN 500 MG/250 ML 0.9% NS IVPB (vial-mate)  500 mg Intravenous Q24H Manohar Mary MD       • Bag [place] ointment ointment 1 application  1 application Topical BID Manohar Mary MD   1 application at 04/17/21 0929   • brinzolamide (AZOPT) 1 % ophthalmic suspension 1 drop  1 drop Both Eyes TID Manohar Mary MD   1 drop at 04/17/21 0930   • calcium carbonate (TUMS) chewable tablet 500 mg (200 mg elemental)  2 tablet Oral BID PRN Manohar Mary MD       • carvedilol (COREG) tablet 6.25 mg  6.25 mg Oral BID With Meals Manohar Mary MD       • dextrose (D50W) 25 g/ 50mL Intravenous Solution 25 g  25 g Intravenous Q15 Min PRN Manohar Mary MD       • dextrose (GLUTOSE) oral gel 15 g  15 g Oral Q15 Min PRN Manohar Mary MD       • furosemide (LASIX) injection 40 mg  40 mg Intravenous Daily Manohar Mary MD   40 mg at 04/17/21 0931   • glucagon (human recombinant) (GLUCAGEN DIAGNOSTIC) injection 1 mg  1 mg Subcutaneous Q15 Min PRN Manohar Mary MD       • haloperidol lactate (HALDOL) injection 0.5 mg  0.5 mg Intravenous Q6H PRN Manohar Mary MD   0.5 mg at 04/16/21 2012   • hydrALAZINE  (APRESOLINE) tablet 50 mg  50 mg Oral Q8H Manohar Mary MD       • hydrocortisone-bacitracin-zinc oxide-nystatin (MAGIC BARRIER) ointment 1 application  1 application Topical PRN Manohar Mary MD   1 application at 04/17/21 0930   • insulin aspart (novoLOG) injection 0-7 Units  0-7 Units Subcutaneous TID AC Manohar Mary MD       • ipratropium-albuterol (DUO-NEB) nebulizer solution 3 mL  3 mL Nebulization Q4H PRN Manohar Mary MD   3 mL at 04/16/21 2137   • isosorbide mononitrate (IMDUR) 24 hr tablet 30 mg  30 mg Oral Q24H Manohar Mary MD       • latanoprost (XALATAN) 0.005 % ophthalmic solution 1 drop  1 drop Both Eyes Nightly Manohar Mary MD       • LORazepam (ATIVAN) injection 1 mg  1 mg Intravenous Q6H PRN Manohar Mary MD       • minoxidil (LONITEN) tablet 2.5 mg  2.5 mg Oral Daily Manohar Mary MD       • nitroglycerin (NITROSTAT) SL tablet 0.4 mg  0.4 mg Sublingual Q5 Min PRN Neil Coleman MD       • ondansetron (ZOFRAN) tablet 4 mg  4 mg Oral Q6H PRN Manohar Mary MD        Or   • ondansetron (ZOFRAN) injection 4 mg  4 mg Intravenous Q6H PRN Manohar Mary MD       • pantoprazole (PROTONIX) EC tablet 40 mg  40 mg Oral Daily Manohar Mary MD       • Pharmacy to dose warfarin   Does not apply Continuous PRN Edwin Long MD       • QUEtiapine (SEROquel) tablet 100 mg  100 mg Oral Nightly Manohar Mary MD       • QUEtiapine (SEROquel) tablet 50 mg  50 mg Oral QAM Manohar Mary MD       • sodium chloride 0.9 % flush 10 mL  10 mL Intravenous PRN Neil Coleman MD       • sodium chloride 0.9 % flush 10 mL  10 mL Intravenous Q12H Manohar Mary MD   10 mL at 04/17/21 0931   • sodium chloride 0.9 % flush 10 mL  10 mL Intravenous PRN Manohar Mary MD           Review of Systems:   Review of Systems   Unable to perform ROS: Dementia       Objective     Vital Signs  Temp:  [94.2 °F (34.6 °C)-98.1 °F (36.7 °C)] 98.1 °F (36.7 °C)  Heart Rate:  [] 58  Resp:  [10-18] 16  BP:  ()/() 141/116    Physical Exam:  Physical Exam  Vitals reviewed.   Constitutional:       General: He is not in acute distress.     Appearance: He is ill-appearing.   HENT:      Head: Normocephalic and atraumatic.   Eyes:      General: No scleral icterus.     Extraocular Movements: Extraocular movements intact.      Pupils: Pupils are equal, round, and reactive to light.   Cardiovascular:      Rate and Rhythm: Normal rate and regular rhythm.      Heart sounds: No friction rub.   Pulmonary:      Effort: Pulmonary effort is normal. No respiratory distress.      Breath sounds: Normal breath sounds. No stridor. No wheezing or rales.   Chest:      Chest wall: No tenderness.   Abdominal:      General: There is no distension.      Palpations: Abdomen is soft. There is no mass.      Tenderness: There is no abdominal tenderness. There is no right CVA tenderness or left CVA tenderness.   Musculoskeletal:         General: No swelling or deformity.      Cervical back: Normal range of motion and neck supple. No rigidity or tenderness.      Right lower leg: No edema.      Left lower leg: No edema.   Skin:     General: Skin is dry.      Coloration: Skin is not jaundiced or pale.      Findings: No lesion or rash.   Neurological:      Mental Status: He is lethargic.      Comments: Nonverbal, spastic weakness          Results Review:    Lab Results (last 24 hours)     Procedure Component Value Units Date/Time    PTH, Intact [[id number]]  (Abnormal) Collected: 04/17/21 1003    Specimen: Blood Updated: 04/17/21 1033     PTH, Intact 71.0 pg/mL     Narrative:      Results may be falsely decreased if patient taking Biotin.      Extra Tubes [[id number]] Collected: 04/17/21 1003    Specimen: Blood, Venous Line Updated: 04/17/21 1008    Narrative:      The following orders were created for panel order Extra Tubes.  Procedure                               Abnormality         Status                     ---------                                -----------         ------                     Jody Top[[id number]]                                     In process                   Please view results for these tests on the individual orders.    Lavmayra Top [[id number]] Collected: 04/17/21 1003    Specimen: Blood Updated: 04/17/21 1008    Scan Slide [[id number]] Collected: 04/17/21 0530    Specimen: Blood Updated: 04/17/21 0714     Acanthocytes Slight/1+     Anisocytosis Mod/2+     Hypochromia Mod/2+     Microcytes Slight/1+     RBC Fragments Mod/2+     Target Cells Slight/1+     WBC Morphology Normal     Platelet Estimate Decreased    CBC Auto Differential [[id number]]  (Abnormal) Collected: 04/17/21 0530    Specimen: Blood Updated: 04/17/21 0632     WBC 2.66 10*3/mm3      RBC 3.81 10*6/mm3      Hemoglobin 10.0 g/dL      Hematocrit 29.0 %      MCV 76.1 fL      MCH 26.2 pg      MCHC 34.5 g/dL      RDW 17.7 %      RDW-SD 47.6 fl      MPV --     Comment: Instrument unable to calculate.        Platelets 99 10*3/mm3      Neutrophil % 70.6 %      Lymphocyte % 15.8 %      Monocyte % 10.2 %      Eosinophil % 1.5 %      Basophil % 0.4 %      Immature Grans % 1.5 %      Neutrophils, Absolute 1.88 10*3/mm3      Lymphocytes, Absolute 0.42 10*3/mm3      Monocytes, Absolute 0.27 10*3/mm3      Eosinophils, Absolute 0.04 10*3/mm3      Basophils, Absolute 0.01 10*3/mm3      Immature Grans, Absolute 0.04 10*3/mm3      nRBC 0.0 /100 WBC     Protime-INR [[id number]]  (Abnormal) Collected: 04/17/21 0530    Specimen: Blood Updated: 04/17/21 0630     Protime 33.3 Seconds      INR 3.01    Narrative:      Therapeutic range for most indications is 2.0-3.0 INR,  or 2.5-3.5 for mechanical heart valves.    Comprehensive Metabolic Panel [[id number]]  (Abnormal) Collected: 04/17/21 0530    Specimen: Blood Updated: 04/17/21 0609     Glucose 78 mg/dL      BUN 45 mg/dL      Creatinine 2.46 mg/dL      Sodium 145 mmol/L      Potassium 4.4 mmol/L      Chloride 114 mmol/L      CO2 25.0 mmol/L       Calcium 8.2 mg/dL      Total Protein 6.3 g/dL      Albumin 2.40 g/dL      ALT (SGPT) 5 U/L      AST (SGOT) 13 U/L      Alkaline Phosphatase 68 U/L      Total Bilirubin 0.4 mg/dL      eGFR  Marissa Amer 31 mL/min/1.73      Globulin 3.9 gm/dL      A/G Ratio 0.6 g/dL      BUN/Creatinine Ratio 18.3     Anion Gap 6.0 mmol/L     Narrative:      GFR Normal >60  Chronic Kidney Disease <60  Kidney Failure <15      Urinalysis, Microscopic Only - Urine, Catheter [[id number]]  (Abnormal) Collected: 04/16/21 2021    Specimen: Urine, Catheter Updated: 04/16/21 2118     RBC, UA 31-50 /HPF      WBC, UA Too Numerous to Count /HPF      Bacteria, UA Trace /HPF      Squamous Epithelial Cells, UA None Seen /HPF      Hyaline Casts, UA 3-6 /LPF      Methodology Manual Light Microscopy    Urine Culture - Urine, Urine, Catheter [[id number]] Collected: 04/16/21 2021    Specimen: Urine, Catheter Updated: 04/16/21 2118    Urinalysis With Culture If Indicated - Urine, Catheter [[id number]]  (Abnormal) Collected: 04/16/21 2021    Specimen: Urine, Catheter Updated: 04/16/21 2055     Color, UA Yellow     Appearance, UA Cloudy     pH, UA <=5.0     Specific Gravity, UA 1.010     Glucose, UA Negative     Ketones, UA Negative     Bilirubin, UA Negative     Blood, UA Large (3+)     Protein, UA Negative     Leuk Esterase, UA Moderate (2+)     Nitrite, UA Positive     Urobilinogen, UA 0.2 E.U./dL    Troponin [[id number]]  (Abnormal) Collected: 04/16/21 1824    Specimen: Blood Updated: 04/16/21 1850     Troponin T 0.145 ng/mL     Narrative:      Troponin T Reference Range:  <= 0.03 ng/mL-   Negative for AMI  >0.03 ng/mL-     Abnormal for myocardial necrosis.  Clinicians would have to utilize clinical acumen, EKG, Troponin and serial changes to determine if it is an Acute Myocardial Infarction or myocardial injury due to an underlying chronic condition.       Results may be falsely decreased if patient taking Biotin.      Blood Culture - Blood, Arm, Right  "[[id number]] Collected: 04/16/21 1824    Specimen: Blood from Arm, Right Updated: 04/16/21 1828    Blood Culture - Blood, Arm, Right [[id number]] Collected: 04/16/21 1824    Specimen: Blood from Arm, Right Updated: 04/16/21 1828    Procalcitonin [[id number]]  (Normal) Collected: 04/16/21 1505    Specimen: Blood Updated: 04/16/21 1822     Procalcitonin 0.13 ng/mL     Narrative:      As a Marker for Sepsis (Non-Neonates):     1. <0.5 ng/mL represents a low risk of severe sepsis and/or septic shock.  2. >2 ng/mL represents a high risk of severe sepsis and/or septic shock.    As a Marker for Lower Respiratory Tract Infections that require antibiotic therapy:  PCT on Admission     Antibiotic Therapy             6-12 Hrs later  >0.5                          Strongly Recommended            >0.25 - <0.5             Recommended  0.1 - 0.25                  Discouraged                       Remeasure/reassess PCT  <0.1                         Strongly Discouraged         Remeasure/reassess PCT      As 28 day mortality risk marker: \"Change in Procalcitonin Result\" (>80% or <=80%) if Day 0 (or Day 1) and Day 4 values are available. Refer to http://www.[facility]cal[facility].com/    Change in PCT <=80 %   A decrease of PCT levels below or equal to 80% defines a positive change in PCT test result representing a higher risk for 28-day all-cause mortality of patients diagnosed with severe sepsis or septic shock.    Change in PCT >80 %   A decrease of PCT levels of more than 80% defines a negative change in PCT result representing a lower risk for 28-day all-cause mortality of patients diagnosed with severe sepsis or septic shock.              Results may be falsely decreased if patient taking Biotin.     TSH [[id number]]  (Normal) Collected: 04/16/21 1505    Specimen: Blood Updated: 04/16/21 1753     TSH 3.700 uIU/mL     COVID-19 and FLU A/B PCR - Swab, Nasopharynx [[id number]]  (Normal) Collected: 04/16/21 1554    Specimen: Swab from " Nasopharynx Updated: 04/16/21 1703     COVID19 Not Detected     Influenza A PCR Not Detected     Influenza B PCR Not Detected    Narrative:      Fact sheet for providers: https://www.fda.gov/media/210623/download    Fact sheet for patients: https://www.fda.gov/media/811120/download    Test performed by PCR.    Lactic Acid, Plasma [[id number]]  (Normal) Collected: 04/16/21 1603    Specimen: Blood Updated: 04/16/21 1627     Lactate 1.2 mmol/L     [place] Draw [[id number]] Collected: 04/16/21 1505    Specimen: Blood Updated: 04/16/21 1615    Narrative:      The following orders were created for panel order [place] Draw.  Procedure                               Abnormality         Status                     ---------                               -----------         ------                     Light Blue Top[[id number]]                                   Final result               Green Top (Gel)[[id number]]                                  Final result               Lavender Top[[id number]]                                     Final result               Gold Top - SST[[id number]]                                   Final result                 Please view results for these tests on the individual orders.    Gold Top - SST [[id number]] Collected: 04/16/21 1505    Specimen: Blood Updated: 04/16/21 1615     Extra Tube Hold for add-ons.     Comment: Auto resulted.       Light Blue Top [[id number]] Collected: 04/16/21 1505    Specimen: Blood Updated: 04/16/21 1615     Extra Tube hold for add-on     Comment: Auto resulted       Green Top (Gel) [[id number]] Collected: 04/16/21 1505    Specimen: Blood Updated: 04/16/21 1615     Extra Tube Hold for add-ons.     Comment: Auto resulted.       Lavender Top [[id number]] Collected: 04/16/21 1505    Specimen: Blood Updated: 04/16/21 1615     Extra Tube hold for add-on     Comment: Auto resulted             Imaging Results (Last 24 Hours)     Procedure Component Value Units Date/Time    CT Chest  Without Contrast Diagnostic [[id number]] Collected: 04/16/21 1910     Updated: 04/16/21 1958    Narrative:      Congestive heart failure.    CT chest without intravenous contrast.    Radiation dose-limiting techniques also utilized, including  automated exposure control and adjustment of mA and/or KVP to the  patient size according to ALARA (as low as reasonably  achievable).    There is mild cardiomegaly. There is small pericardial effusion.  There are calcifications in the coronary arteries. There are a  few mediastinal lymph nodes normal by size criteria. There are  emphysematous changes in the right lung apex There are large  bilateral pleural effusions with almost complete collapse of  lower lobes bilaterally. There are scattered groundglass  infiltrates in the upper lobes bilaterally.    No acute abnormality is seen in the upper abdomen. There is small  liver. There are degenerative changes of the thoracic spine.  There is scoliosis of the thoracic spine with convexity to the  right. No acute bony abnormality is seen.      Impression:      CONCLUSION:  1. Large bilateral pleural effusions with almost complete  collapse of the lower lobes bilaterally.  2. Groundglass infiltrates in the upper lobes bilaterally.  Imaging features can be seen with COVID pneumonia, though are  nonspecific and can occur with a variety of infectious and  noninfectious processes.     Electronically signed by:  You Flores MD  4/16/2021 7:57  PM CDT Workstation: [id number]          Assessment/Plan       Sepsis (CMS/HCC)    Bilateral pleural effusion    Bilateral pneumonia    Acute on chronic systolic CHF (congestive heart failure) (CMS/HCC)    CKD (chronic kidney disease) stage 4, GFR 15-29 ml/min (CMS/HCC)    Coumadin toxicity    Diabetes mellitus (CMS/HCC)    UTI (urinary tract infection)    Atrial fibrillation, chronic (CMS/HCC)    Dementia (CMS/HCC)    Continue with supportive care, monitor his renal function/INR (13 on  admission), the IV antibiotics - negative cultures so far. Can be transferred to a regular floor as his vital signs are stable and his oxygen saturation on 2 L nasal cannula is 100%.    Edwin Long MD  04/17/21  11:13 CDT

## 2021-04-18 NOTE — PROGRESS NOTES
"[facility]  [address] [place] [address]. [id number]  T - [phone]  F - [phone]     Progress Note          PATIENT  DEMOGRAPHICS   PATIENT NAME: Ondina Alanis Sr.                      PHYSICIAN: Enoch Cotter MD  : 1940  MRN: [id number]   LOS: 2 days    Patient Care Team:  Mark Sheldon APRN as PCP - General (Nurse Practitioner)  Subjective   SUBJECTIVE   Doing well. Denied chest pain, SOB.          Objective   OBJECTIVE   Vital Signs  Temp:  [97.1 °F (36.2 °C)-98.4 °F (36.9 °C)] 97.1 °F (36.2 °C)  Heart Rate:  [61-77] 63  Resp:  [16-18] 18  BP: (141-174)/(67-91) 165/87    Flowsheet Rows      First Filed Value   Admission Height  193 cm (76\") Documented at 2021 1448   Admission Weight  106 kg (233 lb 12.8 oz) Documented at 2021 1448           I/O last 3 completed shifts:  In: 450 [IV Piggyback:450]  Out: 2100 [Urine:2100]    PHYSICAL EXAM    Physical Exam  Vitals and nursing note reviewed.   Constitutional:       General: He is not in acute distress.     Appearance: He is not ill-appearing.   Cardiovascular:      Rate and Rhythm: Normal rate and regular rhythm.      Heart sounds: Normal heart sounds. No murmur heard.   No friction rub. No gallop.    Pulmonary:      Effort: Pulmonary effort is normal. No respiratory distress.      Breath sounds: Normal breath sounds. No stridor. No wheezing, rhonchi or rales.   Abdominal:      General: Bowel sounds are normal. There is no distension.      Palpations: Abdomen is soft.   Musculoskeletal:         General: Swelling present.   Neurological:      Mental Status: He is alert.         RESULTS   Results Review:    Results from last 7 days   Lab Units 21  0559 21  0530 21  1505   SODIUM mmol/L 146* 145 139   POTASSIUM mmol/L 4.4 4.4 4.4   CHLORIDE mmol/L 114* 114* 110*   CO2 mmol/L 24.0 25.0 27.0   BUN mg/dL 51* 45* 48*   CREATININE mg/dL 2.48* 2.46* 2.47*   CALCIUM mg/dL 8.1* 8.2* 8.3* "   BILIRUBIN mg/dL  --  0.4 0.4   ALK PHOS U/L  --  68 78   ALT (SGPT) U/L  --  5 7   AST (SGOT) U/L  --  13 14   GLUCOSE mg/dL 61* 78 132*       Estimated Creatinine Clearance: 30 mL/min (A) (by C-G formula based on SCr of 2.48 mg/dL (H)).    Results from last 7 days   Lab Units 04/18/21  0559   MAGNESIUM mg/dL 1.8   PHOSPHORUS mg/dL 3.1             Results from last 7 days   Lab Units 04/18/21  0922 04/17/21  0530 04/16/21  1505   WBC 10*3/mm3 3.26* 2.66* 2.91*   HEMOGLOBIN g/dL 10.7* 10.0* 10.9*   PLATELETS 10*3/mm3 92* 99* 115*       Results from last 7 days   Lab Units 04/18/21  0922 04/17/21  0530 04/16/21  1505   INR  1.59* 3.01* 13.24*         Imaging Results (Last 24 Hours)     ** No results found for the last 24 hours. **           MEDICATIONS    amiodarone, 200 mg, Oral, Q24H  cefTRIAXone, 1 g, Intravenous, Q24H   And  azithromycin, 500 mg, Intravenous, Q24H  Bag Balm, 1 application, Topical, BID  brinzolamide, 1 drop, Both Eyes, TID  budesonide-formoterol, 2 puff, Inhalation, BID - RT  carvedilol, 6.25 mg, Oral, BID With Meals  furosemide, 40 mg, Intravenous, Q12H  hydrALAZINE, 50 mg, Oral, Q8H  insulin aspart, 0-7 Units, Subcutaneous, TID AC  isosorbide mononitrate, 30 mg, Oral, Q24H  latanoprost, 1 drop, Both Eyes, Nightly  pantoprazole, 40 mg, Intravenous, Q AM  sodium chloride, 10 mL, Intravenous, Q12H      Pharmacy to dose warfarin,         Assessment/Plan   ASSESSMENT / PLAN      Sepsis (CMS/HCC)    CKD (chronic kidney disease) stage 4, GFR 15-29 ml/min (CMS/[facility])    Bilateral pneumonia    Acute on chronic systolic CHF (congestive heart failure) (CMS/[facility])    Dementia (CMS/[facility])    Bilateral pleural effusion    Coumadin toxicity    Diabetes mellitus (CMS/HCC)    UTI (urinary tract infection)    Atrial fibrillation, chronic (CMS/HCC)    1. CKD 3B/4: Baseline creatinine 2.3-2.6 mg/dl.   - UA protein negative, blood 3+, has UTI. Last MIKE done 8/23/20- right kidney 10.3 in length renal cysts. Left kidney  is small 6.9 in length- no hydronephrosis, calculi or masses.   - Creatinine is overall stable. Change Lasix 40 mg PO twice a day.      2. Hypertension:  - Blood pressure is acceptable.     3. Systolic heart failure:  - Severe LV systolic dysfunction last EF 20%. On Coreg 6.25mg BID.     4. A fib:  - On amiodarone and coumadin.  INR 13 on arrival to ER. Now 1.59     5. Anemia:  - Hemoglobin is acceptable at 10.7     6. Dementia:  - On Seroquel     7. UTI/Pneumonia:  - Blood and urine cultures are pending. COVID was negative. On Rocephin and azithromycin.            This document has been electronically signed by Enoch Cotter MD on April 18, 2021 13:29 CDT

## 2021-04-18 NOTE — THERAPY TREATMENT NOTE
Acute Care - Speech Language Pathology   Swallow Treatment Note [facility]     Patient Name: Ondina Alanis Sr.  : 1940  MRN: [id number]  Today's Date: 2021               Admit Date: 2021    Visit Dx:     ICD-10-CM ICD-9-CM   1. Sepsis, due to unspecified organism, unspecified whether acute organ dysfunction present (CMS/[facility])  A41.9 038.9     995.91   2. Acute on chronic systolic congestive heart failure (CMS/[facility])  I50.23 428.23     428.0   3. Atrial flutter, unspecified type (CMS/[facility])  I48.92 427.32   4. Poisoning by warfarin sodium, accidental or unintentional, initial encounter  T45.511A 964.2     E858.2   5. Oropharyngeal dysphagia  R13.12 787.22     Patient Active Problem List   Diagnosis   • CKD (chronic kidney disease) stage 4, GFR 15-29 ml/min (CMS/[facility])   • Diabetic peripheral neuropathy associated with type 2 diabetes mellitus (CMS/[facility])   • Diabetes mellitus type II, uncontrolled (CMS/[facility])   • GERD (gastroesophageal reflux disease)   • Primary osteoarthritis of both knees   • Pulmonary embolism (CMS/[facility])   • BPH (benign prostatic hyperplasia)   • Benign essential hypertension   • Asthma   • Pleurisy   • Acute respiratory failure with hypoxia (CMS/[facility])   • Stage 1 acute kidney injury (CMS/[facility])   • Left ventricular diastolic dysfunction   • Exacerbation of asthma   • COPD exacerbation (CMS/[facility])   • Atrial flutter (CMS/[facility])   • Acute systolic CHF (congestive heart failure) (CMS/[facility])   • Bilateral pneumonia   • Urinary obstruction   • Supratherapeutic INR   • Sepsis due to urinary tract infection (CMS/[facility])   • Acute on chronic systolic CHF (congestive heart failure) (CMS/[facility])   • Bradycardia   • Dementia (CMS/[facility])   • Acute blood loss anemia   • MRSA bacteremia   • ANT (acute kidney injury) (CMS/[facility])   • Sepsis (CMS/[facility])   • Bilateral pleural effusion   • Coumadin toxicity   • Diabetes mellitus (CMS/[facility])   • UTI (urinary tract infection)   • Atrial fibrillation, chronic (CMS/[facility])      Past Medical History:   Diagnosis Date   • Asthma    • Benign prostatic hyperplasia    • CHF (congestive heart failure) (CMS/[facility])    • Coronary artery disease    • Diabetes mellitus (CMS/[facility])    • GERD (gastroesophageal reflux disease)    • Hyperlipidemia    • Hypertension    • Prostate disorder    • Renal insufficiency    • Urinary tract infection      Past Surgical History:   Procedure Laterality Date   • BACK SURGERY     • CYSTOSCOPY N/A 2/5/2021    Procedure: CYSTOSCOPY WITH CATHETER PLACEMENT;  Surgeon: Keely, Orlin TRIMBLE MD;  Location: [facility];  Service: Urology;  Laterality: N/A;   • KNEE SURGERY Left    • PROSTATE SURGERY          SWALLOW EVALUATION (last 72 hours)      SLP Adult Swallow Evaluation     Row Name 04/18/21 0920 04/17/21 0927                Rehab Evaluation    Document Type  --  evaluation  -EK       Subjective Information  --  no complaints  -EK       Patient Observations  --  decreased LOC Will shake head yes/no  -EK       Care Plan Review  --  evaluation/treatment results reviewed  -EK       Patient Effort  --  adequate  -EK          General Information    Patient Profile Reviewed  --  yes  -EK          Pain    Additional Documentation  --  Pain Scale: Numbers Pre/Post-Treatment (Group)  -EK          Pain Scale: Numbers Pre/Post-Treatment    Pretreatment Pain Rating  --  0/10 - no pain  -EK       Posttreatment Pain Rating  --  0/10 - no pain  -EK          Oral Motor Structure and Function    Dentition Assessment  --  natural, present and adequate  -EK       Mucosal Quality  --  moist, healthy  -EK          General Eating/Swallowing Observations    Respiratory Support Currently in Use  nasal cannula  -EK  nasal cannula  -EK       Eating/Swallowing Skills  fed by SLP  -EK  fed by SLP  -EK       Positioning During Eating  upright 90 degree;upright in bed  -EK  upright 90 degree;upright in bed  -EK       Utensils Used  cup;spoon  -EK  spoon  -EK       Consistencies Trialed  pureed;ice  chips;thin liquids  -EK  ice chips;thin liquids  -EK       Pre SpO2 (%)  --  100  -EK       Post SpO2 (%)  --  100  -EK          Clinical Swallow Eval    Oral Prep Phase  WFL  -EK  --       Oral Transit  WFL  -EK  --       Oral Residue  WFL  -EK  --       Pharyngeal Phase  suspected pharyngeal impairment  -EK  --       Esophageal Phase  --  -EK  --       Clinical Swallow Evaluation Summary  Swallow: Pt alert today and able to safely tolerate mech soft with chopped meats and thin liquids. Pt ate bowl of cheerios and half cup of applesauce. Pt took medication. SLP intiated diet of mechanical soft with chopped meats and thin liquids. Pt will need feeding assistance. SLP to follow up.   -EK  Swallow evaluation completed. Pt with decreased alertnes this date. RN reports given haldol last pm. Pt was able to chew ice chips however SLP could not get pt awake for additional trials   -EK          Pharyngeal Phase Concerns    Pharyngeal Phase Concerns  cough  -EK  --       Cough  thin with straw  -EK  --       Pharyngeal Phase Concerns, Comment  Mild cough x1  -EK  --          Clinical Impression    Daily Summary of Progress (SLP)  progress toward functional goals is good  -EK  --       SLP Swallowing Diagnosis  mild  -EK  mild-moderate;oral dysphagia;suspected pharyngeal dysphagia decreased alertness  -EK       Functional Impact  risk of aspiration/pneumonia  -EK  risk of aspiration/pneumonia;risk of malnutrition;risk of dehydration  -EK       Rehab Potential/Prognosis, Swallowing  good, to achieve stated therapy goals  -EK  good, to achieve stated therapy goals  -EK          Recommendations    Therapy Frequency (Swallow)  5 days per week  -EK  --       Predicted Duration Therapy Intervention (Days)  until discharge  -EK  --       SLP Diet Recommendation  thin liquids;soft textures;chopped  -EK  NPO  -EK       Recommended Precautions and Strategies  upright posture during/after eating;small bites of food and sips of  liquid;multiple swallows per bite of food;alternate between small bites of food and sips of liquid;multiple swallows per sip of liquid;check mouth frequently for oral residue/pocketing  -EK  --       Oral Care Recommendations  Oral Care BID/PRN  -EK  Oral Care BID/PRN  -EK       SLP Rec. for Method of Medication Administration  meds whole;with pudding or applesauce  -EK  meds via alternate route  -EK       Monitor for Signs of Aspiration  yes;notify SLP if any concerns  -EK  yes;notify SLP if any concerns  -EK          Swallow Goals (SLP)    Oral Nutrition/Hydration Goal Selection (SLP)  oral nutrition/hydration, SLP goal 1  -EK  oral nutrition/hydration, SLP goal 1  -EK          Oral Nutrition/Hydration Goal 1 (SLP)    Oral Nutrition/Hydration Goal 1, SLP  Pt to tolerate least restrictive diet with no s/s of aspiration for adequate nutrition and hydration.   -EK  Pt to tolerate least restrictive diet with no s/s of aspiration for adequate nutrition and hydration.   -EK       Time Frame (Oral Nutrition/Hydration Goal 1, SLP)  by discharge  -EK  by discharge  -EK       Progress/Outcomes (Oral Nutrition/Hydration Goal 1, SLP)  goal not met  -EK  other (see comments) new goal   -EK         User Key  (r) = Recorded By, (t) = Taken By, (c) = Cosigned By    Initials Name Effective Dates    Yenifer Forbes, CCC-SLP 07/24/19 -           EDUCATION  The patient has been educated in the following areas:   Dysphagia (Swallowing Impairment).    SLP Recommendation and Plan  SLP Swallowing Diagnosis: mild  SLP Diet Recommendation: thin liquids, soft textures, chopped  Recommended Precautions and Strategies: upright posture during/after eating, small bites of food and sips of liquid, multiple swallows per bite of food, alternate between small bites of food and sips of liquid, multiple swallows per sip of liquid, check mouth frequently for oral residue/pocketing  SLP Rec. for Method of Medication Administration: meds  whole, with pudding or applesauce     Monitor for Signs of Aspiration: yes, notify SLP if any concerns           Rehab Potential/Prognosis, Swallowing: good, to achieve stated therapy goals  Therapy Frequency (Swallow): 5 days per week  Predicted Duration Therapy Intervention (Days): until discharge     Daily Summary of Progress (SLP): progress toward functional goals is good                   Plan of Care Reviewed With: patient  Outcome Summary: Swallow evaluation completed. Pt with decreased alertnes this date. RN reports given haldol last pm. Pt was able to chew ice chips however SLP could not get pt awake for additional trials. SLP will return in a.m to re-assess diet and upgrade as tolerated.    SLP GOALS     Row Name 04/18/21 0920 04/17/21 0927          Oral Nutrition/Hydration Goal 1 (SLP)    Oral Nutrition/Hydration Goal 1, SLP  Pt to tolerate least restrictive diet with no s/s of aspiration for adequate nutrition and hydration.   -EK  Pt to tolerate least restrictive diet with no s/s of aspiration for adequate nutrition and hydration.   -EK     Time Frame (Oral Nutrition/Hydration Goal 1, SLP)  by discharge  -EK  by discharge  -EK     Progress/Outcomes (Oral Nutrition/Hydration Goal 1, SLP)  goal not met  -EK  other (see comments) new goal   -EK       User Key  (r) = Recorded By, (t) = Taken By, (c) = Cosigned By    Initials Name Provider Type    Yenifer Forbes CCC-SLP Speech and Language Pathologist             Time Calculation:   Time Calculation- SLP     Row Name 04/18/21 1001             Time Calculation- SLP    SLP Start Time  0920  -EK      SLP Stop Time  1001  -EK      SLP Time Calculation (min)  41 min  -EK      SLP Received On  04/18/21  -EK      SLP Goal Re-Cert Due Date  04/30/21  -EK        User Key  (r) = Recorded By, (t) = Taken By, (c) = Cosigned By    Initials Name Provider Type    Yenifer Forbes CCC-SLP Speech and Language Pathologist          Therapy  Charges for Today     Code Description Service Date Service Provider Modifiers Qty    [id number]  ST EVAL ORAL PHARYNG SWALLOW 2 4/17/2021 Yenifer Wagner CCC-SLP GN 1    [id number] HC ST TREATMENT SWALLOW 3 4/18/2021 Yenifer Wagner CCC-SLP GN 1               ÁLVARO Lemus  4/18/2021

## 2021-04-18 NOTE — PROGRESS NOTES
"Anticoagulation by Pharmacy - Warfarin    Ondina Alanis Sr. is a 81 y.o.male being continued on warfarin for atrial fibrillation    Home regimen: 3 mg nightly  INR Goal: 2-3    Last INR:   Lab Results   Component Value Date    INR 1.59 (H) 04/18/2021       Objective:  [Ht: 193 cm (75.98\"); Wt: 89.3 kg (196 lb 14.4 oz)]  Lab Results   Component Value Date    INR 1.59 (H) 04/18/2021    INR 3.01 (H) 04/17/2021    INR 13.24 (C) 04/16/2021    PROTIME 19.8 (H) 04/18/2021    PROTIME 33.3 (H) 04/17/2021    PROTIME 111.1 (H) 04/16/2021     Lab Results   Component Value Date    HGB 10.7 (L) 04/18/2021    HGB 10.0 (L) 04/17/2021    HGB 10.9 (L) 04/16/2021    HCT 32.3 (L) 04/18/2021    HCT 29.0 (L) 04/17/2021    HCT 33.0 (L) 04/16/2021    PLT 92 (L) 04/18/2021    PLT 99 (L) 04/17/2021     (L) 04/16/2021           Assessment  Interacting medications: azithromycin, amiodarone  INR is subtherapeutic today after two doses of vitamin K this week  Provider is ready for warfarin to be restarted  We will restart at home dose of 3 mg for now despite vitamin K as patient was admitted with a supratherapeutic INR and we should anticipate the vitamin K will continue a downward trend in the INR tomorrow as well    Plan:  1.  Start warfarin 3 mg PO nightly  2.  Draw a PT/INR in AM  3.  Pharmacy will continue to follow    Norm Bustamante PharmD  04/18/21 14:09 CDT     "

## 2021-04-18 NOTE — PROGRESS NOTES
[facility] Medicine Services  INPATIENT PROGRESS NOTE    Length of Stay: 2  Date of Admission: 4/16/2021  Primary Care Physician: Mark Sheldon APRN    Subjective   Chief Complaint: Shortness of breath  HPI:  81 year old male with a history of HTN, HLD, DMII, CKDIII, BPH, and CAD who presented with shortness of breath and hypothermia. Chest x-ray x-ray showed cardiomegaly with interval development of bilateral infiltrative changes suggesting pulmonary edema and/or bilateral pneumonitis.  He was admitted to CCU and initiated on IV antibiotics and IV diuretics.  Nephrology was consulted for assistance with fluid management. He is on 2 LPM and denies shortness of breath.     Review of Systems   Unable to perform ROS: Dementia        All pertinent negatives and positives are as above. All other systems have been reviewed and are negative unless otherwise stated.     Objective    Temp:  [97.1 °F (36.2 °C)-98.8 °F (37.1 °C)] 97.1 °F (36.2 °C)  Heart Rate:  [57-77] 77  Resp:  [16-18] 18  BP: (120-174)/(59-91) 152/91    Physical Exam  Vitals reviewed.   Constitutional:       Appearance: Normal appearance. He is ill-appearing.   HENT:      Head: Normocephalic and atraumatic.   Cardiovascular:      Rate and Rhythm: Normal rate and regular rhythm.   Pulmonary:      Breath sounds: Examination of the right-lower field reveals decreased breath sounds. Examination of the left-lower field reveals decreased breath sounds. Decreased breath sounds present. No wheezing or rales.   Abdominal:      General: There is no distension.      Palpations: Abdomen is soft.      Tenderness: There is no abdominal tenderness.   Musculoskeletal:         General: No swelling or deformity.   Skin:     General: Skin is warm and dry.   Neurological:      General: No focal deficit present.      Mental Status: He is alert. Mental status is at baseline.             Results Review:  I have reviewed the labs,  radiology results, and diagnostic studies.    Laboratory Data:   Results from last 7 days   Lab Units 04/18/21  0559 04/17/21  0530 04/16/21  1505   SODIUM mmol/L 146* 145 139   POTASSIUM mmol/L 4.4 4.4 4.4   CHLORIDE mmol/L 114* 114* 110*   CO2 mmol/L 24.0 25.0 27.0   BUN mg/dL 51* 45* 48*   CREATININE mg/dL 2.48* 2.46* 2.47*   GLUCOSE mg/dL 61* 78 132*   CALCIUM mg/dL 8.1* 8.2* 8.3*   BILIRUBIN mg/dL  --  0.4 0.4   ALK PHOS U/L  --  68 78   ALT (SGPT) U/L  --  5 7   AST (SGOT) U/L  --  13 14   ANION GAP mmol/L 8.0 6.0 2.0*     Estimated Creatinine Clearance: 30 mL/min (A) (by C-G formula based on SCr of 2.48 mg/dL (H)).  Results from last 7 days   Lab Units 04/18/21  0559   MAGNESIUM mg/dL 1.8   PHOSPHORUS mg/dL 3.1         Results from last 7 days   Lab Units 04/18/21  0922 04/17/21  0530 04/16/21  1505   WBC 10*3/mm3 3.26* 2.66* 2.91*   HEMOGLOBIN g/dL 10.7* 10.0* 10.9*   HEMATOCRIT % 32.3* 29.0* 33.0*   PLATELETS 10*3/mm3 92* 99* 115*     Results from last 7 days   Lab Units 04/18/21  0922 04/17/21  0530 04/16/21  1505   INR  1.59* 3.01* 13.24*       Culture Data:   Blood Culture   Date Value Ref Range Status   04/16/2021 No growth at 24 hours  Preliminary   04/16/2021 No growth at 24 hours  Preliminary     Urine Culture   Date Value Ref Range Status   04/16/2021 50,000 CFU/mL Gram Negative Bacilli (A)  Preliminary     No results found for: RESPCX  No results found for: WOUNDCX  No results found for: STOOLCX  No components found for: BODYFLD    Radiology Data:   Imaging Results (Last 24 Hours)     ** No results found for the last 24 hours. **          I have reviewed the patient's current medications.     Assessment/Plan     Active Hospital Problems    Diagnosis    • **Sepsis (CMS/HCC)    • Bilateral pneumonia    • Bilateral pleural effusion    • Coumadin toxicity    • Diabetes mellitus (CMS/HCC)    • UTI (urinary tract infection)    • Atrial fibrillation, chronic (CMS/HCC)    • Acute on chronic systolic CHF  (congestive heart failure) (CMS/HCC)    • Dementia (CMS/HCC)    • CKD (chronic kidney disease) stage 4, GFR 15-29 ml/min (CMS/HCC)        Plan:    Chest x-ray in AM  Supplemental oxygen  Rocephin day  Azithromycin day  Follow cultures  Lasix 40 mg IV q 12 hours  Nephrology consultation appreciated  Beta-blocker: coreg  ACEI/ARB: contraindicated to renal disease  Imdur/Hydralazine  Rhythm/rate control: amiodarone, coreg  EKG in AM for QT monitoring  Glucose control: SSI 0-7 units  PT/OT/SLP  VTE PPx: Coumadin on hold, pharmacy to dose warfarin      The patient was evaluated during the global COVID-19 pandemic, and the diagnosis was suspected/considered upon their initial presentation.  Evaluation, treatment, and testing were consistent with current guidelines for patients who present with complaints or symptoms that may be related to COVID-19.        This document has been electronically signed by THIERNO Griffin on April 18, 2021 11:39 CDT

## 2021-04-18 NOTE — PLAN OF CARE
Problem: Skin Injury Risk Increased  Goal: Skin Health and Integrity  Outcome: Ongoing, Progressing     Problem: Fall Injury Risk  Goal: Absence of Fall and Fall-Related Injury  Outcome: Ongoing, Progressing  Intervention: Promote Injury-Free Environment  Recent Flowsheet Documentation  Taken 4/17/2021 2200 by Suyapa Marks RN  Safety Promotion/Fall Prevention: safety round/check completed  Taken 4/17/2021 2000 by Suyapa Marks RN  Safety Promotion/Fall Prevention: safety round/check completed  Taken 4/17/2021 1900 by Suyapa Marks RN  Safety Promotion/Fall Prevention: safety round/check completed     Problem: Adjustment to Illness (Therapeutic Hypothermia)  Goal: Optimal Response to Life-Threatening Event  Outcome: Ongoing, Progressing     Problem: Arrhythmia/Dysrhythmia (Therapeutic Hypothermia)  Goal: Stable Cardiac Rate and Rhythm  Outcome: Ongoing, Progressing     Problem: Bleeding (Therapeutic Hypothermia)  Goal: Absence of Bleeding  Outcome: Ongoing, Progressing     Problem: Body Temperature Regulation (Therapeutic Hypothermia)  Goal: Target Body Temperature Maintained  Outcome: Ongoing, Progressing     Problem: Hemodynamic Instability (Therapeutic Hypothermia)  Goal: Effective Tissue Perfusion  Outcome: Ongoing, Progressing     Problem: Infection (Therapeutic Hypothermia)  Goal: Absence of Infection Signs and Symptoms  Outcome: Ongoing, Progressing     Problem: Pain (Therapeutic Hypothermia)  Goal: Acceptable Pain Control  Outcome: Ongoing, Progressing     Problem: Seizure Risk (Therapeutic Hypothermia)  Goal: Absence of Seizures/Seizure-Related Injury  Outcome: Ongoing, Progressing     Problem: Adjustment to Illness (Heart Failure)  Goal: Optimal Coping  Outcome: Ongoing, Progressing     Problem: Arrhythmia/Dysrhythmia (Heart Failure)  Goal: Stable Heart Rate and Rhythm  Outcome: Ongoing, Progressing     Problem: Cardiac Output Decreased (Heart Failure)  Goal: Optimal Cardiac Output  Outcome:  Ongoing, Progressing     Problem: Fluid Imbalance (Heart Failure)  Goal: Fluid Balance  Outcome: Ongoing, Progressing     Problem: Functional Ability Impaired (Heart Failure)  Goal: Optimal Functional Ability  Outcome: Ongoing, Progressing     Problem: Oral Intake Inadequate (Heart Failure)  Goal: Optimal Nutrition Intake  Outcome: Ongoing, Progressing     Problem: Respiratory Compromise (Heart Failure)  Goal: Effective Oxygenation and Ventilation  Outcome: Ongoing, Progressing     Problem: Sleep Disordered Breathing (Heart Failure)  Goal: Effective Breathing Pattern During Sleep  Outcome: Ongoing, Progressing     Problem: Adult Inpatient Plan of Care  Goal: Plan of Care Review  Outcome: Ongoing, Progressing  Goal: Patient-Specific Goal (Individualized)  Outcome: Ongoing, Progressing  Goal: Absence of Hospital-Acquired Illness or Injury  Outcome: Ongoing, Progressing  Intervention: Identify and Manage Fall Risk  Recent Flowsheet Documentation  Taken 4/17/2021 2200 by Suyapa Marks RN  Safety Promotion/Fall Prevention: safety round/check completed  Taken 4/17/2021 2000 by Suyapa Marks RN  Safety Promotion/Fall Prevention: safety round/check completed  Taken 4/17/2021 1900 by Suyapa Marks RN  Safety Promotion/Fall Prevention: safety round/check completed  Intervention: Prevent Skin Injury  Recent Flowsheet Documentation  Taken 4/17/2021 2200 by Suyapa Marks, RN  Body Position: tilted, left  Taken 4/17/2021 2000 by Suyapa Marks RN  Body Position: supine  Goal: Optimal Comfort and Wellbeing  Outcome: Ongoing, Progressing  Goal: Readiness for Transition of Care  Outcome: Ongoing, Progressing   Goal Outcome Evaluation:

## 2021-04-19 NOTE — PLAN OF CARE
Goal Outcome Evaluation:  Plan of Care Reviewed With: patient     Outcome Summary: Initial PT evaluation complete; co-evaluation with OT.  Patient is lethargic, appears to have significant visual impairment.  ROM and strength in BLEs difficult to assess, limited by pain in R knee and decreased cognition. Appears to have limited knee flexion/extension bilaterally. Patient is dependent for positioning in bed (to facilitate upright  posture for feeding). Goals established for bed mobility and LE ROM, continue skilled PT.

## 2021-04-19 NOTE — PROGRESS NOTES
"[facility]  [address] [place] [address]. [id number]  T - [phone]  F - [phone]     Progress Note          PATIENT  DEMOGRAPHICS   PATIENT NAME: Ondina Alanis Sr.                      PHYSICIAN: Marley Akhtar MD  : 1940  MRN: [id number]   LOS: 3 days    Patient Care Team:  Mark Sheldon APRN as PCP - General (Nurse Practitioner)  Subjective   SUBJECTIVE   Doing well. Denied chest pain,         Objective   OBJECTIVE   Vital Signs  Temp:  [97.1 °F (36.2 °C)-98.7 °F (37.1 °C)] 97.4 °F (36.3 °C)  Heart Rate:  [55-70] 61  Resp:  [18] 18  BP: (130-167)/(67-87) 167/76    Flowsheet Rows      First Filed Value   Admission Height  193 cm (76\") Documented at 2021 1448   Admission Weight  106 kg (233 lb 12.8 oz) Documented at 2021 1448           I/O last 3 completed shifts:  In: 1180 [P.O.:1080; IV Piggyback:100]  Out: 2750 [Urine:2750]    PHYSICAL EXAM    Physical Exam  Vitals and nursing note reviewed.   Constitutional:       General: He is not in acute distress.     Appearance: He is not ill-appearing.   Cardiovascular:      Rate and Rhythm: Normal rate and regular rhythm.      Heart sounds: Normal heart sounds. No murmur heard.   No friction rub. No gallop.    Pulmonary:      Effort: Pulmonary effort is normal. No respiratory distress.      Breath sounds: Normal breath sounds. No stridor. No wheezing, rhonchi or rales.   Abdominal:      General: Bowel sounds are normal. There is no distension.      Palpations: Abdomen is soft.   Musculoskeletal:         General: Swelling present.   Neurological:      Mental Status: He is alert.         RESULTS   Results Review:    Results from last 7 days   Lab Units 21  0559 21  0530 21  1505   SODIUM mmol/L 146* 145 139   POTASSIUM mmol/L 4.4 4.4 4.4   CHLORIDE mmol/L 114* 114* 110*   CO2 mmol/L 24.0 25.0 27.0   BUN mg/dL 51* 45* 48*   CREATININE mg/dL 2.48* 2.46* 2.47*   CALCIUM mg/dL 8.1* 8.2* 8.3* "   BILIRUBIN mg/dL  --  0.4 0.4   ALK PHOS U/L  --  68 78   ALT (SGPT) U/L  --  5 7   AST (SGOT) U/L  --  13 14   GLUCOSE mg/dL 61* 78 132*       Estimated Creatinine Clearance: 30.3 mL/min (A) (by C-G formula based on SCr of 2.48 mg/dL (H)).    Results from last 7 days   Lab Units 04/18/21  0559   MAGNESIUM mg/dL 1.8   PHOSPHORUS mg/dL 3.1             Results from last 7 days   Lab Units 04/18/21  0922 04/17/21  0530 04/16/21  1505   WBC 10*3/mm3 3.26* 2.66* 2.91*   HEMOGLOBIN g/dL 10.7* 10.0* 10.9*   PLATELETS 10*3/mm3 92* 99* 115*       Results from last 7 days   Lab Units 04/19/21  0637 04/18/21  0922 04/17/21  0530 04/16/21  1505   INR  1.64* 1.59* 3.01* 13.24*         Imaging Results (Last 24 Hours)     Procedure Component Value Units Date/Time    XR Chest PA & Lateral [[id number]] Collected: 04/19/21 0733     Updated: 04/19/21 0758    Narrative:      PROCEDURE: Chest x-ray with two views.    INDICATION: effusion follow-up, A41.9 Sepsis, unspecified  organism I50.23 Acute on chronic systolic (congestive) heart  failure I48.92 Unspecified atrial flutter T45.511A Poisoning by  anticoagulants, accidental (unintentional), initial encounter  R13.12 Dysphagia, oropharyngeal phase    COMPARISON: 4/16/2021 chest x-ray and CT of the chest. Earlier  chest x-rays.    Mild cardiomegaly. Mild improvement in vascular congestion with  residual.    Improved aeration in the right lung with mild residual basilar  atelectasis.  Decreased right pleural effusion with minimal blunting right CP  angle.    Improved aeration left lung with moderate residual atelectasis  and/or volume loss left lower lobe.  Mild decreased left pleural effusion with moderate residual.      Impression:      Decreased bilateral pleural effusion minimal blunting right CP  angle. Moderate residual left effusion.    Almost complete clearing right lung with residual right basilar  atelectasis.    Improved left lower lobe infiltrate with residual infiltrate  and  volume loss.    51946    Electronically signed by:  Christopher Ramirez MD  4/19/2021 7:57  AM CDT Workstation: [id number]           MEDICATIONS    amiodarone, 200 mg, Oral, Q24H  cefTRIAXone, 1 g, Intravenous, Q24H   And  azithromycin, 500 mg, Intravenous, Q24H  Bag Balm, 1 application, Topical, BID  brinzolamide, 1 drop, Both Eyes, TID  budesonide-formoterol, 2 puff, Inhalation, BID - RT  carvedilol, 6.25 mg, Oral, BID With Meals  furosemide, 40 mg, Oral, BID  hydrALAZINE, 50 mg, Oral, Q8H  insulin aspart, 0-7 Units, Subcutaneous, TID AC  isosorbide mononitrate, 30 mg, Oral, Q24H  latanoprost, 1 drop, Both Eyes, Nightly  pantoprazole, 40 mg, Intravenous, Q AM  sodium chloride, 10 mL, Intravenous, Q12H  warfarin, 3 mg, Oral, Daily      Pharmacy to dose warfarin,         Assessment/Plan   ASSESSMENT / PLAN      Sepsis (CMS/[facility])    CKD (chronic kidney disease) stage 4, GFR 15-29 ml/min (CMS/[facility])    Bilateral pneumonia    Acute on chronic systolic CHF (congestive heart failure) (CMS/[facility])    Dementia (CMS/[facility])    Bilateral pleural effusion    Coumadin toxicity    Diabetes mellitus (CMS/[facility])    UTI (urinary tract infection)    Atrial fibrillation, chronic (CMS/[facility])    1. CKD 3B/4: Baseline creatinine 2.3-2.6 mg/dl.   - UA protein negative, blood 3+, has UTI. Last MIKE done 8/23/20- right kidney 10.3 in length renal cysts. Left kidney is small 6.9 in length- no hydronephrosis, calculi or masses.   - Creatinine is overall stable. KEEP Lasix 40 mg PO twice a day.      2. Hypertension:  - Blood pressure is acceptable.     3. Systolic heart failure:  - Severe LV systolic dysfunction last EF 20%. On Coreg 6.25mg BID.     4. A fib:  - On amiodarone and coumadin.  INR 13 on arrival to ER. Now 1.59     5. Anemia:  - Hemoglobin is acceptable at 10.7     6. Dementia:  - On Seroquel     7. UTI/Pneumonia:  - URINE CULTURE gnr. COVID was negative. On Rocephin and azithromycin.            This document has been electronically signed  by Marley Akhtar MD on April 19, 2021 10:36 CDT

## 2021-04-19 NOTE — PLAN OF CARE
Goal Outcome Evaluation:  Plan of Care Reviewed With: patient     Outcome Summary: OT anam completed this date, co-eval with PT. Pt is lethargic appears to have significant visual impairment. Hard to formally assess ROM and strength of BUE grossly 3 to 3+/5. Pt engaged in self feeding dependent to get food onto fork/spoon, with yogurt [place] to get spoon to mouth in order not to spill otherwise min to mod assist with min to mod spillage and extended time and mod to max vc. Pt setup and mod vc to wipe off mouth and hands. Pt dependent of 2 to get scooted and better position in the bed for self feeding. Pt may benefit from further skilled OT to reach max independence with ADL. Pt will likely need 24/7 assist at d/c.

## 2021-04-19 NOTE — CASE MANAGEMENT/SOCIAL WORK
Discharge Planning Assessment  [facility]     Patient Name: Ondina Alanis Sr.  MRN: [id number]  Today's Date: 4/19/2021    Admit Date: 4/16/2021    Discharge Needs Assessment     Row Name 04/19/21 1016       Living Environment    Lives With  spouse    Name(s) of Who Lives With Patient  Lamar Alanis    Unique Family Situation  Patient has 4 adult children that live in [place] [facility] that assist with patients care.    Current Living Arrangements  home/apartment/condo    Primary Care Provided by  spouse/significant other    Provides Primary Care For  no one, unable/limited ability to care for self    Family Caregiver if Needed  child(bia), adult;spouse    Family Caregiver Names  4 adult children that live in [place] and spouse, Lamar.    Quality of Family Relationships  helpful;involved;supportive    Able to Return to Prior Arrangements  yes       Resource/Environmental Concerns    Resource/Environmental Concerns  none       Transition Planning    Patient/Family Anticipates Transition to  home with family;home with help/services    Patient/Family Anticipated Services at Transition  home health care    Transportation Anticipated  health plan transportation Patient is bed bound, EMS.       Discharge Needs Assessment    Readmission Within the Last 30 Days  no previous admission in last 30 days    Current Outpatient/Agency/Support Group  homecare agency    Equipment Currently Used at Home  commode;glucometer;hospital bed;lift device;nebulizer;walker, rolling;wheelchair    Concerns to be Addressed  care coordination/care conferences    Anticipated Changes Related to Illness  none    Equipment Needed After Discharge  none    Outpatient/Agency/Support Group Needs  homecare agency    Discharge Facility/Level of Care Needs  home with home health    Provided Post Acute Provider List?  N/A    N/A Provider List Comment  Patient is active with [facility] and wants to continue services.    Current  Discharge Risk  chronically ill;cognitively impaired;dependent with mobility/activities of daily living        Discharge Plan     Row Name 04/19/21 1022       Plan    Plan  Home with [facility] with EMS for transport.    Plan Comments  CM verfied dempgraphics. Patient uses [facility] with THIERNO Wheeler as pcp. Spouse prefers [facility] mail order pharmacy but uses [facility] on [address] for short term or need now medications. Patient is active with [facility] for [place] care and general assessments, no PT/OT as patient is bed bound. Spouse has INR machine that she calls in so does not have to take patient to coumadin clinic.        Continued Care and Services - Admitted Since 4/16/2021     Home Medical Care     Service Provider Request Status Selected Services Address Phone Fax Patient Preferred    [facility]  Pending - No Request Sent N/A [address] [facility] [address] [phone] --            Selected Continued Care - Prior Encounters Includes selections from prior encounters from 1/16/2021 to 4/19/2021    Discharged on 2/9/2021 Admission date: 2/4/2021 - Discharge disposition: Long Term Care (DC - External)    Destination     Service Provider Selected Services Address Phone Fax Patient Preferred    [address] [address] [phone] --                      Demographic Summary     Row Name 04/19/21 1014       General Information    Admission Type  inpatient    Arrived From  home    Required Notices Provided  Important Message from Medicare    Referral Source  high risk screening Lace of 13 at time of assessment.    Preferred Language  English    General Information Comments  Patient confused, assessment done with spouse.        Functional Status     Row Name 04/19/21 1016       Functional Status    Usual Activity Tolerance  poor     Current Activity Tolerance  poor       Functional Status, IADL    Medications  completely dependent    Meal Preparation  completely dependent    Housekeeping  completely dependent    Laundry  completely dependent    Shopping  completely dependent       Mental Status    General Appearance WDL  WDL       Mental Status Summary    Recent Changes in Mental Status/Cognitive Functioning  no changes       Employment/    Employment Status  retired        Psychosocial    No documentation.       Abuse/Neglect    No documentation.       Legal    No documentation.       Substance Abuse    No documentation.       Patient Forms    No documentation.           Lynn Talavera RN

## 2021-04-19 NOTE — PLAN OF CARE
Goal Outcome Evaluation:  Plan of Care Reviewed With: patient, spouse  Progress: no change     Pt weaned to 1L oxygen via nasal cannula. Wound center's recommendations implemented on skin care. Pt continues to get agitated whenever staff turns him or tends to wound, but then he quickly drifts back to sleep. Vital signs stable.

## 2021-04-19 NOTE — THERAPY DISCHARGE NOTE
Acute Care - Speech Language Pathology   Swallow Treatment Note/Discharge [facility]     Patient Name: Ondina Alanis Sr.  : 1940  MRN: [id number]  Today's Date: 2021               Admit Date: 2021    Visit Dx:    ICD-10-CM ICD-9-CM   1. Sepsis, due to unspecified organism, unspecified whether acute organ dysfunction present (CMS/[facility])  A41.9 038.9     995.91   2. Acute on chronic systolic congestive heart failure (CMS/[facility])  I50.23 428.23     428.0   3. Atrial flutter, unspecified type (CMS/[facility])  I48.92 427.32   4. Poisoning by warfarin sodium, accidental or unintentional, initial encounter  T45.511A 964.2     E858.2   5. Oropharyngeal dysphagia  R13.12 787.22   6. Impaired physical mobility  Z74.09 781.99   7. Impaired mobility and ADLs  Z74.09 V49.89    Z78.9      Patient Active Problem List   Diagnosis   • CKD (chronic kidney disease) stage 4, GFR 15-29 ml/min (CMS/[facility])   • Diabetic peripheral neuropathy associated with type 2 diabetes mellitus (CMS/[facility])   • Diabetes mellitus type II, uncontrolled (CMS/[facility])   • GERD (gastroesophageal reflux disease)   • Primary osteoarthritis of both knees   • Pulmonary embolism (CMS/[facility])   • BPH (benign prostatic hyperplasia)   • Benign essential hypertension   • Asthma   • Pleurisy   • Acute respiratory failure with hypoxia (CMS/[facility])   • Stage 1 acute kidney injury (CMS/[facility])   • Left ventricular diastolic dysfunction   • Exacerbation of asthma   • COPD exacerbation (CMS/[facility])   • Atrial flutter (CMS/[facility])   • Acute systolic CHF (congestive heart failure) (CMS/[facility])   • Bilateral pneumonia   • Urinary obstruction   • Supratherapeutic INR   • Sepsis due to urinary tract infection (CMS/[facility])   • Acute on chronic systolic CHF (congestive heart failure) (CMS/[facility])   • Bradycardia   • Dementia (CMS/[facility])   • Acute blood loss anemia   • MRSA bacteremia   • ANT (acute kidney injury) (CMS/[facility])   • Sepsis (CMS/[facility])   • Bilateral pleural effusion   • Coumadin  toxicity   • Diabetes mellitus (CMS/HCC)   • UTI (urinary tract infection)   • Atrial fibrillation, chronic (CMS/HCC)     Past Medical History:   Diagnosis Date   • Asthma    • Benign prostatic hyperplasia    • CHF (congestive heart failure) (CMS/HCC)    • Coronary artery disease    • Diabetes mellitus (CMS/HCC)    • GERD (gastroesophageal reflux disease)    • Hyperlipidemia    • Hypertension    • Prostate disorder    • Renal insufficiency    • Urinary tract infection      Past Surgical History:   Procedure Laterality Date   • BACK SURGERY     • CYSTOSCOPY N/A 2/5/2021    Procedure: CYSTOSCOPY WITH CATHETER PLACEMENT;  Surgeon: Orlin Riggs MD;  Location: [facility];  Service: Urology;  Laterality: N/A;   • KNEE SURGERY Left    • PROSTATE SURGERY            SWALLOW EVALUATION (last 72 hours)      SLP Adult Swallow Evaluation     Row Name 04/19/21 1140 04/18/21 0920 04/17/21 0927             Rehab Evaluation    Document Type  --  --  evaluation  -EK      Subjective Information  --  --  no complaints  -EK      Patient Observations  --  --  decreased LOC Will shake head yes/no  -EK      Care Plan Review  --  --  evaluation/treatment results reviewed  -EK      Patient Effort  --  --  adequate  -EK         General Information    Patient Profile Reviewed  --  --  yes  -EK         Pain    Additional Documentation  --  --  Pain Scale: Numbers Pre/Post-Treatment (Group)  -EK         Pain Scale: Numbers Pre/Post-Treatment    Pretreatment Pain Rating  --  --  0/10 - no pain  -EK      Posttreatment Pain Rating  --  --  0/10 - no pain  -EK         Oral Motor Structure and Function    Dentition Assessment  --  --  natural, present and adequate  -EK      Mucosal Quality  --  --  moist, healthy  -EK         General Eating/Swallowing Observations    Respiratory Support Currently in Use  nasal cannula  -EK  nasal cannula  -EK  nasal cannula  -EK      Eating/Swallowing Skills  fed by SLP  -EK  fed by SLP  -EK  fed by SLP  -EK       Positioning During Eating  upright 90 degree;upright in bed  -EK  upright 90 degree;upright in bed  -EK  upright 90 degree;upright in bed  -EK      Utensils Used  straw  -EK  cup;spoon  -EK  spoon  -EK      Consistencies Trialed  soft textures;thin liquids;chopped  -EK  pureed;ice chips;thin liquids  -EK  ice chips;thin liquids  -EK      Pre SpO2 (%)  --  --  100  -EK      Post SpO2 (%)  --  --  100  -EK         Clinical Swallow Eval    Oral Prep Phase  impaired  -EK  WFL  -EK  --      Oral Transit  WFL  -EK  WFL  -EK  --      Oral Residue  WFL  -EK  WFL  -EK  --      Pharyngeal Phase  --  suspected pharyngeal impairment  -EK  --      Esophageal Phase  --  --  -EK  --      Clinical Swallow Evaluation Summary  Swallow: Wife present this date. Pt with good tolerance of mechanical soft with chopped meat and thin liquids. Pt with mild cough however no other s/s of aspiration. ST to discontinue from services at this time due to goals met.   -EK  Swallow: Pt alert today and able to safely tolerate mech soft with chopped meats and thin liquids. Pt ate bowl of cheerios and half cup of applesauce. Pt took medication. SLP intiated diet of mechanical soft with chopped meats and thin liquids. Pt will need feeding assistance. SLP to follow up.   -EK  Swallow evaluation completed. Pt with decreased alertnes this date. RN reports given haldol last pm. Pt was able to chew ice chips however SLP could not get pt awake for additional trials   -EK         Oral Prep Concerns    Oral Prep Concerns  prolonged mastication  -EK  --  --      Oral Prep Concerns, Comment  Pt continues to benefit from chopped meat due to prolonged mastication.   -EK  --  --         Pharyngeal Phase Concerns    Pharyngeal Phase Concerns  --  cough  -EK  --      Cough  --  thin with straw  -EK  --      Pharyngeal Phase Concerns, Comment  --  Mild cough x1  -EK  --         Clinical Impression    Daily Summary of Progress (SLP)  progress toward functional goals is  good  -EK  progress toward functional goals is good  -EK  --      SLP Swallowing Diagnosis  mild;oral dysphagia  -EK  mild  -EK  mild-moderate;oral dysphagia;suspected pharyngeal dysphagia decreased alertness  -EK      Functional Impact  --  risk of aspiration/pneumonia  -EK  risk of aspiration/pneumonia;risk of malnutrition;risk of dehydration  -EK      Rehab Potential/Prognosis, Swallowing  --  good, to achieve stated therapy goals  -EK  good, to achieve stated therapy goals  -EK         Recommendations    Therapy Frequency (Swallow)  --  5 days per week  -EK  --      Predicted Duration Therapy Intervention (Days)  --  until discharge  -EK  --      SLP Diet Recommendation  thin liquids;soft textures;chopped  -EK  thin liquids;soft textures;chopped  -EK  NPO  -EK      Recommended Precautions and Strategies  --  upright posture during/after eating;small bites of food and sips of liquid;multiple swallows per bite of food;alternate between small bites of food and sips of liquid;multiple swallows per sip of liquid;check mouth frequently for oral residue/pocketing  -EK  --      Oral Care Recommendations  Oral Care BID/PRN  -EK  Oral Care BID/PRN  -EK  Oral Care BID/PRN  -EK      SLP Rec. for Method of Medication Administration  meds whole;with pudding or applesauce  -EK  meds whole;with pudding or applesauce  -EK  meds via alternate route  -EK      Monitor for Signs of Aspiration  yes;notify SLP if any concerns  -EK  yes;notify SLP if any concerns  -EK  yes;notify SLP if any concerns  -EK         Swallow Goals (SLP)    Oral Nutrition/Hydration Goal Selection (SLP)  --  oral nutrition/hydration, SLP goal 1  -EK  oral nutrition/hydration, SLP goal 1  -EK         Oral Nutrition/Hydration Goal 1 (SLP)    Oral Nutrition/Hydration Goal 1, SLP  Pt to tolerate least restrictive diet with no s/s of aspiration for adequate nutrition and hydration.   -EK  Pt to tolerate least restrictive diet with no s/s of aspiration for adequate  nutrition and hydration.   -EK  Pt to tolerate least restrictive diet with no s/s of aspiration for adequate nutrition and hydration.   -EK      Time Frame (Oral Nutrition/Hydration Goal 1, SLP)  by discharge  -EK  by discharge  -EK  by discharge  -EK      Progress/Outcomes (Oral Nutrition/Hydration Goal 1, SLP)  goal met  -EK  goal not met  -EK  other (see comments) new goal   -EK        User Key  (r) = Recorded By, (t) = Taken By, (c) = Cosigned By    Initials Name Effective Dates    Yenifer Forbes CCC-SLP 07/24/19 -           EDUCATION  The patient has been educated in the following areas:   Dysphagia (Swallowing Impairment).    SLP Recommendation and Plan  SLP Swallowing Diagnosis: mild, oral dysphagia  SLP Diet Recommendation: thin liquids, soft textures, chopped     Monitor for Signs of Aspiration: yes, notify SLP if any concerns        Anticipated Discharge Disposition (SLP): home with /7 care        Predicted Duration Therapy Intervention (Days): until discharge     Daily Summary of Progress (SLP): progress toward functional goals is good     Anticipated Discharge Disposition (SLP): home with / care        Reason for Discharge: all goals and outcomes met, no further needs identified    Progress: improving  Outcome Summary: Swallow: Wife present this date. Pt with good tolerance of mechanical soft with chopped meat and thin liquids. Pt with mild cough however no other s/s of aspiration. ST to discontinue from services at this time due to goals met.    SLP GOALS     Row Name 04/19/21 1140 04/18/21 0920 04/17/21 0927       Oral Nutrition/Hydration Goal 1 (SLP)    Oral Nutrition/Hydration Goal 1, SLP  Pt to tolerate least restrictive diet with no s/s of aspiration for adequate nutrition and hydration.   -EK  Pt to tolerate least restrictive diet with no s/s of aspiration for adequate nutrition and hydration.   -EK  Pt to tolerate least restrictive diet with no s/s of aspiration for  adequate nutrition and hydration.   -EK    Time Frame (Oral Nutrition/Hydration Goal 1, SLP)  by discharge  -EK  by discharge  -EK  by discharge  -EK    Progress/Outcomes (Oral Nutrition/Hydration Goal 1, SLP)  goal met  -EK  goal not met  -EK  other (see comments) new goal   -EK      User Key  (r) = Recorded By, (t) = Taken By, (c) = Cosigned By    Initials Name Provider Type    Yenifer Forbes CCC-SLP Speech and Language Pathologist               Time Calculation:   Time Calculation- SLP     Row Name 04/19/21 1155             Time Calculation- SLP    SLP Start Time  1140  -EK      SLP Stop Time  1208  -EK      SLP Time Calculation (min)  28 min  -EK      SLP Received On  04/19/21  -EK        User Key  (r) = Recorded By, (t) = Taken By, (c) = Cosigned By    Initials Name Provider Type    Yenifer Forbes CCC-SLP Speech and Language Pathologist          Therapy Charges for Today     Code Description Service Date Service Provider Modifiers Qty    65528187790 HC ST TREATMENT SWALLOW 3 4/18/2021 Yenifer Wagner CCC-SLP GN 1    [id number] HC ST TREATMENT SWALLOW 2 4/19/2021 Yenifer Wagner CCC-SLP GN 1               SLP Discharge Summary  Anticipated Discharge Disposition (SLP): home with 24/7 care  Reason for Discharge: all goals and outcomes met, no further needs identified  Progress Toward Achieving Short/long Term Goals: all goals met within established timelines    ÁLVARO Lemus  4/19/2021

## 2021-04-19 NOTE — PLAN OF CARE
Goal Outcome Evaluation:  Plan of Care Reviewed With: patient  Progress: improving  Outcome Summary: Swallow: Wife present this date. Pt with good tolerance of mechanical soft with chopped meat and thin liquids. Pt with mild cough however no other s/s of aspiration. ST to discontinue from services at this time due to goals met.

## 2021-04-19 NOTE — PROGRESS NOTES
[facility] Medicine Services  INPATIENT PROGRESS NOTE    Length of Stay: 3  Date of Admission: 4/16/2021  Primary Care Physician: Mark Sheldon APRN    Subjective   Chief Complaint: Shortness of breath  HPI:  81 year old male with a history of HTN, HLD, DMII, CKDIII, BPH, and CAD who presented with shortness of breath and hypothermia. Chest x-ray x-ray showed cardiomegaly with interval development of bilateral infiltrative changes suggesting pulmonary edema and/or bilateral pneumonitis.  He was admitted to CCU and initiated on IV antibiotics and IV diuretics.  Nephrology was consulted for assistance with fluid management. Chest x-ray notes decreased bilateral pleural effusion with moderate residual left effusion.  He is on 2 LPM and denies shortness of breath.     Review of Systems   Unable to perform ROS: Dementia        All pertinent negatives and positives are as above. All other systems have been reviewed and are negative unless otherwise stated.     Objective    Temp:  [97.1 °F (36.2 °C)-98.7 °F (37.1 °C)] 97.4 °F (36.3 °C)  Heart Rate:  [55-70] 61  Resp:  [18] 18  BP: (130-167)/(67-87) 167/76    Physical Exam  Vitals reviewed.   Constitutional:       Appearance: Normal appearance. He is ill-appearing.   HENT:      Head: Normocephalic and atraumatic.   Cardiovascular:      Rate and Rhythm: Normal rate and regular rhythm.   Pulmonary:      Breath sounds: Examination of the right-lower field reveals decreased breath sounds. Examination of the left-lower field reveals decreased breath sounds. Decreased breath sounds present. No wheezing or rales.   Abdominal:      General: There is no distension.      Palpations: Abdomen is soft.      Tenderness: There is no abdominal tenderness.   Musculoskeletal:         General: No swelling or deformity.   Skin:     General: Skin is warm and dry.   Neurological:      General: No focal deficit present.      Mental Status: He is  alert. Mental status is at baseline.             Results Review:  I have reviewed the labs, radiology results, and diagnostic studies.    Laboratory Data:   Results from last 7 days   Lab Units 04/18/21  0559 04/17/21  0530 04/16/21  1505   SODIUM mmol/L 146* 145 139   POTASSIUM mmol/L 4.4 4.4 4.4   CHLORIDE mmol/L 114* 114* 110*   CO2 mmol/L 24.0 25.0 27.0   BUN mg/dL 51* 45* 48*   CREATININE mg/dL 2.48* 2.46* 2.47*   GLUCOSE mg/dL 61* 78 132*   CALCIUM mg/dL 8.1* 8.2* 8.3*   BILIRUBIN mg/dL  --  0.4 0.4   ALK PHOS U/L  --  68 78   ALT (SGPT) U/L  --  5 7   AST (SGOT) U/L  --  13 14   ANION GAP mmol/L 8.0 6.0 2.0*     Estimated Creatinine Clearance: 30.3 mL/min (A) (by C-G formula based on SCr of 2.48 mg/dL (H)).  Results from last 7 days   Lab Units 04/18/21  0559   MAGNESIUM mg/dL 1.8   PHOSPHORUS mg/dL 3.1         Results from last 7 days   Lab Units 04/18/21  0922 04/17/21  0530 04/16/21  1505   WBC 10*3/mm3 3.26* 2.66* 2.91*   HEMOGLOBIN g/dL 10.7* 10.0* 10.9*   HEMATOCRIT % 32.3* 29.0* 33.0*   PLATELETS 10*3/mm3 92* 99* 115*     Results from last 7 days   Lab Units 04/19/21  0637 04/18/21  0922 04/17/21  0530 04/16/21  1505   INR  1.64* 1.59* 3.01* 13.24*       Culture Data:   Blood Culture   Date Value Ref Range Status   04/16/2021 No growth at 2 days  Preliminary   04/16/2021 No growth at 2 days  Preliminary     Urine Culture   Date Value Ref Range Status   04/16/2021 50,000 CFU/mL Gram Negative Bacilli (A)  Preliminary     No results found for: RESPCX  No results found for: WOUNDCX  No results found for: STOOLCX  No components found for: BODYFLD    Radiology Data:   Imaging Results (Last 24 Hours)     Procedure Component Value Units Date/Time    XR Chest PA & Lateral [[id number]] Collected: 04/19/21 0733     Updated: 04/19/21 0758    Narrative:      PROCEDURE: Chest x-ray with two views.    INDICATION: effusion follow-up, A41.9 Sepsis, unspecified  organism I50.23 Acute on chronic systolic (congestive)  heart  failure I48.92 Unspecified atrial flutter T45.511A Poisoning by  anticoagulants, accidental (unintentional), initial encounter  R13.12 Dysphagia, oropharyngeal phase    COMPARISON: 4/16/2021 chest x-ray and CT of the chest. Earlier  chest x-rays.    Mild cardiomegaly. Mild improvement in vascular congestion with  residual.    Improved aeration in the right lung with mild residual basilar  atelectasis.  Decreased right pleural effusion with minimal blunting right CP  angle.    Improved aeration left lung with moderate residual atelectasis  and/or volume loss left lower lobe.  Mild decreased left pleural effusion with moderate residual.      Impression:      Decreased bilateral pleural effusion minimal blunting right CP  angle. Moderate residual left effusion.    Almost complete clearing right lung with residual right basilar  atelectasis.    Improved left lower lobe infiltrate with residual infiltrate and  volume loss.    [id number]    Electronically signed by:  Christopher Ramirez MD  4/19/2021 7:57  AM CDT Workstation: [id number]          I have reviewed the patient's current medications.     Assessment/Plan     Active Hospital Problems    Diagnosis    • **Sepsis (CMS/HCC)    • Bilateral pneumonia    • Bilateral pleural effusion    • Coumadin toxicity    • Diabetes mellitus (CMS/HCC)    • UTI (urinary tract infection)    • Atrial fibrillation, chronic (CMS/HCC)    • Acute on chronic systolic CHF (congestive heart failure) (CMS/HCC)    • Dementia (CMS/HCC)    • CKD (chronic kidney disease) stage 4, GFR 15-29 ml/min (CMS/HCC)        Plan:    Supplemental oxygen, wean as tolerated  Rocephin day 4/7  Azithromycin day 4/5  Follow cultures  Lasix 40 mg PO BID  Nephrology consultation appreciated  Beta-blocker: decrease coreg due to bradycardia to 3.125 mg PO BID  ACEI/ARB: contraindicated to renal disease  Imdur/Hydralazine  Rhythm/rate control: amiodarone, coreg  Glucose control: SSI 0-7 units  PT/OT/SLP  VTE PPx:  Coumadin on hold, pharmacy to dose warfarin  Discharge planning: anticipate home with home health 2-3 days    The patient was evaluated during the global COVID-19 pandemic, and the diagnosis was suspected/considered upon their initial presentation.  Evaluation, treatment, and testing were consistent with current guidelines for patients who present with complaints or symptoms that may be related to COVID-19.        This document has been electronically signed by THIERNO Griffin on April 19, 2021 10:51 CDT

## 2021-04-19 NOTE — NURSING NOTE
Patient has irritation on the penis from catheter, excoriation on both buttock with shearing and an abrasion on his right heel. There are 2 small openings-stage 2 on each anterior buttock. They each measures 0.3 x 0.3 x 0.1 cm with granulation in wound beds. Bag balm has been ordered for his feet, magic butt for his buttock wounds. Needs good skin care, wound care, offloading and protection. POA yes

## 2021-04-19 NOTE — PAYOR COMM NOTE
"Sandra Frias   [facility]  phone [phone]  fax [phone]    Nadir Alanis Sr. (81 y.o. Male)     Date of Birth Social Security Number Address Home Phone MRN    1940  [address] [address] [phone] [id number]    Yarsani Marital Status          [facility]        Admission Date Admission Type Admitting Provider Attending Provider Department, Room/Bed    4/16/21 Emergency Manohar Mary MD Craig, David A, MD [address], 333/1    Discharge Date Discharge Disposition Discharge Destination                       Attending Provider: Manohar Mary MD    Allergies: Contrast Dye    Isolation: None   Infection: None   Code Status: No CPR    Ht: 193 cm (75.98\")   Wt: 90.2 kg (198 lb 14.4 oz)    Admission Cmt: None   Principal Problem: Sepsis (CMS/[facility]) [A41.9]                 Active Insurance as of 4/16/2021     Primary Coverage     Payor Plan Insurance Group Employer/Plan Group    MEDICARE MEDICARE A & B      Payor Plan Address Payor Plan Phone Number Payor Plan Fax Number Effective Dates    [address] [phone]  11/1/1976 - None Entered    [facility] [id number]       Subscriber Name Subscriber Birth Date Member ID       NADIR ALANIS SR. 1940 [id number]           Secondary Coverage     Payor Plan Insurance Group Employer/Plan Group    [facility] [id number]     Payor Plan Address Payor Plan Phone Number Payor Plan Fax Number Effective Dates    [address] [address]   1/23/2019 - None Entered    [facility] [phone]       Subscriber Name Subscriber Birth Date Member ID       NADIR ALANIS SR. 1940 [id number]                 Emergency Contacts      (Rel.) Home Phone Work Phone Mobile Phone    ZekeLamar (Spouse) [phone] -- [phone]    ZekeCammy (Daughter) [phone] -- [phone]               History & Physical      Manohar Mary MD at 04/16/21 1717                [facility]" [facility] Medicine Admission      Date of Admission: 4/16/2021      Primary Care Physician: Mark Sheldon APRN      Chief Complaint: Shortness of breath    HPI: Patient is a 81-year-old male with past medical history notable for hypertension, hyperlipidemia, type 2 diabetes mellitus, CKD 3B, BPH, and CAD who presented to the emergency department due to worsening shortness of breath and reports of hypothermia at home.  Due to patient's dementia history is obtained from his wife.  She notes that he has been declining over the past week or so.  She states that he is essentially bedbound but has also been developing worsening edema in his lower extremities.  She states that he is not having any fevers but has had low body temperatures.  She notes he is also been developing some worsening shortness of breath.  She states that he will randomly talk to himself and talk to people that are not there at home.    Evaluation of the emergency department was notable for CBC which showed a WBC of 2.91, hemoglobin 10.9, hematocrit 33, platelets at 115.  D-dimer was elevated at 1071.  PTT was 86.  PT was 111.1 with an INR of 13.24.  Troponin T was 0.16 which is improved from 6 months ago when it was 0.241.  proBNP was elevated at 65,746.  CMP showed glucose of 132, BUN 48, creatinine 2.47, chloride 110, calcium 8.3, albumin 2.7.  Covid and influenza screen was negative.  Lactate was normal at 1.2.  Chest x-ray showed cardiomegaly with interval development of bilateral infiltrative changes suggesting pulmonary edema and/or bilateral pneumonitis.    Patient was given Rocephin and azithromycin along with vitamin K.  Vital signs in the ER were notable for temp of 94.2, pulse 52, /81 with a normal SPO2 on room air.  Admission was requested for further evaluation and care.    Concurrent Medical History:  has a past medical history of Asthma, Benign prostatic hyperplasia, CHF (congestive heart  failure) (CMS/[facility]), Coronary artery disease, Diabetes mellitus (CMS/[facility]), GERD (gastroesophageal reflux disease), Hyperlipidemia, Hypertension, Prostate disorder, Renal insufficiency, and Urinary tract infection.    Past Surgical History:  has a past surgical history that includes Back surgery; Prostate surgery; Knee surgery (Left); and Cystoscopy (N/A, 2/5/2021).    Family History: family history is not on file.  No changes    Social History:  reports that he has never smoked. He does not have any smokeless tobacco history on file. He reports that he does not drink alcohol and does not use drugs.    Allergies:   Allergies   Allergen Reactions   • Contrast Dye        Medications:   Prior to Admission medications    Medication Sig Start Date End Date Taking? Authorizing Provider   acetaminophen (TYLENOL) 325 MG tablet Take 2 tablets by mouth Every 6 (Six) Hours As Needed for Mild Pain . 10/13/20   Nicole Canseco APRN   atorvastatin (LIPITOR) 40 MG tablet Take 1 tablet by mouth Every Night. 8/28/20   Anup Atkinson MD   Bacitracin Zinc (magic butt ointment) Apply 1 each topically to the appropriate area as directed 2 (Two) Times a Day. 10/13/20   Nicole Canseco APRN   bisacodyl (DULCOLAX) 5 MG EC tablet Take 1 tablet by mouth Daily As Needed for Constipation. 10/13/20   Nicole Canseco APRN   brimonidine (ALPHAGAN P) 0.1 % solution ophthalmic solution 1 drop. 1/26/16   Selina Rosario MD   brinzolamide (AZOPT) 1 % ophthalmic suspension 1 drop. 1/26/16   Selina Rosario MD   budesonide (PULMICORT) 0.5 MG/2ML nebulizer solution Inhale 0.5 mg. 1/26/16   Selina Rosario MD   hydrALAZINE (APRESOLINE) 50 MG tablet Take 1 tablet by mouth Every 8 (Eight) Hours. 2/9/21   Ralph Weaver APRN   insulin aspart (novoLOG) 100 UNIT/ML injection Inject 0-7 Units under the skin into the appropriate area as directed 3 (Three) Times a Day Before Meals. 8/28/20   Anup Atkinson MD   insulin  detemir (Levemir) 100 UNIT/ML injection Inject 10 Units under the skin into the appropriate area as directed Every 12 (Twelve) Hours. 8/28/20   Anup Atkinson MD   ipratropium (ATROVENT) 0.02 % nebulizer solution Take 2.5 mL by nebulization Every 4 (Four) Hours As Needed for Wheezing or Shortness of Air. 8/28/20   Anup Atkinson MD   ipratropium-albuterol (DUO-NEB) 0.5-2.5 mg/3 ml nebulizer Take 3 mL by nebulization Every 4 (Four) Hours As Needed for Wheezing or Shortness of Air. 10/13/20   Nicole Canseco APRN   isosorbide mononitrate (IMDUR) 30 MG 24 hr tablet Take 1 tablet by mouth Daily. 8/29/20   Anup Atkinson MD   lansoprazole (PREVACID) 30 MG capsule Take 30 mg by mouth. 4/10/17   ProviderSelina MD   latanoprost (XALATAN) 0.005 % ophthalmic solution 1 drop. 1/26/16   ProviderSelina MD   Misc. Devices (TRANSFER BENCH) misc Transfer bench r/t dementia, deconditioning. 6/8/17   Nicole Canseco APRN   [facility]. Devices (TUB TRANSFER BOARD) [facility] Transfer tub bench r/t deconditioning, dementia, and blind r/t glaucoma 6/9/17   Nicole Canseco APRN   ondansetron (ZOFRAN) 4 MG/2ML injection Infuse 2 mL into a venous catheter Every 6 (Six) Hours As Needed for Nausea or Vomiting. 10/13/20   Nicole Canseco APRN       Review of Systems:  Review of Systems   Constitutional: Negative for chills and fever.   HENT: Negative for congestion.    Eyes: Negative.    Respiratory: Positive for shortness of breath. Negative for cough.    Cardiovascular: Negative for chest pain and palpitations.   Gastrointestinal: Negative for abdominal pain, constipation, diarrhea, nausea and vomiting.   Genitourinary: Negative.    Musculoskeletal: Negative.         Bilateral lower extremity edema   Skin: Negative for rash.   Neurological: Negative.    Psychiatric/Behavioral: Negative.    All other systems reviewed and are negative.     Otherwise complete ROS is negative except as mentioned above.    Physical Exam:   Temp:   [94.2 °F (34.6 °C)-94.6 °F (34.8 °C)] 94.2 °F (34.6 °C)  Heart Rate:  [] 52  Resp:  [16-17] 17  BP: (143-181)/(67-81) 181/81  Physical Exam  Constitutional:       General: He is not in acute distress.     Appearance: He is not toxic-appearing.   HENT:      Head: Normocephalic and atraumatic.      Right Ear: External ear normal.      Left Ear: External ear normal.      Nose: Nose normal.      Mouth/Throat:      Mouth: Mucous membranes are moist.      Pharynx: Oropharynx is clear.   Eyes:      Conjunctiva/sclera: Conjunctivae normal.   Cardiovascular:      Rate and Rhythm: Normal rate and regular rhythm.      Pulses: Normal pulses.      Heart sounds: Normal heart sounds.   Pulmonary:      Comments: Diminished coarse breath sounds bilaterally with crackles at the bases.  Abdominal:      General: Bowel sounds are normal.      Tenderness: There is no abdominal tenderness.   Musculoskeletal:      Cervical back: Neck supple.      Right lower leg: Edema present.      Left lower leg: Edema present.   Skin:     General: Skin is warm and dry.      Capillary Refill: Capillary refill takes less than 2 seconds.   Neurological:      Comments: Pleasantly demented   Psychiatric:      Comments: Pleasantly demented           Results Reviewed:  I have personally reviewed current lab, radiology, and data and agree with results.  Lab Results (last 24 hours)     Procedure Component Value Units Date/Time    Procalcitonin [[id number]] Collected: 04/16/21 1505    Specimen: Blood Updated: 04/16/21 1704    COVID-19 and FLU A/B PCR - Swab, Nasopharynx [[id number]]  (Normal) Collected: 04/16/21 1554    Specimen: Swab from Nasopharynx Updated: 04/16/21 1703     COVID19 Not Detected     Influenza A PCR Not Detected     Influenza B PCR Not Detected    Narrative:      Fact sheet for providers: https://www.fda.gov/media/952956/download    Fact sheet for patients: https://www.fda.gov/media/140869/download    Test performed by PCR.    Lactic Acid,  Plasma [[id number]]  (Normal) Collected: 04/16/21 1603    Specimen: Blood Updated: 04/16/21 1627     Lactate 1.2 mmol/L     Early Draw [[id number]] Collected: 04/16/21 1505    Specimen: Blood Updated: 04/16/21 1615    Narrative:      The following orders were created for panel order Early Draw.  Procedure                               Abnormality         Status                     ---------                               -----------         ------                     Light Blue Top[[id number]]                                   Final result               Green Top (Gel)[[id number]]                                  Final result               Lavender Top[[id number]]                                     Final result               Gold Top - SST[[id number]]                                   Final result                 Please view results for these tests on the individual orders.    Gold Top - SST [[id number]] Collected: 04/16/21 1505    Specimen: Blood Updated: 04/16/21 1615     Extra Tube Hold for add-ons.     Comment: Auto resulted.       Light Blue Top [[id number]] Collected: 04/16/21 1505    Specimen: Blood Updated: 04/16/21 1615     Extra Tube hold for add-on     Comment: Auto resulted       Green Top (Gel) [[id number]] Collected: 04/16/21 1505    Specimen: Blood Updated: 04/16/21 1615     Extra Tube Hold for add-ons.     Comment: Auto resulted.       Lavender Top [[id number]] Collected: 04/16/21 1505    Specimen: Blood Updated: 04/16/21 1615     Extra Tube hold for add-on     Comment: Auto resulted       CBC & Differential [[id number]]  (Abnormal) Collected: 04/16/21 1505    Specimen: Blood Updated: 04/16/21 1610    Narrative:      The following orders were created for panel order CBC & Differential.  Procedure                               Abnormality         Status                     ---------                               -----------         ------                     Scan Slide[[id number]]                                        Final result               CBC Auto Differential[[id number]]        Abnormal            Final result                 Please view results for these tests on the individual orders.    Scan Slide [[id number]] Collected: 04/16/21 1505    Specimen: Blood Updated: 04/16/21 1610     Acanthocytes Slight/1+     Anisocytosis Slight/1+     Hypochromia Slight/1+     Target Cells Slight/1+     WBC Morphology Normal     Platelet Estimate Decreased    aPTT [[id number]]  (Abnormal) Collected: 04/16/21 1505    Specimen: Blood Updated: 04/16/21 1603     PTT 86.0 seconds     Narrative:      The recommended Heparin therapeutic range is 68-97 seconds.    D-dimer, Quantitative [[id number]]  (Abnormal) Collected: 04/16/21 1505    Specimen: Blood Updated: 04/16/21 1603     D-Dimer, Quantitative 1,071 ng/mL (FEU)     Narrative:      Dimer values <500 ng/ml FEU are FDA approved as aid in diagnosis of deep venous thrombosis and pulmonary embolism.  This test should not be used in an exclusion strategy with pretest probability alone.    A recent guideline regarding diagnosis for pulmonary thromboembolism recommends an adjusted exclusion criterion of age x 10 ng/ml FEU for patients >50 years of age (Brenda Intern Med 2015; 163: 701-711).      Protime-INR [[id number]]  (Abnormal) Collected: 04/16/21 1505    Specimen: Blood Updated: 04/16/21 1603     Protime 111.1 Seconds      INR 13.24    Narrative:      Therapeutic range for most indications is 2.0-3.0 INR,  or 2.5-3.5 for mechanical heart valves.    BNP [[id number]]  (Abnormal) Collected: 04/16/21 1505    Specimen: Blood Updated: 04/16/21 1545     proBNP 65,746.0 pg/mL     Narrative:      Among patients with dyspnea, NT-proBNP is highly sensitive for the detection of acute congestive heart failure. In addition NT-proBNP of <300 pg/ml effectively rules out acute congestive heart failure with 99% negative predictive value.    Results may be falsely decreased if patient taking Biotin.       Troponin [[id number]]  (Abnormal) Collected: 04/16/21 1505    Specimen: Blood Updated: 04/16/21 1538     Troponin T 0.160 ng/mL     Narrative:      Troponin T Reference Range:  <= 0.03 ng/mL-   Negative for AMI  >0.03 ng/mL-     Abnormal for myocardial necrosis.  Clinicians would have to utilize clinical acumen, EKG, Troponin and serial changes to determine if it is an Acute Myocardial Infarction or myocardial injury due to an underlying chronic condition.       Results may be falsely decreased if patient taking Biotin.      Comprehensive Metabolic Panel [[id number]]  (Abnormal) Collected: 04/16/21 1505    Specimen: Blood Updated: 04/16/21 1535     Glucose 132 mg/dL      BUN 48 mg/dL      Creatinine 2.47 mg/dL      Sodium 139 mmol/L      Potassium 4.4 mmol/L      Chloride 110 mmol/L      CO2 27.0 mmol/L      Calcium 8.3 mg/dL      Total Protein 6.8 g/dL      Albumin 2.70 g/dL      ALT (SGPT) 7 U/L      AST (SGOT) 14 U/L      Alkaline Phosphatase 78 U/L      Total Bilirubin 0.4 mg/dL      eGFR   Amer 31 mL/min/1.73      Globulin 4.1 gm/dL      A/G Ratio 0.7 g/dL      BUN/Creatinine Ratio 19.4     Anion Gap 2.0 mmol/L     Narrative:      GFR Normal >60  Chronic Kidney Disease <60  Kidney Failure <15      CBC Auto Differential [[id number]]  (Abnormal) Collected: 04/16/21 1505    Specimen: Blood Updated: 04/16/21 1533     WBC 2.91 10*3/mm3      RBC 4.24 10*6/mm3      Hemoglobin 10.9 g/dL      Hematocrit 33.0 %      MCV 77.8 fL      MCH 25.7 pg      MCHC 33.0 g/dL      RDW 17.9 %      RDW-SD 49.2 fl      MPV --     Comment: Instrument unable to calculate  Slide scan to follow        Platelets 115 10*3/mm3      Neutrophil % 72.5 %      Lymphocyte % 16.5 %      Monocyte % 7.9 %      Eosinophil % 2.1 %      Basophil % 0.3 %      Immature Grans % 0.7 %      Neutrophils, Absolute 2.11 10*3/mm3      Lymphocytes, Absolute 0.48 10*3/mm3      Monocytes, Absolute 0.23 10*3/mm3      Eosinophils, Absolute 0.06 10*3/mm3       Basophils, Absolute 0.01 10*3/mm3      Immature Grans, Absolute 0.02 10*3/mm3      nRBC 0.0 /100 WBC         Imaging Results (Last 24 Hours)     Procedure Component Value Units Date/Time    XR Chest 1 View [[id number]] Collected: 04/16/21 1522     Updated: 04/16/21 1541    Narrative:      Chest x-ray single view.         CLINICAL INDICATION: Shortness of breath    COMPARISON: Chest February 4, 2021.    FINDINGS: Cardiac silhouette is enlarged in size. Interval  development of bilateral infiltrative changes suggesting  pulmonary edema pattern and/or bilateral pneumonitis. Relative  sparing of portions of the right apex. Small left-sided pleural  effusion.      Impression:      Cardiomegaly. Interval development of bilateral  infiltrative changes suggesting pulmonary edema type pattern  and/or bilateral pneumonitis with relative sparing of the right  apex. Small left-sided pleural effusion. Unfavorable change since  prior exam.    Electronically signed by:  Herbert Clement MD  4/16/2021 3:40 PM CDT  Workstation: [id number]            Assessment:    Active Hospital Problems    Diagnosis    • Sepsis (CMS/HCC)    • Acute on chronic systolic CHF (congestive heart failure) (CMS/HCC)    • Dementia (CMS/HCC)    • CKD (chronic kidney disease) stage 4, GFR 15-29 ml/min (CMS/HCC)    • Diabetes mellitus type II, uncontrolled (CMS/HCC)              Plan:  -Patient will be admitted to the CCU for closer monitoring for now  -We will continue with antibiotic coverage with Rocephin and azithromycin pending further evaluation  -We will give him a small dose of IV Lasix this evening to diurese him some but nephrology will be consulted to help out with diuretic and fluid management given his underlying CKD 4.  -We will resume his home medications as appropriate  -We will monitor his blood sugars with glucose checks have a sliding scale insulin, his wife reports that he does not take basal insulin anymore  -We will hold his warfarin for  the time being and monitor his INRs with daily checks  -We will go ahead and give him some more vitamin K tomorrow.  -As needed Ativan and Haldol will be available for agitation given his underlying dementia  -DVT prophylaxis not indicated at this time given his elevated INR  -CODE STATUS: DNR/DNI which was confirmed with the patient's wife in the emergency department    The patient was evaluated during the global COVID-19 pandemic, and the diagnosis was suspected/considered upon their initial presentation.  Evaluation, treatment, and testing were consistent with current guidelines for patients who present with complaints or symptoms that may be related to COVID-19.      I discussed the patient's findings and my recommendations with: The patient and his wife    Manohar Mary MD            Electronically signed by Manohar Mary MD at 04/16/21 0831

## 2021-04-19 NOTE — THERAPY EVALUATION
Patient Name: Ondina Alanis Sr.  : 1940    MRN: [id number]                              Today's Date: 2021       Admit Date: 2021    Visit Dx:     ICD-10-CM ICD-9-CM   1. Sepsis, due to unspecified organism, unspecified whether acute organ dysfunction present (CMS/[facility])  A41.9 038.9     995.91   2. Acute on chronic systolic congestive heart failure (CMS/[facility])  I50.23 428.23     428.0   3. Atrial flutter, unspecified type (CMS/[facility])  I48.92 427.32   4. Poisoning by warfarin sodium, accidental or unintentional, initial encounter  T45.511A 964.2     E858.2   5. Oropharyngeal dysphagia  R13.12 787.22   6. Impaired physical mobility  Z74.09 781.99     Patient Active Problem List   Diagnosis   • CKD (chronic kidney disease) stage 4, GFR 15-29 ml/min (CMS/[facility])   • Diabetic peripheral neuropathy associated with type 2 diabetes mellitus (CMS/[facility])   • Diabetes mellitus type II, uncontrolled (CMS/[facility])   • GERD (gastroesophageal reflux disease)   • Primary osteoarthritis of both knees   • Pulmonary embolism (CMS/[facility])   • BPH (benign prostatic hyperplasia)   • Benign essential hypertension   • Asthma   • Pleurisy   • Acute respiratory failure with hypoxia (CMS/[facility])   • Stage 1 acute kidney injury (CMS/[facility])   • Left ventricular diastolic dysfunction   • Exacerbation of asthma   • COPD exacerbation (CMS/[facility])   • Atrial flutter (CMS/[facility])   • Acute systolic CHF (congestive heart failure) (CMS/[facility])   • Bilateral pneumonia   • Urinary obstruction   • Supratherapeutic INR   • Sepsis due to urinary tract infection (CMS/[facility])   • Acute on chronic systolic CHF (congestive heart failure) (CMS/[facility])   • Bradycardia   • Dementia (CMS/[facility])   • Acute blood loss anemia   • MRSA bacteremia   • ANT (acute kidney injury) (CMS/[facility])   • Sepsis (CMS/[facility])   • Bilateral pleural effusion   • Coumadin toxicity   • Diabetes mellitus (CMS/[facility])   • UTI (urinary tract infection)   • Atrial fibrillation, chronic (CMS/[facility])     Past Medical  History:   Diagnosis Date   • Asthma    • Benign prostatic hyperplasia    • CHF (congestive heart failure) (CMS/HCC)    • Coronary artery disease    • Diabetes mellitus (CMS/HCC)    • GERD (gastroesophageal reflux disease)    • Hyperlipidemia    • Hypertension    • Prostate disorder    • Renal insufficiency    • Urinary tract infection      Past Surgical History:   Procedure Laterality Date   • BACK SURGERY     • CYSTOSCOPY N/A 2/5/2021    Procedure: CYSTOSCOPY WITH CATHETER PLACEMENT;  Surgeon: Keely, Orlin TRIMBLE MD;  Location: [facility];  Service: Urology;  Laterality: N/A;   • KNEE SURGERY Left    • PROSTATE SURGERY       General Information     Row Name 04/19/21 0820          Physical Therapy Time and Intention    Document Type  evaluation  -CZ     Mode of Treatment  co-treatment;physical therapy;occupational therapy  -CZ     Row Name 04/19/21 0820          General Information    Patient Profile Reviewed  yes  -CZ     Prior Level of Function  min assist:;bed mobility;dependent:;transfer  -CZ     Existing Precautions/Restrictions  fall  -CZ     Barriers to Rehab  visual deficit;cognitive status;previous functional deficit  -CZ     Row Name 04/19/21 0820          Living Environment    Lives With  spouse;child(bia), adult  -CZ     Row Name 04/19/21 0820          Home Main Entrance    Number of Stairs, Main Entrance  none  -CZ     Row Name 04/19/21 0820          Cognition    Orientation Status (Cognition)  oriented to;person  -CZ     Row Name 04/19/21 0820          Safety Issues, Functional Mobility    Safety Issues Affecting Function (Mobility)  ability to follow commands;insight into deficits/self-awareness;judgment  -CZ     Impairments Affecting Function (Mobility)  strength;endurance/activity tolerance;range of motion (ROM);pain;cognition;visual/perceptual  -CZ       User Key  (r) = Recorded By, (t) = Taken By, (c) = Cosigned By    Initials Name Provider Type    CZ Surya Rodriguez, PT Physical Therapist         Mobility     Row Name 04/19/21 0820          Bed Mobility    Bed Mobility  scooting/bridging  -     Scooting/Bridging [place] (Bed Mobility)  dependent (less than 25% patient effort);2 person assist  -CZ     Comment (Bed Mobility)  Repositioned patient to facilitate upright posture for feeding. Patient is quite tall and leans to L.  -     Row Name 04/19/21 0820          Bed-Chair Transfer    Bed-Chair [place] (Transfers)  not tested  -     Row Name 04/19/21 0820          Sit-Stand Transfer    Sit-Stand [place] (Transfers)  not tested  -     Row Name 04/19/21 0820          Gait/Stairs (Locomotion)    [place] Level (Gait)  not tested  -       User Key  (r) = Recorded By, (t) = Taken By, (c) = Cosigned By    Initials Name Provider Type    Surya Perales, PT Physical Therapist        Obj/Interventions     Row Name 04/19/21 0820          Range of Motion Comprehensive    General Range of Motion  other (see comments)  -     Comment, General Range of Motion  Difficult to assess, does not allow RLE ROM secondary to chronic knee pain; moves LLE mimimally.  -     Row Name 04/19/21 0820          Strength Comprehensive (MMT)    General Manual Muscle Testing (MMT) Assessment  other (see comments)  -     Comment, General Manual Muscle Testing (MMT) Assessment  Difficult to assess, 2+/5 grossly.  -     Row Name 04/19/21 0820          Sensory Assessment (Somatosensory)    Sensory Assessment (Somatosensory)  unable/difficult to assess  -       User Key  (r) = Recorded By, (t) = Taken By, (c) = Cosigned By    Initials Name Provider Type    Surya Perales, PT Physical Therapist        Goals/Plan     Row Name 04/19/21 0820          Bed Mobility Goal 1 (PT)    Activity/Assistive Device (Bed Mobility Goal 1, PT)  rolling to left;rolling to right  -     [place] Level/Cues Needed (Bed Mobility Goal 1, PT)  minimum assist (75% or more patient effort)  -     Time Frame (Bed  Mobility Goal 1, PT)  by discharge  -CZ     Strategies/Barriers (Bed Mobility Goal 1, PT)  Confused, bed bound, poor vision.  -CZ     Progress/Outcomes (Bed Mobility Goal 1, PT)  goal not met  -CZ     Row Name 04/19/21 0820          ROM Goal 1 (PT)    ROM Goal 1 (PT)  Patient will tolerate BLE AAROM, 15-20 reps each plane.  -CZ     Time Frame (ROM Goal 1, PT)  by discharge  -CZ     Progress/Outcome (ROM Goal 1, PT)  goal not met  -CZ       User Key  (r) = Recorded By, (t) = Taken By, (c) = Cosigned By    Initials Name Provider Type    CZ Surya Rodriguez, PT Physical Therapist        Clinical Impression     Row Name 04/19/21 0820          Pain    Additional Documentation  Pain Scale: Numbers Pre/Post-Treatment (Group);Pain Scale: FACES Pre/Post-Treatment (Group)  -CZ     Row Name 04/19/21 0820          Pain Scale: FACES Pre/Post-Treatment    Pain: FACES Scale, Pretreatment  6-->hurts even more  -CZ     Pain Location  back  -CZ     Row Name 04/19/21 0820          Plan of Care Review    Plan of Care Reviewed With  patient  -CZ     Outcome Summary  Initial PT evaluation complete; co-evaluation with OT.  Patient is lethargic, appears to have significant visual impairment.  ROM and strength in BLEs difficult to assess, limited by pain in R knee and decreased cognition. Appears to have limited knee flexion/extension bilaterally. Patient is dependent for positioning in bed (to facilitate upright  posture for feeding). Goals established for bed mobility and LE ROM, continue skilled PT.  -     Row Name 04/19/21 0820          Therapy Assessment/Plan (PT)    Rehab Potential (PT)  fair, will monitor progress closely  -CZ     Criteria for Skilled Interventions Met (PT)  yes;skilled treatment is necessary  -CZ     Row Name 04/19/21 0820          Vital Signs    Pre Systolic BP Rehab  177  -CZ     Pre Treatment Diastolic BP  91  -CZ     Pretreatment Heart Rate (beats/min)  67  -CZ     Pre SpO2 (%)  98  -CZ     O2 Delivery Pre  Treatment  nasal cannula 2 LPM  -CZ     Pre Patient Position  Supine  -CZ     Row Name 04/19/21 0820          Positioning and Restraints    Pre-Treatment Position  in bed  -CZ     Post Treatment Position  bed  -CZ     In Bed  supine;with OT  -CZ       User Key  (r) = Recorded By, (t) = Taken By, (c) = Cosigned By    Initials Name Provider Type    Surya Perales, PT Physical Therapist        Outcome Measures     Row Name 04/19/21 0820          How much help from another person do you currently need...    Turning from your back to your side while in flat bed without using bedrails?  2  -CZ     Moving from lying on back to sitting on the side of a flat bed without bedrails?  2  -CZ     Moving to and from a bed to a chair (including a wheelchair)?  1  -CZ     Standing up from a chair using your arms (e.g., wheelchair, bedside chair)?  1  -CZ     Climbing 3-5 steps with a railing?  1  -CZ     To walk in hospital room?  1  -CZ     AM-PAC 6 Clicks Score (PT)  8  -CZ     Row Name 04/19/21 0820          Functional Assessment    Outcome Measure Options  AM-PAC 6 Clicks Basic Mobility (PT)  -CZ       User Key  (r) = Recorded By, (t) = Taken By, (c) = Cosigned By    Initials Name Provider Type    Surya Perales, PT Physical Therapist        Physical Therapy Education                 Title: PT OT SLP Therapies (In Progress)     Topic: Physical Therapy (In Progress)     Point: Mobility training (In Progress)     Learning Progress Summary           Patient Acceptance, E, NR by ANGELA at 4/19/2021 0842    Comment: Educated on PT POC and goals.                   Point: Home exercise program (Not Started)     Learner Progress:  Not documented in this visit.          Point: Body mechanics (Not Started)     Learner Progress:  Not documented in this visit.          Point: Precautions (Not Started)     Learner Progress:  Not documented in this visit.                      User Key     Initials Effective Dates Name Provider Type  Discipline    CZ 04/03/18 -  Surya Rodriguez, PT Physical Therapist PT              PT Recommendation and Plan  Planned Therapy Interventions (PT): balance training, bed mobility training, patient/family education, ROM (range of motion), strengthening, stretching  Plan of Care Reviewed With: patient  Outcome Summary: Initial PT evaluation complete; co-evaluation with OT.  Patient is lethargic, appears to have significant visual impairment.  ROM and strength in BLEs difficult to assess, limited by pain in R knee and decreased cognition. Appears to have limited knee flexion/extension bilaterally. Patient is dependent for positioning in bed (to facilitate upright  posture for feeding). Goals established for bed mobility and LE ROM, continue skilled PT.     Time Calculation:   PT Charges     Row Name 04/19/21 0858             Time Calculation    Start Time  0820  -CZ      Stop Time  0845  -      Time Calculation (min)  25 min  -CZ      PT Received On  04/19/21  -      PT Goal Re-Cert Due Date  05/02/21  -        User Key  (r) = Recorded By, (t) = Taken By, (c) = Cosigned By    Initials Name Provider Type    CZ Sruya Rodriguez, PT Physical Therapist        Therapy Charges for Today     Code Description Service Date Service Provider Modifiers Qty    25932374397 HC PT EVAL MOD COMPLEXITY 2 4/19/2021 Surya Rodriguez, PT GP 1          PT G-Codes  Outcome Measure Options: AM-PAC 6 Clicks Basic Mobility (PT)  AM-PAC 6 Clicks Score (PT): 8    Surya Rodriguez PT  4/19/2021

## 2021-04-19 NOTE — PROGRESS NOTES
"Anticoagulation by Pharmacy - Warfarin    Ondina Alanis Sr. is a 81 y.o.male who has been consulted for warfarin for atrial fibrillation.     Home regimen: Warfarin ~3 mg PO nightly   INR Goal: 2-3    Objective:  [Ht: 193 cm (75.98\"); Wt: 90.2 kg (198 lb 14.4 oz)]    Lab Results   Component Value Date    INR 1.64 (H) 04/19/2021    INR 1.59 (H) 04/18/2021    INR 3.01 (H) 04/17/2021    PROTIME 20.3 (H) 04/19/2021    PROTIME 19.8 (H) 04/18/2021    PROTIME 33.3 (H) 04/17/2021     Lab Results   Component Value Date    HGB 10.7 (L) 04/18/2021    HGB 10.0 (L) 04/17/2021    HGB 10.9 (L) 04/16/2021    HCT 32.3 (L) 04/18/2021    HCT 29.0 (L) 04/17/2021    HCT 33.0 (L) 04/16/2021    PLT 92 (L) 04/18/2021    PLT 99 (L) 04/17/2021     (L) 04/16/2021       Recent Warfarin Administrations     The 5 most recent administrations since 04/12/2021 are shown below each listed medication.    Warfarin Sodium       Order Dose Date Given     warfarin (COUMADIN) tablet 3 mg 3 mg 04/18/2021                Assessment  • H/H/plts low but stable   • Interacting medications: amiodarone, ceftriaxone, azithromycin,   • INR is subtherapeutic after restart yesterday. Patient received IV vitamin K on admission due to elevated INR.     Plan:  1. Give warfarin 3 mg tablet PO @ 1800 tonight  2. PT/INR ordered daily   3. Pharmacy will continue to follow    John Galicia [facility]  04/19/21 09:39 CDT     "

## 2021-04-19 NOTE — THERAPY EVALUATION
Patient Name: Ondina Alanis Sr.  : 1940    MRN: [id number]                              Today's Date: 2021       Admit Date: 2021    Visit Dx:     ICD-10-CM ICD-9-CM   1. Sepsis, due to unspecified organism, unspecified whether acute organ dysfunction present (CMS/[facility])  A41.9 038.9     995.91   2. Acute on chronic systolic congestive heart failure (CMS/[facility])  I50.23 428.23     428.0   3. Atrial flutter, unspecified type (CMS/[facility])  I48.92 427.32   4. Poisoning by warfarin sodium, accidental or unintentional, initial encounter  T45.511A 964.2     E858.2   5. Oropharyngeal dysphagia  R13.12 787.22   6. Impaired physical mobility  Z74.09 781.99   7. Impaired mobility and ADLs  Z74.09 V49.89    Z78.9      Patient Active Problem List   Diagnosis   • CKD (chronic kidney disease) stage 4, GFR 15-29 ml/min (CMS/[facility])   • Diabetic peripheral neuropathy associated with type 2 diabetes mellitus (CMS/[facility])   • Diabetes mellitus type II, uncontrolled (CMS/[facility])   • GERD (gastroesophageal reflux disease)   • Primary osteoarthritis of both knees   • Pulmonary embolism (CMS/[facility])   • BPH (benign prostatic hyperplasia)   • Benign essential hypertension   • Asthma   • Pleurisy   • Acute respiratory failure with hypoxia (CMS/[facility])   • Stage 1 acute kidney injury (CMS/[facility])   • Left ventricular diastolic dysfunction   • Exacerbation of asthma   • COPD exacerbation (CMS/[facility])   • Atrial flutter (CMS/[facility])   • Acute systolic CHF (congestive heart failure) (CMS/[facility])   • Bilateral pneumonia   • Urinary obstruction   • Supratherapeutic INR   • Sepsis due to urinary tract infection (CMS/[facility])   • Acute on chronic systolic CHF (congestive heart failure) (CMS/[facility])   • Bradycardia   • Dementia (CMS/[facility])   • Acute blood loss anemia   • MRSA bacteremia   • ANT (acute kidney injury) (CMS/[facility])   • Sepsis (CMS/[facility])   • Bilateral pleural effusion   • Coumadin toxicity   • Diabetes mellitus (CMS/[facility])   • UTI (urinary tract infection)   •  Atrial fibrillation, chronic (CMS/[facility])     Past Medical History:   Diagnosis Date   • Asthma    • Benign prostatic hyperplasia    • CHF (congestive heart failure) (CMS/[facility])    • Coronary artery disease    • Diabetes mellitus (CMS/[facility])    • GERD (gastroesophageal reflux disease)    • Hyperlipidemia    • Hypertension    • Prostate disorder    • Renal insufficiency    • Urinary tract infection      Past Surgical History:   Procedure Laterality Date   • BACK SURGERY     • CYSTOSCOPY N/A 2/5/2021    Procedure: CYSTOSCOPY WITH CATHETER PLACEMENT;  Surgeon: Orlin Riggs MD;  Location: [facility];  Service: Urology;  Laterality: N/A;   • KNEE SURGERY Left    • PROSTATE SURGERY       General Information     [facility] Name 04/19/21 0821          OT Time and Intention    Document Type  evaluation  -     Mode of Treatment  individual therapy;occupational therapy;physical therapy  -[facility] Name 04/19/21 0821          General Information    Patient Profile Reviewed  yes  -     Existing Precautions/Restrictions  fall;oxygen therapy device and L/min  -     Barriers to Rehab  medically complex;previous functional deficit;cognitive status;visual deficit  -[facility] Name 04/19/21 0821          Living Environment    Lives With  spouse;child(bia), adult  -[facility] Name 04/19/21 0821          Home Main Entrance    Number of Stairs, Main Entrance  none  -[facility] Name 04/19/21 0821          Stairs Within Home, Primary    Stairs Comment, Within Home, Primary  unsure of PLOF with ADL  -     Row Name 04/19/21 0821          Cognition    Orientation Status (Cognition)  oriented to;person  -[facility] Name 04/19/21 0821          Safety Issues, Functional Mobility    Safety Issues Affecting Function (Mobility)  ability to follow commands;insight into deficits/self-awareness;safety precautions follow-through/compliance;judgment;sequencing abilities;problem-solving;safety precaution awareness;impulsivity;at risk behavior observed   -     Impairments Affecting Function (Mobility)  strength;endurance/activity tolerance;range of motion (ROM);pain;cognition;visual/perceptual;balance;coordination;motor control;motor planning;shortness of breath;postural/trunk control  -       User Key  (r) = Recorded By, (t) = Taken By, (c) = Cosigned By    Initials Name Provider Type     Genny Greenwood, OTR/L Occupational Therapist          Mobility/ADL's     Row Name 04/19/21 0821          Bed Mobility    Bed Mobility  scooting/bridging  -     Scooting/[place] (Bed Mobility)  dependent (less than 25% patient effort);2 person assist  -     Assistive Device (Bed Mobility)  draw sheet  -     Comment (Bed Mobility)  repositioned pt to facilitate upright posture for feeding, pt is quite tall and leans to the Left frequently with frequent redirection and repositioned.  -     Row Name 04/19/21 0821          Transfers    Comment (Transfers)  defer  -     Row Name 04/19/21 0821          Functional Mobility    Functional Mobility- Comment  defer  -     Row Name 04/19/21 0821          Activities of Daily Living    BADL Assessment/Intervention  grooming;feeding  -     Row Name 04/19/21 0821          Grooming Assessment/Training    Comment (Grooming)  pt set up and mod redirection to wipe off hands and mouth after self feeding.  -     Row Name 04/19/21 0821          Self-Feeding Assessment/Training    Comment (Feeding)  pt alert and hungry eyes stayed closed throughout. Pt had max difficulty locating fork and items dependent for getting food onto fork and/or spoon. Pt with non liquid food min to mod assist for fork to mouth, [place] for spoon with yogurt to mouth. Pt required extended time and effort frequent redirection min to mod spillage noted  -       User Key  (r) = Recorded By, (t) = Taken By, (c) = Cosigned By    Initials Name Provider Type     Genny Greenwood, OTR/L Occupational Therapist        Obj/Interventions     Row Name  04/19/21 0821          Sensory Assessment (Somatosensory)    Sensory Assessment (Somatosensory)  unable/difficult to assess  -BH     Row Name 04/19/21 0821          Vision Assessment/Intervention    Visual Impairment/Limitations  -- decreased vision  -BH     Row Name 04/19/21 0821          Range of Motion Comprehensive    Comment, General Range of Motion  difficulty to fully assess pt unable to formally test but appears functionally, LUE is swollen and more hindered movements  -BH     Row Name 04/19/21 0821          Strength Comprehensive (MMT)    Comment, General Manual Muscle Testing (MMT) Assessment  unable to formally asess,  grossly 3 to 3+/5 BUE grossly 3 to 3+/5  -       User Key  (r) = Recorded By, (t) = Taken By, (c) = Cosigned By    Initials Name Provider Type     Genny Greenwood, OTR/L Occupational Therapist        Goals/Plan     Row Name 04/19/21 0821          Bathing Goal 1 (OT)    Activity/Device (Bathing Goal 1, OT)  upper body bathing  -     [place] Level/Cues Needed (Bathing Goal 1, OT)  moderate assist (50-74% patient effort)  -     Time Frame (Bathing Goal 1, OT)  long term goal (LTG);by discharge  -     Progress/Outcomes (Bathing Goal 1, OT)  goal not met  -BH     Row Name 04/19/21 0821          Grooming Goal 1 (OT)    Activity/Device (Grooming Goal 1, OT)  grooming skills, all  -BH     [place] (Grooming Goal 1, OT)  set-up required;supervision required;verbal cues required  -     Time Frame (Grooming Goal 1, OT)  long term goal (LTG);by discharge  -     Progress/Outcome (Grooming Goal 1, OT)  goal not met  -BH     Row Name 04/19/21 0821          Self-Feeding Goal 1 (OT)    Activity/Device (Self-Feeding Goal 1, OT)  self-feeding skills, all  -     [place] Level/Cues Needed (Self-Feeding Goal 1, OT)  moderate assist (50-74% patient effort)  -     Time Frame (Self-Feeding Goal 1, OT)  long term goal (LTG);by discharge  -     Progress/Outcomes (Self-Feeding  "Goal 1, OT)  goal not met  -     Row Name 04/19/21 0821          Therapy Assessment/Plan (OT)    Planned Therapy Interventions (OT)  activity tolerance training;adaptive equipment training;BADL retraining;cognitive/visual perception retraining;functional balance retraining;IADL retraining;occupation/activity based interventions;ROM/therapeutic exercise;strengthening exercise;transfer/mobility retraining;passive ROM/stretching;patient/caregiver education/training;neuromuscular control/coordination retraining  [facility]       User Key  (r) = Recorded By, (t) = Taken By, (c) = Cosigned By    Initials Name Provider Type     Genny Greenwood, OTR/L Occupational Therapist        Clinical Impression     Row Name 04/19/21 0821          Pain Assessment    Additional Documentation  Pain Scale: FACES Pre/Post-Treatment (Group)  -[facility] Name 04/19/21 0821          Pain Scale: FACES Pre/Post-Treatment    Pain: FACES Scale, Pretreatment  6-->hurts even more  -     Posttreatment Pain Rating  6-->hurts even more  -     Pre/Posttreatment Pain Comment  pt reported pain in his back reports he \"always has pain\"  -     Row Name 04/19/21 0821          Plan of Care Review    Plan of Care Reviewed With  patient  -     Outcome Summary  OT anam completed this date, co-eval with PT. Pt is lethargic appears to have significant visual impairment. Hard to formally assess ROM and strength of BUE grossly 3 to 3+/5. Pt engaged in self feeding dependent to get food onto fork/spoon, with yogurt [place] to get spoon to mouth in order not to spill otherwise min to mod assist with min to mod spillage and extended time and mod to max vc. Pt setup and mod vc to wipe off mouth and hands. Pt dependent of 2 to get scooted and better position in the bed for self feeding. Pt may benefit from further skilled OT to reach max independence with ADL. Pt will likely need 24/7 assist at d/c.  -     Row Name 04/19/21 0821          Therapy Assessment/Plan (OT) "    Rehab Potential (OT)  fair, will monitor progress closely  -     Criteria for Skilled Therapeutic Interventions Met (OT)  yes;meets criteria;skilled treatment is necessary  -     Therapy Frequency (OT)  other (see comments) 5-7 days a week  -     Predicted Duration of Therapy Intervention (OT)  until d/c  -     Row Name 04/19/21 0821          Therapy Plan Review/Discharge Plan (OT)    Anticipated Discharge Disposition (OT)  home with 24/7 care;home with home health;skilled nursing facility  -     Row Name 04/19/21 0821          Vital Signs    Pre Systolic BP Rehab  177  -BH     Pre Treatment Diastolic BP  91  -     Pretreatment Heart Rate (beats/min)  67  -     Pre SpO2 (%)  98  -     O2 Delivery Pre Treatment  nasal cannula 2L  -BH     Pre Patient Position  Supine  -     Intra Patient Position  Supine  -     Post Patient Position  Supine HOB up  -     Row Name 04/19/21 0821          Positioning and Restraints    Pre-Treatment Position  in bed  -     Post Treatment Position  bed  -     In Bed  notified nsg;supine;sitting;call light within reach;encouraged to call for assist;exit alarm on;side rails up x2  -       User Key  (r) = Recorded By, (t) = Taken By, (c) = Cosigned By    Initials Name Provider Type     Genny Greenwood, OTR/L Occupational Therapist        Outcome Measures     Row Name 04/19/21 0821          How much help from another is currently needed...    Putting on and taking off regular lower body clothing?  1  -     Bathing (including washing, rinsing, and drying)  1  -     Toileting (which includes using toilet bed pan or urinal)  1  -     Putting on and taking off regular upper body clothing  1  -     Taking care of personal grooming (such as brushing teeth)  3  -     Eating meals  2  -     AM-PAC 6 Clicks Score (OT)  9  -     Row Name 04/19/21 0821          Functional Assessment    Outcome Measure Options  AM-PAC 6 Clicks Daily Activity (OT)  -        User Key  (r) = Recorded By, (t) = Taken By, (c) = Cosigned By    Initials Name Provider Type     Genny Greenwood, OTR/L Occupational Therapist        Occupational Therapy Education                 Title: PT OT SLP Therapies (In Progress)     Topic: Occupational Therapy (In Progress)     Point: ADL training (In Progress)     Description:   Instruct learner(s) on proper safety adaptation and remediation techniques during self care or transfers.   Instruct in proper use of assistive devices.              Learning Progress Summary           Patient Acceptance, E, NR by  at 4/19/2021 0957    Comment: Educated about OT. Educated on safety throughout.                   Point: Home exercise program (Not Started)     Description:   Instruct learner(s) on appropriate technique for monitoring, assisting and/or progressing therapeutic exercises/activities.              Learner Progress:  Not documented in this visit.          Point: Precautions (Not Started)     Description:   Instruct learner(s) on prescribed precautions during self-care and functional transfers.              Learner Progress:  Not documented in this visit.          Point: Body mechanics (Not Started)     Description:   Instruct learner(s) on proper positioning and spine alignment during self-care, functional mobility activities and/or exercises.              Learner Progress:  Not documented in this visit.                      User Key     Initials Effective Dates Name Provider Type [facility] 06/08/18 -  Genny Greenwood, OTR/L Occupational Therapist OT              OT Recommendation and Plan  Planned Therapy Interventions (OT): activity tolerance training, adaptive equipment training, BADL retraining, cognitive/visual perception retraining, functional balance retraining, IADL retraining, occupation/activity based interventions, ROM/therapeutic exercise, strengthening exercise, transfer/mobility retraining, passive ROM/stretching,  patient/caregiver education/training, neuromuscular control/coordination retraining  Therapy Frequency (OT): other (see comments) (5-7 days a week)  Plan of Care Review  Plan of Care Reviewed With: patient  Outcome Summary: OT eval completed this date, co-eval with PT. Pt is lethargic appears to have significant visual impairment. Hard to formally assess ROM and strength of BUE grossly 3 to 3+/5. Pt engaged in self feeding dependent to get food onto fork/spoon, with yogurt [place] to get spoon to mouth in order not to spill otherwise min to mod assist with min to mod spillage and extended time and mod to max vc. Pt setup and mod vc to wipe off mouth and hands. Pt dependent of 2 to get scooted and better position in the bed for self feeding. Pt may benefit from further skilled OT to reach max independence with ADL. Pt will likely need 24/7 assist at d/c.     Time Calculation:   Time Calculation- OT     Row Name 04/19/21 0957             Time Calculation-     OT Start Time  0821  -      OT Stop Time  0910  -      OT Time Calculation (min)  49 min  -      OT Received On  04/19/21  -      OT Goal Re-Cert Due Date  05/02/21  -        User Key  (r) = Recorded By, (t) = Taken By, (c) = Cosigned By    Initials Name Provider Type    Genny Lemus OTR/L Occupational Therapist        Therapy Charges for Today     Code Description Service Date Service Provider Modifiers Qty    [id number]  OT EVAL MOD COMPLEXITY 3 4/19/2021 Genny Greenwood OTR/L GO 1               KAYY Multani/DIONNE  4/19/2021

## 2021-04-20 NOTE — PLAN OF CARE
Goal Outcome Evaluation:  Plan of Care Reviewed With: patient  Progress: no change  Outcome Summary: VSS, pt still confused per baseline. still receiving iv antbx, requiring NC. pt does take O2 off often.

## 2021-04-20 NOTE — SIGNIFICANT NOTE
04/20/21 1131   OTHER   Discipline physical therapy assistant   Rehab Time/Intention   Session Not Performed patient/family declined treatment

## 2021-04-20 NOTE — PROGRESS NOTES
"Anticoagulation by Pharmacy - Warfarin    Ondina Alanis Sr. is a 81 y.o.male who has been consulted for warfarin for atrial fibrillation.      Home regimen: Warfarin ~3 mg PO nightly   INR Goal: 2-3    Last INR:   Lab Results   Component Value Date    INR 2.43 (H) 04/20/2021       Objective:  [Ht: 193 cm (75.98\"); Wt: 90.1 kg (198 lb 9.6 oz)]  Lab Results   Component Value Date    INR 2.43 (H) 04/20/2021    INR 1.64 (H) 04/19/2021    INR 1.59 (H) 04/18/2021    PROTIME 28.0 (H) 04/20/2021    PROTIME 20.3 (H) 04/19/2021    PROTIME 19.8 (H) 04/18/2021     Lab Results   Component Value Date    HGB 10.2 (L) 04/20/2021    HGB 10.7 (L) 04/18/2021    HGB 10.0 (L) 04/17/2021    HCT 29.6 (L) 04/20/2021    HCT 32.3 (L) 04/18/2021    HCT 29.0 (L) 04/17/2021     (L) 04/20/2021    PLT 92 (L) 04/18/2021    PLT 99 (L) 04/17/2021       Recent Warfarin Administrations       The 5 most recent administrations since 04/13/2021 are shown below each listed medication.    Warfarin Sodium         Order Dose Date Given     warfarin (COUMADIN) tablet 3 mg 3 mg 04/19/2021      3 mg 04/18/2021                    Assessment  Interacting medications: amiodarone, azithromycin  INR is 2.43, therapeutic.  Pretty significant jump upwards of 0.8 last night.  There was also 10 mg of vit k given 4/16 and 4/17.  Patient most likely is sensitive to the abx/warf.  Will reduce to warfarin 1 mg x 1 tonight.    H/H below normal limits, stable    Plan:  1.  Give warfarin 1 mg tablet PO @ 1800 tonight x 1  2.  Draw a PT/INR in AM  3.  Pharmacy will continue to follow    Tung Colon, PharmYAKELIN  04/20/21 14:43 CDT     "

## 2021-04-20 NOTE — PROGRESS NOTES
"[facility]  [address] [place] [address]. [address]  T - [phone]  F - [phone]     Progress Note          PATIENT  DEMOGRAPHICS   PATIENT NAME: Ondina Alanis Sr.                      PHYSICIAN: Marley Akhtar MD  : 1940  MRN: [id number]   LOS: 4 days    Patient Care Team:  Mark Sheldon APRN as PCP - General (Nurse Practitioner)  Subjective   SUBJECTIVE   Denies chest pain, confused and not sure why he is here         Objective   OBJECTIVE   Vital Signs  Temp:  [96.7 °F (35.9 °C)-97.5 °F (36.4 °C)] 96.9 °F (36.1 °C)  Heart Rate:  [61-78] 76  Resp:  [18] 18  BP: (120-173)/() 154/106    Flowsheet Rows      First Filed Value   Admission Height  193 cm (76\") Documented at 2021 1448   Admission Weight  106 kg (233 lb 12.8 oz) Documented at 2021 1448           I/O last 3 completed shifts:  In: 720 [P.O.:720]  Out: 2400 [Urine:2400]    PHYSICAL EXAM    Physical Exam  Vitals and nursing note reviewed.   Constitutional:       General: He is not in acute distress.     Appearance: He is not ill-appearing.   Cardiovascular:      Rate and Rhythm: Normal rate and regular rhythm.      Heart sounds: Normal heart sounds. No murmur heard.   No friction rub. No gallop.    Pulmonary:      Effort: Pulmonary effort is normal. No respiratory distress.      Breath sounds: Normal breath sounds. No stridor. No wheezing, rhonchi or rales.   Abdominal:      General: Bowel sounds are normal. There is no distension.      Palpations: Abdomen is soft.   Musculoskeletal:         General: Swelling present.   Neurological:      Mental Status: He is alert.         RESULTS   Results Review:    Results from last 7 days   Lab Units 21  0641 21  0559 21  0530 21  1505   SODIUM mmol/L 138 146* 145 139   POTASSIUM mmol/L 3.9 4.4 4.4 4.4   CHLORIDE mmol/L 109* 114* 114* 110*   CO2 mmol/L 22.0 24.0 25.0 27.0   BUN mg/dL 55* 51* 45* 48*   CREATININE mg/dL 2.72* " 2.48* 2.46* 2.47*   CALCIUM mg/dL 8.4* 8.1* 8.2* 8.3*   BILIRUBIN mg/dL 0.5  --  0.4 0.4   ALK PHOS U/L 76  --  68 78   ALT (SGPT) U/L 7  --  5 7   AST (SGOT) U/L 14  --  13 14   GLUCOSE mg/dL 126* 61* 78 132*       Estimated Creatinine Clearance: 27.6 mL/min (A) (by C-G formula based on SCr of 2.72 mg/dL (H)).    Results from last 7 days   Lab Units 04/18/21  0559   MAGNESIUM mg/dL 1.8   PHOSPHORUS mg/dL 3.1             Results from last 7 days   Lab Units 04/20/21  0641 04/18/21  0922 04/17/21  0530 04/16/21  1505   WBC 10*3/mm3 3.64 3.26* 2.66* 2.91*   HEMOGLOBIN g/dL 10.2* 10.7* 10.0* 10.9*   PLATELETS 10*3/mm3 103* 92* 99* 115*       Results from last 7 days   Lab Units 04/19/21  0637 04/18/21  0922 04/17/21  0530 04/16/21  1505   INR  1.64* 1.59* 3.01* 13.24*         Imaging Results (Last 24 Hours)     ** No results found for the last 24 hours. **           MEDICATIONS    amiodarone, 200 mg, Oral, Q24H  cefTRIAXone, 1 g, Intravenous, Q24H   And  azithromycin, 500 mg, Intravenous, Q24H  bacitracin, , Topical, Daily  Bag Balm, 1 application, Topical, BID  brinzolamide, 1 drop, Both Eyes, TID  budesonide-formoterol, 2 puff, Inhalation, BID - RT  carvedilol, 3.125 mg, Oral, BID With Meals  furosemide, 40 mg, Oral, BID  hydrALAZINE, 50 mg, Oral, Q8H  insulin aspart, 0-7 Units, Subcutaneous, TID AC  isosorbide mononitrate, 30 mg, Oral, Q24H  latanoprost, 1 drop, Both Eyes, Nightly  pantoprazole, 40 mg, Intravenous, Q AM  sodium chloride, 10 mL, Intravenous, Q12H  warfarin, 3 mg, Oral, Daily      Pharmacy to dose warfarin,         Assessment/Plan   ASSESSMENT / PLAN      Sepsis (CMS/HCC)    CKD (chronic kidney disease) stage 4, GFR 15-29 ml/min (CMS/HCC)    Bilateral pneumonia    Acute on chronic systolic CHF (congestive heart failure) (CMS/HCC)    Dementia (CMS/HCC)    Bilateral pleural effusion    Coumadin toxicity    Diabetes mellitus (CMS/[facility])    UTI (urinary tract infection)    Atrial fibrillation, chronic  (CMS/HCC)    1. CKD 3B/4: Baseline creatinine 2.3-2.6 mg/dl.   - UA protein negative, blood 3+, has UTI. Last MIKE done 8/23/20- right kidney 10.3 in length renal cysts. Left kidney is small 6.9 in length- no hydronephrosis, calculi or masses.   - Creatinine is slightly high and lower lasix to daily. Leg edema is stable. No orthopnea      2. Hypertension:  - Blood pressure is acceptable.     3. Systolic heart failure:  - Severe LV systolic dysfunction last EF 20%. On Coreg 6.25mg BID.     4. A fib:  - On amiodarone and coumadin.  INR 13 on arrival to ER. Now 1.59     5. Anemia:  - Hemoglobin is acceptable at 10.7     6. Dementia:  - On Seroquel     7. UTI/Pneumonia:  - URINE CULTURE gnr. COVID was negative. On Rocephin and azithromycin.            This document has been electronically signed by Marley Akhtar MD on April 20, 2021 10:27 CDT

## 2021-04-20 NOTE — THERAPY TREATMENT NOTE
Patient Name: Ondina Alanis Sr.  : 1940    MRN: [id number]                              Today's Date: 2021       Admit Date: 2021    Visit Dx:     ICD-10-CM ICD-9-CM   1. Sepsis, due to unspecified organism, unspecified whether acute organ dysfunction present (CMS/[facility])  A41.9 038.9     995.91   2. Acute on chronic systolic congestive heart failure (CMS/[facility])  I50.23 428.23     428.0   3. Atrial flutter, unspecified type (CMS/[facility])  I48.92 427.32   4. Poisoning by warfarin sodium, accidental or unintentional, initial encounter  T45.511A 964.2     E858.2   5. Oropharyngeal dysphagia  R13.12 787.22   6. Impaired physical mobility  Z74.09 781.99   7. Impaired mobility and ADLs  Z74.09 V49.89    Z78.9      Patient Active Problem List   Diagnosis   • CKD (chronic kidney disease) stage 4, GFR 15-29 ml/min (CMS/[facility])   • Diabetic peripheral neuropathy associated with type 2 diabetes mellitus (CMS/[facility])   • Diabetes mellitus type II, uncontrolled (CMS/[facility])   • GERD (gastroesophageal reflux disease)   • Primary osteoarthritis of both knees   • Pulmonary embolism (CMS/[facility])   • BPH (benign prostatic hyperplasia)   • Benign essential hypertension   • Asthma   • Pleurisy   • Acute respiratory failure with hypoxia (CMS/[facility])   • Stage 1 acute kidney injury (CMS/[facility])   • Left ventricular diastolic dysfunction   • Exacerbation of asthma   • COPD exacerbation (CMS/[facility])   • Atrial flutter (CMS/[facility])   • Acute systolic CHF (congestive heart failure) (CMS/[facility])   • Bilateral pneumonia   • Urinary obstruction   • Supratherapeutic INR   • Sepsis due to urinary tract infection (CMS/[facility])   • Acute on chronic systolic CHF (congestive heart failure) (CMS/[facility])   • Bradycardia   • Dementia (CMS/[facility])   • Acute blood loss anemia   • MRSA bacteremia   • ANT (acute kidney injury) (CMS/[facility])   • Sepsis (CMS/[facility])   • Bilateral pleural effusion   • Coumadin toxicity   • Diabetes mellitus (CMS/[facility])   • UTI (urinary tract infection)   •  Atrial fibrillation, chronic (CMS/HCC)     Past Medical History:   Diagnosis Date   • Asthma    • Benign prostatic hyperplasia    • CHF (congestive heart failure) (CMS/HCC)    • Coronary artery disease    • Diabetes mellitus (CMS/HCC)    • GERD (gastroesophageal reflux disease)    • Hyperlipidemia    • Hypertension    • Prostate disorder    • Renal insufficiency    • Urinary tract infection      Past Surgical History:   Procedure Laterality Date   • BACK SURGERY     • CYSTOSCOPY N/A 2/5/2021    Procedure: CYSTOSCOPY WITH CATHETER PLACEMENT;  Surgeon: Keely, Orlin TRIMBLE MD;  Location: [facility];  Service: Urology;  Laterality: N/A;   • KNEE SURGERY Left    • PROSTATE SURGERY       General Information     Row Name 04/20/21 0818          OT Time and Intention    Document Type  therapy note (daily note)  -BB     Mode of Treatment  individual therapy;occupational therapy  -BB     Row Name 04/20/21 0818          General Information    Patient Profile Reviewed  yes  -BB     Existing Precautions/Restrictions  fall;oxygen therapy device and L/min  -BB     Row Name 04/20/21 0818          Cognition    Orientation Status (Cognition)  oriented to;person  -BB     Row Name 04/20/21 0818          Safety Issues, Functional Mobility    Impairments Affecting Function (Mobility)  strength;endurance/activity tolerance;range of motion (ROM);pain;cognition;visual/perceptual;balance;coordination;motor control;motor planning;shortness of breath;postural/trunk control  -BB       User Key  (r) = Recorded By, (t) = Taken By, (c) = Cosigned By    Initials Name Provider Type    BB Lamar Trinidad COTA/L Occupational Therapy Assistant          Mobility/ADL's     Row Name 04/20/21 0818          Bed Mobility    Bed Mobility  scooting/bridging  -BB     Scooting/Bridging [place] (Bed Mobility)  dependent (less than 25% patient effort);2 person assist  -BB     Assistive Device (Bed Mobility)  draw sheet  -BB     Row Name 04/20/21 0818           Transfers    Bed-Chair [place] (Transfers)  not tested  -BB     Sit-Stand [place] (Transfers)  not tested  -BB     Row Name 04/20/21 0818          Activities of Daily Living    BADL Assessment/Intervention  grooming;feeding  -BB     Row Name 04/20/21 0818          Grooming Assessment/Training    [place] Level (Grooming)  wash face, hands;dependent (less than 25% patient effort)  -BB     Row Name 04/20/21 0818          Self-Feeding Assessment/Training    [place] Level (Feeding)  liquids to mouth;scoop food and bring to mouth;dependent (less than 25% patient effort) Pt encouraged to attempt to hold utensil and take to mouth, however he defers  -BB       User Key  (r) = Recorded By, (t) = Taken By, (c) = Cosigned By    Initials Name Provider Type    Lamar Guerrero COTA/L Occupational Therapy Assistant        Obj/Interventions     Row Name 04/20/21 0818          Vision Assessment/Intervention    Visual Impairment/Limitations  -- decreased vision  -BB     Row Name 04/20/21 0818          Range of Motion Comprehensive    General Range of Motion  other (see comments)  -BB     Row Name 04/20/21 0818          Strength Comprehensive (MMT)    General Manual Muscle Testing (MMT) Assessment  other (see comments)  -BB       User Key  (r) = Recorded By, (t) = Taken By, (c) = Cosigned By    Initials Name Provider Type    Lamar Guerrero COTA/L Occupational Therapy Assistant        Goals/Plan     Row Name 04/20/21 0818          Bathing Goal 1 (OT)    Activity/Device (Bathing Goal 1, OT)  upper body bathing  -BB     [place] Level/Cues Needed (Bathing Goal 1, OT)  moderate assist (50-74% patient effort)  -BB     Time Frame (Bathing Goal 1, OT)  long term goal (LTG);by discharge  -BB     Progress/Outcomes (Bathing Goal 1, OT)  goal not met  -BB     Row Name 04/20/21 0818          Grooming Goal 1 (OT)    Activity/Device (Grooming Goal 1, OT)  grooming skills, all  -BB     [place]  (Grooming Goal 1, OT)  set-up required;supervision required;verbal cues required  -BB     Time Frame (Grooming Goal 1, OT)  long term goal (LTG);by discharge  -BB     Progress/Outcome (Grooming Goal 1, OT)  goal not met  -BB     Row Name 04/20/21 0818          Self-Feeding Goal 1 (OT)    Activity/Device (Self-Feeding Goal 1, OT)  self-feeding skills, all  -BB     [place] Level/Cues Needed (Self-Feeding Goal 1, OT)  moderate assist (50-74% patient effort)  -BB     Time Frame (Self-Feeding Goal 1, OT)  long term goal (LTG);by discharge  -BB     Progress/Outcomes (Self-Feeding Goal 1, OT)  goal not met  -BB       User Key  (r) = Recorded By, (t) = Taken By, (c) = Cosigned By    Initials Name Provider Type    Lamar Guerrero COTA/L Occupational Therapy Assistant        Clinical Impression     Row Name 04/20/21 0818          Pain Scale: Numbers Pre/Post-Treatment    Pretreatment Pain Rating  0/10 - no pain  -BB     Posttreatment Pain Rating  0/10 - no pain  -BB     Row Name 04/20/21 0818          Pain Scale: FACES Pre/Post-Treatment    Pain: FACES Scale, Pretreatment  6-->hurts even more  -BB     Posttreatment Pain Rating  6-->hurts even more  -BB     Row Name 04/20/21 0818          Plan of Care Review    Plan of Care Reviewed With  patient  -BB     Progress  no change  -BB     Outcome Summary  Pt is dependent for grooming activity and self feeding tasks. No goals met. Continue OT POC  -BB     Row Name 04/20/21 0818          Therapy Assessment/Plan (OT)    Rehab Potential (OT)  fair, will monitor progress closely  -BB     Criteria for Skilled Therapeutic Interventions Met (OT)  yes;meets criteria;skilled treatment is necessary  -BB     Therapy Frequency (OT)  other (see comments) 5-7 days a week  -BB     Row Name 04/20/21 0818          Therapy Plan Review/Discharge Plan (OT)    Anticipated Discharge Disposition (OT)  home with 24/7 care;home with home health;skilled nursing facility  -BB     Row Name  04/20/21 0818          Vital Signs    Pretreatment Heart Rate (beats/min)  66  -BB     Pre SpO2 (%)  98  -BB     O2 Delivery Pre Treatment  supplemental O2  -BB     Pre Patient Position  -- long sitting  -BB     Row Name 04/20/21 0818          Positioning and Restraints    Pre-Treatment Position  in bed  -BB     Post Treatment Position  bed  -BB     In Bed  fowlers;call light within reach;encouraged to call for assist;exit alarm on  -BB       User Key  (r) = Recorded By, (t) = Taken By, (c) = Cosigned By    Initials Name Provider Type    Lamar Guerrero COTA/L Occupational Therapy Assistant        Outcome Measures     Row Name 04/20/21 0818          How much help from another is currently needed...    Putting on and taking off regular lower body clothing?  1  -BB     Bathing (including washing, rinsing, and drying)  1  -BB     Toileting (which includes using toilet bed pan or urinal)  1  -BB     Putting on and taking off regular upper body clothing  1  -BB     Taking care of personal grooming (such as brushing teeth)  1  -BB     Eating meals  1  -BB     AM-PAC 6 Clicks Score (OT)  6  -BB       User Key  (r) = Recorded By, (t) = Taken By, (c) = Cosigned By    Initials Name Provider Type    Lamar Guerrero COTA/L Occupational Therapy Assistant        Occupational Therapy Education                 Title: PT OT SLP Therapies (In Progress)     Topic: Occupational Therapy (In Progress)     Point: ADL training (In Progress)     Description:   Instruct learner(s) on proper safety adaptation and remediation techniques during self care or transfers.   Instruct in proper use of assistive devices.              Learning Progress Summary           Patient Acceptance, E, NR by BB at 4/20/2021 1408    Acceptance, E, NR by  at 4/19/2021 0957    Comment: Educated about OT. Educated on safety throughout.                   Point: Home exercise program (Not Started)     Description:   Instruct learner(s) on appropriate  technique for monitoring, assisting and/or progressing therapeutic exercises/activities.              Learner Progress:  Not documented in this visit.          Point: Precautions (Not Started)     Description:   Instruct learner(s) on prescribed precautions during self-care and functional transfers.              Learner Progress:  Not documented in this visit.          Point: Body mechanics (Not Started)     Description:   Instruct learner(s) on proper positioning and spine alignment during self-care, functional mobility activities and/or exercises.              Learner Progress:  Not documented in this visit.                      User Key     Initials Effective Dates Name Provider Type [facility] 06/08/18 -  Genny Greenwood, OTR/L Occupational Therapist OT     03/07/18 -  Lamar Trinidad SOTOMAYOR/L Occupational Therapy Assistant OT              OT Recommendation and Plan  Therapy Frequency (OT): other (see comments) (5-7 days a week)  Plan of Care Review  Plan of Care Reviewed With: patient  Progress: no change  Outcome Summary: Pt is dependent for grooming activity and self feeding tasks. No goals met. Continue OT POC     Time Calculation:   Time Calculation- OT     Row Name 04/20/21 1408             Time Calculation- OT    OT Start Time  0818  -BB      OT Stop Time  0858  -BB      OT Time Calculation (min)  40 min  -BB      Total Timed Code Minutes- OT  40 minute(s)  -BB      OT Received On  04/20/21  -BB         Timed Charges    10136 - OT Self Care/Mgmt Minutes  40  -BB         Total Minutes    Timed Charges Total Minutes  40  -BB       Total Minutes  40  -BB        User Key  (r) = Recorded By, (t) = Taken By, (c) = Cosigned By    Initials Name Provider Type     Lamar Trinidad COTA/L Occupational Therapy Assistant        Therapy Charges for Today     Code Description Service Date Service Provider Modifiers Qty    [id number]  OT SELF CARE/MGMT/TRAIN EA 15 MIN 4/20/2021 Lamar Trinidad,  СВЕТЛАНА/L  3               СВЕТЛАНА Salvador/DIONNE  4/20/2021

## 2021-04-20 NOTE — PLAN OF CARE
Problem: Adult Inpatient Plan of Care  Goal: Plan of Care Review  Flowsheets  Taken 4/20/2021 1408  Progress: no change  Plan of Care Reviewed With: patient  Taken 4/20/2021 0818  Progress: no change  Plan of Care Reviewed With: patient  Outcome Summary: Pt is dependent for grooming activity and self feeding tasks. No goals met. Continue OT POC   Goal Outcome Evaluation:  Plan of Care Reviewed With: patient  Progress: no change  Outcome Summary: Pt is dependent for grooming activity and self feeding tasks. No goals met. Continue OT POC

## 2021-04-20 NOTE — PROGRESS NOTES
[facility] Medicine Services  INPATIENT PROGRESS NOTE    Length of Stay: 4  Date of Admission: 4/16/2021  Primary Care Physician: Mark Sheldon APRN    Subjective   Chief Complaint: Shortness of breath  HPI:  81 year old male with a history of HTN, HLD, DMII, CKDIII, BPH, and CAD who presented with shortness of breath and hypothermia. Chest x-ray x-ray showed cardiomegaly with interval development of bilateral infiltrative changes suggesting pulmonary edema and/or bilateral pneumonitis.  He was admitted to CCU and initiated on IV antibiotics and IV diuretics.  Nephrology was consulted for assistance with fluid management. Chest x-ray notes decreased bilateral pleural effusion with moderate residual left effusion.  He is on room air as of this morning.     Review of Systems   Unable to perform ROS: Dementia        All pertinent negatives and positives are as above. All other systems have been reviewed and are negative unless otherwise stated.     Objective    Temp:  [96.7 °F (35.9 °C)-97.7 °F (36.5 °C)] 97.7 °F (36.5 °C)  Heart Rate:  [61-78] 65  Resp:  [18] 18  BP: (120-173)/() 126/75    Physical Exam  Vitals reviewed.   Constitutional:       Appearance: Normal appearance. He is ill-appearing.   HENT:      Head: Normocephalic and atraumatic.   Cardiovascular:      Rate and Rhythm: Normal rate and regular rhythm.   Pulmonary:      Breath sounds: Examination of the right-lower field reveals decreased breath sounds. Examination of the left-lower field reveals decreased breath sounds. Decreased breath sounds present. No wheezing or rales.   Abdominal:      General: There is no distension.      Palpations: Abdomen is soft.      Tenderness: There is no abdominal tenderness.   Musculoskeletal:         General: No swelling or deformity.   Skin:     General: Skin is warm and dry.   Neurological:      General: No focal deficit present.      Mental Status: He is alert. Mental  status is at baseline.             Results Review:  I have reviewed the labs, radiology results, and diagnostic studies.    Laboratory Data:   Results from last 7 days   Lab Units 04/20/21  0641 04/18/21  0559 04/17/21  0530 04/16/21  1505   SODIUM mmol/L 138 146* 145 139   POTASSIUM mmol/L 3.9 4.4 4.4 4.4   CHLORIDE mmol/L 109* 114* 114* 110*   CO2 mmol/L 22.0 24.0 25.0 27.0   BUN mg/dL 55* 51* 45* 48*   CREATININE mg/dL 2.72* 2.48* 2.46* 2.47*   GLUCOSE mg/dL 126* 61* 78 132*   CALCIUM mg/dL 8.4* 8.1* 8.2* 8.3*   BILIRUBIN mg/dL 0.5  --  0.4 0.4   ALK PHOS U/L 76  --  68 78   ALT (SGPT) U/L 7  --  5 7   AST (SGOT) U/L 14  --  13 14   ANION GAP mmol/L 7.0 8.0 6.0 2.0*     Estimated Creatinine Clearance: 27.6 mL/min (A) (by C-G formula based on SCr of 2.72 mg/dL (H)).  Results from last 7 days   Lab Units 04/18/21  0559   MAGNESIUM mg/dL 1.8   PHOSPHORUS mg/dL 3.1         Results from last 7 days   Lab Units 04/20/21  0641 04/18/21  0922 04/17/21  0530 04/16/21  1505   WBC 10*3/mm3 3.64 3.26* 2.66* 2.91*   HEMOGLOBIN g/dL 10.2* 10.7* 10.0* 10.9*   HEMATOCRIT % 29.6* 32.3* 29.0* 33.0*   PLATELETS 10*3/mm3 103* 92* 99* 115*     Results from last 7 days   Lab Units 04/20/21  0939 04/19/21  0637 04/18/21  0922 04/17/21  0530 04/16/21  1505   INR  2.43* 1.64* 1.59* 3.01* 13.24*       Culture Data:   No results found for: BLOODCX  No results found for: URINECX  No results found for: RESPCX  No results found for: WOUNDCX  No results found for: STOOLCX  No components found for: BODYFLD    Radiology Data:   Imaging Results (Last 24 Hours)     ** No results found for the last 24 hours. **          I have reviewed the patient's current medications.     Assessment/Plan     Active Hospital Problems    Diagnosis    • **Sepsis (CMS/HCC)    • Bilateral pneumonia    • Bilateral pleural effusion    • Coumadin toxicity    • Diabetes mellitus (CMS/HCC)    • UTI (urinary tract infection)    • Atrial fibrillation, chronic (CMS/HCC)    •  Acute on chronic systolic CHF (congestive heart failure) (CMS/[facility])    • Dementia (CMS/[facility])    • CKD (chronic kidney disease) stage 4, GFR 15-29 ml/min (CMS/[facility])        Plan:    Weaned to room air  Rocephin day 5/7  Azithromycin day 5/5  Follow cultures, urine culture pending  Lasix 40 mg PO daily  Nephrology consultation appreciated  Beta-blocker: coreg 3.125 mg PO BID  ACEI/ARB: contraindicated to renal disease  Imdur/Hydralazine  Rhythm/rate control: amiodarone, coreg  Glucose control: SSI 0-7 units  PT/OT/SLP  VTE PPx: Coumadin, pharmacy dosing  Discharge planning: anticipate home with home health 1-2 days    The patient was evaluated during the global COVID-19 pandemic, and the diagnosis was suspected/considered upon their initial presentation.  Evaluation, treatment, and testing were consistent with current guidelines for patients who present with complaints or symptoms that may be related to COVID-19.        This document has been electronically signed by THIERNO Griffin on April 20, 2021 12:29 CDT

## 2021-04-20 NOTE — PLAN OF CARE
Pt reports no pain at this time. Contact precautions maintained for ESBL E Coli in urine. Will continue to monitor.        Goal Outcome Evaluation:

## 2021-04-21 PROBLEM — B96.29 URINARY TRACT INFECTION DUE TO EXTENDED-SPECTRUM BETA LACTAMASE (ESBL) PRODUCING ESCHERICHIA COLI: Status: ACTIVE | Noted: 2021-01-01

## 2021-04-21 PROBLEM — Z16.12 URINARY TRACT INFECTION DUE TO EXTENDED-SPECTRUM BETA LACTAMASE (ESBL) PRODUCING ESCHERICHIA COLI: Status: ACTIVE | Noted: 2021-01-01

## 2021-04-21 NOTE — PROGRESS NOTES
"[facility]  [address] [place] [address]. [address]  T - [phone]  F - [phone]     Progress Note          PATIENT  DEMOGRAPHICS   PATIENT NAME: Ondina Alanis Sr.                      PHYSICIAN: Marley Akhtar MD  : 1940  MRN: [id number]   LOS: 5 days    Patient Care Team:  Mark Sheldon APRN as PCP - General (Nurse Practitioner)  Subjective   SUBJECTIVE   Denies chest pain, remained confused        Objective   OBJECTIVE   Vital Signs  Temp:  [96.6 °F (35.9 °C)-97.9 °F (36.6 °C)] 97.9 °F (36.6 °C)  Heart Rate:  [] 63  Resp:  [18] 18  BP: (126-161)/(62-89) 130/62    Flowsheet Rows      First Filed Value   Admission Height  193 cm (76\") Documented at 2021 1448   Admission Weight  106 kg (233 lb 12.8 oz) Documented at 2021 1448           I/O last 3 completed shifts:  In: 1200 [P.O.:1200]  Out:  [Urine:1975]    PHYSICAL EXAM    Physical Exam  Vitals and nursing note reviewed.   Constitutional:       General: He is not in acute distress.     Appearance: He is not ill-appearing.   Cardiovascular:      Rate and Rhythm: Normal rate and regular rhythm.      Heart sounds: Normal heart sounds. No murmur heard.   No friction rub. No gallop.    Pulmonary:      Effort: Pulmonary effort is normal. No respiratory distress.      Breath sounds: Normal breath sounds. No stridor. No wheezing, rhonchi or rales.   Abdominal:      General: Bowel sounds are normal. There is no distension.      Palpations: Abdomen is soft.   Musculoskeletal:         General: Swelling present.   Neurological:      Mental Status: He is alert.         RESULTS   Results Review:    Results from last 7 days   Lab Units 21  0608 21  0641 21  0559 21  0530   SODIUM mmol/L 143 138 146* 145   POTASSIUM mmol/L 3.8 3.9 4.4 4.4   CHLORIDE mmol/L 110* 109* 114* 114*   CO2 mmol/L 26.0 22.0 24.0 25.0   BUN mg/dL 52* 55* 51* 45*   CREATININE mg/dL 2.69* 2.72* 2.48* 2.46* "   CALCIUM mg/dL 8.1* 8.4* 8.1* 8.2*   BILIRUBIN mg/dL 0.5 0.5  --  0.4   ALK PHOS U/L 69 76  --  68   ALT (SGPT) U/L 7 7  --  5   AST (SGOT) U/L 13 14  --  13   GLUCOSE mg/dL 88 126* 61* 78       Estimated Creatinine Clearance: 27.9 mL/min (A) (by C-G formula based on SCr of 2.69 mg/dL (H)).    Results from last 7 days   Lab Units 04/18/21  0559   MAGNESIUM mg/dL 1.8   PHOSPHORUS mg/dL 3.1             Results from last 7 days   Lab Units 04/21/21  0608 04/20/21  0641 04/18/21  0922 04/17/21  0530 04/16/21  1505   WBC 10*3/mm3 3.41 3.64 3.26* 2.66* 2.91*   HEMOGLOBIN g/dL 9.4* 10.2* 10.7* 10.0* 10.9*   PLATELETS 10*3/mm3 84* 103* 92* 99* 115*       Results from last 7 days   Lab Units 04/21/21  0608 04/20/21  0939 04/19/21  0637 04/18/21  0922 04/17/21  0530   INR  2.79* 2.43* 1.64* 1.59* 3.01*         Imaging Results (Last 24 Hours)     ** No results found for the last 24 hours. **           MEDICATIONS    amiodarone, 200 mg, Oral, Q24H  bacitracin, , Topical, Daily  Bag Balm, 1 application, Topical, BID  brinzolamide, 1 drop, Both Eyes, TID  budesonide-formoterol, 2 puff, Inhalation, BID - RT  carvedilol, 3.125 mg, Oral, BID With Meals  furosemide, 40 mg, Oral, Daily  hydrALAZINE, 50 mg, Oral, Q8H  insulin aspart, 0-7 Units, Subcutaneous, TID AC  isosorbide mononitrate, 30 mg, Oral, Q24H  latanoprost, 1 drop, Both Eyes, Nightly  meropenem, 500 mg, Intravenous, Q8H  [START ON 4/22/2021] pantoprazole, 40 mg, Oral, Q AM  sodium chloride, 10 mL, Intravenous, Q12H  warfarin, 1 mg, Oral, Daily      Pharmacy to Dose meropenem (MERREM),   Pharmacy to dose warfarin,         Assessment/Plan   ASSESSMENT / PLAN      Sepsis (CMS/[facility])    CKD (chronic kidney disease) stage 4, GFR 15-29 ml/min (CMS/[facility])    Bilateral pneumonia    Acute on chronic systolic CHF (congestive heart failure) (CMS/[facility])    Dementia (CMS/[facility])    Bilateral pleural effusion    Coumadin toxicity    Diabetes mellitus (CMS/[facility])    UTI (urinary tract  infection)    Atrial fibrillation, chronic (CMS/HCC)    1. CKD 3B/4: Baseline creatinine 2.3-2.6 mg/dl.   - UA protein negative, blood 3+, has UTI. Last MIKE done 8/23/20- right kidney 10.3 in length renal cysts. Left kidney is small 6.9 in length- no hydronephrosis, calculi or masses.   - Creatinine is stable and close to his baseline. Keep lasix daily . Leg edema is stable. No orthopnea      2. Hypertension:  - Blood pressure is acceptable.     3. Systolic heart failure:  - Severe LV systolic dysfunction last EF 20%. On Coreg 6.25mg BID.     4. A fib:  - On amiodarone and coumadin.  INR 13 on arrival to ER. Now 1.59     5. Anemia:  - Hemoglobin is acceptable      6. Dementia:  - On Seroquel     7. ESBL E.coli UTI/Pneumonia:  -now on merrem    Will review lab in am and if all stable will see in our office in 2-3 weeks            This document has been electronically signed by Marley Akhtar MD on April 21, 2021 10:23 CDT

## 2021-04-21 NOTE — PLAN OF CARE
Problem: Adult Inpatient Plan of Care  Goal: Plan of Care Review  Flowsheets  Taken 4/21/2021 1337  Progress: no change  Plan of Care Reviewed With: patient  Taken 4/21/2021 0595  Progress: no change  Plan of Care Reviewed With: patient  Outcome Summary: Pt is dependent for grooming task. Pt is dependent for loading utensils and is able to take food on fork and get to his mouth ~40% with Mod  A this am. Pt requires verbal cues during activity. Continue OT POC   Goal Outcome Evaluation:  Plan of Care Reviewed With: patient  Progress: no change  Outcome Summary: Pt is dependent for grooming task. Pt is dependent for loading utensils and is able to take food on fork and get to his mouth ~40% with Mod  A this am. Pt requires verbal cues during activity. Continue OT POC

## 2021-04-21 NOTE — PROGRESS NOTES
TWO PATIENT IDENTIFIERS WERE USED. THE PATIENT WAS DRAPED AND PREPPED IN USUAL STERILE TECHNIQUE. ULTRASOUND WAS USED TO LOCALIZE THERIGHT CEPHALIC VEIN. AT THAT POINT, THE SKIN WAS ANESTHETIZED WITH 2% LIDOCAINE. A 21 GAUGE NEEDLE WAS INSERTED INTO THERIGHT CEPHALIC VEIN AT THAT POINT AN 0.018 WIRE WAS INSERTED THROUG THE NEEDLE AND THE NEEDLE WAS REMOVED. A 4FR. CATHETER WAS PLACED OVER THE WIRE INTO THE VEIN. THE WIRE WAS REMOVED. CATHETER WAS FLUSHED WITH NORMAL SALINE AND SECURED WITH A TEGADERM. THIS WAS DONE Ultrasound room. PATIENT TOLERATED PROCEDURE WELL.    IMPRESSION: SUCCESSFUL PLACEMENT OF MIDLINE      Michelle Osullivan RN  4/21/2021  13:53 CDT

## 2021-04-21 NOTE — PROGRESS NOTES
Ondina Colby Alanis Sr. is a 81 y.o. male who qualifies for IV to PO therapy conversion.    Pantoprozole has been changed from IV to PO per System Policy.       Fany Byers, PharmD  10:04 CDT  04/21/21

## 2021-04-21 NOTE — THERAPY TREATMENT NOTE
Acute Care - Physical Therapy Treatment Note  [facility]     Patient Name: Ondina Alanis Sr.  : 1940  MRN: [id number]  Today's Date: 2021           PT Assessment (last 12 hours)      PT Evaluation and Treatment     [facility] Name 21 1454          Physical Therapy Time and Intention    Document Type  therapy note (daily note)  -     Mode of Treatment  physical therapy;individual therapy  -     Patient Effort  good  -[facility] Name 21 1454          General Information    Patient Profile Reviewed  yes  -     Existing Precautions/Restrictions  fall  -[facility] Name 21 1454          Cognition    Affect/Mental Status (Cognitive)  confused;agitated  -     Orientation Status (Cognition)  oriented to;person;disoriented to;place;situation;time  -     Cognitive Function (Cognitive)  WFL  -[facility] Name 21 145          Pain Scale: Numbers Pre/Post-Treatment    Pretreatment Pain Rating  0/10 - no pain  -     Posttreatment Pain Rating  0/10 - no pain  -[facility] Name 21 1454          Pain Scale: FACES Pre/Post-Treatment    Pain: FACES Scale, Pretreatment  --  -[facility] Name 21 1454          Range of Motion Comprehensive    General Range of Motion  --  -YARITZA     Row Name 21 1454          Strength Comprehensive (MMT)    General Manual Muscle Testing (MMT) Assessment  --  -YARITZA     Row Name 21 145          Bed Mobility    Bed Mobility  --  -     Scooting/Bridging [place] (Bed Mobility)  --  -YARITZA     Row Name 21 1454          Transfers    Bed-Chair [place] (Transfers)  not tested  -     Sit-Stand [place] (Transfers)  not tested  -YARITZA     Row Name 21 1454          Gait/Stairs (Locomotion)    [place] Level (Gait)  --  -YARITZA     Row Name 21 145          Safety Issues, Functional Mobility    Impairments Affecting Function (Mobility)  strength;endurance/activity tolerance;range of motion  "(ROM);pain;cognition;visual/perceptual  -YARITZA     Row Name 04/21/21 1454          Motor Skills    Motor Skills  -- limited LE ROM  -YARITZA     Therapeutic Exercise  -- DF/PF, hip Ab/Ad, HS, SAQ, SLR- 10-15x1 ashia   -YARITZA     Additional Documentation  -- calf S, HS S- 3x15\" ashia. pt getting aggitated at times.   -YARITZA     Row Name             Wound 04/16/21 1805 penis Pressure Injury    Wound - Properties Group Placement Date: 04/16/21  -HR Placement Time: 1805  -HR Location: penis  -HR Primary Wound Type: Pressure inj  -HR    Retired Wound - Properties Group Date first assessed: 04/16/21  -HR Time first assessed: 1805  -HR Location: penis  -HR Primary Wound Type: Pressure inj  -HR    Row Name             Wound 04/16/21 1807 perineum Pressure Injury    Wound - Properties Group Placement Date: 04/16/21  -HR Placement Time: 1807  -HR Location: perineum  -HR Primary Wound Type: Pressure inj  -HR    Retired Wound - Properties Group Date first assessed: 04/16/21  -HR Time first assessed: 1807  -HR Location: perineum  -HR Primary Wound Type: Pressure inj  -HR    Row Name             Wound 04/16/21 1807 Right heel Pressure Injury    Wound - Properties Group Placement Date: 04/16/21  -HR Placement Time: 1807  -HR Side: Right  -HR Location: heel  -HR Primary Wound Type: Pressure inj  -HR    Retired Wound - Properties Group Date first assessed: 04/16/21  -HR Time first assessed: 1807  -HR Side: Right  -HR Location: heel  -HR Primary Wound Type: Pressure inj  -HR    Row Name             Wound 08/21/20 1200 Bilateral posterior gluteal Other (comment)    Wound - Properties Group Placement Date: 08/21/20  -AK Placement Time: 1200  -AK Present on Hospital Admission: Y  -AK Side: Bilateral  -AK Location: gluteal  -AK Primary Wound Type: Other  -AK, open areas from patient scratching     Retired Wound - Properties Group Date first assessed: 08/21/20  -AK Time first assessed: 1200  -AK Present on Hospital Admission: Y  -AK Side: Bilateral  -AK " Retired Orientation: posterior  -AK Location: gluteal  -AK Primary Wound Type: Other  -AK, open areas from patient scratching     Row Name 04/21/21 1454          Vital Signs    Pre Systolic BP Rehab  129 beginning vitals taken by nsg and pt became aggiated.   -YARITZA     Pre Treatment Diastolic BP  78  -YARITZA     Post Systolic BP Rehab  -- no ending vitals obtained due to aggitation.   -YARITZA     Pretreatment Heart Rate (beats/min)  66  -YARITZA     Pre SpO2 (%)  93  -YARITZA     O2 Delivery Pre Treatment  supplemental O2  -YARITZA     Pre Patient Position  Supine  -YARITZA     Post Patient Position  Supine  -YARITZA     Row Name 04/21/21 1451          Bed Mobility Goal 1 (PT)    Activity/Assistive Device (Bed Mobility Goal 1, PT)  rolling to left;rolling to right  -YARITZA     [place] Level/Cues Needed (Bed Mobility Goal 1, PT)  minimum assist (75% or more patient effort)  -YARITZA     Time Frame (Bed Mobility Goal 1, PT)  by discharge  -YARITZA     Strategies/Barriers (Bed Mobility Goal 1, PT)  Confused, bed bound, poor vision.  -YARITZA     Progress/Outcomes (Bed Mobility Goal 1, PT)  goal not met  -     Row Name 04/21/21 1458          ROM Goal 1 (PT)    ROM Goal 1 (PT)  Patient will tolerate BLE AAROM, 15-20 reps each plane.  -YARITZA     Time Frame (ROM Goal 1, PT)  by discharge  -YARITZA     Progress/Outcome (ROM Goal 1, PT)  goal not met  -     Row Name 04/21/21 1453          Positioning and Restraints    Pre-Treatment Position  in bed  -YARITZA     Post Treatment Position  bed  -YARITZA     In Bed  fowlers;call light within reach;encouraged to call for assist;exit alarm on all needs met  -     Row Name 04/21/21 1459          Therapy Assessment/Plan (PT)    Rehab Potential (PT)  fair, will monitor progress closely  -YARITZA     Criteria for Skilled Interventions Met (PT)  yes;skilled treatment is necessary  -       User Key  (r) = Recorded By, (t) = Taken By, (c) = Cosigned By    Initials Name Provider Type    Dianne Barrios, RN Registered Nurse    Jihan Barrow,  RN Registered Nurse    Rajendra Lafleur PTA Physical Therapy Assistant        Physical Therapy Education                 Title: PT OT SLP Therapies (In Progress)     Topic: Physical Therapy (In Progress)     Point: Mobility training (In Progress)     Learning Progress Summary           Patient Acceptance, E, NR by YARITZA at 4/21/2021 1550    Acceptance, E, NR by ANGELA at 4/19/2021 0842    Comment: Educated on PT POC and goals.                   Point: Home exercise program (Not Started)     Learner Progress:  Not documented in this visit.          Point: Body mechanics (Not Started)     Learner Progress:  Not documented in this visit.          Point: Precautions (Not Started)     Learner Progress:  Not documented in this visit.                      User Key     Initials Effective Dates Name Provider Type Discipline    YARITZA 03/07/18 -  Rajendra Weston PTA Physical Therapy Assistant PT    ANGELA 04/03/18 -  Surya Rodriguez, PT Physical Therapist PT              PT Recommendation and Plan  Anticipated Discharge Disposition (PT): home with 24/7 care  Therapy Frequency (PT): 5 times/wk  Plan of Care Reviewed With: patient  Progress: no change  Outcome Summary: pt is confused w/ intermittent agitation. PROM-AAROM completed w/ ashia LEs. stretcing performed. ROM is limited. no new goals met at this time. pt would continue to benefit from PT services.  Outcome Measures     Row Name 04/21/21 6620             How much help from another person do you currently need...    Turning from your back to your side while in flat bed without using bedrails?  1  -YARITZA      Moving from lying on back to sitting on the side of a flat bed without bedrails?  1  -YARITZA      Moving to and from a bed to a chair (including a wheelchair)?  1  -YARITZA      Standing up from a chair using your arms (e.g., wheelchair, bedside chair)?  1  -YARITZA      Climbing 3-5 steps with a railing?  1  -YARITZA      To walk in hospital room?  1  -YARITZA      AM-PAC 6 Clicks Score (PT)  6  -YARITZA          Functional Assessment    Outcome Measure Options  AM-PAC 6 Clicks Basic Mobility (PT)  -YARITZA        User Key  (r) = Recorded By, (t) = Taken By, (c) = Cosigned By    Initials Name Provider Type    Rajendra Lafleur PTA Physical Therapy Assistant           Time Calculation:   PT Charges     Row Name 04/21/21 1553             Time Calculation    Start Time  1454  -YARITZA      Stop Time  1522  -YARITZA      Time Calculation (min)  28 min  -YARITZA         Time Calculation- PT    Total Timed Code Minutes- PT  28 minute(s)  -YARITZA         Timed Charges    54540 - PT Therapeutic Exercise Minutes  28  -YARITZA         Total Minutes    Timed Charges Total Minutes  28  -YARITZA       Total Minutes  28  -YARITZA        User Key  (r) = Recorded By, (t) = Taken By, (c) = Cosigned By    Initials Name Provider Type    Rajendra Lafleur PTA Physical Therapy Assistant        Therapy Charges for Today     Code Description Service Date Service Provider Modifiers Qty    34640701908 HC PT THER PROC EA 15 MIN 4/21/2021 Rajendra Weston PTA GP 2          PT G-Codes  Outcome Measure Options: AM-PAC 6 Clicks Basic Mobility (PT)  AM-PAC 6 Clicks Score (PT): 6  AM-PAC 6 Clicks Score (OT): 7    Rajendra Weston PTA  4/21/2021

## 2021-04-21 NOTE — PLAN OF CARE
Goal Outcome Evaluation:  Plan of Care Reviewed With: patient  Progress: declining  Patient is very confused and nursing was unable to orient pt. Patient is very uncomfortable morphine helps, haldol has been adequate for patients anxiety. Patient needs medication for anxiety. Presently has only 2 doses of haldol ordered and one of those has been given.

## 2021-04-21 NOTE — THERAPY TREATMENT NOTE
Patient Name: Ondina Alanis Sr.  : 1940    MRN: [id number]                              Today's Date: 2021       Admit Date: 2021    Visit Dx:     ICD-10-CM ICD-9-CM   1. Sepsis, due to unspecified organism, unspecified whether acute organ dysfunction present (CMS/[facility])  A41.9 038.9     995.91   2. Acute on chronic systolic congestive heart failure (CMS/[facility])  I50.23 428.23     428.0   3. Atrial flutter, unspecified type (CMS/[facility])  I48.92 427.32   4. Poisoning by warfarin sodium, accidental or unintentional, initial encounter  T45.511A 964.2     E858.2   5. Oropharyngeal dysphagia  R13.12 787.22   6. Impaired physical mobility  Z74.09 781.99   7. Impaired mobility and ADLs  Z74.09 V49.89    Z78.9    8. Urinary tract infection due to extended-spectrum beta lactamase (ESBL) producing Escherichia coli  N39.0 599.0    B96.29 041.49    Z16.12 V09.1     Patient Active Problem List   Diagnosis   • CKD (chronic kidney disease) stage 4, GFR 15-29 ml/min (CMS/[facility])   • Diabetic peripheral neuropathy associated with type 2 diabetes mellitus (CMS/[facility])   • Diabetes mellitus type II, uncontrolled (CMS/[facility])   • GERD (gastroesophageal reflux disease)   • Primary osteoarthritis of both knees   • Pulmonary embolism (CMS/[facility])   • BPH (benign prostatic hyperplasia)   • Benign essential hypertension   • Asthma   • Pleurisy   • Acute respiratory failure with hypoxia (CMS/[facility])   • Stage 1 acute kidney injury (CMS/[facility])   • Left ventricular diastolic dysfunction   • Exacerbation of asthma   • COPD exacerbation (CMS/[facility])   • Atrial flutter (CMS/[facility])   • Acute systolic CHF (congestive heart failure) (CMS/[facility])   • Bilateral pneumonia   • Urinary obstruction   • Supratherapeutic INR   • Sepsis due to urinary tract infection (CMS/[facility])   • Acute on chronic systolic CHF (congestive heart failure) (CMS/[facility])   • Bradycardia   • Dementia (CMS/[facility])   • Acute blood loss anemia   • MRSA bacteremia   • ANT (acute kidney injury)  (CMS/HCC)   • Sepsis (CMS/HCC)   • Bilateral pleural effusion   • Coumadin toxicity   • Diabetes mellitus (CMS/HCC)   • Urinary tract infection due to extended-spectrum beta lactamase (ESBL) producing Escherichia coli   • Atrial fibrillation, chronic (CMS/HCC)     Past Medical History:   Diagnosis Date   • Asthma    • Benign prostatic hyperplasia    • CHF (congestive heart failure) (CMS/HCC)    • Coronary artery disease    • Diabetes mellitus (CMS/HCC)    • GERD (gastroesophageal reflux disease)    • Hyperlipidemia    • Hypertension    • Prostate disorder    • Renal insufficiency    • Urinary tract infection      Past Surgical History:   Procedure Laterality Date   • BACK SURGERY     • CYSTOSCOPY N/A 2/5/2021    Procedure: CYSTOSCOPY WITH CATHETER PLACEMENT;  Surgeon: Orlin Riggs MD;  Location: [facility];  Service: Urology;  Laterality: N/A;   • KNEE SURGERY Left    • PROSTATE SURGERY       General Information     Row Name 04/21/21 0755          OT Time and Intention    Document Type  therapy note (daily note)  -BB     Mode of Treatment  individual therapy;occupational therapy  -BB     Row Name 04/21/21 0755          General Information    Patient Profile Reviewed  yes  -BB     Existing Precautions/Restrictions  fall;oxygen therapy device and L/min  -BB     Row Name 04/21/21 0755          Cognition    Orientation Status (Cognition)  oriented to;person  -BB     Row Name 04/21/21 0755          Safety Issues, Functional Mobility    Impairments Affecting Function (Mobility)  strength;endurance/activity tolerance;range of motion (ROM);pain;cognition;visual/perceptual;balance;coordination;motor control;motor planning;shortness of breath;postural/trunk control  -BB       User Key  (r) = Recorded By, (t) = Taken By, (c) = Cosigned By    Initials Name Provider Type    BB Lamar Trinidad COTA/L Occupational Therapy Assistant          Mobility/ADL's     Row Name 04/21/21 0755          Bed Mobility    Bed Mobility   scooting/bridging  -BB     Scooting/Bridging [place] (Bed Mobility)  dependent (less than 25% patient effort);2 person assist  -BB     Assistive Device (Bed Mobility)  draw sheet  -     Row Name 04/21/21 0755          Transfers    Bed-Chair [place] (Transfers)  not tested  -BB     Sit-Stand [place] (Transfers)  not tested  -     Row Name 04/21/21 0755          Activities of Daily Living    BADL Assessment/Intervention  grooming;feeding  -     Row Name 04/21/21 0755          Grooming Assessment/Training    [place] Level (Grooming)  wash face, hands;dependent (less than 25% patient effort)  -BB     Position (Grooming)  sitting up in bed  -BB     Row Name 04/21/21 0755          Self-Feeding Assessment/Training    [place] Level (Feeding)  liquids to mouth;scoop food and bring to mouth;dependent (less than 25% patient effort) Food loaded on fork(dependent)Pt able to get food to his mouth ~40% with Mod A during breakfast with verbal cues  -BB       User Key  (r) = Recorded By, (t) = Taken By, (c) = Cosigned By    Initials Name Provider Type    BB Lamar Trinidad, СВЕТЛАНА/L Occupational Therapy Assistant        Obj/Interventions    No documentation.       Goals/Plan     Row Name 04/21/21 0755          Bathing Goal 1 (OT)    Activity/Device (Bathing Goal 1, OT)  upper body bathing  -BB     [place] Level/Cues Needed (Bathing Goal 1, OT)  moderate assist (50-74% patient effort)  -BB     Time Frame (Bathing Goal 1, OT)  long term goal (LTG);by discharge  -BB     Progress/Outcomes (Bathing Goal 1, OT)  goal not met  -     Row Name 04/21/21 0755          Grooming Goal 1 (OT)    Activity/Device (Grooming Goal 1, OT)  grooming skills, all  -BB     [place] (Grooming Goal 1, OT)  set-up required;supervision required;verbal cues required  -BB     Time Frame (Grooming Goal 1, OT)  long term goal (LTG);by discharge  -BB     Progress/Outcome (Grooming Goal 1, OT)  goal not met  -[facility]  Name 04/21/21 0755          Self-Feeding Goal 1 (OT)    Activity/Device (Self-Feeding Goal 1, OT)  self-feeding skills, all  -BB     [place] Level/Cues Needed (Self-Feeding Goal 1, OT)  moderate assist (50-74% patient effort)  -BB     Time Frame (Self-Feeding Goal 1, OT)  long term goal (LTG);by discharge  -BB     Progress/Outcomes (Self-Feeding Goal 1, OT)  goal not met  -BB       User Key  (r) = Recorded By, (t) = Taken By, (c) = Cosigned By    Initials Name Provider Type    BB Lamar Trinidad COTA/L Occupational Therapy Assistant        Clinical Impression     Row Name 04/21/21 0755          Pain Scale: Numbers Pre/Post-Treatment    Pretreatment Pain Rating  0/10 - no pain  -BB     Posttreatment Pain Rating  0/10 - no pain  -BB     Pain Intervention(s)  Medication (See MAR)  -BB     Row Name 04/21/21 0755          Pain Scale: FACES Pre/Post-Treatment    Pain: FACES Scale, Pretreatment  6-->hurts even more  -BB     Posttreatment Pain Rating  6-->hurts even more  -BB     Pain Location  back;knee  -BB     Row Name 04/21/21 0755          Plan of Care Review    Plan of Care Reviewed With  patient  -BB     Progress  no change  -BB     Outcome Summary  Pt is dependent for grooming task. Pt is dependent for loading utensils and is able to take food on fork and get to his mouth ~40% with Mod  A this am. Pt requires verbal cues during activity. Continue OT POC  -BB     Row Name 04/21/21 0755          Therapy Assessment/Plan (OT)    Rehab Potential (OT)  fair, will monitor progress closely  -BB     Criteria for Skilled Therapeutic Interventions Met (OT)  yes;meets criteria;skilled treatment is necessary  -BB     Therapy Frequency (OT)  other (see comments) 5-7 days a week  -BB     Row Name 04/21/21 0755          Therapy Plan Review/Discharge Plan (OT)    Anticipated Discharge Disposition (OT)  home with 24/7 care;home with home health;skilled nursing facility  -BB     Row Name 04/21/21 0755          Vital Signs     Pretreatment Heart Rate (beats/min)  68  -BB     Pre SpO2 (%)  97  -BB     O2 Delivery Pre Treatment  supplemental O2  -BB     Pre Patient Position  -- long sitting  -BB     Row Name 04/21/21 0755          Positioning and Restraints    Pre-Treatment Position  in bed  -BB     Post Treatment Position  bed  -BB     In Bed  fowlers;call light within reach;encouraged to call for assist;exit alarm on  -BB       User Key  (r) = Recorded By, (t) = Taken By, (c) = Cosigned By    Initials Name Provider Type    Lamar Guerrero COTA/L Occupational Therapy Assistant        Outcome Measures     Row Name 04/21/21 0755          How much help from another is currently needed...    Putting on and taking off regular lower body clothing?  1  -BB     Bathing (including washing, rinsing, and drying)  1  -BB     Toileting (which includes using toilet bed pan or urinal)  1  -BB     Putting on and taking off regular upper body clothing  1  -BB     Taking care of personal grooming (such as brushing teeth)  1  -BB     Eating meals  2  -BB     AM-PAC 6 Clicks Score (OT)  7  -BB       User Key  (r) = Recorded By, (t) = Taken By, (c) = Cosigned By    Initials Name Provider Type    Lamar Guerrero COTA/L Occupational Therapy Assistant        Occupational Therapy Education                 Title: PT OT SLP Therapies (In Progress)     Topic: Occupational Therapy (In Progress)     Point: ADL training (In Progress)     Description:   Instruct learner(s) on proper safety adaptation and remediation techniques during self care or transfers.   Instruct in proper use of assistive devices.              Learning Progress Summary           Patient Acceptance, E, NR by BB at 4/21/2021 1337    Acceptance, E, NR by BB at 4/20/2021 1408    Acceptance, E, NR by  at 4/19/2021 0957    Comment: Educated about OT. Educated on safety throughout.                   Point: Home exercise program (Not Started)     Description:   Instruct learner(s) on  appropriate technique for monitoring, assisting and/or progressing therapeutic exercises/activities.              Learner Progress:  Not documented in this visit.          Point: Precautions (Not Started)     Description:   Instruct learner(s) on prescribed precautions during self-care and functional transfers.              Learner Progress:  Not documented in this visit.          Point: Body mechanics (Not Started)     Description:   Instruct learner(s) on proper positioning and spine alignment during self-care, functional mobility activities and/or exercises.              Learner Progress:  Not documented in this visit.                      User Key     Initials Effective Dates Name Provider Type [facility] 06/08/18 -  Genny Greenwood OTR/L Occupational Therapist OT     03/07/18 -  Lamar Trinidad COTA/L Occupational Therapy Assistant OT              OT Recommendation and Plan  Therapy Frequency (OT): other (see comments) (5-7 days a week)  Plan of Care Review  Plan of Care Reviewed With: patient  Progress: no change  Outcome Summary: Pt is dependent for grooming task. Pt is dependent for loading utensils and is able to take food on fork and get to his mouth ~40% with Mod  A this am. Pt requires verbal cues during activity. Continue OT POC     Time Calculation:   Time Calculation- OT     Row Name 04/21/21 1339             Time Calculation- OT    OT Start Time  0755  -BB      OT Stop Time  0854  -BB      OT Time Calculation (min)  59 min  -BB      Total Timed Code Minutes- OT  59 minute(s)  -BB      OT Received On  04/21/21  -BB         Timed Charges    58058 - OT Self Care/Mgmt Minutes  59  -BB         Total Minutes    Timed Charges Total Minutes  59  -BB       Total Minutes  59  -BB        User Key  (r) = Recorded By, (t) = Taken By, (c) = Cosigned By    Initials Name Provider Type     Lamar Trinidad COTA/L Occupational Therapy Assistant        Therapy Charges for Today     Code Description  Service Date Service Provider Modifiers Qty    38390852833 HC OT SELF CARE/MGMT/TRAIN EA 15 MIN 4/20/2021 Lamar Trinidad COTA/L GO 3    80986641769 HC OT SELF CARE/MGMT/TRAIN EA 15 MIN 4/21/2021 Lamar Trinidad COTA/L GO 4               СВЕТЛАНА Salvador/DIONNE  4/21/2021

## 2021-04-21 NOTE — DISCHARGE PLACEMENT REQUEST
"Nadir Alanis Sr. (81 y.o. Male)     Date of Birth Social Security Number Address Home Phone MRN    1940  [address] [address] [phone] [id number]    Caodaism Marital Status          Yazdanism        Admission Date Admission Type Admitting Provider Attending Provider Department, Room/Bed    4/16/21 Emergency Manohar Mary MD Craig, David A, MD [address], [facility]/1    Discharge Date Discharge Disposition Discharge Destination                       Attending Provider: Manohar Mary MD    Allergies: Contrast Dye    Isolation: Contact   Infection: ESBL E coli (04/20/21)   Code Status: No CPR    Ht: 193 cm (75.98\")   Wt: 90.1 kg (198 lb 9.6 oz)    Admission Cmt: None   Principal Problem: Sepsis (CMS/formerly [facility]) [A41.9]                 Active Insurance as of 4/16/2021     Primary Coverage     Payor Plan Insurance Group Employer/Plan Group    MEDICARE MEDICARE A & B      Payor Plan Address Payor Plan Phone Number Payor Plan Fax Number Effective Dates    [address] [phone]  11/1/1976 - None Entered    [facility] [id number]       Subscriber Name Subscriber Birth Date Member ID       NADIR ALANIS SR. 1940 [id number]           Secondary Coverage     Payor Plan Insurance Group Employer/Plan Group    [facility] [id number]     Payor Plan Address Payor Plan Phone Number Payor Plan Fax Number Effective Dates    [address] [address]   1/23/2019 - None Entered    [facility] [address]       Subscriber Name Subscriber Birth Date Member ID       NADIR ALANIS SR. 1940 [id number]                 Emergency Contacts      (Rel.) Home Phone Work Phone Mobile Phone    ZekeLamar (Spouse) [phone] -- [phone]    ZekeCammy (Daughter) [phone] -- [phone]            Insurance Information                MEDICARE/MEDICARE A & B Phone: [phone]    Subscriber: Nadir Alanis Sr. Subscriber#: [id number]    " Group#:  Precert#:         UMWA/UMWA Phone:     Subscriber: Ondina Alanis Sr. Subscriber#: [id number]    Group#: [id number] Precert#:

## 2021-04-21 NOTE — PLAN OF CARE
Pt reported back pain this AM; medicated with Tylenol. Contact precautions maintained. Moore replaced today. Will continue to monitor.    Goal Outcome Evaluation:

## 2021-04-21 NOTE — PROGRESS NOTES
[facility] Medicine Services  INPATIENT PROGRESS NOTE    Length of Stay: 5  Date of Admission: 4/16/2021  Primary Care Physician: Mark Sheldon APRN    Subjective   Chief Complaint: Shortness of breath  HPI:  81 year old male with a history of HTN, HLD, DMII, CKDIII, BPH, and CAD who presented with shortness of breath and hypothermia. Chest x-ray x-ray showed cardiomegaly with interval development of bilateral infiltrative changes suggesting pulmonary edema and/or bilateral pneumonitis.  He was admitted to CCU and initiated on IV antibiotics and IV diuretics.  Nephrology was consulted for assistance with fluid management. Chest x-ray notes decreased bilateral pleural effusion with moderate residual left effusion.  Urine culture grew ESBL E. Coli.  He is on room air as of this morning. No new complaints or overnight events.     Review of Systems   Unable to perform ROS: Dementia        All pertinent negatives and positives are as above. All other systems have been reviewed and are negative unless otherwise stated.     Objective    Temp:  [96.6 °F (35.9 °C)-97.9 °F (36.6 °C)] 97.9 °F (36.6 °C)  Heart Rate:  [] 68  Resp:  [18] 18  BP: (130-161)/(62-89) 158/80    Physical Exam  Vitals reviewed.   Constitutional:       General: He is not in acute distress.     Appearance: Normal appearance.   HENT:      Head: Normocephalic and atraumatic.   Cardiovascular:      Rate and Rhythm: Normal rate and regular rhythm.   Pulmonary:      Breath sounds: Examination of the right-lower field reveals decreased breath sounds. Examination of the left-lower field reveals decreased breath sounds. Decreased breath sounds present. No wheezing or rales.   Abdominal:      General: There is no distension.      Palpations: Abdomen is soft.      Tenderness: There is no abdominal tenderness.   Musculoskeletal:         General: No swelling or deformity.   Skin:     General: Skin is warm and dry.      Neurological:      General: No focal deficit present.      Mental Status: He is alert. Mental status is at baseline.             Results Review:  I have reviewed the labs, radiology results, and diagnostic studies.    Laboratory Data:   Results from last 7 days   Lab Units 04/21/21  0608 04/20/21  0641 04/18/21  0559 04/17/21  0530   SODIUM mmol/L 143 138 146* 145   POTASSIUM mmol/L 3.8 3.9 4.4 4.4   CHLORIDE mmol/L 110* 109* 114* 114*   CO2 mmol/L 26.0 22.0 24.0 25.0   BUN mg/dL 52* 55* 51* 45*   CREATININE mg/dL 2.69* 2.72* 2.48* 2.46*   GLUCOSE mg/dL 88 126* 61* 78   CALCIUM mg/dL 8.1* 8.4* 8.1* 8.2*   BILIRUBIN mg/dL 0.5 0.5  --  0.4   ALK PHOS U/L 69 76  --  68   ALT (SGPT) U/L 7 7  --  5   AST (SGOT) U/L 13 14  --  13   ANION GAP mmol/L 7.0 7.0 8.0 6.0     Estimated Creatinine Clearance: 27.9 mL/min (A) (by C-G formula based on SCr of 2.69 mg/dL (H)).  Results from last 7 days   Lab Units 04/18/21  0559   MAGNESIUM mg/dL 1.8   PHOSPHORUS mg/dL 3.1         Results from last 7 days   Lab Units 04/21/21  0608 04/20/21  0641 04/18/21  0922 04/17/21  0530 04/16/21  1505   WBC 10*3/mm3 3.41 3.64 3.26* 2.66* 2.91*   HEMOGLOBIN g/dL 9.4* 10.2* 10.7* 10.0* 10.9*   HEMATOCRIT % 27.2* 29.6* 32.3* 29.0* 33.0*   PLATELETS 10*3/mm3 84* 103* 92* 99* 115*     Results from last 7 days   Lab Units 04/21/21  0608 04/20/21  0939 04/19/21  0637 04/18/21  0922 04/17/21  0530   INR  2.79* 2.43* 1.64* 1.59* 3.01*       Culture Data:   No results found for: BLOODCX  No results found for: URINECX  No results found for: RESPCX  No results found for: WOUNDCX  No results found for: STOOLCX  No components found for: BODYFLD    Radiology Data:   Imaging Results (Last 24 Hours)     Procedure Component Value Units Date/Time    US Guided Vascular Access [[id number]] Collected: 04/21/21 1335     Updated: 04/21/21 1409    Narrative:      PROCEDURE: Ultrasound Guidance Vascular Access    CLINICAL INDICATION: midline, A41.9 Sepsis, unspecified  organism  I50.23 Acute on chronic systolic (congestive) heart failure  I48.92 Unspecified atrial flutter T45.511A Poisoning by  anticoagulants, accidental (unintentional), initial encounter  R13.12 Dysphagia, oropharyngeal phase Z74.09 Other reduced  mobility Z74.09 Other reduced mobility Z78.9 Other specified  health status N39.0 Urinary tract infection, site not spec    FINDINGS:  Realtime ultrasound imaging was utilized to visualize needle  entry into the right cephalic vein during midline catheter  placement and a permanent image was stored for permanent  recording and reporting.       Impression:      Imaging obtained during vascular access device placement under  ultrasound guidance as described above    Electronically signed by:  Agustin Early MD  4/21/2021 2:08 PM CDT  Workstation: [id number]    IR Insert Midline Without Port Pump 5 Plus [[id number]] Resulted: 04/21/21 1355     Updated: 04/21/21 1355    Narrative:      This procedure was auto-finalized with no dictation required.          I have reviewed the patient's current medications.     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 ml/min (CMS/HCC)        Plan:    Weaned to room air  Merrem day 2/7  Azithromycin completed  Midline for home IV antibiotics  Lasix 40 mg PO daily  Nephrology consultation appreciated  Beta-blocker: coreg 3.125 mg PO BID  ACEI/ARB: contraindicated to renal disease  Imdur/Hydralazine  Rhythm/rate control: amiodarone, coreg  Glucose control: SSI 0-7 units  PT/OT/SLP  VTE PPx: Coumadin,  pharmacy dosing  Discharge planning: likely home with home health and IV antibiotics in the AM    The patient was evaluated during the global COVID-19 pandemic, and the diagnosis was suspected/considered upon their initial presentation.  Evaluation, treatment, and testing were consistent with current guidelines for patients who present with complaints or symptoms that may be related to COVID-19.        This document has been electronically signed by THIERNO Griffin on April 21, 2021 14:25 CDT

## 2021-04-21 NOTE — PLAN OF CARE
Problem: Adult Inpatient Plan of Care  Goal: Plan of Care Review  Outcome: Ongoing, Not Progressing  Flowsheets (Taken 4/21/2021 1550)  Progress: no change  Plan of Care Reviewed With: patient  Outcome Summary: pt is confused w/ intermittent agitation. PROM-AAROM completed w/ ashia LEs. stretching performed. ROM is limited. no new goals met at this time. pt would continue to benefit from PT services.

## 2021-04-21 NOTE — CONSULTS
Adult Nutrition  Assessment    Patient Name:  Ondina Alanis Sr.  YOB: 1940  MRN: [id number]  Admit Date:  4/16/2021    Assessment Date:  4/21/2021    Comments:  Pt seen 4/20/21. Pt is in Contact Isolation due to ESBL E coli.  RN indicates pt is confused.  Intake 75% - 2x, 50% - 1x, 25% - 2x.  Blood glucose low on occasion but is routinely elevated. Sliding scale novolog prescribed.  BUN, Creat are elevated consistent with dx CKD.  Pt with penis pressure injury, right heel pressure injury, perineum pressure injury.  Bacitracin, Bag Balm, PRN Magic Barrier prescribed.  Pt reported to be free of GI distress.  Will maintain pt on prescribed diet adding milk to meals.               Reason for Assessment     Row Name 04/21/21 1522          Reason for Assessment    Reason For Assessment  follow-up protocol             Labs/Tests/Procedures/Meds     Row Name 04/21/21 1522          Labs/Procedures/Meds    Lab Results Reviewed  reviewed, pertinent        Medications    Pertinent Medications Reviewed  reviewed, pertinent         Physical Findings     Row Name 04/21/21 1528          Physical Findings    Skin  pressure injury;other (see comments) perineum pressure injury, right heel pressure injury, penis pressure injury           Nutrition Prescription Ordered     Row Name 04/21/21 1528          Nutrition Prescription PO    Current PO Diet  Soft Texture     Texture  Chopped         Evaluation of Received Nutrient/Fluid Intake     Row Name 04/21/21 1529          PO Evaluation    Number of Meals  5     % PO Intake  75% - 2x, 50% - 1x, 25% - 2x               Electronically signed by:  Malini Alaniz RD  04/21/21 15:31 CDT

## 2021-04-21 NOTE — PROGRESS NOTES
"Anticoagulation by Pharmacy - Warfarin    Ondina Alanis Sr. is a 81 y.o.male  [Ht: 193 cm (75.98\"); Wt: 90.1 kg (198 lb 9.6 oz)] on Warfarin  for indication of Atrial fibrillation.    Goal INR: 2-3  Home dose is 3 mg daily    Today's INR:   Lab Results   Component Value Date    INR 2.79 (H) 04/21/2021          Lab Results   Component Value Date    INR 2.79 (H) 04/21/2021    INR 2.43 (H) 04/20/2021    INR 1.64 (H) 04/19/2021    PROTIME 31.3 (H) 04/21/2021    PROTIME 28.0 (H) 04/20/2021    PROTIME 20.3 (H) 04/19/2021     Lab Results   Component Value Date    HGB 9.4 (L) 04/21/2021    HGB 10.2 (L) 04/20/2021    HGB 10.7 (L) 04/18/2021     Lab Results   Component Value Date    HCT 27.2 (L) 04/21/2021    HCT 29.6 (L) 04/20/2021    HCT 32.3 (L) 04/18/2021     Lab Results   Component Value Date    PLT 84 (L) 04/21/2021     (L) 04/20/2021    PLT 92 (L) 04/18/2021       Recent Warfarin Administrations       The 5 most recent administrations since 04/14/2021 are shown below each listed medication.    Warfarin Sodium         Order Dose Date Given     warfarin (COUMADIN) tablet 1 mg 1 mg 04/20/2021     warfarin (COUMADIN) tablet 3 mg 3 mg 04/19/2021      3 mg 04/18/2021                    Assessment/Plan:  Reviewed above labs. INR is 2.79.  INR is at goal. Reviewed medication list. Concurrent medications include amiodarone. Azithromycin ended yesterday. Pt did receive dose of warfarin 1 mg last night.  Will continue current dose of  1 mg. Rx will continue to follow and adjust dose accordingly.      Fany Byers, PharmD  04/21/21 09:18 CDT     "

## 2021-04-22 NOTE — DISCHARGE SUMMARY
[facility]  DISCHARGE SUMMARY       Date of Admission: 4/16/2021  Date of Discharge:  4/22/2021  Primary Care Physician: Mark Sheldon APRN    Presenting Problem/History of Present Illness:  Acute on chronic systolic congestive heart failure (CMS/[facility]) [I50.23]  Poisoning by warfarin sodium, accidental or unintentional, initial encounter [T45.511A]  Atrial flutter, unspecified type (CMS/HCC) [I48.92]  Sepsis, due to unspecified organism, unspecified whether acute organ dysfunction present (CMS/HCC) [A41.9]       Final Discharge Diagnoses:  Active Hospital Problems    Diagnosis    • **Sepsis (CMS/HCC)    • Bilateral pleural effusion    • Coumadin toxicity    • Diabetes mellitus (CMS/[facility])    • Urinary tract infection due to extended-spectrum beta lactamase (ESBL) producing Escherichia coli    • Atrial fibrillation, chronic (CMS/[facility])    • Acute on chronic systolic CHF (congestive heart failure) (CMS/HCC)    • Dementia (CMS/HCC)    • Bilateral pneumonia    • CKD (chronic kidney disease) stage 4, 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-29 ml/min (CMS/HCC)        Consults:   Consults     Date and Time Order Name Status Description    4/16/2021  6:12 PM Inpatient Nephrology Consult Completed           Procedures Performed:                 Pertinent Test Results:   Lab Results (last 24 hours)     Procedure Component Value Units Date/Time    POC Glucose Once [[id number]]  (Normal) Collected: 04/22/21 1018    Specimen: Blood Updated: 04/22/21 1059     Glucose 123 mg/dL      Comment: RN NotifiedOperator: [id number] [facility] ID: [id number]       CBC & Differential [[id number]]  (Abnormal) Collected: 04/22/21 0642    Specimen: Blood Updated: 04/22/21 0846    Narrative:      The following orders were created for panel order CBC & Differential.  Procedure                               Abnormality         Status                     ---------                               -----------          ------                     Scan Slide[[id number]]                                                                  CBC Auto Differential[[id number]]        Abnormal            Final result                 Please view results for these tests on the individual orders.    Protime-INR [[id number]]  (Abnormal) Collected: 04/22/21 0642    Specimen: Blood Updated: 04/22/21 0800     Protime 34.1 Seconds      INR 3.10    Narrative:      Therapeutic range for most indications is 2.0-3.0 INR,  or 2.5-3.5 for mechanical heart valves.    Comprehensive Metabolic Panel [[id number]]  (Abnormal) Collected: 04/22/21 0642    Specimen: Blood Updated: 04/22/21 0753     Glucose 74 mg/dL      BUN 54 mg/dL      Creatinine 2.35 mg/dL      Sodium 142 mmol/L      Potassium 3.8 mmol/L      Chloride 110 mmol/L      CO2 26.0 mmol/L      Calcium 8.2 mg/dL      Total Protein 6.0 g/dL      Albumin 2.40 g/dL      ALT (SGPT) 6 U/L      AST (SGOT) 11 U/L      Alkaline Phosphatase 67 U/L      Total Bilirubin 0.4 mg/dL      eGFR  African Amer 33 mL/min/1.73      Globulin 3.6 gm/dL      A/G Ratio 0.7 g/dL      BUN/Creatinine Ratio 23.0     Anion Gap 6.0 mmol/L     Narrative:      GFR Normal >60  Chronic Kidney Disease <60  Kidney Failure <15      CBC Auto Differential [[id number]]  (Abnormal) Collected: 04/22/21 0642    Specimen: Blood Updated: 04/22/21 0738     WBC 2.31 10*3/mm3      RBC 3.64 10*6/mm3      Hemoglobin 9.5 g/dL      Hematocrit 27.0 %      MCV 74.2 fL      MCH 26.1 pg      MCHC 35.2 g/dL      RDW 17.2 %      RDW-SD 45.0 fl      MPV --     Comment: Instrument unable to calculate mpv        Platelets 109 10*3/mm3      Neutrophil % 55.4 %      Lymphocyte % 29.0 %      Monocyte % 10.4 %      Eosinophil % 3.9 %      Basophil % 0.4 %      Immature Grans % 0.9 %      Neutrophils, Absolute 1.28 10*3/mm3      Lymphocytes, Absolute 0.67 10*3/mm3      Monocytes, Absolute 0.24 10*3/mm3      Eosinophils, Absolute 0.09 10*3/mm3      Basophils, Absolute 0.01  10*3/mm3      Immature Grans, Absolute 0.02 10*3/mm3      nRBC 0.0 /100 WBC     POC Glucose Once [[id number]]  (Normal) Collected: 04/22/21 0641    Specimen: Blood Updated: 04/22/21 0656     Glucose 76 mg/dL      Comment: : [id number] JOSÉ HIMeter ID: [id number]       POC Glucose Once [[id number]]  (Normal) Collected: 04/21/21 2007    Specimen: Blood Updated: 04/21/21 2159     Glucose 113 mg/dL      Comment: RN NotifiedOperator: [id number] JOSÉ BARTONLEYMeter ID: [id number]       Blood Culture - Blood, Arm, Right [[id number]] Collected: 04/16/21 1824    Specimen: Blood from Arm, Right Updated: 04/21/21 1830     Blood Culture No growth at 5 days    Blood Culture - Blood, Arm, Right [[id number]] Collected: 04/16/21 1824    Specimen: Blood from Arm, Right Updated: 04/21/21 1830     Blood Culture No growth at 5 days    POC Glucose Once [[id number]]  (Abnormal) Collected: 04/21/21 1610    Specimen: Blood Updated: 04/21/21 1631     Glucose 148 mg/dL      Comment: RN NotifiedOperator: [id number] MANNIE HINTONMeter ID: [id number]           Imaging Results (All)     Procedure Component Value Units Date/Time    US Guided Vascular Access [[id number]] Collected: 04/21/21 1335     Updated: 04/21/21 1409    Narrative:      PROCEDURE: Ultrasound Guidance Vascular Access    CLINICAL INDICATION: midline, A41.9 Sepsis, unspecified organism  I50.23 Acute on chronic systolic (congestive) heart failure  I48.92 Unspecified atrial flutter T45.511A Poisoning by  anticoagulants, accidental (unintentional), initial encounter  R13.12 Dysphagia, oropharyngeal phase Z74.09 Other reduced  mobility Z74.09 Other reduced mobility Z78.9 Other specified  health status N39.0 Urinary tract infection, site not spec    FINDINGS:  Realtime ultrasound imaging was utilized to visualize needle  entry into the right cephalic vein during midline catheter  placement and a permanent image was stored for permanent  recording and reporting.        Impression:      Imaging obtained during vascular access device placement under  ultrasound guidance as described above    Electronically signed by:  Agustin Early MD  4/21/2021 2:08 PM CDT  Workstation: [id number]    IR Insert Midline Without Port Pump 5 Plus [[id number]] Resulted: 04/21/21 1355     Updated: 04/21/21 1355    Narrative:      This procedure was auto-finalized with no dictation required.    XR Chest PA & Lateral [[id number]] Collected: 04/19/21 0733     Updated: 04/19/21 0758    Narrative:      PROCEDURE: Chest x-ray with two views.    INDICATION: effusion follow-up, A41.9 Sepsis, unspecified  organism I50.23 Acute on chronic systolic (congestive) heart  failure I48.92 Unspecified atrial flutter T45.511A Poisoning by  anticoagulants, accidental (unintentional), initial encounter  R13.12 Dysphagia, oropharyngeal phase    COMPARISON: 4/16/2021 chest x-ray and CT of the chest. Earlier  chest x-rays.    Mild cardiomegaly. Mild improvement in vascular congestion with  residual.    Improved aeration in the right lung with mild residual basilar  atelectasis.  Decreased right pleural effusion with minimal blunting right CP  angle.    Improved aeration left lung with moderate residual atelectasis  and/or volume loss left lower lobe.  Mild decreased left pleural effusion with moderate residual.      Impression:      Decreased bilateral pleural effusion minimal blunting right CP  angle. Moderate residual left effusion.    Almost complete clearing right lung with residual right basilar  atelectasis.    Improved left lower lobe infiltrate with residual infiltrate and  volume loss.    [id number]    Electronically signed by:  Christopher Ramirez MD  4/19/2021 7:57  AM CDT Workstation: [id number]    CT Chest Without Contrast Diagnostic [[id number]] Collected: 04/16/21 1910     Updated: 04/16/21 1958    Narrative:      Congestive heart failure.    CT chest without intravenous contrast.    Radiation dose-limiting techniques also  utilized, including  automated exposure control and adjustment of mA and/or KVP to the  patient size according to ALARA (as low as reasonably  achievable).    There is mild cardiomegaly. There is small pericardial effusion.  There are calcifications in the coronary arteries. There are a  few mediastinal lymph nodes normal by size criteria. There are  emphysematous changes in the right lung apex There are large  bilateral pleural effusions with almost complete collapse of  lower lobes bilaterally. There are scattered groundglass  infiltrates in the upper lobes bilaterally.    No acute abnormality is seen in the upper abdomen. There is small  liver. There are degenerative changes of the thoracic spine.  There is scoliosis of the thoracic spine with convexity to the  right. No acute bony abnormality is seen.      Impression:      CONCLUSION:  1. Large bilateral pleural effusions with almost complete  collapse of the lower lobes bilaterally.  2. Groundglass infiltrates in the upper lobes bilaterally.  Imaging features can be seen with COVID pneumonia, though are  nonspecific and can occur with a variety of infectious and  noninfectious processes.     Electronically signed by:  You Flores MD  4/16/2021 7:57  PM CDT Workstation: [id number]    XR Chest 1 View [[id number]] Collected: 04/16/21 1522     Updated: 04/16/21 1541    Narrative:      Chest x-ray single view.         CLINICAL INDICATION: Shortness of breath    COMPARISON: Chest February 4, 2021.    FINDINGS: Cardiac silhouette is enlarged in size. Interval  development of bilateral infiltrative changes suggesting  pulmonary edema pattern and/or bilateral pneumonitis. Relative  sparing of portions of the right apex. Small left-sided pleural  effusion.      Impression:      Cardiomegaly. Interval development of bilateral  infiltrative changes suggesting pulmonary edema type pattern  and/or bilateral pneumonitis with relative sparing of the right  apex. Small  "left-sided pleural effusion. Unfavorable change since  prior exam.    Electronically signed by:  Herbert Clement MD  4/16/2021 3:40 PM CDT  Workstation: [id number]            Chief Complaint on Day of Discharge: none    Hospital Course:  81-year-old -American male with past medical history of hypertension, hyperlipidemia, type 2 diabetes, CKD stage III, BPH, dementia, coronary artery disease who presented on 4/16/2021 with shortness of breath and hypothermia.  Chest x-ray imaging revealed cardiomegaly with interval development of bilateral infiltrative changes.  Patient was admitted to ICU and initiated on IV antibiotic and diuretic therapy.  Nephrology followed related to fluid management.  Cultures were collected and urine culture revealed growth of ESBL E. coli for which he was treated with Merrem.  Patient received Merrem and azithromycin therapy during his hospital stay.  He remains on IV Merrem via midline at discharge with home health to manage.  In regards to patient's pleural effusions and pneumonia, he has been weaned to room air and is saturating 94%.  He is stable for discharge and will return home with 24/7 care provided by his family.  He has been instructed on 1 week follow-up with PCP and 2-week follow-up with nephrology.  Condition on Discharge: Stable    Physical Exam on Discharge:  /70 (BP Location: Left arm, Patient Position: Lying)   Pulse 63   Temp 97.4 °F (36.3 °C) (Temporal)   Resp 18   Ht 193 cm (75.98\")   Wt 91.7 kg (202 lb 1.6 oz)   SpO2 94%   BMI 24.61 kg/m²   Physical Exam  Vitals reviewed.   Constitutional:       General: He is not in acute distress.     Appearance: Normal appearance.   HENT:      Head: Normocephalic and atraumatic.   Cardiovascular:      Rate and Rhythm: Normal rate and regular rhythm.   Pulmonary:      Breath sounds: Examination of the right-lower field reveals decreased breath sounds. Examination of the left-lower field reveals decreased breath " sounds. Decreased breath sounds present. No wheezing or rales.   Abdominal:      General: There is no distension.      Palpations: Abdomen is soft.      Tenderness: There is no abdominal tenderness.   Musculoskeletal:         General: No swelling or deformity.   Skin:     General: Skin is warm and dry.   Neurological:      General: No focal deficit present.      Mental Status: He is alert. Mental status is at baseline.   : Moore catheter in place with clear yellow urine.    [place]charge Dis[place] [facility]    Discharge Medications:     Discharge Medications      New Medications      Instructions Start Date   bacitracin 500 UNIT/GM ointment   Topical, Daily   Start Date: April 23, 2021     Bag [place] ointment ointment   1 application, Topical, 2 Times Daily      haloperidol 1 MG tablet  Commonly known as: HALDOL   1 mg, Oral, 3 Times Daily PRN      hydrocortisone-bacitracin-zinc oxide-nystatin  Commonly known as: MAGIC BARRIER   1 application, Topical, As Needed      meropenem  Commonly known as: MERREM   500 mg, Intravenous, Every 8 Hours         Changes to Medications      Instructions Start Date   amiodarone 200 MG tablet  Commonly known as: PACERONE  What changed: when to take this   200 mg, Oral, Every 24 Hours Scheduled   Start Date: April 23, 2021     carvedilol 3.125 MG tablet  Commonly known as: COREG  What changed:   · medication strength  · how much to take  · additional instructions   3.125 mg, Oral, 2 Times Daily With Meals, Hold if heart rate less than 60         Continue These Medications      Instructions Start Date   acetaminophen 325 MG tablet  Commonly known as: TYLENOL   650 mg, Oral, Every 6 Hours PRN      atorvastatin 40 MG tablet  Commonly known as: LIPITOR   40 mg, Oral, Nightly      brimonidine 0.1 % solution ophthalmic solution  Commonly known as: ALPHAGAN P   1 drop, Left Eye, 3 Times Daily      brinzolamide 1 % ophthalmic suspension  Commonly known as: AZOPT   1 drop, Both  Eyes, 3 Times Daily      budesonide 0.5 MG/2ML nebulizer solution  Commonly known as: PULMICORT   0.5 mg, Nebulization, Daily - RT      fluticasone-salmeterol 250-50 MCG/DOSE DISKUS  Commonly known as: ADVAIR   1 puff, Inhalation, 2 Times Daily - RT      furosemide 40 MG tablet  Commonly known as: LASIX   40 mg, Oral, Daily      hydrALAZINE 50 MG tablet  Commonly known as: APRESOLINE   50 mg, Oral, Every 8 Hours Scheduled      ipratropium-albuterol 0.5-2.5 mg/3 ml nebulizer  Commonly known as: DUO-NEB   3 mL, Nebulization, Every 4 Hours PRN      isosorbide mononitrate 30 MG 24 hr tablet  Commonly known as: IMDUR   30 mg, Oral, Every 24 Hours Scheduled      latanoprost 0.005 % ophthalmic solution  Commonly known as: XALATAN   1 drop, Both Eyes, Nightly      minoxidil 2.5 MG tablet  Commonly known as: LONITEN   2.5 mg, Oral, Daily      pantoprazole 40 MG EC tablet  Commonly known as: PROTONIX   40 mg, Oral, Daily      QUEtiapine 50 MG tablet  Commonly known as: SEROquel   100 mg, Oral, Nightly      QUEtiapine 50 MG tablet  Commonly known as: SEROquel   50 mg, Oral, Every Morning      warfarin 3 MG tablet  Commonly known as: COUMADIN   3 mg, Oral, Take As Directed             Discharge Diet:   Diet Instructions     Diet: Dysphagia; Thin Liquids, No Restrictions; Mechanical Soft      Discharge Diet: Dysphagia    Fluid Consistency: Thin Liquids, No Restrictions    Pureed Options: Mechanical Soft          Activity at Discharge:   Activity Instructions     Activity as Tolerated            Discharge Care Plan/Instructions: Follow-up with PCP within 1 week of discharge.  Follow-up with nephrology in 1 to 2 weeks.    Follow-up Appointments:   Additional Instructions for the Follow-ups that You Need to Schedule     Ambulatory Referral to Home Health   As directed      Face to Face Visit Date: 4/21/2021    Follow-up provider for Plan of Care?: I treated the patient in an acute care facility and will not continue treatment after  discharge.    Follow-up provider: NICK SOTOMAYOR [[id number]]    Reason/Clinical Findings: Pneumonia, CHF, ESBL E. coli UTI    Describe mobility limitations that make leaving home difficult: impaired mobility and ADLs, home IV antibiotics    Nursing/Therapeutic Services Requested: Skilled Nursing Physical Therapy Occupational Therapy    Skilled nursing orders: Medication education PICC line care/instruction Infusion therapy    PT orders: Therapeutic exercise Transfer training Strengthening Home safety assessment    Occupational orders: Activities of daily living Energy conservation Strengthening Cognition Home safety assessment    Frequency: 1 Week 1         Discharge Follow-up with PCP   As directed       Currently Documented PCP:    Nick Sotomayor APRN    PCP Phone Number:    [phone]     Follow Up Details: one week         Discharge Follow-up with Specified Provider: Giovana; 2 Weeks   As directed      To: Giovana    Follow Up: 2 Weeks            Contact information for follow-up providers     Colt Neville APRN. Go on 5/13/2021.    Specialty: Nurse Practitioner  Why: THURSDAY  MAY 13TH 10:15 HOSPITAL FOLLOW UP APPOINTMENT  Contact information:  [address] [facility] [address] [phone]             Nick Sotomayor APRN Follow up.    Specialty: Nurse Practitioner  Why: OFFICE WILL CALL TO SCHEDULE HOSPITAL FOLLOW UP APPOINTMENT  Contact information:  [address] [facility] [address]  [facility] [address] [phone]                   Contact information for after-discharge care     Durable Medical Equipment     [facility] .    Service: Durable Medical Equipment  Contact information:  [address] [facility] [address]  [facility] [id number] [phone]                 Home Medical Care     [facility] .    Service: Home Health Services  Contact information:  [address] [place] [id number] [phone]                             Test  Results Pending at Discharge:           This document has been electronically signed by THIERNO Waldrop on April 22, 2021 18:40 CDT        Time: 30 minutes spent on assessment, discussion, management, and discharge planning for this patient.

## 2021-04-22 NOTE — PLAN OF CARE
Goal Outcome Evaluation:  Plan of Care Reviewed With: patient  Progress: no change   Patient slept well all night, likely due to ativan given yesterday. Patient still gets angry when turned, and will yell threats at you. He has drawn back his fist but has not hit this shift.

## 2021-04-22 NOTE — THERAPY TREATMENT NOTE
Patient Name: Ondina Alanis Sr.  : 1940    MRN: [id number]                              Today's Date: 2021       Admit Date: 2021    Visit Dx:     ICD-10-CM ICD-9-CM   1. Sepsis, due to unspecified organism, unspecified whether acute organ dysfunction present (CMS/[facility])  A41.9 038.9     995.91   2. Acute on chronic systolic congestive heart failure (CMS/[facility])  I50.23 428.23     428.0   3. Atrial flutter, unspecified type (CMS/[facility])  I48.92 427.32   4. Poisoning by warfarin sodium, accidental or unintentional, initial encounter  T45.511A 964.2     E858.2   5. Oropharyngeal dysphagia  R13.12 787.22   6. Impaired physical mobility  Z74.09 781.99   7. Impaired mobility and ADLs  Z74.09 V49.89    Z78.9    8. Urinary tract infection due to extended-spectrum beta lactamase (ESBL) producing Escherichia coli  N39.0 599.0    B96.29 041.49    Z16.12 V09.1     Patient Active Problem List   Diagnosis   • CKD (chronic kidney disease) stage 4, GFR 15-29 ml/min (CMS/[facility])   • Diabetic peripheral neuropathy associated with type 2 diabetes mellitus (CMS/[facility])   • Diabetes mellitus type II, uncontrolled (CMS/[facility])   • GERD (gastroesophageal reflux disease)   • Primary osteoarthritis of both knees   • Pulmonary embolism (CMS/[facility])   • BPH (benign prostatic hyperplasia)   • Benign essential hypertension   • Asthma   • Pleurisy   • Acute respiratory failure with hypoxia (CMS/[facility])   • Stage 1 acute kidney injury (CMS/[facility])   • Left ventricular diastolic dysfunction   • Exacerbation of asthma   • COPD exacerbation (CMS/[facility])   • Atrial flutter (CMS/[facility])   • Acute systolic CHF (congestive heart failure) (CMS/[facility])   • Bilateral pneumonia   • Urinary obstruction   • Supratherapeutic INR   • Sepsis due to urinary tract infection (CMS/[facility])   • Acute on chronic systolic CHF (congestive heart failure) (CMS/[facility])   • Bradycardia   • Dementia (CMS/[facility])   • Acute blood loss anemia   • MRSA bacteremia   • ANT (acute kidney injury)  (CMS/HCC)   • Sepsis (CMS/HCC)   • Bilateral pleural effusion   • Coumadin toxicity   • Diabetes mellitus (CMS/HCC)   • Urinary tract infection due to extended-spectrum beta lactamase (ESBL) producing Escherichia coli   • Atrial fibrillation, chronic (CMS/HCC)     Past Medical History:   Diagnosis Date   • Asthma    • Benign prostatic hyperplasia    • CHF (congestive heart failure) (CMS/HCC)    • Coronary artery disease    • Diabetes mellitus (CMS/HCC)    • GERD (gastroesophageal reflux disease)    • Hyperlipidemia    • Hypertension    • Prostate disorder    • Renal insufficiency    • Urinary tract infection      Past Surgical History:   Procedure Laterality Date   • BACK SURGERY     • CYSTOSCOPY N/A 2/5/2021    Procedure: CYSTOSCOPY WITH CATHETER PLACEMENT;  Surgeon: Orlin Riggs MD;  Location: [facility];  Service: Urology;  Laterality: N/A;   • KNEE SURGERY Left    • PROSTATE SURGERY       General Information     Row Name 04/22/21 0805          OT Time and Intention    Document Type  therapy note (daily note)  -BB     Mode of Treatment  individual therapy;occupational therapy  -BB     Row Name 04/22/21 0805          General Information    Patient Profile Reviewed  yes  -BB     Existing Precautions/Restrictions  fall  -BB     Row Name 04/22/21 0805          Cognition    Orientation Status (Cognition)  oriented to;person;disoriented to;place;situation;time  -BB     Row Name 04/22/21 0805          Safety Issues, Functional Mobility    Impairments Affecting Function (Mobility)  strength;endurance/activity tolerance;range of motion (ROM);pain;cognition;visual/perceptual  -BB       User Key  (r) = Recorded By, (t) = Taken By, (c) = Cosigned By    Initials Name Provider Type    BB Lamar Trinidad COTA/L Occupational Therapy Assistant          Mobility/ADL's     Row Name 04/22/21 0805          Bed Mobility    Bed Mobility  scooting/bridging  -BB     Scooting/Bridging [place] (Bed Mobility)  dependent (less  than 25% patient effort);2 person assist  -BB     Assistive Device (Bed Mobility)  draw sheet  -BB     Row Name 04/22/21 0805          Transfers    Bed-Chair [place] (Transfers)  not tested  -BB     Sit-Stand [place] (Transfers)  not tested  -BB     Row Name 04/22/21 0805          Activities of Daily Living    BADL Assessment/Intervention  upper body dressing  -BB     [facility] Name 04/22/21 0805          Grooming Assessment/Training    [place] Level (Grooming)  wash face, hands;dependent (less than 25% patient effort)  -BB     Position (Grooming)  sitting up in bed  -BB     Row Name 04/22/21 0805          Self-Feeding Assessment/Training    [place] Level (Feeding)  liquids to mouth;scoop food and bring to mouth;dependent (less than 25% patient effort) Food loaded on fork(dependent)Pt able to get food to his mouth ~40% with Mod A during breakfast with verbal cues  -BB     [facility] Name 04/22/21 0805          Upper Body Dressing Assessment/Training    [place] Level (Upper Body Dressing)  doff;don;maximum assist (25% patient effort) HG  -BB     Position (Upper Body Dressing)  sitting up in bed  -BB       User Key  (r) = Recorded By, (t) = Taken By, (c) = Cosigned By    Initials Name Provider Type    Lamar Guerrero COTA/L Occupational Therapy Assistant        Obj/Interventions     Row Name 04/22/21 0805          Vision Assessment/Intervention    Visual Impairment/Limitations  -- decreased vision  -[facility] Name 04/22/21 0805          Range of Motion Comprehensive    General Range of Motion  other (see comments)  -[facility] Name 04/22/21 0805          Strength Comprehensive (MMT)    General Manual Muscle Testing (MMT) Assessment  other (see comments)  -BB       User Key  (r) = Recorded By, (t) = Taken By, (c) = Cosigned By    Initials Name Provider Type    Lamar Guerrero COTA/L Occupational Therapy Assistant        Goals/Plan     Row Name 04/22/21 0805          Bathing Goal 1 (OT)     Activity/Device (Bathing Goal 1, OT)  upper body bathing  -BB     [place] Level/Cues Needed (Bathing Goal 1, OT)  moderate assist (50-74% patient effort)  -BB     Time Frame (Bathing Goal 1, OT)  long term goal (LTG);by discharge  -BB     Progress/Outcomes (Bathing Goal 1, OT)  goal not met  -BB     Row Name 04/22/21 08          Grooming Goal 1 (OT)    Activity/Device (Grooming Goal 1, OT)  grooming skills, all  -BB     [place] (Grooming Goal 1, OT)  set-up required;supervision required;verbal cues required  -BB     Time Frame (Grooming Goal 1, OT)  long term goal (LTG);by discharge  -BB     Progress/Outcome (Grooming Goal 1, OT)  goal not met  -BB     Row Name 04/22/21 0805          Self-Feeding Goal 1 (OT)    Activity/Device (Self-Feeding Goal 1, OT)  self-feeding skills, all  -BB     [place] Level/Cues Needed (Self-Feeding Goal 1, OT)  moderate assist (50-74% patient effort)  -BB     Time Frame (Self-Feeding Goal 1, OT)  long term goal (LTG);by discharge  -BB     Progress/Outcomes (Self-Feeding Goal 1, OT)  goal not met  -BB       User Key  (r) = Recorded By, (t) = Taken By, (c) = Cosigned By    Initials Name Provider Type    Lamar Guerrero COTA/L Occupational Therapy Assistant        Clinical Impression     Row Name 04/22/21 0805          Pain Scale: Numbers Pre/Post-Treatment    Pretreatment Pain Rating  0/10 - no pain  -BB     Posttreatment Pain Rating  0/10 - no pain  -BB     Row Name 04/22/21 0805          Pain Scale: FACES Pre/Post-Treatment    Pain: FACES Scale, Pretreatment  6-->hurts even more  -BB     Posttreatment Pain Rating  6-->hurts even more  -BB     Row Name 04/22/21 0805          Plan of Care Review    Plan of Care Reviewed With  patient  -BB     Progress  no change  -BB     Outcome Summary  Pt is Max A for UB dressing this am. Pt requires verbal cues for self feeding tasks and loading utensils prior to handing pt the fork. Pt is successful ~40% during breakfast.  Continue OT POC  -BB     Row Name 04/22/21 0805          Therapy Assessment/Plan (OT)    Rehab Potential (OT)  fair, will monitor progress closely  -BB     Criteria for Skilled Therapeutic Interventions Met (OT)  yes;meets criteria;skilled treatment is necessary  -BB     Therapy Frequency (OT)  other (see comments) 5-7 days a week  -BB     Row Name 04/22/21 0805          Therapy Plan Review/Discharge Plan (OT)    Anticipated Discharge Disposition (OT)  home with 24/7 care;home with home health;skilled nursing facility  -BB     Row Name 04/22/21 0805          Vital Signs    Pre Systolic BP Rehab  173  -BB     Pre Treatment Diastolic BP  81  -BB     Pretreatment Heart Rate (beats/min)  63  -BB     Pre SpO2 (%)  95  -BB     O2 Delivery Pre Treatment  room air  -BB     Pre Patient Position  Sitting  -BB     Row Name 04/22/21 0805          Positioning and Restraints    Pre-Treatment Position  in bed  -BB     Post Treatment Position  bed  -BB     In Bed  fowlers;call light within reach;encouraged to call for assist;exit alarm on  -BB       User Key  (r) = Recorded By, (t) = Taken By, (c) = Cosigned By    Initials Name Provider Type    Lamar Guerrero COTA/L Occupational Therapy Assistant        Outcome Measures     Row Name 04/22/21 0805          How much help from another is currently needed...    Putting on and taking off regular lower body clothing?  1  -BB     Bathing (including washing, rinsing, and drying)  1  -BB     Toileting (which includes using toilet bed pan or urinal)  1  -BB     Putting on and taking off regular upper body clothing  1  -BB     Taking care of personal grooming (such as brushing teeth)  1  -BB     Eating meals  2  -BB     AM-PAC 6 Clicks Score (OT)  7  -BB       User Key  (r) = Recorded By, (t) = Taken By, (c) = Cosigned By    Initials Name Provider Type    Lamar Guerrero COTA/L Occupational Therapy Assistant        Occupational Therapy Education                 Title: PT OT  SLP Therapies (Resolved)     Topic: Occupational Therapy (Resolved)     Point: ADL training (Resolved)     Description:   Instruct learner(s) on proper safety adaptation and remediation techniques during self care or transfers.   Instruct in proper use of assistive devices.              Learning Progress Summary           Patient Acceptance, E, NR by  at 4/21/2021 1337    Acceptance, E, NR by  at 4/20/2021 1408    Acceptance, E, NR by  at 4/19/2021 0957    Comment: Educated about OT. Educated on safety throughout.                   Point: Home exercise program (Resolved)     Description:   Instruct learner(s) on appropriate technique for monitoring, assisting and/or progressing therapeutic exercises/activities.              Learner Progress:  Not documented in this visit.          Point: Precautions (Resolved)     Description:   Instruct learner(s) on prescribed precautions during self-care and functional transfers.              Learner Progress:  Not documented in this visit.          Point: Body mechanics (Resolved)     Description:   Instruct learner(s) on proper positioning and spine alignment during self-care, functional mobility activities and/or exercises.              Learner Progress:  Not documented in this visit.                      User Key     Initials Effective Dates Name Provider Type Discipline     06/08/18 -  Genny Greenwood OTR/L Occupational Therapist OT     03/07/18 -  Lamar Trinidad SOTOMAYOR/L Occupational Therapy Assistant OT              OT Recommendation and Plan  Therapy Frequency (OT): other (see comments) (5-7 days a week)  Plan of Care Review  Plan of Care Reviewed With: patient  Progress: no change  Outcome Summary: Pt is Max A for UB dressing this am. Pt requires verbal cues for self feeding tasks and loading utensils prior to handing pt the fork. Pt is successful ~40% during breakfast. Continue OT POC     Time Calculation:   Time Calculation- OT     Row Name 04/22/21 1300              Time Calculation- OT    OT Start Time  0805  -BB      OT Stop Time  0900  -BB      OT Time Calculation (min)  55 min  -BB      Total Timed Code Minutes- OT  55 minute(s)  -BB      OT Received On  04/22/21  -BB         Timed Charges    88405 - OT Self Care/Mgmt Minutes  55  -BB         Total Minutes    Timed Charges Total Minutes  55  -BB       Total Minutes  55  -BB        User Key  (r) = Recorded By, (t) = Taken By, (c) = Cosigned By    Initials Name Provider Type    Lamar Guerrero COTA/L Occupational Therapy Assistant        Therapy Charges for Today     Code Description Service Date Service Provider Modifiers Qty    89784448403 HC OT SELF CARE/MGMT/TRAIN EA 15 MIN 4/21/2021 Lamar Trinidad COTA/L GO 4    40038307963 HC OT SELF CARE/MGMT/TRAIN EA 15 MIN 4/22/2021 Lamar Trinidad COTA/L GO 4               СВЕТЛАНА Salvador/DIONNE  4/22/2021

## 2021-04-22 NOTE — CASE MANAGEMENT/SOCIAL WORK
[facility] Encounter Date/Time: 2021 [facility]   Hospital Account: [id number]    MRN: [id number]   Patient:  Nadir Alanis Sr.   Contact Serial #: [id number]   SSN:          ENCOUNTER             Patient Class: Inpatient   Unit: [address] Service: Medicine     Bed: 333/1   Admitting Provider: Manohar Mary MD   Referring Physician: Neil Coleman   Attending Provider: Manohar Mary MD   Adm Diagnosis: Acute on chronic systoli*               PATIENT          Name: Nadir Alanis Sr. : 1940 (81 yrs)   Address: [address] [place] [address] Sex: Male   City: [place] [address]   County: [place]   Marital Status:  Ethnicity: NOT                                                                              Race: BLACK   Primary Care Provider: Mark Sheldon APRN Patients Phone: Home Phone: [phone]     Mobile Phone: [phone]   EMERGENCY CONTACT   Contact Name Legal Guardian? Relationship to Patient Home Phone Work Phone   1. Lamar Alanis  2. Cammy Alanis  No Spouse  Daughter [phone]           GUARANTOR            Guarantor: Nadir Alanis     : 1940   Address: [address] [place] [address] Sex: Male     [place] [address]     Relation to Patient: Self       Home Phone: [phone]   Guarantor ID: [id number]       Work Phone:     GUARANTOR EMPLOYER   Employer:           Status: RETIRED   COVERAGE          PRIMARY INSURANCE   Payor: MEDICARE Plan: MEDICARE A & B   Group Number:   Insurance Type: INDEMNITY   Subscriber Name: NADIR ALANIS* Subscriber : 1940   Subscriber ID: [id number] Coverage Address: [place] [address]   Pat. Rel. to Subscriber: Self Coverage Phone: [phone]   SECONDARY INSURANCE   Payor: [facility] Plan: [facility]   Group Number: [id number] Insurance Type: INDEMNITY   Subscriber Name: NADIR ALANIS* Subscriber : 1940   Subscriber ID: [id number] Coverage  "Address: [facility] [address]  [place] [address]   Pat. Rel. to Subscriber: SELF Coverage Phone: [phone]      Contact Serial # ([id number])  `       April 22, 2021    Chart ID [id number])                    Physicians Statement of Medical Necessity for  Ambulance Transportation    GENERAL INFORMATION     Name: Ondina Alanis Sr.  YOB: 1940  Medicare #: [id number]  Transport Date: 4/22/21 (Valid for round trips this date, or for scheduled repetitive trips for 60 days from the date signed below.)  Origin: [facility], Room 333 Destination: [address] [place] [address]  Is the Patient's stay covered under Medicare Part A (PPS/DRG?) Yes  Closest appropriate facility?  Yes  If this a hosp-hosp transfer?  No  Is this a hospice patient?  No    MEDICAL NECESSITY QUESTIONAIRE    Ambulance Transportation is medically necessary only if other means of transportation are contraindicated or would be potentially harmful to the patient.  To meet this requirement, the patient must be either \"bed confined\" or suffer from a condition such that transport by means other than an ambulance is contraindicated by the patient's condition.  The following questions must be answered by the healthcare professional signing below for this form to be valid:     1) Describe the MEDICAL CONDITION (physical and/or mental) of this patient AT THE TIME OF AMBULANCE TRANSPORT that requires the patient to be transported in an ambulance, and why transport by other means is contraindicated by the patient's condition: Patient with severe deconditioning d/t bilateral pneumonia and dementia.      2) Is this patient \"bed confined\" as defined below? Yes   To be \"bed confined\" the patient must satisfy all three of the following criteria:  (1) unable to get up from bed without assistance; AND (2) unable to ambulate;  AND (3) unable to sit in a chair or wheelchair.  3) Can this patient safely be transported by car or " wheelchair van (I.e., may safely sit during transport, without an attendant or monitoring?) No  4. In addition to completing questions 1-3 above, please check any of the following conditions that apply*:          *Note: supporting documentation for any boxes checked must be maintained in the patient's medical records Patient is confused and Unable to tolerate seated position for time needed to transport      SIGNATURE OF PHYSICIAN OR OTHER AUTHORIZED HEALTHCARE PROFESSIONAL    I certify that the above information is true and correct based on my evaluation of this patient, and represent that the patient requires transport by ambulance and that other forms of transport are contraindicated.  I understand that this information will be used by the Centers for Medicare and Medicaid Services (CMS) to support the determiniation of medical necessity for ambulance services, and I represent that I have personal knowledge of the patient's condition at the time of transport.       If this box is checked, I also certify that the patient is physically or mentally incapable of signing the ambulance service's claim form and that the institution with which I am affiliated has furnished care, services or assistance to the patient.  My signature below is made on behalf of the patient pursuant to 42 .36(b)(4). In accordance with 42 .37, the specific reason(s) that the patient is physically or mentally incapable of signing the claim for is as follows:     Signature of Physician or Healthcare Professional      Lynn Talavera RN CM Date/Time:      4/22/21     (For Scheduled repetitive transport, this form is not valid for transports performed more than 60 days after this date).                                                                                                                                            --------------------------------------------------------------------------------------------  Printed Name  and Credentials of Physician or Authorized Healthcare Professional     *Form must be signed by patient's attending physician for scheduled, repetitive transports,.  For non-repetitive ambulance transports, if unable to obtain the signature of the attending physician, any of the following may sign (please select below):     Physician   Clinical Nurse Specialist  x Registered Nurse     Physician Assistant  x Discharge Planner  Licensed Practical Nurse     Nurse Practitioner  x

## 2021-04-22 NOTE — PROGRESS NOTES
"Anticoagulation by Pharmacy - Warfarin    Ondina Alanis Sr. is a 81 y.o.male  [Ht: 193 cm (75.98\"); Wt: 91.7 kg (202 lb 1.6 oz)] on Warfarin for indication of Atrial fibrillation.    Goal INR: 2-3  Home dose is 3 mg daily  Today's INR:   Lab Results   Component Value Date    INR 3.10 (H) 04/22/2021          Lab Results   Component Value Date    INR 3.10 (H) 04/22/2021    INR 2.79 (H) 04/21/2021    INR 2.43 (H) 04/20/2021    PROTIME 34.1 (H) 04/22/2021    PROTIME 31.3 (H) 04/21/2021    PROTIME 28.0 (H) 04/20/2021     Lab Results   Component Value Date    HGB 9.5 (L) 04/22/2021    HGB 9.4 (L) 04/21/2021    HGB 10.2 (L) 04/20/2021     Lab Results   Component Value Date    HCT 27.0 (L) 04/22/2021    HCT 27.2 (L) 04/21/2021    HCT 29.6 (L) 04/20/2021     Lab Results   Component Value Date     (L) 04/22/2021    PLT 84 (L) 04/21/2021     (L) 04/20/2021       Recent Warfarin Administrations       The 5 most recent administrations since 04/15/2021 are shown below each listed medication.    Warfarin Sodium         Order Dose Date Given     warfarin (COUMADIN) tablet 1 mg 1 mg 04/21/2021     warfarin (COUMADIN) tablet 1 mg 1 mg 04/20/2021     warfarin (COUMADIN) tablet 3 mg 3 mg 04/19/2021      3 mg 04/18/2021                    Assessment/Plan:  Reviewed above labs. INR is 3.1.  INR is supra therapeutic. Pt did receive dose of warfarin 1 mg last night. No changes in medication list. Will Hold warfarin tonight.  Rx will continue to follow and adjust dose accordingly.      Fany Byers, PharmD  04/22/21 09:18 CDT     "

## 2021-04-23 NOTE — OUTREACH NOTE
Prep Survey      Responses   [facility] facility patient discharged from?  [facility]   Is LACE score < 7 ?  No   Emergency Room discharge w/ pulse ox?  No   Eligibility  Readm Mgmt   Discharge diagnosis  Sepsis  Bilateral pleural effusion   Does the patient have one of the following disease processes/diagnoses(primary or secondary)?  Sepsis   Does the patient have Home health ordered?  Yes   What is the Home health agency?   [facility]   Is there a DME ordered?  No   Prep survey completed?  Yes          Sandee Fletcher RN

## 2021-04-27 NOTE — OUTREACH NOTE
Sepsis Week 1 Survey      Responses   [facility] patient discharged from?  [place]   Does the patient have one of the following disease processes/diagnoses(primary or secondary)?  Sepsis   Week 1 attempt successful?  No   Unsuccessful attempts  Attempt 1          Janie Neves RN

## 2021-04-28 NOTE — OUTREACH NOTE
Sepsis Week 1 Survey      Responses   [facility] patient discharged from?  [place]   Does the patient have one of the following disease processes/diagnoses(primary or secondary)?  Sepsis   Week 1 attempt successful?  Yes   Call start time  1600   Call end time  1604   Discharge diagnosis  Sepsis  Bilateral pleural effusion   Is patient permission given to speak with other caregiver?  Yes   List who call center can speak with  Wife    Person spoke with today (if not patient) and relationship  Wife- Lamar    Meds reviewed with patient/caregiver?  Yes   Is the patient having any side effects they believe may be caused by any medication additions or changes?  No   Does the patient have all medications related to this admission filled (includes all antibiotics, inhalers, nebulizers,steroids,etc.)  Yes   Is the patient taking all medications as directed (includes completed medication regime)?  Yes   Comments regarding appointments  MD 2 U    Does the patient have a primary care provider?   Yes   Does the patient have an appointment with their PCP within 7 days of discharge?  Yes   Has the patient kept scheduled appointments due by today?  Yes   Comments  MD [facility] - should be coming.   What is the Home health agency?   [facility]   Has home health visited the patient within 72 hours of discharge?  Yes   Home health interventions  Called Home Health agency   Has all DME been delivered?  No   Psychosocial issues?  No   Did the patient receive a copy of their discharge instructions?  Yes   Nursing interventions  Reviewed instructions with patient   Nursing interventions  Nurse provided patient education   Is the patient/caregiver able to teach back Sepsis?  S - Shivering,fever or very cold, E - Extreme pain or generalized discomfort (worst ever,especially abdomen), P - Pale or discolored skin, S - Sleepy, difficult to arouse,confused, I -   I feel like I might die-a feeling of hopelessness, S - Short  of breath   Nursing interventions  Nurse provided reassurance to patient, Nurse provided patient education   Is patient/caregiver able to teach back steps to recovery at home?  Set small, achievable goals for return to baseline health, Exercise as tolerated, Make a list of questions for PCP appoinment   Is the patient/caregiver able to teach back signs and symptoms of worsening condition:  Fever, Rapid heart rate (>90), Altered mental status(confusion/coma), Hyperthermia, Shortness of breath/rapid respiratory rate, Edema   If the patient is a current smoker, are they able to teach back resources for cessation?  Not a smoker   Is the patient/caregiver able to teach back the hierarchy of who to call/visit for symptoms/problems? PCP, Specialist, Home health nurse, Urgent Care, ED, 911  Yes   Additional teach back comments  Wife- He coughs alot - He coughed in the hospital, and coughs now.  she cares for the patient.    Week 1 call completed?  Yes   Wrap up additional comments  Aware of Nurse call Line.           Salena Solano RN

## 2021-04-30 NOTE — OUTREACH NOTE
Sepsis Week 2 Survey      Responses   [facility] patient discharged from?  [place]   Does the patient have one of the following disease processes/diagnoses(primary or secondary)?  Sepsis   Week 2 attempt successful?  No   Revoke  Readmitted          Teena Valente RN

## 2021-05-01 NOTE — PROGRESS NOTES
"Anticoagulation by Pharmacy - Warfarin    Ondina Alanis Sr. is a 81 y.o.male  [Ht: 193 cm (76\"); Wt: 94.8 kg (208 lb 14.4 oz)] on Warfarin  for indication of Atrial fibrillation.  Current dose is on HOLD    Goal INR: 2-3  Home dose is 3 mg daily  Today's INR:   Lab Results   Component Value Date    INR 3.84 (H) 05/01/2021          Lab Results   Component Value Date    INR 3.84 (H) 05/01/2021    INR 3.58 (H) 04/30/2021    INR 3.78 (H) 04/30/2021    PROTIME 40.6 (H) 05/01/2021    PROTIME 38.4 (H) 04/30/2021    PROTIME 40.1 (H) 04/30/2021     Lab Results   Component Value Date    HGB 9.9 (L) 04/30/2021    HGB 10.3 (L) 04/29/2021    HGB 9.5 (L) 04/22/2021     Lab Results   Component Value Date    HCT 28.1 (L) 04/30/2021    HCT 31.4 (L) 04/29/2021    HCT 27.0 (L) 04/22/2021     Lab Results   Component Value Date     (L) 04/30/2021     04/29/2021     (L) 04/22/2021           Assessment/Plan:  Reviewed above labs. INR is 3.84.  INR is above goal. No changes in medication list. Concurrent medications include amiodarone. Pt did NOT receive dose of warfarin last night.  Will continue to HOLD warfarin. Rx will continue to follow and adjust dose accordingly.      Fany Byers, PharmD  05/01/21 08:57 CDT     "

## 2021-05-01 NOTE — PROGRESS NOTES
[facility] Medicine Services  INPATIENT PROGRESS NOTE    Length of Stay: 2  Date of Admission: 4/29/2021  Primary Care Physician: Mark Sheldon APRN    Subjective   Chief Complaint: Shortness of air  HPI:    Patient currently on supplemental oxygen 1 L.  Reports his breathing is getting better.  Denies any chest pain.  Wife is at bedside    Review of Systems   Respiratory: Positive for shortness of breath.    Cardiovascular: Negative for chest pain.   Gastrointestinal: Negative for nausea.        All pertinent negatives and positives are as above. All other systems have been reviewed and are negative unless otherwise stated.     Objective    Temp:  [96.2 °F (35.7 °C)-97.8 °F (36.6 °C)] 96.2 °F (35.7 °C)  Heart Rate:  [64-77] 70  Resp:  [18] 18  BP: (107-166)/(64-82) 148/72  Physical Exam  Vitals and nursing note reviewed.   Constitutional:       General: He is not in acute distress.     Appearance: He is well-developed. He is not diaphoretic.   HENT:      Head: Normocephalic and atraumatic.   Cardiovascular:      Rate and Rhythm: Normal rate.   Pulmonary:      Effort: Pulmonary effort is normal. No respiratory distress.      Breath sounds: Rales present. No wheezing.   Abdominal:      General: There is no distension.      Palpations: Abdomen is soft.   Musculoskeletal:         General: Normal range of motion.   Skin:     General: Skin is warm and dry.   Neurological:      Mental Status: He is alert.      Cranial Nerves: No cranial nerve deficit.   Psychiatric:      Comments: Confusion present             Results Review:  I have reviewed the labs, radiology results, and diagnostic studies.    Laboratory Data:   Lab Results (last 24 hours)     Procedure Component Value Units Date/Time    Extra Tubes [[id number]] Collected: 05/01/21 0540    Specimen: Blood, Venous Line Updated: 05/01/21 0780    Narrative:      The following orders were created for panel order Extra  Tubes.  Procedure                               Abnormality         Status                     ---------                               -----------         ------                     Green Top (Gel)[[id number]]                                  Final result                 Please view results for these tests on the individual orders.    Green Top (Gel) [[id number]] Collected: 05/01/21 0540    Specimen: Blood Updated: 05/01/21 0745     Extra Tube Hold for add-ons.     Comment: Auto resulted.       Extra Tubes [[id number]] Collected: 05/01/21 0540    Specimen: Blood, Venous Line Updated: 05/01/21 0745    Narrative:      The following orders were created for panel order Extra Tubes.  Procedure                               Abnormality         Status                     ---------                               -----------         ------                     Lavender Top[[id number]]                                     Final result                 Please view results for these tests on the individual orders.    Lavender Top [[id number]] Collected: 05/01/21 0540    Specimen: Blood Updated: 05/01/21 0745     Extra Tube hold for add-on     Comment: Auto resulted       Protime-INR [[id number]]  (Abnormal) Collected: 05/01/21 0541    Specimen: Blood Updated: 05/01/21 0704     Protime 40.6 Seconds      INR 3.84    Narrative:      Therapeutic range for most indications is 2.0-3.0 INR,  or 2.5-3.5 for mechanical heart valves.    POC Glucose Once [[id number]]  (Normal) Collected: 05/01/21 0552    Specimen: Blood Updated: 05/01/21 0701     Glucose 76 mg/dL      Comment: RN NotifiedOperator: [id number] NPRMeter ID: [id number]       POC Glucose Once [[id number]]  (Abnormal) Collected: 04/30/21 1926    Specimen: Blood Updated: 04/30/21 2358     Glucose 135 mg/dL      Comment: RN NotifiedOperator: [id number] NPRMeter ID: [id number]       Blood Culture - Blood, Arm, Left [[id number]] Collected: 04/29/21 1810    Specimen:  Blood from Arm, Left Updated: 04/30/21 1815     Blood Culture No growth at 24 hours    Blood Culture - Blood, Arm, Right [[id number]] Collected: 04/29/21 1811    Specimen: Blood from Arm, Right Updated: 04/30/21 1815     Blood Culture No growth at 24 hours          Culture Data:   Blood Culture   Date Value Ref Range Status   04/29/2021 No growth at 24 hours  Preliminary   04/29/2021 No growth at 24 hours  Preliminary     No results found for: URINECX  No results found for: RESPCX  No results found for: WOUNDCX  No results found for: STOOLCX  No components found for: BODYFLD    Radiology Data:   Imaging Results (Last 24 Hours)     ** No results found for the last 24 hours. **          I have reviewed the patient's current medications.     Assessment/Plan     Active Hospital Problems    Diagnosis    • Acute respiratory failure with hypoxia (CMS/HCC)        Acute on chronic diastolic CHF exacerbation-continue monitoring, continue with IV Lasix.  Fluid restriction.  We will continue to monitor     Chronic atrial fibrillation-patient is on Coumadin, will have pharmacy to dose Coumadin, INR level is elevated    Elevated troponin-patient denies any chest pain.  Will treat conservatively due to his dementia     CKD stage III-continue with creatinine level monitoring     Dementia-continue with supportive care     DVT prophylaxis-patient is on Coumadin            Jad Kay MD   05/01/21   13:06 CDT

## 2021-05-02 NOTE — DISCHARGE PLACEMENT REQUEST
"Nadir Alanis Sr. (81 y.o. Male)     Date of Birth Social Security Number Address Home Phone MRN    1940  [address] [address] [phone] [id number]    Baptism Marital Status          Taoism        Admission Date Admission Type Admitting Provider Attending Provider Department, Room/Bed    4/29/21 Emergency Kian Waggoner MD Williams, Kevin L, MD [address], 301/1    Discharge Date Discharge Disposition Discharge Destination         Home or Self Care              Attending Provider: Kian Waggoner MD    Allergies: Contrast Dye    Isolation: None   Infection: None   Code Status: CPR    Ht: 193 cm (76\")   Wt: 94.8 kg (209 lb 1.6 oz)    Admission Cmt: None   Principal Problem: None                Active Insurance as of 4/29/2021     Primary Coverage     Payor Plan Insurance Group Employer/Plan Group    MEDICARE MEDICARE A & B      Payor Plan Address Payor Plan Phone Number Payor Plan Fax Number Effective Dates    [address] [phone]  11/1/1976 - None Entered    [facility] [address]       Subscriber Name Subscriber Birth Date Member ID       NADIR ALANIS SR. 1940 [id number]           Secondary Coverage     Payor Plan Insurance Group Employer/Plan Group    [facility] [id number]     Payor Plan Address Payor Plan Phone Number Payor Plan Fax Number Effective Dates    [address] [address]   1/23/2019 - None Entered    [facility] [phone]       Subscriber Name Subscriber Birth Date Member ID       NADIR ALANIS SR. 1940 [id number]                 Emergency Contacts      (Rel.) Home Phone Work Phone Mobile Phone    Lamar Alanis (Spouse) [phone] -- [phone]    ZekeCammy (Daughter) [phone] -- [phone]               History & Physical      Adria Pittman MD at 04/29/21 2043                [facility] Medicine Admission      Date of Admission: " 4/29/2021      Primary Care Physician: Mark Sheldon APRN      Chief Complaint: Shortness of Breadth    HPI: Mr. Alanis is a 81-year-old gentleman with history of advanced cognitive dementia on high-dose Seroquel, stage III chronic kidney disease, baseline CHF, recently discharged hospital service for what appears to be ESBL infection in his urine.  Patient is just completed course of meropenem.  He is brought in with complaints of incresing issues with acute on chronic shortness of breath.  The patient's been to be hypertensive with systolic blood pressure greater than 200 with evidence of right pleural effusion and evidence of acute on chronic systolic CHF complicated by demand ischemia with troponin up to 0.3.  The patient has denies any chest pain or chest pressure.  He is currently fairly agitated and unable to provide any information regarding the events surrounding this admission.      Concurrent Medical History:  has a past medical history of Asthma, Benign prostatic hyperplasia, CHF (congestive heart failure) (CMS/[facility]), Coronary artery disease, Diabetes mellitus (CMS/[facility]), GERD (gastroesophageal reflux disease), Hyperlipidemia, Hypertension, Prostate disorder, Renal insufficiency, and Urinary tract infection.    Past Surgical History:  has a past surgical history that includes Back surgery; Prostate surgery; Knee surgery (Left); and Cystoscopy (N/A, 2/5/2021).    Family History: family history is not on file.  Unable to complete secondary patient compliance due to patient's issues with advanced cognitive dementia    Social History:  reports that he has never smoked. He does not have any smokeless tobacco history on file. He reports that he does not drink alcohol and does not use drugs.    Allergies:   Allergies   Allergen Reactions   • Contrast Dye        Medications:   Prior to Admission medications    Medication Sig Start Date End Date Taking? Authorizing Provider   acetaminophen (TYLENOL) 325 MG  tablet Take 2 tablets by mouth Every 6 (Six) Hours As Needed for Mild Pain . 10/13/20   Nicole Canseco APRN   amiodarone (PACERONE) 200 MG tablet Take 1 tablet by mouth Daily. 4/23/21   Nicole Canseco APRN   atorvastatin (LIPITOR) 40 MG tablet Take 1 tablet by mouth Every Night. 8/28/20   Anup Atkinson MD   brimonidine (ALPHAGAN P) 0.1 % solution ophthalmic solution Administer 1 drop into the left eye 3 (Three) Times a Day. 1/26/16   Selina Rosario MD   brinzolamide (AZOPT) 1 % ophthalmic suspension Administer 1 drop to both eyes 3 (Three) Times a Day. 1/26/16   Selina Rosario MD   budesonide (PULMICORT) 0.5 MG/2ML nebulizer solution Take 0.5 mg by nebulization Daily. 1/26/16   Selina Rosario MD   carvedilol (COREG) 3.125 MG tablet Take 1 tablet by mouth 2 (Two) Times a Day With Meals. Hold if heart rate less than 60 4/22/21   Nicole Canseco APRN   Emollient (Bag Balm) ointment ointment Apply 1 application topically to the appropriate area as directed 2 (Two) Times a Day. 4/22/21   Nicole Canseco APRN   fluticasone-salmeterol (ADVAIR) 250-50 MCG/DOSE DISKUS Inhale 1 puff 2 (Two) Times a Day.    ProviderSelina MD   haloperidol (HALDOL) 1 MG tablet Take 1 tablet by mouth 3 (Three) Times a Day As Needed for Agitation. 4/22/21   Nicole Canseco APRN   hydrALAZINE (APRESOLINE) 50 MG tablet Take 1 tablet by mouth Every 8 (Eight) Hours. 2/9/21   Ralph Weaver APRN   hydrocortisone-bacitracin-zinc oxide-nystatin (MAGIC BARRIER) Apply 1 application topically to the appropriate area as directed As Needed (for skin breakdown prevention). 4/22/21   Nicole Canseco APRN   ipratropium-albuterol (DUO-NEB) 0.5-2.5 mg/3 ml nebulizer Take 3 mL by nebulization Every 4 (Four) Hours As Needed for Wheezing or Shortness of Air. 10/13/20   Nicole Canseco APRN   isosorbide mononitrate (IMDUR) 30 MG 24 hr tablet Take 1 tablet by mouth Daily. 8/29/20   Anup Atkinson MD   latanoprost  (XALATAN) 0.005 % ophthalmic solution Administer 1 drop to both eyes Every Night. 1/26/16   Selina Rosario MD   meropenem (MERREM) 500mg/100 mL 0.9% NS IVPB (mbp) Infuse 100 mL into a venous catheter Every 8 (Eight) Hours for 19 doses. Indications: Urinary Tract Infection 4/21/21 4/28/21  Ralph Weaver APRN   minoxidil (LONITEN) 2.5 MG tablet Take 2.5 mg by mouth Daily.    Selina Rosario MD   pantoprazole (PROTONIX) 40 MG EC tablet Take 40 mg by mouth Daily.    Selina Rosario MD   QUEtiapine (SEROquel) 50 MG tablet Take 100 mg by mouth Every Night.    Selina Rosario MD   QUEtiapine (SEROquel) 50 MG tablet Take 50 mg by mouth Every Morning.    Selina Rosario MD   bacitracin 500 UNIT/GM ointment Apply  topically to the appropriate area as directed Daily. 4/23/21 4/29/21  Nicole Canseco APRN   furosemide (LASIX) 40 MG tablet Take 40 mg by mouth Daily.  4/29/21  Selina Rosario MD   warfarin (COUMADIN) 3 MG tablet Take 3 mg by mouth Take As Directed.  4/29/21  Selina Rosario MD       Review of Systems:  Review of Systems   Musculoskeletal: Positive for gait problem.      Otherwise complete ROS is negative except as mentioned above.    Physical Exam:   Temp:  [98 °F (36.7 °C)] 98 °F (36.7 °C)  Heart Rate:  [68-77] 71  Resp:  [17] 17  BP: (165-232)/(85-98) 201/93  Physical Exam    General: Well developed well nourished male agitated in bed. Asked that I not examine him multiple times. Lying on his left side. Clearly demented  HEENT: Anicteric with dry mucous membranes  Neck: supple without any lymphadenopathy or visible thyromegaly  Chest: Symmetric  Pulmonary: Diminished on the right side with crackles bilaterally   cardiovascular: Regular rate with normal S1/S  Gastrointestinal: Hypoactive bowel sounds soft and nontender   extremities: No edema pulses present distally   skin: Areas of skin breakdown  Neuro: Moving upper and lower extremity spontaneously unable  to complete full neuro exam due to patient compliance as he is fairly agitated   psychiatric: Agitated     results Reviewed:  I have personally reviewed current lab, radiology, and data and agree with results.  Lab Results (last 24 hours)     Procedure Component Value Units Date/Time    COVID-19, [facility] IN-HOUSE, NP SWAB IN TRANSPORT MEDIA 8-10 HR TAT - Swab, Nasopharynx [[id number]] Collected: 04/29/21 1945    Specimen: Swab from Nasopharynx Updated: 04/29/21 1949    [place] Draw [[id number]] Collected: 04/29/21 1727    Specimen: Blood from Arm, Right Updated: 04/29/21 1830    Narrative:      The following orders were created for panel order [place] Draw.  Procedure                               Abnormality         Status                     ---------                               -----------         ------                     Light Blue Top[[id number]]                                   Final result               Green Top (Gel)[[id number]]                                  Final result               Lavender Top[[id number]]                                     Final result               Gold Top - SST[[id number]]                                   Final result                 Please view results for these tests on the individual orders.    Gold Top - SST [[id number]] Collected: 04/29/21 1727    Specimen: Blood from Arm, Right Updated: 04/29/21 1830     Extra Tube Hold for add-ons.     Comment: Auto resulted.       Green Top (Gel) [[id number]] Collected: 04/29/21 1728    Specimen: Blood from Arm, Right Updated: 04/29/21 1830     Extra Tube Hold for add-ons.     Comment: Auto resulted.       Lavender Top [[id number]] Collected: 04/29/21 1728    Specimen: Blood from Arm, Right Updated: 04/29/21 1830     Extra Tube hold for add-on     Comment: Auto resulted       Light Blue Top [[id number]] Collected: 04/29/21 1727    Specimen: Blood from Arm, Right Updated: 04/29/21 1830     Extra Tube hold for add-on     Comment: Auto resulted        CBC & Differential [[id number]]  (Abnormal) Collected: 04/29/21 1728    Specimen: Blood from Arm, Right Updated: 04/29/21 1821    Narrative:      The following orders were created for panel order CBC & Differential.  Procedure                               Abnormality         Status                     ---------                               -----------         ------                     Scan Slide[[id number]]                                       Final result               CBC Auto Differential[[id number]]        Abnormal            Final result                 Please view results for these tests on the individual orders.    Scan Slide [[id number]] Collected: 04/29/21 1728    Specimen: Blood from Arm, Right Updated: 04/29/21 1821     Acanthocytes Slight/1+     Anisocytosis Slight/1+     Helmet Cells Slight/1+     Hypochromia Slight/1+     Target Cells Slight/1+     WBC Morphology Normal     Platelet Morphology Normal    BNP [[id number]]  (Abnormal) Collected: 04/29/21 1728    Specimen: Blood from Arm, Right Updated: 04/29/21 1819     proBNP >70,000.0 pg/mL     Narrative:      Among patients with dyspnea, NT-proBNP is highly sensitive for the detection of acute congestive heart failure. In addition NT-proBNP of <300 pg/ml effectively rules out acute congestive heart failure with 99% negative predictive value.    Results may be falsely decreased if patient taking Biotin.      aPTT [[id number]]  (Abnormal) Collected: 04/29/21 1727    Specimen: Blood from Arm, Right Updated: 04/29/21 1815     PTT 48.4 seconds     Narrative:      The recommended Heparin therapeutic range is 68-97 seconds.    Protime-INR [[id number]]  (Abnormal) Collected: 04/29/21 1727    Specimen: Blood from Arm, Right Updated: 04/29/21 1815     Protime 39.1 Seconds      INR 3.67    Narrative:      Therapeutic range for most indications is 2.0-3.0 INR,  or 2.5-3.5 for mechanical heart valves.    Urinalysis, Microscopic Only - Urine, Catheter [[id number]]   (Abnormal) Collected: 04/29/21 1736    Specimen: Urine, Catheter Updated: 04/29/21 1813     RBC, UA 6-12 /HPF      WBC, UA 3-5 /HPF      Bacteria, UA 1+ /HPF      Squamous Epithelial Cells, UA 0-2 /HPF      Hyaline Casts, UA 21-30 /LPF      Methodology Manual Light Microscopy    Comprehensive Metabolic Panel [[id number]]  (Abnormal) Collected: 04/29/21 1728    Specimen: Blood from Arm, Right Updated: 04/29/21 1813     Glucose 158 mg/dL      BUN 53 mg/dL      Creatinine 2.39 mg/dL      Sodium 137 mmol/L      Potassium 4.5 mmol/L      Comment: Slight hemolysis detected by analyzer. Results may be affected.        Chloride 104 mmol/L      CO2 28.0 mmol/L      Calcium 8.0 mg/dL      Total Protein 6.7 g/dL      Albumin 2.50 g/dL      ALT (SGPT) 6 U/L      AST (SGOT) 19 U/L      Alkaline Phosphatase 83 U/L      Total Bilirubin 0.4 mg/dL      eGFR  African Amer 32 mL/min/1.73      Globulin 4.2 gm/dL      A/G Ratio 0.6 g/dL      BUN/Creatinine Ratio 22.2     Anion Gap 5.0 mmol/L     Narrative:      GFR Normal >60  Chronic Kidney Disease <60  Kidney Failure <15      Blood Gas, Arterial - [[id number]]  (Abnormal) Collected: 04/29/21 1806    Specimen: Arterial Blood Updated: 04/29/21 1811     Site Left Radial     Robert's Test N/A     pH, Arterial 7.401 pH units      pCO2, Arterial 43.2 mm Hg      pO2, Arterial 198.0 mm Hg      Comment: 83 Value above reference range        HCO3, Arterial 26.8 mmol/L      Comment: 83 Value above reference range        Base Excess, Arterial 1.7 mmol/L      O2 Saturation, Arterial >100.0 %      Comment: 93 Value above reportable range > 100.0        Barometric Pressure for Blood Gas 746 mmHg      Modality Nasal Cannula     Flow Rate 6.0 lpm      Ventilator Mode NA     Collected by      Comment: Meter: [id number]     :  [id number]       Blood Culture - Blood, Arm, Right [[id number]] Collected: 04/29/21 1811    Specimen: Blood from Arm, Right Updated: 04/29/21 1811    Blood Culture -  Blood, Arm, Left [[id number]] Collected: 04/29/21 1810    Specimen: Blood from Arm, Left Updated: 04/29/21 1811    Troponin [[id number]]  (Abnormal) Collected: 04/29/21 1728    Specimen: Blood from Arm, Right Updated: 04/29/21 1810     Troponin T 0.300 ng/mL     Narrative:      Troponin T Reference Range:  <= 0.03 ng/mL-   Negative for AMI  >0.03 ng/mL-     Abnormal for myocardial necrosis.  Clinicians would have to utilize clinical acumen, EKG, Troponin and serial changes to determine if it is an Acute Myocardial Infarction or myocardial injury due to an underlying chronic condition.       Results may be falsely decreased if patient taking Biotin.      Magnesium [[id number]]  (Normal) Collected: 04/29/21 1728    Specimen: Blood from Arm, Right Updated: 04/29/21 1808     Magnesium 1.9 mg/dL     Lactic Acid, Plasma [[id number]]  (Normal) Collected: 04/29/21 1728    Specimen: Blood Updated: 04/29/21 1804     Lactate 1.5 mmol/L     Urinalysis With Culture If Indicated - Urine, Catheter [[id number]]  (Abnormal) Collected: 04/29/21 1736    Specimen: Urine, Catheter Updated: 04/29/21 1754     Color, UA Yellow     Appearance, UA Cloudy     pH, UA <=5.0     Specific Gravity, UA 1.014     Glucose, UA Negative     Ketones, UA Negative     Bilirubin, UA Small (1+)     Blood, UA Negative     Protein, UA 30 mg/dL (1+)     Leuk Esterase, UA Small (1+)     Nitrite, UA Negative     Urobilinogen, UA 0.2 E.U./dL    CBC Auto Differential [[id number]]  (Abnormal) Collected: 04/29/21 1728    Specimen: Blood from Arm, Right Updated: 04/29/21 1754     WBC 3.35 10*3/mm3      RBC 4.09 10*6/mm3      Hemoglobin 10.3 g/dL      Hematocrit 31.4 %      MCV 76.8 fL      MCH 25.2 pg      MCHC 32.8 g/dL      RDW 18.6 %      RDW-SD 49.1 fl      MPV --     Comment: INSTRUMENT UNABLE TO CALCULATE SLIDE SCAN TO FOLLOW        Platelets 158 10*3/mm3      Neutrophil % 71.9 %      Lymphocyte % 16.4 %      Monocyte % 9.6 %      Eosinophil % 1.2 %      Basophil %  0.3 %      Immature Grans % 0.6 %      Neutrophils, Absolute 2.41 10*3/mm3      Lymphocytes, Absolute 0.55 10*3/mm3      Monocytes, Absolute 0.32 10*3/mm3      Eosinophils, Absolute 0.04 10*3/mm3      Basophils, Absolute 0.01 10*3/mm3      Immature Grans, Absolute 0.02 10*3/mm3      nRBC 0.0 /100 WBC         Imaging Results (Last 24 Hours)     Procedure Component Value Units Date/Time    CT Chest Without Contrast Diagnostic [[id number]] Collected: 04/29/21 1826     Updated: 04/29/21 1929    Narrative:      CT Chest Without Contrast    History: Shortness of air. Altered mental status.    Axial spiral scans of the chest were obtained without and with   intravenous contrast.  Coronal and sagital reconstructions were  preformed.    This exam was performed according to our departmental  dose-optimization program, which includes automated exposure  control, adjustment of the mA and/or kV according to patient size  and/or use of iterative reconstruction technique.    DLP: 521.30    Comparison: April 16, 2021. Correlation earlier chest radiograph.    Findings:  Large bilateral pleural effusions with compressive atelectasis in  each upper lobe, right middle lobe and right lower lobe and  complete compressive atelectasis of the left lower lobe.  No mass or noncalcified nodule.  No pneumothorax.    No hilar, mediastinal or axillary adenopathy.  No thoracic aortic aneurysm.  No pericardial effusion.  Cardiomegaly.  Coronary artery calcifications.  Pulmonary vascular congestion.    Distended gallbladder with increased density of the bile.    No acute osseous abnormality.  Degenerative changes in the thoracic spine.      Impression:      Conclusion:  Large bilateral pleural effusions with compressive atelectasis in  each upper lobe, right middle lobe and right lower lobe and  complete compressive atelectasis of the left lower lobe.  Cardiomegaly.  Coronary artery calcifications.  Pulmonary vascular congestion.  Distended  gallbladder with increased density of the bile.    [id number]    Electronically signed by:  Chris Srinivasan MD  4/29/2021 7:28 PM CDT  Workstation: [id number]    CT Head Without Contrast [[id number]] Collected: 04/29/21 1826     Updated: 04/29/21 1903    Narrative:      Confusion.    CT head without intravenous contrast.    Comparison is made with study dated June 7, 2017.    Radiation dose-limiting techniques also utilized, including  automated exposure control and adjustment of mA and/or KVP to the  patient size according to ALARA (as low as reasonably  achievable).    There is diffuse cerebral atrophy with dilatation of ventricular  system. No midline shift is identified. There are periventricular  and white matter hypodensities. There is encephalomalacia seen in  the occipital lobes bilaterally consistent with old infarctions.  There is no evidence of acute ischemia, intracranial hemorrhage  or mass. No acute abnormality is seen in the posterior fossa. No  pituitary lesion is identified.    The visualized sinuses are clear. The mastoid cells are partially  opacified. The calvarium is intact.      Impression:      CONCLUSION:  1. No acute intracranial abnormality is identified.  2. Atrophy and microvascular changes.  3. 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    Electronically signed by:  You Flores MD  4/29/2021 7:02  PM CDT Workstation: [id number]    XR Chest 1 View [[id number]] Collected: 04/29/21 1731     Updated: 04/29/21 1748    Narrative:        PORTABLE CHEST    HISTORY: Altered mental status    Portable AP semierect film of the chest was obtained at 4:59 PM.  COMPARISON: April 19, 2021    FINDINGS:   EKG leads.  Right PICC line terminating in the right axilla.  Moderate-sized left pleural effusion with fluid in the left apex  and along the left lateral chest wall.  Increased opacity right hemithorax which may be artifactual or in  part artifactual and secondary to infiltrate or edema.  Recommend  follow-up study.  Stable cardiomegaly.  Pulmonary vascular congestion.  No pneumothorax.  No acute osseous abnormality.  Minimal dextroscoliosis thoracic spine.      Impression:      CONCLUSION:  Right PICC line terminating in the right axilla.  Moderate-sized left pleural effusion with fluid in the left apex  and along the left lateral chest wall.  Increased opacity right hemithorax which may be artifactual or in  part artifactual and secondary to infiltrate or edema.  Recommend follow-up study.  Stable cardiomegaly.  Pulmonary vascular congestion.    [id number]    Electronically signed by:  Chris Srinivasan MD  4/29/2021 5:46 PM CDT  Workstation: [id number]            Assessment:    Active Hospital Problems    Diagnosis    • Acute respiratory failure with hypoxia (CMS/HCC)              Plan:  Acute on chronic diastolic CHF complicated by hypertensive urgency and patient with evidence of right pleural effusion with acute on chronic hypoxemic respiratory failure --- suspicion of potential noncompliance and patient who is been refusing medications while in the emergency department.  --- Diurese with IV Lasix 40 mg IV every 6x2 doses follow up creatinine  --- increase baseline home lasix to 80mg daily  --- Suspicion low the patient be able to tolerate underlying thoracentesis with effusion noted to be fairly small based on my evaluation the chest x-ray.  We will still hold the patient's Coumadin acutely should he require any type of procedure.  ----Placed on IV hydralazine 20 mg IV q6h PRN for elevated blood pressure  --- Present the patient's underlying baseline hypertensive medications which include Coreg, minoxidil, Imdur, and oral hydralazine.  --- Plan repeat troponin in a.m.  --- Telemetry monitoring  --- Placed on fluid restriction 1250 mils daily  --- Repeat chest x-ray for a.m.    Chronic Atrial fibrillation  --- continue  patient's home amiodarone with underlying Coreg for rate control  --- Therapeutic holding Coumadin  acutely should the patient prior to type of underlying procedure given evidence of what appears to be a small right pleural effusion patient low though that this require thoracentesis  --- place on telemetry monitoring     Stage III CKD  ---- continue to trend creatinine with diuresis    Advanced baseline cognitive dimentia  --- The patient's baseline Coreg along with Haldol  --- Ativan as needed for anxiety agitation    Prophylaxis: Patient is therapeutic on Coumadin    CODE STATUS: Unable to complete secondary to patient's history of advanced cognitive dementia    Expected length of stay: Patient like to be in the hospital 48 hours as he optimize his medical management given his multimedical issues on admission.      I confirmed that the patient's Advance Care Plan is present, code status is documented, or surrogate decision maker is listed in the patient's medical record.     I have utilized all available immediate resources to obtain, update, or review the patient's current medications.     I discussed the patient's findings and my recommendations with: the patient    Adria Pittman MD                  Electronically signed by Adria Pittman MD at [address] [place] [id number]  Phone:  [phone]  Fax:  [phone] Date: May 2, 2021      Ambulatory Referral to Home Health     Patient:  Ondina Alanis . MRN:  [id number]   [address] [address] :  1940  SSN:    Phone: [phone] Sex:  M      INSURANCE PAYOR PLAN GROUP # SUBSCRIBER ID   Primary:  Secondary:    MEDICARE  [facility] [id number]  [id number]    [id number]  [id number]      Referring Provider Information:  BERNARDA VAUGHN Phone: [phone] Fax: [phone]      Referral Information:   # Visits:  1 Referral Type: Home Health [42]   Urgency:  Routine Referral Reason: Specialty Services Required   Start Date: May 2, 2021 End  Date:  To be determined by Insurer   Diagnosis: Poisoning by warfarin sodium, accidental or unintentional, initial encounter (T45.511A [ICD-10-CM] 964.2,E858.2 [ICD-9-CM])  COPD exacerbation (CMS/[facility]) (J44.1 [ICD-10-CM] 491.21 [ICD-9-CM])  Acute on chronic systolic CHF (congestive heart failure) (CMS/[facility]) (I50.23 [ICD-10-CM] 428.23,428.0 [ICD-9-CM])      Refer to Dept:   Refer to Provider:   Refer to Facility:       Face to Face Visit Date: 5/2/2021  Follow-up provider for Plan of Care? I treated the patient in an acute care facility and will not continue treatment after discharge.  Follow-up provider: THERESA SOLOMON [[id number]]  Reason/Clinical Findings: deconditioning  Describe mobility limitations that make leaving home difficult: no transport  Nursing/Therapeutic Services Requested: Skilled Nursing  Nursing/Therapeutic Services Requested: Physical Therapy  Nursing/Therapeutic Services Requested: Occupational Therapy  Skilled nursing orders: CHF management (INR check)  PT orders: Therapeutic exercise  PT orders: Strengthening  Occupational orders: Activities of daily living  Frequency: 1 Week 1     This document serves as a request of services and does not constitute Insurance authorization or approval of services.  To determine eligibility, please contact the members Insurance carrier to verify and review coverage.     If you have medical questions regarding this request for services. Please contact [address] at [phone] during normal business hours.       Authorizing Provider:Jad Kay MD  Authorizing Provider's NPI: [id number]  Order Entered By: Jad Kay MD 5/2/2021  1:27 PM     Electronically signed by: Jad Kay MD 5/2/2021  1:27 PM

## 2021-05-02 NOTE — DISCHARGE SUMMARY
[facility] Medicine Services  DISCHARGE SUMMARY       Date of Admission: 4/29/2021  Date of Discharge:  5/2/2021  Primary Care Physician: Mark Sheldon APRN    Presenting Problem/History of Present Illness:  NSTEMI (non-ST elevated myocardial infarction) (CMS/[facility]) [I21.4]  Pleural effusion, bilateral [J90]  Hypertensive urgency [I16.0]  Acute respiratory failure with hypoxia (CMS/[facility]) [J96.01]  Dementia without behavioral disturbance, unspecified dementia type (CMS/[facility]) [F03.90]  Anemia, unspecified type [D64.9]  Chronic kidney disease, unspecified CKD stage [N18.9]       Final Discharge Diagnoses:  Active Hospital Problems    Diagnosis    • Acute respiratory failure with hypoxia (CMS/[facility])        Consults:   Consults     Date and Time Order Name Status Description    4/29/2021  7:29 PM Hospitalist (on-call MD unless specified)      4/16/2021  6:12 PM Inpatient Nephrology Consult Completed               Chief Complaint on Day of Discharge: none    Hospital Course:  The patient is a 81 y.o. 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  Patient is just completed course of meropenem.  He is brought in with complaints of incresing issues with acute on chronic shortness of breath.  The patient's been to be hypertensive with systolic blood pressure greater than 200 with evidence of right pleural effusion and evidence of acute on chronic systolic CHF complicated by demand ischemia with troponin up to 0.3.  The patient has denies any chest pain or chest pressure.   Patient's troponin did not trend up and he did not have any more chest pain.  Invasive cardiac work-up was not done because of his dementia.  Patient was admitted to the hospital started on Lasix.  Patient  "diuresed very well and his breathing also improved and he was weaned off of oxygen and was saturating fine on room air.  Patient was stabilized and then was discharged back to home with outpatient follow-up PCP and home health as well.  He will also follow-up with cardiology.    Condition on Discharge: Stable    Physical Exam on Discharge:  /68 (BP Location: Left arm, Patient Position: Lying)   Pulse 59   Temp 97.8 °F (36.6 °C) (Axillary)   Resp 18   Ht 193 cm (76\")   Wt 94.8 kg (209 lb 1.6 oz)   SpO2 97%   BMI 25.45 kg/m²   Physical Exam  Vitals and nursing note reviewed.   Constitutional:       General: He is not in acute distress.     Appearance: He is well-developed. He is not diaphoretic.   HENT:      Head: Normocephalic and atraumatic.   Cardiovascular:      Rate and Rhythm: Normal rate.   Pulmonary:      Effort: Pulmonary effort is normal. No respiratory distress.      Breath sounds: No wheezing.   Abdominal:      General: There is no distension.      Palpations: Abdomen is soft.   Musculoskeletal:         General: Normal range of motion.   Skin:     General: Skin is warm and dry.   Neurological:      Mental Status: He is alert.      Cranial Nerves: No cranial nerve deficit.   Psychiatric:         Behavior: Behavior normal.      Comments: Confusion present           Discharge Disposition:  Home or Self Care    Discharge Medications:     Discharge Medications      New Medications      Instructions Start Date   furosemide 40 MG tablet  Commonly known as: Lasix   40 mg, Oral, Daily         Continue These Medications      Instructions Start Date   acetaminophen 325 MG tablet  Commonly known as: TYLENOL   650 mg, Oral, Every 6 Hours PRN      amiodarone 200 MG tablet  Commonly known as: PACERONE   200 mg, Oral, Every 24 Hours Scheduled      atorvastatin 40 MG tablet  Commonly known as: LIPITOR   40 mg, Oral, Nightly      Bag Balm ointment ointment   1 application, Topical, 2 Times Daily    "   brimonidine 0.1 % solution ophthalmic solution  Commonly known as: ALPHAGAN P   1 drop, Left Eye, 3 Times Daily      brinzolamide 1 % ophthalmic suspension  Commonly known as: AZOPT   1 drop, Both Eyes, 3 Times Daily      budesonide 0.5 MG/2ML nebulizer solution  Commonly known as: PULMICORT   0.5 mg, Nebulization, Daily - RT      carvedilol 3.125 MG tablet  Commonly known as: COREG   3.125 mg, Oral, 2 Times Daily With Meals, Hold if heart rate less than 60      fluticasone-salmeterol 250-50 MCG/DOSE DISKUS  Commonly known as: ADVAIR   1 puff, Inhalation, 2 Times Daily - RT      haloperidol 1 MG tablet  Commonly known as: HALDOL   1 mg, Oral, 3 Times Daily PRN      hydrALAZINE 50 MG tablet  Commonly known as: APRESOLINE   50 mg, Oral, Every 8 Hours Scheduled      hydrocortisone-bacitracin-zinc oxide-nystatin  Commonly known as: MAGIC BARRIER   1 application, Topical, As Needed      ipratropium-albuterol 0.5-2.5 mg/3 ml nebulizer  Commonly known as: DUO-NEB   3 mL, Nebulization, Every 4 Hours PRN      isosorbide mononitrate 30 MG 24 hr tablet  Commonly known as: IMDUR   30 mg, Oral, Every 24 Hours Scheduled      latanoprost 0.005 % ophthalmic solution  Commonly known as: XALATAN   1 drop, Both Eyes, Nightly      minoxidil 2.5 MG tablet  Commonly known as: LONITEN   2.5 mg, Oral, Daily      pantoprazole 40 MG EC tablet  Commonly known as: PROTONIX   40 mg, Oral, Daily      QUEtiapine 50 MG tablet  Commonly known as: SEROquel   100 mg, Oral, Nightly      QUEtiapine 50 MG tablet  Commonly known as: SEROquel   50 mg, Oral, Every Morning      warfarin 3 MG tablet  Commonly known as: COUMADIN   3 mg, Oral, Daily Warfarin         Stop These Medications    meropenem  Commonly known as: MERREM            Discharge Diet:   Diet Instructions     Diet: Cardiac, Renal      Discharge Diet:  Cardiac  Renal             Activity at Discharge:   Activity Instructions     Activity as Tolerated            Discharge Care  Plan/Instructions: Continue with current medications.  Continue monitoring INR through home health.  Continue follow-up with PCP and cardiology.    Follow-up Appointments: Appointment orders for PCP and cardiology made  No future appointments.    Test Results Pending at Discharge:   Pending Labs     Order Current Status    Blood Culture - Blood, Arm, Left Preliminary result    Blood Culture - Blood, Arm, Right Preliminary result          Jad Kay MD  05/02/21  13:28 CDT

## 2021-05-02 NOTE — PROGRESS NOTES
"Physicians Statement of Medical Necessity for  Ambulance Transportation    GENERAL INFORMATION     Name: Ondina Alanis Sr.  YOB: 1940  Medicare #: [id number]  Transport Date: 5/2/2021 (Valid for round trips this date, or for scheduled repetitive trips for 60 days from the date signed below.)  Origin: [place] [address]  Destination: [address] [place] [address]  Is the Patient's stay covered under Medicare Part A (PPS/DRG?)Yes  Closest appropriate facility? Yes  If this a hosp-hosp transfer? No  Is this a hospice patient? No    MEDICAL NECESSITY QUESTIONAIRE    Ambulance Transportation is medically necessary only if other means of transportation are contraindicated or would be potentially harmful to the patient.  To meet this requirement, the patient must be either \"bed confined\" or suffer from a condition such that transport by means other than an ambulance is contraindicated by the patient's condition.  The following questions must be answered by the healthcare professional signing below for this form to be valid:     1) Describe the MEDICAL CONDITION (physical and/or mental) of this patient AT THE TIME OF AMBULANCE TRANSPORT that requires the patient to be transported in an ambulance, and why transport by other means is contraindicated by the patient's condition:     Patient has advanced cognitive dementia, currently confused, stage 3 CKD, CHF.  Perineum pressure injury, bilateral posterior gulteal injuries  Past Medical History:   Diagnosis Date   • Asthma    • Benign prostatic hyperplasia    • CHF (congestive heart failure) (CMS/HCC)    • Coronary artery disease    • Diabetes mellitus (CMS/HCC)    • GERD (gastroesophageal reflux disease)    • Hyperlipidemia    • Hypertension    • Prostate disorder    • Renal insufficiency    • Urinary tract infection       Past Surgical History:   Procedure Laterality Date   • BACK SURGERY     • CYSTOSCOPY N/A 2/5/2021    " "Procedure: CYSTOSCOPY WITH CATHETER PLACEMENT;  Surgeon: Keely, Orlin TRIMBLE MD;  Location: [facility];  Service: Urology;  Laterality: N/A;   • KNEE SURGERY Left    • PROSTATE SURGERY        2) Is this patient \"bed confined\" as defined below?Yes   To be \"bed confined\" the patient must satisfy all three of the following criteria:  (1) unable to get up from bed without assistance; AND (2) unable to ambulate;  AND (3) unable to sit in a chair or wheelchair.  3) Can this patient safely be transported by car or wheelchair van (I.e., may safely sit during transport, without an attendant or monitoring?)No   4. In addition to completing questions 1-3 above, please check any of the following conditions that apply*:          *Note: supporting documentation for any boxes checked must be maintained in the patient's medical records Patient is confused, Medical attendant required, Unable to tolerate seated position for time needed to transport and Unable to sit in a chair or wheelchair due to decubitus ulcers or other wounds      SIGNATURE OF PHYSICIAN OR OTHER AUTHORIZED HEALTHCARE PROFESSIONAL    I certify that the above information is true and correct based on my evaluation of this patient, and represent that the patient requires transport by ambulance and that other forms of transport are contraindicated.  I understand that this information will be used by the Centers for Medicare and Medicaid Services (CMS) to support the determiniation of medical necessity for ambulance services, and I represent that I have personal knowledge of the patient's condition at the time of transport.       If this box is checked, I also certify that the patient is physically or mentally incapable of signing the ambulance service's claim form and that the institution with which I am affiliated has furnished care, services or assistance to the patient.  My signature below is made on behalf of the patient pursuant to 42 .36(b)(4). In accordance " with 42 .37, the specific reason(s) that the patient is physically or mentally incapable of signing the claim for is as follows:     Signature of Physician or Healthcare Professional     ERLIN Lyons Date/Time:     5/2/2021     (For Scheduled repetitive transport, this form is not valid for transports performed more than 60 days after this date).                                                                                                                                            --------------------------------------------------------------------------------------------  Printed Name and Credentials of Physician or Authorized Healthcare Professional     Form must be signed by patient's attending physician for scheduled, repetitive transports,.  For non-repetitive ambulance transports, if unable to obtain the signature of the attending physician, any of the following may sign (please select below):     Physician  Clinical Nurse Specialist  Registered Nurse     Physician Assistant x Discharge Planner  Licensed Practical Nurse     Nurse Practitioner x

## 2021-05-02 NOTE — DISCHARGE PLACEMENT REQUEST
"Nadir Alanis Sr. (81 y.o. Male)     Date of Birth Social Security Number Address Home Phone MRN    1940  [address] [address] [phone] [id number]    Roman [employer] Marital Status          Anabaptism        Admission Date Admission Type Admitting Provider Attending Provider Department, Room/Bed    4/29/21 Emergency Kian Waggoner MD Williams, Kevin L, MD [address], [address]    Discharge Date Discharge Disposition Discharge Destination         Home or Self Care              Attending Provider: Kian Waggoner MD    Allergies: Contrast Dye    Isolation: None   Infection: None   Code Status: CPR    Ht: 193 cm (76\")   Wt: 94.8 kg (209 lb 1.6 oz)    Admission Cmt: None   Principal Problem: None                Active Insurance as of 4/29/2021     Primary Coverage     Payor Plan Insurance Group Employer/Plan Group    MEDICARE MEDICARE A & B      Payor Plan Address Payor Plan Phone Number Payor Plan Fax Number Effective Dates    [address] [phone]  11/1/1976 - None Entered    [facility] [address]       Subscriber Name Subscriber Birth Date Member ID       NADIR ALANIS SR. 1940 [id number]           Secondary Coverage     Payor Plan Insurance Group Employer/Plan Group    [facility] [id number]     Payor Plan Address Payor Plan Phone Number Payor Plan Fax Number Effective Dates    [address] [address]   1/23/2019 - None Entered    [facility] [address]       Subscriber Name Subscriber Birth Date Member ID       NADIR ALANIS SR. 1940 [id number]                 Emergency Contacts      (Rel.) Home Phone Work Phone Mobile Phone    AlanisLamar (Spouse) [phone] -- [phone]    Cammy Alanis (Daughter) [phone] -- [phone]              "

## 2021-05-03 NOTE — OUTREACH NOTE
Prep Survey      Responses   [facility] facility patient discharged from?  [facility]   Is LACE score < 7 ?  No   Emergency Room discharge w/ pulse ox?  No   Eligibility  Readm Mgmt   Discharge diagnosis  Acute respiratory failure with hypoxia,  bilateral pleural effusion,  hypertensive urgency   Does the patient have one of the following disease processes/diagnoses(primary or secondary)?  Other   Does the patient have Home health ordered?  Yes   What is the Home health agency?   [facility]   Is there a DME ordered?  No   Prep survey completed?  Yes          Loan Mehean RN

## 2021-05-05 NOTE — OUTREACH NOTE
Medical Week 1 Survey      Responses   [facility] patient discharged from?  [facility]   Does the patient have one of the following disease processes/diagnoses(primary or secondary)?  Other   Week 1 attempt successful?  Yes   Call start time  1558   Call end time  1603   Is patient permission given to speak with other caregiver?  Yes   List who call center can speak with  Wife Lamar    Person spoke with today (if not patient) and relationship  Wife Lamar    Meds reviewed with patient/caregiver?  Yes   Is the patient having any side effects they believe may be caused by any medication additions or changes?  No   Does the patient have all medications ordered at discharge?  Yes   Is the patient taking all medications as directed (includes completed medication regime)?  Yes   Does the patient have a primary care provider?   Yes   Comments regarding PCP  [facility] patient in the home per spouse - spouse interested in oxygen for patient-instructed the wife to ask the doctor about oxygen    What is the Home health agency?   [facility] health comments  HH still coming    Psychosocial issues?  No   What is the patient's perception of their health status since discharge?  Improving   Week 1 call completed?  Yes   Wrap up additional comments  Instructed patient's wife to ask doctor that sees patient about oxygen-wife is interested in having some available as needed for patient if he ever gets in distress again           Hilary Norman RN

## 2021-05-11 NOTE — OUTREACH NOTE
Medical Week 2 Survey      Responses   [facility] patient discharged from?  [facility]   Does the patient have one of the following disease processes/diagnoses(primary or secondary)?  Other   Week 2 attempt successful?  Yes   Call start time  1559   Discharge diagnosis  Acute respiratory failure with hypoxia,  bilateral pleural effusion,  hypertensive urgency   Call end time  1604   Person spoke with today (if not patient) and relationship  Wife Lamar Foley reviewed with patient/caregiver?  Yes   Is the patient taking all medications as directed (includes completed medication regime)?  Yes   Has the patient kept scheduled appointments due by today?  Yes   What is the Home health agency?   [facility] health comments  Home Health took blood specimen today   Comments  Wife says urine is a little darker, will try some more oral fluids if he will drink them.   What is the patient's perception of their health status since discharge?  New symptoms unrelated to diagnosis [Having diarrhea, real soft,  for 2 days]   Week 2 Call Completed?  Yes          Sylvia Sanders RN

## 2021-05-18 PROBLEM — N39.0 URINARY TRACT INFECTION WITHOUT HEMATURIA: Status: ACTIVE | Noted: 2021-01-01

## 2021-05-18 NOTE — OUTREACH NOTE
Medical Week 3 Survey      Responses   [facility] patient discharged from?  [place]   Does the patient have one of the following disease processes/diagnoses(primary or secondary)?  Other   Week 3 attempt successful?  No   Revoke  Readmitted          Salena Solano RN

## 2023-02-09 NOTE — ADDENDUM NOTE
Addendum  created 02/05/21 1055 by Pilo Kate CRNA    Clinical Note Signed       Unna Boot Text: An Unna boot was placed to help immobilize the limb and facilitate more rapid healing.

## 2025-01-21 NOTE — PLAN OF CARE
All prescriptions sent to [facility] for refill.    Goal Outcome Evaluation:     Progress: no change  Outcome Summary: Pt admitted from ER to SDU for acute/chronic CHF and hypothermia.  [place] warmer applied.  Lasix given.  Chronic burleson in place.  VSS.

## (undated) DEVICE — SOL IRRG H2O PL/BG 1000ML STRL

## (undated) DEVICE — DRAINBAG,ANTI-REFLUX TOWER,L/F,2000ML,LL: Brand: MEDLINE

## (undated) DEVICE — SYR LL TP 10ML STRL

## (undated) DEVICE — SOL PVPI SPRY BETADINE 3OZ

## (undated) DEVICE — EVAC BLDR UROVAC W ADAPT

## (undated) DEVICE — SOL IRR H2O BTL 1000ML STRL

## (undated) DEVICE — STERILE POLYISOPRENE POWDER-FREE SURGICAL GLOVES WITH EMOLLIENT COATING: Brand: PROTEXIS

## (undated) DEVICE — CONTAINER,SPECIMEN,OR STERILE,4OZ: Brand: MEDLINE

## (undated) DEVICE — GLV SURG SENSICARE PI ORTHO SZ7.5 LF STRL

## (undated) DEVICE — PK CYSTO LF 60

## (undated) DEVICE — GLV SURG NEOLON 2G PF LF 7.5 STRL

## (undated) DEVICE — SILICONE ELASTOMER COATED LATEX FOLEY CATHETER, 30 CC, 3-WAY, 22 FR (7.3 MM): Brand: DOVER

## (undated) DEVICE — IRRIGATOR TOOMEY 70CC

## (undated) DEVICE — GW PTFE FIX/CORE FLXTIP .038 3X150CM